# Patient Record
Sex: FEMALE | Race: WHITE | HISPANIC OR LATINO | Employment: UNEMPLOYED | URBAN - METROPOLITAN AREA
[De-identification: names, ages, dates, MRNs, and addresses within clinical notes are randomized per-mention and may not be internally consistent; named-entity substitution may affect disease eponyms.]

---

## 2017-01-05 ENCOUNTER — ALLSCRIPTS OFFICE VISIT (OUTPATIENT)
Dept: OTHER | Facility: OTHER | Age: 62
End: 2017-01-05

## 2017-01-18 ENCOUNTER — ALLSCRIPTS OFFICE VISIT (OUTPATIENT)
Dept: OTHER | Facility: OTHER | Age: 62
End: 2017-01-18

## 2017-02-01 ENCOUNTER — HOSPITAL ENCOUNTER (EMERGENCY)
Facility: HOSPITAL | Age: 62
Discharge: HOME | End: 2017-02-01
Attending: EMERGENCY MEDICINE | Admitting: EMERGENCY MEDICINE
Payer: COMMERCIAL

## 2017-02-01 VITALS
TEMPERATURE: 98.7 F | SYSTOLIC BLOOD PRESSURE: 154 MMHG | HEART RATE: 74 BPM | WEIGHT: 167 LBS | OXYGEN SATURATION: 95 % | BODY MASS INDEX: 27.79 KG/M2 | RESPIRATION RATE: 18 BRPM | DIASTOLIC BLOOD PRESSURE: 65 MMHG

## 2017-02-01 DIAGNOSIS — R59.0 CERVICAL ADENOPATHY: ICD-10-CM

## 2017-02-01 DIAGNOSIS — R21 RASH: Primary | ICD-10-CM

## 2017-02-01 LAB
ALBUMIN SERPL BCP-MCNC: 3.9 G/DL (ref 3.5–5)
ALP SERPL-CCNC: 80 U/L (ref 46–116)
ALT SERPL W P-5'-P-CCNC: 44 U/L (ref 12–78)
ANION GAP SERPL CALCULATED.3IONS-SCNC: 9 MMOL/L (ref 4–13)
AST SERPL W P-5'-P-CCNC: 22 U/L (ref 5–45)
BASOPHILS # BLD AUTO: 0.1 THOUSANDS/ΜL (ref 0–0.1)
BASOPHILS NFR BLD AUTO: 1 % (ref 0–1)
BILIRUB SERPL-MCNC: 0.2 MG/DL (ref 0.2–1)
BUN SERPL-MCNC: 11 MG/DL (ref 5–25)
CALCIUM SERPL-MCNC: 9.4 MG/DL (ref 8.3–10.1)
CHLORIDE SERPL-SCNC: 101 MMOL/L (ref 100–108)
CO2 SERPL-SCNC: 28 MMOL/L (ref 21–32)
CREAT SERPL-MCNC: 0.84 MG/DL (ref 0.6–1.3)
EOSINOPHIL # BLD AUTO: 0.2 THOUSAND/ΜL (ref 0–0.61)
EOSINOPHIL NFR BLD AUTO: 2 % (ref 0–6)
ERYTHROCYTE [DISTWIDTH] IN BLOOD BY AUTOMATED COUNT: 12.5 % (ref 11.6–15.1)
ERYTHROCYTE [SEDIMENTATION RATE] IN BLOOD: 34 MM/HOUR (ref 2–25)
GFR SERPL CREATININE-BSD FRML MDRD: >60 ML/MIN/1.73SQ M
GLUCOSE SERPL-MCNC: 90 MG/DL (ref 65–140)
HCT VFR BLD AUTO: 41.8 % (ref 37–47)
HGB BLD-MCNC: 13.8 G/DL (ref 12–16)
LYMPHOCYTES # BLD AUTO: 3 THOUSANDS/ΜL (ref 0.6–4.47)
LYMPHOCYTES NFR BLD AUTO: 32 % (ref 14–44)
MCH RBC QN AUTO: 30.2 PG (ref 27–31)
MCHC RBC AUTO-ENTMCNC: 33.1 G/DL (ref 31.4–37.4)
MCV RBC AUTO: 91 FL (ref 82–98)
MONOCYTES # BLD AUTO: 0.9 THOUSAND/ΜL (ref 0.17–1.22)
MONOCYTES NFR BLD AUTO: 9 % (ref 4–12)
NEUTROPHILS # BLD AUTO: 5.2 THOUSANDS/ΜL (ref 1.85–7.62)
NEUTS SEG NFR BLD AUTO: 55 % (ref 43–75)
PLATELET # BLD AUTO: 354 THOUSANDS/UL (ref 130–400)
PMV BLD AUTO: 8.1 FL (ref 8.9–12.7)
POTASSIUM SERPL-SCNC: 4.1 MMOL/L (ref 3.5–5.3)
PROT SERPL-MCNC: 8.7 G/DL (ref 6.4–8.2)
RBC # BLD AUTO: 4.57 MILLION/UL (ref 4.2–5.4)
SODIUM SERPL-SCNC: 138 MMOL/L (ref 136–145)
WBC # BLD AUTO: 9.4 THOUSAND/UL (ref 4.8–10.8)

## 2017-02-01 PROCEDURE — 36415 COLL VENOUS BLD VENIPUNCTURE: CPT | Performed by: EMERGENCY MEDICINE

## 2017-02-01 PROCEDURE — 99283 EMERGENCY DEPT VISIT LOW MDM: CPT

## 2017-02-01 PROCEDURE — 80053 COMPREHEN METABOLIC PANEL: CPT | Performed by: EMERGENCY MEDICINE

## 2017-02-01 PROCEDURE — 86617 LYME DISEASE ANTIBODY: CPT | Performed by: EMERGENCY MEDICINE

## 2017-02-01 PROCEDURE — 85652 RBC SED RATE AUTOMATED: CPT | Performed by: EMERGENCY MEDICINE

## 2017-02-01 PROCEDURE — 85025 COMPLETE CBC W/AUTO DIFF WBC: CPT | Performed by: EMERGENCY MEDICINE

## 2017-02-01 RX ORDER — MUPIROCIN CALCIUM 20 MG/G
1 CREAM TOPICAL 3 TIMES DAILY
Qty: 15 G | Refills: 0 | Status: SHIPPED | OUTPATIENT
Start: 2017-02-01 | End: 2017-05-10

## 2017-02-01 RX ORDER — SULFAMETHOXAZOLE AND TRIMETHOPRIM 800; 160 MG/1; MG/1
1 TABLET ORAL 2 TIMES DAILY
Qty: 14 TABLET | Refills: 0 | Status: SHIPPED | OUTPATIENT
Start: 2017-02-01 | End: 2017-02-08

## 2017-02-01 RX ORDER — ESOMEPRAZOLE MAGNESIUM 40 MG/1
CAPSULE, DELAYED RELEASE ORAL DAILY
COMMUNITY
Start: 2015-09-15 | End: 2018-01-20

## 2017-02-01 RX ORDER — SULFAMETHOXAZOLE AND TRIMETHOPRIM 800; 160 MG/1; MG/1
1 TABLET ORAL ONCE
Status: COMPLETED | OUTPATIENT
Start: 2017-02-01 | End: 2017-02-01

## 2017-02-01 RX ADMIN — SULFAMETHOXAZOLE AND TRIMETHOPRIM 1 TABLET: 800; 160 TABLET ORAL at 20:22

## 2017-02-03 LAB
B BURGDOR IGG PATRN SER IB-IMP: NEGATIVE
B BURGDOR IGM PATRN SER IB-IMP: NEGATIVE
B BURGDOR18KD IGG SER QL IB: NORMAL
B BURGDOR23KD IGG SER QL IB: NORMAL
B BURGDOR23KD IGM SER QL IB: NORMAL
B BURGDOR28KD IGG SER QL IB: NORMAL
B BURGDOR30KD IGG SER QL IB: NORMAL
B BURGDOR39KD IGG SER QL IB: NORMAL
B BURGDOR39KD IGM SER QL IB: NORMAL
B BURGDOR41KD IGG SER QL IB: NORMAL
B BURGDOR41KD IGM SER QL IB: NORMAL
B BURGDOR45KD IGG SER QL IB: NORMAL
B BURGDOR58KD IGG SER QL IB: NORMAL
B BURGDOR66KD IGG SER QL IB: NORMAL
B BURGDOR93KD IGG SER QL IB: NORMAL

## 2017-02-07 ENCOUNTER — ALLSCRIPTS OFFICE VISIT (OUTPATIENT)
Dept: OTHER | Facility: OTHER | Age: 62
End: 2017-02-07

## 2017-02-07 LAB
FLUAV AG SPEC QL IA: NEGATIVE
INFLUENZA B AG (HISTORICAL): NEGATIVE

## 2017-03-07 ENCOUNTER — ALLSCRIPTS OFFICE VISIT (OUTPATIENT)
Dept: OTHER | Facility: OTHER | Age: 62
End: 2017-03-07

## 2017-03-09 ENCOUNTER — TRANSCRIBE ORDERS (OUTPATIENT)
Dept: LAB | Facility: CLINIC | Age: 62
End: 2017-03-09

## 2017-03-09 ENCOUNTER — APPOINTMENT (OUTPATIENT)
Dept: LAB | Facility: CLINIC | Age: 62
End: 2017-03-09
Payer: COMMERCIAL

## 2017-03-09 ENCOUNTER — GENERIC CONVERSION - ENCOUNTER (OUTPATIENT)
Dept: OTHER | Facility: OTHER | Age: 62
End: 2017-03-09

## 2017-03-09 DIAGNOSIS — E78.5 HYPERLIPIDEMIA, UNSPECIFIED HYPERLIPIDEMIA TYPE: Primary | ICD-10-CM

## 2017-03-09 DIAGNOSIS — Z12.31 ENCOUNTER FOR SCREENING MAMMOGRAM FOR MALIGNANT NEOPLASM OF BREAST: ICD-10-CM

## 2017-03-09 LAB — VENIPUNCTURE: NORMAL

## 2017-03-09 PROCEDURE — 36415 COLL VENOUS BLD VENIPUNCTURE: CPT | Performed by: FAMILY MEDICINE

## 2017-03-10 LAB
A/G RATIO (HISTORICAL): 1.4 (ref 1.1–2.5)
ALBUMIN SERPL BCP-MCNC: 4.6 G/DL (ref 3.6–4.8)
ALP SERPL-CCNC: 64 IU/L (ref 39–117)
ALT SERPL W P-5'-P-CCNC: 29 IU/L (ref 0–32)
AST SERPL W P-5'-P-CCNC: 24 IU/L (ref 0–40)
BILIRUB SERPL-MCNC: 0.3 MG/DL (ref 0–1.2)
BUN SERPL-MCNC: 19 MG/DL (ref 8–27)
BUN/CREA RATIO (HISTORICAL): 21 (ref 11–26)
CALCIUM SERPL-MCNC: 10.1 MG/DL (ref 8.7–10.3)
CHLORIDE SERPL-SCNC: 100 MMOL/L (ref 96–106)
CHOLEST SERPL-MCNC: 271 MG/DL (ref 100–199)
CO2 SERPL-SCNC: 22 MMOL/L (ref 18–29)
CREAT SERPL-MCNC: 0.9 MG/DL (ref 0.57–1)
CREATININE, URINE (HISTORICAL): 224 MG/DL
EGFR AFRICAN AMERICAN (HISTORICAL): 79 ML/MIN/1.73
EGFR-AMERICAN CALC (HISTORICAL): 68 ML/MIN/1.73
GLUCOSE SERPL-MCNC: 109 MG/DL (ref 65–99)
HDLC SERPL-MCNC: 51 MG/DL
LDLC SERPL CALC-MCNC: 187 MG/DL (ref 0–99)
MICROALBUM.,U,RANDOM (HISTORICAL): 8.6 UG/ML
MICROALBUMIN/CREATININE RATIO (HISTORICAL): 3.8 MG/G CREAT (ref 0–30)
POTASSIUM SERPL-SCNC: 4.6 MMOL/L (ref 3.5–5.2)
SODIUM SERPL-SCNC: 139 MMOL/L (ref 134–144)
TOT. GLOBULIN, SERUM (HISTORICAL): 3.4 G/DL (ref 1.5–4.5)
TOTAL PROTEIN (HISTORICAL): 8 G/DL (ref 6–8.5)
TRIGL SERPL-MCNC: 167 MG/DL (ref 0–149)

## 2017-03-11 LAB
HBA1C MFR BLD HPLC: 6.2 % (ref 4.8–5.6)
INTERPRETATION (HISTORICAL): NORMAL

## 2017-03-13 ENCOUNTER — GENERIC CONVERSION - ENCOUNTER (OUTPATIENT)
Dept: OTHER | Facility: OTHER | Age: 62
End: 2017-03-13

## 2017-04-04 DIAGNOSIS — M79.602 PAIN OF LEFT ARM: ICD-10-CM

## 2017-04-04 DIAGNOSIS — R20.2 PARESTHESIA OF SKIN: ICD-10-CM

## 2017-04-04 DIAGNOSIS — M79.601 PAIN OF RIGHT ARM: ICD-10-CM

## 2017-04-04 DIAGNOSIS — Z12.31 ENCOUNTER FOR SCREENING MAMMOGRAM FOR MALIGNANT NEOPLASM OF BREAST: ICD-10-CM

## 2017-04-06 ENCOUNTER — ALLSCRIPTS OFFICE VISIT (OUTPATIENT)
Dept: OTHER | Facility: OTHER | Age: 62
End: 2017-04-06

## 2017-04-08 ENCOUNTER — HOSPITAL ENCOUNTER (OUTPATIENT)
Dept: RADIOLOGY | Facility: HOSPITAL | Age: 62
Discharge: HOME | End: 2017-04-08
Attending: FAMILY MEDICINE
Payer: COMMERCIAL

## 2017-04-08 ENCOUNTER — GENERIC CONVERSION - ENCOUNTER (OUTPATIENT)
Dept: OTHER | Facility: OTHER | Age: 62
End: 2017-04-08

## 2017-04-08 DIAGNOSIS — Z12.31 ENCOUNTER FOR SCREENING MAMMOGRAM FOR MALIGNANT NEOPLASM OF BREAST: ICD-10-CM

## 2017-04-08 PROCEDURE — G0202 SCR MAMMO BI INCL CAD: HCPCS

## 2017-04-11 ENCOUNTER — GENERIC CONVERSION - ENCOUNTER (OUTPATIENT)
Dept: OTHER | Facility: OTHER | Age: 62
End: 2017-04-11

## 2017-04-11 ENCOUNTER — APPOINTMENT (OUTPATIENT)
Dept: PHYSICAL THERAPY | Facility: CLINIC | Age: 62
End: 2017-04-11
Payer: COMMERCIAL

## 2017-04-11 DIAGNOSIS — M79.601 PAIN OF RIGHT ARM: ICD-10-CM

## 2017-04-11 DIAGNOSIS — R20.2 PARESTHESIA OF SKIN: ICD-10-CM

## 2017-04-11 DIAGNOSIS — M79.602 PAIN OF LEFT ARM: ICD-10-CM

## 2017-04-11 PROCEDURE — 97140 MANUAL THERAPY 1/> REGIONS: CPT

## 2017-04-11 PROCEDURE — 97110 THERAPEUTIC EXERCISES: CPT

## 2017-04-11 PROCEDURE — 97161 PT EVAL LOW COMPLEX 20 MIN: CPT

## 2017-04-12 ENCOUNTER — GENERIC CONVERSION - ENCOUNTER (OUTPATIENT)
Dept: OTHER | Facility: OTHER | Age: 62
End: 2017-04-12

## 2017-04-24 ENCOUNTER — APPOINTMENT (OUTPATIENT)
Dept: PHYSICAL THERAPY | Facility: CLINIC | Age: 62
End: 2017-04-24
Payer: COMMERCIAL

## 2017-04-24 PROCEDURE — 97110 THERAPEUTIC EXERCISES: CPT

## 2017-04-24 PROCEDURE — 97112 NEUROMUSCULAR REEDUCATION: CPT

## 2017-04-26 ENCOUNTER — APPOINTMENT (OUTPATIENT)
Dept: PHYSICAL THERAPY | Facility: CLINIC | Age: 62
End: 2017-04-26
Payer: COMMERCIAL

## 2017-04-26 PROCEDURE — 97110 THERAPEUTIC EXERCISES: CPT

## 2017-04-26 PROCEDURE — 97140 MANUAL THERAPY 1/> REGIONS: CPT

## 2017-04-26 PROCEDURE — 97112 NEUROMUSCULAR REEDUCATION: CPT

## 2017-05-01 ENCOUNTER — APPOINTMENT (OUTPATIENT)
Dept: PHYSICAL THERAPY | Facility: CLINIC | Age: 62
End: 2017-05-01
Payer: COMMERCIAL

## 2017-05-01 PROCEDURE — 97110 THERAPEUTIC EXERCISES: CPT

## 2017-05-01 PROCEDURE — 97112 NEUROMUSCULAR REEDUCATION: CPT

## 2017-05-04 ENCOUNTER — APPOINTMENT (OUTPATIENT)
Dept: PHYSICAL THERAPY | Facility: CLINIC | Age: 62
End: 2017-05-04
Payer: COMMERCIAL

## 2017-05-04 PROCEDURE — 97112 NEUROMUSCULAR REEDUCATION: CPT

## 2017-05-08 ENCOUNTER — APPOINTMENT (OUTPATIENT)
Dept: PHYSICAL THERAPY | Facility: CLINIC | Age: 62
End: 2017-05-08
Payer: COMMERCIAL

## 2017-05-08 PROCEDURE — 97110 THERAPEUTIC EXERCISES: CPT

## 2017-05-10 ENCOUNTER — HOSPITAL ENCOUNTER (EMERGENCY)
Facility: HOSPITAL | Age: 62
Discharge: HOME | End: 2017-05-10
Attending: EMERGENCY MEDICINE | Admitting: EMERGENCY MEDICINE
Payer: COMMERCIAL

## 2017-05-10 ENCOUNTER — APPOINTMENT (EMERGENCY)
Dept: RADIOLOGY | Facility: HOSPITAL | Age: 62
End: 2017-05-10
Payer: COMMERCIAL

## 2017-05-10 ENCOUNTER — GENERIC CONVERSION - ENCOUNTER (OUTPATIENT)
Dept: OTHER | Facility: OTHER | Age: 62
End: 2017-05-10

## 2017-05-10 ENCOUNTER — APPOINTMENT (OUTPATIENT)
Dept: PHYSICAL THERAPY | Facility: CLINIC | Age: 62
End: 2017-05-10
Payer: COMMERCIAL

## 2017-05-10 VITALS
HEART RATE: 85 BPM | SYSTOLIC BLOOD PRESSURE: 108 MMHG | WEIGHT: 164 LBS | OXYGEN SATURATION: 97 % | BODY MASS INDEX: 27.32 KG/M2 | RESPIRATION RATE: 16 BRPM | HEIGHT: 65 IN | DIASTOLIC BLOOD PRESSURE: 83 MMHG

## 2017-05-10 DIAGNOSIS — R42 DIZZINESS: Primary | ICD-10-CM

## 2017-05-10 DIAGNOSIS — R55 POSTURAL DIZZINESS WITH PRESYNCOPE: ICD-10-CM

## 2017-05-10 DIAGNOSIS — R42 POSTURAL DIZZINESS WITH PRESYNCOPE: ICD-10-CM

## 2017-05-10 DIAGNOSIS — S43.006A SHOULDER DISLOCATION: ICD-10-CM

## 2017-05-10 LAB
ALBUMIN SERPL BCP-MCNC: 4 G/DL (ref 3.5–5)
ALP SERPL-CCNC: 76 U/L (ref 46–116)
ALT SERPL W P-5'-P-CCNC: 63 U/L (ref 12–78)
ANION GAP SERPL CALCULATED.3IONS-SCNC: 12 MMOL/L (ref 4–13)
APTT PPP: 22 SECONDS (ref 24–33)
AST SERPL W P-5'-P-CCNC: 28 U/L (ref 5–45)
BASOPHILS # BLD AUTO: 0.3 THOUSANDS/ΜL (ref 0–0.1)
BASOPHILS NFR BLD AUTO: 3 % (ref 0–1)
BILIRUB SERPL-MCNC: 0.3 MG/DL (ref 0.2–1)
BILIRUB UR QL STRIP: NEGATIVE
BUN SERPL-MCNC: 14 MG/DL (ref 5–25)
CALCIUM SERPL-MCNC: 9.4 MG/DL (ref 8.3–10.1)
CHLORIDE SERPL-SCNC: 101 MMOL/L (ref 100–108)
CK SERPL-CCNC: 80 U/L (ref 26–192)
CLARITY UR: CLEAR
CO2 SERPL-SCNC: 25 MMOL/L (ref 21–32)
COLOR UR: YELLOW
CREAT SERPL-MCNC: 0.8 MG/DL (ref 0.6–1.3)
EOSINOPHIL # BLD AUTO: 0.4 THOUSAND/ΜL (ref 0–0.61)
EOSINOPHIL NFR BLD AUTO: 4 % (ref 0–6)
ERYTHROCYTE [DISTWIDTH] IN BLOOD BY AUTOMATED COUNT: 13.4 % (ref 11.6–15.1)
GFR SERPL CREATININE-BSD FRML MDRD: >60 ML/MIN/1.73SQ M
GLUCOSE SERPL-MCNC: 134 MG/DL (ref 65–140)
GLUCOSE UR STRIP-MCNC: NEGATIVE MG/DL
HCT VFR BLD AUTO: 45 % (ref 37–47)
HGB BLD-MCNC: 14.6 G/DL (ref 12–16)
HGB UR QL STRIP.AUTO: NEGATIVE
INR PPP: 0.95 (ref 0.86–1.16)
KETONES UR STRIP-MCNC: ABNORMAL MG/DL
LEUKOCYTE ESTERASE UR QL STRIP: NEGATIVE
LYMPHOCYTES # BLD AUTO: 3 THOUSANDS/ΜL (ref 0.6–4.47)
LYMPHOCYTES NFR BLD AUTO: 33 % (ref 14–44)
MCH RBC QN AUTO: 29.7 PG (ref 27–31)
MCHC RBC AUTO-ENTMCNC: 32.4 G/DL (ref 31.4–37.4)
MCV RBC AUTO: 92 FL (ref 82–98)
MONOCYTES # BLD AUTO: 0.9 THOUSAND/ΜL (ref 0.17–1.22)
MONOCYTES NFR BLD AUTO: 11 % (ref 4–12)
NEUTROPHILS # BLD AUTO: 4.3 THOUSANDS/ΜL (ref 1.85–7.62)
NEUTS SEG NFR BLD AUTO: 49 % (ref 43–75)
NITRITE UR QL STRIP: NEGATIVE
PH UR STRIP.AUTO: 8.5 [PH] (ref 5–9)
PLATELET # BLD AUTO: 318 THOUSANDS/UL (ref 130–400)
PLATELET BLD QL SMEAR: ADEQUATE
PMV BLD AUTO: 8.1 FL (ref 8.9–12.7)
POTASSIUM SERPL-SCNC: 4 MMOL/L (ref 3.5–5.3)
PROT SERPL-MCNC: 8.5 G/DL (ref 6.4–8.2)
PROT UR STRIP-MCNC: NEGATIVE MG/DL
PROTHROMBIN TIME: 10 SECONDS (ref 9.4–11.7)
RBC # BLD AUTO: 4.91 MILLION/UL (ref 4.2–5.4)
SODIUM SERPL-SCNC: 138 MMOL/L (ref 136–145)
SP GR UR STRIP.AUTO: 1.02 (ref 1–1.03)
TROPONIN I SERPL-MCNC: <0.02 NG/ML
TROPONIN I SERPL-MCNC: <0.02 NG/ML
UROBILINOGEN UR QL STRIP.AUTO: 0.2 E.U./DL
WBC # BLD AUTO: 8.9 THOUSAND/UL (ref 4.8–10.8)

## 2017-05-10 PROCEDURE — 73060 X-RAY EXAM OF HUMERUS: CPT

## 2017-05-10 PROCEDURE — 36415 COLL VENOUS BLD VENIPUNCTURE: CPT | Performed by: EMERGENCY MEDICINE

## 2017-05-10 PROCEDURE — 85730 THROMBOPLASTIN TIME PARTIAL: CPT | Performed by: EMERGENCY MEDICINE

## 2017-05-10 PROCEDURE — 71010 HB CHEST X-RAY 1 VIEW FRONTAL (PORTABLE): CPT

## 2017-05-10 PROCEDURE — 73030 X-RAY EXAM OF SHOULDER: CPT

## 2017-05-10 PROCEDURE — 96375 TX/PRO/DX INJ NEW DRUG ADDON: CPT

## 2017-05-10 PROCEDURE — 81003 URINALYSIS AUTO W/O SCOPE: CPT | Performed by: EMERGENCY MEDICINE

## 2017-05-10 PROCEDURE — 99284 EMERGENCY DEPT VISIT MOD MDM: CPT

## 2017-05-10 PROCEDURE — 96376 TX/PRO/DX INJ SAME DRUG ADON: CPT

## 2017-05-10 PROCEDURE — 85610 PROTHROMBIN TIME: CPT | Performed by: EMERGENCY MEDICINE

## 2017-05-10 PROCEDURE — 80053 COMPREHEN METABOLIC PANEL: CPT | Performed by: EMERGENCY MEDICINE

## 2017-05-10 PROCEDURE — 82550 ASSAY OF CK (CPK): CPT | Performed by: EMERGENCY MEDICINE

## 2017-05-10 PROCEDURE — 93005 ELECTROCARDIOGRAM TRACING: CPT | Performed by: EMERGENCY MEDICINE

## 2017-05-10 PROCEDURE — 85025 COMPLETE CBC W/AUTO DIFF WBC: CPT | Performed by: EMERGENCY MEDICINE

## 2017-05-10 PROCEDURE — 96374 THER/PROPH/DIAG INJ IV PUSH: CPT

## 2017-05-10 PROCEDURE — 84484 ASSAY OF TROPONIN QUANT: CPT | Performed by: EMERGENCY MEDICINE

## 2017-05-10 RX ORDER — FENTANYL CITRATE 50 UG/ML
50 INJECTION, SOLUTION INTRAMUSCULAR; INTRAVENOUS ONCE
Status: COMPLETED | OUTPATIENT
Start: 2017-05-10 | End: 2017-05-10

## 2017-05-10 RX ORDER — NALOXONE HYDROCHLORIDE 1 MG/ML
2 INJECTION INTRAMUSCULAR; INTRAVENOUS; SUBCUTANEOUS ONCE
Status: DISCONTINUED | OUTPATIENT
Start: 2017-05-10 | End: 2017-05-10

## 2017-05-10 RX ORDER — FENTANYL CITRATE 50 UG/ML
100 INJECTION, SOLUTION INTRAMUSCULAR; INTRAVENOUS ONCE
Status: DISCONTINUED | OUTPATIENT
Start: 2017-05-10 | End: 2017-05-10

## 2017-05-10 RX ORDER — MIDAZOLAM HYDROCHLORIDE 1 MG/ML
2 INJECTION INTRAMUSCULAR; INTRAVENOUS ONCE
Status: COMPLETED | OUTPATIENT
Start: 2017-05-10 | End: 2017-05-10

## 2017-05-10 RX ORDER — KETOROLAC TROMETHAMINE 30 MG/ML
60 INJECTION, SOLUTION INTRAMUSCULAR; INTRAVENOUS ONCE
Status: DISCONTINUED | OUTPATIENT
Start: 2017-05-10 | End: 2017-05-10

## 2017-05-10 RX ORDER — TRAMADOL HYDROCHLORIDE 50 MG/1
50 TABLET ORAL EVERY 6 HOURS PRN
Qty: 20 TABLET | Refills: 0 | Status: SHIPPED | OUTPATIENT
Start: 2017-05-10 | End: 2017-05-20

## 2017-05-10 RX ORDER — KETOROLAC TROMETHAMINE 30 MG/ML
30 INJECTION, SOLUTION INTRAMUSCULAR; INTRAVENOUS ONCE
Status: COMPLETED | OUTPATIENT
Start: 2017-05-10 | End: 2017-05-10

## 2017-05-10 RX ADMIN — KETOROLAC TROMETHAMINE 30 MG: 30 INJECTION, SOLUTION INTRAMUSCULAR at 11:37

## 2017-05-10 RX ADMIN — FENTANYL CITRATE 50 MCG: 50 INJECTION INTRAMUSCULAR; INTRAVENOUS at 12:35

## 2017-05-10 RX ADMIN — FENTANYL CITRATE 50 MCG: 50 INJECTION INTRAMUSCULAR; INTRAVENOUS at 11:38

## 2017-05-10 RX ADMIN — MIDAZOLAM 2 MG: 1 INJECTION INTRAMUSCULAR; INTRAVENOUS at 12:35

## 2017-05-12 ENCOUNTER — ALLSCRIPTS OFFICE VISIT (OUTPATIENT)
Dept: OTHER | Facility: OTHER | Age: 62
End: 2017-05-12

## 2017-05-12 LAB
ATRIAL RATE: 81 BPM
P AXIS: 44 DEGREES
PR INTERVAL: 160 MS
QRS AXIS: 20 DEGREES
QRSD INTERVAL: 86 MS
QT INTERVAL: 366 MS
QTC INTERVAL: 425 MS
T WAVE AXIS: 58 DEGREES
VENTRICULAR RATE: 81 BPM

## 2017-05-15 ENCOUNTER — APPOINTMENT (OUTPATIENT)
Dept: PHYSICAL THERAPY | Facility: CLINIC | Age: 62
End: 2017-05-15
Payer: COMMERCIAL

## 2017-05-17 ENCOUNTER — APPOINTMENT (OUTPATIENT)
Dept: PHYSICAL THERAPY | Facility: CLINIC | Age: 62
End: 2017-05-17
Payer: COMMERCIAL

## 2017-05-22 ENCOUNTER — TRANSCRIBE ORDERS (OUTPATIENT)
Dept: ADMINISTRATIVE | Facility: HOSPITAL | Age: 62
End: 2017-05-22

## 2017-05-22 ENCOUNTER — ALLSCRIPTS OFFICE VISIT (OUTPATIENT)
Dept: OTHER | Facility: OTHER | Age: 62
End: 2017-05-22

## 2017-05-22 DIAGNOSIS — R42 DIZZINESS AND GIDDINESS: Primary | ICD-10-CM

## 2017-05-22 DIAGNOSIS — M25.312 OTHER INSTABILITY, LEFT SHOULDER: ICD-10-CM

## 2017-05-22 DIAGNOSIS — R42 DIZZINESS AND GIDDINESS: ICD-10-CM

## 2017-05-22 DIAGNOSIS — M24.412 RECURRENT DISLOCATION OF LEFT SHOULDER: ICD-10-CM

## 2017-05-31 ENCOUNTER — ALLSCRIPTS OFFICE VISIT (OUTPATIENT)
Dept: OTHER | Facility: OTHER | Age: 62
End: 2017-05-31

## 2017-05-31 ENCOUNTER — HOSPITAL ENCOUNTER (OUTPATIENT)
Dept: NON INVASIVE DIAGNOSTICS | Facility: HOSPITAL | Age: 62
Discharge: HOME | End: 2017-05-31
Attending: INTERNAL MEDICINE
Payer: COMMERCIAL

## 2017-05-31 DIAGNOSIS — R42 DIZZINESS AND GIDDINESS: ICD-10-CM

## 2017-05-31 PROCEDURE — 93017 CV STRESS TEST TRACING ONLY: CPT

## 2017-05-31 PROCEDURE — 93306 TTE W/DOPPLER COMPLETE: CPT

## 2017-06-01 LAB
MAX DIASTOLIC BP: 80 MMHG
MAX HEART RATE: 139 BPM
MAX PREDICTED HEART RATE: 157 BPM
MAX. SYSTOLIC BP: 170 MMHG
PROTOCOL NAME: NORMAL
TIME IN EXERCISE PHASE: 529 S

## 2017-06-06 ENCOUNTER — HOSPITAL ENCOUNTER (OUTPATIENT)
Dept: RADIOLOGY | Facility: HOSPITAL | Age: 62
Discharge: HOME | End: 2017-06-06
Attending: ORTHOPAEDIC SURGERY
Payer: COMMERCIAL

## 2017-06-06 DIAGNOSIS — M24.412 RECURRENT DISLOCATION OF LEFT SHOULDER: ICD-10-CM

## 2017-06-06 PROCEDURE — 73221 MRI JOINT UPR EXTREM W/O DYE: CPT

## 2017-06-12 ENCOUNTER — GENERIC CONVERSION - ENCOUNTER (OUTPATIENT)
Dept: OTHER | Facility: OTHER | Age: 62
End: 2017-06-12

## 2017-06-12 ENCOUNTER — ALLSCRIPTS OFFICE VISIT (OUTPATIENT)
Dept: OTHER | Facility: OTHER | Age: 62
End: 2017-06-12

## 2017-06-14 ENCOUNTER — ALLSCRIPTS OFFICE VISIT (OUTPATIENT)
Dept: OTHER | Facility: OTHER | Age: 62
End: 2017-06-14

## 2017-06-21 ENCOUNTER — ALLSCRIPTS OFFICE VISIT (OUTPATIENT)
Dept: OTHER | Facility: OTHER | Age: 62
End: 2017-06-21

## 2017-07-06 ENCOUNTER — APPOINTMENT (OUTPATIENT)
Dept: PHYSICAL THERAPY | Facility: CLINIC | Age: 62
End: 2017-07-06
Payer: COMMERCIAL

## 2017-07-06 PROCEDURE — 97140 MANUAL THERAPY 1/> REGIONS: CPT

## 2017-07-06 PROCEDURE — 97161 PT EVAL LOW COMPLEX 20 MIN: CPT

## 2017-07-10 ENCOUNTER — APPOINTMENT (OUTPATIENT)
Dept: PHYSICAL THERAPY | Facility: CLINIC | Age: 62
End: 2017-07-10
Payer: COMMERCIAL

## 2017-07-10 PROCEDURE — 97110 THERAPEUTIC EXERCISES: CPT

## 2017-07-10 PROCEDURE — 97014 ELECTRIC STIMULATION THERAPY: CPT

## 2017-07-12 ENCOUNTER — GENERIC CONVERSION - ENCOUNTER (OUTPATIENT)
Dept: OTHER | Facility: OTHER | Age: 62
End: 2017-07-12

## 2017-07-13 ENCOUNTER — APPOINTMENT (OUTPATIENT)
Dept: PHYSICAL THERAPY | Facility: CLINIC | Age: 62
End: 2017-07-13
Payer: COMMERCIAL

## 2017-07-13 PROCEDURE — 97110 THERAPEUTIC EXERCISES: CPT

## 2017-07-13 PROCEDURE — 97140 MANUAL THERAPY 1/> REGIONS: CPT

## 2017-07-18 ENCOUNTER — APPOINTMENT (OUTPATIENT)
Dept: PHYSICAL THERAPY | Facility: CLINIC | Age: 62
End: 2017-07-18
Payer: COMMERCIAL

## 2017-07-18 PROCEDURE — 97014 ELECTRIC STIMULATION THERAPY: CPT

## 2017-07-18 PROCEDURE — 97110 THERAPEUTIC EXERCISES: CPT

## 2017-07-19 ENCOUNTER — ALLSCRIPTS OFFICE VISIT (OUTPATIENT)
Dept: OTHER | Facility: OTHER | Age: 62
End: 2017-07-19

## 2017-07-20 ENCOUNTER — APPOINTMENT (OUTPATIENT)
Dept: PHYSICAL THERAPY | Facility: CLINIC | Age: 62
End: 2017-07-20
Payer: COMMERCIAL

## 2017-07-25 ENCOUNTER — APPOINTMENT (OUTPATIENT)
Dept: PHYSICAL THERAPY | Facility: CLINIC | Age: 62
End: 2017-07-25
Payer: COMMERCIAL

## 2017-07-27 ENCOUNTER — APPOINTMENT (OUTPATIENT)
Dept: PHYSICAL THERAPY | Facility: CLINIC | Age: 62
End: 2017-07-27
Payer: COMMERCIAL

## 2017-08-01 ENCOUNTER — APPOINTMENT (OUTPATIENT)
Dept: PHYSICAL THERAPY | Facility: CLINIC | Age: 62
End: 2017-08-01
Payer: COMMERCIAL

## 2017-08-01 PROCEDURE — 97110 THERAPEUTIC EXERCISES: CPT

## 2017-08-01 PROCEDURE — 97014 ELECTRIC STIMULATION THERAPY: CPT

## 2017-08-03 ENCOUNTER — APPOINTMENT (OUTPATIENT)
Dept: PHYSICAL THERAPY | Facility: CLINIC | Age: 62
End: 2017-08-03
Payer: COMMERCIAL

## 2017-08-03 PROCEDURE — 97110 THERAPEUTIC EXERCISES: CPT

## 2017-08-03 PROCEDURE — 97014 ELECTRIC STIMULATION THERAPY: CPT

## 2017-08-08 ENCOUNTER — APPOINTMENT (OUTPATIENT)
Dept: PHYSICAL THERAPY | Facility: CLINIC | Age: 62
End: 2017-08-08
Payer: COMMERCIAL

## 2017-08-08 PROCEDURE — 97014 ELECTRIC STIMULATION THERAPY: CPT

## 2017-08-08 PROCEDURE — 97110 THERAPEUTIC EXERCISES: CPT

## 2017-08-10 ENCOUNTER — APPOINTMENT (OUTPATIENT)
Dept: PHYSICAL THERAPY | Facility: CLINIC | Age: 62
End: 2017-08-10
Payer: COMMERCIAL

## 2017-08-10 PROCEDURE — 97014 ELECTRIC STIMULATION THERAPY: CPT

## 2017-08-10 PROCEDURE — 97110 THERAPEUTIC EXERCISES: CPT

## 2017-08-15 ENCOUNTER — APPOINTMENT (OUTPATIENT)
Dept: PHYSICAL THERAPY | Facility: CLINIC | Age: 62
End: 2017-08-15
Payer: COMMERCIAL

## 2017-08-15 PROCEDURE — 97110 THERAPEUTIC EXERCISES: CPT

## 2017-08-15 PROCEDURE — 97014 ELECTRIC STIMULATION THERAPY: CPT

## 2017-08-17 ENCOUNTER — APPOINTMENT (OUTPATIENT)
Dept: PHYSICAL THERAPY | Facility: CLINIC | Age: 62
End: 2017-08-17
Payer: COMMERCIAL

## 2017-08-17 PROCEDURE — 97110 THERAPEUTIC EXERCISES: CPT

## 2017-08-22 ENCOUNTER — APPOINTMENT (OUTPATIENT)
Dept: PHYSICAL THERAPY | Facility: CLINIC | Age: 62
End: 2017-08-22
Payer: COMMERCIAL

## 2017-08-22 PROCEDURE — 97110 THERAPEUTIC EXERCISES: CPT

## 2017-08-24 ENCOUNTER — APPOINTMENT (OUTPATIENT)
Dept: PHYSICAL THERAPY | Facility: CLINIC | Age: 62
End: 2017-08-24
Payer: COMMERCIAL

## 2017-08-24 PROCEDURE — 97110 THERAPEUTIC EXERCISES: CPT

## 2017-08-28 ENCOUNTER — ALLSCRIPTS OFFICE VISIT (OUTPATIENT)
Dept: OTHER | Facility: OTHER | Age: 62
End: 2017-08-28

## 2017-09-18 ENCOUNTER — ALLSCRIPTS OFFICE VISIT (OUTPATIENT)
Dept: OTHER | Facility: OTHER | Age: 62
End: 2017-09-18

## 2017-09-18 DIAGNOSIS — N94.89 OTHER SPECIFIED CONDITIONS ASSOCIATED WITH FEMALE GENITAL ORGANS AND MENSTRUAL CYCLE: ICD-10-CM

## 2017-09-18 LAB
BILIRUB UR QL STRIP: NORMAL
CLARITY UR: NORMAL
COLOR UR: YELLOW
GLUCOSE (HISTORICAL): NORMAL
HGB UR QL STRIP.AUTO: NORMAL
KETONES UR STRIP-MCNC: NORMAL MG/DL
LEUKOCYTE ESTERASE UR QL STRIP: NORMAL
NITRITE UR QL STRIP: NORMAL
PH UR STRIP.AUTO: 6 [PH]
PROT UR STRIP-MCNC: NORMAL MG/DL
SP GR UR STRIP.AUTO: 1.02
UROBILINOGEN UR QL STRIP.AUTO: NORMAL

## 2017-09-25 ENCOUNTER — HOSPITAL ENCOUNTER (OUTPATIENT)
Dept: RADIOLOGY | Facility: HOSPITAL | Age: 62
Discharge: HOME | End: 2017-09-25
Attending: FAMILY MEDICINE
Payer: COMMERCIAL

## 2017-09-25 DIAGNOSIS — N94.89 OTHER SPECIFIED CONDITIONS ASSOCIATED WITH FEMALE GENITAL ORGANS AND MENSTRUAL CYCLE: ICD-10-CM

## 2017-09-25 PROCEDURE — 76830 TRANSVAGINAL US NON-OB: CPT

## 2017-09-25 PROCEDURE — 76856 US EXAM PELVIC COMPLETE: CPT

## 2017-09-26 ENCOUNTER — GENERIC CONVERSION - ENCOUNTER (OUTPATIENT)
Dept: OTHER | Facility: OTHER | Age: 62
End: 2017-09-26

## 2017-09-30 ENCOUNTER — ALLSCRIPTS OFFICE VISIT (OUTPATIENT)
Dept: OTHER | Facility: OTHER | Age: 62
End: 2017-09-30

## 2017-09-30 LAB
BILIRUB UR QL STRIP: NORMAL
CLARITY UR: NORMAL
COLOR UR: YELLOW
GLUCOSE (HISTORICAL): NORMAL
HGB UR QL STRIP.AUTO: NORMAL
KETONES UR STRIP-MCNC: NORMAL MG/DL
LEUKOCYTE ESTERASE UR QL STRIP: NORMAL
NITRITE UR QL STRIP: NORMAL
PH UR STRIP.AUTO: 5 [PH]
PROT UR STRIP-MCNC: NORMAL MG/DL
SP GR UR STRIP.AUTO: 1.01
UROBILINOGEN UR QL STRIP.AUTO: NORMAL

## 2017-10-11 ENCOUNTER — GENERIC CONVERSION - ENCOUNTER (OUTPATIENT)
Dept: OTHER | Facility: OTHER | Age: 62
End: 2017-10-11

## 2017-11-04 ENCOUNTER — APPOINTMENT (EMERGENCY)
Dept: RADIOLOGY | Facility: HOSPITAL | Age: 62
End: 2017-11-04
Payer: COMMERCIAL

## 2017-11-04 ENCOUNTER — HOSPITAL ENCOUNTER (EMERGENCY)
Facility: HOSPITAL | Age: 62
Discharge: HOME | End: 2017-11-04
Admitting: EMERGENCY MEDICINE
Payer: COMMERCIAL

## 2017-11-04 VITALS
WEIGHT: 162 LBS | TEMPERATURE: 97.8 F | HEART RATE: 71 BPM | BODY MASS INDEX: 26.96 KG/M2 | SYSTOLIC BLOOD PRESSURE: 117 MMHG | DIASTOLIC BLOOD PRESSURE: 70 MMHG | OXYGEN SATURATION: 96 % | RESPIRATION RATE: 20 BRPM

## 2017-11-04 DIAGNOSIS — S92.912A TOE FRACTURE, LEFT: Primary | ICD-10-CM

## 2017-11-04 PROCEDURE — 99283 EMERGENCY DEPT VISIT LOW MDM: CPT

## 2017-11-04 PROCEDURE — 73660 X-RAY EXAM OF TOE(S): CPT

## 2017-11-04 RX ORDER — NAPROXEN 500 MG/1
500 TABLET ORAL 2 TIMES DAILY WITH MEALS
Qty: 20 TABLET | Refills: 0 | Status: SHIPPED | OUTPATIENT
Start: 2017-11-04 | End: 2018-01-20

## 2017-11-04 NOTE — ED PROVIDER NOTES
History  Chief Complaint   Patient presents with    Toe Injury     injury to L fifth toe yesterday     57-year-old female presenting today with left pinky toe pain x1 day that began after patient had an inversion injury  Notes some bruising and swelling to this area  No other foot or ankle tenderness  Has been able to ambulate however with some pain  Pain ranking 6/10 nonradiating  Denies numbness, paresthesias, open injury or fall, hitting of head  Differential includes but is not limited to fracture, sprain, dislocation  Prior to Admission Medications   Prescriptions Last Dose Informant Patient Reported? Taking? BIOTIN PO   Yes Yes   Sig: Take by mouth daily   esomeprazole (NEXIUM) 40 MG capsule   Yes No   Sig: Take by mouth daily     fluticasone-salmeterol (ADVAIR) 250-50 mcg/dose inhaler   Yes No   Sig: Inhale 1 puff daily        Facility-Administered Medications: None       Past Medical History:   Diagnosis Date    Asthma     Fibromyalgia     History of shingles     may 2016    Sleep apnea        Past Surgical History:   Procedure Laterality Date    ABDOMINAL SURGERY      release of adhesions    BLADDER SURGERY      mesh-lift    BREAST SURGERY      lift     SECTION      x 3    CHOLECYSTECTOMY      lap    COLONOSCOPY      COSMETIC SURGERY      tummy and breast lift    ESOPHAGOGASTRODUODENOSCOPY      OOPHORECTOMY Left     OTHER SURGICAL HISTORY      vocal cord surgery, scraping    [place] HYSTEROSCOPY,W/ENDO BX N/A 11/3/2016    Procedure: DILATATION AND CURETTAGE (D&C) WITH HYSTEROSCOPY;  Surgeon: An Agosto MD;  Location: [facility];  Service: Gynecology    TONSILLECTOMY         Family History   Problem Relation Age of Onset    Hypertension Mother     Alzheimer's disease Mother     Hypertension Father     Kidney disease Brother      stone    Diabetes Daughter      I have reviewed and agree with the history as documented      Social History   Substance Use Topics    Smoking status: Never Smoker    Smokeless tobacco: Never Used    Alcohol use No        Review of Systems   Constitutional: Negative  Negative for activity change and appetite change  HENT: Negative  Eyes: Negative  Respiratory: Negative  Cardiovascular: Negative  Gastrointestinal: Negative  Genitourinary: Negative  Musculoskeletal: Positive for arthralgias  Negative for back pain, gait problem, joint swelling, myalgias, neck pain and neck stiffness  Skin: Positive for color change  Negative for pallor, rash and wound  Neurological: Negative  Negative for weakness and numbness  All other systems reviewed and are negative  Physical Exam  ED Triage Vitals [11/04/17 1112]   Temperature Pulse Respirations Blood Pressure SpO2   97 8 °F (36 6 °C) 71 20 117/70 96 %      Temp src Heart Rate Source Patient Position - Orthostatic VS BP Location FiO2 (%)   -- Monitor Sitting Right arm --      Pain Score       7           Orthostatic Vital Signs  Vitals:    11/04/17 1112   BP: 117/70   Pulse: 71   Patient Position - Orthostatic VS: Sitting       Physical Exam   Constitutional: She is oriented to person, place, and time  She appears well-developed and well-nourished  Cardiovascular: Normal rate, regular rhythm, normal heart sounds and intact distal pulses  Pulmonary/Chest: Effort normal and breath sounds normal    S PO2 is 96% indicating adequate oxygenation  Abdominal: Soft  Bowel sounds are normal    Musculoskeletal: Normal range of motion  She exhibits tenderness  She exhibits no edema or deformity  Feet:    Neurological: She is alert and oriented to person, place, and time  Skin: Skin is warm and dry  Capillary refill takes less than 2 seconds  Nursing note and vitals reviewed        ED Medications  Medications - No data to display    Diagnostic Studies  Results Reviewed     None                 XR toe fifth min 2 views LEFT   ED Interpretation by Lorenza Newman PA-C (11/04 1156)   5th toe fracture  Patient relays she has a history of 5th metatarsal base fracture back in 2011 and is completely nontender in this region  Procedures  Procedures       Phone Contacts  ED Phone Contact    ED Course  ED Course                                MDM  Number of Diagnoses or Management Options  Toe fracture, left:   Diagnosis management comments: Discussed with patient results of the x-ray showing a 5th toe fracture  Patient was placed in a lynsey tape and and ace wrap with good neurovascular exam intact before and after  Patient states that she has a boot at home for which she will use and follow up with Ortho  Informed return for worsening  Patient verbalizes understanding and agrees with the above assessment and plan  Amount and/or Complexity of Data Reviewed  Tests in the radiology section of CPT®: reviewed and ordered  Review and summarize past medical records: yes  Independent visualization of images, tracings, or specimens: yes      CritCare Time    Disposition  Final diagnoses: Toe fracture, left     Time reflects when diagnosis was documented in both MDM as applicable and the Disposition within this note     Time User Action Codes Description Comment    11/4/2017 11:56 AM Ryan Ceron Add [[id number] [id number]] Toe fracture, left       ED Disposition     ED Disposition Condition Comment    Discharge  Adriana Hughes discharge to home/self care      Condition at discharge: Good        Follow-up Information     Follow up With Specialties Details Why Contact Info Additional [address] Emergency Department Emergency Medicine Go to If symptoms worsen [address]  [phone] [facility] ED, Zhanna Hugo [place], [address], MD Orthopedic Surgery  call [phone]  [place] [address] [address]           Discharge Medication List as of 11/4/2017 11:57 AM      START taking these medications    Details   naproxen (NAPROSYN) 500 mg tablet Take 1 tablet by mouth 2 (two) times a day with meals for 10 days, Starting Sat 11/4/2017, Until Tue 11/14/2017, Print         CONTINUE these medications which have NOT CHANGED    Details   BIOTIN PO Take by mouth daily, Historical Med      esomeprazole (NEXIUM) 40 MG capsule Take by mouth daily  , Starting Tue 9/15/2015, Historical Med      fluticasone-salmeterol (ADVAIR) 250-50 mcg/dose inhaler Inhale 1 puff daily  , Historical Med           No discharge procedures on file      ED Provider  Electronically Signed by           Jorge Alberto Smith PA-C  11/04/17 0172

## 2017-11-04 NOTE — DISCHARGE INSTRUCTIONS
Toe Fracture   WHAT YOU NEED TO KNOW:   A toe fracture is a break in 1 or more of the bones in your toe  It is most commonly caused by a direct blow to the toe  The bones in your toe may just be broken, or they may be out of place or   DISCHARGE INSTRUCTIONS:   Return to the emergency department if:   · Blood soaks through your bandage  · You have severe pain in your toe  · Your toe is cold or numb  Contact your healthcare provider if:   · You have a fever  · Your pain does not go away, even after treatment  · Your toe continues to hurt even after it has healed  · You have questions or concerns about your condition or care  Medicines: You may need any of the following:  · NSAIDs , such as ibuprofen, help decrease swelling, pain, and fever  This medicine is available with or without a doctor's order  NSAIDs can cause stomach bleeding or kidney problems in certain people  If you take blood thinner medicine, always ask your healthcare provider if NSAIDs are safe for you  Always read the medicine label and follow directions  · Prescription pain medicine  may be given  Ask your healthcare provider how to take this medicine safely  Some prescription pain medicines contain acetaminophen  Do not take other medicines that contain acetaminophen without talking to your healthcare provider  Too much acetaminophen may cause liver damage  Prescription pain medicine may cause constipation  Ask your healthcare provider how to prevent or treat constipation  · Antibiotics  treat a bacterial infection  You may need antibiotics if you have an open wound  · Take your medicine as directed  Contact your healthcare provider if you think your medicine is not helping or if you have side effects  Tell him of her if you are allergic to any medicine  Keep a list of the medicines, vitamins, and herbs you take  Include the amounts, and when and why you take them   Bring the list or the pill bottles to follow-up visits  Carry your medicine list with you in case of an emergency  Self-care:   · Use lynsey tape, an elastic bandage, or a splint as directed  These help keep your toe in its correct position as it heals  Lynsey tape is when your fractured toe and the toe next to it are taped together  · Use support devices  including a cane, crutches, walking boot, or hard soled shoe as directed  These help protect your broken toe and limit movement so it can heal     · Rest  your toe so that it can heal  Return to normal activities as directed  · Apply ice  on your toe for 15 to 20 minutes every hour or as directed  Use an ice pack, or put crushed ice in a plastic bag  Cover it with a towel  Ice helps prevent tissue damage and decreases swelling and pain  · Elevate  your toe above the level of your heart as often as you can  This will help decrease swelling and pain  Prop your toe on pillows or blankets to keep it elevated comfortably  Follow up with your healthcare provider as directed: You may need to see a podiatrist in 1 to 2 weeks  Write down your questions so you remember to ask them during your visits  © 2017 [facility] Information is for End User's use only and may not be sold, redistributed or otherwise used for commercial purposes  All illustrations and images included in CareNotes® are the copyrighted property of A D A M , Inc  or [employer]  The above information is an  only  It is not intended as medical advice for individual conditions or treatments  Talk to your doctor, nurse or pharmacist before following any medical regimen to see if it is safe and effective for you

## 2017-11-06 ENCOUNTER — GENERIC CONVERSION - ENCOUNTER (OUTPATIENT)
Dept: OTHER | Facility: OTHER | Age: 62
End: 2017-11-06

## 2017-11-06 DIAGNOSIS — G43.109 MIGRAINE WITH AURA AND WITHOUT STATUS MIGRAINOSUS, NOT INTRACTABLE: ICD-10-CM

## 2017-11-06 DIAGNOSIS — Z78.0 ASYMPTOMATIC MENOPAUSAL STATE: ICD-10-CM

## 2017-11-06 DIAGNOSIS — S92.919A CLOSED FRACTURE OF TOE: ICD-10-CM

## 2017-11-09 ENCOUNTER — TRANSCRIBE ORDERS (OUTPATIENT)
Dept: ADMINISTRATIVE | Facility: HOSPITAL | Age: 62
End: 2017-11-09

## 2017-11-09 DIAGNOSIS — M54.12 BRACHIAL NEURITIS: Primary | ICD-10-CM

## 2017-11-17 ENCOUNTER — ALLSCRIPTS OFFICE VISIT (OUTPATIENT)
Dept: OTHER | Facility: OTHER | Age: 62
End: 2017-11-17

## 2017-11-21 ENCOUNTER — HOSPITAL ENCOUNTER (OUTPATIENT)
Dept: RADIOLOGY | Facility: HOSPITAL | Age: 62
Discharge: HOME | End: 2017-11-21
Attending: PSYCHIATRY & NEUROLOGY
Payer: COMMERCIAL

## 2017-11-21 DIAGNOSIS — G43.109 MIGRAINE WITH AURA AND WITHOUT STATUS MIGRAINOSUS, NOT INTRACTABLE: ICD-10-CM

## 2017-11-21 PROCEDURE — 72141 MRI NECK SPINE W/O DYE: CPT

## 2017-11-22 ENCOUNTER — HOSPITAL ENCOUNTER (OUTPATIENT)
Dept: RADIOLOGY | Facility: CLINIC | Age: 62
Discharge: HOME | End: 2017-11-22
Payer: COMMERCIAL

## 2017-11-22 ENCOUNTER — GENERIC CONVERSION - ENCOUNTER (OUTPATIENT)
Dept: OTHER | Facility: OTHER | Age: 62
End: 2017-11-22

## 2017-11-22 DIAGNOSIS — S92.919A CLOSED FRACTURE OF TOE: ICD-10-CM

## 2017-11-22 DIAGNOSIS — Z78.0 ASYMPTOMATIC MENOPAUSAL STATE: ICD-10-CM

## 2017-11-22 PROCEDURE — 77080 DXA BONE DENSITY AXIAL: CPT

## 2017-11-27 ENCOUNTER — GENERIC CONVERSION - ENCOUNTER (OUTPATIENT)
Dept: OTHER | Facility: OTHER | Age: 62
End: 2017-11-27

## 2017-11-30 ENCOUNTER — GENERIC CONVERSION - ENCOUNTER (OUTPATIENT)
Dept: OTHER | Facility: OTHER | Age: 62
End: 2017-11-30

## 2017-12-07 ENCOUNTER — GENERIC CONVERSION - ENCOUNTER (OUTPATIENT)
Dept: OTHER | Facility: OTHER | Age: 62
End: 2017-12-07

## 2017-12-12 ENCOUNTER — ALLSCRIPTS OFFICE VISIT (OUTPATIENT)
Dept: OTHER | Facility: OTHER | Age: 62
End: 2017-12-12

## 2017-12-13 NOTE — PROGRESS NOTES
Assessment    1  Folliculitis of perineum (364 8) (S64 9)    Plan  Folliculitis of perineum    · Mupirocin 2 % External Ointment; APPLY A SMALL AMOUNT 3 TIMES DAILY ASDIRECTED    Discussion/Summary    Rto prn  Possible side effects of new medications were reviewed with the patient/guardian today  The treatment plan was reviewed with the patient/guardian  The patient/guardian understands and agrees with the treatment plan      Chief Complaint  Patient presents for c/o diarrhea that has caused irritation and discomfort near her rectum  nil/lpn      History of Present Illness  HPI: 57 y o f seen for painful lump in perineum after diarrhea episode - noticed 3 days ago, no therapy tried      Review of Systems   Constitutional: No fever, no chills, feels well, no tiredness, no recent weight gain or loss  Respiratory: no complaints of shortness of breath, no wheezing, no dyspnea on exertion, no orthopnea or PND  Gastrointestinal: no complaints of abdominal pain, no constipation, no nausea or diarrhea, no vomiting, no bloody stools  Family History  Mother    1  Family history of arthritis (V17 7) (Z82 61)   2  Family history of hypertension (V17 49) (Z82 49)   3  Family history of Hypertension  Father    4  Family history of arthritis (V17 7) (Z82 61)   5  Family history of hypertension (V17 49) (Z82 49)   6  Family history of Heart attack   7  Family history of Hypertension  Son    8  Family history of Down syndrome (V19 5) (Z82 79)  Sister    5  Family history of Breast cancer   10  Family history of Skin cancer  Brother    6  Family history of diabetes mellitus (V18 0) (Z83 3)    Social History   · Daily caffeinated coffee consumption   ·    · Exercise habits   · Good sleep hygiene   · Never a smoker   · Never A Smoker   · Pets/Animals: Fish   · Social alcohol use (Z78 9)    Surgical History    1  History of Breast Surgery Reduction Procedure Bilateral   2  History of  Section   3   History of Complete Colonoscopy   4  History of Gallbladder Surgery   5  History of Oophorectomy    Current Meds   1  AirDuo RespiClick 647/91 644-43 MCG/ACT Inhalation Aerosol Powder Breath Activated; INHALE 1 PUFFS Every twelve hours; Therapy: 13Iwm4336 to (Last Rx:22Yyb1076)  Requested for: 92Svc1535 Ordered   2  Cyclobenzaprine HCl - 10 MG Oral Tablet; TAKE 1 TABLET 3 TIMES DAILY AS NEEDED; Therapy: 60KUG8993 to (Evaluate:2018)  Requested for: 63SSH8947; Last [id number] Ordered    The medication list was reviewed and updated today  Allergies  1  Latex Exam Gloves MISC    Vitals   Recorded: 2017 02:59PM   Temperature 97 6 F   Heart Rate 76   Respiration Quality Normal   Respiration 16   Systolic 047   Diastolic 80   Height 5 ft 3 in   Weight 166 lb 6 oz   BMI Calculated 29 47   BSA Calculated 1 79       Physical Exam   Abdomen  Abdomen: Non-tender, no masses  Skin  Skin and subcutaneous tissue: Abnormal  -- infected follicle VS small cyst in perineum          Signatures   Electronically signed by : ZOFIA Patrick ; Dec 12 2017  8:54PM EST                       (Author)

## 2017-12-15 ENCOUNTER — HOSPITAL ENCOUNTER (OUTPATIENT)
Dept: NEUROLOGY | Facility: HOSPITAL | Age: 62
Discharge: HOME | End: 2017-12-15
Attending: PSYCHIATRY & NEUROLOGY
Payer: COMMERCIAL

## 2017-12-15 DIAGNOSIS — M54.12 BRACHIAL NEURITIS: ICD-10-CM

## 2017-12-15 DIAGNOSIS — M79.601 RIGHT ARM PAIN: ICD-10-CM

## 2017-12-15 PROCEDURE — 95886 MUSC TEST DONE W/N TEST COMP: CPT

## 2017-12-15 PROCEDURE — 95911 NRV CNDJ TEST 9-10 STUDIES: CPT

## 2017-12-16 ENCOUNTER — GENERIC CONVERSION - ENCOUNTER (OUTPATIENT)
Dept: OTHER | Facility: OTHER | Age: 62
End: 2017-12-16

## 2017-12-16 LAB
A/G RATIO (HISTORICAL): 1.1 (ref 1.2–2.2)
ALBUMIN SERPL BCP-MCNC: 4 G/DL (ref 3.6–4.8)
ALP SERPL-CCNC: 62 IU/L (ref 39–117)
ALT SERPL W P-5'-P-CCNC: 40 IU/L (ref 0–32)
AST SERPL W P-5'-P-CCNC: 26 IU/L (ref 0–40)
BASOPHILS # BLD AUTO: 0.1 X10E3/UL (ref 0–0.2)
BASOPHILS # BLD AUTO: 1 %
BILIRUB SERPL-MCNC: 0.2 MG/DL (ref 0–1.2)
BUN SERPL-MCNC: 12 MG/DL (ref 8–27)
BUN/CREA RATIO (HISTORICAL): 15 (ref 12–28)
CALCIUM SERPL-MCNC: 9.4 MG/DL (ref 8.7–10.3)
CHLORIDE SERPL-SCNC: 100 MMOL/L (ref 96–106)
CHOLEST SERPL-MCNC: 270 MG/DL (ref 100–199)
CO2 SERPL-SCNC: 25 MMOL/L (ref 18–29)
CREAT SERPL-MCNC: 0.79 MG/DL (ref 0.57–1)
DEPRECATED RDW RBC AUTO: 13.8 % (ref 12.3–15.4)
EGFR AFRICAN AMERICAN (HISTORICAL): 91 ML/MIN/1.73
EGFR-AMERICAN CALC (HISTORICAL): 79 ML/MIN/1.73
EOSINOPHIL # BLD AUTO: 0.4 X10E3/UL (ref 0–0.4)
EOSINOPHIL # BLD AUTO: 4 %
GLUCOSE SERPL-MCNC: 122 MG/DL (ref 65–99)
HCT VFR BLD AUTO: 42.3 % (ref 34–46.6)
HDLC SERPL-MCNC: 44 MG/DL
HGB BLD-MCNC: 14 G/DL (ref 11.1–15.9)
IMM.GRANULOCYTES (CD4/8) (HISTORICAL): 0 X10E3/UL (ref 0–0.1)
IMM.GRANULOCYTES (CD4/8) (HISTORICAL): 1 %
LDLC SERPL CALC-MCNC: 167 MG/DL (ref 0–99)
LYMPHOCYTES # BLD AUTO: 3 X10E3/UL (ref 0.7–3.1)
LYMPHOCYTES # BLD AUTO: 34 %
MCH RBC QN AUTO: 30.8 PG (ref 26.6–33)
MCHC RBC AUTO-ENTMCNC: 33.1 G/DL (ref 31.5–35.7)
MCV RBC AUTO: 93 FL (ref 79–97)
MONOCYTES # BLD AUTO: 1.2 X10E3/UL (ref 0.1–0.9)
MONOCYTES (HISTORICAL): 13 %
NEUTROPHILS # BLD AUTO: 4.2 X10E3/UL (ref 1.4–7)
NEUTROPHILS # BLD AUTO: 47 %
PLATELET # BLD AUTO: 331 X10E3/UL (ref 150–379)
POTASSIUM SERPL-SCNC: 4.6 MMOL/L (ref 3.5–5.2)
RBC (HISTORICAL): 4.55 X10E6/UL (ref 3.77–5.28)
SODIUM SERPL-SCNC: 140 MMOL/L (ref 134–144)
TOT. GLOBULIN, SERUM (HISTORICAL): 3.6 G/DL (ref 1.5–4.5)
TOTAL PROTEIN (HISTORICAL): 7.6 G/DL (ref 6–8.5)
TRIGL SERPL-MCNC: 294 MG/DL (ref 0–149)
WBC # BLD AUTO: 8.8 X10E3/UL (ref 3.4–10.8)

## 2017-12-17 LAB
BACTERIA UR QL AUTO: ABNORMAL
BILIRUB UR QL STRIP: NEGATIVE
COLOR UR: YELLOW
COMMENT (HISTORICAL): CLEAR
FECAL OCCULT BLOOD DIAGNOSTIC (HISTORICAL): NEGATIVE
GLUCOSE (HISTORICAL): NEGATIVE
INTERPRETATION (HISTORICAL): NORMAL
KETONES UR STRIP-MCNC: NEGATIVE MG/DL
LEUKOCYTE ESTERASE UR QL STRIP: ABNORMAL
MICROSCOPIC EXAMINATION (HISTORICAL): ABNORMAL
MUCUS THREADS (HISTORICAL): PRESENT
NITRITE UR QL STRIP: NEGATIVE
NON-SQ EPI CELLS URNS QL MICRO: ABNORMAL /HPF
PH UR STRIP.AUTO: 5.5 [PH] (ref 5–7.5)
PROT UR STRIP-MCNC: NEGATIVE MG/DL
RBC (HISTORICAL): ABNORMAL /HPF
SP GR UR STRIP.AUTO: 1.01 (ref 1–1.03)
TSH SERPL DL<=0.05 MIU/L-ACNC: 2.63 UIU/ML (ref 0.45–4.5)
UROBILINOGEN UR QL STRIP.AUTO: 0.2 EU/DL (ref 0.2–1)
WBC # BLD AUTO: ABNORMAL /HPF

## 2017-12-19 ENCOUNTER — GENERIC CONVERSION - ENCOUNTER (OUTPATIENT)
Dept: OTHER | Facility: OTHER | Age: 62
End: 2017-12-19

## 2018-01-05 ENCOUNTER — GENERIC CONVERSION - ENCOUNTER (OUTPATIENT)
Dept: OTHER | Facility: OTHER | Age: 63
End: 2018-01-05

## 2018-01-09 NOTE — RESULT NOTES
Verified Results  ([facility]) Pap IG (Image Guided) [id number] 12:00AM Amanda Bill, [address]   Araceli Child Cervical  No  of containers  Araceli Child 01 CYTYC Thin Prep Vial     Test Name Result Flag Reference   DIAGNOSIS: Comment     NEGATIVE FOR INTRAEPITHELIAL LESION AND MALIGNANCY  Specimen adequacy: Comment     Satisfactory for evaluation  Endocervical component may not be  distinguished in cases of atrophy  Clinician provided ICD10: Comment     Z01 [id number]   Performed by: Harjit Huntley, Cytotechnologist (ASCP)     Araceli Child Note: Comment     The Pap smear is a screening test designed to aid in the detection of  premalignant and malignant conditions of the uterine cervix  It is not a  diagnostic procedure and should not be used as the sole means of detecting  cervical cancer  Both false-positive and false-negative reports do occur  Test Methodology: Comment     This liquid based ThinPrep(R) pap test was screened with the  use of an image guided system         Discussion/Summary   Good results

## 2018-01-09 NOTE — RESULT NOTES
Verified Results  * [facility] [id number] 01:37PM Aaliyah Calabrese Order Number: [id number]    - Patient Instructions: To schedule this appointment, please contact Central Scheduling at [phone]  Test Name Result Flag Reference   US PELVIS COMPLETE (TRANSABDOMINAL AND TRANSVAGINAL) (Report)     PELVIC ULTRASOUND, COMPLETE     INDICATION: 68-year-old woman with low back pain and bloating for one week  History of left oophorectomy  Possible endometrial mass suggested on prior sonogram         COMPARISON: September 17, 2016  TECHNIQUE:  Transabdominal pelvic ultrasound was performed in sagittal and transverse planes with a curvilinear transducer  Additional transvaginal imaging was performed to better evaluate the endometrium and ovaries  Imaging included volumetric    sweeps as well as traditional still imaging technique  FINDINGS:     UTERUS:   Position: Anteverted and anteflexed  Size: 6 2 x 3 0 x 4 1 cm  Myometrium: Normal contour and echogenicity  No masses  Cervix: Normal in appearance  ENDOMETRIUM:    Thickness: Normal in thickness, measuring 3 mm in maximal thickness  Echotexture: Normal and homogenous in echogenicity with no evidence of endometrial mass or fluid collection  The endometrial abnormality noted on the earlier sonogram is not visible on today's study  OVARIES/ADNEXA:   Right ovary: Not visualized  Left ovary: Not visualized  Left oophorectomy by history  No suspicious adnexal mass  FREE FLUID: None  IMPRESSION:      1  Prior left oophorectomy  2  Right ovary not visualized  3  Otherwise normal pelvic sonogram  Specifically, the endometrium is normal in appearance            Workstation performed: [id number]     Signed by:   Kimberly Ayers MD   9/26/17

## 2018-01-10 NOTE — RESULT NOTES
Verified Results  * [facility] COMPLETE [facility] [id number] 07:46AM Bi Gonzalez Order Number: [id number]    - Patient Instructions: To schedule this appointment, please contact Central Scheduling at [phone]   Order Number: [id number]    - Patient Instructions: To schedule this appointment, please contact Central Scheduling at [phone]  Test Name Result Flag Reference   US PELVIS COMPLETE (TRANSABDOMINAL AND TRANSVAGINAL) (Report)     PELVIC ULTRASOUND, COMPLETE     INDICATION: Pelvic pain Patient is postmenopausal,   left oophorectomy  COMPARISON: 9/14/2011     TECHNIQUE:  Transabdominal pelvic ultrasound was performed in sagittal and transverse planes with a curvilinear transducer  Additional transvaginal imaging was performed to better evaluate the endometrium and ovaries  Imaging included volumetric    sweeps as well as traditional still imaging technique  FINDINGS:     UTERUS:   The uterus is normal in position, measuring 8 0 x 2 8 x 4 5 cm  Contour and echotexture appear normal      The cervix shows no suspicious abnormality  ENDOMETRIUM:    7 mm  The endometrium is heterogeneous with a suspected endometrial mass measuring 1 2 x 1 0 x 1 0 cm  There is some fluid in the endometrial canal       OVARIES/ADNEXA:   Right ovary: Not visualized     Left ovary: Left oophorectomy  No suspicious adnexal mass or loculated collections  There is no free fluid  IMPRESSION:      Fluid in the endometrium with a possible endometrial mass  Hysterosonogram recommended  Left oophorectomy  Right ovary not visualized         ##sigslh##sigslh       Workstation performed: [id number]     Signed by:   Augustine Jerome MD   9/19/16     (1) COMPREHENSIVE METABOLIC PANEL [id number] 10:53KA Beba Hashimoto     Test Name Result Flag Reference   Glucose, Serum 96 mg/dL  65-99   BUN 23 mg/dL  8-27   Creatinine, Serum 0 81 mg/dL  0 57-1 00   eGFR If NonAfricn Am 78 mL/min/1 73  >59   eGFR If Africn Am 90 mL/min/1 73  >59   BUN/Creatinine Ratio 28 H 11-26   Sodium, Serum 141 mmol/L  134-144   Potassium, Serum 4 7 mmol/L  3 5-5 2   Chloride, Serum 100 mmol/L     Carbon Dioxide, Total 22 mmol/L  18-29   Calcium, Serum 9 5 mg/dL  8 7-10 3   Protein, Total, Serum 7 5 g/dL  6 0-8 5   Albumin, Serum 4 1 g/dL  3 6-4 8   Globulin, Total 3 4 g/dL  1 5-4 5   A/G Ratio 1 2  1 1-2 5   Bilirubin, Total <0 2 mg/dL  0 0-1 2   Alkaline Phosphatase, S 90 IU/L     AST (SGOT) 20 IU/L  0-40   ALT (SGPT) 43 IU/L H 0-32     (1) LIPID PANEL FASTING W DIRECT LDL REFLEX [id number] 07:33AM Adelina Nava     Test Name Result Flag Reference   Cholesterol, Total 256 mg/dL H 100-199   Triglycerides 264 mg/dL H 0-149   HDL Cholesterol 43 mg/dL  >39   According to ATP-III Guidelines, HDL-C >59 mg/dL is considered a  negative risk factor for CHD  LDL Cholesterol Calc 160 mg/dL H 0-99     [facility]) Feli Muro CMP14 Default [id number] 07:33AM Adelina Nava     Test Name Result Flag Reference   Feli Muro CMP14 Default Comment     A hand-written panel/profile was received from your office  In  accordance with the [facility] Ambiguous Test Code Policy dated July 4467, we have completed your order by using the closest currently  or formerly recognized AMA panel  We have assigned Comprehensive  Metabolic Panel (14), Test Code #141368 to this request   If this  is not the testing you wished to receive on this specimen, please  contact the [facility] Client Inquiry/Technical Services Department  to clarify the test order  We appreciate your business  [facility]) Cardiovascular Risk Assessment [id number] 07:33AM Adelina Nava     Test Name Result Flag Reference   Interpretation Note     -------------------------------  CARDIOVASCULAR REPORT:  -------------------------------  Current available clinical information suggests the  patient's risk is at least LOW   One major CHD risk factor is  present (age over 54)  If the patient has CHD or a CHD risk  equivalent, the risk category is high  If patient does not  have CHD or a CHD risk equivalent, consider use of the  Pooled Cohort Equations to estimate 10-year CVD risk, as  individuals with greater than 7 5% risk may warrant more  intensive therapy  The calculator can be found at:  http://tools  cardiosource org/AIMBJ-Rrvs-Spcwukjjh/  -  Insulin resistance, obesity, excessive alcohol use, smoking,  thyroid disease, nephrotic syndrome, liver disease, and  certain medications are all causes of secondary  dyslipidemia  Consider evaluation if clinically indicated  -  Therapeutic lifestyle changes are always valuable to achieve  optimal blood lipid status (diet, exercise, weight  management)  -------------------------------  LIPID MANAGEMENT  Select one patient risk category based upon medical history  and clinical judgment  Additional risk factors such as  personal or family history of premature CHD, smoking, and  hypertension modify a patient's goals of therapy  In CVD  prevention, the intensity of therapy should be adjusted to  the level of patient risk  MODERATE intensity statin therapy  generally results in an average LDL-C reduction of 30% to  less than 50% from the untreated baseline  Examples include  (daily doses): atorvastatin 10-20 mg, rosuvastatin 5-10 mg,  simvastatin 20-40 mg, pravastatin 40-80 mg, lovastatin 40  mg  HIGH intensity statin therapy generally results in an  average LDL-C reduction of 50% or more from the untreated  baseline  Examples include (daily doses): atorvastatin 40-80  mg and rosuvastatin 20 mg   -------------------------------  LOW RISK ASSESSMENT AND TREATMENT SUGGESTIONS  -------------------------------  LDL-C is high, was 150 and now is 160 mg/dL  Non-HDL  Cholesterol is high, was 195 and now is 213 mg/dL  -  Consider statin therapy as elevated LDL-C may contribute to  increased CVD risk   If statin cannot be tolerated or  increased, alternatives include use of an intestinal agent  (ezetimibe or bile acid sequestrant), niacin, and/or fish  oil   -------------------------------  INTERMEDIATE RISK ASSESSMENT AND TREATMENT SUGGESTIONS  -------------------------------  LDL-C is high, was 150 and now is 160 mg/dL  Non-HDL  Cholesterol is high, was 195 and now is 213 mg/dL  -  Begin statin  If statin already in use, consider increasing  dose  If statin cannot be tolerated or increased,  alternatives include use of an intestinal agent (ezetimibe  or bile acid sequestrant), niacin, and/or fish oil   -------------------------------  HIGH RISK ASSESSMENT AND TREATMENT SUGGESTIONS  -------------------------------  LDL-C is high, was 150 and now is 160 mg/dL  Non-HDL  Cholesterol is high, was 195 and now is 213 mg/dL  -  Begin statin  If statin already in use, consider increasing  dose to achieve at least a 50% LDL reduction from baseline  Moderate or high intensity statin is preferred  If statin  cannot be tolerated or increased, alternatives include use  of an intestinal agent (ezetimibe or bile acid sequestrant),  niacin, and/or fish oil   -------------------------------  DISCLAIMER  These assessments and treatment suggestions are provided as  a convenience in support of the physician-patient  relationship and are not intended to replace the physician's  clinical judgment  They are derived from the national  guidelines in addition to other evidence and expert opinion  The clinician should consider this information within the  context of clinical opinion and the individual patient  SEE GUIDANCE FOR CARDIOVASCULAR REPORT: National Heart,  Lung, and Blood [place]'s Third Report of the NCEP Expert  Panel on Detection, Evaluation and Treatment of High Blood  Cholesterol in Adults (ATP III) (2002  NIH publication  );  Dajuan et al  Diabetes Care 2008; 31(4):811-82;  Akash gilmore al  Clin Chem 2009; 55(3):407-419; Harry Campos et  al  2013 ACC/AHA guideline on the treatment of blood  cholesterol to reduce atherosclerotic cardiovascular risk in  adults: a report of the [employer] of  Cardiology/American Heart Association Task Force on Practice  Guidelines  Circulation 7193;844(UBPBI 2):S1? S45  PDF Image

## 2018-01-10 NOTE — RESULT NOTES
Message   cholesterol is very high  recommend starting medication  if agreeable ---I can sent to pharmacy and repeat bw in 2 months  rl      Verified Results  (1) COMPREHENSIVE METABOLIC PANEL [id number] 67:07XZ [facility]     Test Name Result Flag Reference   Glucose, Serum 109 mg/dL H 65-99   BUN 19 mg/dL  8-27   Creatinine, Serum 0 90 mg/dL  0 57-1 00   eGFR If NonAfricn Am 68 mL/min/1 73  >59   eGFR If Africn Am 79 mL/min/1 73  >59   BUN/Creatinine Ratio 21  11-26   Sodium, Serum 139 mmol/L  134-144   Potassium, Serum 4 6 mmol/L  3 5-5 2   Chloride, Serum 100 mmol/L     Carbon Dioxide, Total 22 mmol/L  18-29   Calcium, Serum 10 1 mg/dL  8 7-10 3   Protein, Total, Serum 8 0 g/dL  6 0-8 5   Albumin, Serum 4 6 g/dL  3 6-4 8   Globulin, Total 3 4 g/dL  1 5-4 5   A/G Ratio 1 4  1 1-2 5   **Effective March 13, 2017 the reference interval**                   for A/G Ratio will be changing to:                               Age                Male          Female                           0 -  7 days       1 1 - 2 3       1 1 - 2 3                           8 - 30 days       1 2 - 2 8       1 2 - 2 8                           1 -  6 months     1 3 - 3 6       1 3 - 3 6                    7 months -  5 years      1 5 - 2 6       1 5 - 2 6                              > 5 years      1 2 - 2 2       1 2 - 2 2   Bilirubin, Total 0 3 mg/dL  0 0-1 2   Alkaline Phosphatase, S 64 IU/L     AST (SGOT) 24 IU/L  0-40   ALT (SGPT) 29 IU/L  0-32     (1) HEMOGLOBIN A1C [id number] 12:00AM [facility]     Test Name Result Flag Reference   Hemoglobin A1c 6 2 % H 4 8-5 6   Pre-diabetes: 5 7 - 6 4           Diabetes: >6 4           Glycemic control for adults with diabetes: <7 0     (1) LIPID PANEL FASTING W DIRECT LDL REFLEX [id number] 12:00AM [facility]     Test Name Result Flag Reference   Cholesterol, Total 271 mg/dL H 100-199   Triglycerides 167 mg/dL H 0-149   HDL Cholesterol 51 mg/dL  >39   LDL Cholesterol Calc 187 mg/dL H 0-99     (1) MICROALBUMIN CREATININE RATIO, RANDOM URINE [id number] 12:00AM [place]     Test Name Result Flag Reference   Creatinine, Urine 224 0 mg/dL  Not Estab  Microalbumin, Urine 8 6 ug/mL  Not Estab  Microalb/Creat Ratio 3 8 mg/g creat  0 0-30 0     [facility]) Cardiovascular Risk Assessment [id number] 12:00AM [place]     Test Name Result Flag Reference   Interpretation Note     Supplement report is available  PDF Image

## 2018-01-12 VITALS
HEIGHT: 64 IN | RESPIRATION RATE: 18 BRPM | BODY MASS INDEX: 28.17 KG/M2 | TEMPERATURE: 97.8 F | DIASTOLIC BLOOD PRESSURE: 80 MMHG | HEART RATE: 84 BPM | WEIGHT: 165 LBS | SYSTOLIC BLOOD PRESSURE: 118 MMHG

## 2018-01-12 VITALS
HEART RATE: 82 BPM | BODY MASS INDEX: 28.65 KG/M2 | DIASTOLIC BLOOD PRESSURE: 74 MMHG | SYSTOLIC BLOOD PRESSURE: 122 MMHG | WEIGHT: 167.81 LBS | HEIGHT: 64 IN

## 2018-01-12 NOTE — PROGRESS NOTES
Assessment    1  Encounter for preventive health examination (V70 0) (Z00 00)   2  Toe fracture (826 0) (C05 326A)    Plan  Abdominal bloating, Abdominal pain in female    · 1 - Rg Parr MD (General Surgery) Co-Management  *  Status: Active  Requested  for: 18FGA3698  Care Summary provided  : Yes  Elevated LFTs, Health Maintenance, Impaired fasting glucose, Mixed hyperlipidemia    · (1) CBC/PLT/DIFF; Status:Active; Requested for:17Nov2017;    · (1) COMPREHENSIVE METABOLIC PANEL; Status:Active; Requested for:17Nov2017;    · (1) LIPID PANEL FASTING W DIRECT LDL REFLEX; Status:Active; Requested  for:17Nov2017;    · (1) TSH; Status:Active; Requested for:17Nov2017;    · (1) URINALYSIS (will reflex a microscopy if leukocytes, occult blood, protein or nitrites  are not within normal limits); Status:Active; Requested for:17Nov2017; Health Maintenance    · Begin a limited exercise program ; Status:Complete;   Done: 03ZBX0845   · Decreasing the stress in your life may help your condition improve ; Status:Complete;    Done: 13ZBF0133   · Keep a diary of when and what you eat ; Status:Complete;   Done: 71RQJ5123   · Regular aerobic exercise can help reduce stress ; Status:Complete;   Done: 21BGO7241  Postmenopausal, Toe fracture    · * DXA BONE DENSITY SPINE HIP AND PELVIS; Status:Hold For - Scheduling;  Requested for:17Nov2017;     Discussion/Summary  health maintenance visit cervical cancer screening is managed by gyn Breast cancer screening: mammogram is current and mammogram is needed every year  Colorectal cancer screening: colorectal cancer screening is current  Osteoporosis screening: bone mineral density testing has been ordered  Screening lab work includes hemoglobin, glucose, lipid profile and thyroid function testing  The patient declines immunizations  Advice and education were given regarding nutrition, aerobic exercise and weight loss  Patient discussion: discussed with the patient        History of Present Illness  HM, Adult Female:   General Health: The patient's health since the last visit is described as fair  She has regular dental visits  She complains of vision problems  Vision care includes wearing glasses  She denies hearing loss  Immunizations status:  declines  Lifestyle:  She consumes a diverse and healthy diet  She does not use tobacco  She denies alcohol use  She denies drug use  Reproductive health:  saw Dr Florida Oseguera who wants pt to see general surgeon for adhesions  Screening: cancer screening reviewed and updated  metabolic screening reviewed and updated  risk screening reviewed and updated  Additional History:  pt broke her b/l feet 2 weeks ago and would like a bone density test  reviewed ED report from 11/4  noted 5 toe fx on left   c-scope utd  mammogram utd     pt stopped self stopped statin     has hx of herpes from last bf a few years ago  Review of Systems    Constitutional: not feeling poorly, no recent weight gain and no recent weight loss  Eyes: as noted in HPI    ENT: no hearing loss  Cardiovascular: No complaints of slow heart rate, no fast heart rate, no chest pain, no palpitations, no leg claudication, no lower extremity edema  Respiratory: No complaints of shortness of breath, no wheezing, no cough, no SOB on exertion, no orthopnea, no PND  Gastrointestinal: Abdominal pain and bloating workup being done by Gynecology  No change in bowel habits no nausea vomiting  Genitourinary: as noted in HPI  Musculoskeletal: arthralgias  Neurological: no headache, no numbness and no dizziness  Psychiatric: no depression  Active Problems    1  Abdominal bloating (787 3) (R14 0)   2  Abdominal pain in female (789 00) (R10 9)   3  Adult BMI 28 0-28 9 kg/sq m (V85 24) (Z68 28)   4  Allergic rhinitis (477 9) (J30 9)   5  Asthma (493 90) (J45 909)   6  Cervical radiculopathy (723 4) (M54 12)   7  Complicated migraine (597 75) (G43 109)   8   Dysuria (788 1) (R30 0)   9  Elevated LFTs (790 6) (R79 89)   10  Encounter for screening mammogram for malignant neoplasm of breast (V76 12)    (Z12 31)   11  Endometrial mass (625 8) (N94 89)   12  Female pelvic pain (625 9) (R10 2)   13  Impaired fasting glucose (790 21) (R73 01)   14  Mixed hyperlipidemia (272 2) (E78 2)   15  Paresthesia and pain of both upper extremities (782 0,729 5) (R20 2,M79 601,M79 602)   16  Seborrheic keratosis (702 19) (L82 1)   17  Shoulder instability, left (718 81) (M25 312)   18  Tension type headache (339 10) (G44 209)   19   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)    Past Medical History    · History of Abdominal pain, RLQ (right lower quadrant) (789 03) (R10 31)   · History of Abnormal glucose (790 29) (R73 09)   · History of Acute weakness (780 79) (R53 1)   · History of Adult BMI 29 0-29 9 kg/sq m (V85 25) (Z68 29)   · History of Adult body mass index 29 0-29 9 (V85 25) (Z68 29)   · Asthma (493 90) (J45 909)   · History of Asthma with exacerbation (493 92) (J45 901)   · History of Asymptomatic postmenopausal status (age-related) (natural) (V49 81) (Z78 0)   · History of Back Strain (847 9)   · 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, 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)   · History of Bilateral carpal tunnel syndrome (354 0) (G56 03)   · History of Blepharospasm (333 81) (G24 5)   · History of BMI 28 0-28 9,adult (V85 24) (Z68 28)   · History of Bug bites (919 4,E906 4) (W57 XXXA)   · History of Cervical adenopathy (785 6) (R59 0)   · History of Contusion of skin with intact surface (924 9) (T14 8XXA)   · History of Contusion Of The Left Side Of The Back With Intact Skin Surface (922 31)   · History of Dyslipidemia (272 4) (E78 5)   · History of Encounter for routine gynecological examination (V72 31) (Z01 419)   · History of Encounter for screening for malignant neoplasm of colon (V76 51) (Z12 11)   · History of Encounter for screening mammogram for malignant neoplasm of breast  (V76 12) (Z12 31)   · History of Facial droop (781 94) (R29 810)   · History of Facial pain (784 0) (R51)   · Fibrositis (729 0) (M79 7)   · Hip pain, unspecified laterality   · History of abdominal pain (V13 89) (E39 282)   · History of arthritis (V13 4) (Z87 39)   · History of atypical nevus (V13 3) (Z87 2)   · History of backache (V13 59) (Z87 39)   · History of blood loss (V12 59) (V33 297)   · History of breast lump (V13 89) (T70 788)   · History of cataract (V12 49) (Z86 69)   · History of dislocation of shoulder (V13 59) (Z87 39)   · History of dizziness (V13 89) (Z76 160)   · History of fatigue (V13 89) (F06 431)   · History of headache (V13 89) (T04 638)   · History of heartburn (V12 79) (F06 926)   · History of herpes genitalis (V12 09) (Z86 19)   · History of herpes zoster (V12 09) (Z86 19)   · History of hyperlipidemia (V12 29) (Z86 39)   · History of influenza (V12 09) (Z87 09)   · History of skin disorder (V13 3) (Z87 2)   · History of sleep apnea (V13 89) (Z86 69)   · History of stomach ulcers (V12 79) (Z87 19)   · History of tinea corporis (V12 09) (Z86 19)   · History of viral infection (V12 09) (Z86 19)   · History of Hospital discharge follow-up (V67 59) (Z09)   · History of Left knee pain (719 46) (M25 562)   · History of Leg injury (959 7) (S89 90XA)   · History of Mammogram abnormal (793 80) (R92 8)   · History of Muscle cramps (729 82) (R25 2)   · History of Myalgia And Myositis (729 1)   · History of Neck Strain (847 0)   · Noninfectious Dermatological Conditions (709 9)   · History of Other fatigue (780 79) (R53 83)   · History of Pain of upper abdomen (789 09) (R10 10)   · History of Pelvic pain in female (625 9) (R10 2)   · History of PPD screening test (V74 1) (Z11 1)   · History of Pruritus ani (698 0) (L29 0)   · History of Rectal itching (698 0) (L29 0)   · History of Rectal pain (569 42) (K62 89)   · History of Recurrent dislocation of left shoulder (718 31) (M24 412)   · History of Right knee pain (719 46) (M25 561)   · Rotator cuff tendinitis, unspecified laterality (726 10) (M75 80)   · History of Skin rash (782 1) (R21)   · History of Skin rash (782 1) (R21)   · History of Sprain Of The Back (847 9)   · History of Stomach problems (536 9) (K31 9)   · History of Tinea pedis of both feet (110 4) (B35 3)    Surgical History    · History of Breast Surgery Reduction Procedure Bilateral   · History of  Section   · History of Complete Colonoscopy   · History of Gallbladder Surgery   · History of Oophorectomy    Family History  Mother    · Family history of arthritis (V17 7) (Z82 61)   · Family history of hypertension (V17 49) (Z82 49)   · Family history of Hypertension  Father    · Family history of arthritis (V17 7) (Z82 61)   · Family history of hypertension (V17 49) (Z82 49)   · Family history of Heart attack   · Family history of Hypertension  Son    · Family history of Down syndrome (V19 5) (Z82 79)  Sister    · Family history of Breast cancer   · Family history of Skin cancer  Brother    · Family history of diabetes mellitus (V18 0) (Z83 3)    Social History    · Daily caffeinated coffee consumption   ·    · Exercise habits   · Good sleep hygiene   · Never a smoker   · Never A Smoker   · Pets/Animals: Fish   · Social alcohol use (Z78 9)    Current Meds   1  AirDuo RespiClick 622/07 634-92 MCG/ACT Inhalation Aerosol Powder Breath Activated;   INHALE 1 PUFFS Every twelve hours; Therapy: 36Vau9897 to (Last Rx:20Art2130)  Requested for: 10Ksr8456 Ordered   2  Atorvastatin Calcium 20 MG Oral Tablet; Take 1 tablet daily; Therapy: 22FSO1084 to (Last Rx:2017)  Requested for: 32IRQ1102 Ordered   3  Cyclobenzaprine HCl - 10 MG Oral Tablet; TAKE 1 TABLET 3 TIMES DAILY AS NEEDED; Therapy: 51LTG8935 to (Evaluate:2018)  Requested for: 82EFG0433; Last   Rx:2017 Ordered    Allergies    1   Latex Exam Gloves MISC    Vitals   Recorded: 99REA7834 08:49AM Temperature 97 9 F   Heart Rate 68   Respiration 12   Systolic 107   Diastolic 74   Height 3 in   Weight 165 lb    BMI Calculated 91920 8   BSA Calculated 0 2     Physical Exam    Constitutional   General appearance: No acute distress, well appearing and well nourished  Head and Face   Head and face: Normal     Eyes   Conjunctiva and lids: No swelling, erythema or discharge  Ears, Nose, Mouth, and Throat   External inspection of ears and nose: Normal     Otoscopic examination: Tympanic membranes translucent with normal light reflex  Canals patent without erythema  Nasal mucosa, septum, and turbinates: Normal without edema or erythema  Lips, teeth, and gums: Normal, good dentition  Oropharynx: Normal with no erythema, edema, exudate or lesions  Neck   Neck: Supple, symmetric, trachea midline, no masses  Thyroid: Normal, no thyromegaly  Pulmonary   Respiratory effort: No increased work of breathing or signs of respiratory distress  Auscultation of lungs: Clear to auscultation  Cardiovascular   Auscultation of heart: Normal rate and rhythm, normal S1 and S2, no murmurs  Carotid pulses: 2+ bilaterally  Abdominal aorta: Normal     Peripheral vascular exam: Normal     Examination of extremities for edema and/or varicosities: Normal     Abdomen Mild tenderness diffusely rest of abdominal exam within normal limits  Lymphatic   Palpation of lymph nodes in neck: No lymphadenopathy  Musculoskeletal   Gait and station: Normal   has surgical shoe on left foot  Neurologic   Cranial nerves: Cranial nerves II-XII intact  Cortical function: Normal mental status  Reflexes: 2+ and symmetric  Psychiatric   Judgment and insight: Normal     Mood and affect: Normal        Procedure    Procedure: Visual Acuity Test    Indication: routine screening  Inforrmation supplied by a Snellen chart     Results: 20/20 in both eyes with corrective device, 20/20 in the right eye with corrective device, 20/25 in the left eye with corrective device   Color vision was screened with the [place] Test and the results were  Health Management  Encounter for screening mammogram for malignant neoplasm of breast   * MAMMO SCREENING BILATERAL W CAD; every 1 year; Last 08Apr2017; Next Due:  08Apr2018;  Active    Future Appointments    Date/Time Provider Specialty Site   11/27/2017 02:30 PM Tricia Haider MD General Surgery [address]     Signatures   Electronically signed by : Florencio Frost DO; Nov 17 2017 10:59AM EST                       (Author)

## 2018-01-13 VITALS
HEART RATE: 93 BPM | SYSTOLIC BLOOD PRESSURE: 130 MMHG | WEIGHT: 170 LBS | HEIGHT: 64 IN | DIASTOLIC BLOOD PRESSURE: 74 MMHG | BODY MASS INDEX: 29.02 KG/M2

## 2018-01-13 VITALS
WEIGHT: 165 LBS | TEMPERATURE: 97.9 F | SYSTOLIC BLOOD PRESSURE: 116 MMHG | BODY MASS INDEX: 38.18 KG/M2 | RESPIRATION RATE: 12 BRPM | HEART RATE: 68 BPM | HEIGHT: 55 IN | DIASTOLIC BLOOD PRESSURE: 74 MMHG

## 2018-01-13 VITALS
DIASTOLIC BLOOD PRESSURE: 77 MMHG | HEIGHT: 64 IN | WEIGHT: 166 LBS | SYSTOLIC BLOOD PRESSURE: 99 MMHG | BODY MASS INDEX: 28.34 KG/M2

## 2018-01-13 VITALS
RESPIRATION RATE: 16 BRPM | DIASTOLIC BLOOD PRESSURE: 80 MMHG | HEART RATE: 76 BPM | WEIGHT: 166 LBS | TEMPERATURE: 97.2 F | BODY MASS INDEX: 28.94 KG/M2 | SYSTOLIC BLOOD PRESSURE: 122 MMHG

## 2018-01-13 VITALS
SYSTOLIC BLOOD PRESSURE: 98 MMHG | BODY MASS INDEX: 28.34 KG/M2 | HEIGHT: 64 IN | RESPIRATION RATE: 18 BRPM | TEMPERATURE: 98 F | DIASTOLIC BLOOD PRESSURE: 62 MMHG | HEART RATE: 82 BPM | WEIGHT: 166 LBS

## 2018-01-13 NOTE — RESULT NOTES
Verified Results  * MRI CERVICAL SPINE WO CONTRAST [id number] 02:53PM Shaila Glass Order Number: [id number]    - Patient Instructions: To schedule this appointment, please contact Central Scheduling at [phone]  Test Name Result Flag Reference   MRI CERVICAL SPINE WO CONTRAST (Report)     MRI CERVICAL SPINE WITHOUT CONTRAST     INDICATION: Right arm radiculopathy  COMPARISON: None  TECHNIQUE: Sagittal T1, sagittal T2, sagittal inversion recovery, axial T2, axial 2D merge        IMAGE QUALITY: Diagnostic     FINDINGS:     ALIGNMENT: Straightening of the normal cervical lordosis  No subluxation or compression fracture  No scoliosis  MARROW SIGNAL: Mild endplate marrow degenerative change C4-5 and C5-6  CERVICAL AND VISUALIZED THORACIC CORD: Normal signal within the visualized cord  PREVERTEBRAL AND PARASPINAL SOFT TISSUES:  Normal          VISUALIZED POSTERIOR FOSSA: The visualized posterior fossa demonstrates no abnormal signal      CERVICAL DISC SPACES:        C2-C3: Normal      C3-C4: Slight loss of disc height with mild annular bulging and uncinate joint hypertrophic degenerative change  Mild canal stenosis and foraminal narrowing  C4-C5: Disc desiccation and loss of disc height  Annular bulging with bilateral uncinate joint hypertrophic osteophyte formation  Mild to moderate canal stenosis with partial effacement of the CSF space but no cord compression  Moderate bilateral    foraminal narrowing  C5-C6: Disc desiccation and loss of disc height  Mild annular bulging and uncinate joint hypertrophic osteophyte formation  Mild to moderate canal stenosis with moderate bilateral foraminal narrowing  C6-C7: Slight loss of disc height with mild annular bulging  Mild uncinate joint hypertrophic change  Mild canal stenosis and bilateral foraminal narrowing       C7-T1: Normal      UPPER THORACIC DISC SPACES: Normal        IMPRESSION:     Straightening of normal cervical lordosis  Multilevel cervical spondylitic degenerative change with annular bulging and uncinate joint hypertrophic degenerative change resulting in mild to moderate canal stenosis and foraminal narrowing  Moderate    bilateral foraminal narrowing at the C4-5 and C5-6 levels               Workstation performed: [id number]     Signed by:   Nisha Mcqueen DO   11/22/17

## 2018-01-13 NOTE — MISCELLANEOUS
Message  Pt called today to discuss left shoulder MRI results  Pt denies any more dislocation events since last visit  MRI results: partial thickness supraspinatus tear (rim-rent tear), no full thickness tear, Hill Sachs fracture/lesion, probable anterior labrum tear  Pt demonstrates understanding of results  Pt still in sling and NWB LUE  Will refer to my partner Dr Allegra Lechuga for possible arthroscopic soft tissue reconstruction for shoulder recurrent instability  Signatures   Electronically signed by :  Cay Eisenmenger, DO; Jun 12 2017  2:46PM EST                       (Author)

## 2018-01-14 VITALS
WEIGHT: 167 LBS | BODY MASS INDEX: 28.51 KG/M2 | TEMPERATURE: 97.8 F | RESPIRATION RATE: 16 BRPM | HEART RATE: 70 BPM | SYSTOLIC BLOOD PRESSURE: 99 MMHG | DIASTOLIC BLOOD PRESSURE: 68 MMHG | HEIGHT: 64 IN

## 2018-01-14 VITALS
TEMPERATURE: 97.2 F | HEIGHT: 64 IN | BODY MASS INDEX: 28.34 KG/M2 | DIASTOLIC BLOOD PRESSURE: 60 MMHG | SYSTOLIC BLOOD PRESSURE: 110 MMHG | RESPIRATION RATE: 16 BRPM | WEIGHT: 166 LBS | HEART RATE: 68 BPM

## 2018-01-14 VITALS
BODY MASS INDEX: 28.65 KG/M2 | HEIGHT: 64 IN | HEART RATE: 80 BPM | TEMPERATURE: 97.4 F | WEIGHT: 167.8 LBS | SYSTOLIC BLOOD PRESSURE: 120 MMHG | RESPIRATION RATE: 16 BRPM | DIASTOLIC BLOOD PRESSURE: 70 MMHG

## 2018-01-14 VITALS
BODY MASS INDEX: 28 KG/M2 | WEIGHT: 164 LBS | HEART RATE: 83 BPM | HEIGHT: 64 IN | SYSTOLIC BLOOD PRESSURE: 134 MMHG | DIASTOLIC BLOOD PRESSURE: 84 MMHG

## 2018-01-14 VITALS
BODY MASS INDEX: 28.34 KG/M2 | HEIGHT: 64 IN | RESPIRATION RATE: 16 BRPM | HEART RATE: 84 BPM | SYSTOLIC BLOOD PRESSURE: 118 MMHG | DIASTOLIC BLOOD PRESSURE: 70 MMHG | TEMPERATURE: 97.8 F | WEIGHT: 166 LBS

## 2018-01-14 NOTE — RESULT NOTES
Verified Results  Urine Dip Automated- POC [id number] 11:18AM Vielka Novathuybenjamin     Test Name Result Flag Reference   Color Yellow     Clarity Transparent     Leukocytes neg     Nitrite neg     Blood neg     Bilirubin neg     Urobilinogen norm     Protein neg     Ph 5     Specific Gravity 1 015     Ketone neg     Glucose norm         Plan  Endometrial mass    · Urine Dip Automated- POC; Status:Complete;   Done: 10BQS1725 11:18AM

## 2018-01-15 VITALS
OXYGEN SATURATION: 98 % | DIASTOLIC BLOOD PRESSURE: 76 MMHG | WEIGHT: 170 LBS | HEIGHT: 64 IN | BODY MASS INDEX: 29.02 KG/M2 | SYSTOLIC BLOOD PRESSURE: 112 MMHG | HEART RATE: 82 BPM

## 2018-01-17 NOTE — RESULT NOTES
Verified Results  * DXA BONE DENSITY SPINE HIP AND PELVIS 22Nov2017 02:13PM Richrd Guardian Order Number: [id number]    - Patient Instructions: To schedule this appointment, please contact Central Scheduling at [phone]  Test Name Result Flag Reference   DXA BONE DENSITY SPINE HIP AND PELVIS (Report)     CENTRAL DXA SCAN     CLINICAL HISTORY:  57year old postmenopausal  female risk factors include rheumatoid arthritis  TECHNIQUE: Bone densitometry was performed using a Blue[employer]ic bone densitometer  Regions of interest appear properly placed  There are no obvious fractures or other confounding variables which could limit the study  Degenerative changes of the lumbar    spine and hip  This will falsely elevate the bone mineral densities in these regions  COMPARISON: May 11, 2012, performed on a different DEXA unit  RESULTS:    LUMBAR SPINE: L1-L4:   BMD 1 007 gm/cm2   T-score -1 5   Z-score 0 0     LEFT TOTAL HIP:   BMD 0 895 gm/cm2   T-score -0 9   Z-score 0 2     LEFT FEMORAL NECK:   BMD 0 814 gm/cm2   T-score -1 6   Z-score -0 2     TOTAL RIGHT HIP:        BMD 0 881 gm/sq-cm,      T-score is -1 0      Z-score is 0 1                FEMORAL NECK RIGHT HIP :     BMD 0 794 gm/sq-cm,     T-score is -1 8     Z-score is -0 3             IMPRESSION:   1  Based on the [facility] classification, the T-score of -1 8 in the right hip is consistent with low bone mineral density  2  Direct comparison cannot be made to the lumbar spine, given the prior study only evaluated the L2 and L4 vertebral bodies  There has been a slight decrease in the mean total bone mineral density of the hips  It should be noted however that the    examinations were performed on different DXA units  3  Any secondary causes of low bone mineral density should be excluded prior to treatment, if clinically indicated     4  A daily intake of at least 1200 mg calcium and 800 to 1000 IU of Vitamin D, as well as weight bearing and muscle strengthening exercise, fall prevention and avoidance of tobacco and excessive alcohol intake as basic preventive measures are suggested  5  Repeat DXA in 18 - 24 months, on the same machine, as clinically indicated  The 10 year risk of hip fracture is 3%, with the 10 year risk of major osteoporotic fracture being 18%, as calculated by the [facility] fracture risk assessment tool (FRAX)  The current NOF guidelines recommend treating patients with FRAX 10 year risk score of   >3% for hip fracture and >20% for major osteoporotic fracture        WHO CLASSIFICATION:   Normal (a T-score of -1 0 or higher)   Low bone mineral density (a T-score of less than -1 0 but higher than -2 5)   Osteoporosis (a T-score of -2 5 or less)   Severe osteoporosis (a T-score of -2 5 or less with a fragility fracture)             Workstation performed: [id number]     Signed by:   Lis Morgan DO   11/24/17

## 2018-01-17 NOTE — RESULT NOTES
Verified Results  * MRI SHOULDER LEFT WO CONTRAST [id number] 10:50AM Nhi Womack Order Number: [id number]   Performing Comments: MRI LEFT SHOULDER   - Patient Instructions: To schedule this appointment, please contact Central Scheduling at [phone]  Test Name Result Flag Reference   MRI SHOULDER LEFT WO CONTRAST (Report)     This is a summary report  The complete report is available in the patient's medical record  If you cannot access the medical record, please contact the sending organization for a detailed fax or copy  MRI LEFT SHOULDER     INDICATION: [id number]: Recurrent dislocation, left shoulder  History taken directly from the electronic ordering system  57-year-old woman with pain and decreased range of motion in the left shoulder following a fall off of a treadmill one month ago  History of recurrent left shoulder dislocations  COMPARISON: Left shoulder radiographs from May 10, 2017  TECHNIQUE:  The following MR sequences were obtained of the left shoulder: Localizer, axial GRE/PD fat sat, oblique coronal T2 fat sat, oblique sagittal T1/T2 fat sat  Images were acquired on a 1 5 Korin unit  Gadolinium was not used  FINDINGS:     SUBCUTANEOUS TISSUES: Normal     JOINT EFFUSION: Small shoulder joint effusion  ACROMION PROCESS: 2 mm anterolateral undersurface enthesophyte  Otherwise normal acromion morphology  ROTATOR CUFF: Partial thickness tear of the anterior leading edge of the supraspinatus tendon at its footprint (rim rent tear)  No full-thickness rotator cuff tear  Rotator cuff muscles unremarkable  SUBACROMIAL/SUBDELTOID BURSA: Normal       LONG HEAD OF BICEPS TENDON: Normal      GLENOID LABRUM: Suggestion of small amount of joint fluid interposed between the anterior labrum and glenoid process (series 5, images 12 and 13), the appearance of which suggests a subclavian pleural sulcus, rather than a tear   If Bankart lesion or    related anteroinferior labral tear suspected clinically, consider further evaluation with MR arthrography  Superior labrum and posterior labrum normal in appearance  GLENOHUMERAL JOINT: Intact  ACROMIOCLAVICULAR JOINT: Small effusion and mild osteophytosis  BONE MARROW SIGNAL: Small Hill-Sachs fracture deformity of the posterior humeral head with surrounding marrow edema  Marrow signal otherwise normal          IMPRESSION:     1  Rim rent tear of the left supraspinatus  2  No evidence of full-thickness rotator cuff tear  3  Recent Hill-Sachs fracture  4  Questionable anterior labral tear  If clinically indicated, this can be evaluated more accurately with MR arthrography         Workstation performed: [id number]     Signed by:   Sonia Mariscal MD   6/8/17

## 2018-01-17 NOTE — PROGRESS NOTES
Chief Complaint  PPD read negative 0 00mm bh/lpn      Active Problems    1  Adult BMI 29 0-29 9 kg/sq m (V85 25) (Z68 29)   2  Allergic rhinitis (477 9) (J30 9)   3  Asthma (493 90) (J45 909)   4  Atypical nevus (216 9) (D22 9)   5  Cervical radiculopathy (723 4) (M54 12)   6  Complicated migraine (407 66) (G43 109)   7  Dislocated shoulder (831 00) (S43 006A)   8  Dizziness (780 4) (R42)   9  Dyslipidemia (272 4) (E78 5)   10  Elevated LFTs (790 6) (R94 5)   11  Encounter for screening mammogram for malignant neoplasm of breast (V76 12)    (Z12 31)   12  Endometrial mass (625 8) (N94 89)   13  Impaired fasting glucose (790 21) (R73 01)   14  Mixed hyperlipidemia (272 2) (E78 2)   15  Muscle cramps (729 82) (R25 2)   16  Paresthesia and pain of both upper extremities (782 0,729 5) (R20 2,M79 601,M79 602)   17  PPD screening test (V74 1) (Z11 1)   18  Rectal itching (698 0) (L29 0)   19  Recurrent dislocation of left shoulder (718 31) (M24 412)   20  Tension type headache (339 10) (G44 209)   21  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)    Current Meds   1  Advair Diskus 250-50 MCG/DOSE Inhalation Aerosol Powder Breath Activated; 1 puff 2   times a day; Therapy: 50Ynu8567 to (Naty Mcgraw)  Requested for: 18RMI2419; Last   Rx:06Mar2017 Ordered   2  Atorvastatin Calcium 20 MG Oral Tablet; Take 1 tablet daily; Therapy: 10HHH0535 to (Last Rx:15Mar2017)  Requested for: 63SFV8484 Ordered   3  Clotrimazole 1 % External Cream; Apply to affected skin twice daily; Therapy: 19YFO5245 to (Evaluate:01Mar2017)  Requested for: 74UTI8151; Last   Rx:18Jan2017 Ordered   4  Magnesium Oxide 400 MG Oral Tablet; TAKE 1 TABLET daily if tolerated take one tab   twice  DAILY; Therapy: 51ZYB3751 to (Last Rx:05Jan2017)  Requested for: 08WLC7780 Ordered   5   Mometasone Furoate 50 MCG/ACT Nasal Suspension; USE 2 SPRAYS IN EACH   NOSTRIL 3 TIMES DAILY POST OP;   Therapy: 02WRT1324 to (Last Rx:21Mar2017) Requested for: 21Mar2017 Ordered   6  Montelukast Sodium 10 MG Oral Tablet; TAKE 1 TABLET DAILY; Therapy: 84CYB2421 to (Jeanne Grounds)  Requested for: 28QFF5714; Last   Rx:07Mar2017 Ordered   7  ProAir  (90 Base) MCG/ACT Inhalation Aerosol Solution; INHALE 2 PUFFS   FOUR TIMES DAILY AS DIRECTED; Therapy: 68HGB5519 to (Evaluate:18Jun2017)  Requested for: 20Mar2017; Last   Rx:20Mar2017 Ordered    Allergies    1  Latex Exam Gloves MISC    Plan  PPD screening test    · PPD    Future Appointments    Date/Time Provider Specialty Site   06/21/2017 08:20 AM ZOFIA Real  Orthopedic Surgery [facility]   08/10/2017 10:30 AM ZOFIA Heaton   Neurology [facility]UROLOG[facility]   Electronically signed by : ZOFIA Broussard ; Jun 14 2017 11:19AM EST                       (Author)

## 2018-01-17 NOTE — RESULT NOTES
Verified Results  * MAMMO SCREENING BILATERAL W CAD 30Oou29 03:13PM Anu Barr     Test Name Result Flag Reference   MAMMO SCREENING BILATERAL W CAD (Report)     Patient History:   Patient is postmenopausal    Family history of breast cancer in paternal half sister at age 43   and breast cancer in sister at age 52  Benign cyst aspiration, March 24, 2014  Patient's BMI is 29 2  Reason for exam: screening (asymptomatic)  Mammo Screening Bilateral W CAD: April 4, 2016 - Check In #:    [de-identified]   Bilateral MLO, CC, and XCCL view(s) were taken  Technologist: RT Shahana(R)(M)   Prior study comparison: April 1, 2015, bilateral screening    mammogram performed at [place] [address]  March 24, 2014, bilateral diagnostic mammogram performed at [place] [address]  March 4, 2014, right breast    screening mammogram performed at [place] [address]  June 16, 2011, bilateral screening mammogram performed    at [place] [address]  April 20, 2010,    bilateral screening mammogram performed at [place] [address]  March 16, 2009, screening mammogram performed at    [place] [address]  There are scattered fibroglandular densities  No new dominant    soft tissue mass, architectural distortion or suspicious    calcifications are noted  The skin and nipple structures are    within normal limits  Benign appearing calcifications are    noted  Stable postoperative changes noted  No mammographic evidence of malignancy  No    significant changes when compared with prior studies  ASSESSMENT: BiRad:2 - Benign     Recommendation:   Routine screening mammogram of both breasts in 1 year  Analyzed by CAD     8-10% of cancers will be missed on mammography  Management of a    palpable abnormality must be based on clinical grounds   Patients   will be notified of their results via letter from our facility  Accredited by [employer] of [employer] and FDA  Transcription Location: CARYN Keyes 98: [id number]     Risk Value(s):   Tyrer-Cuzick 10 Year: 14 707%, Tyrer-Cuzick Lifetime: 31 448%,    Myriad Table: 5 6%, ILENE 5 Year: 2 9%, NCI Lifetime: 12 8%, MRS    : Based on personal and/or family history,    consideration of hereditary risk assessment may be warranted     Signed by:   Andrew Poon MD   4/5/16

## 2018-01-17 NOTE — RESULT NOTES
Verified Results  COLONOSCOPY 02Mar2012 03:52PM Anu Barr     Test Name Result Flag Reference   Colonoscopy see scanned immage

## 2018-01-18 NOTE — PROGRESS NOTES
Assessment   1  Encounter for routine gynecological examination (V72 31) (Z01 419)  2  Abnormal glucose (790 29) (R73 09)  3  Dyslipidemia (272 4) (E78 5)  4  Adult body mass index 29 0-29 9 (V85 25) (Z68 29)    Plan  Abnormal glucose    · Start: MetFORMIN HCl - 500 MG Oral Tablet; take 0 5 tablet daily  Encounter for routine gynecological examination    · (1) THIN PREP PAP WITH IMAGING; Status:Active; Requested for:19Erx7393;   PAP Maturation index required? : No  Reflex HPV? : Never    Discussion/Summary  Currently, she eats an adequate diet  the risks and benefits of cervical cancer screening were discussed Pap test was done today Breast cancer screening: the risks and benefits of breast cancer screening were discussed and mammogram is current  Colorectal cancer screening: colorectal cancer screening is current  Osteoporosis screening: bone mineral density testing is not indicated  The immunizations are up to date  Advice and education were given regarding nutrition, aerobic exercise, weight bearing exercise and weight loss  Patient discussion: discussed with the patient  Discussion regarding diet and exercise for her decreasing risk for diabetes  Adding 250 mg of metformin at bedtime for eating weight reduction and glucose control  The patient was counseled regarding diagnostic results, instructions for management, risk factor reductions, prognosis, risks and benefits of treatment options, importance of compliance with treatment  Chief Complaint  PE and Pap  ck/lpn      History of Present Illness  HM, Adult Female: The patient is being seen for a health maintenance evaluation  The last health maintenance visit was many year(s) ago  General Health: The patient's health since the last visit is described as good  Screening:      Review of Systems    Constitutional: No fever, no chills, feels well, no tiredness, no recent weight gain or weight loss     Eyes: No complaints of eye pain, no red eyes, no eyesight problems, no discharge, no dry eyes, no itching of eyes  ENT: no complaints of earache, no loss of hearing, no nose bleeds, no nasal discharge, no sore throat, no hoarseness  Cardiovascular: No complaints of slow heart rate, no fast heart rate, no chest pain, no palpitations, no leg claudication, no lower extremity edema  Respiratory: No complaints of shortness of breath, no wheezing, no cough, no SOB on exertion, no orthopnea, no PND  Gastrointestinal: No complaints of abdominal pain, no constipation, no nausea or vomiting, no diarrhea, no bloody stools  Genitourinary: No complaints of dysuria, no incontinence, no pelvic pain, no dysmenorrhea, no vaginal discharge or bleeding  Musculoskeletal: No complaints of arthralgias, no myalgias, no joint swelling or stiffness, no limb pain or swelling  Integumentary: No complaints of skin rash or lesions, no itching, no skin wounds, no breast pain or lump  Neurological: No complaints of headache, no confusion, no convulsions, no numbness, no dizziness or fainting, no tingling, no limb weakness, no difficulty walking  Psychiatric: Not suicidal, no sleep disturbance, no anxiety or depression, no change in personality, no emotional problems  Endocrine: Glycohemoglobin has been known to be increasing over the past year  Hematologic/Lymphatic: No complaints of swollen glands, no swollen glands in the neck, does not bleed easily, does not bruise easily  Active Problems   1  Abnormal glucose (790 29) (R73 09)  2  Asthma (493 90) (J45 909)  3  Backache (724 5) (M54 9)  4  Breast lump (611 72) (N63)  5  Dyslipidemia (272 4) (E78 5)  6  Encounter for screening for malignant neoplasm of colon (V76 51) (Z12 11)  7  Encounter for screening mammogram for malignant neoplasm of breast (V76 12)   (Z12 31)  8  Genital herpes simplex (054 10) (A60 00)  9  Left knee pain (719 46) (M25 562)  10  Mammogram abnormal (793 80) (R92 8)  11   Need for prophylactic vaccination and inoculation against influenza (V04 81) (Z23)  12  Right knee pain (719 46) (M25 561)  13  Varicose veins of left lower extremity with pain (454 8) (V65 231)    Past Medical History    · History of Abdominal pain, RLQ (right lower quadrant) (789 03) (R10 31)   · History of Asthma with exacerbation (493 92) (J45 901)   · History of Asymptomatic postmenopausal status (age-related) (natural) (V49 81) (Z78 0)   · History of Back Strain (847 9)   · 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, 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)   · History of Blepharospasm (333 81) (G24 5)   · History of Contusion of skin with intact surface (924 9) (T14 8)   · History of Contusion Of The Left Side Of The Back With Intact Skin Surface (922 31)   · History of Facial pain (784 0) (R51)   · Fibrositis (729 0) (M79 7)   · Hip pain, unspecified laterality   · History of fatigue (V13 89) (N88 642)   · History of heartburn (V12 79) (W02 486)   · History of influenza (V12 09) (Z87 09)   · History of tinea corporis (V12 09) (Z86 19)   · History of Leg injury (959 7) (S89 90XA)   · History of Myalgia And Myositis (729 1)   · History of Neck Strain (847 0)   · Noninfectious Dermatological Conditions (709 9)   · History of Pruritus ani (698 0) (L29 0)   · Rotator cuff tendinitis, unspecified laterality (726 10) (M75 80)   · History of Skin rash (782 1) (R21)   · History of Sprain Of The Back (847 9)    Surgical History    · History of Breast Surgery Reduction Procedure Bilateral   · History of  Section    Family History    · Family history of Hypertension    · Family history of Heart attack   · Family history of Hypertension    · Family history of Breast cancer   · Family history of Skin cancer    · Family history of diabetes mellitus (V18 0) (Z83 3)    Social History    · Daily caffeinated coffee consumption   · Never A Smoker   · Social alcohol use (Z78 9)    Current Meds  1   Advair Diskus 250-50 MCG/DOSE Inhalation Aerosol Powder Breath Activated; 1 puff 2   times a day; Therapy: 75Xsp6150 to (Garrel Dumont)  Requested for: 48GSD8046; Last   Rx:46Sog1432 Ordered  2  NexIUM 40 MG Oral Capsule Delayed Release; TAKE 1 CAPSULE DAILY; Therapy: 63Asi6203 to Recorded  3  Pravastatin Sodium 40 MG Oral Tablet; TAKE 1 TABLET AT BEDTIME; Therapy: 54HQP7165 to (Last Rx:65Qcu8378)  Requested for: 03Goz8353 Ordered  4  Systane Overnight Therapy 0 3 % Ophthalmic Gel; USE AS DIRECTED; Therapy: 90Arj8933 to Recorded    Allergies   1  Latex Exam Gloves MISC    Vitals   Recorded: 25Feb2016 03:18PM   Temperature 97 9 F   Heart Rate 78   Respiration 14   Systolic 480   Diastolic 70   Height 5 ft 3 5 in   Weight 170 lb    BMI Calculated 29 64   BSA Calculated 1 82     Physical Exam    Constitutional   General appearance: Abnormal   comfortable and obese  Eyes   Conjunctiva and lids: No swelling, erythema or discharge  Pupils and irises: Equal, round, reactive to light  Ophthalmoscopic examination: Normal fundi and optic discs  Ears, Nose, Mouth, and Throat   Otoscopic examination: Tympanic membranes translucent with normal light reflex  Canals patent without erythema  Oropharynx: Normal with no erythema, edema, exudate or lesions  Neck   Neck: Supple, symmetric, trachea midline, no masses  Thyroid: Normal, no thyromegaly  Pulmonary   Respiratory effort: No increased work of breathing or signs of respiratory distress  Percussion of chest: Normal     Palpation of chest: Normal     Auscultation of lungs: Clear to auscultation  Cardiovascular   Auscultation of heart: Normal rate and rhythm, normal S1 and S2, no murmurs  Carotid pulses: 2+ bilaterally  Abdominal aorta: Normal     Examination of extremities for edema and/or varicosities: Normal     Chest   Breasts: Abnormal   Bilateral breast scars  Bilateral nipple examination revealed normal appearance and no discharge     Palpation of breasts and axillae: Normal, no masses palpated  Chest: Normal     Abdomen   Abdomen: Abnormal   The abdomen was obese  Bowel sounds were normal  The abdomen was soft and nontender  Liver and spleen: No hepatomegaly or splenomegaly  Genitourinary   External genitalia and vagina: Normal, no lesions appreciated  Urethra: Normal, no discharge  Bladder: Not distended, no tenderness  Cervix: Normal, no lesions  Uterus: Normal size, no tenderness, no masses  Adnexa/Parametria: Normal, no masses or tenderness  Lymphatic   Palpation of lymph nodes in neck: No lymphadenopathy  Palpation of lymph nodes in axillae: No lymphadenopathy  Musculoskeletal   Gait and station: Normal     Digits and nails: Normal without clubbing or cyanosis  Muscle strength/tone: Normal     Skin   Skin and subcutaneous tissue: Normal without rashes or lesions  Neurologic   Cranial nerves: Cranial nerves II-XII intact  Cortical function: Normal mental status  Reflexes: 2+ and symmetric  Psychiatric   Judgment and insight: Normal     Mood and affect: Normal        Health Management  Encounter for screening mammogram for malignant neoplasm of breast   Digital Bilateral Screening Mammogram With CAD; every 1 year; Last 01Apr2015;  Next  Due: 01Apr2016; Near Due    Signatures   Electronically signed by : ZOFIA Bearden ; Feb 25 2016  5:28PM EST                       (Author)

## 2018-01-18 NOTE — PROGRESS NOTES
Chief Complaint  PPD placed in left forearm  bh/lpn      Active Problems    1  Adult BMI 29 0-29 9 kg/sq m (V85 25) (Z68 29)   2  Allergic rhinitis (477 9) (J30 9)   3  Asthma (493 90) (J45 909)   4  Atypical nevus (216 9) (D22 9)   5  Cervical radiculopathy (723 4) (M54 12)   6  Complicated migraine (676 56) (G43 109)   7  Dislocated shoulder (831 00) (S43 006A)   8  Dizziness (780 4) (R42)   9  Dyslipidemia (272 4) (E78 5)   10  Elevated LFTs (790 6) (R94 5)   11  Encounter for screening mammogram for malignant neoplasm of breast (V76 12)    (Z12 31)   12  Endometrial mass (625 8) (N94 89)   13  Impaired fasting glucose (790 21) (R73 01)   14  Mixed hyperlipidemia (272 2) (E78 2)   15  Muscle cramps (729 82) (R25 2)   16  Paresthesia and pain of both upper extremities (782 0,729 5) (R20 2,M79 601,M79 602)   17  PPD screening test (V74 1) (Z11 1)   18  Rectal itching (698 0) (L29 0)   19  Recurrent dislocation of left shoulder (718 31) (M24 412)   20  Tension type headache (339 10) (G44 209)   21  Varicose veins of left lower extremity with pain (454 8) (G42 782)    Current Meds   1  Advair Diskus 250-50 MCG/DOSE Inhalation Aerosol Powder Breath Activated; 1 puff 2   times a day; Therapy: 57Wqm1442 to (Joon Diaz)  Requested for: 74PJR1226; Last   Rx:06Mar2017 Ordered   2  Atorvastatin Calcium 20 MG Oral Tablet; Take 1 tablet daily; Therapy: 68TUK6273 to (Last Rx:15Mar2017)  Requested for: 45ENJ0094 Ordered   3  Clotrimazole 1 % External Cream; Apply to affected skin twice daily; Therapy: 47TJX8676 to (Evaluate:01Mar2017)  Requested for: 21MHL5792; Last   Rx:18Jan2017 Ordered   4  Magnesium Oxide 400 MG Oral Tablet; TAKE 1 TABLET daily if tolerated take one tab   twice  DAILY; Therapy: 66XGG2087 to (Last Rx:05Jan2017)  Requested for: 38LCN3496 Ordered   5   Mometasone Furoate 50 MCG/ACT Nasal Suspension; USE 2 SPRAYS IN EACH   NOSTRIL 3 TIMES DAILY POST OP;   Therapy: 32RBP8029 to (Last Rx:21Mar2017)  Requested for: 21Mar2017 Ordered   6  Montelukast Sodium 10 MG Oral Tablet; TAKE 1 TABLET DAILY; Therapy: 84OLM2807 to (Maureen Colon)  Requested for: 34EPP2698; Last   Rx:07Mar2017 Ordered   7  ProAir  (90 Base) MCG/ACT Inhalation Aerosol Solution; INHALE 2 PUFFS   FOUR TIMES DAILY AS DIRECTED; Therapy: 75YPO8416 to (Evaluate:18Jun2017)  Requested for: 20Mar2017; Last   Rx:20Mar2017 Ordered    Allergies    1  Latex Exam Gloves MISC    Plan  PPD screening test    · PPD    Future Appointments    Date/Time Provider Specialty Site   08/10/2017 10:30 AM ZOFIA Solano   Neurology NEUROLOGY [place]   06/13/2017 04:00 PM Sandra Catalan DO Orthopedic Surgery [facility]   06/14/2017 11:00 AM [facility] FP, Nurse Schedule  [facility]     Signatures   Electronically signed by : ZOFIA Moyer ; Jun 12 2017  2:34PM EST                       (Author)

## 2018-01-19 ENCOUNTER — GENERIC CONVERSION - ENCOUNTER (OUTPATIENT)
Dept: OTHER | Facility: OTHER | Age: 63
End: 2018-01-19

## 2018-01-19 ENCOUNTER — HOSPITAL ENCOUNTER (OUTPATIENT)
Dept: RADIOLOGY | Facility: HOSPITAL | Age: 63
Discharge: HOME | End: 2018-01-19
Attending: PODIATRIST
Payer: COMMERCIAL

## 2018-01-19 ENCOUNTER — TRANSCRIBE ORDERS (OUTPATIENT)
Dept: ADMINISTRATIVE | Facility: HOSPITAL | Age: 63
End: 2018-01-19

## 2018-01-19 DIAGNOSIS — M72.2 PLANTAR FASCIAL FIBROMATOSIS: ICD-10-CM

## 2018-01-19 DIAGNOSIS — M72.2 PLANTAR FASCIAL FIBROMATOSIS: Primary | ICD-10-CM

## 2018-01-19 PROCEDURE — 73630 X-RAY EXAM OF FOOT: CPT

## 2018-01-20 ENCOUNTER — APPOINTMENT (EMERGENCY)
Dept: RADIOLOGY | Facility: HOSPITAL | Age: 63
End: 2018-01-20
Payer: COMMERCIAL

## 2018-01-20 ENCOUNTER — HOSPITAL ENCOUNTER (EMERGENCY)
Facility: HOSPITAL | Age: 63
Discharge: HOME | End: 2018-01-20
Attending: EMERGENCY MEDICINE
Payer: COMMERCIAL

## 2018-01-20 VITALS
DIASTOLIC BLOOD PRESSURE: 59 MMHG | OXYGEN SATURATION: 94 % | RESPIRATION RATE: 16 BRPM | HEIGHT: 64 IN | SYSTOLIC BLOOD PRESSURE: 122 MMHG | TEMPERATURE: 98.4 F | WEIGHT: 164 LBS | HEART RATE: 83 BPM | BODY MASS INDEX: 28 KG/M2

## 2018-01-20 DIAGNOSIS — J45.901 ASTHMA EXACERBATION: Primary | ICD-10-CM

## 2018-01-20 PROCEDURE — 99285 EMERGENCY DEPT VISIT HI MDM: CPT

## 2018-01-20 PROCEDURE — 94640 AIRWAY INHALATION TREATMENT: CPT

## 2018-01-20 PROCEDURE — 71046 X-RAY EXAM CHEST 2 VIEWS: CPT

## 2018-01-20 PROCEDURE — 93005 ELECTROCARDIOGRAM TRACING: CPT

## 2018-01-20 RX ORDER — HYDROCODONE POLISTIREX AND CHLORPHENIRAMINE POLISTIREX 10; 8 MG/5ML; MG/5ML
5 SUSPENSION, EXTENDED RELEASE ORAL EVERY 12 HOURS PRN
Status: DISCONTINUED | OUTPATIENT
Start: 2018-01-20 | End: 2018-01-20

## 2018-01-20 RX ORDER — ALBUTEROL SULFATE 2.5 MG/3ML
5 SOLUTION RESPIRATORY (INHALATION) ONCE
Status: COMPLETED | OUTPATIENT
Start: 2018-01-20 | End: 2018-01-20

## 2018-01-20 RX ORDER — ALBUTEROL SULFATE 90 UG/1
2 AEROSOL, METERED RESPIRATORY (INHALATION) EVERY 4 HOURS PRN
Qty: 1 INHALER | Refills: 0 | Status: SHIPPED | OUTPATIENT
Start: 2018-01-20 | End: 2018-01-24 | Stop reason: CLARIF

## 2018-01-20 RX ORDER — ATORVASTATIN CALCIUM 10 MG/1
10 TABLET, FILM COATED ORAL DAILY
COMMUNITY
End: 2018-01-24

## 2018-01-20 RX ORDER — GUAIFENESIN/DEXTROMETHORPHAN 100-10MG/5
10 SYRUP ORAL ONCE
Status: COMPLETED | OUTPATIENT
Start: 2018-01-20 | End: 2018-01-20

## 2018-01-20 RX ORDER — CEFADROXIL 500 MG/1
500 CAPSULE ORAL EVERY 12 HOURS SCHEDULED
COMMUNITY
End: 2018-02-07

## 2018-01-20 RX ORDER — FLUTICASONE PROPIONATE 220 UG/1
1 AEROSOL, METERED RESPIRATORY (INHALATION) 2 TIMES DAILY
COMMUNITY
End: 2018-02-01 | Stop reason: ALTCHOICE

## 2018-01-20 RX ORDER — PREDNISONE 50 MG/1
50 TABLET ORAL DAILY
Qty: 5 TABLET | Refills: 0 | Status: SHIPPED | OUTPATIENT
Start: 2018-01-20 | End: 2018-01-25

## 2018-01-20 RX ORDER — ALBUTEROL SULFATE 2.5 MG/3ML
2.5 SOLUTION RESPIRATORY (INHALATION) EVERY 6 HOURS PRN
Qty: 75 ML | Refills: 0 | Status: SHIPPED | OUTPATIENT
Start: 2018-01-20 | End: 2019-06-27 | Stop reason: ALTCHOICE

## 2018-01-20 RX ORDER — IPRATROPIUM BROMIDE AND ALBUTEROL SULFATE 2.5; .5 MG/3ML; MG/3ML
3 SOLUTION RESPIRATORY (INHALATION) ONCE
Status: COMPLETED | OUTPATIENT
Start: 2018-01-20 | End: 2018-01-20

## 2018-01-20 RX ORDER — PROMETHAZINE HYDROCHLORIDE AND CODEINE PHOSPHATE 6.25; 1 MG/5ML; MG/5ML
5 SYRUP ORAL EVERY 4 HOURS PRN
Qty: 120 ML | Refills: 0 | Status: SHIPPED | OUTPATIENT
Start: 2018-01-20 | End: 2018-01-24 | Stop reason: SDUPTHER

## 2018-01-20 RX ORDER — PREDNISONE 20 MG/1
60 TABLET ORAL ONCE
Status: COMPLETED | OUTPATIENT
Start: 2018-01-20 | End: 2018-01-20

## 2018-01-20 RX ORDER — PANTOPRAZOLE SODIUM 40 MG/1
40 TABLET, DELAYED RELEASE ORAL DAILY
COMMUNITY
End: 2018-12-06 | Stop reason: SDUPTHER

## 2018-01-20 RX ADMIN — IPRATROPIUM BROMIDE 0.5 MG: 0.5 SOLUTION RESPIRATORY (INHALATION) at 20:29

## 2018-01-20 RX ADMIN — ALBUTEROL SULFATE 5 MG: 2.5 SOLUTION RESPIRATORY (INHALATION) at 20:29

## 2018-01-20 RX ADMIN — IPRATROPIUM BROMIDE AND ALBUTEROL SULFATE 3 ML: .5; 3 SOLUTION RESPIRATORY (INHALATION) at 21:19

## 2018-01-20 RX ADMIN — GUAIFENESIN AND DEXTROMETHORPHAN 10 ML: 100; 10 SYRUP ORAL at 20:47

## 2018-01-20 RX ADMIN — PREDNISONE 60 MG: 20 TABLET ORAL at 20:47

## 2018-01-21 LAB
ATRIAL RATE: 93 BPM
P AXIS: 39 DEGREES
PR INTERVAL: 142 MS
QRS AXIS: 15 DEGREES
QRSD INTERVAL: 74 MS
QT INTERVAL: 346 MS
QTC INTERVAL: 430 MS
T WAVE AXIS: 63 DEGREES
VENTRICULAR RATE: 93 BPM

## 2018-01-21 NOTE — ED PROVIDER NOTES
History  Chief Complaint   Patient presents with    Shortness of Breath     Pt c/o onset of cough and short of breath for one hour, history of asthma  Pt speaking in full sentences  57-year-old female with past medical history of asthma, fibromyalgia, sleep apnea, presents to the ER with acute onset cold symptoms, dry cough that started this morning  Patient started to have chest tightness and shortness of breath about 2 hours prior to arrival   Patient has history of mild asthma  Patient ran out of her albuterol nebulizer and inhaler at home  Patient came to the ER for further evaluation of her shortness of breath and chest tightness  Patient denies any recent fevers, chills, nausea, vomiting, diarrhea, dysuria  History provided by:  Patient  Shortness of Breath   Associated symptoms: cough    Associated symptoms: no abdominal pain, no chest pain, no ear pain, no fever, no headaches, no rash and no wheezing        Prior to Admission Medications   Prescriptions Last Dose Informant Patient Reported? Taking?    Calcium Citrate-Vitamin D (CALCIUM CITRATE+D3 PO)   Yes Yes   Sig: Take 1 tablet by mouth daily   Cholecalciferol (D3-1000) 1000 units tablet   Yes Yes   Sig: Take 1,000 Units by mouth daily   Ferrous Gluconate (IRON 27 PO)   Yes Yes   Sig: Take 1 tablet by mouth daily   atorvastatin (LIPITOR) 10 mg tablet   Yes Yes   Sig: Take 10 mg by mouth daily   cefadroxil (DURICEF) 500 mg capsule   Yes Yes   Sig: Take 500 mg by mouth every 12 (twelve) hours   fluticasone (FLOVENT HFA) 220 mcg/act inhaler   Yes Yes   Sig: Inhale 1 puff 2 (two) times a day   mupirocin (BACTROBAN) 2 % ointment   Yes Yes   Sig: Apply 1 application topically daily   orlistat (NOEMY) 60 MG capsule   Yes Yes   Sig: Take 60 mg by mouth 3 (three) times a day with meals   pantoprazole (PROTONIX) 40 mg tablet   Yes Yes   Sig: Take 40 mg by mouth daily      Facility-Administered Medications: None       Past Medical History: Diagnosis Date    Asthma     Fibromyalgia     History of shingles     may 2016    Sleep apnea        Past Surgical History:   Procedure Laterality Date    ABDOMINAL SURGERY      release of adhesions    BLADDER SURGERY      mesh-lift    BREAST SURGERY      lift     SECTION      x 3    CHOLECYSTECTOMY      lap    COLONOSCOPY      COSMETIC SURGERY      tummy and breast lift    ESOPHAGOGASTRODUODENOSCOPY      OOPHORECTOMY Left     OTHER SURGICAL HISTORY      vocal cord surgery, scraping    IA HYSTEROSCOPY,W/ENDO BX N/A 11/3/2016    Procedure: DILATATION AND CURETTAGE (D&C) WITH HYSTEROSCOPY;  Surgeon: Arabella Ferris MD;  Location: [facility];  Service: Gynecology    TONSILLECTOMY         Family History   Problem Relation Age of Onset    Hypertension Mother     Alzheimer's disease Mother     Hypertension Father     Kidney disease Brother      stone    Diabetes Daughter      I have reviewed and agree with the history as documented  Social History   Substance Use Topics    Smoking status: Never Smoker    Smokeless tobacco: Never Used    Alcohol use No        Review of Systems   Constitutional: Negative for activity change, appetite change, chills and fever  HENT: Negative for congestion and ear pain  Eyes: Negative for pain and discharge  Respiratory: Positive for cough and shortness of breath  Negative for chest tightness, wheezing and stridor  Cardiovascular: Negative for chest pain and palpitations  Gastrointestinal: Negative for abdominal distention, abdominal pain, constipation, diarrhea and nausea  Endocrine: Negative for cold intolerance  Genitourinary: Negative for dysuria, frequency and urgency  Musculoskeletal: Negative for arthralgias and back pain  Skin: Negative for color change and rash  Allergic/Immunologic: Negative for environmental allergies and food allergies  Neurological: Negative for dizziness, weakness, numbness and headaches  Hematological: Negative for adenopathy  Psychiatric/Behavioral: Negative for agitation, behavioral problems and confusion  The patient is not nervous/anxious  All other systems reviewed and are negative  Physical Exam  ED Triage Vitals [01/20/18 2026]   Temperature Pulse Respirations Blood Pressure SpO2   98 4 °F (36 9 °C) (!) 109 18 145/70 98 %      Temp Source Heart Rate Source Patient Position - Orthostatic VS BP Location FiO2 (%)   Tympanic Monitor Sitting Left arm --      Pain Score       --           Orthostatic Vital Signs  Vitals:    01/20/18 2026 01/20/18 2040   BP: 145/70 136/67   Pulse: (!) 109 97   Patient Position - Orthostatic VS: Sitting Sitting       Physical Exam   Constitutional: She is oriented to person, place, and time  She appears well-developed and well-nourished  HENT:   Head: Normocephalic and atraumatic  Mouth/Throat: Oropharynx is clear and moist    Eyes: Conjunctivae and EOM are normal    Neck: Normal range of motion  Neck supple  Cardiovascular: Normal rate, regular rhythm, normal heart sounds and intact distal pulses  Pulmonary/Chest: Effort normal  She has wheezes  Diffuse inspiratory and expiratory wheezes noted bilateral    Abdominal: Soft  Bowel sounds are normal  She exhibits no distension  There is no tenderness  Musculoskeletal: Normal range of motion  Neurological: She is alert and oriented to person, place, and time  Skin: Skin is warm and dry  Psychiatric: She has a normal mood and affect  Her behavior is normal  Judgment and thought content normal    Nursing note and vitals reviewed        ED Medications  Medications   ipratropium (ATROVENT) 0 02 % inhalation solution 0 5 mg (0 5 mg Nebulization Given 1/20/18 2029)   albuterol inhalation solution 5 mg (5 mg Nebulization Given 1/20/18 2029)   predniSONE tablet 60 mg (60 mg Oral Given 1/20/18 2047)   dextromethorphan-guaiFENesin (ROBITUSSIN DM)  mg/5 mL oral syrup 10 mL (10 mL Oral Given 1/20/18 2047)   ipratropium-albuterol (DUO-NEB) 0 5-2 5 mg/mL inhalation solution 3 mL (3 mL Nebulization Given 1/20/18 2119)       Diagnostic Studies  Results Reviewed     None                 XR chest 2 views   ED Interpretation by Jesse Parker DO (01/20 2118)   No acute infiltrates or pneumothorax noted  Procedures  ECG 12 Lead Documentation  Date/Time: 1/20/2018 8:29 PM  Performed by: Agustina Trevino  Authorized by: Agustina Trevino     Indications / Diagnosis:  Shortness of breath  ECG reviewed by me, the ED Provider: yes    Patient location:  ED  Previous ECG:     Previous ECG:  Compared to current    Similarity:  No change  Interpretation:     Interpretation: normal    Comments:      Sinus rhythm, rate 93, normal axis, normal intervals, no acute ST/T-wave abnormalities noted, otherwise unremarkable EKG, unchanged from previous EKG on 05/31/2017  Phone Contacts  ED Phone Contact    ED Course  ED Course as of Jan 20 2128   Sat Jan 20, 2018 2042 Pre-treatment peak flow: 300    2117 Post treatment peak flow after the 1st treatment was 350  MDM  Number of Diagnoses or Management Options  Asthma exacerbation: new and requires workup  Diagnosis management comments: Obtain chest x-ray to rule out any acute infiltrates  Give DuoNeb, steroids, cough medicine and reassess symptoms  Amount and/or Complexity of Data Reviewed  Tests in the medicine section of CPT®: ordered and reviewed  Independent visualization of images, tracings, or specimens: yes    Risk of Complications, Morbidity, and/or Mortality  General comments: Chest x-ray does not show any acute infiltrates or abnormalities  Patient's symptoms started to improve with 1st neb treatment  Peak flow improved  Patient continues to have some wheezing bilaterally  Second neb treatment ordered    Care patient signed out to the next attending will re-evaluated patient after 2nd neb treatment and if she continues to feel better than patient will be discharged home on asthma exacerbation  Patient Progress  Patient progress: stable    CritCare Time    Disposition  Final diagnoses:   Asthma exacerbation     Time reflects when diagnosis was documented in both MDM as applicable and the Disposition within this note     Time User Action Codes Description Comment    1/20/2018  9:18 PM Agapito Segura Add [J45 901] Asthma exacerbation       ED Disposition     None      Follow-up Information     Follow up With Specialties Details Why Contact Info    Jazmin Aparicio MD Family Medicine In 3 days  [address] [facility] [phone]          Patient's Medications   Discharge Prescriptions    ALBUTEROL (2 5 MG/3 ML) 0 083 % NEBULIZER SOLUTION    Take 3 mL by nebulization every 6 (six) hours as needed for wheezing       Start Date: 1/20/2018 End Date: --       Order Dose: 2 5 mg       Quantity: 75 mL    Refills: 0    ALBUTEROL (PROVENTIL HFA,VENTOLIN HFA) 90 MCG/ACT INHALER    Inhale 2 puffs every 4 (four) hours as needed for wheezing       Start Date: 1/20/2018 End Date: --       Order Dose: 2 puffs       Quantity: 1 Inhaler    Refills: 0    PREDNISONE 50 MG TABLET    Take 1 tablet by mouth daily for 5 days       Start Date: 1/20/2018 End Date: 1/25/2018       Order Dose: 50 mg       Quantity: 5 tablet    Refills: 0    PROMETHAZINE-CODEINE (PHENERGAN WITH CODEINE) 6 25-10 MG/5 ML SYRUP    Take 5 mL by mouth every 4 (four) hours as needed for cough for up to 10 days       Start Date: 1/20/2018 End Date: 1/30/2018       Order Dose: 5 mL       Quantity: 120 mL    Refills: 0     No discharge procedures on file      ED Provider  Electronically Signed by           Gerald Garrett DO  01/20/18 9133

## 2018-01-21 NOTE — ED NOTES
Pt feeling better, lungs clear to auscultate with coughing occasionally       Carla Weller RN  01/20/18 2533

## 2018-01-21 NOTE — ED NOTES
Pt feeling better with treatment, resting comfortably, chest tightness relieved       Perri Stack, RN  01/20/18 2042

## 2018-01-21 NOTE — DISCHARGE INSTRUCTIONS
Please take a list of all of your medications and discharge paperwork with you to all of your follow-up medical visits  Please take all of your medications as directed  Please call your family doctor or return to the ER if you have increased shortness of breath, chest pain, fevers, chills, nausea, vomiting, diarrhea, or any other worsening symptoms  Asthma, Ambulatory Care   GENERAL INFORMATION:   Asthma  is a lung disease that makes breathing difficult  Chronic inflammation and reactions to triggers narrow the airways in your lungs  Asthma can become life-threatening if it is not managed  Common symptoms include the following:   · Coughing     · Wheezing     · Shortness of breath     · Chest tightness  Seek immediate care for the following symptoms:   · Severe shortness of breath    · Blue or gray lips or nails    · Skin around your neck and ribs pulls in with each breath    · Shortness of breath, even after you take your short-term medicine as directed     · Peak flow numbers in the red zone of your asthma action plan  Treatment for asthma  will depend on how severe it is  Medicine may decrease inflammation, open airways, and make it easier to breathe  Medicines may be inhaled, taken as a pill, or injected  Short-term medicines relieve your symptoms quickly  Long-term medicines are used to prevent future attacks  You may also need medicine to help control your allergies  Manage and prevent future asthma attacks:   · Follow your asthma action pan  This is a written plan that you and your healthcare provider create  It explains which medicine you need and when to change doses if necessary  It also explains how you can monitor symptoms and use a peak flow meter  The meter measures how well your lungs are working  · Manage other health conditions , such as allergies, acid reflux, and sleep apnea  · Identify and avoid triggers  These may include pets, dust mites, mold, and cockroaches      · Do not smoke and avoid others who smoke  If you smoke, it is never too late to quit  Ask your healthcare provider if you need help quitting  · Ask about a flu vaccine  The flu can make your asthma worse  You may need a yearly flu shot  Follow up with your healthcare provider as directed: You will need to return to make sure your medicine is working and your symptoms are controlled  You may be referred to an asthma or allergy specialist  Earnestinemarlee Bruno may be asked to keep a record of your peak flow values and bring it with you to your appointments  Write down your questions so you remember to ask them during your visits  CARE AGREEMENT:   You have the right to help plan your care  Learn about your health condition and how it may be treated  Discuss treatment options with your caregivers to decide what care you want to receive  You always have the right to refuse treatment  The above information is an  only  It is not intended as medical advice for individual conditions or treatments  Talk to your doctor, nurse or pharmacist before following any medical regimen to see if it is safe and effective for you  © 2014 [address] is for End User's use only and may not be sold, redistributed or otherwise used for commercial purposes  All illustrations and images included in CareNotes® are the copyrighted property of [employer]  or [employer]

## 2018-01-21 NOTE — ED NOTES
Patient transported to [address] [place] [address] 304, 5780 [facility]  01/20/18 7812

## 2018-01-22 VITALS
WEIGHT: 162 LBS | SYSTOLIC BLOOD PRESSURE: 112 MMHG | BODY MASS INDEX: 27.66 KG/M2 | OXYGEN SATURATION: 97 % | HEIGHT: 64 IN | HEART RATE: 74 BPM | DIASTOLIC BLOOD PRESSURE: 62 MMHG

## 2018-01-22 VITALS
DIASTOLIC BLOOD PRESSURE: 78 MMHG | TEMPERATURE: 98.6 F | RESPIRATION RATE: 12 BRPM | WEIGHT: 164.38 LBS | HEIGHT: 63 IN | BODY MASS INDEX: 29.12 KG/M2 | SYSTOLIC BLOOD PRESSURE: 118 MMHG

## 2018-01-23 VITALS
WEIGHT: 166.38 LBS | DIASTOLIC BLOOD PRESSURE: 80 MMHG | RESPIRATION RATE: 16 BRPM | BODY MASS INDEX: 29.48 KG/M2 | HEIGHT: 63 IN | HEART RATE: 76 BPM | SYSTOLIC BLOOD PRESSURE: 120 MMHG | TEMPERATURE: 97.6 F

## 2018-01-23 VITALS — HEIGHT: 63 IN | WEIGHT: 164 LBS | BODY MASS INDEX: 29.06 KG/M2

## 2018-01-23 NOTE — CONSULTS
I had the pleasure of evaluating your patient, Jose Livingston  My full evaluation follows:      Chief Complaint  Chief Complaint Free Text Note Form: Right foot pain since Friday  Left foot pain also  The patient had injury to left 5th digit was seen in emergency room 1 month ago  Was told to lynsey splint toe  There is a fracture of the base of the left 5th digit the this could be chronic  Patient was having significant pain pain was 8/10 pain scale  Pain now 4/10 pain scale  Patient also has chronic plantar fasciitis for the past several months  Patient used to work in [employer] but was laid off and 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  Has been having pain in arch  Left worse than right  Rates pain 6/10 pain scale  Has tried over-the-counter orthotics bed is wearing soft nonsupportive sneakers  History of Present Illness  HPI: Patient also has chronic ingrown nail right hallux tibial border  There has been no infection but has been chronically causing pain patient has been coming out herself  Patient previously had matricectomy over 10 years ago on other borders  Social History    · Never a smoker    Current Meds   1  Advair Diskus 250-50 MCG/DOSE MISC; Therapy: (Recorded:71Tis6713) to Recorded   2  Biotin CAPS; Therapy: (Recorded:66Htn5475) to Recorded   3  Iron TABS; Therapy: (Recorded:46Zkk2243) to Recorded   4  Vitamin D TABS; Therapy: (Recorded:83Xjg7978) to Recorded    Allergies    1  Adhesive Tape TAPE   2  Latex Gloves MISC    Vitals  Signs   Recorded: 30Ibp5800 11:34AM   Height: 5 ft 3 in  Weight: 164 lb   BMI Calculated: 29 05  BSA Calculated: 1 78    Physical Exam  Left Foot: Appearance: Normal except as noted: pes planus  Tenderness: insertion of the plantar fascia  Left 5th digit mild swelling positive pain on range of motion no erythema no signs of infection  Right Foot: Appearance: Normal except as noted: excessive pronation and pes planus   Evaluation of the great toe nail demonstrates an ingrown nail  Ingrown tibial border negative paronychia positive pain on palpation negative exudate no signs of infection  Neurological Exam: performed  Light touch was intact bilaterally  Vibratory sensation was intact bilaterally  Vascular Exam: performed Dorsalis pedis pulses were 2/4 bilaterally  Posterior tibial pulses were 1/4 bilaterally  Capillary refill time was between 1-3 seconds bilaterally  Results/Data  Xray: Foot:   Views: of both feet  Findings:    Diagnostic Studies Reviewed: I personally reviewed the films/images/results in the office today  My interpretation follows  X-ray Review x-ray DP medial oblique bilateral feet  There is a possible old fracture the 5th metatarsal base of the left 5th digit  No other fractures noted  Procedure  Cast orthotics bilateral feet sulcus length no arch fill padded graphite      Assessment    1  Never a smoker   2  Ingrowing nail (703 0) (L60 0)   3  Plantar fasciitis (728 71) (M72 2)   4  Flat foot (734) (M21 40)   5  Difficulty in walking (719 7) (R26 2)   6  Closed nondisplaced fracture of proximal phalanx of lesser toe of right foot, initial   encounter (826 0) (S92 514A)    Plan  Ingrowing nail    · Stretch your plantar fascia 4 times a day ; Status:Complete;   Done: 08TYY8012 12:09PM  Unlinked    · Casting for Orthotics - POC; Status:Complete;   Done: 29TBA4165 12:09PM   · Follow-up visit in 2 weeks Evaluation and Treatment  Follow-up  Status: Hold For -  Scheduling  Requested for: 06Mwu6795    Discussion/Summary  Discussion Summary:   Patient given handout and instruction on stretching exercises  Discussed physical therapy or injection of pain not resolving discussed proper shoes and proper shoe fit  Patient instructed on lynsey splinting daily  Discussed risks of posttraumatic arthritis  Discussed NSAIDs risks and benefits  The patient will follow up for matricectomy of chronic ingrown nail   Patient will call if any signs of infection  Counseling Documentation With Imm: The patient was counseled regarding instructions for management, patient and family education, importance of compliance with treatment  Thank you very much for allowing me to participate in the care of this patient  If you have any questions, please do not hesitate to contact me  Signatures   Electronically signed by :  Echo Meadows DPM; Dec 12 2017 12:10PM EST                       (Author)

## 2018-01-23 NOTE — RESULT NOTES
Verified Results  (1) CBC/PLT/DIFF 10KBU9600 07:27AM Carter Finger     Test Name Result Flag Reference   WBC 8 8 x10E3/uL  3 4-10 8   RBC 4 55 x10E6/uL  3 77-5 28   Hemoglobin 14 0 g/dL  11 1-15 9   Hematocrit 42 3 %  34 0-46  6   MCV 93 fL  79-97   MCH 30 8 pg  26 6-33 0   MCHC 33 1 g/dL  31 5-35 7   RDW 13 8 %  12 3-15 4   Platelets 190 H05G4/XM  150-379   Neutrophils 47 %  Not Estab  Lymphs 34 %  Not Estab  Monocytes 13 %  Not Estab  Eos 4 %  Not Estab  Basos 1 %  Not Estab  Neutrophils (Absolute) 4 2 x10E3/uL  1 4-7 0   Lymphs (Absolute) 3 0 x10E3/uL  0 7-3 1   Monocytes(Absolute) 1 2 x10E3/uL H 0 1-0 9   Eos (Absolute) 0 4 x10E3/uL  0 0-0 4   Baso (Absolute) 0 1 x10E3/uL  0 0-0 2   Immature Granulocytes 1 %  Not Estab     Immature Grans (Abs) 0 0 x10E3/uL  0 0-0 1     (1) COMPREHENSIVE METABOLIC PANEL 44SZP1816 96:77OC Cr[facility] Finger     Test Name Result Flag Reference   Glucose, Serum 122 mg/dL H 65-99   BUN 12 mg/dL  8-27   Creatinine, Serum 0 79 mg/dL  0 57-1 00   BUN/Creatinine Ratio 15  12-28   Sodium, Serum 140 mmol/L  134-144   Potassium, Serum 4 6 mmol/L  3 5-5 2   Chloride, Serum 100 mmol/L     Carbon Dioxide, Total 25 mmol/L  18-29   Calcium, Serum 9 4 mg/dL  8 7-10 3   Protein, Total, Serum 7 6 g/dL  6 0-8 5   Albumin, Serum 4 0 g/dL  3 6-4 8   Globulin, Total 3 6 g/dL  1 5-4 5   A/G Ratio 1 1 L 1 2-2 2   Bilirubin, Total 0 2 mg/dL  0 0-1 2   Alkaline Phosphatase, S 62 IU/L     AST (SGOT) 26 IU/L  0-40   ALT (SGPT) 40 IU/L H 0-32   eGFR If NonAfricn Am 79 mL/min/1 73  >59   eGFR If Africn Am 91 mL/min/1 73  >59     (1) LIPID PANEL FASTING W DIRECT LDL REFLEX [id number] 07:27AM Carter Finger     Test Name Result Flag Reference   Cholesterol, Total 270 mg/dL H 100-199   Triglycerides 294 mg/dL H 0-149   HDL Cholesterol 44 mg/dL  >39   LDL Cholesterol Calc 167 mg/dL H 0-99     (1) TSH [id number] 07:27AM Carter Finger     Test Name Result Flag Reference   TSH 2 630 uIU/mL 0 450-4 500     (1) URINALYSIS (will reflex a microscopy if leukocytes, occult blood, protein or nitrites are not within normal limits) [id number] 07:27AM Leonard Plush     Test Name Result Flag Reference   Specific Gravity 1 013  1 005-1 030   pH 5 5  5 0-7 5   Urine-Color Yellow  Yellow   Appearance Clear  Clear   WBC Esterase 1+ A Negative   Protein Negative  Negative/Trace   Glucose Negative  Negative   Ketones Negative  Negative   Occult Blood Negative  Negative   Bilirubin Negative  Negative   Urobilinogen,Semi-Qn 0 2 EU/dL  0 2-1 0   Nitrite, Urine Negative  Negative   Microscopic Examination See below:     WBC 6-10 /hpf A 0 -  5   RBC 0-2 /hpf  0 -  2   Epithelial Cells (non renal) 0-10 /hpf  0 - 10   Mucus Threads Present  Not Estab  Bacteria None seen  None seen/Few     [facility]) Cardiovascular Risk Assessment [id number] 07:27AM Leonard Plush     Test Name Result Flag Reference   Interpretation Note     Supplemental report is available  PDF Image

## 2018-01-24 ENCOUNTER — OFFICE VISIT (OUTPATIENT)
Dept: FAMILY MEDICINE CLINIC | Facility: CLINIC | Age: 63
End: 2018-01-24
Payer: COMMERCIAL

## 2018-01-24 VITALS
BODY MASS INDEX: 29.77 KG/M2 | RESPIRATION RATE: 16 BRPM | HEIGHT: 63 IN | DIASTOLIC BLOOD PRESSURE: 81 MMHG | WEIGHT: 168 LBS | HEART RATE: 92 BPM | SYSTOLIC BLOOD PRESSURE: 137 MMHG

## 2018-01-24 VITALS
SYSTOLIC BLOOD PRESSURE: 122 MMHG | RESPIRATION RATE: 16 BRPM | HEART RATE: 70 BPM | HEIGHT: 63 IN | BODY MASS INDEX: 29.27 KG/M2 | DIASTOLIC BLOOD PRESSURE: 80 MMHG | WEIGHT: 165.2 LBS | TEMPERATURE: 98.4 F

## 2018-01-24 VITALS
RESPIRATION RATE: 16 BRPM | HEART RATE: 80 BPM | WEIGHT: 168 LBS | BODY MASS INDEX: 29.77 KG/M2 | DIASTOLIC BLOOD PRESSURE: 86 MMHG | SYSTOLIC BLOOD PRESSURE: 132 MMHG | HEIGHT: 63 IN

## 2018-01-24 VITALS
WEIGHT: 168 LBS | HEIGHT: 63 IN | TEMPERATURE: 98.5 F | DIASTOLIC BLOOD PRESSURE: 80 MMHG | HEART RATE: 78 BPM | RESPIRATION RATE: 16 BRPM | SYSTOLIC BLOOD PRESSURE: 120 MMHG | BODY MASS INDEX: 29.77 KG/M2

## 2018-01-24 VITALS
DIASTOLIC BLOOD PRESSURE: 70 MMHG | HEIGHT: 63 IN | BODY MASS INDEX: 29.88 KG/M2 | TEMPERATURE: 96.2 F | WEIGHT: 168.6 LBS | HEART RATE: 72 BPM | SYSTOLIC BLOOD PRESSURE: 122 MMHG | RESPIRATION RATE: 16 BRPM

## 2018-01-24 DIAGNOSIS — J45.901 MODERATE ASTHMA WITH ACUTE EXACERBATION, UNSPECIFIED WHETHER PERSISTENT: Primary | ICD-10-CM

## 2018-01-24 DIAGNOSIS — J06.9 VIRAL UPPER RESPIRATORY TRACT INFECTION: ICD-10-CM

## 2018-01-24 PROBLEM — R73.01 IMPAIRED FASTING GLUCOSE: Status: ACTIVE | Noted: 2018-01-24

## 2018-01-24 PROCEDURE — 99214 OFFICE O/P EST MOD 30 MIN: CPT | Performed by: FAMILY MEDICINE

## 2018-01-24 RX ORDER — CALCIUM CARBONATE/VITAMIN D3 500-10/5ML
LIQUID (ML) ORAL
COMMUNITY
Start: 2017-01-05 | End: 2018-02-01 | Stop reason: ALTCHOICE

## 2018-01-24 RX ORDER — IRON,CARBONYL/ASCORBIC ACID 100-250 MG
TABLET ORAL
COMMUNITY
End: 2018-02-01 | Stop reason: ALTCHOICE

## 2018-01-24 RX ORDER — CLOTRIMAZOLE AND BETAMETHASONE DIPROPIONATE 10; .64 MG/G; MG/G
CREAM TOPICAL
Refills: 0 | COMMUNITY
Start: 2018-01-09 | End: 2018-02-01 | Stop reason: ALTCHOICE

## 2018-01-24 RX ORDER — NAPROXEN SODIUM 220 MG
1 TABLET ORAL EVERY 12 HOURS
COMMUNITY
Start: 2018-01-05 | End: 2018-02-01 | Stop reason: ALTCHOICE

## 2018-01-24 RX ORDER — PROMETHAZINE HYDROCHLORIDE AND CODEINE PHOSPHATE 6.25; 1 MG/5ML; MG/5ML
5 SYRUP ORAL EVERY 4 HOURS PRN
Qty: 120 ML | Refills: 0 | Status: SHIPPED | OUTPATIENT
Start: 2018-01-24 | End: 2018-02-03

## 2018-01-24 RX ORDER — POTASSIUM CHLORIDE 20 MEQ/1
TABLET, EXTENDED RELEASE ORAL
COMMUNITY
Start: 2017-01-05 | End: 2018-02-01 | Stop reason: ALTCHOICE

## 2018-01-24 RX ORDER — ATORVASTATIN CALCIUM 10 MG/1
1 TABLET, FILM COATED ORAL DAILY
COMMUNITY
Start: 2018-01-05 | End: 2018-06-26 | Stop reason: SDUPTHER

## 2018-01-24 RX ORDER — NICOTINE POLACRILEX 2 MG
GUM BUCCAL
COMMUNITY
End: 2018-02-01 | Stop reason: ALTCHOICE

## 2018-01-24 RX ORDER — MONTELUKAST SODIUM 10 MG/1
TABLET ORAL
COMMUNITY
Start: 2017-03-07 | End: 2018-02-01 | Stop reason: ALTCHOICE

## 2018-01-24 RX ORDER — TRIAMCINOLONE ACETONIDE 1 MG/G
CREAM TOPICAL
COMMUNITY
Start: 2017-08-08 | End: 2018-02-01 | Stop reason: ALTCHOICE

## 2018-01-24 RX ORDER — CYCLOBENZAPRINE HCL 10 MG
TABLET ORAL
COMMUNITY
Start: 2017-11-06 | End: 2018-02-01 | Stop reason: ALTCHOICE

## 2018-01-24 RX ORDER — MOMETASONE FUROATE 50 UG/1
SPRAY, METERED NASAL
COMMUNITY
Start: 2004-05-25 | End: 2018-02-01 | Stop reason: ALTCHOICE

## 2018-01-24 RX ORDER — FLUTICASONE PROPIONATE AND SALMETEROL 232; 14 UG/1; UG/1
POWDER, METERED RESPIRATORY (INHALATION)
COMMUNITY
Start: 2017-09-12 | End: 2018-02-01 | Stop reason: ALTCHOICE

## 2018-01-24 RX ORDER — ALBUTEROL SULFATE 90 UG/1
AEROSOL, METERED RESPIRATORY (INHALATION)
COMMUNITY
Start: 2017-03-20 | End: 2018-03-16

## 2018-01-24 NOTE — PATIENT INSTRUCTIONS
Bronchospasm   WHAT YOU NEED TO KNOW:   Bronchospasm is a narrowing of the airway that usually comes and goes  You may be at risk for bronchospasm if you have a chest cold or allergies  You may also be at risk if you are bothered by air pollution, certain medicines, cold, dry air, smoke, or strong odors  Exercise may worsen your symptoms  Bronchospasms may make it hard for you to breathe  DISCHARGE INSTRUCTIONS:   Medicines: You may need any of the following:  · Bronchodilators  help expand your airway for easier breathing  Some of these medicines may help prevent future spasms  · Inhaled steroids  help reduce swelling in your airway and soothe your breathing  These are used for long-term control  · Anticholinergics  help relax and open your airway  · Take your medicine as directed  Contact your healthcare provider if you think your medicine is not helping or if you have side effects  Tell him of her if you are allergic to any medicine  Keep a list of the medicines, vitamins, and herbs you take  Include the amounts, and when and why you take them  Bring the list or the pill bottles to follow-up visits  Carry your medicine list with you in case of an emergency  Follow up with your healthcare provider as directed: You may need more tests to find the cause of your condition  Write down your questions so you remember to ask them during your visits  Self-care:   · Avoid triggers  · Warm up before you exercise  Ask your healthcare provider about the best exercise plan for you  · Try to avoid people who are sick  Ask your healthcare provider if you need a flu or pneumonia vaccine  · Breathe through your nose when you are in cold, dry air or weather  This may help reduce lung irritation by warming the air before it reaches your lungs  Contact your healthcare provider if:   · You have a fever  · You have a cough that will not go away  · Your wheezing worsens      · You have questions or concerns about your condition or care  Return to the emergency department if:   · You cough or spit up blood  · You have trouble breathing  · You have blue fingernails or toenails  · You have chest pain  · You have a fast or uneven heartbeat  © 2017 [facility] Information is for End User's use only and may not be sold, redistributed or otherwise used for commercial purposes  All illustrations and images included in CareNotes® are the copyrighted property of [employer] M [employer]  or [employer]  The above information is an  only  It is not intended as medical advice for individual conditions or treatments  Talk to your doctor, nurse or pharmacist before following any medical regimen to see if it is safe and effective for you

## 2018-01-24 NOTE — PROGRESS NOTES
Chief Complaint   Patient presents with    Follow-up     [facility] ER Asthma        Patient ID: Demetri Tarango is a 57 y o  female  Pt seen and examined  Here for ER f/u for asthma exacerbation  Pt stated that today wasn't a good day  Using rescue inhaler a lot  Would like refill on phenergan with codeine  Feels it is a/w stress    Has seen an allergist in the past but didn't see pulm    Pt was given abx for toe but never started it  Has 1 more day of steroid       Asthma   She complains of chest tightness, cough, shortness of breath and wheezing  There is no hemoptysis or hoarse voice  This is a recurrent problem  The current episode started in the past 7 days  The problem occurs every several days  The problem has been unchanged  The cough is non-productive  Associated symptoms include dyspnea on exertion, headaches, nasal congestion, PND, postnasal drip and rhinorrhea  Pertinent negatives include no appetite change, chest pain, ear congestion, ear pain, fever, heartburn, malaise/fatigue, myalgias, orthopnea, sneezing, sore throat, sweats, trouble swallowing or weight loss  Her symptoms are aggravated by emotional stress  Her symptoms are alleviated by beta-agonist  She reports minimal improvement on treatment  Her past medical history is significant for asthma  The following portions of the patient's history were reviewed and updated as appropriate: allergies, current medications, past family history, past medical history, past social history, past surgical history and problem list     Review of Systems   Constitutional: Negative for appetite change, fever, malaise/fatigue and weight loss  HENT: Positive for postnasal drip and rhinorrhea  Negative for ear pain, hoarse voice, sneezing, sore throat and trouble swallowing  Eyes: Negative for discharge  Respiratory: Positive for cough, chest tightness, shortness of breath and wheezing  Negative for hemoptysis      Cardiovascular: Positive for dyspnea on exertion and PND  Negative for chest pain  Gastrointestinal: Negative for diarrhea, heartburn and vomiting  Musculoskeletal: Negative for arthralgias and myalgias  Skin: Negative for color change  Neurological: Positive for headaches  Negative for dizziness  Hematological: Negative for adenopathy         Current Outpatient Prescriptions   Medication Sig Dispense Refill    albuterol (2 5 mg/3 mL) 0 083 % nebulizer solution Take 3 mL by nebulization every 6 (six) hours as needed for wheezing 75 mL 0    albuterol (PROAIR HFA) 90 mcg/act inhaler Inhale      atorvastatin (LIPITOR) 10 mg tablet Take 1 tablet by mouth daily      Biotin 1 MG CAPS Take by mouth      Calcium Citrate-Vitamin D (CALCIUM CITRATE+D3 PO) Take 1 tablet by mouth daily      cefadroxil (DURICEF) 500 mg capsule Take 500 mg by mouth every 12 (twelve) hours      Cholecalciferol (D3-1000) 1000 units tablet Take 1,000 Units by mouth daily      clotrimazole-betamethasone (LOTRISONE) 1-0 05 % cream APPLY AND RUB IN A THIN LAYER TO AFFECTED AREAS TWICE A DAY (AM AND PM)  0    cyclobenzaprine (FLEXERIL) 10 mg tablet Take by mouth      Ferrous Gluconate (IRON 27 PO) Take 1 tablet by mouth daily      fluticasone (FLOVENT HFA) 220 mcg/act inhaler Inhale 1 puff 2 (two) times a day      fluticasone-salmeterol (ADVAIR DISKUS) 250-50 mcg/dose inhaler Inhale      Fluticasone-Salmeterol (AIRDUO RESPICLICK 223/79) 107-42 MCG/ACT AEPB Inhale      Iron-Vitamin C (IRON 100/C) 100-250 MG TABS Take by mouth      Magnesium Oxide 400 MG CAPS Take by mouth      mometasone (NASONEX) 50 mcg/act nasal spray into each nostril      orlistat (NOEMY) 60 MG capsule Take 60 mg by mouth 3 (three) times a day with meals      pantoprazole (PROTONIX) 40 mg tablet Take 40 mg by mouth daily      potassium chloride (K-DUR,KLOR-CON) 20 mEq tablet Take by mouth      predniSONE 50 mg tablet Take 1 tablet by mouth daily for 5 days 5 tablet 0    triamcinolone (KENALOG) 0 1 % cream Triamcinolone Acetonide 0 1 % External Cream  APPLY 1 INCH Twice daily PRN 2-4 x a day for rash and itchiness   Quantity: 1;  Refills: 3      Bee Martínez ;  Start 8-Aug-2017  Active  80 GM Tube      montelukast (SINGULAIR) 10 mg tablet Take by mouth      mupirocin (BACTROBAN) 2 % ointment Apply 1 application topically daily      naproxen sodium (ALEVE) 220 MG tablet Take 1 tablet by mouth every 12 (twelve) hours      promethazine-codeine (PHENERGAN WITH CODEINE) 6 25-10 mg/5 mL syrup Take 5 mL by mouth every 4 (four) hours as needed for cough for up to 10 days 120 mL 0     No current facility-administered medications for this visit  Objective:    /70 (BP Location: Left arm, Patient Position: Sitting, Cuff Size: Adult)   Pulse 72   Temp (!) 96 2 °F (35 7 °C)   Resp 16   Ht 5' 3" (1 6 m)   Wt 76 5 kg (168 lb 9 6 oz)   BMI 29 87 kg/m²        Physical Exam   Constitutional: She appears well-nourished  No distress  HENT:   Right Ear: Tympanic membrane is bulging  Left Ear: Tympanic membrane is bulging  Nose: Mucosal edema and rhinorrhea present  Mouth/Throat: Mucous membranes are normal  Posterior oropharyngeal erythema present  +post nasal drip   Neck: Normal range of motion  Neck supple  Cardiovascular: Normal rate and regular rhythm  Pulmonary/Chest: Effort normal and breath sounds normal    Lymphadenopathy:     She has no cervical adenopathy  Skin: Skin is warm and dry  Assessment/Plan:    No problem-specific Assessment & Plan notes found for this encounter  Diagnoses and all orders for this visit:    Moderate asthma with acute exacerbation, unspecified whether persistent  Comments:  continue with airduo and rescue inhaler  use neb tx as needed  referral for pulm given   Orders:  -     Ambulatory referral to Pulmonology; Future    Viral upper respiratory tract infection  Comments:  start abx prescribed by podiatry    will give 1 more refill of phenergan with codiene  Orders:  -     promethazine-codeine (PHENERGAN WITH CODEINE) 6 25-10 mg/5 mL syrup; Take 5 mL by mouth every 4 (four) hours as needed for cough for up to 10 days    Other orders  -     fluticasone-salmeterol (ADVAIR DISKUS) 250-50 mcg/dose inhaler; Inhale  -     Fluticasone-Salmeterol (Meredeth Janina 117/70) 196-69 MCG/ACT AEPB; Inhale  -     naproxen sodium (ALEVE) 220 MG tablet; Take 1 tablet by mouth every 12 (twelve) hours  -     Biotin 1 MG CAPS; Take by mouth  -     clotrimazole-betamethasone (LOTRISONE) 1-0 05 % cream; APPLY AND RUB IN A THIN LAYER TO AFFECTED AREAS TWICE A DAY (AM AND PM)  -     cyclobenzaprine (FLEXERIL) 10 mg tablet; Take by mouth  -     Iron-Vitamin C (IRON 100/C) 100-250 MG TABS; Take by mouth  -     Magnesium Oxide 400 MG CAPS; Take by mouth  -     mometasone (NASONEX) 50 mcg/act nasal spray; into each nostril  -     montelukast (SINGULAIR) 10 mg tablet; Take by mouth  -     Discontinue: mupirocin (BACTROBAN) 2 % ointment; Mupirocin 2 % External Ointment  APPLY 1 INCH Daily   Quantity: 30;  Refills: 2      Mike Santillan ;  Start 19-Jan-2018;  End 20-Mar-2018  Active  -     potassium chloride (K-DUR,KLOR-CON) 20 mEq tablet; Take by mouth  -     triamcinolone (KENALOG) 0 1 % cream; Triamcinolone Acetonide 0 1 % External Cream  APPLY 1 INCH Twice daily PRN 2-4 x a day for rash and itchiness   Quantity: 1;  Refills: 3      Jos Snyder ;  Start 8-Aug-2017  Active  80 GM Tube  -     Discontinue: cholecalciferol (VITAMIN D3) 1,000 units tablet; Take by mouth  -     atorvastatin (LIPITOR) 10 mg tablet; Take 1 tablet by mouth daily  -     albuterol (PROAIR HFA) 90 mcg/act inhaler;  [id number] Angel Edwards DO

## 2018-02-01 ENCOUNTER — TELEPHONE (OUTPATIENT)
Dept: FAMILY MEDICINE CLINIC | Facility: CLINIC | Age: 63
End: 2018-02-01

## 2018-02-01 ENCOUNTER — APPOINTMENT (EMERGENCY)
Dept: RADIOLOGY | Facility: HOSPITAL | Age: 63
End: 2018-02-01
Payer: COMMERCIAL

## 2018-02-01 ENCOUNTER — HOSPITAL ENCOUNTER (EMERGENCY)
Facility: HOSPITAL | Age: 63
Discharge: HOME | End: 2018-02-01
Attending: EMERGENCY MEDICINE
Payer: COMMERCIAL

## 2018-02-01 VITALS
HEIGHT: 63 IN | OXYGEN SATURATION: 96 % | TEMPERATURE: 98.6 F | WEIGHT: 164 LBS | RESPIRATION RATE: 18 BRPM | HEART RATE: 80 BPM | BODY MASS INDEX: 29.06 KG/M2 | DIASTOLIC BLOOD PRESSURE: 57 MMHG | SYSTOLIC BLOOD PRESSURE: 103 MMHG

## 2018-02-01 DIAGNOSIS — J40 BRONCHITIS: Primary | ICD-10-CM

## 2018-02-01 DIAGNOSIS — J18.9 PNEUMONIA: ICD-10-CM

## 2018-02-01 LAB
ALBUMIN SERPL BCP-MCNC: 3.3 G/DL (ref 3.5–5)
ALP SERPL-CCNC: 92 U/L (ref 46–116)
ALT SERPL W P-5'-P-CCNC: 84 U/L (ref 12–78)
ANION GAP SERPL CALCULATED.3IONS-SCNC: 8 MMOL/L (ref 4–13)
APTT PPP: 23 SECONDS (ref 24–33)
AST SERPL W P-5'-P-CCNC: 40 U/L (ref 5–45)
BASOPHILS # BLD AUTO: 0.1 THOUSANDS/ΜL (ref 0–0.1)
BASOPHILS NFR BLD AUTO: 1 % (ref 0–1)
BILIRUB SERPL-MCNC: 0.2 MG/DL (ref 0.2–1)
BUN SERPL-MCNC: 11 MG/DL (ref 5–25)
CALCIUM SERPL-MCNC: 10 MG/DL (ref 8.3–10.1)
CHLORIDE SERPL-SCNC: 106 MMOL/L (ref 100–108)
CK SERPL-CCNC: 99 U/L (ref 26–192)
CO2 SERPL-SCNC: 30 MMOL/L (ref 21–32)
CREAT SERPL-MCNC: 1.01 MG/DL (ref 0.6–1.3)
EOSINOPHIL # BLD AUTO: 0.1 THOUSAND/ΜL (ref 0–0.61)
EOSINOPHIL NFR BLD AUTO: 1 % (ref 0–6)
ERYTHROCYTE [DISTWIDTH] IN BLOOD BY AUTOMATED COUNT: 13.5 % (ref 11.6–15.1)
FLUAV AG SPEC QL IA: NEGATIVE
FLUBV AG SPEC QL IA: NEGATIVE
GFR SERPL CREATININE-BSD FRML MDRD: 59 ML/MIN/1.73SQ M
GLUCOSE SERPL-MCNC: 149 MG/DL (ref 65–140)
HCT VFR BLD AUTO: 42.8 % (ref 37–47)
HGB BLD-MCNC: 14.3 G/DL (ref 12–16)
INR PPP: 0.96 (ref 0.86–1.16)
LACTATE SERPL-SCNC: 1.6 MMOL/L (ref 0.5–2)
LYMPHOCYTES # BLD AUTO: 2.8 THOUSANDS/ΜL (ref 0.6–4.47)
LYMPHOCYTES NFR BLD AUTO: 26 % (ref 14–44)
MCH RBC QN AUTO: 31.3 PG (ref 27–31)
MCHC RBC AUTO-ENTMCNC: 33.4 G/DL (ref 31.4–37.4)
MCV RBC AUTO: 94 FL (ref 82–98)
MONOCYTES # BLD AUTO: 0.9 THOUSAND/ΜL (ref 0.17–1.22)
MONOCYTES NFR BLD AUTO: 8 % (ref 4–12)
NEUTROPHILS # BLD AUTO: 7 THOUSANDS/ΜL (ref 1.85–7.62)
NEUTS SEG NFR BLD AUTO: 64 % (ref 43–75)
NRBC BLD AUTO-RTO: 0 /100 WBCS
PLATELET # BLD AUTO: 375 THOUSANDS/UL (ref 130–400)
PMV BLD AUTO: 7.7 FL (ref 8.9–12.7)
POTASSIUM SERPL-SCNC: 4.5 MMOL/L (ref 3.5–5.3)
PROT SERPL-MCNC: 8.2 G/DL (ref 6.4–8.2)
PROTHROMBIN TIME: 10.1 SECONDS (ref 9.4–11.7)
RBC # BLD AUTO: 4.57 MILLION/UL (ref 4.2–5.4)
SODIUM SERPL-SCNC: 144 MMOL/L (ref 136–145)
TROPONIN I SERPL-MCNC: <0.02 NG/ML
TROPONIN I SERPL-MCNC: <0.02 NG/ML
WBC # BLD AUTO: 10.9 THOUSAND/UL (ref 4.8–10.8)

## 2018-02-01 PROCEDURE — 85730 THROMBOPLASTIN TIME PARTIAL: CPT | Performed by: EMERGENCY MEDICINE

## 2018-02-01 PROCEDURE — 80053 COMPREHEN METABOLIC PANEL: CPT | Performed by: EMERGENCY MEDICINE

## 2018-02-01 PROCEDURE — 85610 PROTHROMBIN TIME: CPT | Performed by: EMERGENCY MEDICINE

## 2018-02-01 PROCEDURE — 84484 ASSAY OF TROPONIN QUANT: CPT | Performed by: EMERGENCY MEDICINE

## 2018-02-01 PROCEDURE — 87798 DETECT AGENT NOS DNA AMP: CPT | Performed by: EMERGENCY MEDICINE

## 2018-02-01 PROCEDURE — 87040 BLOOD CULTURE FOR BACTERIA: CPT | Performed by: EMERGENCY MEDICINE

## 2018-02-01 PROCEDURE — 71045 X-RAY EXAM CHEST 1 VIEW: CPT

## 2018-02-01 PROCEDURE — 96365 THER/PROPH/DIAG IV INF INIT: CPT

## 2018-02-01 PROCEDURE — 94640 AIRWAY INHALATION TREATMENT: CPT

## 2018-02-01 PROCEDURE — 85025 COMPLETE CBC W/AUTO DIFF WBC: CPT | Performed by: EMERGENCY MEDICINE

## 2018-02-01 PROCEDURE — 71275 CT ANGIOGRAPHY CHEST: CPT

## 2018-02-01 PROCEDURE — 36415 COLL VENOUS BLD VENIPUNCTURE: CPT | Performed by: EMERGENCY MEDICINE

## 2018-02-01 PROCEDURE — 83605 ASSAY OF LACTIC ACID: CPT | Performed by: EMERGENCY MEDICINE

## 2018-02-01 PROCEDURE — 82550 ASSAY OF CK (CPK): CPT | Performed by: EMERGENCY MEDICINE

## 2018-02-01 PROCEDURE — 99285 EMERGENCY DEPT VISIT HI MDM: CPT

## 2018-02-01 PROCEDURE — 87400 INFLUENZA A/B EACH AG IA: CPT | Performed by: EMERGENCY MEDICINE

## 2018-02-01 PROCEDURE — 93005 ELECTROCARDIOGRAM TRACING: CPT

## 2018-02-01 RX ORDER — AZITHROMYCIN 250 MG/1
250 TABLET, FILM COATED ORAL DAILY
Qty: 4 TABLET | Refills: 0 | Status: SHIPPED | OUTPATIENT
Start: 2018-02-01 | End: 2018-02-05

## 2018-02-01 RX ORDER — ACETAMINOPHEN 325 MG/1
975 TABLET ORAL ONCE
Status: COMPLETED | OUTPATIENT
Start: 2018-02-01 | End: 2018-02-01

## 2018-02-01 RX ORDER — AZITHROMYCIN 250 MG/1
500 TABLET, FILM COATED ORAL ONCE
Status: DISCONTINUED | OUTPATIENT
Start: 2018-02-01 | End: 2018-02-01

## 2018-02-01 RX ORDER — AZITHROMYCIN 250 MG/1
500 TABLET, FILM COATED ORAL ONCE
Status: COMPLETED | OUTPATIENT
Start: 2018-02-01 | End: 2018-02-01

## 2018-02-01 RX ORDER — BENZONATATE 100 MG/1
100 CAPSULE ORAL ONCE
Status: COMPLETED | OUTPATIENT
Start: 2018-02-01 | End: 2018-02-01

## 2018-02-01 RX ADMIN — ACETAMINOPHEN 975 MG: 325 TABLET, FILM COATED ORAL at 15:52

## 2018-02-01 RX ADMIN — CEFTRIAXONE 1000 MG: 1 INJECTION, SOLUTION INTRAVENOUS at 18:15

## 2018-02-01 RX ADMIN — AZITHROMYCIN 500 MG: 250 TABLET, FILM COATED ORAL at 18:45

## 2018-02-01 RX ADMIN — IOHEXOL 85 ML: 350 INJECTION, SOLUTION INTRAVENOUS at 16:08

## 2018-02-01 RX ADMIN — ALBUTEROL SULFATE 2.5 MG: 2.5 SOLUTION RESPIRATORY (INHALATION) at 15:16

## 2018-02-01 RX ADMIN — BENZONATATE 100 MG: 100 CAPSULE ORAL at 18:13

## 2018-02-01 NOTE — TELEPHONE ENCOUNTER
Dr Infante [place] pt unable to get in with pulmonary until march   Pt still having trouble breathing was in emergency room 2x is there something she can take to get the stuff out of her chest  Terrible cough

## 2018-02-01 NOTE — TELEPHONE ENCOUNTER
Spoke with patient- based upon sxs- advised patient to go to ER for reevaluation  Patient consented- being transported by friend

## 2018-02-01 NOTE — TELEPHONE ENCOUNTER
Patient says steroid did not help, coughing is worse , she went to [facility] in [place] on Saturday  Wants appointment today

## 2018-02-02 LAB
ATRIAL RATE: 101 BPM
FLUAV AG SPEC QL: DETECTED
FLUBV AG SPEC QL: ABNORMAL
P AXIS: 41 DEGREES
PR INTERVAL: 150 MS
QRS AXIS: 4 DEGREES
QRSD INTERVAL: 72 MS
QT INTERVAL: 324 MS
QTC INTERVAL: 420 MS
RSV B RNA SPEC QL NAA+PROBE: ABNORMAL
T WAVE AXIS: 44 DEGREES
VENTRICULAR RATE: 101 BPM

## 2018-02-02 PROCEDURE — 93010 ELECTROCARDIOGRAM REPORT: CPT | Performed by: INTERNAL MEDICINE

## 2018-02-02 NOTE — DISCHARGE INSTRUCTIONS
Acute Bronchitis   WHAT YOU NEED TO KNOW:   What is acute bronchitis? Acute bronchitis is swelling and irritation in the air passages of your lungs  This irritation may cause you to cough or have other breathing problems  Acute bronchitis often starts because of another illness, such as a cold or the flu  The illness spreads from your nose and throat to your windpipe and airways  Bronchitis is often called a chest cold  Acute bronchitis lasts about 3 to 6 weeks and is usually not a serious illness  What causes acute bronchitis? · Infection  caused by a virus, bacteria, or a fungus    · Polluted air  caused by chemical fumes, dust, smoke, allergens, or pollution  What increases my risk for acute bronchitis? · Age, usually older adults    · Smoking cigarettes or being around cigarette smoke    · Chronic lung diseases or chronic sinus infections    · Weakened immune system    · Gastroesophageal reflux disease    · Allergies and environmental changes  What are the signs and symptoms of acute bronchitis? · A cough with sputum that may be clear, yellow, or green    · Feeling more tired than usual, and body aches    · A fever and chills    · Wheezing when you breathe    · A tight chest or pain when you breathe or cough  How is acute bronchitis diagnosed? Your healthcare provider may diagnose bronchitis by your symptoms  If he is not sure, you may need the following:  · Blood tests  will be done to see if your symptoms are caused by an infection  · X-ray  pictures of your lungs and heart may show signs of infection, such as pneumonia  Chest x-rays may also show fluid around your heart and lungs  How is acute bronchitis treated? Your healthcare provider will treat any condition that has caused your acute bronchitis  He may also give you any of the following:  · Ibuprofen or acetaminophen  are medicines that help lower your fever  They are available without a doctor's order   Ask your healthcare provider which medicine is right for you  Ask how much to take and how often to take it  Follow directions  These medicines can cause stomach bleeding if not taken correctly  Ibuprofen can cause kidney damage  Do not take ibuprofen if you have kidney disease, an ulcer, or allergies to aspirin  Acetaminophen can cause liver damage  Do not take more than 4,000 milligrams in 24 hours  · Decongestants  help loosen mucus in your lungs and make it easier to cough up  This can help you breathe easier  · Cough suppressants  decrease your urge to cough  If your cough produces mucus, do not take a cough suppressant unless your healthcare provider tells you to  Your healthcare provider may suggest that you take a cough suppressant at night so you can rest     · Inhalers  may be given  Your healthcare provider may give you one or more inhalers to help you breathe easier and cough less  An inhaler gives your medicine to open your airways  Ask your healthcare provider to show you how to use your inhaler correctly  How can I care for myself when I have acute bronchitis? · Get more rest   Rest helps your body to heal  Slowly start to do more each day  Rest when you feel it is needed  · Avoid irritants in the air  Avoid chemicals, fumes, and dust  Wear a face mask if you must work around dust or fumes  Stay inside on days when air pollution levels are high  If you have allergies, stay inside when pollen counts are high  Do not use aerosol products, such as spray-on deodorant, bug spray, and hair spray  · Do not smoke or be around others who smoke  Nicotine and other chemicals in cigarettes and cigars damages the cilia that move mucus out of your lungs  Ask your healthcare provider for information if you currently smoke and need help to quit  E-cigarettes or smokeless tobacco still contain nicotine  Talk to your healthcare provider before you use these products  · Drink liquids as directed    Liquids help keep your air passages moist and help you cough up mucus  You may need to drink more liquids when you have acute bronchitis  Ask how much liquid to drink each day and which liquids are best for you  · Use a humidifier or vaporizer  Use a cool mist humidifier or a vaporizer to increase air moisture in your home  This may make it easier for you to breathe and help decrease your cough  How can I decrease my risk for acute bronchitis? · Get the vaccinations you need  Ask your healthcare provider if you should get vaccinated against the flu or pneumonia  · Prevent the spread of germs  You can decrease your risk of acute bronchitis and other illnesses by doing the following:     [facility] your hands often with soap and water  Carry germ-killing hand lotion or gel with you  You can use the lotion or gel to clean your hands when soap and water are not available  ¨ Do not touch your eyes, nose, or mouth unless you have washed your hands first     ¨ Always cover your mouth when you cough to prevent the spread of germs  It is best to cough into a tissue or your shirt sleeve instead of into your hand  Ask those around you cover their mouths when they cough  ¨ Try to avoid people who have a cold or the flu  If you are sick, stay away from others as much as possible  When should I seek immediate care? · You cough up blood  · Your lips or fingernails turn blue  · You feel like you are not getting enough air when you breathe  When should I contact my healthcare provider? · You have a fever  · Your breathing problems do not go away or get worse  · Your cough does not get better within 4 weeks  · You have questions or concerns about your condition or care  CARE AGREEMENT:   You have the right to help plan your care  Learn about your health condition and how it may be treated  Discuss treatment options with your caregivers to decide what care you want to receive  You always have the right to refuse treatment  The above information is an  only  It is not intended as medical advice for individual conditions or treatments  Talk to your doctor, nurse or pharmacist before following any medical regimen to see if it is safe and effective for you  © 2017 [address] Information is for End User's use only and may not be sold, redistributed or otherwise used for commercial purposes  All illustrations and images included in CareNotes® are the copyrighted property of A [employer] M [employer] Inc  or [address]  Community Acquired Pneumonia   WHAT YOU NEED TO KNOW:   Community-acquired pneumonia (CAP) is a lung infection that you get outside of a hospital or nursing home setting  Your lungs become inflamed and cannot work well  CAP may be caused by bacteria, viruses, or fungi  DISCHARGE INSTRUCTIONS:   Seek care immediately if:   · You are confused and cannot think clearly  · You have increased trouble breathing  · Your lips or fingernails turn gray or blue  Contact your healthcare provider if:   · Your symptoms do not get better, or they get worse  · You are urinating less, or not at all  · You have questions or concerns about your condition or care  Medicines:   · Medicines  may be given to treat a bacterial, viral, or fungal infection  You may also be given medicines to dilate your bronchial tubes to help you breathe more easily  · Take your medicine as directed  Contact your healthcare provider if you think your medicine is not helping or if you have side effects  Tell him or her if you are allergic to any medicine  Keep a list of the medicines, vitamins, and herbs you take  Include the amounts, and when and why you take them  Bring the list or the pill bottles to follow-up visits  Carry your medicine list with you in case of an emergency  Follow up with your healthcare provider within 3 days or as directed: You may need another x-ray   Write down your questions so you remember to ask them during your visits  Deep breathing and coughing:  Deep breathing helps open the air passages in your lungs  Coughing helps bring up mucus from your lungs  Take a deep breath and hold the breath as long as you can  Then push the air out of your lungs with a deep, strong cough  Spit out any mucus you have coughed up  Take 10 deep breaths in a row every hour that you are awake  Remember to follow each deep breath with a cough  Do not smoke or allow others to smoke around you:  Nicotine and other chemicals in cigarettes and cigars can cause lung damage  Ask your healthcare provider for information if you currently smoke and need help to quit  E-cigarettes or smokeless tobacco still contain nicotine  Talk to your healthcare provider before you use these products  Manage CAP at home:   · Breathe warm, moist air  This helps loosen mucus  Loosely place a warm, wet washcloth over your nose and mouth  A room humidifier may also help make the air moist     · Drink liquids as directed  Ask your healthcare provider how much liquid to drink each day and which liquids to drink  Liquids help make mucus thin and easier to get out of your body  · Gently tap your chest   This helps loosen mucus so it is easier to cough  Lie with your head lower than your chest several times a day and tap your chest      · Get plenty of rest   Rest helps your body heal   Prevent CAP:   · Wash your hands often with soap and water  Carry germ-killing hand gel with you  You can use the gel to clean your hands when soap and water are not available  Do not touch your eyes, nose, or mouth unless you have washed your hands first      · Clean surfaces often  Clean doorknobs, countertops, cell phones, and other surfaces that are touched often  · Always cover your mouth when you cough  Cough into a tissue or your shirtsleeve so you do not spread germs from your hands  · Try to avoid people who have a cold or the flu    If you are sick, stay away from others as much as possible  · Ask about vaccines  You may need a vaccine to help prevent pneumonia  Get an influenza (flu) vaccine every year as soon as it becomes available  © 2017 [facility] Information is for End User's use only and may not be sold, redistributed or otherwise used for commercial purposes  All illustrations and images included in CareNotes® are the copyrighted property of A [employer] M [employer]  or [employer]  The above information is an  only  It is not intended as medical advice for individual conditions or treatments  Talk to your doctor, nurse or pharmacist before following any medical regimen to see if it is safe and effective for you

## 2018-02-06 LAB
BACTERIA BLD CULT: NORMAL
BACTERIA BLD CULT: NORMAL

## 2018-02-07 ENCOUNTER — OFFICE VISIT (OUTPATIENT)
Dept: FAMILY MEDICINE CLINIC | Facility: CLINIC | Age: 63
End: 2018-02-07
Payer: COMMERCIAL

## 2018-02-07 VITALS
DIASTOLIC BLOOD PRESSURE: 80 MMHG | BODY MASS INDEX: 29.2 KG/M2 | SYSTOLIC BLOOD PRESSURE: 130 MMHG | TEMPERATURE: 97.4 F | WEIGHT: 164.8 LBS | HEART RATE: 82 BPM | RESPIRATION RATE: 16 BRPM | HEIGHT: 63 IN

## 2018-02-07 DIAGNOSIS — R59.0 MEDIASTINAL LYMPHADENOPATHY: ICD-10-CM

## 2018-02-07 DIAGNOSIS — J10.1 INFLUENZA A: Primary | ICD-10-CM

## 2018-02-07 DIAGNOSIS — J45.901 MODERATE ASTHMA WITH ACUTE EXACERBATION, UNSPECIFIED WHETHER PERSISTENT: ICD-10-CM

## 2018-02-07 PROBLEM — J06.9 VIRAL UPPER RESPIRATORY TRACT INFECTION: Status: RESOLVED | Noted: 2018-01-24 | Resolved: 2018-02-07

## 2018-02-07 PROCEDURE — 99214 OFFICE O/P EST MOD 30 MIN: CPT | Performed by: FAMILY MEDICINE

## 2018-02-07 NOTE — ED PROVIDER NOTES
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)   used: No    Shortness of Breath   Severity:  Moderate  Onset quality:  Gradual  Duration:  3 weeks  Timing:  Intermittent  Progression:  Unchanged  Chronicity:  New  Context: URI    Context: not activity, not animal exposure, not emotional upset, not fumes, not known allergens, not occupational exposure, not pollens, not smoke exposure, not strong odors and not weather changes    Relieved by:  Nothing  Worsened by:  Exertion, activity and deep breathing (cough-fits)  Ineffective treatments:  Inhaler, sitting up, rest and oxygen (diuretics)  Associated symptoms: cough, fever and sputum production    Associated symptoms: no abdominal pain, no chest pain, no claudication, no diaphoresis, no ear pain, no headaches, no hemoptysis, no neck pain, no PND, no rash, no sore throat, no syncope, no swollen glands, no vomiting and no wheezing    Associated symptoms comment:  Whitish-sputum production  Myalgias  Generalized weakness   Fatigue  Risk factors: no recent alcohol use, no hx of cancer, no hx of PE/DVT, no obesity, no oral contraceptive use, no prolonged immobilization, no recent surgery and no tobacco use    Risk factors comment:  H/o asthma, mild intermittent, sleep apnea, sick contacts (son with URI - possibly Flu)      Prior to Admission Medications   Prescriptions Last Dose Informant Patient Reported? Taking?    albuterol (2 5 mg/3 mL) 0 083 % nebulizer solution   No No   Sig: Take 3 mL by nebulization every 6 (six) hours as needed for wheezing   albuterol (PROAIR HFA) 90 mcg/act inhaler   Yes No   Sig: Inhale   atorvastatin (LIPITOR) 10 mg tablet   Yes No   Sig: Take 1 tablet by mouth daily   cefadroxil (DURICEF) 500 mg capsule   Yes No   Sig: Take 500 mg by mouth every 12 (twelve) hours   fluticasone-salmeterol (ADVAIR DISKUS) 250-50 mcg/dose inhaler   Yes No   Sig: Inhale   pantoprazole (PROTONIX) 40 mg tablet   Yes No   Sig: Take 40 mg by mouth daily   promethazine-codeine (PHENERGAN WITH CODEINE) 6 25-10 mg/5 mL syrup Past Week at Unknown time  No Yes   Sig: Take 5 mL by mouth every 4 (four) hours as needed for cough for up to 10 days      Facility-Administered Medications: None       Past Medical History:   Diagnosis Date    Abnormal glucose     last assessed 16    Arthritis     Asthma     w/ exacerbation; last assessed 5/14/15    Atypical nevus     last assessed 17    Breast lump     last assessed 3/6/14    Cataract     Cervical adenopathy     last assessed  17    Dyslipidemia     last assessed 17    Facial droop     last assessed  16    Fibromyalgia     Genital herpes     resolved 16    Herpes zoster     last assessed 16    History of shingles     may 2016    History of stomach ulcers     Hx of abnormal mammogram     last assessed  3/5/14    Hyperlipidemia     Myalgia     last assessed  12    Myositis     12    Skin neoplasm     of the lower limb, including hip; onset 12; last assessed  12    Sleep apnea        Past Surgical History:   Procedure Laterality Date    ABDOMINAL SURGERY      release of adhesions    BLADDER SURGERY      mesh-lift    BREAST SURGERY      lift     SECTION      x 3    CHOLECYSTECTOMY      lap    COLONOSCOPY      COSMETIC SURGERY      tummy and breast lift    ESOPHAGOGASTRODUODENOSCOPY      GALLBLADDER SURGERY      OOPHORECTOMY Left     OTHER SURGICAL HISTORY      vocal cord surgery, scraping    [place] HYSTEROSCOPY,W/ENDO BX N/A 11/3/2016    Procedure: DILATATION AND CURETTAGE (D&C) WITH HYSTEROSCOPY;  Surgeon: Suma Vargas MD;  Location: [facility];  Service: Gynecology    TONSILLECTOMY         Family History   Problem Relation Age of Onset    Hypertension Mother     Alzheimer's disease Mother     Arthritis Mother     Hypertension Father     Arthritis Father     Heart attack Father     Kidney disease Brother      stone    Diabetes Brother     Diabetes Daughter     Breast cancer Sister     Skin cancer Sister     Down syndrome Son      I have reviewed and agree with the history as documented  Social History   Substance Use Topics    Smoking status: Never Smoker    Smokeless tobacco: Never Used    Alcohol use No      Comment: social alcohol use as per [employer]        Review of Systems   Constitutional: Positive for activity change, chills and fever  Negative for appetite change and diaphoresis  HENT: Positive for congestion  Negative for ear pain, sinus pain, sinus pressure, sore throat, trouble swallowing and voice change  Eyes: Negative for pain, discharge, redness and itching  Respiratory: Positive for cough, sputum production, chest tightness and shortness of breath  Negative for hemoptysis and wheezing  Cardiovascular: Negative for chest pain, palpitations, claudication, leg swelling, syncope and PND  Gastrointestinal: Negative for abdominal distention, abdominal pain, diarrhea and vomiting  Endocrine: Negative for polydipsia, polyphagia and polyuria  Genitourinary: Negative for difficulty urinating and dysuria  Musculoskeletal: Positive for myalgias  Negative for back pain, neck pain and neck stiffness  Chronic, generalized myalgias - h/o fibromyalgia   Skin: Negative for pallor, rash and wound  Allergic/Immunologic: Negative for immunocompromised state  Neurological: Negative for dizziness, syncope, weakness, light-headedness and headaches  Psychiatric/Behavioral: Negative for behavioral problems and confusion  The patient is nervous/anxious          Physical Exam  ED Triage Vitals   Temperature Pulse Respirations Blood Pressure SpO2   02/01/18 1447 02/01/18 1447 02/01/18 1447 02/01/18 1447 02/01/18 1447   98 8 °F (37 1 °C) (!) 108 (!) 28 119/63 94 %      Temp Source Heart Rate Source Patient Position - Orthostatic VS BP Location FiO2 (%)   02/01/18 1732 02/01/18 1732 02/01/18 1848 02/01/18 1848 --   Oral Monitor Lying Right arm       Pain Score       02/01/18 1447       Worst Possible Pain           Orthostatic Vital Signs  Vitals:    02/01/18 1447 02/01/18 1732 02/01/18 1848   BP: 119/63 114/63 103/57   Pulse: (!) 108 88 80   Patient Position - Orthostatic VS:   Lying       Physical Exam   Constitutional: She is oriented to person, place, and time  She appears well-developed and well-nourished  No distress  HENT:   Head: Normocephalic and atraumatic  Mouth/Throat: Oropharynx is clear and moist    Eyes: EOM are normal  Pupils are equal, round, and reactive to light  Neck: Normal range of motion  Neck supple  No JVD present  No tracheal deviation present  Cardiovascular: Regular rhythm and intact distal pulses  Mildly tachycardic s/p albuterol at home   Pulmonary/Chest: Effort normal  No stridor  Coarse BS diffuse b/l  Good air entry  (+) coughing fits at bedside  tachypneic   O2 Sat 94%     Abdominal: Soft  She exhibits no distension  There is no tenderness  Musculoskeletal: Normal range of motion  She exhibits no edema, tenderness or deformity  Neurological: She is alert and oriented to person, place, and time  Skin: Skin is warm and dry  No rash noted  She is not diaphoretic  No erythema  No pallor  Psychiatric: Her behavior is normal  Thought content normal    Mod anxious   Nursing note and vitals reviewed        ED Medications  Medications   albuterol inhalation solution 2 5 mg (2 5 mg Nebulization Given 2/1/18 1516)   acetaminophen (TYLENOL) tablet 975 mg (975 mg Oral Given 2/1/18 1552)   iohexol (OMNIPAQUE) 350 MG/ML injection (MULTI-DOSE) 85 mL (85 mL Intravenous Given 2/1/18 1608)   cefTRIAXone (ROCEPHIN) IVPB (premix) 1,000 mg (0 mg Intravenous Stopped 2/1/18 1848) benzonatate (TESSALON PERLES) capsule 100 mg (100 mg Oral Given 2/1/18 1813)   azithromycin (ZITHROMAX) tablet 500 mg (500 mg Oral Given 2/1/18 1845)       Diagnostic Studies  Results Reviewed     Procedure Component Value Units Date/Time    Blood culture #2 [[id number]] Collected:  02/01/18 1509    Lab Status:  Final result Specimen:  Blood from Arm, Left Updated:  02/06/18 2303     Blood Culture No Growth After 5 Days  Blood culture #1 [[id number]] Collected:  02/01/18 1509    Lab Status:  Final result Specimen:  Blood from Arm, Left Updated:  02/06/18 2303     Blood Culture No Growth After 5 Days  Influenza A/B and RSV by PCR (Indicated for patients > 2 mo of age) [[id number]]  (Abnormal) Collected:  02/01/18 1516    Lab Status:  Final result Specimen:  Nasopharyngeal from Nasopharyngeal Swab Updated:  02/02/18 1407     INFLU A PCR Detected (A)     INFLU B PCR None Detected     RSV PCR None Detected    Troponin I [[id number]]  (Normal) Collected:  02/01/18 1816    Lab Status:  Final result Specimen:  Blood from Arm, Left Updated:  02/01/18 1840     Troponin I <0 02 ng/mL     Narrative:         [employer] Chemistry analyzer 99% cutoff is > 0 04 ng/mL in network labs    o cTnI 99% cutoff is useful only when applied to patients in the clinical setting of myocardial ischemia  o cTnI 99% cutoff should be interpreted in the context of clinical history, ECG findings and possibly cardiac imaging to establish correct diagnosis  o cTnI 99% cutoff may be suggestive but clearly not indicative of a coronary event without the clinical setting of myocardial ischemia      Rapid Influenza Screen with Reflex PCR (indicated for patients <2 mo of age) [[id number]]  (Normal) Collected:  02/01/18 1516    Lab Status:  Final result Specimen:  Nasopharyngeal from Nasopharyngeal Swab Updated:  02/01/18 1541     Rapid Influenza A Ag Negative     Rapid Influenza B Ag Negative    Lactic acid x2 Q2H [[id number]]  (Normal) Collected:  02/01/18 1509 Lab Status:  Final result Specimen:  Blood from Arm, Left Updated:  02/01/18 1539     LACTIC ACID 1 6 mmol/L     Narrative:         Result may be elevated if tourniquet was used during collection  Troponin I [[id number]]  (Normal) Collected:  02/01/18 1509    Lab Status:  Final result Specimen:  Blood from Arm, Left Updated:  02/01/18 1538     Troponin I <0 02 ng/mL     Narrative:         [employer] Chemistry analyzer 99% cutoff is > 0 04 ng/mL in network labs    o cTnI 99% cutoff is useful only when applied to patients in the clinical setting of myocardial ischemia  o cTnI 99% cutoff should be interpreted in the context of clinical history, ECG findings and possibly cardiac imaging to establish correct diagnosis  o cTnI 99% cutoff may be suggestive but clearly not indicative of a coronary event without the clinical setting of myocardial ischemia  CK Total with Reflex CKMB [[id number]]  (Normal) Collected:  02/01/18 1509    Lab Status:  Final result Specimen:  Blood from Arm, Left Updated:  02/01/18 1537     Total CK 99 U/L     Comprehensive metabolic panel [[id number]]  (Abnormal) Collected:  02/01/18 1509    Lab Status:  Final result Specimen:  Blood from Arm, Left Updated:  02/01/18 1536     Sodium 144 mmol/L      Potassium 4 5 mmol/L      Chloride 106 mmol/L      CO2 30 mmol/L      Anion Gap 8 mmol/L      BUN 11 mg/dL      Creatinine 1 01 mg/dL      Glucose 149 (H) mg/dL      Calcium 10 0 mg/dL      AST 40 U/L      ALT 84 (H) U/L      Alkaline Phosphatase 92 U/L      Total Protein 8 2 g/dL      Albumin 3 3 (L) g/dL      Total Bilirubin 0 20 mg/dL      eGFR 59 ml/min/1 73sq m     Narrative:         National Kidney Disease Education Program recommendations are as follows:  GFR calculation is accurate only with a steady state creatinine  Chronic Kidney disease less than 60 ml/min/1 73 sq  meters  Kidney failure less than 15 ml/min/1 73 sq  meters      Martha Cedeno [[id number]]  (Normal) Collected:  02/01/18 1500 Lab Status:  Final result Specimen:  Blood from Arm, Left Updated:  02/01/18 1535     Protime 10 1 seconds      INR 0 96    APTT [[id number]]  (Abnormal) Collected:  02/01/18 1509    Lab Status:  Final result Specimen:  Blood from Arm, Left Updated:  02/01/18 1535     PTT 23 (L) seconds     Narrative: Therapeutic Heparin Range = 60-90 seconds    CBC and differential [[id number]]  (Abnormal) Collected:  02/01/18 1509    Lab Status:  Final result Specimen:  Blood from Arm, Left Updated:  02/01/18 1518     WBC 10 90 (H) Thousand/uL      RBC 4 57 Million/uL      Hemoglobin 14 3 g/dL      Hematocrit 42 8 %      MCV 94 fL      MCH 31 3 (H) pg      MCHC 33 4 g/dL      RDW 13 5 %      MPV 7 7 (L) fL      Platelets 012 Thousands/uL      nRBC 0 /100 WBCs      Neutrophils Relative 64 %      Lymphocytes Relative 26 %      Monocytes Relative 8 %      Eosinophils Relative 1 %      Basophils Relative 1 %      Neutrophils Absolute 7 00 Thousands/µL      Lymphocytes Absolute 2 80 Thousands/µL      Monocytes Absolute 0 90 Thousand/µL      Eosinophils Absolute 0 10 Thousand/µL      Basophils Absolute 0 10 Thousands/µL                  CTA ED chest PE study   Final Result by Alayna Guillory MD (02/01 1658)      1  Evaluation of lower lobe subsegmental pulmonary arteries is somewhat suboptimal due to respiratory motion  Otherwise no evidence for pulmonary embolus  2   Variable bronchial wall thickening with partial opacification of bronchioles  Correlate for bronchiolar airways disease  Minimal consolidation in the lingula  3   Single prominent right perihilar lymph node identified, nonspecific, may be reactive  This could be followed up with CT  Workstation performed: [id number]         XR chest 1 view portable   Final Result by Jason Gann DO (02/01 6421)      No active pulmonary disease            Workstation performed: [id number]                    Procedures  Procedures       Phone Contacts  ED Phone Contact    ED Course  ED Course                                MDM  Number of Diagnoses or Management Options  Bronchitis: new and requires workup  Pneumonia: new and requires workup  Diagnosis management comments: R/o sepsis, other infectious etiology (Influenza), viral illness, RAD, asthma exacerbation, volume deficit, other metabolic/electrolyte abnormality; low suspicion for ACS/CHF or PE but will r/o given non-resolving s&s  - EKG  - Labs, including Sommer, BNP, LA, Bld Cx, Influenza screen/cx  - UA, UCx  - CXR  - CTA  - IVF  - PRN analgesia, antipyretics  - O2  - Duonebs  - s/p last dose of steroids - will hold  - Abx  - Re-eval, dispo        Amount and/or Complexity of Data Reviewed  Clinical lab tests: reviewed and ordered  Tests in the radiology section of CPT®: ordered and reviewed  Tests in the medicine section of CPT®: ordered and reviewed  Decide to obtain previous medical records or to obtain history from someone other than the patient: yes  Obtain history from someone other than the patient: yes (Sister - concern pt's symptoms have not improved)  Review and summarize past medical records: yes  Independent visualization of images, tracings, or specimens: yes    Risk of Complications, Morbidity, and/or Mortality  Presenting problems: moderate  Diagnostic procedures: moderate  Management options: low    Patient Progress  Patient progress: improved (Stable at discharge)    CritCare Time    Disposition  Final diagnoses:   Bronchitis   Pneumonia - r/o     Time reflects when diagnosis was documented in both MDM as applicable and the Disposition within this note     Time User Action Codes Description Comment    2/1/2018  6:59 PM Kayla Quick Add [J40] Bronchitis     2/1/2018  6:59 PM Kayla Quick Add [J18 9] Pneumonia     2/1/2018  7:00 PM Kayla Quick Modify [J18 9] Pneumonia r/o      ED Disposition     ED Disposition Condition Comment    Discharge  Demetri Tarango discharge to home/self care     Condition at discharge: Good        Follow-up Information     Follow up With Specialties Details Why Contact Kenn Juarez DO Family Medicine Schedule an appointment as soon as possible for a visit  [address] [facility] [address]  [phone]          Discharge Medication List as of 2/1/2018  7:11 PM      START taking these medications    Details   azithromycin (ZITHROMAX) 250 mg tablet Take 1 tablet (250 mg total) by mouth daily for 4 days Take first 2 tablets together, then 1 every day until finished , Starting Thu 2/1/2018, Until Mon 2/5/2018, Print         CONTINUE these medications which have NOT CHANGED    Details   promethazine-codeine (PHENERGAN WITH CODEINE) 6 25-10 mg/5 mL syrup Take 5 mL by mouth every 4 (four) hours as needed for cough for up to 10 days, Starting Wed 1/24/2018, Until Sat 2/3/2018, Print      albuterol (2 5 mg/3 mL) 0 083 % nebulizer solution Take 3 mL by nebulization every 6 (six) hours as needed for wheezing, Starting Sat 1/20/2018, Print      albuterol (PROAIR HFA) 90 mcg/act inhaler Inhale, Starting Mon 3/20/2017, Historical Med      atorvastatin (LIPITOR) 10 mg tablet Take 1 tablet by mouth daily, Starting Fri 1/5/2018, Historical Med      cefadroxil (DURICEF) 500 mg capsule Take 500 mg by mouth every 12 (twelve) hours, Historical Med      fluticasone-salmeterol (ADVAIR DISKUS) 250-50 mcg/dose inhaler Inhale, Starting Mon 4/30/2012, Historical Med      pantoprazole (PROTONIX) 40 mg tablet Take 40 mg by mouth daily, Historical Med           No discharge procedures on file      ED Provider  Electronically Signed by           Aaron Cruz MD  02/06/18 1261

## 2018-02-07 NOTE — PATIENT INSTRUCTIONS
Please keep appt with pulm next month  T/c repeat ct scan due to lymph node but will defer to pulm     Continue with asthma medication

## 2018-02-07 NOTE — PROGRESS NOTES
Chief Complaint   Patient presents with    Asthma    Pneumonia    Follow-up     [facility]  ER        Patient ID: Dixon Taylor is a 57 y o  female  Pt seen and examined  Pt was seen at [facility] on 2/1 and dx with pna and bronchitis  Pt's PCR came back + for influenza A  Pt stated that she wasn't notified  Prior to that she was in the ED ([facility] and [facility]) 2 x prior with asthma exacerbation  Pt had CTA while in the ER   CTA - CHEST WITH IV CONTRAST - PULMONARY ANGIOGRAM     INDICATION: Shortness of breath      COMPARISON: Chest x-ray 2/1/2018     TECHNIQUE: CTA examination of the chest was performed using angiographic technique according to a protocol specifically tailored to evaluate for pulmonary embolism  Reformatted images were created in axial, sagittal, and coronal planes  In addition,   coronal 3D MIP postprocessing was performed on the acquisition scanner        Radiation dose length product (DLP) for this visit:  533 69 mGy-cm   This examination, like all CT scans performed in the [facility], was performed utilizing techniques to minimize radiation dose exposure, including the use of iterative   reconstruction and automated exposure control      IV Contrast:  85 mL of iohexol (OMNIPAQUE)              FINDINGS:     PULMONARY ARTERIAL TREE:  Evaluation of lower lobe subsegmental pulmonary arteries is somewhat suboptimal due to respiratory motion  Pulmonary arteries are otherwise clear      LUNGS:  Somewhat suboptimal evaluation due to respiratory motion  Bronchial wall thickening noted bilaterally  Partial opacification of lower lobe bronchioles  Minimal consolidation in the lingula laterally  Mild patchy atelectasis in the right lower   lobe posteriorly      PLEURA:  Unremarkable      HEART/AORTA:  Unremarkable for patient's age      MEDIASTINUM AND BALJINDER:  Single prominent right perihilar lymph node identified measuring 1 5 x 1 3 cm    Small mediastinal lymph nodes noted not enlarged by CT size criteria      CHEST WALL AND LOWER NECK:       Normal      VISUALIZED STRUCTURES IN THE UPPER ABDOMEN:  Low attenuation liver suggest fatty infiltration      OSSEOUS STRUCTURES:  No acute fracture or destructive osseous lesion      IMPRESSION:     1  Evaluation of lower lobe subsegmental pulmonary arteries is somewhat suboptimal due to respiratory motion  Otherwise no evidence for pulmonary embolus  2   Variable bronchial wall thickening with partial opacification of bronchioles  Correlate for bronchiolar airways disease  Minimal consolidation in the lingula  3   Single prominent right perihilar lymph node identified, nonspecific, may be reactive  This could be followed up with CT            xray showed no active disease        Pt give steroid, cough syrup, and abx  Sat today is 96%      Pt stated that she felt as if she was dying for the past 4 days   Had vertigo  + headaches  Today is feeling better    Feels her bp is elevated but today it is stable        Asthma   She complains of cough  There is no chest tightness, difficulty breathing, frequent throat clearing, hemoptysis, hoarse voice, shortness of breath, sputum production or wheezing  Associated symptoms include headaches and myalgias  Pertinent negatives include no appetite change, chest pain, fever, postnasal drip or sore throat  Her past medical history is significant for asthma  Pneumonia   She complains of cough  There is no chest tightness, difficulty breathing, frequent throat clearing, hemoptysis, hoarse voice, shortness of breath, sputum production or wheezing  Associated symptoms include headaches and myalgias  Pertinent negatives include no appetite change, chest pain, fever, postnasal drip or sore throat  Her past medical history is significant for asthma           The following portions of the patient's history were reviewed and updated as appropriate: allergies, current medications, past family history, past medical history, past social history, past surgical history and problem list     Review of Systems   Constitutional: Positive for activity change, chills and fatigue  Negative for appetite change, diaphoresis and fever  HENT: Negative for congestion, hoarse voice, postnasal drip and sore throat  Respiratory: Positive for cough  Negative for hemoptysis, sputum production, shortness of breath and wheezing  Cardiovascular: Negative for chest pain, palpitations and leg swelling  Gastrointestinal: Negative for diarrhea, nausea and vomiting  Musculoskeletal: Positive for arthralgias and myalgias  Neurological: Positive for headaches  Current Outpatient Prescriptions   Medication Sig Dispense Refill    albuterol (2 5 mg/3 mL) 0 083 % nebulizer solution Take 3 mL by nebulization every 6 (six) hours as needed for wheezing 75 mL 0    albuterol (PROAIR HFA) 90 mcg/act inhaler Inhale      atorvastatin (LIPITOR) 10 mg tablet Take 1 tablet by mouth daily      fluticasone-salmeterol (ADVAIR DISKUS) 250-50 mcg/dose inhaler Inhale      pantoprazole (PROTONIX) 40 mg tablet Take 40 mg by mouth daily       No current facility-administered medications for this visit  Objective:    /80 (BP Location: Right arm, Patient Position: Sitting, Cuff Size: Adult)   Pulse 82   Temp (!) 97 4 °F (36 3 °C)   Resp 16   Ht 5' 3" (1 6 m)   Wt 74 8 kg (164 lb 12 8 oz)   BMI 29 19 kg/m²        Physical Exam   Constitutional: She is oriented to person, place, and time  She appears well-developed and well-nourished  Eyes: Conjunctivae are normal    Neck: Normal range of motion  No thyromegaly present  Cardiovascular: Normal rate, regular rhythm, normal heart sounds and intact distal pulses  Pulmonary/Chest: Effort normal and breath sounds normal    Abdominal: Soft  Musculoskeletal: She exhibits no edema  Neurological: She is alert and oriented to person, place, and time  Skin: Skin is warm and dry  Psychiatric: She has a normal mood and affect  Assessment/Plan:        Diagnoses and all orders for this visit:    Influenza A  Comments:  improving    Mediastinal lymphadenopathy  Comments:  most like reactive but will defer to pulm for repeat imaging     Moderate asthma with acute exacerbation, unspecified whether persistent  Comments:  stable currently   follow up with pulm                  Return if symptoms worsen or fail to improve            Governor Clark, DO

## 2018-02-21 ENCOUNTER — TELEPHONE (OUTPATIENT)
Dept: FAMILY MEDICINE CLINIC | Facility: CLINIC | Age: 63
End: 2018-02-21

## 2018-02-28 ENCOUNTER — TRANSCRIBE ORDERS (OUTPATIENT)
Dept: URGENT CARE | Facility: CLINIC | Age: 63
End: 2018-02-28

## 2018-02-28 ENCOUNTER — APPOINTMENT (OUTPATIENT)
Dept: RADIOLOGY | Facility: CLINIC | Age: 63
End: 2018-02-28
Attending: FAMILY MEDICINE

## 2018-02-28 DIAGNOSIS — Z02.1 PRE-EMPLOYMENT EXAMINATION: Primary | ICD-10-CM

## 2018-02-28 PROCEDURE — 71045 X-RAY EXAM CHEST 1 VIEW: CPT

## 2018-03-15 ENCOUNTER — OFFICE VISIT (OUTPATIENT)
Dept: NEUROLOGY | Facility: CLINIC | Age: 63
End: 2018-03-15
Payer: COMMERCIAL

## 2018-03-15 VITALS
SYSTOLIC BLOOD PRESSURE: 104 MMHG | DIASTOLIC BLOOD PRESSURE: 78 MMHG | HEIGHT: 63 IN | WEIGHT: 164 LBS | BODY MASS INDEX: 29.06 KG/M2 | HEART RATE: 83 BPM

## 2018-03-15 DIAGNOSIS — M54.12 CERVICAL RADICULOPATHY: ICD-10-CM

## 2018-03-15 DIAGNOSIS — H81.10 BENIGN PAROXYSMAL POSITIONAL VERTIGO, UNSPECIFIED LATERALITY: ICD-10-CM

## 2018-03-15 DIAGNOSIS — R51.9 DAILY HEADACHE: Primary | ICD-10-CM

## 2018-03-15 DIAGNOSIS — S16.1XXD STRAIN OF NECK MUSCLE, SUBSEQUENT ENCOUNTER: ICD-10-CM

## 2018-03-15 PROCEDURE — 99214 OFFICE O/P EST MOD 30 MIN: CPT | Performed by: PSYCHIATRY & NEUROLOGY

## 2018-03-15 RX ORDER — CYCLOBENZAPRINE HCL 10 MG
10 TABLET ORAL 3 TIMES DAILY PRN
Qty: 90 TABLET | Refills: 3 | Status: SHIPPED | OUTPATIENT
Start: 2018-03-15 | End: 2018-07-23 | Stop reason: SDUPTHER

## 2018-03-15 RX ORDER — VERAPAMIL HYDROCHLORIDE 120 MG/1
120 CAPSULE, EXTENDED RELEASE ORAL
Qty: 30 CAPSULE | Refills: 3 | Status: SHIPPED | OUTPATIENT
Start: 2018-03-15 | End: 2018-07-23 | Stop reason: SDUPTHER

## 2018-03-15 RX ORDER — BENZONATATE 100 MG/1
CAPSULE ORAL
Refills: 0 | COMMUNITY
Start: 2018-02-21 | End: 2018-10-22 | Stop reason: ALTCHOICE

## 2018-03-15 NOTE — PROGRESS NOTES
Return NeuroOutpatient Note        Adalgisa Esparza  [id number]  30 y o   1955       Dizziness        History obtained from: Patient     HPI/Subjective:    Patient returns as follow up  Per my previous history, on Dec 6th, she had presented to hospital after headache for a day and noted right facial droop  She had negative MRI and CTA  She was dx as having complicated migraine  There was a possibility of TIA as well  Her LDL was elevated to 182 and she was started on lipitor  She's currently on 20mg daily  She is still in a lot of stress  Still hasn't been able to find job that she deserves  She had EMG few years ago and it was normal  Patient finally had cervical spine MRI in Nov 2017 and it showed straightening of normal cervical lordosis  Multilevel cervical spondylitic degenerative change with annular bulging and uncinate joint hypertrophic degenerative change resulting in mild to moderate canal stenosis and foraminal narrowing  Moderate bilateral foraminal narrowing at the C4-5 and C5-6 levels  Patient says her paresthesia is actually better  Her neck pain still persists  In [place] she developed flu and later pneumonia  Since then she started getting vertigo  It's positional  She also now started getting headache, described at top, frontal or occipital region  She denies associated nausea or vomiting  Ernie light or noise sensitivity  She was given meclizine script by ENT     She was placed on flexeril prn for neck pain and she does well with it            Past Medical History:   Diagnosis Date    Abnormal glucose     last assessed 2/25/16    Arthritis     Asthma     w/ exacerbation; last assessed 5/14/15    Atypical nevus     last assessed 1/18/17    Breast lump     last assessed 3/6/14    Cataract     Cervical adenopathy     last assessed  2/7/17    Dizziness     Dyslipidemia     last assessed 5/22/17    Facial droop     last assessed  12/5/16    Fibromyalgia     Flu 01/20/2018    Genital herpes     resolved 9/1/16    Herpes zoster     last assessed 5/20/16    History of shingles     may 2016    History of stomach ulcers     Hx of abnormal mammogram     last assessed  3/5/14    Hyperlipidemia     Myalgia     last assessed  11/21/12    Myositis     11/21/12    Skin neoplasm     of the lower limb, including hip; onset 1/23/12; last assessed  1/23/12    Sleep apnea      Social History     Social History    Marital status:      Spouse name: N/A    Number of children: N/A    Years of education: N/A     Occupational History    Not on file       Social History Main Topics    Smoking status: Never Smoker    Smokeless tobacco: Never Used    Alcohol use No      Comment: social alcohol use as per [employer]    Drug use: No    Sexual activity: Not on file     Other Topics Concern    Not on file     Social History Narrative    Daily caffinated coffee consumption    Exercise habits- walking sometimes 1x per day- last impression 5/22/17- Alis Westfall    Good sleep hygiene    Pet - fish     Family History   Problem Relation Age of Onset    Hypertension Mother     Alzheimer's disease Mother     Arthritis Mother     Hypertension Father     Arthritis Father     Heart attack Father     Kidney disease Brother      stone    Diabetes Brother     Diabetes Daughter     Breast cancer Sister     Skin cancer Sister     Down syndrome Son      Allergies   Allergen Reactions    Latex Rash     Current Outpatient Prescriptions on File Prior to Visit   Medication Sig Dispense Refill    albuterol (2 5 mg/3 mL) 0 083 % nebulizer solution Take 3 mL by nebulization every 6 (six) hours as needed for wheezing 75 mL 0    atorvastatin (LIPITOR) 10 mg tablet Take 1 tablet by mouth daily      fluticasone-salmeterol (ADVAIR DISKUS) 250-50 mcg/dose inhaler Inhale      pantoprazole (PROTONIX) 40 mg tablet Take 40 mg by mouth daily      albuterol (PROAIR HFA) 90 mcg/act inhaler Inhale       No current facility-administered medications on file prior to visit  Review of Systems   Refer to positive review of systems in HPI  Constitutional- No fever  Eyes- No visual change  ENT- Hearing normal  CV- No chest pain  Resp- No Shortness of breath  GI- No diarrhea  - Bladder normal  MS- No Arthritis   Skin- No rash  Psych- No depression  Endo- No DM  Heme- No nodes    Vitals:    03/15/18 1409 03/15/18 1415   BP: 118/80 104/78   BP Location: Left arm Left arm   Patient Position: Sitting Standing   Pulse: 83    Weight: 74 4 kg (164 lb)    Height: 5' 3" (1 6 m)        PHYSICAL EXAM:  Appearance: No Acute Distress  Ophthalmoscopic: Disc Flat, Normal fundus  Mental status:  Orientation: Awake, Alert, and Orientedx3  Memory: Registation 3/3 Recall 3/3  Attention: normal  Knowledge: good  Language: No aphasia  Speech: No dysarthria  Cranial Nerves:  2 No Visual Defect on Confrontation, Pupils round, equal, reactive to light  3,4,6 Extraocular Movements Intact, no nystagmus  5 Facial Sensation Intact  7 right facial asymmetry is chronic   8 Intact hearing  9,10 Palate symmetric, normal gag  11 Good shoulder shrug  12 Tongue Midline  Gait: Stable  Coordination: No ataxia with finger to nose testing, and heel to shin  Sensory: Intact, Symmetric to pinprick, light touch, vibration, and joint position  Muscle Tone: Normal              Muscle exam:  Arm Right Left Leg Right Left   Deltoid 5/5 5/5 Iliopsoas 5/5 5/5   Biceps 5/5 5/5 Quads 5/5 5/5   Triceps 5/5 5/5 Hamstrings 5/5 5/5   Wrist Extension 5/5 5/5 Ankle Dorsi Flexion 5/5 5/5   Wrist Flexion 5/5 5/5 Ankle Plantar Flexion 5/5 5/5   Interossei 5/5 5/5 Ankle Eversion 5/5 5/5   APB 5/5 5/5 Ankle Inversion 5/5 5/5       Reflexes   RJ BJ TJ KJ AJ Plantars Mcnulty's   Right 2+ 2+ 2+ 2+ 2+ Downgoing Not present   Left 2+ 2+ 2+ 2+ 2+ Downgoing Not present     Personal review of  Labs:                  Diagnoses and all orders for this visit:      1   Daily headache verapamil (VERELAN PM) 120 MG 24 hr capsule   2  Benign paroxysmal positional vertigo, unspecified laterality  verapamil (VERELAN PM) 120 MG 24 hr capsule   3  Strain of neck muscle, subsequent encounter  Ambulatory referral to Physical Therapy    cyclobenzaprine (FLEXERIL) 10 mg tablet   4  Cervical radiculopathy  Ambulatory referral to Physical Therapy    cyclobenzaprine (FLEXERIL) 10 mg tablet       Patient has return of headaches now  For vertigo and headaches will start her on verapamil  Resume flexeril prn  Will refer her to PT for neck traction therapy  Counseling Documentation:  The patient and/or patient's family were  counseled regarding diagnostic results  Instructions for management,risk factor reductions,prognosis of disease were discussed  Patient and family were educated regarding impressions,risks and benefits of treatment options,importance of compliance with treatment        Total time of encounter:  30 min  More than 50% of the time was used in counseling and/or coordination of care  Extent of counseling and/or coordination of care        [facility]  [address]  [place]  [phone]

## 2018-03-16 ENCOUNTER — OFFICE VISIT (OUTPATIENT)
Dept: PULMONOLOGY | Facility: MEDICAL CENTER | Age: 63
End: 2018-03-16
Payer: COMMERCIAL

## 2018-03-16 VITALS
BODY MASS INDEX: 29.41 KG/M2 | OXYGEN SATURATION: 98 % | HEIGHT: 63 IN | TEMPERATURE: 98.4 F | WEIGHT: 166 LBS | SYSTOLIC BLOOD PRESSURE: 122 MMHG | HEART RATE: 91 BPM | RESPIRATION RATE: 12 BRPM | DIASTOLIC BLOOD PRESSURE: 82 MMHG

## 2018-03-16 DIAGNOSIS — R05.9 COUGH: ICD-10-CM

## 2018-03-16 DIAGNOSIS — J45.30 ASTHMA IN ADULT, MILD PERSISTENT, UNCOMPLICATED: Primary | ICD-10-CM

## 2018-03-16 DIAGNOSIS — G47.33 OBSTRUCTIVE SLEEP APNEA HYPOPNEA, MILD: ICD-10-CM

## 2018-03-16 DIAGNOSIS — J45.30 ALLERGIC ASTHMA, MILD PERSISTENT, UNCOMPLICATED: ICD-10-CM

## 2018-03-16 DIAGNOSIS — R06.02 SOB (SHORTNESS OF BREATH): ICD-10-CM

## 2018-03-16 PROCEDURE — 99214 OFFICE O/P EST MOD 30 MIN: CPT | Performed by: INTERNAL MEDICINE

## 2018-03-16 PROCEDURE — 94010 BREATHING CAPACITY TEST: CPT | Performed by: INTERNAL MEDICINE

## 2018-03-16 RX ORDER — ALBUTEROL SULFATE 90 UG/1
2 AEROSOL, METERED RESPIRATORY (INHALATION) EVERY 4 HOURS PRN
Qty: 1 INHALER | Refills: 5 | Status: SHIPPED | OUTPATIENT
Start: 2018-03-16 | End: 2020-06-10 | Stop reason: SDUPTHER

## 2018-03-16 RX ORDER — PROMETHAZINE HYDROCHLORIDE AND CODEINE PHOSPHATE 6.25; 1 MG/5ML; MG/5ML
SYRUP ORAL
Qty: 120 ML | Refills: 0 | Status: SHIPPED | OUTPATIENT
Start: 2018-03-16 | End: 2018-03-16 | Stop reason: SDUPTHER

## 2018-03-16 NOTE — PATIENT INSTRUCTIONS
Try pseudoephedrine 30 mg 1 tablet twice a day for postnasal drainage    Can use Flonase 2 sprays each nostril once a day    Use Breo 200 mcg 1 puff daily for 2 weeks then try Breo 100 mcg 1 puff daily    Take Allegra or Claritin over-the-counter once a day for allergies    Ventolin inhaler 2 puffs every 4 hours as needed    Can use benzonatate 100 mg twice a day during the day as needed for cough    Can use Phenergan with codeine 1-2 tsp fulls at bedtime as needed for cough

## 2018-03-18 PROBLEM — R05.9 COUGH: Status: ACTIVE | Noted: 2018-03-18

## 2018-03-18 PROBLEM — J45.30 ASTHMA IN ADULT, MILD PERSISTENT, UNCOMPLICATED: Status: ACTIVE | Noted: 2018-03-18

## 2018-03-18 PROBLEM — G47.33 OBSTRUCTIVE SLEEP APNEA HYPOPNEA, MILD: Status: ACTIVE | Noted: 2018-03-18

## 2018-03-18 PROBLEM — J10.1 INFLUENZA A: Status: RESOLVED | Noted: 2018-02-07 | Resolved: 2018-03-18

## 2018-03-18 RX ORDER — PROMETHAZINE HYDROCHLORIDE AND CODEINE PHOSPHATE 6.25; 1 MG/5ML; MG/5ML
SYRUP ORAL
Qty: 120 ML | Refills: 0 | Status: SHIPPED | OUTPATIENT
Start: 2018-03-18 | End: 2018-10-15

## 2018-03-18 NOTE — PROGRESS NOTES
Assessment/Plan:     Problem List Items Addressed This Visit     None      Visit Diagnoses     SOB (shortness of breath)    -  Primary    Relevant Orders    POCT spirometry (Completed)    Allergic asthma, mild persistent, uncomplicated        Relevant Medications    albuterol (VENTOLIN HFA) 90 mcg/act inhaler    Cough        Relevant Medications    promethazine-codeine (PHENERGAN WITH CODEINE) 6 25-10 mg/5 mL syrup            Return in about 2 months (around 5/16/2018)  All questions are answered to the patient's satisfaction and understanding  She verbalizes understanding  She is encouraged to call with any further questions or concerns  Portions of the record may have been created with voice recognition software  Occasional wrong word or "sound a like" substitutions may have occurred due to the inherent limitations of voice recognition software  Read the chart carefully and recognize, using context, where substitutions have occurred  ______________________________________________________________________    Chief Complaint:   Chief Complaint   Patient presents with    Follow-up     hfu :1/20, 1/27, 2/1,    Shortness of Breath     dx in hosp occurs when taking    Cough     salty foam       Patient ID: Jessica Tamez is a 57 y o  y o  female has a past medical history of Abnormal glucose; Arthritis; Asthma; Atypical nevus; Breast lump; Cataract; Cervical adenopathy; Dizziness; Dyslipidemia; Facial droop; Fibromyalgia; Flu (01/20/2018); Genital herpes; Herpes zoster; History of shingles; History of stomach ulcers; abnormal mammogram; Hyperlipidemia; Myalgia; Myositis; Skin neoplasm; and Sleep apnea  3/16/2018  Shortness of Breath   Pertinent negatives include no abdominal pain, chest pain, fever, leg swelling, rash or sore throat  Cough   Associated symptoms include postnasal drip and shortness of breath  Pertinent negatives include no chest pain, eye redness, fever, myalgias, rash or sore throat   Her past medical history is significant for environmental allergies  HPI:  [place] has a history of asthma for any years  She has had some cough and shortness of breath for the past several weeks  She was seen in the ER on 1/20 and prescribed a short course of prednisone for an asthma exacerbation  Then on 2/1/18 she went to the ER again complaining of nasal congestion and cough  Influenza A by nasal PCR swab was positive  She was prescribd a 5 day azithromycin pack then  She is better now but is having persistent nonproductive cough and occasional wheeze  Nocturnal dyspnea  No  fever or chills  Minima GERD  She is on [id number]/14 mcg 1 puff daily and she uses Avair 250 mcg 1 puff in the evening  She also has mild ERINN  Diagnostic sleep study done April 2015 showed an AHI of 7 6  In May 2015 she had a PAP titration study done and needed a pressure of 6 cm H2O  She is using the CPAP and denies any excessive daytime somnolence  She does have allergic rhinitis and post nasal drainage  She does have a history of fibromyalgia and headaches  Review of Systems   Constitutional: Negative for activity change, fatigue and fever  HENT: Positive for postnasal drip  Negative for sinus pressure and sore throat  Eyes: Negative for redness and itching  Respiratory: Positive for cough and shortness of breath  Has periodic wheeze and exertional shortness of breath   Cardiovascular: Negative for chest pain and leg swelling  Gastrointestinal: Negative for abdominal distention and abdominal pain  Endocrine: Negative for polydipsia and polyphagia  Genitourinary: Negative for dysuria  Musculoskeletal: Negative for joint swelling and myalgias  Skin: Negative for rash  Allergic/Immunologic: Positive for environmental allergies  Neurological: Negative for dizziness  Psychiatric/Behavioral: Negative for confusion  Smoking history: She reports that she has never smoked  She has never used smokeless tobacco     The following portions of the patient's history were reviewed and updated as appropriate: allergies, current medications, past family history, past medical history, past social history, past surgical history and problem list     Immunization History   Administered Date(s) Administered    Influenza TIV (IM) 09/15/2015, 11/20/2017    Influenza, Quadrivalent (nasal) 12/06/2016    Pneumococcal Conjugate 13-Valent 02/02/2016    Tuberculin Skin Test-PPD Intradermal 06/12/2017     Current Outpatient Prescriptions   Medication Sig Dispense Refill    albuterol (2 5 mg/3 mL) 0 083 % nebulizer solution Take 3 mL by nebulization every 6 (six) hours as needed for wheezing 75 mL 0    atorvastatin (LIPITOR) 10 mg tablet Take 1 tablet by mouth daily      benzonatate (TESSALON PERLES) 100 mg capsule take 1 capsule by mouth every 8 hours if needed for cough  0    cyclobenzaprine (FLEXERIL) 10 mg tablet Take 1 tablet (10 mg total) by mouth 3 (three) times a day as needed for muscle spasms 90 tablet 3    fluticasone-salmeterol (ADVAIR DISKUS) 250-50 mcg/dose inhaler Inhale      pantoprazole (PROTONIX) 40 mg tablet Take 40 mg by mouth daily      verapamil (VERELAN PM) 120 MG 24 hr capsule Take 1 capsule (120 mg total) by mouth daily at bedtime 30 capsule 3    albuterol (VENTOLIN HFA) 90 mcg/act inhaler Inhale 2 puffs every 4 (four) hours as needed for wheezing 1 Inhaler 5    promethazine-codeine (PHENERGAN WITH CODEINE) 6 25-10 mg/5 mL syrup Take 5 mL to 10 mL at bedtime for cough as needed 120 mL 0     No current facility-administered medications for this visit  Allergies: Latex    Objective:  Vitals:    03/16/18 1351   BP: 122/82   BP Location: Left arm   Patient Position: Sitting   Cuff Size: Adult   Pulse: 91   Resp: 12   Temp: 98 4 °F (36 9 °C)   TempSrc: Oral   SpO2: 98%   Weight: 75 3 kg (166 lb)   Height: 5' 3" (1 6 m)   Oxygen Therapy  SpO2: 98 %      Wt Readings from Last 3 Encounters:   03/16/18 75 3 kg (166 lb)   03/15/18 74 4 kg (164 lb)   02/07/18 74 8 kg (164 lb 12 8 oz)     Body mass index is 29 41 kg/m²  Physical Exam   Constitutional: She is oriented to person, place, and time  She appears well-developed and well-nourished  No distress  HENT:   Head: Normocephalic  Nose: Nose normal    Mouth/Throat: Oropharynx is clear and moist  No oropharyngeal exudate  Eyes: Conjunctivae are normal  No scleral icterus  Neck: Neck supple  No JVD present  No tracheal deviation present  Cardiovascular: Normal rate, regular rhythm and normal heart sounds  No murmur heard  Pulmonary/Chest: Effort normal and breath sounds normal  She has no wheezes  She has no rales  Abdominal: Soft  She exhibits no distension  There is no tenderness  Musculoskeletal: She exhibits no edema  Neurological: She is alert and oriented to person, place, and time  Skin: Skin is warm and dry  She is not diaphoretic  No erythema  Psychiatric: She has a normal mood and affect  Her behavior is normal  Judgment and thought content normal        Office Spirometry Results:done today  3/16/18  FVC - 2 56 L   83%  FEV1 - 2 11 L    90%  FEV1/FVC% - 83%    Normal lung volumes     Xr Chest 1 View    Result Date: 3/3/2018  Narrative: CHEST INDICATION: Z02 1: Encounter for pre-employment examination COMPARISON:  February 1, 2018 EXAM PERFORMED/VIEWS:  XR CHEST 1 VIEW FINDINGS: Cardiomediastinal silhouette appears unremarkable  The lungs are clear  No pneumothorax or pleural effusion  Osseous structures appear within normal limits for patient age       Impression: No acute consolidation or congestion seen Stable chest x-ray Workstation performed: [id number]

## 2018-03-19 NOTE — ASSESSMENT & PLAN NOTE
Use benzonatate 100 mg BID PRN and can also use promethazine with codeine 5 to 10 ml at bedtime as needed

## 2018-03-19 NOTE — ASSESSMENT & PLAN NOTE
Mild persistent asthma with recent exacerbation from Influenza A infection diagnosed February 1  She has persistent cough from this and occasional wheezing  Spirometry today shows normal lung volumes    I will change her inhaler regimen for now to Breo 200 mcg 1 puff daily x 4 weeks then reduce to Breo 100 mcg 1 puff daily  Stop Advair and AirDuo  I gave her samples of Breo    Also she has post nasal drainage and cna try pseudoephedrine 0 mg BID PRN in addition to flonase

## 2018-03-19 NOTE — ASSESSMENT & PLAN NOTE
Mild ERINN with diagnostic study done in April 22015 showing an AHI of 7 6  She will continue CPA at 6 cm H2O  Compliance data from [address] [place] [address] from 2/11 to 3/12/18 was reviwed with the patient  She uses an Airfit N10 mask    Average usagewas just over 4 hours and resultat AHI was 1 8 which is satisfactory

## 2018-04-02 ENCOUNTER — APPOINTMENT (OUTPATIENT)
Dept: PHYSICAL THERAPY | Facility: CLINIC | Age: 63
End: 2018-04-02
Payer: COMMERCIAL

## 2018-04-09 ENCOUNTER — HOSPITAL ENCOUNTER (OUTPATIENT)
Dept: RADIOLOGY | Facility: HOSPITAL | Age: 63
Discharge: HOME | End: 2018-04-09
Attending: FAMILY MEDICINE
Payer: COMMERCIAL

## 2018-04-09 ENCOUNTER — EVALUATION (OUTPATIENT)
Dept: PHYSICAL THERAPY | Facility: CLINIC | Age: 63
End: 2018-04-09
Payer: COMMERCIAL

## 2018-04-09 ENCOUNTER — TRANSCRIBE ORDERS (OUTPATIENT)
Dept: ADMINISTRATIVE | Facility: HOSPITAL | Age: 63
End: 2018-04-09

## 2018-04-09 ENCOUNTER — TELEPHONE (OUTPATIENT)
Dept: FAMILY MEDICINE CLINIC | Facility: CLINIC | Age: 63
End: 2018-04-09

## 2018-04-09 VITALS — SYSTOLIC BLOOD PRESSURE: 122 MMHG | DIASTOLIC BLOOD PRESSURE: 70 MMHG

## 2018-04-09 DIAGNOSIS — M54.12 CERVICAL RADICULOPATHY: Primary | ICD-10-CM

## 2018-04-09 DIAGNOSIS — Z12.31 ENCOUNTER FOR SCREENING MAMMOGRAM FOR MALIGNANT NEOPLASM OF BREAST: ICD-10-CM

## 2018-04-09 PROCEDURE — G8981 BODY POS CURRENT STATUS: HCPCS | Performed by: PHYSICAL THERAPIST

## 2018-04-09 PROCEDURE — G8982 BODY POS GOAL STATUS: HCPCS | Performed by: PHYSICAL THERAPIST

## 2018-04-09 PROCEDURE — G8983 BODY POS D/C STATUS: HCPCS | Performed by: PHYSICAL THERAPIST

## 2018-04-09 PROCEDURE — 77067 SCR MAMMO BI INCL CAD: CPT

## 2018-04-09 PROCEDURE — 97161 PT EVAL LOW COMPLEX 20 MIN: CPT | Performed by: PHYSICAL THERAPIST

## 2018-04-09 NOTE — PROGRESS NOTES
PT Evaluation     Today's date: 2018  Patient name: Karissa Fountain  : 1955  MRN: [id number]  Referring provider: Nicolas Strange MD  Dx:   Encounter Diagnosis     ICD-10-CM    1  Cervical radiculopathy M54 12                   Assessment    Assessment details: Due to active mandibular infection, the patient was instructed to see her dentist today  Due to the reproduction of vertigo with lying supine or rotation to the right, the patient was referred to the [address] [place] [address]  Understanding of Dx/Px/POC: good   Prognosis: good    Plan  Plan details: Defer physical therapy at this facility  Patient needs to have the active mandibular infection addressed and was referred to the [address] [place] [address]  Subjective Evaluation    History of Present Illness  Date of onset: 2016  Mechanism of injury: Patient reports experiencing neck pain for 2 years, but after a bout of the flu in 2018, the neck pain was significantly worse  She also began to experience spinning when lying supine or turning to the right  She also reports a right sided mandibular tooth infection that has been getting more swollen since starting anti-biotics  Recurrent probem    Quality of life: good    Pain  Current pain ratin  At best pain ratin  At worst pain ratin  Location: right side of neck  Quality: knife-like  Relieving factors: rest  Progression: worsening    Treatments  Current treatment: physical therapy  Patient Goals  Patient goals for therapy: decreased pain, increased motion and independence with ADLs/IADLs          Objective     Static Posture     Comments  Patient with significant swelling on the lower right mandible, slightly red and warm  Active Range of Motion   Cervical/Thoracic Spine   Cervical    Right rotation: 45 degrees with pain    Additional Active Range of Motion Details  With right rotation the patient experiences spinning        Flowsheet Rows    Flowsheet Row Most Recent Value   PT/OT G-Codes Current Score  49   Projected Score  62   FOTO information reviewed  Yes   Assessment Type  Evaluation   G code set  Changing & Maintaining Body Position   Changing and Maintaining Body Position Current Status ()  CK   Changing and Maintaining Body Position Goal Status ()  CK        Precautions Asthma, COPD, right mandibular infection    Specialty Daily Treatment Diary     Manual                                                     Exercise Diary                                                                                                                                                                             Modalities

## 2018-04-09 NOTE — TELEPHONE ENCOUNTER
----- Message from Tessie Soto DO sent at 4/9/2018  4:17 PM EDT -----  Mammogram stable   Repeat in 1 year

## 2018-05-25 ENCOUNTER — OFFICE VISIT (OUTPATIENT)
Dept: FAMILY MEDICINE CLINIC | Facility: CLINIC | Age: 63
End: 2018-05-25
Payer: COMMERCIAL

## 2018-05-25 VITALS
HEIGHT: 63 IN | HEART RATE: 88 BPM | TEMPERATURE: 98.1 F | OXYGEN SATURATION: 98 % | SYSTOLIC BLOOD PRESSURE: 106 MMHG | WEIGHT: 161.2 LBS | BODY MASS INDEX: 28.56 KG/M2 | DIASTOLIC BLOOD PRESSURE: 78 MMHG | RESPIRATION RATE: 14 BRPM

## 2018-05-25 DIAGNOSIS — R05.9 COUGH: Primary | ICD-10-CM

## 2018-05-25 PROCEDURE — 99213 OFFICE O/P EST LOW 20 MIN: CPT | Performed by: FAMILY MEDICINE

## 2018-05-25 RX ORDER — AZITHROMYCIN 250 MG/1
TABLET, FILM COATED ORAL
Qty: 6 TABLET | Refills: 0 | Status: SHIPPED | OUTPATIENT
Start: 2018-05-25 | End: 2018-05-29

## 2018-05-25 NOTE — PATIENT INSTRUCTIONS
Acute Cough   AMBULATORY CARE:   An acute cough  can last up to 3 weeks  Common causes of an acute cough include a cold, allergies, or a lung infection  Seek care immediately if:   · You have trouble breathing or feel short of breath  · You cough up blood, or you see blood in your mucus  · You faint or feel weak or dizzy  · You have chest pain when you cough or take a deep breath  · You have new wheezing  Contact your healthcare provider if:   · You have a fever  · Your cough lasts longer than 4 weeks  · Your symptoms do not improve with treatment  · You have questions or concerns about your condition or care  Treatment:  An acute cough usually goes away on its own  Ask your healthcare provider about medicines you can take to decrease your cough  You may need medicine to stop the cough, decrease swelling in your airways, or help open your airways  Medicine may also be given to help you cough up mucus  If you have an infection caused by bacteria, you may need antibiotics  Manage your symptoms:   · Do not smoke and stay away from others who smoke  Nicotine and other chemicals in cigarettes and cigars can cause lung damage and make your cough worse  Ask your healthcare provider for information if you currently smoke and need help to quit  E-cigarettes or smokeless tobacco still contain nicotine  Talk to your healthcare provider before you use these products  · Drink extra liquids as directed  Liquids will help thin and loosen mucus so you can cough it up  Liquids will also help prevent dehydration  Examples of good liquids to drink include water, fruit juice, and broth  Do not drink liquids that contain caffeine  Caffeine can increase your risk for dehydration  Ask your healthcare provider how much liquid to drink each day  · Rest as directed  Do not do activities that make your cough worse, such as exercise  · Use a humidifier or vaporizer    Use a cool mist humidifier or a vaporizer to increase air moisture in your home  This may make it easier for you to breathe and help decrease your cough  · Eat 2 to 5 mL of honey 2 times each day  Honey can help thin mucus and decrease your cough  · Use cough drops or lozenges  These can help decrease throat irritation and your cough  Follow up with your healthcare provider as directed:  Write down your questions so you remember to ask them during your visits  © 2017 [facility] Information is for End User's use only and may not be sold, redistributed or otherwise used for commercial purposes  All illustrations and images included in CareNotes® are the copyrighted property of [employer] D [employer] M [employer]  or [employer]  The above information is an  only  It is not intended as medical advice for individual conditions or treatments  Talk to your doctor, nurse or pharmacist before following any medical regimen to see if it is safe and effective for you

## 2018-05-26 PROBLEM — J30.9 ALLERGIC RHINITIS: Status: ACTIVE | Noted: 2017-03-07

## 2018-05-26 PROBLEM — G44.209 TENSION TYPE HEADACHE: Status: ACTIVE | Noted: 2017-01-05

## 2018-05-26 PROBLEM — M54.12 CERVICAL RADICULOPATHY: Status: ACTIVE | Noted: 2017-04-06

## 2018-05-26 PROBLEM — M25.312 SHOULDER INSTABILITY, LEFT: Status: ACTIVE | Noted: 2017-06-21

## 2018-05-26 NOTE — PROGRESS NOTES
Assessment/Plan:   Diagnoses and all orders for this visit:    Cough  Comments:  rx zpk  Has leftover rx for prom w cod if needed  follow-up if sxs persist abx  Orders:  -     azithromycin (ZITHROMAX) 250 mg tablet; Take 2 tablets today then 1 tablet daily x 4 days        Subjective:      Patient ID: Adriana Hughes is a 57 y o  female  Chief Complaint   Patient presents with    Cough     pt has pain like nails scratching her down front of chest and left side back hurts       58 yo pt in w c/o cough and atypical cp--feels like scratching down front of chest  No shortness of breath, diaphoresis , rash, fever or n/v  No inc edema , occ palpitation  The following portions of the patient's history were reviewed and updated as appropriate: allergies, current medications, past family history, past medical history, past social history, past surgical history and problem list      Review of Systems   Constitutional: Positive for fatigue  Negative for fever  HENT: Positive for congestion and postnasal drip  Respiratory: Positive for cough  Negative for shortness of breath  Cardiovascular:        Atypical cp   Gastrointestinal: Negative for abdominal pain  Genitourinary: Negative  Musculoskeletal: Positive for arthralgias and myalgias  Allergic/Immunologic: Positive for environmental allergies  Neurological: Negative  Psychiatric/Behavioral: Positive for sleep disturbance  Objective:    /78 (BP Location: Left arm, Patient Position: Sitting, Cuff Size: Standard)   Pulse 88   Temp 98 1 °F (36 7 °C)   Resp 14   Ht 5' 3" (1 6 m)   Wt 73 1 kg (161 lb 3 2 oz)   SpO2 98%   BMI 28 56 kg/m²        Physical Exam   Constitutional: She is oriented to person, place, and time  She appears well-developed and well-nourished  No distress  HENT:   Head: Normocephalic and atraumatic  Nose: Nose normal    Mouth/Throat: No oropharyngeal exudate     Eyes: Conjunctivae are normal    Neck: Neck supple  Cardiovascular: Normal rate and regular rhythm  Pulmonary/Chest: Effort normal and breath sounds normal  She has no wheezes  She exhibits tenderness  Abdominal: Soft  Bowel sounds are normal  There is no tenderness  Musculoskeletal: Normal range of motion  Neurological: She is alert and oriented to person, place, and time  No cranial nerve deficit  Skin: Skin is warm and dry  No rash noted  Psychiatric: She has a normal mood and affect  Nursing note and vitals reviewed            Davon Vallejo MD

## 2018-06-12 ENCOUNTER — TELEPHONE (OUTPATIENT)
Dept: FAMILY MEDICINE CLINIC | Facility: CLINIC | Age: 63
End: 2018-06-12

## 2018-06-12 DIAGNOSIS — R10.9 ABDOMINAL PAIN, UNSPECIFIED ABDOMINAL LOCATION: ICD-10-CM

## 2018-06-12 DIAGNOSIS — R53.82 CHRONIC FATIGUE: Primary | ICD-10-CM

## 2018-06-12 DIAGNOSIS — E78.5 HYPERLIPIDEMIA, UNSPECIFIED HYPERLIPIDEMIA TYPE: ICD-10-CM

## 2018-06-12 DIAGNOSIS — R73.09 ABNORMAL GLUCOSE: ICD-10-CM

## 2018-06-12 DIAGNOSIS — M25.50 ARTHRALGIA, UNSPECIFIED JOINT: ICD-10-CM

## 2018-06-12 DIAGNOSIS — M79.10 MYALGIA: ICD-10-CM

## 2018-06-12 NOTE — TELEPHONE ENCOUNTER
Dr Marguarite Mohs:    Patient called asking if you could write her an order for BW  Please contact her when ready for pickup

## 2018-06-14 ENCOUNTER — OFFICE VISIT (OUTPATIENT)
Dept: NEUROLOGY | Facility: CLINIC | Age: 63
End: 2018-06-14
Payer: COMMERCIAL

## 2018-06-14 ENCOUNTER — CLINICAL SUPPORT (OUTPATIENT)
Dept: FAMILY MEDICINE CLINIC | Facility: CLINIC | Age: 63
End: 2018-06-14

## 2018-06-14 VITALS
SYSTOLIC BLOOD PRESSURE: 100 MMHG | HEIGHT: 63 IN | DIASTOLIC BLOOD PRESSURE: 64 MMHG | HEART RATE: 82 BPM | WEIGHT: 160 LBS | BODY MASS INDEX: 28.35 KG/M2

## 2018-06-14 DIAGNOSIS — Z87.898 H/O HEADACHE: Primary | ICD-10-CM

## 2018-06-14 DIAGNOSIS — Z02.1 PRE-EMPLOYMENT DRUG SCREENING: Primary | ICD-10-CM

## 2018-06-14 DIAGNOSIS — J44.1 CHRONIC OBSTRUCTIVE PULMONARY DISEASE WITH ACUTE EXACERBATION (HCC): Primary | ICD-10-CM

## 2018-06-14 DIAGNOSIS — M54.2 NECK PAIN: ICD-10-CM

## 2018-06-14 DIAGNOSIS — M50.90 CERVICAL DISC DISEASE: ICD-10-CM

## 2018-06-14 PROCEDURE — 99213 OFFICE O/P EST LOW 20 MIN: CPT | Performed by: PSYCHIATRY & NEUROLOGY

## 2018-06-14 RX ORDER — FLUTICASONE FUROATE AND VILANTEROL 200; 25 UG/1; UG/1
1 POWDER RESPIRATORY (INHALATION) DAILY
Qty: 1 INHALER | Refills: 5 | Status: SHIPPED | OUTPATIENT
Start: 2018-06-14 | End: 2018-06-27 | Stop reason: SDUPTHER

## 2018-06-14 RX ORDER — FLUTICASONE FUROATE AND VILANTEROL 200; 25 UG/1; UG/1
1 POWDER RESPIRATORY (INHALATION) DAILY
COMMUNITY
End: 2018-06-14 | Stop reason: SDUPTHER

## 2018-06-14 NOTE — PROGRESS NOTES
Return NeuroOutpatient Note        Ivett Ortiz  [id number]  20 y o   1955       Dizziness        History obtained from: Patient     HPI/Subjective:    Patient returns as follow up  Per my previous history, on Dec 6th, she had presented to hospital after headache for a day and noted right facial droop  She had negative MRI and CTA  She was dx as having complicated migraine  There was a possibility of TIA as well  Her LDL was elevated to 182 and she was started on lipitor  She's currently on 20mg daily  She is still in a lot of stress  Still hasn't been able to find job that she deserves  She had EMG few years ago and it was normal  Patient finally had cervical spine MRI in Nov 2017 and it showed straightening of normal cervical lordosis  Multilevel cervical spondylitic degenerative change with annular bulging and uncinate joint hypertrophic degenerative change resulting in mild to moderate canal stenosis and foraminal narrowing  Moderate bilateral foraminal narrowing at the C4-5 and C5-6 levels  Patient says her paresthesia is actually better  At last visit she had reported return of headaches  At last visit she was placed on verapamil  She says her headaches have resolved  She was placed on flexeril prn for neck pain and she does well with it     She has not had time to go for PT for neck therapy          Past Medical History:   Diagnosis Date    Abnormal glucose     last assessed 2/25/16    Arthritis     Asthma     w/ exacerbation; last assessed 5/14/15    Atypical nevus     last assessed 1/18/17    Breast lump     last assessed 3/6/14    Cataract     Cervical adenopathy     last assessed  2/7/17    Dizziness     Dyslipidemia     last assessed 5/22/17    Facial droop     last assessed  12/5/16    Fibromyalgia     Flu 01/20/2018    Genital herpes     resolved 9/1/16    Herpes zoster     last assessed 5/20/16    History of shingles     may 2016    History of stomach ulcers     Hx of abnormal mammogram     last assessed  3/5/14    Hyperlipidemia     Myalgia     last assessed  11/21/12    Myositis     11/21/12    Skin neoplasm     of the lower limb, including hip; onset 1/23/12; last assessed  1/23/12    Sleep apnea      Social History     Social History    Marital status:      Spouse name: N/A    Number of children: N/A    Years of education: N/A     Occupational History    Not on file       Social History Main Topics    Smoking status: Never Smoker    Smokeless tobacco: Never Used    Alcohol use No      Comment: social alcohol use as per [employer]    Drug use: No    Sexual activity: Not on file     Other Topics Concern    Not on file     Social History Narrative    Daily caffinated coffee consumption    Exercise habits- walking sometimes 1x per day- last impression 5/22/17- Michelle Bradley    Good sleep hygiene    Pet - fish     Family History   Problem Relation Age of Onset    Hypertension Mother     Alzheimer's disease Mother     Arthritis Mother     Hypertension Father     Arthritis Father     Heart attack Father     Kidney disease Brother         stone    Diabetes Brother     Diabetes Daughter     Breast cancer Sister     Skin cancer Sister     Down syndrome Son      Allergies   Allergen Reactions    Latex Rash     Current Outpatient Prescriptions on File Prior to Visit   Medication Sig Dispense Refill    albuterol (2 5 mg/3 mL) 0 083 % nebulizer solution Take 3 mL by nebulization every 6 (six) hours as needed for wheezing 75 mL 0    atorvastatin (LIPITOR) 10 mg tablet Take 1 tablet by mouth daily      benzonatate (TESSALON PERLES) 100 mg capsule take 1 capsule by mouth every 8 hours if needed for cough  0    cyclobenzaprine (FLEXERIL) 10 mg tablet Take 1 tablet (10 mg total) by mouth 3 (three) times a day as needed for muscle spasms 90 tablet 3    pantoprazole (PROTONIX) 40 mg tablet Take 40 mg by mouth daily      verapamil (VERELAN PM) 120 MG 24 hr capsule Take 1 capsule (120 mg total) by mouth daily at bedtime 30 capsule 3    albuterol (VENTOLIN HFA) 90 mcg/act inhaler Inhale 2 puffs every 4 (four) hours as needed for wheezing 1 Inhaler 5    fluticasone-salmeterol (ADVAIR DISKUS) 250-50 mcg/dose inhaler Inhale      promethazine-codeine (PHENERGAN WITH CODEINE) 6 25-10 mg/5 mL syrup Take 5 mL to 10 mL at bedtime for cough as needed 120 mL 0     No current facility-administered medications on file prior to visit  Review of Systems   Refer to positive review of systems in HPI     Constitutional- No fever  Eyes- No visual change  ENT- Hearing normal  CV- No chest pain  Resp- No Shortness of breath  GI- No diarrhea  - Bladder normal  MS- No Arthritis   Skin- No rash  Psych- No depression  Endo- No DM  Heme- No nodes    Vitals:    06/14/18 1428   BP: 100/64   BP Location: Left arm   Patient Position: Sitting   Pulse: 82   Weight: 72 6 kg (160 lb)   Height: 5' 3" (1 6 m)       PHYSICAL EXAM:  Appearance: No Acute Distress  Ophthalmoscopic: Disc Flat, Normal fundus  Mental status:  Orientation: Awake, Alert, and Orientedx3  Memory: Registation 3/3 Recall 3/3  Attention: normal  Knowledge: good  Language: No aphasia  Speech: No dysarthria  Cranial Nerves:  2 No Visual Defect on Confrontation, Pupils round, equal, reactive to light  3,4,6 Extraocular Movements Intact, no nystagmus  5 Facial Sensation Intact  7 right facial asymmetry is chronic   8 Intact hearing  9,10 Palate symmetric, normal gag  11 Good shoulder shrug  12 Tongue Midline  Gait: Stable  Coordination: No ataxia with finger to nose testing, and heel to shin  Sensory: Intact, Symmetric to pinprick, light touch, vibration, and joint position  Muscle Tone: Normal              Muscle exam:  Arm Right Left Leg Right Left   Deltoid 5/5 5/5 Iliopsoas 5/5 5/5   Biceps 5/5 5/5 Quads 5/5 5/5   Triceps 5/5 5/5 Hamstrings 5/5 5/5   Wrist Extension 5/5 5/5 Ankle Dorsi Flexion 5/5 5/5   Wrist Flexion 5/5 5/5 Ankle Plantar Flexion 5/5 5/5   Interossei 5/5 5/5 Ankle Eversion 5/5 5/5   APB 5/5 5/5 Ankle Inversion 5/5 5/5       Reflexes   RJ BJ TJ KJ AJ Plantars Mcnulty's   Right 2+ 2+ 2+ 2+ 2+ Downgoing Not present   Left 2+ 2+ 2+ 2+ 2+ Downgoing Not present     Personal review of  Labs:                  Diagnoses and all orders for this visit:      1  H/O headache     2  Cervical disc disease     3  Neck pain         Patient has now resolution of headaches  Resume flexeril prn  She says she may loose insurance so may not go for PT  Addressed all of her social concerns  Counseling Documentation:  The patient and/or patient's family were  counseled regarding diagnostic results  Instructions for management,risk factor reductions,prognosis of disease were discussed  Patient and family were educated regarding impressions,risks and benefits of treatment options,importance of compliance with treatment        Total time of encounter:  30 min  More than 50% of the time was used in counseling and/or coordination of care  Extent of counseling and/or coordination of care        MD [facility]  [address] [place] [address],[address]  [address] [address]  [phone]

## 2018-06-14 NOTE — PROGRESS NOTES
PT HERE FOR ALISHA LOZADA UDS, PT COULD ONLY PROVIDE ABOUT 3 DROPS OF URINE WASN'T ENOUGH TO PERFORM TEST

## 2018-06-15 ENCOUNTER — CLINICAL SUPPORT (OUTPATIENT)
Dept: FAMILY MEDICINE CLINIC | Facility: CLINIC | Age: 63
End: 2018-06-15

## 2018-06-15 DIAGNOSIS — Z02.1 PRE-EMPLOYMENT DRUG SCREENING: Primary | ICD-10-CM

## 2018-06-16 LAB — HBA1C MFR BLD HPLC: 6.2 %

## 2018-06-20 ENCOUNTER — TELEPHONE (OUTPATIENT)
Dept: FAMILY MEDICINE CLINIC | Facility: CLINIC | Age: 63
End: 2018-06-20

## 2018-06-20 LAB
ALBUMIN SERPL-MCNC: 4.4 G/DL (ref 3.6–4.8)
ALBUMIN/GLOB SERPL: 1.5 {RATIO} (ref 1.2–2.2)
ALP SERPL-CCNC: 77 IU/L (ref 39–117)
ALT SERPL-CCNC: 34 IU/L (ref 0–32)
AMYLASE SERPL-CCNC: 68 U/L (ref 31–124)
ANA NUCLEOLAR TITR SER: ABNORMAL {TITER}
ANA TITR SER IF: POSITIVE {TITER}
AST SERPL-CCNC: 23 IU/L (ref 0–40)
B BURGDOR IGG+IGM SER-ACNC: <0.91 ISR (ref 0–0.9)
B MICROTI IGG TITR SER: NORMAL {TITER}
B MICROTI IGM TITR SER: NORMAL {TITER}
BASOPHILS # BLD AUTO: 0.1 X10E3/UL (ref 0–0.2)
BASOPHILS NFR BLD AUTO: 1 %
BILIRUB SERPL-MCNC: 0.2 MG/DL (ref 0–1.2)
BUN SERPL-MCNC: 18 MG/DL (ref 8–27)
BUN/CREAT SERPL: 19 (ref 12–28)
CALCIUM SERPL-MCNC: 10.1 MG/DL (ref 8.7–10.3)
CHLORIDE SERPL-SCNC: 101 MMOL/L (ref 96–106)
CHOLEST SERPL-MCNC: 234 MG/DL (ref 100–199)
CHOLEST/HDLC SERPL: 5.1 RATIO (ref 0–4.4)
CK SERPL-CCNC: 120 U/L (ref 24–173)
CO2 SERPL-SCNC: 24 MMOL/L (ref 20–29)
CREAT SERPL-MCNC: 0.96 MG/DL (ref 0.57–1)
EOSINOPHIL # BLD AUTO: 0.3 X10E3/UL (ref 0–0.4)
EOSINOPHIL NFR BLD AUTO: 4 %
ERYTHROCYTE [DISTWIDTH] IN BLOOD BY AUTOMATED COUNT: 14 % (ref 12.3–15.4)
EST. AVERAGE GLUCOSE BLD GHB EST-MCNC: 131 MG/DL
GLOBULIN SER-MCNC: 2.9 G/DL (ref 1.5–4.5)
GLUCOSE SERPL-MCNC: 119 MG/DL (ref 65–99)
HBA1C MFR BLD: 6.2 % (ref 4.8–5.6)
HCT VFR BLD AUTO: 41.4 % (ref 34–46.6)
HDLC SERPL-MCNC: 46 MG/DL
HGB BLD-MCNC: 13.9 G/DL (ref 11.1–15.9)
IMM GRANULOCYTES # BLD: 0 X10E3/UL (ref 0–0.1)
IMM GRANULOCYTES NFR BLD: 0 %
LDLC SERPL CALC-MCNC: 149 MG/DL (ref 0–99)
LIPASE SERPL-CCNC: 28 U/L (ref 14–72)
LYMPHOCYTES # BLD AUTO: 2.4 X10E3/UL (ref 0.7–3.1)
LYMPHOCYTES NFR BLD AUTO: 29 %
MCH RBC QN AUTO: 31 PG (ref 26.6–33)
MCHC RBC AUTO-ENTMCNC: 33.6 G/DL (ref 31.5–35.7)
MCV RBC AUTO: 92 FL (ref 79–97)
MICRODELETION SYND BLD/T FISH: NORMAL
MONOCYTES # BLD AUTO: 0.9 X10E3/UL (ref 0.1–0.9)
MONOCYTES NFR BLD AUTO: 11 %
NEUTROPHILS # BLD AUTO: 4.7 X10E3/UL (ref 1.4–7)
NEUTROPHILS NFR BLD AUTO: 55 %
PLATELET # BLD AUTO: 311 X10E3/UL (ref 150–379)
POTASSIUM SERPL-SCNC: 4.4 MMOL/L (ref 3.5–5.2)
PROT SERPL-MCNC: 7.3 G/DL (ref 6–8.5)
RBC # BLD AUTO: 4.49 X10E6/UL (ref 3.77–5.28)
SL AMB EGFR AFRICAN AMERICAN: 72 ML/MIN/1.73
SL AMB EGFR NON AFRICAN AMERICAN: 63 ML/MIN/1.73
SL AMB NOTE:: ABNORMAL
SL AMB VLDL CHOLESTEROL CALC: 39 MG/DL (ref 5–40)
SODIUM SERPL-SCNC: 140 MMOL/L (ref 134–144)
TRIGL SERPL-MCNC: 194 MG/DL (ref 0–149)
TSH SERPL DL<=0.005 MIU/L-ACNC: 3.11 UIU/ML (ref 0.45–4.5)
WBC # BLD AUTO: 8.4 X10E3/UL (ref 3.4–10.8)

## 2018-06-20 NOTE — TELEPHONE ENCOUNTER
Dr Re Kaiser    Patient is calling for results of labs done this past Saturday at [facility]

## 2018-06-21 ENCOUNTER — TELEPHONE (OUTPATIENT)
Dept: FAMILY MEDICINE CLINIC | Facility: CLINIC | Age: 63
End: 2018-06-21

## 2018-06-26 DIAGNOSIS — E78.5 HYPERLIPIDEMIA, UNSPECIFIED HYPERLIPIDEMIA TYPE: Primary | ICD-10-CM

## 2018-06-26 RX ORDER — ATORVASTATIN CALCIUM 10 MG/1
TABLET, FILM COATED ORAL
Qty: 30 TABLET | Refills: 3 | Status: SHIPPED | OUTPATIENT
Start: 2018-06-26 | End: 2018-12-14 | Stop reason: SDUPTHER

## 2018-06-27 ENCOUNTER — OFFICE VISIT (OUTPATIENT)
Dept: FAMILY MEDICINE CLINIC | Facility: CLINIC | Age: 63
End: 2018-06-27
Payer: COMMERCIAL

## 2018-06-27 VITALS
TEMPERATURE: 96.7 F | SYSTOLIC BLOOD PRESSURE: 112 MMHG | WEIGHT: 166 LBS | HEART RATE: 78 BPM | DIASTOLIC BLOOD PRESSURE: 70 MMHG | BODY MASS INDEX: 29.41 KG/M2 | RESPIRATION RATE: 14 BRPM | HEIGHT: 63 IN

## 2018-06-27 DIAGNOSIS — E78.5 HYPERLIPIDEMIA, UNSPECIFIED HYPERLIPIDEMIA TYPE: ICD-10-CM

## 2018-06-27 DIAGNOSIS — E03.9 HYPOTHYROIDISM, UNSPECIFIED TYPE: ICD-10-CM

## 2018-06-27 DIAGNOSIS — R73.03 PREDIABETES: ICD-10-CM

## 2018-06-27 DIAGNOSIS — R30.0 DYSURIA: ICD-10-CM

## 2018-06-27 DIAGNOSIS — J44.1 CHRONIC OBSTRUCTIVE PULMONARY DISEASE WITH ACUTE EXACERBATION (HCC): ICD-10-CM

## 2018-06-27 DIAGNOSIS — R13.10 DYSPHAGIA, UNSPECIFIED TYPE: ICD-10-CM

## 2018-06-27 DIAGNOSIS — E07.89 THYROID FULLNESS: Primary | ICD-10-CM

## 2018-06-27 LAB
SL AMB  POCT GLUCOSE, UA: ABNORMAL
SL AMB LEUKOCYTE ESTERASE,UA: 25
SL AMB POCT BILIRUBIN,UA: ABNORMAL
SL AMB POCT BLOOD,UA: ABNORMAL
SL AMB POCT CLARITY,UA: CLEAR
SL AMB POCT COLOR,UA: YELLOW
SL AMB POCT KETONES,UA: ABNORMAL
SL AMB POCT NITRITE,UA: ABNORMAL
SL AMB POCT PH,UA: 7
SL AMB POCT SPECIFIC GRAVITY,UA: 1
SL AMB POCT URINE PROTEIN: ABNORMAL
SL AMB POCT UROBILINOGEN: ABNORMAL

## 2018-06-27 PROCEDURE — 99214 OFFICE O/P EST MOD 30 MIN: CPT | Performed by: FAMILY MEDICINE

## 2018-06-27 PROCEDURE — 81003 URINALYSIS AUTO W/O SCOPE: CPT | Performed by: FAMILY MEDICINE

## 2018-06-27 PROCEDURE — 3008F BODY MASS INDEX DOCD: CPT | Performed by: FAMILY MEDICINE

## 2018-06-27 RX ORDER — CIPROFLOXACIN 250 MG/1
250 TABLET, FILM COATED ORAL EVERY 12 HOURS SCHEDULED
Qty: 14 TABLET | Refills: 0 | Status: SHIPPED | OUTPATIENT
Start: 2018-06-27 | End: 2018-07-04

## 2018-06-27 RX ORDER — FLUTICASONE FUROATE AND VILANTEROL 200; 25 UG/1; UG/1
1 POWDER RESPIRATORY (INHALATION) DAILY
Qty: 3 INHALER | Refills: 0 | Status: SHIPPED | OUTPATIENT
Start: 2018-06-27 | End: 2018-06-28 | Stop reason: SDUPTHER

## 2018-06-27 RX ORDER — LEVOTHYROXINE SODIUM 0.03 MG/1
25 TABLET ORAL DAILY
Qty: 90 TABLET | Refills: 1 | Status: SHIPPED | OUTPATIENT
Start: 2018-06-27 | End: 2019-04-09 | Stop reason: SDUPTHER

## 2018-06-27 RX ORDER — COLESEVELAM 180 1/1
1875 TABLET ORAL 2 TIMES DAILY WITH MEALS
Qty: 180 TABLET | Refills: 3 | Status: SHIPPED | OUTPATIENT
Start: 2018-06-27 | End: 2018-10-22

## 2018-06-27 RX ORDER — METFORMIN HYDROCHLORIDE 500 MG/1
500 TABLET, EXTENDED RELEASE ORAL
Qty: 90 TABLET | Refills: 1 | Status: SHIPPED | OUTPATIENT
Start: 2018-06-27 | End: 2019-01-11 | Stop reason: SDUPTHER

## 2018-06-27 RX ORDER — CIPROFLOXACIN 250 MG/1
500 TABLET, FILM COATED ORAL EVERY 12 HOURS SCHEDULED
Qty: 14 TABLET | Refills: 0 | Status: SHIPPED | OUTPATIENT
Start: 2018-06-27 | End: 2018-06-27 | Stop reason: SDUPTHER

## 2018-06-28 DIAGNOSIS — J44.1 CHRONIC OBSTRUCTIVE PULMONARY DISEASE WITH ACUTE EXACERBATION (HCC): ICD-10-CM

## 2018-06-28 RX ORDER — FLUTICASONE FUROATE AND VILANTEROL 200; 25 UG/1; UG/1
1 POWDER RESPIRATORY (INHALATION) DAILY
Qty: 3 INHALER | Refills: 0 | Status: SHIPPED | OUTPATIENT
Start: 2018-06-28 | End: 2020-06-10 | Stop reason: SDUPTHER

## 2018-06-29 NOTE — PROGRESS NOTES
Assessment/Plan:     Diagnoses and all orders for this visit:    Thyroid fullness  Comments:  +GISELA  TSH=3 110  Trouble swallowing, fatigue, joint pains  Check U/S thyroid  Orders:  -     US thyroid; Future  -     levothyroxine 25 mcg tablet; Take 1 tablet (25 mcg total) by mouth daily    Hypothyroidism, unspecified type  Comments:  clinical sxs-start levothyroxine 25mcg daily  recheck TSH 3 mths, check u/s thyroid  Orders:  -     US thyroid; Future  -     levothyroxine 25 mcg tablet; Take 1 tablet (25 mcg total) by mouth daily    Dysphagia, unspecified type  Comments:  as above  will also check esophagram  t/c GI eval  Possible ENT/endocrin eval pending thyroid u/s  Orders:  -     FL barium swallow; Future    Dysuria  -     POCT urine dip auto non-scope  -     Urine culture; Future  -     Urine culture  -     Discontinue: ciprofloxacin (CIPRO) 250 mg tablet; Take 2 tablets (500 mg total) by mouth every 12 (twelve) hours for 7 days  -     ciprofloxacin (CIPRO) 250 mg tablet; Take 1 tablet (250 mg total) by mouth every 12 (twelve) hours for 7 days    Hyperlipidemia, unspecified hyperlipidemia type  Comments:  optimize thryoid fxn, cont statin and low chol diet, inc  exercise as tolerated  Add Welchol to statin use  Orders:  -     colesevelam (WELCHOL) 625 mg tablet; Take 3 tablets (1,875 mg total) by mouth 2 (two) times a day with meals  -     levothyroxine 25 mcg tablet; Take 1 tablet (25 mcg total) by mouth daily    Prediabetes  Comments:  XbQ0O=8 2%  Rx metformin  Follow ADA diet, inc exercise as tolerated  Watch response as well to [employer]  Orders:  -     metFORMIN (GLUCOPHAGE-XR) 500 mg 24 hr tablet; Take 1 tablet (500 mg total) by mouth daily with dinner    Chronic obstructive pulmonary disease with acute exacerbation ([facility] [address] )  Comments:  stable  Inh refilled  Orders:  -     Discontinue: fluticasone-vilanterol (BREO ELLIPTA) 200-25 MCG/INH inhaler; Inhale 1 puff daily Rinse mouth after use  Subjective:      Patient ID: Shani Phan is a 57 y o  female  Chief Complaint   Patient presents with    Results    Medication Refill     refill for 3 months(Breo)    Fatigue    Asthma    Difficulty Swallowing    Hyperlipidemia       58 yo pt in for follow-up   Multiple concerns  Recent labs-ClF6S=0 2%, ZNW=477,ALT=34  CBC wnl  +Elevated lipids  TSH=3 110  +GISELA-titer 1:320-Nucleolar pattern  Tick born titers negative  Feels fatigued, +joint and muscle pains,no rash or neck stiffness  +Trouble swallowing, no drooling, occ nausea, no vomiting  Ernie CP or sig abd pain  Occ dysuria--UA-+leukos -otherwise negative  Denies melena or brbpr  No shortness of breath  Needs med refills  The following portions of the patient's history were reviewed and updated as appropriate: allergies, current medications, past family history, past medical history, past social history, past surgical history and problem list      Review of Systems   Constitutional: Positive for fatigue  Negative for fever  Eyes:        Wears glasses   Respiratory: Positive for wheezing  Cardiovascular: Negative for chest pain  Gastrointestinal: Negative for blood in stool  Endocrine:        Pre-diabetes  Borderline hypothyroidism   Genitourinary: Positive for dysuria  Musculoskeletal: Positive for arthralgias and myalgias  Negative for neck stiffness  Skin: Negative for rash  Neurological: Positive for headaches  Psychiatric/Behavioral: Positive for sleep disturbance  The patient is nervous/anxious  Objective:    /70 (BP Location: Left arm, Patient Position: Sitting)   Pulse 78   Temp (!) 96 7 °F (35 9 °C)   Resp 14   Ht 5' 3" (1 6 m)   Wt 75 3 kg (166 lb)   BMI 29 41 kg/m²        Physical Exam   Constitutional: She is oriented to person, place, and time  Overweight, looks tired   HENT:   Head: Normocephalic and atraumatic  Mouth/Throat: No oropharyngeal exudate     Eyes: Conjunctivae and EOM are normal  Pupils are equal, round, and reactive to light  Neck: Neck supple  Thyromegaly present  Cardiovascular: Normal rate and regular rhythm  Pulmonary/Chest: Effort normal and breath sounds normal    Abdominal: Soft  Bowel sounds are normal  There is no tenderness  Musculoskeletal: She exhibits tenderness  Neurological: She is alert and oriented to person, place, and time  No cranial nerve deficit  Skin: Skin is warm  Psychiatric: She has a normal mood and affect  Nursing note and vitals reviewed  Labs;  Labs in chart were reviewed        Karina Bedoya MD

## 2018-07-02 LAB
BACTERIA UR CULT: ABNORMAL
Lab: ABNORMAL
SL AMB ANTIMICROBIAL SUSCEPTIBILITY: ABNORMAL

## 2018-07-23 DIAGNOSIS — M54.12 CERVICAL RADICULOPATHY: ICD-10-CM

## 2018-07-23 DIAGNOSIS — H81.10 BENIGN PAROXYSMAL POSITIONAL VERTIGO, UNSPECIFIED LATERALITY: ICD-10-CM

## 2018-07-23 DIAGNOSIS — S16.1XXD STRAIN OF NECK MUSCLE, SUBSEQUENT ENCOUNTER: ICD-10-CM

## 2018-07-23 DIAGNOSIS — R51.9 DAILY HEADACHE: ICD-10-CM

## 2018-07-23 RX ORDER — CYCLOBENZAPRINE HCL 10 MG
TABLET ORAL
Qty: 90 TABLET | Refills: 3 | Status: SHIPPED | OUTPATIENT
Start: 2018-07-23 | End: 2018-10-22 | Stop reason: ALTCHOICE

## 2018-07-23 RX ORDER — VERAPAMIL HYDROCHLORIDE 120 MG/1
CAPSULE, EXTENDED RELEASE ORAL
Qty: 30 CAPSULE | Refills: 3 | Status: SHIPPED | OUTPATIENT
Start: 2018-07-23 | End: 2018-10-22 | Stop reason: ALTCHOICE

## 2018-10-15 ENCOUNTER — OFFICE VISIT (OUTPATIENT)
Dept: FAMILY MEDICINE CLINIC | Facility: CLINIC | Age: 63
End: 2018-10-15
Payer: MEDICARE

## 2018-10-15 VITALS
RESPIRATION RATE: 16 BRPM | WEIGHT: 152 LBS | BODY MASS INDEX: 26.93 KG/M2 | HEART RATE: 80 BPM | DIASTOLIC BLOOD PRESSURE: 80 MMHG | TEMPERATURE: 97.4 F | SYSTOLIC BLOOD PRESSURE: 120 MMHG | HEIGHT: 63 IN

## 2018-10-15 DIAGNOSIS — R10.12 LEFT UPPER QUADRANT PAIN: Primary | ICD-10-CM

## 2018-10-15 PROCEDURE — 99213 OFFICE O/P EST LOW 20 MIN: CPT | Performed by: FAMILY MEDICINE

## 2018-10-15 NOTE — PROGRESS NOTES
Keyanna Luciano 1955 female MRN: [id number]    [facility] PRACTICE ACUTE OFFICE VISIT  [facility] - [address]      ASSESSMENT/PLAN  Keyanna Luciano is a 57 y o  female presents to the office for    1  Left upper quadrant pain;  - Patient is a doctor herself from   She states that "something isn't right"  Minimal touch of the left UQ,and lower back causes pain  No signs of rash at this time, resembling shingles  (+) no hernia, felt, patient would like an US to evaluate and to see what is causing her pain  CBC/CMP sent for evaluation   (+) constipation-> however to "sand BM" today; awaiting results, to eval further   - Avoid NSAID, given the possibility of gastritis  Continue priolosec  - Patient was Valorie Christensen go to the ED, given that her pain was severe and wanted STAT answers  - US abdomen complete; Future  - Comprehensive metabolic panel; Future       Disposition: RTC w/ her pcp in 1 week  Future Appointments  Date Time Provider Sridhar Bryan   10/22/2018 2:15 PM Rae Levin MD [facility]          SUBJECTIVE  CC: Abdominal Pain      HPI:  Keyanna Luciano is a 57 y o  female who presents for an acute appoinment  10/10 LUQ pain, with left flank pain x 6 days with worsening symptoms  Pain is constant stabbing pain  Light tough even causes her pain  She believe that she has swelling of the left abdomen in compared to the right  Hx of hysterectomy  Headaces  Constipation (hx of constipation as a child)-> went to the bathroom today and was like " sand" x 2      Review of Systems   Constitutional: Negative for activity change, appetite change, chills, fatigue and fever  HENT: Negative for congestion  Eyes: Negative for visual disturbance  Respiratory: Negative for cough, chest tightness and shortness of breath  Cardiovascular: Negative for chest pain and leg swelling  Gastrointestinal: Positive for abdominal pain and constipation   Negative for abdominal distention, diarrhea, nausea and vomiting  Musculoskeletal: Positive for back pain  Allergic/Immunologic: Negative for environmental allergies  Neurological: Negative for dizziness, light-headedness and headaches  All other systems reviewed and are negative        Historical Information   The patient history was reviewed as follows:  Past Medical History:   Diagnosis Date    Abnormal glucose     last assessed 16    Arthritis     Asthma     w/ exacerbation; last assessed 5/14/15    Atypical nevus     last assessed 17    Breast lump     last assessed 3/6/14    Cataract     Cervical adenopathy     last assessed  17    Dizziness     Dyslipidemia     last assessed 17    Facial droop     last assessed  16    Fibromyalgia     Flu 2018    Genital herpes     resolved 16    Herpes zoster     last assessed 16    History of shingles     may 2016    History of stomach ulcers     Hx of abnormal mammogram     last assessed  3/5/14    Hyperlipidemia     Myalgia     last assessed  12    Myositis     12    Skin neoplasm     of the lower limb, including hip; onset 12; last assessed  12    Sleep apnea          Past Surgical History:   Procedure Laterality Date    ABDOMINAL SURGERY      release of adhesions    BLADDER SURGERY      mesh-lift    BREAST SURGERY      lift     SECTION      x 3    CHOLECYSTECTOMY      lap    COLONOSCOPY      COSMETIC SURGERY      tummy and breast lift    ESOPHAGOGASTRODUODENOSCOPY      GALLBLADDER SURGERY      OOPHORECTOMY Left     OTHER SURGICAL HISTORY      vocal cord surgery, scraping    NV HYSTEROSCOPY,W/ENDO BX N/A 11/3/2016    Procedure: DILATATION AND CURETTAGE (D&C) WITH HYSTEROSCOPY;  Surgeon: Tomas Cabot, MD;  Location: [facility];  Service: Gynecology    TONSILLECTOMY       Family History   Problem Relation Age of Onset    Hypertension Mother     Alzheimer's disease Mother    Jaclyn Mare Arthritis Mother     Hypertension Father     Arthritis Father     Heart attack Father     Kidney disease Brother         stone    Diabetes Brother     Diabetes Daughter     Breast cancer Sister     Skin cancer Sister     Down syndrome Son       Social History   History   Alcohol Use No     Comment: social alcohol use as per [employer]     History   Drug Use No     History   Smoking Status    Never Smoker   Smokeless Tobacco    Never Used       Medications:     Current Outpatient Prescriptions:     albuterol (2 5 mg/3 mL) 0 083 % nebulizer solution, Take 3 mL by nebulization every 6 (six) hours as needed for wheezing, Disp: 75 mL, Rfl: 0    albuterol (VENTOLIN HFA) 90 mcg/act inhaler, Inhale 2 puffs every 4 (four) hours as needed for wheezing, Disp: 1 Inhaler, Rfl: 5    atorvastatin (LIPITOR) 10 mg tablet, take 1 tablet by mouth once daily, Disp: 30 tablet, Rfl: 3    benzonatate (TESSALON PERLES) 100 mg capsule, take 1 capsule by mouth every 8 hours if needed for cough, Disp: , Rfl: 0    colesevelam (WELCHOL) 625 mg tablet, Take 3 tablets (1,875 mg total) by mouth 2 (two) times a day with meals, Disp: 180 tablet, Rfl: 3    cyclobenzaprine (FLEXERIL) 10 mg tablet, take 1 tablet by mouth three times a day if needed, Disp: 90 tablet, Rfl: 3    fluticasone-vilanterol (BREO ELLIPTA) 200-25 MCG/INH inhaler, Inhale 1 puff daily Rinse mouth after use , Disp: 3 Inhaler, Rfl: 0    levothyroxine 25 mcg tablet, Take 1 tablet (25 mcg total) by mouth daily, Disp: 90 tablet, Rfl: 1    metFORMIN (GLUCOPHAGE-XR) 500 mg 24 hr tablet, Take 1 tablet (500 mg total) by mouth daily with dinner, Disp: 90 tablet, Rfl: 1    pantoprazole (PROTONIX) 40 mg tablet, Take 40 mg by mouth daily, Disp: , Rfl:     verapamil (VERELAN PM) 120 MG 24 hr capsule, take 1 capsule by mouth at bedtime, Disp: 30 capsule, Rfl: 3    Allergies   Allergen Reactions    Latex Rash       OBJECTIVE  Vitals:   Vitals:    10/15/18 1330   BP: 120/80 BP Location: Left arm   Patient Position: Sitting   Cuff Size: Standard   Pulse: 80   Resp: 16   Temp: (!) 97 4 °F (36 3 °C)   Weight: 68 9 kg (152 lb)   Height: 5' 3" (1 6 m)         Physical Exam   Constitutional: She is oriented to person, place, and time  Vital signs are normal  She appears well-developed and well-nourished  HENT:   Head: Normocephalic and atraumatic  Right Ear: Hearing normal    Left Ear: Hearing normal    Eyes: Pupils are equal, round, and reactive to light  Conjunctivae and EOM are normal    Neck: Normal range of motion  Neck supple  Cardiovascular: Normal rate, regular rhythm, S1 normal, S2 normal, normal heart sounds and intact distal pulses  No murmur heard  Pulmonary/Chest: Effort normal and breath sounds normal  No respiratory distress  She has no wheezes  Abdominal: Soft  Bowel sounds are normal  She exhibits no distension  There is no hepatosplenomegaly, splenomegaly or hepatomegaly  There is tenderness (Light touch cause 10/10 pain unable to truely exam the patient  No swelling or brusing seen or rash)  There is no CVA tenderness  No hernia  Musculoskeletal: Normal range of motion  She exhibits no edema  Neurological: She is alert and oriented to person, place, and time  She has normal strength  Skin: Skin is warm  No rash noted  Psychiatric: She has a normal mood and affect  Her speech is normal and behavior is normal  Judgment and thought content normal    Vitals reviewed                   Senthil Perla MD,   [facility]  10/15/2018

## 2018-10-18 ENCOUNTER — HOSPITAL ENCOUNTER (OUTPATIENT)
Dept: RADIOLOGY | Facility: CLINIC | Age: 63
Discharge: HOME | End: 2018-10-18
Payer: MEDICARE

## 2018-10-18 ENCOUNTER — TRANSCRIBE ORDERS (OUTPATIENT)
Dept: LAB | Facility: CLINIC | Age: 63
End: 2018-10-18

## 2018-10-18 ENCOUNTER — LAB (OUTPATIENT)
Dept: LAB | Facility: CLINIC | Age: 63
End: 2018-10-18
Payer: MEDICARE

## 2018-10-18 DIAGNOSIS — R10.12 LEFT UPPER QUADRANT PAIN: ICD-10-CM

## 2018-10-18 LAB
ALBUMIN SERPL BCP-MCNC: 4.1 G/DL (ref 3.5–5)
ALP SERPL-CCNC: 73 U/L (ref 46–116)
ALT SERPL W P-5'-P-CCNC: 42 U/L (ref 12–78)
ANION GAP SERPL CALCULATED.3IONS-SCNC: 2 MMOL/L (ref 4–13)
AST SERPL W P-5'-P-CCNC: 17 U/L (ref 5–45)
BILIRUB SERPL-MCNC: 0.33 MG/DL (ref 0.2–1)
BUN SERPL-MCNC: 10 MG/DL (ref 5–25)
CALCIUM SERPL-MCNC: 9.8 MG/DL (ref 8.3–10.1)
CHLORIDE SERPL-SCNC: 106 MMOL/L (ref 100–108)
CO2 SERPL-SCNC: 30 MMOL/L (ref 21–32)
CREAT SERPL-MCNC: 0.97 MG/DL (ref 0.6–1.3)
GFR SERPL CREATININE-BSD FRML MDRD: 62 ML/MIN/1.73SQ M
GLUCOSE P FAST SERPL-MCNC: 124 MG/DL (ref 65–99)
POTASSIUM SERPL-SCNC: 4.5 MMOL/L (ref 3.5–5.3)
PROT SERPL-MCNC: 8.5 G/DL (ref 6.4–8.2)
SODIUM SERPL-SCNC: 138 MMOL/L (ref 136–145)

## 2018-10-18 PROCEDURE — 80053 COMPREHEN METABOLIC PANEL: CPT

## 2018-10-18 PROCEDURE — 36415 COLL VENOUS BLD VENIPUNCTURE: CPT

## 2018-10-18 PROCEDURE — 76700 US EXAM ABDOM COMPLETE: CPT

## 2018-10-18 NOTE — PROGRESS NOTES
Please advise the patient that her liver, and kidney function is normal  Her blood sugar was slightly elevated but it was 2/2 to her not fasting for it  Just be sure to follow up with her PCP if pain persist  Still pending US

## 2018-10-19 ENCOUNTER — TELEPHONE (OUTPATIENT)
Dept: FAMILY MEDICINE CLINIC | Facility: CLINIC | Age: 63
End: 2018-10-19

## 2018-10-19 NOTE — TELEPHONE ENCOUNTER
----- Message from Miguel Angel Finch MD sent at 10/18/2018  5:01 PM EDT -----  Please advise the patient that her liver, and kidney function is normal  Her blood sugar was slightly elevated but it was 2/2 to her not fasting for it  Just be sure to follow up with her PCP if pain persist  Still pending US

## 2018-10-22 ENCOUNTER — OFFICE VISIT (OUTPATIENT)
Dept: FAMILY MEDICINE CLINIC | Facility: CLINIC | Age: 63
End: 2018-10-22
Payer: MEDICARE

## 2018-10-22 VITALS
DIASTOLIC BLOOD PRESSURE: 70 MMHG | RESPIRATION RATE: 14 BRPM | BODY MASS INDEX: 27.07 KG/M2 | HEART RATE: 82 BPM | WEIGHT: 152.8 LBS | SYSTOLIC BLOOD PRESSURE: 100 MMHG | TEMPERATURE: 96.6 F | HEIGHT: 63 IN

## 2018-10-22 DIAGNOSIS — R10.32 LLQ ABDOMINAL PAIN: ICD-10-CM

## 2018-10-22 DIAGNOSIS — E78.5 HYPERLIPIDEMIA, UNSPECIFIED HYPERLIPIDEMIA TYPE: ICD-10-CM

## 2018-10-22 DIAGNOSIS — E03.9 HYPOTHYROIDISM, UNSPECIFIED TYPE: Primary | ICD-10-CM

## 2018-10-22 DIAGNOSIS — Z23 NEED FOR INFLUENZA VACCINATION: ICD-10-CM

## 2018-10-22 PROCEDURE — 99214 OFFICE O/P EST MOD 30 MIN: CPT | Performed by: FAMILY MEDICINE

## 2018-10-22 PROCEDURE — 90686 IIV4 VACC NO PRSV 0.5 ML IM: CPT

## 2018-10-22 PROCEDURE — G0008 ADMIN INFLUENZA VIRUS VAC: HCPCS

## 2018-10-22 RX ORDER — LEVOTHYROXINE SODIUM 0.03 MG/1
25 TABLET ORAL DAILY
Qty: 90 TABLET | Refills: 1 | Status: SHIPPED | OUTPATIENT
Start: 2018-10-22 | End: 2018-10-22 | Stop reason: SDUPTHER

## 2018-10-27 ENCOUNTER — HOSPITAL ENCOUNTER (OUTPATIENT)
Dept: RADIOLOGY | Facility: HOSPITAL | Age: 63
Discharge: HOME | End: 2018-10-27
Attending: FAMILY MEDICINE
Payer: MEDICARE

## 2018-10-27 DIAGNOSIS — E01.0 THYROMEGALY: ICD-10-CM

## 2018-10-27 DIAGNOSIS — E03.9 HYPOTHYROIDISM, UNSPECIFIED TYPE: ICD-10-CM

## 2018-10-27 DIAGNOSIS — R10.32 LLQ ABDOMINAL PAIN: ICD-10-CM

## 2018-10-27 PROCEDURE — 74177 CT ABD & PELVIS W/CONTRAST: CPT

## 2018-10-27 PROCEDURE — 76536 US EXAM OF HEAD AND NECK: CPT

## 2018-10-27 RX ADMIN — IOHEXOL 100 ML: 350 INJECTION, SOLUTION INTRAVENOUS at 10:50

## 2018-11-09 PROBLEM — E03.9 HYPOTHYROIDISM: Status: ACTIVE | Noted: 2018-11-09

## 2018-11-10 NOTE — PROGRESS NOTES
Assessment/Plan:   Diagnoses and all orders for this visit:    Hypothyroidism, unspecified type  Comments:  check u/s thyroid, cont l-thyroxine-adjust dose prn  Orders:  -     US thyroid; Future  -     Discontinue: levothyroxine 25 mcg tablet; Take 1 tablet (25 mcg total) by mouth daily    LLQ abdominal pain  Comments:  abd u/s unrevealing as to cause,?diverticular disease- check CT abd-t/c ref to   GI-Dr William Nicolas  Orders:  -     Cancel: CT abdomen pelvis w wo contrast; Future  -     CT abdomen pelvis w contrast; Future    Need for influenza vaccination  Comments:  given  Orders:  -     Cancel: SYRINGE/SINGLE-DOSE VIAL: influenza vaccine, 9916-2921, quadrivalent, 0 5 mL, preservative-free, for patients 3+ yr (FLUZONE)  -     SYRINGE/SINGLE-DOSE VIAL: influenza vaccine, 4592-3925, quadrivalent, 0 5 mL, preservative-free, for patients 3+ yr (FLUZONE)    Hyperlipidemia, unspecified hyperlipidemia type  Comments:  maintain thyroid fxn wnl, follow low chol diet, inc exercise as tolerated  Subjective:      Patient ID: Weston Jackson is a 70 y o  female  Chief Complaint   Patient presents with    Medication Refill    Immunizations     flu       49-year-old patient in for follow-up  Request flu shot  Having ongoing problems with intermittent abdominal pain mainly left-sided  Abdominal ultrasound unrevealing as to cause did show some questionable fatty liver and past cholecystectomy otherwise normal  Also in with complaint of thyroid-disorder -?fullness  Trouble with fatigue  Undergoing stress caring for her disabled son and 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  The following portions of the patient's history were reviewed and updated as appropriate: allergies, current medications, past family history, past medical history, past social history, past surgical history and problem list      Review of Systems   Eyes:        Wears glasses   Respiratory: Negative for shortness of breath      Gastrointestinal: Positive for abdominal pain  Negative for blood in stool  Psychiatric/Behavioral: Positive for sleep disturbance  The patient is nervous/anxious  Objective:    /70 (BP Location: Left arm, Patient Position: Sitting, Cuff Size: Large)   Pulse 82   Temp (!) 96 6 °F (35 9 °C)   Resp 14   Ht 5' 3" (1 6 m)   Wt 69 3 kg (152 lb 12 8 oz)   BMI 27 07 kg/m²        Physical Exam   Constitutional: She is oriented to person, place, and time  She appears well-developed and well-nourished  No distress  HENT:   Head: Normocephalic and atraumatic  Nose: Nose normal    Mouth/Throat: Oropharynx is clear and moist  No oropharyngeal exudate  Eyes: Pupils are equal, round, and reactive to light  Conjunctivae and EOM are normal    Neck: Normal range of motion  Neck supple  +thyroid fullness, non-tender   Cardiovascular: Normal rate, regular rhythm, normal heart sounds and intact distal pulses  Exam reveals no gallop and no friction rub  No murmur heard  Pulmonary/Chest: Effort normal and breath sounds normal    Abdominal: Soft  Bowel sounds are normal  There is tenderness (LUQ and LLQ)  There is no rebound and no guarding  Musculoskeletal: Normal range of motion  She exhibits no edema, tenderness or deformity  Neurological: She is alert and oriented to person, place, and time  No cranial nerve deficit  Skin: Skin is warm and dry  Capillary refill takes less than 2 seconds  No rash noted  Psychiatric: She has a normal mood and affect  Nursing note and vitals reviewed          Gerri Cruz MD

## 2018-12-06 ENCOUNTER — OFFICE VISIT (OUTPATIENT)
Dept: FAMILY MEDICINE CLINIC | Facility: CLINIC | Age: 63
End: 2018-12-06
Payer: MEDICARE

## 2018-12-06 VITALS
BODY MASS INDEX: 27.99 KG/M2 | TEMPERATURE: 97.1 F | SYSTOLIC BLOOD PRESSURE: 110 MMHG | WEIGHT: 158 LBS | RESPIRATION RATE: 14 BRPM | DIASTOLIC BLOOD PRESSURE: 70 MMHG | HEART RATE: 72 BPM

## 2018-12-06 DIAGNOSIS — R10.84 GENERALIZED ABDOMINAL PAIN: Primary | ICD-10-CM

## 2018-12-06 DIAGNOSIS — R11.0 NAUSEA: ICD-10-CM

## 2018-12-06 DIAGNOSIS — K21.9 GASTROESOPHAGEAL REFLUX DISEASE WITHOUT ESOPHAGITIS: ICD-10-CM

## 2018-12-06 DIAGNOSIS — R35.0 URINARY FREQUENCY: ICD-10-CM

## 2018-12-06 LAB
SL AMB  POCT GLUCOSE, UA: ABNORMAL
SL AMB LEUKOCYTE ESTERASE,UA: ABNORMAL
SL AMB POCT BILIRUBIN,UA: 1
SL AMB POCT BLOOD,UA: 50
SL AMB POCT CLARITY,UA: CLEAR
SL AMB POCT COLOR,UA: YELLOW
SL AMB POCT KETONES,UA: ABNORMAL
SL AMB POCT NITRITE,UA: ABNORMAL
SL AMB POCT PH,UA: 5
SL AMB POCT SPECIFIC GRAVITY,UA: 1.02
SL AMB POCT URINE PROTEIN: ABNORMAL
SL AMB POCT UROBILINOGEN: ABNORMAL

## 2018-12-06 PROCEDURE — 99213 OFFICE O/P EST LOW 20 MIN: CPT | Performed by: NURSE PRACTITIONER

## 2018-12-06 PROCEDURE — 81003 URINALYSIS AUTO W/O SCOPE: CPT | Performed by: NURSE PRACTITIONER

## 2018-12-06 RX ORDER — ONDANSETRON 4 MG/1
4 TABLET, ORALLY DISINTEGRATING ORAL EVERY 6 HOURS PRN
Qty: 20 TABLET | Refills: 0 | Status: SHIPPED | OUTPATIENT
Start: 2018-12-06 | End: 2019-01-21

## 2018-12-06 RX ORDER — PANTOPRAZOLE SODIUM 40 MG/1
40 TABLET, DELAYED RELEASE ORAL DAILY
Qty: 30 TABLET | Refills: 0 | Status: SHIPPED | OUTPATIENT
Start: 2018-12-06 | End: 2019-04-05 | Stop reason: SDUPTHER

## 2018-12-06 NOTE — PATIENT INSTRUCTIONS
Acute Abdominal Pain   AMBULATORY CARE:   Acute abdominal pain  usually starts suddenly and gets worse quickly  Seek care immediately if:   · You vomit blood or cannot stop vomiting  · You have blood in your bowel movement or it looks like tar  · You have bleeding from your rectum  · Your abdomen is larger than usual, more painful, and hard  · You have severe pain in your abdomen  · You stop passing gas and having bowel movements  · You feel weak, dizzy, or faint  Contact your healthcare provider if:   · You have a fever  · You have new signs and symptoms  · Your symptoms do not get better with treatment  · You have questions or concerns about your condition or care  Treatment for acute abdominal pain  may depend on the cause of your abdominal pain  You may need any of the following:  · Medicines  may be given to decrease pain, treat an infection, and manage your symptoms, such as constipation  · Surgery  may be needed to treat a serious cause of abdominal pain  Examples include surgery to treat appendicitis or a blockage in your bowels  Manage your symptoms:   · Apply heat  on your abdomen for 20 to 30 minutes every 2 hours for as many days as directed  Heat helps decrease pain and muscle spasms  · Manage your stress  Stress may cause abdominal pain  Your healthcare provider may recommend relaxation techniques and deep breathing exercises to help decrease your stress  Your healthcare provider may recommend you talk to someone about your stress or anxiety, such as a counselor or a trusted friend  Get plenty of sleep and exercise regularly  · Limit or do not drink alcohol  Alcohol can make your abdominal pain worse  Ask your healthcare provider if it is safe for you to drink alcohol  Also ask how much is safe for you to drink  · Do not smoke  Nicotine and other chemicals in cigarettes can damage your esophagus and stomach   Ask your healthcare provider for information if you currently smoke and need help to quit  E-cigarettes or smokeless tobacco still contain nicotine  Talk to your healthcare provider before you use these products  Make changes to the food you eat as directed:  Do not eat foods that cause abdominal pain or other symptoms  Eat small meals more often  · Eat more high-fiber foods if you are constipated  High-fiber foods include fruits, vegetables, whole-grain foods, and legumes  · Do not eat foods that cause gas if you have bloating  Examples include broccoli, cabbage, and cauliflower  Do not drink soda or carbonated drinks, because these may also cause gas  · Do not eat foods or drinks that contain sorbitol or fructose if you have diarrhea and bloating  Some examples are fruit juices, candy, jelly, and sugar-free gum  · Do not eat high-fat foods, such as fried foods, cheeseburgers, hot dogs, and desserts  · Limit or do not drink caffeine  Caffeine may make symptoms, such as heart burn or nausea, worse  · Drink plenty of liquids to prevent dehydration from diarrhea or vomiting  Ask your healthcare provider how much liquid to drink each day and which liquids are best for you  Follow up with your healthcare provider as directed:  Write down your questions so you remember to ask them during your visits  © 2017 [employer] Information is for End User's use only and may not be sold, redistributed or otherwise used for commercial purposes  All illustrations and images included in CareNotes® are the copyrighted property of [employer]  or [employer]  The above information is an  only  It is not intended as medical advice for individual conditions or treatments  Talk to your doctor, nurse or pharmacist before following any medical regimen to see if it is safe and effective for you

## 2018-12-06 NOTE — PROGRESS NOTES
Assessment/Plan   Abdominal pain, unclear cause  Exam unrevealing  UA negative for uti  Query viral illness     Zofran for n/v  Resume omeprazone  Adhere to simple, bland diet  Follow up with PCP in 1 week  Call back with update in 1 day  Follow up with gastro   Advised ER if symptoms progress as discussed    Subjective   Ladonna Marroquin is a 70 y o  female who presents for evaluation of abdominal pain  Onset was sudden and several hours ago  Symptoms have been waxing and waning    The pain is described as cramping, and is 7/10 in intensity  Pain is located in the suprapubic region without radiation  Aggravating factors: activity and movement  Alleviating factors: belching  Associated symptoms: belching nausea, body aches and bloating  The patient denies chills, constipation, fever, sweats and vomiting  Had Ct of abd 10/2018-see report  Was referred to [facility] by pcp  She saw her gastro in [place] instead  "He told me to take miralax  Gross  I take the pills"       The following portions of the patient's history were reviewed and updated as appropriate: allergies, current medications, past family history, past medical history, past social history, past surgical history and problem list     Review of Systems  Pertinent items are noted in HPI  CT Abdomen,Pelvis from 10/27/18 for abdominal pain    FINDINGS:     ABDOMEN     LOWER CHEST:  No clinically significant abnormality identified in the visualized lower chest      LIVER/BILIARY TREE:  Mild diffuse decreased attenuation suggesting hepatic steatosis      GALLBLADDER:  Cholecystectomy  Extrahepatic ductal dilatation which can be seen in the setting of cholecystectomy      SPLEEN:  Unremarkable      PANCREAS:  Fatty infiltration of the pancreatic head, unchanged when compared to the prior study      ADRENAL GLANDS:  Unremarkable      KIDNEYS/URETERS:  Unremarkable   No hydronephrosis      STOMACH AND BOWEL: Small gastric fundal diverticulum  There is diverticular disease in the descending and sigmoid colon without pericolonic fat stranding to suggest acute diverticulitis      APPENDIX:  No findings to suggest appendicitis      ABDOMINOPELVIC CAVITY:  No ascites or free intraperitoneal air  No lymphadenopathy      VESSELS:  Unremarkable for patient's age      PELVIS     REPRODUCTIVE ORGANS:  Approximate 1 cm focus of ring enhancement seen on image 74 of series 2  Within the lower uterine segment possibly representing a fibroid      URINARY BLADDER:  Unremarkable      ABDOMINAL WALL/INGUINAL REGIONS:  Unremarkable      OSSEOUS STRUCTURES:  No acute fracture or destructive osseous lesion      IMPRESSION:  1   Stable left descending and sigmoid diverticulosis without acute diverticulitis  2   Hepatic steatosis  3   Cholecystectomy        Objective   /70 (BP Location: Left arm, Patient Position: Sitting, Cuff Size: Standard)   Pulse 72   Temp (!) 97 1 °F (36 2 °C) (Temporal)   Resp 14   Wt 71 7 kg (158 lb)   BMI 27 99 kg/m²   General appearance: alert and oriented, in no acute distress and appears uncomfortable when sitting in the room  Was squirming in chair   Appear comfortable and relaxed when walking to check out desk  Lungs: clear to auscultation bilaterally  Heart: regular rate and rhythm, S1, S2 normal, no murmur, click, rub or gallop  Abdomen: soft, non-tender; bowel sounds normal; no masses,  no organomegaly  Pulses: 2+ and symmetric  Skin: Skin color, texture, turgor normal  No rashes or lesions  Neurologic: Grossly normal

## 2018-12-14 ENCOUNTER — OFFICE VISIT (OUTPATIENT)
Dept: FAMILY MEDICINE CLINIC | Facility: CLINIC | Age: 63
End: 2018-12-14
Payer: MEDICARE

## 2018-12-14 VITALS
TEMPERATURE: 97 F | SYSTOLIC BLOOD PRESSURE: 104 MMHG | DIASTOLIC BLOOD PRESSURE: 78 MMHG | HEIGHT: 63 IN | HEART RATE: 76 BPM | RESPIRATION RATE: 14 BRPM | WEIGHT: 156.4 LBS | BODY MASS INDEX: 27.71 KG/M2

## 2018-12-14 DIAGNOSIS — R51.9 NONINTRACTABLE HEADACHE, UNSPECIFIED CHRONICITY PATTERN, UNSPECIFIED HEADACHE TYPE: ICD-10-CM

## 2018-12-14 DIAGNOSIS — Z11.59 NEED FOR HEPATITIS C SCREENING TEST: ICD-10-CM

## 2018-12-14 DIAGNOSIS — R73.01 IMPAIRED FASTING GLUCOSE: ICD-10-CM

## 2018-12-14 DIAGNOSIS — E78.5 HYPERLIPIDEMIA, UNSPECIFIED HYPERLIPIDEMIA TYPE: ICD-10-CM

## 2018-12-14 DIAGNOSIS — E03.9 HYPOTHYROIDISM, UNSPECIFIED TYPE: Primary | ICD-10-CM

## 2018-12-14 LAB — SL AMB POCT HEMOGLOBIN AIC: 5.9

## 2018-12-14 PROCEDURE — 99214 OFFICE O/P EST MOD 30 MIN: CPT | Performed by: FAMILY MEDICINE

## 2018-12-14 PROCEDURE — 83036 HEMOGLOBIN GLYCOSYLATED A1C: CPT | Performed by: FAMILY MEDICINE

## 2018-12-14 RX ORDER — ATORVASTATIN CALCIUM 10 MG/1
10 TABLET, FILM COATED ORAL DAILY
Qty: 90 TABLET | Refills: 1 | Status: SHIPPED | OUTPATIENT
Start: 2018-12-14 | End: 2019-01-21 | Stop reason: SDUPTHER

## 2019-01-04 NOTE — PROGRESS NOTES
Assessment/Plan:   Diagnoses and all orders for this visit:    Hypothyroidism, unspecified type  Comments:  check thyroid fxn, adjust levothyroxine prn  Orders:  -     Comprehensive metabolic panel; Future  -     CBC and Platelet; Future  -     Lipid panel; Future  -     Magnesium; Future  -     TSH, 3rd generation with Free T4 reflex; Future    Impaired fasting glucose  Comments:  lby9z=6 9%  Orders:  -     POCT hemoglobin A1c  -     Comprehensive metabolic panel; Future  -     CBC and Platelet; Future  -     Magnesium; Future    Hyperlipidemia, unspecified hyperlipidemia type  Comments:  check lipid profile and thyroid fxn  cont statin, low chol diet, exercise as tolerated  med ref lipitor given  Orders:  -     Comprehensive metabolic panel; Future  -     Amylase; Future  -     CBC and Platelet; Future  -     Lipid panel; Future  -     TSH, 3rd generation with Free T4 reflex; Future  -     Lipase; Future  -     atorvastatin (LIPITOR) 10 mg tablet; Take 1 tablet (10 mg total) by mouth daily    Nonintractable headache, unspecified chronicity pattern, unspecified headache type  Comments:  maintain h/a log, check labs [id number] post completion  Orders:  -     Comprehensive metabolic panel; Future  -     CBC and Platelet; Future    Need for hepatitis C screening test  Comments:  check Hep C ab  Orders:  -     Hepatitis C antibody; Future    Other orders  -     fluocinonide (LIDEX) 0 05 % cream; Apply 1 application topically 2 (two) times a day To affected area            Subjective:      Patient ID: Vanessa Boy is a 70 y o  female  Chief Complaint   Patient presents with    Medication Refill     pt needs bw done       69 yo pt in for BP check and follow-up to request med refill and labs  BP wnl  C/o chronic, intermittent headaches, fatigue  Sleep non-restorative, appetite fair  Ongoing stress        The following portions of the patient's history were reviewed and updated as appropriate: allergies, current medications, past family history, past medical history, past social history, past surgical history and problem list      Review of Systems   Eyes:        Wears glasses   Respiratory: Negative  Cardiovascular: Negative  Endocrine:        Thyroid d/o   Allergic/Immunologic: Positive for environmental allergies  Psychiatric/Behavioral: Positive for sleep disturbance  Objective:    /78 (BP Location: Left arm, Patient Position: Sitting, Cuff Size: Standard)   Pulse 76   Temp (!) 97 °F (36 1 °C)   Resp 14   Ht 5' 3" (1 6 m)   Wt 70 9 kg (156 lb 6 4 oz)   BMI 27 71 kg/m²        Physical Exam   Constitutional: She is oriented to person, place, and time  She appears well-developed and well-nourished  No distress  Eyes: Conjunctivae are normal  No scleral icterus  Cardiovascular: Normal rate, regular rhythm and intact distal pulses  Pulmonary/Chest: Effort normal and breath sounds normal  No respiratory distress  Abdominal: Soft  Bowel sounds are normal  There is no tenderness  Musculoskeletal: Normal range of motion  Neurological: She is alert and oriented to person, place, and time  No cranial nerve deficit  Skin: Skin is warm and dry  Psychiatric: She has a normal mood and affect  Vitals reviewed        Anupama Toussaint MD

## 2019-01-11 ENCOUNTER — OFFICE VISIT (OUTPATIENT)
Dept: FAMILY MEDICINE CLINIC | Facility: CLINIC | Age: 64
End: 2019-01-11
Payer: MEDICARE

## 2019-01-11 VITALS
WEIGHT: 159.6 LBS | RESPIRATION RATE: 14 BRPM | DIASTOLIC BLOOD PRESSURE: 70 MMHG | SYSTOLIC BLOOD PRESSURE: 102 MMHG | HEIGHT: 63 IN | HEART RATE: 78 BPM | BODY MASS INDEX: 28.28 KG/M2 | TEMPERATURE: 97.5 F

## 2019-01-11 DIAGNOSIS — Z00.00 MEDICARE WELCOME EXAM: Primary | ICD-10-CM

## 2019-01-11 DIAGNOSIS — E03.9 HYPOTHYROIDISM, UNSPECIFIED TYPE: ICD-10-CM

## 2019-01-11 DIAGNOSIS — E78.5 HYPERLIPIDEMIA, UNSPECIFIED HYPERLIPIDEMIA TYPE: ICD-10-CM

## 2019-01-11 DIAGNOSIS — R94.31 EKG ABNORMALITY: ICD-10-CM

## 2019-01-11 DIAGNOSIS — R73.03 PREDIABETES: ICD-10-CM

## 2019-01-11 PROCEDURE — G0402 INITIAL PREVENTIVE EXAM: HCPCS | Performed by: FAMILY MEDICINE

## 2019-01-11 PROCEDURE — 93000 ELECTROCARDIOGRAM COMPLETE: CPT | Performed by: FAMILY MEDICINE

## 2019-01-11 RX ORDER — METFORMIN HYDROCHLORIDE 500 MG/1
500 TABLET, EXTENDED RELEASE ORAL
Qty: 90 TABLET | Refills: 3 | Status: SHIPPED | OUTPATIENT
Start: 2019-01-11 | End: 2019-12-26 | Stop reason: SDUPTHER

## 2019-01-11 NOTE — PROGRESS NOTES
Assessment and Plan:  Problem List Items Addressed This Visit     None      Visit Diagnoses     Medicare welcome exam    -  Primary    Relevant Orders    POCT ECG        Health Maintenance Due   Topic Date Due    Hepatitis C Screening  1955    Medicare Annual Wellness Visit (AWV)  1955    PT PLAN OF CARE  05/09/2018    Urinary Incontinence Screening  10/23/2018    Pneumococcal PPSV23/PCV13 65+ Years / High and Highest Risk (2 of 2 - PPSV23) 10/23/2018         HPI:  Patient Active Problem List   Diagnosis    Asthma    Fibromyalgia    Facial droop    Headache    Hyperlipidemia    Impaired fasting glucose    Mediastinal lymphadenopathy    Asthma in adult, mild persistent, uncomplicated    Cough    Obstructive sleep apnea hypopnea, mild    Allergic rhinitis    Cervical radiculopathy    Shoulder instability, left    Tension type headache    Varicose veins of left lower extremity with pain    Hypothyroidism     Past Medical History:   Diagnosis Date    Abnormal glucose     last assessed 2/25/16    Arthritis     Asthma     w/ exacerbation; last assessed 5/14/15    Atypical nevus     last assessed 1/18/17    Breast lump     last assessed 3/6/14    Cataract     Cervical adenopathy     last assessed  2/7/17    Dizziness     Dyslipidemia     last assessed 5/22/17    Facial droop     last assessed  12/5/16    Fibromyalgia     Flu 01/20/2018    Genital herpes     resolved 9/1/16    Herpes zoster     last assessed 5/20/16    History of shingles     may 2016    History of stomach ulcers     Hx of abnormal mammogram     last assessed  3/5/14    Hyperlipidemia     Myalgia     last assessed  11/21/12    Myositis     11/21/12    Skin neoplasm     of the lower limb, including hip; onset 1/23/12; last assessed  1/23/12    Sleep apnea      Past Surgical History:   Procedure Laterality Date    ABDOMINAL SURGERY      release of adhesions    BLADDER SURGERY      mesh-lift    BREAST SURGERY      lift     SECTION      x 3    CHOLECYSTECTOMY      lap    COLONOSCOPY      COSMETIC SURGERY      tummy and breast lift    ESOPHAGOGASTRODUODENOSCOPY      GALLBLADDER SURGERY      OOPHORECTOMY Left     OTHER SURGICAL HISTORY      vocal cord surgery, scraping    NV HYSTEROSCOPY,W/ENDO BX N/A 11/3/2016    Procedure: DILATATION AND CURETTAGE (D&C) WITH HYSTEROSCOPY;  Surgeon: Verna Mancilla MD;  Location: [address] [place] [address];  Service: Gynecology    TONSILLECTOMY       Family History   Problem Relation Age of Onset    Hypertension Mother     Alzheimer's disease Mother     Arthritis Mother     Hypertension Father     Arthritis Father     Heart attack Father     Kidney disease Brother         stone    Diabetes Brother     Diabetes Daughter    [facility] Breast cancer Sister     Skin cancer Sister     Down syndrome Son      History   Smoking Status    Never Smoker   Smokeless Tobacco    Never Used     History   Alcohol Use No     Comment: social alcohol use as per [employer]      History   Drug Use No         Current Outpatient Prescriptions   Medication Sig Dispense Refill    albuterol (2 5 mg/3 mL) 0 083 % nebulizer solution Take 3 mL by nebulization every 6 (six) hours as needed for wheezing 75 mL 0    albuterol (VENTOLIN HFA) 90 mcg/act inhaler Inhale 2 puffs every 4 (four) hours as needed for wheezing 1 Inhaler 5    atorvastatin (LIPITOR) 10 mg tablet Take 1 tablet (10 mg total) by mouth daily 90 tablet 1    fluocinonide (LIDEX) 0 05 % cream Apply 1 application topically 2 (two) times a day To affected area  0    fluticasone-vilanterol (BREO ELLIPTA) 200-25 MCG/INH inhaler Inhale 1 puff daily Rinse mouth after use   3 Inhaler 0    levothyroxine 25 mcg tablet Take 1 tablet (25 mcg total) by mouth daily 90 tablet 1    ondansetron (ZOFRAN-ODT) 4 mg disintegrating tablet Take 1 tablet (4 mg total) by mouth every 6 (six) hours as needed for nausea or vomiting 20 tablet 0    pantoprazole (PROTONIX) 40 mg tablet Take 1 tablet (40 mg total) by mouth daily 30 tablet 0    metFORMIN (GLUCOPHAGE-XR) 500 mg 24 hr tablet Take 1 tablet (500 mg total) by mouth daily with dinner (Patient not taking: Reported on 1/11/2019 ) 90 tablet 1     No current facility-administered medications for this visit  Allergies   Allergen Reactions    Latex Rash     Immunization History   Administered Date(s) Administered    Influenza TIV (IM) 09/15/2015, 11/20/2017    Influenza, Quadrivalent (nasal) 12/06/2016    Influenza, injectable, quadrivalent, preservative free 0 5 mL 10/22/2018    Pneumococcal Conjugate 13-Valent 02/02/2016    Tuberculin Skin Test-PPD Intradermal 06/12/2017       Patient Care Team:  Yessenia Huynh MD as PCP - General (Family Medicine)  MD Charlene Turner MD Marcina Polite, MD    Medicare Screening Tests and Risk Assessments:  Bethany Neely is here for her Welcome to Medicare visit  Health Risk Assessment:  Patient rates overall health as good  Patient feels that their physical health rating is Slightly better  Eyesight was rated as Same  Hearing was rated as Same  Pain experienced by patient in the last 7 days has been Some  Patient's pain rating has been 7/10  Patient states that she has experienced no weight loss or gain in last 6 months  Emotional/Mental Health:  Patient has been feeling nervous/anxious  PHQ-9 Depression Screening:    Frequency of the following problems over the past two weeks:      1  Little interest or pleasure in doing things: 0 - not at all      2  Feeling down, depressed, or hopeless: 0 - not at all  PHQ-2 Score: 0          Broken Bones/Falls: Fall Risk Assessment:    In the past year, patient has experienced: No history of falling in past year          Bladder/Bowel:  Patient has not leaked urine accidently in the last six months  Patient reports no loss of bowel control      Immunizations:  Patient has had a flu vaccination within the last year  Patient has not received a pneumonia shot  Patient has not received a shingles shot  Home Safety:  Patient does not have trouble with stairs inside or outside of their home  Patient currently reports that there are no safety hazards present in home, working smoke alarms, working carbon monoxide detectors  Preventative Screenings:   Breast cancer screening performed, 4/9/2018  no colon cancer screen completed, cholesterol screen completed, 12/18/2018  glaucoma eye exam completed, 12/11/2018      Nutrition:  Current diet: Regular with servings of the following:    Medications:  Patient is not currently taking any over-the-counter supplements  Patient is able to manage medications  Lifestyle Choices:  Patient reports no tobacco use  Patient has not smoked or used tobacco in the past   Patient reports no alcohol use  Patient drives a vehicle  Patient wears seat belt  Current level of exercise of physical activity described by patient as: moderate  Activities of Daily Living:  Can get out of bed by his or her self, able to dress self, able to make own meals, able to do own shopping, able to bathe self, can do own laundry/housekeeping, can manage own money, pay bills and track expenses    Previous Hospitalizations:  No hospitalization or ED visit in past 12 months        Advanced Directives:  Patient has not decided on power of   Patient has not completed advanced directive          Preventative Screening/Counseling:      Cardiovascular:      General: Risks and Benefits Discussed      Counseling: Healthy Diet, Healthy Weight, Improve Cholesterol, Improve Blood Pressure and Improve Exercise Tolerance     Due for Labs/Analytes/Optional EKG: Lipid Panel          Diabetes:      General: Risks and Benefits Discussed and Screening Current      Counseling: Healthy Diet, Healthy Weight and Improve Physical Activity      Due for labs: Blood Glucose          Colorectal Cancer:      General: Risks and Benefits Discussed      Counseling: high fiber diet          Breast Cancer:      General: Risks and Benefits Discussed          Osteoporosis:      General: Risks and Benefits Discussed      Counseling: Calcium and Vitamin D Intake and Regular Weightbearing Exercise          Glaucoma:      General: Risks and Benefits Discussed and Screening Current          HIV:      General: Screening Not Indicated          Hepatitis C:      General: Risks and Benefits Discussed        Advanced Directives:   Patient has no living will for healthcare, does not have durable POA for healthcare, patient does not have an advanced directive  Information on ACP and/or AD provided  5 wishes given  Immunizations:  Patient reviewed and up to date      Influenza: Risks & Benefits Discussed and Influenza UTD This Year      Pneumococcal: Risks & Benefits Discussed      Shingrix: Risks & Benefits Discussed      Hepatitis B (Low risk patients): Series Not Indicated      Zostavax: Risks & Benefits Discussed      TD: Risks & Benefits Discussed      TDAP: Risks & Benefits Discussed      Other Preventative Counseling (Non-Medicare):  Nutrition Counseling, Car/seat belt/driving safety reviewed and Skin self-exam      Referrals:  Referral(s) to: Cardiologist         Visual Acuity Screening    Right eye Left eye Both eyes   Without correction:      With correction: 20/20 20/20 20/20       Physical Exam:  Review of Systems   Constitutional: Positive for fatigue  Negative for fever  HENT: Positive for postnasal drip  Eyes:        Wears glasses   Respiratory: Negative for shortness of breath  Cardiovascular: Positive for palpitations  Negative for chest pain  Gastrointestinal: Negative for bowel incontinence  Endocrine:        Pre-DM   Musculoskeletal: Positive for arthralgias and myalgias  Skin: Negative for rash  Allergic/Immunologic: Positive for environmental allergies  Neurological: Negative  Psychiatric/Behavioral: Positive for sleep disturbance  Negative for hallucinations  The patient is nervous/anxious  Vitals:    01/11/19 1438   BP: 102/70   BP Location: Left arm   Patient Position: Sitting   Cuff Size: Standard   Pulse: 78   Resp: 14   Temp: 97 5 °F (36 4 °C)   Weight: 72 4 kg (159 lb 9 6 oz)   Height: 5' 3" (1 6 m)   Body mass index is 28 27 kg/m²  Physical Exam   Constitutional: She is oriented to person, place, and time  She appears well-developed and well-nourished  HENT:   Head: Normocephalic and atraumatic  Right Ear: External ear normal    Left Ear: External ear normal    Nose: Nose normal    Mouth/Throat: Oropharynx is clear and moist  No oropharyngeal exudate  Eyes: Pupils are equal, round, and reactive to light  Conjunctivae and EOM are normal    Neck: Normal range of motion  Neck supple  No thyromegaly present  Cardiovascular: Normal rate, regular rhythm, normal heart sounds and intact distal pulses  Exam reveals no gallop and no friction rub  No murmur heard  Pulmonary/Chest: Effort normal and breath sounds normal    Abdominal: Soft  Bowel sounds are normal  There is no tenderness  Musculoskeletal: Normal range of motion  She exhibits no edema or deformity  Neurological: She is alert and oriented to person, place, and time  No cranial nerve deficit or sensory deficit  Skin: Skin is warm and dry  Capillary refill takes less than 2 seconds  No rash noted  Psychiatric: She has a normal mood and affect  Nursing note and vitals reviewed

## 2019-01-20 NOTE — PROGRESS NOTES
Consultation - Cardiology Office  [facility]  Gee Vogt 70 y o  female MRN: [id number]  : 1955  Unit/Bed#:  Encounter: [id number]      Assessment:     1  Chest pain, unspecified type    2  Abnormal EKG    3  Dyslipidemia    4  Asthma in adult, mild persistent, uncomplicated    5  Hypothyroidism, unspecified type    6  Impaired fasting glucose    7  Obstructive sleep apnea hypopnea, mild    8           Discussion summary and Plan:  1  Atypical chest pain  Patient has multiple cardiac risk factor including history of dyslipidemia, family history of heart problem and mild diabetes mellitus  She will be scheduled for exercise stress test     2  Abnormal EKG  EKG done at primary care doctor's office reviewed  Most likely pseudo infarct pattern  Previously echo shows normal LV systolic function  Q-waves are only small  Hence I am not worried about it, I do not think patient had any inferior wall infarction  3  Dyslipidemia  Cholesterol has been high  She says she is compliant with Lipitor 10 mg  Will increase the dose to 20 mg  She will scheduled to have follow-up with primary care doctors    4  Hypothyroidism  Continue levothyroxine  She is taking 25 mcg p o  Daily  5  Impaired glucose metabolism   Patient is on metformin  Claims she is not diabetic she is borderline  Management as per primary care team    6  History of obstructive sleep apnea  Patient has been compliant with CPAP  Continue using CPAP  7  History of asthma  As per patient pretty well controlled on inhalers  All issues related to cardiac condition discussed with patient at length  All her questions answered  Further plan as also stress test become available  Patient is scheduled to have low risk cataract surgery  I believe there is no cardiac contraindication for the surgery she is in optimal condition for the surgery as planned            Counseling :  A description of the counseling  Goals and Barriers  Patient's ability to self care: Yes  Medication side effect reviewed with patient in detail and all their questions answered to their satisfaction  Primary Care Physician Requesting Consult: Laura Garcia MD    Reason for Consult / Principal Problem:  Abnormal EKG        HPI :     Phoenix Sood is a 70y o  year old female who was referred by primary care doctor for chest pain and abnormal EKG  Patient has past medical history significant for dyslipidemia, impaired glucose metabolism on metformin, history of bronchial asthma well controlled, sleep apnea on CPAP, hypothyroidism, family history of heart problem who was seen by primary care doctor for follow-up and was found to have abnormal EKG which shows Q-wave in inferior leads and possible old inferior infarct and sent to us  Patient has a stress test and a echo Doppler done in 2017 which shows she has a normal LV systolic function and normal stress test at that time  It was exercise stress test   She is not having episodes of chest pain which she attributed to her shoulder pain however she got worried now  She has no fever no chills no nausea no vomiting  She admits her diet is not proper and her cholesterol has been elevated even though she is compliant with her cholesterol medications  She had history of GERD and she is on pantoprazole for long period of time  No fever no chills no nausea no vomiting no other significant complaint  She is under lot of stress at home also because of her issue with her not working and issues with  A neighbor    Review of Systems   Constitutional: Negative for activity change, chills, diaphoresis, fever and unexpected weight change  HENT: Negative for congestion  Eyes: Negative for discharge and redness  Respiratory: Positive for shortness of breath  Negative for cough, chest tightness and wheezing  Mostly exertional   Cardiovascular: Positive for chest pain  Negative for palpitations and leg swelling  Gastrointestinal: Negative for abdominal pain, diarrhea and nausea  Endocrine: Negative  Genitourinary: Negative for decreased urine volume and urgency  Musculoskeletal: Negative  Negative for arthralgias, back pain and gait problem  Skin: Negative for rash and wound  Allergic/Immunologic: Negative  Neurological: Negative for dizziness, seizures, syncope, weakness, light-headedness and headaches  Hematological: Negative  Psychiatric/Behavioral: Negative for agitation and confusion  The patient is nervous/anxious          Historical Information   Past Medical History:   Diagnosis Date    Abnormal glucose     last assessed 16    Arthritis     Asthma     w/ exacerbation; last assessed 5/14/15    Atypical nevus     last assessed 17    Breast lump     last assessed 3/6/14    Cataract     Cervical adenopathy     last assessed  17    Dizziness     Dyslipidemia     last assessed 17    Facial droop     last assessed  16    Fibromyalgia     Flu 2018    Genital herpes     resolved 16    Herpes zoster     last assessed 16    History of shingles     may 2016    History of stomach ulcers     Hx of abnormal mammogram     last assessed  3/5/14    Hyperlipidemia     Myalgia     last assessed  12    Myositis     12    Skin neoplasm     of the lower limb, including hip; onset 12; last assessed  12    Sleep apnea      Past Surgical History:   Procedure Laterality Date    ABDOMINAL SURGERY      release of adhesions    BLADDER SURGERY      mesh-lift    BREAST SURGERY      lift     SECTION      x 3    CHOLECYSTECTOMY      lap    COLONOSCOPY      COSMETIC SURGERY      tummy and breast lift    ESOPHAGOGASTRODUODENOSCOPY      GALLBLADDER SURGERY      OOPHORECTOMY Left     OTHER SURGICAL HISTORY      vocal cord surgery, scraping    IL HYSTEROSCOPY,W/ENDO BX N/A 11/3/2016 Procedure: DILATATION AND CURETTAGE (D&C) WITH HYSTEROSCOPY;  Surgeon: Lali Mabry MD;  Location: [facility];  Service: Gynecology    TONSILLECTOMY       History   Alcohol Use No     Comment: social alcohol use as per [employer]     History   Drug Use No     History   Smoking Status    Never Smoker   Smokeless Tobacco    Never Used     Family History:   Family History   Problem Relation Age of Onset    Hypertension Mother     Alzheimer's disease Mother     Arthritis Mother     Hypertension Father     Arthritis Father     Heart attack Father     Kidney disease Brother         stone    Diabetes Brother     Diabetes Daughter     Breast cancer Sister     Skin cancer Sister     Down syndrome Son        Meds/Allergies     Allergies   Allergen Reactions    Latex Rash       Current Outpatient Prescriptions:     albuterol (2 5 mg/3 mL) 0 083 % nebulizer solution, Take 3 mL by nebulization every 6 (six) hours as needed for wheezing, Disp: 75 mL, Rfl: 0    albuterol (VENTOLIN HFA) 90 mcg/act inhaler, Inhale 2 puffs every 4 (four) hours as needed for wheezing, Disp: 1 Inhaler, Rfl: 5    atorvastatin (LIPITOR) 20 mg tablet, Take 1 tablet (20 mg total) by mouth daily, Disp: 90 tablet, Rfl: 1    fluocinonide (LIDEX) 0 05 % cream, Apply 1 application topically 2 (two) times a day To affected area, Disp: , Rfl: 0    fluticasone-vilanterol (BREO ELLIPTA) 200-25 MCG/INH inhaler, Inhale 1 puff daily Rinse mouth after use , Disp: 3 Inhaler, Rfl: 0    levothyroxine 25 mcg tablet, Take 1 tablet (25 mcg total) by mouth daily, Disp: 90 tablet, Rfl: 1    metFORMIN (GLUCOPHAGE-XR) 500 mg 24 hr tablet, Take 1 tablet (500 mg total) by mouth daily with dinner, Disp: 90 tablet, Rfl: 3    pantoprazole (PROTONIX) 40 mg tablet, Take 1 tablet (40 mg total) by mouth daily, Disp: 30 tablet, Rfl: 0    Vitals: Blood pressure 110/60, pulse 69, height 5' 3" (1 6 m), weight 72 8 kg (160 lb 9 6 oz), SpO2 98 %, not currently breastfeeding  Body mass index is 28 45 kg/m²  Vitals:    19 1500   Weight: 72 8 kg (160 lb 9 6 oz)     BP Readings from Last 3 Encounters:   19 110/60   19 102/70   18 104/78         Physical Exam    Neurologic:  Alert & oriented x 3, no new focal deficits, Not in any acute distress,  Constitutional:  Well developed, well nourished, non-toxic appearance   Eyes:  Pupil equal and reacting to light, conjunctiva normal, No JVP, No LNP   HENT:  Atraumatic, oropharynx moist, Neck- normal range of motion, no tenderness,  Neck supple   Respiratory:  Bilateral air entry, mostly clear to auscultation  Cardiovascular: S1-S2 regular with a 2/6 ejection systolic murmur and S4 is present  GI:  Soft, nondistended, normal bowel sounds, nontender, no hepatosplenomegaly appreciated  Musculoskeletal:  No edema, no tenderness, no deformities  Skin:  Well hydrated, no rash   Lymphatic:  No lymphadenopathy noted   Extremities:  No edema and distal pulses are present    Diagnostic Studies Review Cardio:    Stress test   Stress EKG test 2017 was normal    She walked 8 minutes and 49 seconds  No symptoms no EKG changes        Echo Doppler  Done 2017  EF was 60%, trace MR, trace AI, trace TR PA pressure 25 mm of mercury    EKG:    Cardiac testing:   Results for orders placed during the hospital encounter of 17   Echo complete with contrast if indicated    [address]  [address] [address]  [phone]    Transthoracic Echocardiogram  2D, M-mode, Doppler, and Color Doppler    Study date:  31-May-2017    Patient: Tyler Blair  MR number: [id number]  Account number: [de-identified]  : 1955  Age: 59 years  Gender: Female  Status: Routine  Location: Echo lab  Height: 65 in  Weight: 163 7 lb  BP: 117/ 81 mmHg    Indications: Dizziness    Diagnoses: R42  - Dizziness and giddiness    Sonographer:  LORE Huertas  Primary Physician: Alie Pascal MD  Referring Physician:  Drew No DO  Group:  Gilberto Pradhan  Interpreting Physician:  Pipo Newton MD    SUMMARY    LEFT VENTRICLE:  Systolic function was normal  Ejection fraction was estimated in the range of 55 % to 60 % to be 60 %  There were no regional wall motion abnormalities  Wall thickness was at the upper limits of normal   Doppler parameters were consistent with abnormal left ventricular relaxation (grade 1 diastolic dysfunction)  MITRAL VALVE:  There was trace regurgitation  AORTIC VALVE:  There was trace regurgitation  TRICUSPID VALVE:  There was trace regurgitation  Estimated peak PA pressure was 25 mmHg  HISTORY: PRIOR HISTORY: Shingles, Fibromyalgia, Asthma,Dyslipidemia    PROCEDURE: The procedure was performed in the echo lab  This was a routine study  The transthoracic approach was used  The study included complete 2D imaging, M-mode, complete spectral Doppler, and color Doppler  The heart rate was 74 bpm,  at the start of the study  Images were obtained from the parasternal, apical, subcostal, and suprasternal notch acoustic windows  Image quality was adequate  LEFT VENTRICLE: Size was normal  Systolic function was normal  Ejection fraction was estimated in the range of 55 % to 60 % to be 60 %  There were no regional wall motion abnormalities  Wall thickness was at the upper limits of normal   DOPPLER: Doppler parameters were consistent with abnormal left ventricular relaxation (grade 1 diastolic dysfunction)  RIGHT VENTRICLE: The size was normal  Systolic function was normal     LEFT ATRIUM: Size was normal     RIGHT ATRIUM: Size was normal     MITRAL VALVE: Valve structure was normal  There was normal leaflet separation  DOPPLER: The transmitral velocity was within the normal range  There was no evidence for stenosis  There was trace regurgitation  AORTIC VALVE: The valve was trileaflet   Leaflets exhibited normal thickness and normal cuspal separation  DOPPLER: Transaortic velocity was within the normal range  There was no evidence for stenosis  There was trace regurgitation  TRICUSPID VALVE: DOPPLER: There was trace regurgitation  Estimated peak PA pressure was 25 mmHg  PULMONIC VALVE: DOPPLER: There was no significant regurgitation  PERICARDIUM: There was no thickening or calcification  There was no pericardial effusion  AORTA: The root exhibited normal size  SYSTEMIC VEINS: IVC: The inferior vena cava was normal in size  Respirophasic changes were normal     SYSTEM MEASUREMENT TABLES    2D mode  AoR Diam 2D: 2 9 cm  LA Diam (2D): 3 1 cm  LA/Ao (2D): 1 07  FS (2D Teich): 30 2 %  IVSd (2D): 1 15 cm  LVDEV: 65 1 cm³  LVEDV MOD BP: 64 cm³  LVESV: 27 3 cm³  LVIDd(2D): 3 88 cm  LVISd (2D): 2 71 cm  LVOT Area 2D: 3 14 cm squared  LVPWd (2D): 1 09 cm  SV (Teich): 37 8 cm³    Apical four chamber  LVEF A4C: 60 %    Apical two chamber  LVEF A2C: 50 %    Unspecified Scan Mode  LISA Cont Eq (Peak Matthew): 2 22 cm squared  LVOT Diam : 2 cm  LVOT Vmax: 918 mm/s  LVOT Vmax; Mean: 918 mm/s  Peak Grad ; Mean: 3 mm[Hg]  MV Peak A Matthew: 785 mm/s  MV Peak E Matthew  Mean: 617 mm/s  MVA (PHT): 4 4 cm squared  PHT: 50 ms  Max P mm[Hg]  V Max: 2090 mm/s  Vmax: 1990 mm/s  RA Area: 9 2 cm squared  RA Volume: 17 6 cm³  TAPSE: 2 3 cm    [facility]redited [facility]    Prepared and electronically signed by    Zach Arana MD  Signed 31-May-2017 12:04:08           Imaging:  Chest X-Ray:   Xr Chest 2 Views    Result Date: 2018  Impression No active pulmonary disease   Workstation performed: [id number]       Lab Review   Lab Results   Component Value Date    WBC 8 4 2018    HGB 13 9 2018    HCT 41 4 2018    MCV 92 2018    RDW 14 0 2018     2018     BMP:  Lab Results   Component Value Date    SODIUM 138 10/18/2018    K 4 5 10/18/2018     10/18/2018    CO2 30 10/18/2018    BUN 10 10/18/2018    CREATININE 0 97 10/18/2018    GLUC 119 (H) 06/16/2018    GLUF 124 (H) 10/18/2018    CALCIUM 9 8 10/18/2018    EGFR 62 10/18/2018     LFT:  Lab Results   Component Value Date    AST 17 10/18/2018    ALT 42 10/18/2018    ALKPHOS 73 10/18/2018    TP 8 5 (H) 10/18/2018    ALB 4 1 10/18/2018      Lab Results   Component Value Date    OZX9KMLLGQSO 2 630 12/16/2017     Lab Results   Component Value Date    HGBA1C 5 9 12/14/2018     Lipid Profile:   Lab Results   Component Value Date    CHOLESTEROL 234 (H) 06/16/2018    HDL 46 06/16/2018    LDLCALC 167 (H) 12/16/2017    TRIG 194 (H) 06/16/2018     Lab Results   Component Value Date    CHOLESTEROL 234 (H) 06/16/2018    CHOLESTEROL 258 (H) 12/06/2016     Lab Results   Component Value Date    CKTOTAL 120 06/16/2018    TROPONINI <0 02 02/01/2018           Dr María Steele MD [facility] [place]      "This note has been constructed using a voice recognition system  Therefore there may be syntax, spelling, and/or grammatical errors   Please call if you have any questions  "

## 2019-01-21 ENCOUNTER — OFFICE VISIT (OUTPATIENT)
Dept: CARDIOLOGY CLINIC | Facility: CLINIC | Age: 64
End: 2019-01-21
Payer: MEDICARE

## 2019-01-21 VITALS
HEART RATE: 69 BPM | BODY MASS INDEX: 28.46 KG/M2 | DIASTOLIC BLOOD PRESSURE: 60 MMHG | SYSTOLIC BLOOD PRESSURE: 110 MMHG | WEIGHT: 160.6 LBS | HEIGHT: 63 IN | OXYGEN SATURATION: 98 %

## 2019-01-21 DIAGNOSIS — J45.30 ASTHMA IN ADULT, MILD PERSISTENT, UNCOMPLICATED: ICD-10-CM

## 2019-01-21 DIAGNOSIS — E03.9 HYPOTHYROIDISM, UNSPECIFIED TYPE: ICD-10-CM

## 2019-01-21 DIAGNOSIS — E78.5 HYPERLIPIDEMIA, UNSPECIFIED HYPERLIPIDEMIA TYPE: ICD-10-CM

## 2019-01-21 DIAGNOSIS — R73.01 IMPAIRED FASTING GLUCOSE: ICD-10-CM

## 2019-01-21 DIAGNOSIS — R07.9 CHEST PAIN, UNSPECIFIED TYPE: ICD-10-CM

## 2019-01-21 DIAGNOSIS — G47.33 OBSTRUCTIVE SLEEP APNEA HYPOPNEA, MILD: ICD-10-CM

## 2019-01-21 DIAGNOSIS — R94.31 ABNORMAL EKG: ICD-10-CM

## 2019-01-21 DIAGNOSIS — E78.5 DYSLIPIDEMIA: ICD-10-CM

## 2019-01-21 PROCEDURE — 99215 OFFICE O/P EST HI 40 MIN: CPT | Performed by: INTERNAL MEDICINE

## 2019-01-21 RX ORDER — ATORVASTATIN CALCIUM 20 MG/1
20 TABLET, FILM COATED ORAL DAILY
Qty: 90 TABLET | Refills: 1 | Status: SHIPPED | OUTPATIENT
Start: 2019-01-21 | End: 2019-07-25 | Stop reason: SDUPTHER

## 2019-01-21 NOTE — PATIENT INSTRUCTIONS
Cholesterol and Your Health   AMBULATORY CARE:   Cholesterol  is a waxy, fat-like substance  Cholesterol is made by your body, but also comes from certain foods you eat  Your body uses cholesterol to make hormones and new cells  Your body also uses cholesterol to protect nerves  Cholesterol comes from foods such as meat and dairy products  Your total cholesterol level is made up by LDL cholesterol, HDL cholesterol, and triglycerides:  · LDL cholesterol  is called bad cholesterol  because it forms plaque in your arteries  As plaque builds up, your arteries become narrow, and less blood flows through  When plaque decreases blood flow to your heart, you may have chest pain  If plaque completely blocks an artery that bring blood to your heart, you may have a heart attack  Plaque can break off and form blood clots  Blood clots may block arteries in your brain and cause a stroke  · HDL cholesterol  is called good cholesterol  because it helps remove LDL cholesterol from your arteries  It does this by attaching to LDL cholesterol and carrying it to your liver  Your liver breaks down LDL cholesterol so your body can get rid of it  High levels of HDL cholesterol can help prevent a heart attack and stroke  Low levels of HDL cholesterol can increase your risk for heart disease, heart attack, and stroke  · Triglycerides  are a type of fat that store energy from foods you eat  High levels of triglycerides also cause plaque buildup  This can increase your risk for a heart attack or stroke  If your triglyceride level is high, your LDL cholesterol level may also be high  How food affects your cholesterol levels:   · Unhealthy fats  increase LDL cholesterol and triglyceride levels in your blood  They are found in foods high in cholesterol, saturated fat, and trans fat:     ¨ Cholesterol  is found in eggs, dairy, and meat  ¨ Saturated fat  is found in butter, cheese, ice cream, whole milk, and coconut oil  Saturated fat is also found in meat, such as sausage, hot dogs, and bologna  ¨ Trans fat  is found in liquid oils and is used in fried and baked foods  Foods that contain trans fats include chips, crackers, muffins, sweet rolls, microwave popcorn, and cookies  · Healthy fats,  also called unsaturated fats, help lower LDL cholesterol and triglyceride levels  Healthy fats include the following:     ¨ Monounsaturated fats  are found in foods such as olive oil, canola oil, avocado, nuts, and olives  ¨ Polyunsaturated fats,  such as omega 3 fats, are found in fish, such as salmon, trout, and tuna  They can also be found in plant foods such as flaxseed, walnuts, and soybeans  Other things that affect your cholesterol levels:   · Smoking cigarettes    · Being overweight or obese     · Drinking large amounts of alcohol    · Not enough exercise or no exercise    · Certain genes passed from your parents to you  What you need to know about having your cholesterol levels checked: Adults 21to 39years of age should have their cholesterol levels checked every 4 to 6 years  Adults 45 years and older should have their cholesterol checked every 1 to 2 years  You may need your cholesterol checked more often, or at a younger age, if you have risk factors for heart disease  You may also need to have your cholesterol checked more often if you have other health conditions, such as diabetes  Blood tests are used to check cholesterol levels  Blood tests measure your levels of triglycerides, LDL cholesterol, and HDL cholesterol  Cholesterol level goals: Your cholesterol level goal may depend on your risk for heart disease  It may also depend on your age and other health conditions  Ask your healthcare provider if the following goals are right for you:  · Your total cholesterol level  should be less than 200 mg/dL  This number may also depend on your HDL and LDL cholesterol goals       · Your LDL cholesterol level  should be less than 130 mg/dL  · Your HDL cholesterol level  should be 60 mg/dL or higher  · Your triglyceride level  should be less than 150 mg/dL  Treatment for high cholesterol:  Treatment for high cholesterol will also decrease your risk of heart disease, heart attack, and stroke  Treatment may include any of the following:  · Medicines  may be given to lower your LDL cholesterol, triglyceride levels, or total cholesterol level  You may need medicines to lower your cholesterol if any of the following is true:     ¨ You have a history of stroke, TIA, unstable angina, or a heart attack    ¨ Your LDL cholesterol level is 190 mg/dL or higher    ¨ You are age 36to 76years of age, have diabetes, and your LDL cholesterol is 70 mg/dL or higher    ¨ You are age 36to 76years of age, have risk factors for heart disease, and your LDL cholesterol is 70 mg/dL or higher    · Lifestyle changes  include changes to your diet, exercise, weight loss, and quitting smoking  It also includes decreasing the amount of alcohol you drink  · Supplements  include fish oil, red yeast rice, and garlic  Fish oil may help lower your triglyceride and LDL cholesterol levels  It may also increase your HDL cholesterol level  Red yeast rice may help decrease your total cholesterol level and LDL cholesterol level  Garlic may help lower your total cholesterol level  Do not take these supplements without talking to your healthcare provider  Nutrition to help lower your cholesterol levels:  A registered dietitian can help you create a healthy eating plan  Read food labels and choose foods low in saturated fat, trans fats, and cholesterol  · Decrease the total amount of fat you eat  Choose lean meats, fat-free or 1% fat milk, and low-fat dairy products, such as yogurt and cheese  Try to limit or avoid red meats  Limit or do not eat fried foods or baked goods such as cookies  · Replace unhealthy fats with healthy fats    Cook foods in olive oil or canola oil  Choose soft margarines that are low in saturated fat and trans fat  Seeds, nuts, and avocados are other examples of healthy fats  · Eat foods with omega-3 fats  Examples include salmon, tuna, mackerel, walnuts, and flaxseed  Eat fish 2 times per week  Children and pregnant women should not eat fish that have high levels of mercury, such as shark, swordfish, and alvin mackerel  · Increase the amount of plant-based foods you eat  Plant-based foods are low in cholesterol and fat  Eating more of these foods may help lower your cholesterol and help you lose weight  Examples of plant-based foods includes fruits, vegetables, legumes, and whole grains  Replace milk that contains dairy with almond, soy, or coconut milk  Eat beans and foods with soy for protein instead of meat  Ask your healthcare provider or dietitian for more information on plant-based foods  · Increase the amount of fiber you eat  High-fiber foods can help lower your LDL cholesterol  You should eat between 20 and 30 grams of fiber each day  Eat at least 5 servings of fruits and vegetables each day  Other examples of high-fiber foods include whole-grain or whole-wheat breads, pastas, or cereals, and brown rice  Eat 3 ounces of whole-grain foods each day  Increase fiber slowly  You may have abdominal discomfort, bloating, and gas if you add fiber to your diet too quickly  Lifestyle changes you can make to help lower your cholesterol levels:   · Maintain a healthy weight  Ask your healthcare provider how much you should weigh  Ask him or her to help you create a weight loss plan if you are overweight  Weight loss can decrease your total cholesterol and triglyceride levels  · Exercise regularly  Exercise can help lower your total cholesterol level and maintain a healthy weight  Exercise can also help increase your HDL cholesterol level   Work with your healthcare provider to create an exercise program that is right for you  Get at least 30 minutes of moderate exercise most days of the week  Examples of exercise include brisk walking, swimming, or biking  · Do not smoke  Nicotine and other chemicals in cigarettes and cigars can damage your lungs, heart, and blood vessels  They can also raise your triglyceride levels  Ask your healthcare provider for information if you currently smoke and need help to quit  E-cigarettes or smokeless tobacco still contain nicotine  Talk to your healthcare provider before you use these products  · Limit or do not drink alcohol  Alcohol can increase your triglyceride levels  Ask your healthcare provider if it is safe for you to drink alcohol  Also ask how much is safe for you to drink each day  © 2017 [facility] Information is for End User's use only and may not be sold, redistributed or otherwise used for commercial purposes  All illustrations and images included in CareNotes® are the copyrighted property of [employer]  or [employer]  The above information is an  only  It is not intended as medical advice for individual conditions or treatments  Talk to your doctor, nurse or pharmacist before following any medical regimen to see if it is safe and effective for you

## 2019-02-21 ENCOUNTER — HOSPITAL ENCOUNTER (OUTPATIENT)
Dept: NON INVASIVE DIAGNOSTICS | Facility: CLINIC | Age: 64
Discharge: HOME | End: 2019-02-21
Payer: MEDICARE

## 2019-02-21 ENCOUNTER — TELEPHONE (OUTPATIENT)
Dept: CARDIOLOGY CLINIC | Facility: CLINIC | Age: 64
End: 2019-02-21

## 2019-02-21 DIAGNOSIS — R94.31 ABNORMAL EKG: ICD-10-CM

## 2019-02-21 DIAGNOSIS — R07.9 CHEST PAIN, UNSPECIFIED TYPE: ICD-10-CM

## 2019-02-21 PROCEDURE — 93016 CV STRESS TEST SUPVJ ONLY: CPT | Performed by: INTERNAL MEDICINE

## 2019-02-21 PROCEDURE — 93017 CV STRESS TEST TRACING ONLY: CPT

## 2019-02-21 PROCEDURE — 93018 CV STRESS TEST I&R ONLY: CPT | Performed by: INTERNAL MEDICINE

## 2019-02-21 NOTE — TELEPHONE ENCOUNTER
----- Message from Javier Gleason MD sent at 2/21/2019  6:01 PM EST -----  Pt's Patient's stress test is normal    Patient can keep regular appointment  Please call patient with the result

## 2019-02-25 LAB
CHEST PAIN STATEMENT: NORMAL
MAX DIASTOLIC BP: 86 MMHG
MAX HEART RATE: 142 BPM
MAX PREDICTED HEART RATE: 155 BPM
MAX. SYSTOLIC BP: 182 MMHG
PROTOCOL NAME: NORMAL
REASON FOR TERMINATION: NORMAL
TARGET HR FORMULA: NORMAL
TEST INDICATION: NORMAL
TIME IN EXERCISE PHASE: NORMAL

## 2019-03-11 ENCOUNTER — TELEPHONE (OUTPATIENT)
Dept: FAMILY MEDICINE CLINIC | Facility: CLINIC | Age: 64
End: 2019-03-11

## 2019-03-11 ENCOUNTER — TRANSCRIBE ORDERS (OUTPATIENT)
Dept: ADMINISTRATIVE | Facility: HOSPITAL | Age: 64
End: 2019-03-11

## 2019-03-11 DIAGNOSIS — Z12.39 SCREENING BREAST EXAMINATION: Primary | ICD-10-CM

## 2019-03-12 DIAGNOSIS — Z12.39 SCREENING BREAST EXAMINATION: Primary | ICD-10-CM

## 2019-04-05 DIAGNOSIS — K21.9 GASTROESOPHAGEAL REFLUX DISEASE WITHOUT ESOPHAGITIS: ICD-10-CM

## 2019-04-05 RX ORDER — PANTOPRAZOLE SODIUM 40 MG/1
40 TABLET, DELAYED RELEASE ORAL DAILY
Qty: 30 TABLET | Refills: 5 | Status: SHIPPED | OUTPATIENT
Start: 2019-04-05 | End: 2020-06-10 | Stop reason: SDUPTHER

## 2019-04-09 DIAGNOSIS — E07.89 THYROID FULLNESS: ICD-10-CM

## 2019-04-09 DIAGNOSIS — E03.9 HYPOTHYROIDISM, UNSPECIFIED TYPE: ICD-10-CM

## 2019-04-09 DIAGNOSIS — E78.5 HYPERLIPIDEMIA, UNSPECIFIED HYPERLIPIDEMIA TYPE: ICD-10-CM

## 2019-04-09 RX ORDER — LEVOTHYROXINE SODIUM 0.03 MG/1
TABLET ORAL
Qty: 90 TABLET | Refills: 1 | Status: SHIPPED | OUTPATIENT
Start: 2019-04-09 | End: 2019-06-01 | Stop reason: SDUPTHER

## 2019-04-12 ENCOUNTER — HOSPITAL ENCOUNTER (OUTPATIENT)
Dept: RADIOLOGY | Facility: HOSPITAL | Age: 64
Discharge: HOME | End: 2019-04-12
Attending: FAMILY MEDICINE
Payer: MEDICARE

## 2019-04-12 VITALS — BODY MASS INDEX: 28.35 KG/M2 | HEIGHT: 63 IN | WEIGHT: 160 LBS

## 2019-04-12 DIAGNOSIS — Z12.39 SCREENING BREAST EXAMINATION: ICD-10-CM

## 2019-04-12 PROCEDURE — 77063 BREAST TOMOSYNTHESIS BI: CPT

## 2019-04-12 PROCEDURE — 77067 SCR MAMMO BI INCL CAD: CPT

## 2019-04-19 ENCOUNTER — TELEPHONE (OUTPATIENT)
Dept: FAMILY MEDICINE CLINIC | Facility: CLINIC | Age: 64
End: 2019-04-19

## 2019-04-22 DIAGNOSIS — R73.01 IMPAIRED FASTING GLUCOSE: ICD-10-CM

## 2019-04-22 DIAGNOSIS — Z11.59 NEED FOR HEPATITIS C SCREENING TEST: ICD-10-CM

## 2019-04-22 DIAGNOSIS — K21.9 GASTROESOPHAGEAL REFLUX DISEASE WITHOUT ESOPHAGITIS: ICD-10-CM

## 2019-04-22 DIAGNOSIS — E03.9 HYPOTHYROIDISM, UNSPECIFIED TYPE: ICD-10-CM

## 2019-04-22 DIAGNOSIS — E78.2 MIXED HYPERLIPIDEMIA: Primary | ICD-10-CM

## 2019-04-26 ENCOUNTER — APPOINTMENT (OUTPATIENT)
Dept: LAB | Facility: CLINIC | Age: 64
End: 2019-04-26
Payer: MEDICARE

## 2019-04-26 ENCOUNTER — TRANSCRIBE ORDERS (OUTPATIENT)
Dept: LAB | Facility: CLINIC | Age: 64
End: 2019-04-26

## 2019-04-26 DIAGNOSIS — R73.01 IMPAIRED FASTING GLUCOSE: ICD-10-CM

## 2019-04-26 DIAGNOSIS — E78.2 MIXED HYPERLIPIDEMIA: ICD-10-CM

## 2019-04-26 DIAGNOSIS — E03.9 HYPOTHYROIDISM, UNSPECIFIED TYPE: ICD-10-CM

## 2019-04-26 DIAGNOSIS — Z11.59 NEED FOR HEPATITIS C SCREENING TEST: ICD-10-CM

## 2019-04-26 DIAGNOSIS — K21.9 GASTROESOPHAGEAL REFLUX DISEASE WITHOUT ESOPHAGITIS: ICD-10-CM

## 2019-04-26 LAB
ALBUMIN SERPL BCP-MCNC: 3.6 G/DL (ref 3.5–5)
ALP SERPL-CCNC: 73 U/L (ref 46–116)
ALT SERPL W P-5'-P-CCNC: 40 U/L (ref 12–78)
AMYLASE SERPL-CCNC: 62 IU/L (ref 25–115)
ANION GAP SERPL CALCULATED.3IONS-SCNC: 4 MMOL/L (ref 4–13)
AST SERPL W P-5'-P-CCNC: 22 U/L (ref 5–45)
BILIRUB SERPL-MCNC: 0.34 MG/DL (ref 0.2–1)
BUN SERPL-MCNC: 13 MG/DL (ref 5–25)
CALCIUM SERPL-MCNC: 9.1 MG/DL (ref 8.3–10.1)
CHLORIDE SERPL-SCNC: 108 MMOL/L (ref 100–108)
CHOLEST SERPL-MCNC: 168 MG/DL (ref 50–200)
CO2 SERPL-SCNC: 29 MMOL/L (ref 21–32)
CREAT SERPL-MCNC: 0.84 MG/DL (ref 0.6–1.3)
ERYTHROCYTE [DISTWIDTH] IN BLOOD BY AUTOMATED COUNT: 13 % (ref 11.6–15.1)
GFR SERPL CREATININE-BSD FRML MDRD: 73 ML/MIN/1.73SQ M
GLUCOSE P FAST SERPL-MCNC: 120 MG/DL (ref 65–99)
HCT VFR BLD AUTO: 43.8 % (ref 34.8–46.1)
HCV AB SER QL: NORMAL
HDLC SERPL-MCNC: 41 MG/DL (ref 40–60)
HGB BLD-MCNC: 13.8 G/DL (ref 11.5–15.4)
LDLC SERPL CALC-MCNC: 94 MG/DL (ref 0–100)
LIPASE SERPL-CCNC: 86 U/L (ref 73–393)
MCH RBC QN AUTO: 30.9 PG (ref 26.8–34.3)
MCHC RBC AUTO-ENTMCNC: 31.5 G/DL (ref 31.4–37.4)
MCV RBC AUTO: 98 FL (ref 82–98)
PLATELET # BLD AUTO: 296 THOUSANDS/UL (ref 149–390)
PMV BLD AUTO: 10.6 FL (ref 8.9–12.7)
POTASSIUM SERPL-SCNC: 4.3 MMOL/L (ref 3.5–5.3)
PROT SERPL-MCNC: 8.2 G/DL (ref 6.4–8.2)
RBC # BLD AUTO: 4.46 MILLION/UL (ref 3.81–5.12)
SODIUM SERPL-SCNC: 141 MMOL/L (ref 136–145)
TRIGL SERPL-MCNC: 167 MG/DL
TSH SERPL DL<=0.05 MIU/L-ACNC: 1.69 UIU/ML (ref 0.36–3.74)
WBC # BLD AUTO: 6.63 THOUSAND/UL (ref 4.31–10.16)

## 2019-04-26 PROCEDURE — 80061 LIPID PANEL: CPT

## 2019-04-26 PROCEDURE — 83690 ASSAY OF LIPASE: CPT

## 2019-04-26 PROCEDURE — 84443 ASSAY THYROID STIM HORMONE: CPT

## 2019-04-26 PROCEDURE — 82150 ASSAY OF AMYLASE: CPT

## 2019-04-26 PROCEDURE — 36415 COLL VENOUS BLD VENIPUNCTURE: CPT

## 2019-04-26 PROCEDURE — 85027 COMPLETE CBC AUTOMATED: CPT

## 2019-04-26 PROCEDURE — 80053 COMPREHEN METABOLIC PANEL: CPT

## 2019-04-26 PROCEDURE — 86803 HEPATITIS C AB TEST: CPT

## 2019-05-16 ENCOUNTER — OFFICE VISIT (OUTPATIENT)
Dept: CARDIOLOGY CLINIC | Facility: CLINIC | Age: 64
End: 2019-05-16
Payer: MEDICARE

## 2019-05-16 VITALS
HEART RATE: 61 BPM | OXYGEN SATURATION: 97 % | BODY MASS INDEX: 28.67 KG/M2 | HEIGHT: 63 IN | SYSTOLIC BLOOD PRESSURE: 100 MMHG | DIASTOLIC BLOOD PRESSURE: 70 MMHG | WEIGHT: 161.8 LBS

## 2019-05-16 DIAGNOSIS — E03.9 HYPOTHYROIDISM, UNSPECIFIED TYPE: ICD-10-CM

## 2019-05-16 DIAGNOSIS — M79.7 FIBROMYALGIA: ICD-10-CM

## 2019-05-16 DIAGNOSIS — G47.33 OBSTRUCTIVE SLEEP APNEA HYPOPNEA, MILD: ICD-10-CM

## 2019-05-16 DIAGNOSIS — R07.9 CHEST PAIN, UNSPECIFIED TYPE: ICD-10-CM

## 2019-05-16 DIAGNOSIS — R94.31 ABNORMAL EKG: ICD-10-CM

## 2019-05-16 DIAGNOSIS — I83.812 VARICOSE VEINS OF LEFT LOWER EXTREMITY WITH PAIN: ICD-10-CM

## 2019-05-16 DIAGNOSIS — E78.5 DYSLIPIDEMIA: ICD-10-CM

## 2019-05-16 DIAGNOSIS — R73.01 IMPAIRED FASTING GLUCOSE: ICD-10-CM

## 2019-05-16 PROCEDURE — 99214 OFFICE O/P EST MOD 30 MIN: CPT | Performed by: INTERNAL MEDICINE

## 2019-05-16 RX ORDER — DIPHENOXYLATE HYDROCHLORIDE AND ATROPINE SULFATE 2.5; .025 MG/1; MG/1
1 TABLET ORAL DAILY
COMMUNITY

## 2019-05-16 RX ORDER — CETIRIZINE HYDROCHLORIDE 10 MG/1
10 TABLET, CHEWABLE ORAL DAILY
COMMUNITY
End: 2019-06-27 | Stop reason: ALTCHOICE

## 2019-05-16 RX ORDER — MELATONIN
2000 DAILY
COMMUNITY

## 2019-06-01 DIAGNOSIS — E03.9 HYPOTHYROIDISM, UNSPECIFIED TYPE: ICD-10-CM

## 2019-06-01 DIAGNOSIS — E78.5 HYPERLIPIDEMIA, UNSPECIFIED HYPERLIPIDEMIA TYPE: ICD-10-CM

## 2019-06-01 DIAGNOSIS — E07.89 THYROID FULLNESS: ICD-10-CM

## 2019-06-01 RX ORDER — LEVOTHYROXINE SODIUM 0.03 MG/1
25 TABLET ORAL DAILY
Qty: 90 TABLET | Refills: 1 | Status: SHIPPED | OUTPATIENT
Start: 2019-06-01 | End: 2020-06-10 | Stop reason: CLARIF

## 2019-06-04 ENCOUNTER — TRANSCRIBE ORDERS (OUTPATIENT)
Dept: RADIOLOGY | Facility: CLINIC | Age: 64
End: 2019-06-04

## 2019-06-04 ENCOUNTER — OFFICE VISIT (OUTPATIENT)
Dept: FAMILY MEDICINE CLINIC | Facility: CLINIC | Age: 64
End: 2019-06-04
Payer: MEDICARE

## 2019-06-04 ENCOUNTER — APPOINTMENT (OUTPATIENT)
Dept: RADIOLOGY | Facility: CLINIC | Age: 64
End: 2019-06-04
Payer: COMMERCIAL

## 2019-06-04 VITALS
WEIGHT: 160 LBS | SYSTOLIC BLOOD PRESSURE: 118 MMHG | DIASTOLIC BLOOD PRESSURE: 74 MMHG | HEART RATE: 68 BPM | BODY MASS INDEX: 28.35 KG/M2 | TEMPERATURE: 97.4 F | RESPIRATION RATE: 12 BRPM | HEIGHT: 63 IN

## 2019-06-04 DIAGNOSIS — R52 PAIN OF MULTIPLE SITES: ICD-10-CM

## 2019-06-04 DIAGNOSIS — R22.1 NECK SWELLING: ICD-10-CM

## 2019-06-04 DIAGNOSIS — R13.10 DYSPHAGIA, UNSPECIFIED TYPE: ICD-10-CM

## 2019-06-04 DIAGNOSIS — S13.4XXA WHIPLASH INJURIES, INITIAL ENCOUNTER: ICD-10-CM
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
## 2019-06-04 DIAGNOSIS — J44.1 CHRONIC OBSTRUCTIVE PULMONARY DISEASE WITH ACUTE EXACERBATION (HCC): ICD-10-CM

## 2019-06-04 PROCEDURE — 71130 X-RAY STRENOCLAVIC JT 3/>VWS: CPT

## 2019-06-04 PROCEDURE — 99213 OFFICE O/P EST LOW 20 MIN: CPT | Performed by: NURSE PRACTITIONER

## 2019-06-06 ENCOUNTER — TELEPHONE (OUTPATIENT)
Dept: FAMILY MEDICINE CLINIC | Facility: CLINIC | Age: 64
End: 2019-06-06

## 2019-06-11 ENCOUNTER — TELEPHONE (OUTPATIENT)
Dept: FAMILY MEDICINE CLINIC | Facility: CLINIC | Age: 64
End: 2019-06-11

## 2019-06-11 DIAGNOSIS — S22.31XA CLOSED FRACTURE OF ONE RIB OF RIGHT SIDE, INITIAL ENCOUNTER: ICD-10-CM

## 2019-06-11 DIAGNOSIS — R52 PAIN OF MULTIPLE SITES: ICD-10-CM
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
## 2019-06-11 DIAGNOSIS — E04.1 THYROID NODULE: ICD-10-CM

## 2019-06-13 ENCOUNTER — APPOINTMENT (OUTPATIENT)
Dept: RADIOLOGY | Facility: CLINIC | Age: 64
End: 2019-06-13
Payer: COMMERCIAL

## 2019-06-13 ENCOUNTER — HOSPITAL ENCOUNTER (OUTPATIENT)
Dept: RADIOLOGY | Facility: CLINIC | Age: 64
Discharge: HOME | End: 2019-06-13
Payer: COMMERCIAL

## 2019-06-13 ENCOUNTER — TRANSCRIBE ORDERS (OUTPATIENT)
Dept: RADIOLOGY | Facility: CLINIC | Age: 64
End: 2019-06-13

## 2019-06-13 DIAGNOSIS — R52 PAIN OF MULTIPLE SITES: ICD-10-CM
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
## 2019-06-13 DIAGNOSIS — E04.1 THYROID NODULE: ICD-10-CM

## 2019-06-13 DIAGNOSIS — S22.31XA CLOSED FRACTURE OF ONE RIB OF RIGHT SIDE, INITIAL ENCOUNTER: ICD-10-CM

## 2019-06-13 PROCEDURE — 71111 X-RAY EXAM RIBS/CHEST4/> VWS: CPT

## 2019-06-13 PROCEDURE — 76536 US EXAM OF HEAD AND NECK: CPT

## 2019-06-18 ENCOUNTER — TELEPHONE (OUTPATIENT)
Dept: FAMILY MEDICINE CLINIC | Facility: CLINIC | Age: 64
End: 2019-06-18

## 2019-06-18 NOTE — TELEPHONE ENCOUNTER
That would be great! She advised my  that she called your office to make an appointment  She was told your office does not deal with the issue  Thanks a million!

## 2019-06-27 ENCOUNTER — OFFICE VISIT (OUTPATIENT)
Dept: OBGYN CLINIC | Facility: CLINIC | Age: 64
End: 2019-06-27
Payer: COMMERCIAL

## 2019-06-27 VITALS
HEART RATE: 89 BPM | HEIGHT: 64 IN | SYSTOLIC BLOOD PRESSURE: 103 MMHG | WEIGHT: 162.2 LBS | DIASTOLIC BLOOD PRESSURE: 65 MMHG | BODY MASS INDEX: 27.69 KG/M2

## 2019-06-27 DIAGNOSIS — S20.211A CONTUSION OF RIB ON RIGHT SIDE, INITIAL ENCOUNTER: Primary | ICD-10-CM

## 2019-06-27 DIAGNOSIS — S22.43XA CLOSED FRACTURE OF MULTIPLE RIBS OF BOTH SIDES, INITIAL ENCOUNTER: ICD-10-CM

## 2019-06-27 PROCEDURE — 99213 OFFICE O/P EST LOW 20 MIN: CPT | Performed by: ORTHOPAEDIC SURGERY

## 2019-06-27 RX ORDER — LIDOCAINE 50 MG/G
1 PATCH TOPICAL DAILY
Qty: 30 PATCH | Refills: 1 | Status: SHIPPED | OUTPATIENT
Start: 2019-06-27 | End: 2019-08-13 | Stop reason: SDUPTHER

## 2019-07-02 ENCOUNTER — EVALUATION (OUTPATIENT)
Dept: PHYSICAL THERAPY | Facility: CLINIC | Age: 64
End: 2019-07-02
Payer: COMMERCIAL

## 2019-07-02 VITALS — SYSTOLIC BLOOD PRESSURE: 102 MMHG | DIASTOLIC BLOOD PRESSURE: 70 MMHG

## 2019-07-02 DIAGNOSIS — S13.9XXD CERVICAL SPRAIN, SUBSEQUENT ENCOUNTER: Primary | ICD-10-CM

## 2019-07-02 PROCEDURE — 97162 PT EVAL MOD COMPLEX 30 MIN: CPT | Performed by: PHYSICAL THERAPIST
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
## 2019-07-05 ENCOUNTER — OFFICE VISIT (OUTPATIENT)
Dept: PHYSICAL THERAPY | Facility: CLINIC | Age: 64
End: 2019-07-05
Payer: COMMERCIAL

## 2019-07-05 DIAGNOSIS — S13.9XXD CERVICAL SPRAIN, SUBSEQUENT ENCOUNTER: Primary | ICD-10-CM

## 2019-07-05 PROCEDURE — 97110 THERAPEUTIC EXERCISES: CPT | Performed by: PHYSICAL THERAPIST

## 2019-07-05 PROCEDURE — 97112 NEUROMUSCULAR REEDUCATION: CPT | Performed by: PHYSICAL THERAPIST

## 2019-07-05 NOTE — PROGRESS NOTES
Daily Note     Today's date: 2019  Patient name: Pablo Cortes  : 1955  MRN: [id number]  Referring provider: TANI Amador  Dx:   Encounter Diagnosis     ICD-10-CM    1  Cervical sprain, subsequent encounter S13  9XXD                   Subjective: Patient reports rib cage pain, coughing is very difficult  Objective: See treatment diary below      Assessment: Tolerated treatment fair  Patient demonstrated fatigue post treatment and exhibited good technique with therapeutic exercises      Plan: Continue per plan of care  Precautions: Fx 2nd rib on right        Manual                                                                                   Exercise Diary              Nustep 11 min L1            Pulleys 20x            FIS w PB 20x            Sh flex in supine 20x            Diagphragmatic breathing 10x                                                                                                                                                                                                                   Modalities

## 2019-07-09 ENCOUNTER — OFFICE VISIT (OUTPATIENT)
Dept: PHYSICAL THERAPY | Facility: CLINIC | Age: 64
End: 2019-07-09
Payer: COMMERCIAL

## 2019-07-09 DIAGNOSIS — S13.9XXD CERVICAL SPRAIN, SUBSEQUENT ENCOUNTER: Primary | ICD-10-CM

## 2019-07-09 PROCEDURE — 97110 THERAPEUTIC EXERCISES: CPT | Performed by: PHYSICAL THERAPIST

## 2019-07-09 PROCEDURE — 97140 MANUAL THERAPY 1/> REGIONS: CPT | Performed by: PHYSICAL THERAPIST

## 2019-07-09 NOTE — PROGRESS NOTES
Daily Note     Today's date: 2019  Patient name: Khadra Alexandre  : 1955  MRN: [id number]  Referring provider: TANI Alford  Dx: No diagnosis found  Subjective: My right shoulder is severely painful, denies any new injury  Objective: See treatment diary below      Assessment: Tolerated treatment well  Patient demonstrated fatigue post treatment and exhibited good technique with therapeutic exercises      Plan: Continue per plan of care  Precautions: Fx 2nd rib on right  Manual              STM c-spine  perf                                                                     Exercise Diary             Nustep 11 min L1 10 min           Pulleys 20x 20x           FIS w PB 20x 20x           Sh flex in supine 20x            Diagphragmatic breathing 10x            TB Scap retraction  Y 20x           TB ER,IR  Y 20x           Wall reach   20x           Wall reach w lift off  20x                                                                                                                                                              Modalities

## 2019-07-11 ENCOUNTER — OFFICE VISIT (OUTPATIENT)
Dept: PHYSICAL THERAPY | Facility: CLINIC | Age: 64
End: 2019-07-11
Payer: COMMERCIAL

## 2019-07-11 DIAGNOSIS — S13.9XXD CERVICAL SPRAIN, SUBSEQUENT ENCOUNTER: Primary | ICD-10-CM

## 2019-07-11 PROCEDURE — 97110 THERAPEUTIC EXERCISES: CPT | Performed by: PHYSICAL THERAPIST

## 2019-07-11 PROCEDURE — 97140 MANUAL THERAPY 1/> REGIONS: CPT | Performed by: PHYSICAL THERAPIST

## 2019-07-11 PROCEDURE — 97112 NEUROMUSCULAR REEDUCATION: CPT | Performed by: PHYSICAL THERAPIST

## 2019-07-11 NOTE — PROGRESS NOTES
Daily Note     Today's date: 2019  Patient name: Jonh Gusman  : 1955  MRN: [id number]  Referring provider: TANI Astorga  Dx:   Encounter Diagnosis     ICD-10-CM    1  Cervical sprain, subsequent encounter S13  9XXD                   Subjective: I am in a lot of pain, neck ribs and shoulders      Objective: See treatment diary below      Assessment: Tolerated treatment well  Patient demonstrated fatigue post treatment and exhibited good technique with therapeutic exercises      Plan: Continue per plan of care  Precautions: Fx 2nd rib on right  Manual             STM c-spine  perf   perf                                                                  Exercise Diary            Nustep 11 min L1 10 min 14 min          Pulleys 20x 20x 20x          FIS w PB 20x 20x 20x          Sh flex in supine 20x  20x          Diagphragmatic breathing 10x            TB Scap retraction  Y 20x 20x          TB ER,IR  Y 20x 20x          Wall reach   20x 20x          Wall reach w lift off  20x 20x                                                                                                                                                             Modalities

## 2019-07-16 ENCOUNTER — OFFICE VISIT (OUTPATIENT)
Dept: PHYSICAL THERAPY | Facility: CLINIC | Age: 64
End: 2019-07-16
Payer: COMMERCIAL

## 2019-07-16 DIAGNOSIS — S13.9XXD CERVICAL SPRAIN, SUBSEQUENT ENCOUNTER: Primary | ICD-10-CM

## 2019-07-16 PROCEDURE — 97110 THERAPEUTIC EXERCISES: CPT | Performed by: PHYSICAL THERAPIST

## 2019-07-16 PROCEDURE — 97140 MANUAL THERAPY 1/> REGIONS: CPT | Performed by: PHYSICAL THERAPIST

## 2019-07-16 NOTE — PROGRESS NOTES
Daily Note     Today's date: 2019  Patient name: Hardeep Shipman  : 1955  MRN: [id number]  Referring provider: TANI Barry  Dx:   Encounter Diagnosis     ICD-10-CM    1  Cervical sprain, subsequent encounter S13  9XXD                   Subjective: Pt reports that she is feeling better overall  Pt states that arms are not as painful, but her neck is really bothering her  Pt states that R side of neck hurts more and she gets tingling sensation down R arm and hand occasionally  Objective: See treatment diary below      Assessment: Tolerated treatment well  Patient demonstrated fatigue post treatment and exhibited good technique with therapeutic exercises  Pt c/o of pain around L shoulder blade w/ scap retractions and rows and c/o soreness in R deltoid with wall reaches  Pt had significant tightness in R cervical spine and upper trap  Pt reported feeling less stiff after STM to cervical spine and manual stretching  Treatment provided by Casey Bowens, SPT and supervised by Macy Mera, PT  Plan: Continue per plan of care  Precautions: Fx 2nd rib on right  Manual            STM c-spine  perf   perf perf         UT stretch B/L    5x10s each                                                    Exercise Diary           Nustep 11 min L1 10 min 14 min 15 min L1         Pulleys 20x 20x 20x --         FIS w PB 20x 20x 20x --         Sh flex in supine 20x  20x --         Diagphragmatic breathing 10x   --         TB Scap retraction  Y 20x 20x Yellow  20x         TB ER,IR  Y 20x 20x --         Wall reach   20x 20x [id number]         Wall reach w lift off  20x 20x --         TB rows    yellow 20x         Doorway stretch    Arms low 5x15s                                                                                                                                  Modalities

## 2019-07-18 ENCOUNTER — OFFICE VISIT (OUTPATIENT)
Dept: PHYSICAL THERAPY | Facility: CLINIC | Age: 64
End: 2019-07-18
Payer: COMMERCIAL

## 2019-07-18 DIAGNOSIS — S13.9XXD CERVICAL SPRAIN, SUBSEQUENT ENCOUNTER: Primary | ICD-10-CM

## 2019-07-18 PROCEDURE — 97140 MANUAL THERAPY 1/> REGIONS: CPT | Performed by: PHYSICAL THERAPIST

## 2019-07-18 PROCEDURE — 97110 THERAPEUTIC EXERCISES: CPT | Performed by: PHYSICAL THERAPIST

## 2019-07-18 NOTE — PROGRESS NOTES
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 and afterwards had moderate pain in her arms, but they feel better now  Pt states that she feels like she is getting better  Objective: See treatment diary below      Assessment: Tolerated treatment well  Patient demonstrated fatigue post treatment and exhibited good technique with therapeutic exercises  Pt is able to tolerate treatment better compared to previous visits  Pt c/o pain w/ wall slide and lift offs, but pain stopped when pt was instructed to reach submax  Pt presented with less tightness in cervical spine musculature compared to last visit, but R was still worse than L  Cervical spine significantly loosened up post STM to cervical spine  Treatment provided by Casey Bowens, ROLANDO and directly supervised by Macy Mera, PT  Plan: Continue per plan of care  Precautions: Fx 2nd rib on right  Manual           STM c-spine  perf   perf perf perf        UT stretch B/L    5x10s each                                                    Exercise Diary          Nustep 11 min L1 10 min 14 min 15 min L1 15 min L1        Pulleys 20x 20x 20x -- --        FIS w PB 20x 20x 20x -- --        Sh flex in supine 20x  20x -- --        Diagphragmatic breathing 10x   -- --        TB Scap retraction  Y 20x 20x Yellow  20x Yellow 20x        TB ER,IR  Y 20x 20x -- Yellow 20x ea B/L        Wall reach   20x 20x [id number] 20x5s        Wall reach w lift off  20x 20x -- 20x        TB rows    yellow 20x yellow 20x        Doorway stretch    Arms low 5x15s Arms low  10x15s        TB extension     20x                                                                                                                    Modalities

## 2019-07-22 ENCOUNTER — OFFICE VISIT (OUTPATIENT)
Dept: PHYSICAL THERAPY | Facility: CLINIC | Age: 64
End: 2019-07-22
Payer: COMMERCIAL

## 2019-07-22 DIAGNOSIS — S13.9XXD CERVICAL SPRAIN, SUBSEQUENT ENCOUNTER: Primary | ICD-10-CM

## 2019-07-22 PROCEDURE — 97112 NEUROMUSCULAR REEDUCATION: CPT

## 2019-07-22 PROCEDURE — 97110 THERAPEUTIC EXERCISES: CPT

## 2019-07-22 PROCEDURE — 97140 MANUAL THERAPY 1/> REGIONS: CPT

## 2019-07-22 NOTE — PROGRESS NOTES
Daily Note     Today's date: 2019  Patient name: Veronika Marquez  : 1955  MRN: [id number]  Referring provider: TANI Estrada  Dx:   Encounter Diagnosis     ICD-10-CM    1  Cervical sprain, subsequent encounter S13  9XXD                   Subjective: Pt reports that the pain in her ribs is feeling better, but she continues to have pain in her neck and bilateral knees  Pt states that she is going to talk to the orthopedic about getting a cervical CT scan  Objective: See treatment diary below      Assessment: Tolerated treatment well  Patient demonstrated fatigue post treatment and exhibited good technique with therapeutic exercises  Pt presented with mild tightness in L cervical spine and upper trap and moderate-severe tightness in R cervical spine, upper trap, and levator scap  Pt responded with decreased pain and tightness in cervical spine musculature post STM and stretching  Pt introduced to upper trap, levator scap, and cross body stretching, and reported decreased pain and stiffness in cervical spine and scapular musculature  Pt cued for good upright posture during T-band rows and stretches  Pt c/o of pain at medial border of B/L scapulae during T-band rows and doorway stretch, R worse than L, which felt better after cross body stretching  Pt instructed to perform upper trap, levator scap, and cross body stretch for HEP 5x15s B/L 3x per day  Treatment provided by ROLANDO Manuel and directly supervised by [facility], PT  Plan: Continue per plan of care  Precautions: Fx 2nd rib on right  Manual          STM c-spine  perf   perf perf perf perf       UT stretch B/L    5x10s each                                                    Exercise Diary         Nustep 11 min L1 10 min 14 min 15 min L1 15 min L1 15 min L1       Pulleys 20x 20x 20x -- -- --       FIS w PB 20x 20x 20x -- -- --       Sh flex in supine 20x  20x -- -- --       Diagphragmatic breathing 10x   -- -- --       TB Scap retraction  Y 20x 20x Yellow  20x Yellow 20x --       TB ER,IR  Y 20x 20x -- Yellow 20x ea B/L Red 20x each B/L       Wall reach   20x 20x [id number] 20x5s --       Wall reach w lift off  20x 20x -- 20x --       TB rows    yellow 20x yellow 20x yellow 20x       Doorway stretch    Arms low 5x15s Arms low  10x15s Arms shoulder height 3x30s       TB extension     20x --       Upper trap stretch      5x15s B/L       Levator scap stretch      5x15s B/L       Cross body stretch      5x15s B/L                                                                            Modalities

## 2019-07-24 ENCOUNTER — OFFICE VISIT (OUTPATIENT)
Dept: PHYSICAL THERAPY | Facility: CLINIC | Age: 64
End: 2019-07-24
Payer: COMMERCIAL

## 2019-07-24 DIAGNOSIS — S13.9XXD CERVICAL SPRAIN, SUBSEQUENT ENCOUNTER: Primary | ICD-10-CM

## 2019-07-24 PROCEDURE — 97112 NEUROMUSCULAR REEDUCATION: CPT

## 2019-07-24 PROCEDURE — 97110 THERAPEUTIC EXERCISES: CPT

## 2019-07-24 PROCEDURE — 97140 MANUAL THERAPY 1/> REGIONS: CPT

## 2019-07-24 NOTE — PROGRESS NOTES
Daily Note     Today's date: 2019  Patient name: Shellie Brower  : 1955  MRN: [id number]  Referring provider: TANI Leija  Dx:   Encounter Diagnosis     ICD-10-CM    1  Cervical sprain, subsequent encounter S13  9XXD                   Subjective: Pt reports that the pain in her ribs is feeling better, but she continues to have pain in her neck right shoulder and bilateral knees  Objective: See treatment diary below    Assessment: Tolerated treatment well  Patient demonstrated fatigue post treatment and exhibited good technique with therapeutic exercises  Pt reported neck was still sore after session maybe a slight increase in pain  Plan: Continue per plan of care  Precautions: Fx 2nd rib on right  Manual         STM c-spine  perf   perf perf perf perf perf      UT stretch B/L    5x10s each         Cervical traction       4 min      Cervical lat flex mobs       Gr 2-3; 4 min                       Exercise Diary        Nustep 11 min L1 10 min 14 min 15 min L1 15 min L1 15 min L1 15 min      Pulleys 20x 20x 20x -- -- -- 20x      FIS w PB 20x 20x 20x -- -- --       Sh flex in supine 20x  20x -- -- --       Diagphragmatic breathing 10x   -- -- --       TB Scap retraction  Y 20x 20x Yellow  20x Yellow 20x -- yellow 20x      TB ER,IR  Y 20x 20x -- Yellow 20x ea B/L Red 20x each B/L Red 20x each      Wall reach   20x 20x [id number] 20x5s -- 20 x 5 sec      Wall reach w lift off  20x 20x -- 20x --       TB rows    yellow 20x yellow 20x yellow 20x yellow 20x      Doorway stretch    Arms low 5x15s Arms low  10x15s Arms shoulder height 3x30s 3 x 30 sec      TB extension     20x --       Upper trap stretch      5x15s B/L       Levator scap stretch      5x15s B/L       Cross body stretch      5x15s B/L       IR/ER with manual resist       Jose: combo of iso and RS                                                              Modalities

## 2019-07-25 DIAGNOSIS — E78.5 HYPERLIPIDEMIA, UNSPECIFIED HYPERLIPIDEMIA TYPE: ICD-10-CM

## 2019-07-26 RX ORDER — ATORVASTATIN CALCIUM 20 MG/1
TABLET, FILM COATED ORAL
Qty: 90 TABLET | Refills: 0 | Status: SHIPPED | OUTPATIENT
Start: 2019-07-26 | End: 2020-01-23 | Stop reason: SDUPTHER

## 2019-07-29 ENCOUNTER — OFFICE VISIT (OUTPATIENT)
Dept: PHYSICAL THERAPY | Facility: CLINIC | Age: 64
End: 2019-07-29
Payer: COMMERCIAL

## 2019-07-29 DIAGNOSIS — S13.9XXD CERVICAL SPRAIN, SUBSEQUENT ENCOUNTER: Primary | ICD-10-CM

## 2019-07-29 PROCEDURE — 97110 THERAPEUTIC EXERCISES: CPT | Performed by: PHYSICAL THERAPIST

## 2019-07-29 PROCEDURE — 97140 MANUAL THERAPY 1/> REGIONS: CPT | Performed by: PHYSICAL THERAPIST

## 2019-07-29 PROCEDURE — 97112 NEUROMUSCULAR REEDUCATION: CPT | Performed by: PHYSICAL THERAPIST

## 2019-07-29 NOTE — PROGRESS NOTES
Daily Note     Today's date: 2019  Patient name: Adriana Hughes  : 1955  MRN: [id number]  Referring provider: TANI Morales  Dx:   Encounter Diagnosis     ICD-10-CM    1  Cervical sprain, subsequent encounter S13  9XXD                   Subjective: Pt reports her neck is bothering her more than usual and she had soreness in shoulder after joint mobilizations last session, but it feels better today  Pt states her knees have been hurting a lot more recently as well and she thinks it is because she did a lot of work outside yesterday  Pt reports that she has been compliant with doorway, UT, and LS stretching HEP and it has been helping her pain occasionally  Objective: See treatment diary below      Assessment: Tolerated treatment well  Patient demonstrated fatigue post treatment and exhibited good technique with therapeutic exercises  Pt continues to present with moderate stiffness in R lower cervical spine, which decreased after STM and traction to cervical spine  Pt is able tolerate PT better compared to previous visits  HEP reviewed with patient to reinforce importance of repetitive stretching and awareness of good posture  Treatment provided by Courtney Rock SPT and directly supervised by Maria M Cramer PT  Plan: Continue per plan of care  Precautions: Fx 2nd rib on right  Manual        STM c-spine  perf   perf perf perf perf perf perf     UT stretch B/L    5x10s each    --     Cervical traction       4 min 4 min     Cervical lat flex mobs       Gr 2-3; 4 min --                      Exercise Diary       Nustep 11 min L1 10 min 14 min 15 min L1 15 min L1 15 min L1 15 min 15 min L1     Pulleys 20x 20x 20x -- -- -- 20x --     FIS w PB 20x 20x 20x -- -- --  --     Sh flex in supine 20x  20x -- -- --  --     Diagphragmatic breathing 10x   -- -- --  --     TB Scap retraction  Y 20x 20x Yellow  20x Yellow 20x -- yellow 20x Red 30x     TB ER,IR  Y 20x 20x -- Yellow 20x ea B/L Red 20x each B/L Red 20x each Red 30x each     Wall reach   20x 20x [id number] 20x5s -- 20 x 5 sec 20x5s     Wall reach w lift off  20x 20x -- 20x --  20x     TB rows    yellow 20x yellow 20x yellow 20x yellow 20x Red 30x     Doorway stretch    Arms low 5x15s Arms low  10x15s Arms shoulder height 3x30s 3 x 30 sec 3x30s     TB extension     20x --  Red 30x     Upper trap stretch      5x15s B/L  HEP     Levator scap stretch      5x15s B/L  HEP     Cross body stretch      5x15s B/L  HEP     IR/ER with manual resist       Jose: combo of iso and RS --                                                             Modalities

## 2019-08-01 ENCOUNTER — OFFICE VISIT (OUTPATIENT)
Dept: PHYSICAL THERAPY | Facility: CLINIC | Age: 64
End: 2019-08-01
Payer: COMMERCIAL

## 2019-08-01 DIAGNOSIS — S13.9XXD CERVICAL SPRAIN, SUBSEQUENT ENCOUNTER: Primary | ICD-10-CM

## 2019-08-01 PROCEDURE — 97112 NEUROMUSCULAR REEDUCATION: CPT | Performed by: PHYSICAL THERAPIST

## 2019-08-01 PROCEDURE — 97110 THERAPEUTIC EXERCISES: CPT | Performed by: PHYSICAL THERAPIST

## 2019-08-01 PROCEDURE — 97140 MANUAL THERAPY 1/> REGIONS: CPT | Performed by: PHYSICAL THERAPIST
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
## 2019-08-05 ENCOUNTER — OFFICE VISIT (OUTPATIENT)
Dept: PHYSICAL THERAPY | Facility: CLINIC | Age: 64
End: 2019-08-05
Payer: COMMERCIAL

## 2019-08-05 DIAGNOSIS — S13.9XXD CERVICAL SPRAIN, SUBSEQUENT ENCOUNTER: Primary | ICD-10-CM

## 2019-08-05 PROCEDURE — 97140 MANUAL THERAPY 1/> REGIONS: CPT | Performed by: PHYSICAL THERAPIST

## 2019-08-05 PROCEDURE — 97110 THERAPEUTIC EXERCISES: CPT | Performed by: PHYSICAL THERAPIST

## 2019-08-05 PROCEDURE — 97112 NEUROMUSCULAR REEDUCATION: CPT | Performed by: PHYSICAL THERAPIST

## 2019-08-05 NOTE — PROGRESS NOTES
Daily Note     Today's date: 2019  Patient name: Baron Bee  : 1955  MRN: [id number]  Referring provider: TANI Becker  Dx:   Encounter Diagnosis     ICD-10-CM    1  Cervical sprain, subsequent encounter S13  9XXD                   Subjective: The right side is very tight, especially with reaching with my right arm  Objective: See treatment diary below      Assessment: Tolerated treatment well  Patient demonstrated fatigue post treatment      Plan: Continue per plan of care  Precautions: Fx 2nd rib on right        Neurac Daily Treatment Diary     Manual         STM fig 8 perf       Manual traction 10x                                   Exercise Diary         Retraction 2x5       Rotation Right        Rotation Left        Lat Flex Right        Lat Flex Left        Extension        Scap retract supine 2x5       Scap retract w depression sit 2x5       Nustep  10 min L1           TB [place] Gr 2x10       Pectoralis corner stretch 10x

## 2019-08-08 ENCOUNTER — OFFICE VISIT (OUTPATIENT)
Dept: PHYSICAL THERAPY | Facility: CLINIC | Age: 64
End: 2019-08-08
Payer: COMMERCIAL

## 2019-08-08 DIAGNOSIS — S13.9XXD CERVICAL SPRAIN, SUBSEQUENT ENCOUNTER: Primary | ICD-10-CM

## 2019-08-08 PROCEDURE — 97110 THERAPEUTIC EXERCISES: CPT | Performed by: PHYSICAL THERAPIST

## 2019-08-08 PROCEDURE — 97140 MANUAL THERAPY 1/> REGIONS: CPT | Performed by: PHYSICAL THERAPIST

## 2019-08-08 PROCEDURE — 97112 NEUROMUSCULAR REEDUCATION: CPT | Performed by: PHYSICAL THERAPIST

## 2019-08-08 NOTE — PROGRESS NOTES
Daily Note     Today's date: 2019  Patient name: Karissa Fountain  : 1955  MRN: [id number]  Referring provider: TANI Merritt  Dx:   Encounter Diagnosis     ICD-10-CM    1  Cervical sprain, subsequent encounter S13  9XXD                   Subjective: My neck gets very stiff often during the day      Objective: See treatment diary below      Assessment: Tolerated treatment well  Patient demonstrated fatigue post treatment and exhibited good technique with therapeutic exercises      Plan: Continue per plan of care  Precautions: Fx 2nd rib on right        Neurac Daily Treatment Diary     Manual        STM fig 8 perf perf      Manual traction 10x 10x                                  Exercise Diary        Retraction 2x5 2x5      Rotation Right  2x5      Rotation Left  2x5      Lat Flex Right        Lat Flex Left        Extension        Scap retract supine 2x5 2x5      Scap retract w depression sit 2x5 2x5      Nustep  10 min L1 15 min          TB [place] Gr 2x10 Gr 20x      Pectoralis corner stretch 10x 10x

## 2019-08-12 ENCOUNTER — OFFICE VISIT (OUTPATIENT)
Dept: PHYSICAL THERAPY | Facility: CLINIC | Age: 64
End: 2019-08-12
Payer: COMMERCIAL

## 2019-08-12 DIAGNOSIS — S13.9XXD CERVICAL SPRAIN, SUBSEQUENT ENCOUNTER: Primary | ICD-10-CM

## 2019-08-12 PROCEDURE — 97140 MANUAL THERAPY 1/> REGIONS: CPT | Performed by: PHYSICAL THERAPIST

## 2019-08-12 PROCEDURE — 97112 NEUROMUSCULAR REEDUCATION: CPT | Performed by: PHYSICAL THERAPIST

## 2019-08-12 PROCEDURE — 97110 THERAPEUTIC EXERCISES: CPT | Performed by: PHYSICAL THERAPIST

## 2019-08-12 NOTE — PROGRESS NOTES
Daily Note     Today's date: 2019  Patient name: Romeo Amor  : 1955  MRN: [id number]  Referring provider: TANI Alvarez  Dx:   Encounter Diagnosis     ICD-10-CM    1  Cervical sprain, subsequent encounter S13  9XXD                   Subjective: My neck is very stiff  To get CAT Scan results to bring for ortho eval       Objective: See treatment diary below      Assessment: Tolerated treatment well  Patient demonstrated fatigue post treatment and exhibited good technique with therapeutic exercises      Plan: Continue per plan of care  Precautions: Fx 2nd rib on right        Neurac Daily Treatment Diary     Manual       STM fig 8 perf perf perf     Manual traction 10x 10x 10x                                 Exercise Diary       Retraction 2x5 2x5 2x5     Rotation Right  2x5 2x5     Rotation Left  2x5 2x5     Lat Flex Right   2x5     Lat Flex Left   2x52x5     Extension        Scap retract supine 2x5 2x5 2x5     Scap retract w depression sit 2x5 2x5      Nustep  10 min L1 15 min 15 min         TB Aldridge Gr 2x10 Gr 20x      Pectoralis corner stretch 10x 10x

## 2019-08-13 ENCOUNTER — TELEPHONE (OUTPATIENT)
Dept: FAMILY MEDICINE CLINIC | Facility: CLINIC | Age: 64
End: 2019-08-13

## 2019-08-13 ENCOUNTER — OFFICE VISIT (OUTPATIENT)
Dept: FAMILY MEDICINE CLINIC | Facility: CLINIC | Age: 64
End: 2019-08-13
Payer: MEDICARE

## 2019-08-13 VITALS
OXYGEN SATURATION: 97 % | SYSTOLIC BLOOD PRESSURE: 120 MMHG | RESPIRATION RATE: 14 BRPM | BODY MASS INDEX: 27.45 KG/M2 | DIASTOLIC BLOOD PRESSURE: 80 MMHG | HEART RATE: 88 BPM | TEMPERATURE: 98 F | HEIGHT: 64 IN | WEIGHT: 160.8 LBS

## 2019-08-13 DIAGNOSIS — R73.01 IMPAIRED FASTING GLUCOSE: ICD-10-CM

## 2019-08-13 DIAGNOSIS — L21.9 SEBORRHEIC DERMATITIS OF SCALP: ICD-10-CM

## 2019-08-13 DIAGNOSIS — E03.9 HYPOTHYROIDISM, UNSPECIFIED TYPE: ICD-10-CM

## 2019-08-13 DIAGNOSIS — S22.43XS CLOSED FRACTURE OF MULTIPLE RIBS OF BOTH SIDES, SEQUELA: ICD-10-CM

## 2019-08-13 DIAGNOSIS — R05.9 COUGH: Primary | ICD-10-CM

## 2019-08-13 PROCEDURE — 99214 OFFICE O/P EST MOD 30 MIN: CPT | Performed by: FAMILY MEDICINE

## 2019-08-13 RX ORDER — LIDOCAINE 50 MG/G
1 PATCH TOPICAL DAILY
Qty: 30 PATCH | Refills: 1 | Status: SHIPPED | OUTPATIENT
Start: 2019-08-13 | End: 2020-06-10 | Stop reason: ALTCHOICE

## 2019-08-13 NOTE — TELEPHONE ENCOUNTER
Received notice that the prior auth for the Lido patch has been denied    Prior Auth required for Lidocaine Patch, submitted to insurance vis [employer]   Awaiting determinatiom

## 2019-08-15 ENCOUNTER — OFFICE VISIT (OUTPATIENT)
Dept: PHYSICAL THERAPY | Facility: CLINIC | Age: 64
End: 2019-08-15
Payer: COMMERCIAL

## 2019-08-15 DIAGNOSIS — S13.9XXD CERVICAL SPRAIN, SUBSEQUENT ENCOUNTER: Primary | ICD-10-CM

## 2019-08-15 PROCEDURE — 97112 NEUROMUSCULAR REEDUCATION: CPT | Performed by: PHYSICAL THERAPIST

## 2019-08-15 PROCEDURE — 97110 THERAPEUTIC EXERCISES: CPT | Performed by: PHYSICAL THERAPIST

## 2019-08-15 PROCEDURE — 97140 MANUAL THERAPY 1/> REGIONS: CPT | Performed by: PHYSICAL THERAPIST

## 2019-08-15 NOTE — PROGRESS NOTES
PT Re-Evaluation     Today's date: 8/15/2019  Patient name: Evan Colunga  : 1955  MRN: [id number]  Referring provider: TANI Aguilera  Dx:   Encounter Diagnosis     ICD-10-CM    1  Cervical sprain, subsequent encounter S13  9XXD                   Assessment  Assessment details: Patient is showing gradual progress of ROM, strength and return to functional status prior to injury  However, there continues to be functional limitations of the cervical spine and left shoulder which limit lifting ability  She would benefit with continued PT to restore prior level of function  Impairments: abnormal or restricted ROM, activity intolerance, impaired physical strength and pain with function    Goals  STG  Full ROM of c-spine in 3 weeks  Full ROM of left shoulder in 3 weeks  LTG  Normal 5/5 strength in 6 weeks  Able to lift 10 lbs overhead without pain in 6 weeks  FOTO > 57 in 6 weeks    Plan  Planned therapy interventions: manual therapy, joint mobilization, neuromuscular re-education, patient education, strengthening, stretching, therapeutic exercise and home exercise program  Frequency: 2x week  Duration in visits: 12  Duration in weeks: 6  Treatment plan discussed with: patient        Subjective Evaluation    History of Present Illness  Mechanism of injury: Overall I am able to tolerate more activity, but continue with severe neck, shoulder pain and low back pain    The [place] exercises have been very benficial   Pain  Current pain rating: 3  At best pain ratin  At worst pain ratin  Location: neck, left shoulder, low back  Quality: sharp, dull ache and discomfort  Aggravating factors: overhead activity and lifting  Progression: improved          Objective     Active Range of Motion   Cervical/Thoracic Spine       Cervical    Flexion: 80 degrees  with pain  Extension: 30 degrees     with pain  Left rotation: 75 degrees with pain  Right rotation: 85 degrees         Left Shoulder   Flexion: 150 degrees with pain  External rotation 0°: 75 degrees with pain    Right Shoulder   Flexion: 180 degrees   External rotation 0°: 80 degrees     Strength/Myotome Testing     Left Shoulder     Planes of Motion   Flexion: 3   Abduction: 3   External rotation at 0°: 3   Internal rotation at 0°: 4     Right Shoulder     Planes of Motion   Flexion: 5   Abduction: 5   External rotation at 0°: 5   Internal rotation at 0°: 5       Flowsheet Rows      Most Recent Value   PT/OT G-Codes   Current Score  37   Projected Score  57          Precautions: Fx 2nd rib on right        Neurac Daily Treatment Diary     Manual  8/5 8/8 8/12 8/15    STM fig 8 perf perf perf perf    Manual traction 10x 10x 10x 10x                                Exercise Diary  8/5 8/8 8/12 8/15    Retraction 2x5 2x5 2x5 2x5    Rotation Right  2x5 2x5 2x5    Rotation Left  2x5 2x5 2x5    Lat Flex Right   2x5 2x5    Lat Flex Left   2x52x5 2x5    Extension    2x5    Scap retract supine 2x5 2x5 2x5 2x5    Scap retract w depression sit 2x5 2x5  2x5    Nustep  10 min L1 15 min 15 min 15min        TB Aldridge Gr 2x10 Gr 20x  Gr 20x    Pectoralis corner stretch 10x 10x  10x

## 2019-08-20 ENCOUNTER — APPOINTMENT (OUTPATIENT)
Dept: RADIOLOGY | Facility: CLINIC | Age: 64
End: 2019-08-20
Payer: COMMERCIAL

## 2019-08-20 ENCOUNTER — OFFICE VISIT (OUTPATIENT)
Dept: OBGYN CLINIC | Facility: CLINIC | Age: 64
End: 2019-08-20
Payer: COMMERCIAL

## 2019-08-20 VITALS
BODY MASS INDEX: 28.35 KG/M2 | SYSTOLIC BLOOD PRESSURE: 119 MMHG | HEART RATE: 80 BPM | HEIGHT: 63 IN | DIASTOLIC BLOOD PRESSURE: 80 MMHG | WEIGHT: 160 LBS

## 2019-08-20 DIAGNOSIS — M47.22 CERVICAL RADICULOPATHY DUE TO DEGENERATIVE JOINT DISEASE OF SPINE: ICD-10-CM

## 2019-08-20 DIAGNOSIS — M25.562 PAIN IN BOTH KNEES, UNSPECIFIED CHRONICITY: ICD-10-CM

## 2019-08-20 DIAGNOSIS — M62.838 TRAPEZIUS MUSCLE SPASM: ICD-10-CM

## 2019-08-20 DIAGNOSIS — M17.0 PRIMARY LOCALIZED OSTEOARTHRITIS OF KNEES, BILATERAL: Primary | ICD-10-CM

## 2019-08-20 DIAGNOSIS — M25.561 PAIN IN BOTH KNEES, UNSPECIFIED CHRONICITY: ICD-10-CM

## 2019-08-20 PROCEDURE — 99214 OFFICE O/P EST MOD 30 MIN: CPT | Performed by: ORTHOPAEDIC SURGERY

## 2019-08-20 PROCEDURE — 73562 X-RAY EXAM OF KNEE 3: CPT
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
## 2019-08-21 ENCOUNTER — APPOINTMENT (OUTPATIENT)
Dept: LAB | Facility: CLINIC | Age: 64
End: 2019-08-21
Payer: MEDICARE

## 2019-08-21 DIAGNOSIS — R73.01 IMPAIRED FASTING GLUCOSE: ICD-10-CM

## 2019-08-21 DIAGNOSIS — E03.9 HYPOTHYROIDISM, UNSPECIFIED TYPE: ICD-10-CM

## 2019-08-21 DIAGNOSIS — R05.9 COUGH: ICD-10-CM

## 2019-08-21 LAB
ALBUMIN SERPL BCP-MCNC: 4.1 G/DL (ref 3.5–5)
ALP SERPL-CCNC: 75 U/L (ref 46–116)
ALT SERPL W P-5'-P-CCNC: 62 U/L (ref 12–78)
ANION GAP SERPL CALCULATED.3IONS-SCNC: 6 MMOL/L (ref 4–13)
AST SERPL W P-5'-P-CCNC: 28 U/L (ref 5–45)
BILIRUB SERPL-MCNC: 0.35 MG/DL (ref 0.2–1)
BUN SERPL-MCNC: 16 MG/DL (ref 5–25)
CALCIUM SERPL-MCNC: 9.4 MG/DL (ref 8.3–10.1)
CHLORIDE SERPL-SCNC: 107 MMOL/L (ref 100–108)
CHOLEST SERPL-MCNC: 177 MG/DL (ref 50–200)
CO2 SERPL-SCNC: 27 MMOL/L (ref 21–32)
CREAT SERPL-MCNC: 0.92 MG/DL (ref 0.6–1.3)
ERYTHROCYTE [DISTWIDTH] IN BLOOD BY AUTOMATED COUNT: 13.3 % (ref 11.6–15.1)
EST. AVERAGE GLUCOSE BLD GHB EST-MCNC: 128 MG/DL
GFR SERPL CREATININE-BSD FRML MDRD: 66 ML/MIN/1.73SQ M
GLUCOSE P FAST SERPL-MCNC: 108 MG/DL (ref 65–99)
HBA1C MFR BLD: 6.1 % (ref 4.2–6.3)
HCT VFR BLD AUTO: 42 % (ref 34.8–46.1)
HDLC SERPL-MCNC: 44 MG/DL (ref 40–60)
HGB BLD-MCNC: 13.2 G/DL (ref 11.5–15.4)
LDLC SERPL CALC-MCNC: 99 MG/DL (ref 0–100)
MCH RBC QN AUTO: 30.7 PG (ref 26.8–34.3)
MCHC RBC AUTO-ENTMCNC: 31.4 G/DL (ref 31.4–37.4)
MCV RBC AUTO: 98 FL (ref 82–98)
PLATELET # BLD AUTO: 339 THOUSANDS/UL (ref 149–390)
PMV BLD AUTO: 10.4 FL (ref 8.9–12.7)
POTASSIUM SERPL-SCNC: 4.4 MMOL/L (ref 3.5–5.3)
PROT SERPL-MCNC: 8.5 G/DL (ref 6.4–8.2)
RBC # BLD AUTO: 4.3 MILLION/UL (ref 3.81–5.12)
SODIUM SERPL-SCNC: 140 MMOL/L (ref 136–145)
TRIGL SERPL-MCNC: 172 MG/DL
TSH SERPL DL<=0.05 MIU/L-ACNC: 1.37 UIU/ML (ref 0.36–3.74)
WBC # BLD AUTO: 9.96 THOUSAND/UL (ref 4.31–10.16)

## 2019-08-21 PROCEDURE — 83036 HEMOGLOBIN GLYCOSYLATED A1C: CPT

## 2019-08-21 PROCEDURE — 80053 COMPREHEN METABOLIC PANEL: CPT

## 2019-08-21 PROCEDURE — 85027 COMPLETE CBC AUTOMATED: CPT

## 2019-08-21 PROCEDURE — 84443 ASSAY THYROID STIM HORMONE: CPT

## 2019-08-21 PROCEDURE — 80061 LIPID PANEL: CPT

## 2019-08-21 PROCEDURE — 36415 COLL VENOUS BLD VENIPUNCTURE: CPT

## 2019-08-28 ENCOUNTER — OFFICE VISIT (OUTPATIENT)
Dept: PHYSICAL THERAPY | Facility: CLINIC | Age: 64
End: 2019-08-28
Payer: COMMERCIAL

## 2019-08-28 DIAGNOSIS — S13.9XXD CERVICAL SPRAIN, SUBSEQUENT ENCOUNTER: Primary | ICD-10-CM

## 2019-08-28 PROCEDURE — 97140 MANUAL THERAPY 1/> REGIONS: CPT

## 2019-08-28 PROCEDURE — 97110 THERAPEUTIC EXERCISES: CPT

## 2019-08-28 NOTE — PROGRESS NOTES
Daily Note     Today's date: 2019  Patient name: Adriana Hughes  : 1955  MRN: [id number]  Referring provider: TANI Morales  Dx:   Encounter Diagnosis     ICD-10-CM    1  Cervical sprain, subsequent encounter S13  9XXD                   Subjective: My Neck hurts, my shoulder hurts and my knee hurts    Objective: See treatment diary below    Assessment: Tolerated treatment well  Patient demonstrated fatigue post treatment and exhibited good technique with therapeutic exercises    Plan: Continue per plan of care  Precautions: Fx 2nd rib on right          Manual      STM fig 8 perf perf perf     Manual traction 10x 10x 10x Perf        Gr 2-3 GH jt mobs                        Exercise Diary       Retraction 2x5 2x5 2x5 2x5    Rotation Right  2x5 2x5 2x5    Rotation Left  2x5 2x5 2x5    Lat Flex Right   2x5 2x5    Lat Flex Left   2x52x5 2x5    Extension    2x5    Scap retract supine 2x5 2x5 2x5 2x5    Scap retract w depression sit 2x5 2x5  2x5    Rows with tubi    2 x 10 Blue    Lats w tubing    2 x 10 blue    Bicep curls     3lbs 2 x 10    Tricep ext    8lbs 2 x 10    Nustep  10 min L1 15 min 15 min 5 min        TB Aldridge Gr 2x10 Gr 20x      Pectoralis corner stretch 10x 10x

## 2019-09-06 ENCOUNTER — OFFICE VISIT (OUTPATIENT)
Dept: PHYSICAL THERAPY | Facility: CLINIC | Age: 64
End: 2019-09-06
Payer: COMMERCIAL

## 2019-09-06 DIAGNOSIS — S13.9XXD CERVICAL SPRAIN, SUBSEQUENT ENCOUNTER: Primary | ICD-10-CM

## 2019-09-06 PROCEDURE — 97140 MANUAL THERAPY 1/> REGIONS: CPT | Performed by: PHYSICAL THERAPIST

## 2019-09-06 PROCEDURE — 97112 NEUROMUSCULAR REEDUCATION: CPT | Performed by: PHYSICAL THERAPIST

## 2019-09-06 PROCEDURE — 97110 THERAPEUTIC EXERCISES: CPT | Performed by: PHYSICAL THERAPIST

## 2019-09-06 NOTE — PROGRESS NOTES
Daily Note     Today's date: 2019  Patient name: Beaulah Castleman  : 1955  MRN: [id number]  Referring provider: TANI Lagos  Dx:   Encounter Diagnosis     ICD-10-CM    1  Cervical sprain, subsequent encounter S13  9XXD                   Subjective: My neck is very painful, getting muscle spasms in back and neck  Objective: See treatment diary below      Assessment: Tolerated treatment fair  Patient demonstrated fatigue post treatment and exhibited good technique with therapeutic exercises      Plan: Continue per plan of care  Precautions: Fx 2nd rib on right          Manual     STM fig 8 perf perf perf  perf   Manual traction 10x 10x 10x Perf perf       Gr 2-3 GH jt mobs                        Exercise Diary     Retraction 2x5 2x5 2x5 2x5 2x5   Rotation Right  2x5 2x5 2x5 2x5   Rotation Left  2x5 2x5 2x5 2x5   Lat Flex Right   2x5 2x5 2x5   Lat Flex Left   2x52x5 2x5 2x5   Extension    2x5 2x5   Scap retract supine 2x5 2x5 2x5 2x5 2x5   Scap retract w depression sit 2x5 2x5  2x5 2x5   Rows with tubi    2 x 10 Blue 20x   Lats w tubing    2 x 10 blue 20x   Bicep curls     3lbs 2 x 10    Tricep ext    8lbs 2 x 10    Nustep  10 min L1 15 min 15 min 5 min 10 min       TB Aldridge Gr 2x10 Gr 20x      Pectoralis corner stretch 10x 10x

## 2019-09-10 ENCOUNTER — OFFICE VISIT (OUTPATIENT)
Dept: PHYSICAL THERAPY | Facility: CLINIC | Age: 64
End: 2019-09-10
Payer: COMMERCIAL

## 2019-09-10 DIAGNOSIS — S13.9XXD CERVICAL SPRAIN, SUBSEQUENT ENCOUNTER: Primary | ICD-10-CM

## 2019-09-10 PROCEDURE — 97112 NEUROMUSCULAR REEDUCATION: CPT | Performed by: PHYSICAL THERAPIST

## 2019-09-10 PROCEDURE — 97110 THERAPEUTIC EXERCISES: CPT | Performed by: PHYSICAL THERAPIST

## 2019-09-10 PROCEDURE — 97140 MANUAL THERAPY 1/> REGIONS: CPT | Performed by: PHYSICAL THERAPIST

## 2019-09-10 NOTE — PROGRESS NOTES
Daily Note     Today's date: 9/10/2019  Patient name: Hardeep Shipman  : 1955  MRN: [id number]  Referring provider: TANI Barry  Dx:   Encounter Diagnosis     ICD-10-CM    1  Cervical sprain, subsequent encounter S13  9XXD                   Subjective: I am getting episodes of back spasms      Objective: See treatment diary below      Assessment: Tolerated treatment well  Patient demonstrated fatigue post treatment and exhibited good technique with therapeutic exercises      Plan: Continue per plan of care  Precautions: Fx 2nd rib on right        Neurac Daily Treatment Diary     Manual         stm perf                                           Exercise Diary         Retraction 2x5       Rotation Right 2x5       Rotation Left 2x5       Scap retract supine 2x5       Scap retract w depression sit 2x5       Nustep 10 min                                   Modalities

## 2019-09-12 ENCOUNTER — OFFICE VISIT (OUTPATIENT)
Dept: PHYSICAL THERAPY | Facility: CLINIC | Age: 64
End: 2019-09-12
Payer: COMMERCIAL

## 2019-09-12 DIAGNOSIS — S13.9XXD CERVICAL SPRAIN, SUBSEQUENT ENCOUNTER: Primary | ICD-10-CM

## 2019-09-12 PROCEDURE — 97112 NEUROMUSCULAR REEDUCATION: CPT | Performed by: PHYSICAL THERAPIST

## 2019-09-12 PROCEDURE — 97140 MANUAL THERAPY 1/> REGIONS: CPT | Performed by: PHYSICAL THERAPIST

## 2019-09-12 PROCEDURE — 97110 THERAPEUTIC EXERCISES: CPT | Performed by: PHYSICAL THERAPIST

## 2019-09-12 NOTE — PROGRESS NOTES
Daily Note     Today's date: 2019  Patient name: Darien Field  : 1955  MRN: [id number]  Referring provider: TANI Cloud  Dx:   Encounter Diagnosis     ICD-10-CM    1  Cervical sprain, subsequent encounter S13  9XXD                   Subjective: Reports low back pain > neck pain today, getting episodes of spasm  Objective: See treatment diary below      Assessment: Tolerated treatment well  Patient demonstrated fatigue post treatment and exhibited good technique with therapeutic exercises      Plan: Continue per plan of care  Precautions: Fx 2nd rib on right        Neurac Daily Treatment Diary     Manual        stm perf perf                                          Exercise Diary        Retraction 2x5 2x5      Rotation Right 2x5 2x5      Rotation Left 2x5 2x5      Scap retract supine 2x5 2x5      Scap retract w depression sit 2x5 2x5      Nustep 10 min       Supine pelvic lift  2x5      Supine bridge  2x5      SDLY Hip ABD  2x5          SDLY Hip ADD  2x5

## 2019-09-17 ENCOUNTER — OFFICE VISIT (OUTPATIENT)
Dept: PHYSICAL THERAPY | Facility: CLINIC | Age: 64
End: 2019-09-17
Payer: COMMERCIAL

## 2019-09-17 DIAGNOSIS — S13.9XXD CERVICAL SPRAIN, SUBSEQUENT ENCOUNTER: Primary | ICD-10-CM

## 2019-09-17 PROCEDURE — 97112 NEUROMUSCULAR REEDUCATION: CPT | Performed by: PHYSICAL THERAPIST

## 2019-09-17 PROCEDURE — 97110 THERAPEUTIC EXERCISES: CPT | Performed by: PHYSICAL THERAPIST

## 2019-09-17 PROCEDURE — 97140 MANUAL THERAPY 1/> REGIONS: CPT | Performed by: PHYSICAL THERAPIST

## 2019-09-17 NOTE — PROGRESS NOTES
Daily Note     Today's date: 2019  Patient name: Vita Patterson  : 1955  MRN: [id number]  Referring provider: TANI Brandon  Dx:   Encounter Diagnosis     ICD-10-CM    1  Cervical sprain, subsequent encounter S13  9XXD                   Subjective: My neck is stiff, but less painful  Objective: See treatment diary below      Assessment: Tolerated treatment well  Patient demonstrated fatigue post treatment and exhibited good technique with therapeutic exercises      Plan: Continue per plan of care  Precautions: Fx 2nd rib on right        Neurac Daily Treatment Diary     Manual       stm perf perf perf                                         Exercise Diary       Retraction 2x5 2x5 2x5     Rotation Right 2x5 2x5 2x5     Rotation Left 2x5 2x5 2x5     Scap retract supine 2x5 2x5 2x5     Scap retract w depression sit 2x5 2x5 2x5     Nustep 10 min  15 min     Supine pelvic lift  2x5 2x5     Supine bridge  2x5 2x5     SDLY Hip ABD  2x5 2x5         SDLY Hip ADD  2x5 2x5

## 2019-09-18 ENCOUNTER — OFFICE VISIT (OUTPATIENT)
Dept: PHYSICAL THERAPY | Facility: CLINIC | Age: 64
End: 2019-09-18
Payer: COMMERCIAL

## 2019-09-18 DIAGNOSIS — S13.9XXD CERVICAL SPRAIN, SUBSEQUENT ENCOUNTER: Primary | ICD-10-CM

## 2019-09-18 PROCEDURE — 97140 MANUAL THERAPY 1/> REGIONS: CPT

## 2019-09-18 PROCEDURE — 97112 NEUROMUSCULAR REEDUCATION: CPT

## 2019-09-18 PROCEDURE — 97110 THERAPEUTIC EXERCISES: CPT

## 2019-09-18 NOTE — PROGRESS NOTES
Daily Note     Today's date: 2019  Patient name: Norma Madrigal  : 1955  MRN: [id number]  Referring provider: TANI Marie  Dx:   Encounter Diagnosis     ICD-10-CM    1  Cervical sprain, subsequent encounter S13  9XXD                   Subjective: My neck is killing me today  Objective: See treatment diary below      Assessment: Tolerated treatment fair  Patient would benefit from continued PT      Plan: Progress treatment as tolerated  Precautions: Fx 2nd rib on right        Neurac Daily Treatment Diary     Manual      stm perf perf perf perf                                        Exercise Diary      Retraction 2x5 2x5 2x5 Blue tb rows x20 reps    Rotation Right 2x5 2x5 2x5 FIS x20 reps    Rotation Left 2x5 2x5 2x5     Scap retract supine 2x5 2x5 2x5 pec corner stretch 10 x 10 sec    Scap retract w depression sit 2x5 2x5 2x5 Bicep curls 3# x20 reps    Nustep 10 min  15 min 10min L1    Supine pelvic lift  2x5 2x5 YTB B ER x10 reps    Supine bridge  2x5 2x5     SDLY Hip ABD  2x5 2x5         SDLY Hip ADD  2x5 2x5

## 2019-09-19 ENCOUNTER — OFFICE VISIT (OUTPATIENT)
Dept: PHYSICAL THERAPY | Facility: CLINIC | Age: 64
End: 2019-09-19
Payer: COMMERCIAL

## 2019-09-19 DIAGNOSIS — S13.9XXD CERVICAL SPRAIN, SUBSEQUENT ENCOUNTER: Primary | ICD-10-CM

## 2019-09-19 PROCEDURE — 97112 NEUROMUSCULAR REEDUCATION: CPT | Performed by: PHYSICAL THERAPIST

## 2019-09-19 PROCEDURE — 97110 THERAPEUTIC EXERCISES: CPT | Performed by: PHYSICAL THERAPIST

## 2019-09-19 PROCEDURE — 97140 MANUAL THERAPY 1/> REGIONS: CPT | Performed by: PHYSICAL THERAPIST

## 2019-09-19 NOTE — PROGRESS NOTES
Daily Note     Today's date: 2019  Patient name: Romeo Amor  : 1955  MRN: [id number]  Referring provider: TANI Alvarez  Dx:   Encounter Diagnosis     ICD-10-CM    1  Cervical sprain, subsequent encounter S13  9XXD                   Subjective: I get episodes of back/neck spasm  Objective: See treatment diary below      Assessment: Tolerated treatment well  Patient demonstrated fatigue post treatment and exhibited good technique with therapeutic exercises      Plan: Continue per plan of care  Precautions: Fx 2nd rib on right        Neurac Daily Treatment Diary     Manual      stm perf perf perf perf                                        Exercise Diary     Retraction 2x5 2x5 2x5 Blue tb rows x20 reps 2x5   Rotation Right 2x5 2x5 2x5 FIS x20 reps 2x5   Rotation Left 2x5 2x5 2x5  2x5   Scap retract supine 2x5 2x5 2x5 pec corner stretch 10 x 10 sec 2x5   Scap retract w depression sit 2x5 2x5 2x5 Bicep curls 3# x20 reps 2x5   Nustep 10 min  15 min 10min L1    Supine pelvic lift  2x5 2x5 YTB B ER x10 reps 2x5   Supine bridge  2x5 2x5  2x5   SDLY Hip ABD  2x5 2x5  2x5       SDLY Hip ADD  2x5 2x5  2x5

## 2019-09-23 ENCOUNTER — OFFICE VISIT (OUTPATIENT)
Dept: PHYSICAL THERAPY | Facility: CLINIC | Age: 64
End: 2019-09-23
Payer: COMMERCIAL

## 2019-09-23 DIAGNOSIS — S13.9XXD CERVICAL SPRAIN, SUBSEQUENT ENCOUNTER: Primary | ICD-10-CM

## 2019-09-23 PROCEDURE — 97112 NEUROMUSCULAR REEDUCATION: CPT | Performed by: PHYSICAL THERAPIST

## 2019-09-23 PROCEDURE — 97110 THERAPEUTIC EXERCISES: CPT | Performed by: PHYSICAL THERAPIST

## 2019-09-23 PROCEDURE — 97140 MANUAL THERAPY 1/> REGIONS: CPT | Performed by: PHYSICAL THERAPIST

## 2019-09-23 NOTE — PROGRESS NOTES
Daily Note     Today's date: 2019  Patient name: Freeman Celestin  : 1955  MRN: [id number]  Referring provider: TANI Canseco  Dx:   Encounter Diagnosis     ICD-10-CM    1  Cervical sprain, subsequent encounter S13  9XXD                   Subjective: My neck and low back is stiff/painful  Objective: See treatment diary below      Assessment: Tolerated treatment well  Patient demonstrated fatigue post treatment and exhibited good technique with therapeutic exercises      Plan: Continue per plan of care  Precautions: Fx 2nd rib on right  Neurac Daily Treatment Diary     Manual         STM perf                                             Exercise Diary         Supine Pelvic Tilt 2x5       Supine Bridging 2x5       Prone Bridging 2x5       Side-lying Hip Abduction 2x5       Side-lying Hip Adduction 2x5       Cervical retraction supine 2x5       Scap retract supine 2x5       Scap retract w/ depression 2x5                   Modalities

## 2019-09-26 ENCOUNTER — OFFICE VISIT (OUTPATIENT)
Dept: PHYSICAL THERAPY | Facility: CLINIC | Age: 64
End: 2019-09-26
Payer: COMMERCIAL

## 2019-09-26 DIAGNOSIS — S13.9XXD CERVICAL SPRAIN, SUBSEQUENT ENCOUNTER: Primary | ICD-10-CM

## 2019-09-26 PROCEDURE — 97112 NEUROMUSCULAR REEDUCATION: CPT | Performed by: PHYSICAL THERAPIST

## 2019-09-26 PROCEDURE — 97110 THERAPEUTIC EXERCISES: CPT | Performed by: PHYSICAL THERAPIST

## 2019-09-26 NOTE — PROGRESS NOTES
PT Discharge    Today's date: 2019  Patient name: Karissa Fountain  : 1955  MRN: [id number]  Referring provider: TANI Merritt  Dx: No diagnosis found  Assessment  Assessment details: Patient has shown great progress of cervical and lumbar ROM, and strength  She continues with pain episodes and is highly motivated with her HEP  She has reached maximun benefit with PT at this time  Plan  Plan details: Discontinue PT,         Subjective Evaluation    History of Present Illness  Mechanism of injury: I am able to cut grass with mower, but I still get episodes of neck and back pain  Pain  Current pain rating: 3  At best pain ratin  At worst pain ratin  Location: neck and back          Objective     Active Range of Motion   Cervical/Thoracic Spine       Cervical    Flexion: 80 degrees   Extension: 25 degrees     with pain  Left rotation: 80 degrees  Right rotation: 80 degrees             Precautions: Fx 2nd rib on right  Neurac Daily Treatment Diary     Manual         STM perf                                             Exercise Diary        Supine Pelvic Tilt 2x5 2x5      Supine Bridging 2x5 2x5      Prone Bridging 2x5 2x5      Side-lying Hip Abduction 2x5 2x5      Side-lying Hip Adduction 2x5 2x5      Cervical retraction supine 2x5 2x5      Scap retract supine 2x5 2x5      Scap retract w/ depression 2x5 2x5                  Modalities

## 2019-10-09 ENCOUNTER — CLINICAL SUPPORT (OUTPATIENT)
Dept: FAMILY MEDICINE CLINIC | Facility: CLINIC | Age: 64
End: 2019-10-09
Payer: MEDICARE

## 2019-10-09 DIAGNOSIS — Z23 NEED FOR PNEUMOCOCCAL VACCINE: ICD-10-CM

## 2019-10-09 DIAGNOSIS — Z23 NEED FOR PNEUMOCOCCAL VACCINATION: Primary | ICD-10-CM

## 2019-10-09 PROCEDURE — G0009 ADMIN PNEUMOCOCCAL VACCINE: HCPCS

## 2019-10-09 PROCEDURE — 90732 PPSV23 VACC 2 YRS+ SUBQ/IM: CPT

## 2019-10-18 ENCOUNTER — CLINICAL SUPPORT (OUTPATIENT)
Dept: FAMILY MEDICINE CLINIC | Facility: CLINIC | Age: 64
End: 2019-10-18
Payer: MEDICARE

## 2019-10-18 DIAGNOSIS — Z23 NEED FOR VACCINATION: Primary | ICD-10-CM

## 2019-10-18 PROCEDURE — 90662 IIV NO PRSV INCREASED AG IM: CPT

## 2019-10-18 PROCEDURE — G0008 ADMIN INFLUENZA VIRUS VAC: HCPCS

## 2019-10-29 ENCOUNTER — OFFICE VISIT (OUTPATIENT)
Dept: OBGYN CLINIC | Facility: CLINIC | Age: 64
End: 2019-10-29
Payer: COMMERCIAL

## 2019-10-29 ENCOUNTER — APPOINTMENT (OUTPATIENT)
Dept: RADIOLOGY | Facility: CLINIC | Age: 64
End: 2019-10-29
Payer: COMMERCIAL

## 2019-10-29 VITALS
HEIGHT: 63 IN | BODY MASS INDEX: 28.17 KG/M2 | DIASTOLIC BLOOD PRESSURE: 71 MMHG | SYSTOLIC BLOOD PRESSURE: 104 MMHG | WEIGHT: 159 LBS | HEART RATE: 79 BPM

## 2019-10-29 DIAGNOSIS — M54.12 CERVICAL RADICULOPATHY: ICD-10-CM

## 2019-10-29 DIAGNOSIS — M54.12 RIGHT CERVICAL RADICULOPATHY: Primary | ICD-10-CM

## 2019-10-29 PROCEDURE — 99213 OFFICE O/P EST LOW 20 MIN: CPT | Performed by: ORTHOPAEDIC SURGERY

## 2019-10-29 PROCEDURE — 72040 X-RAY EXAM NECK SPINE 2-3 VW: CPT
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
## 2019-11-15 NOTE — PROGRESS NOTES
Progress Note - Cardiology Office   [facility]  Sia Rosen 75 y o  female MRN: [id number]  : 1955  Unit/Bed#:  Encounter: [id number]      Assessment:     1  Chest pain, unspecified type    2  Dyslipidemia    3  Hypothyroidism, unspecified type    4  Abnormal EKG    5  Obstructive sleep apnea hypopnea, mild    6  Upper respiratory tract infection, unspecified type    7  Fibromyalgia        Discussion summary and Plan:  1  Atypical chest pain  Patient is having upper respiratory tract infection  Most likely viral she feels congestion she is going to follow up with her primary care team   She has some cough with a clear phlegm  She occasionally gets chest pain at that time  Less likely to be cardiac in origin  Cardiac workup has been negative  Consider workup for noncardiac chest pain if continues to have recurrent symptoms  2  Abnormal EKG  Patient EKG shows pseudo infarct pattern  Echo shows normal LV systolic function no evidence of old inferior wall infarction  Repeat EKG reviewed no changes found discussed with patient      3  Dyslipidemia  Patient has blood work done  She is now taking her cholesterol medications regularly  Her labs from 2019 reviewed  LFTs were acceptable  4  Hypothyroidism  Continue levothyroxine  5  Impaired glucose metabolism   She is borderline diabetic  She is now on metformin  Management as per medical team    6  History of obstructive sleep apnea  Patient has been compliant with CPAP  Continue using CPAP  7  Upper respiratory tract infection with history of asthma  Currently not actively wheezing  Will use over-the-counter D congestion and follow up with primary care doctor  All issues related to cardiac condition discussed with patient at length  All her questions answered  Further plan as also stress test become available  Counseling :  A description of the counseling    Goals and Barriers  Patient's ability to self care: Yes  Medication side effect reviewed with patient in detail and all their questions answered to their satisfaction  Primary Care Physiciant: Jamaal Cornejo MD    HPI :     Chen Crowell is a 75y o  year old female who was referred by primary care doctor for chest pain and abnormal EKG  Patient has past medical history significant for dyslipidemia, impaired glucose metabolism on metformin, history of bronchial asthma well controlled, sleep apnea on CPAP, hypothyroidism, family history of heart problem who was seen by primary care doctor for follow-up and was found to have abnormal EKG which shows Q-wave in inferior leads and possible old inferior infarct and sent to us  Patient has a stress test and a echo Doppler done in 2017 which shows she has a normal LV systolic function and normal stress test at that time  It was exercise stress test   She is not having episodes of chest pain which she attributed to her shoulder pain however she got worried now  She has no fever no chills no nausea no vomiting  She admits her diet is not proper and her cholesterol has been elevated even though she is compliant with her cholesterol medications  She had history of GERD and she is on pantoprazole for long period of time  No fever no chills no nausea no vomiting no other significant complaint  11/20/2019  Above reviewed  Patient came for follow-up  She is doing well other than she recently got upper respiratory tract infection not sure it is while  She has been coughing and some chest pain no relationship to activities  She had a cataract surgery without any problem  She had past medical history significant for sleep apnea on CPAP, hypothyroidism, family history of heart problems, history of bronchial asthma currently having upper respiratory tract infection    She is scheduled to follow up with her primary care doctor but she is taking over-the-counter some medications  No fever no chills no nausea no vomiting no other significant complaint  She has diabetes mellitus and she is now on cholesterol medication  Patient has blood test done August 2019 which was reviewed  Review of Systems   Constitutional: Negative for activity change, chills, diaphoresis, fever and unexpected weight change  HENT: Positive for congestion  Eyes: Negative for discharge and redness  Respiratory: Positive for cough and wheezing  Negative for chest tightness and shortness of breath  Cardiovascular: Positive for chest pain  Negative for palpitations and leg swelling  Only when coughs   Gastrointestinal: Negative for abdominal pain, diarrhea and nausea  Endocrine: Negative  Genitourinary: Negative for decreased urine volume and urgency  Musculoskeletal: Negative  Negative for arthralgias, back pain and gait problem  Skin: Negative for rash and wound  Allergic/Immunologic: Negative  Neurological: Negative for dizziness, seizures, syncope, weakness, light-headedness and headaches  Hematological: Negative  Psychiatric/Behavioral: Negative for agitation and confusion  The patient is nervous/anxious          Historical Information   Past Medical History:   Diagnosis Date    Abnormal glucose     last assessed 2/25/16    Arthritis     Asthma     w/ exacerbation; last assessed 5/14/15    Atypical nevus     last assessed 1/18/17    Breast lump     last assessed 3/6/14    Cataract     Cervical adenopathy     last assessed  2/7/17    Dizziness     Dyslipidemia     last assessed 5/22/17    Facial droop     last assessed  12/5/16    Fibromyalgia     Flu 01/20/2018    Genital herpes     resolved 9/1/16    Herpes zoster     last assessed 5/20/16    History of shingles     may 2016    History of stomach ulcers     Hx of abnormal mammogram     last assessed  3/5/14    Hyperlipidemia     Myalgia     last assessed  11/21/12    Myositis     11/21/12    Skin neoplasm     of the lower limb, including hip; onset 12; last assessed  12    Sleep apnea      Past Surgical History:   Procedure Laterality Date    ABDOMINAL SURGERY      release of adhesions    BLADDER SURGERY      mesh-lift    BREAST SURGERY      lift     SECTION      x 3    CHOLECYSTECTOMY      lap    COLONOSCOPY      COSMETIC SURGERY      tummy and breast lift    ESOPHAGOGASTRODUODENOSCOPY      GALLBLADDER SURGERY      OOPHORECTOMY Left     OTHER SURGICAL HISTORY      vocal cord surgery, scraping    IN HYSTEROSCOPY,W/ENDO BX N/A 11/3/2016    Procedure: DILATATION AND CURETTAGE (D&C) WITH HYSTEROSCOPY;  Surgeon: Dung Hernandez MD;  Location[facility];  Service: Gynecology    REDUCTION MAMMAPLASTY Bilateral 2008    TONSILLECTOMY       Social History     Substance and Sexual Activity   Alcohol Use No    Comment: social alcohol use as per [employer]     Social History     Substance and Sexual Activity   Drug Use No     Social History     Tobacco Use   Smoking Status Never Smoker   Smokeless Tobacco Never Used     Family History:   Family History   Problem Relation Age of Onset    Hypertension Mother     Alzheimer's disease Mother     Arthritis Mother     Hypertension Father     Arthritis Father     Heart attack Father     Kidney disease Brother         stone    Diabetes Brother     Diabetes Daughter     Breast cancer Sister 28    Skin cancer Sister     Down syndrome Son     No Known Problems Maternal Uncle     No Known Problems Paternal Aunt     No Known Problems Paternal Uncle     No Known Problems Maternal Grandmother     No Known Problems Maternal Grandfather     No Known Problems Paternal Grandmother     No Known Problems Paternal Grandfather        Meds/Allergies     Allergies   Allergen Reactions    Latex Rash       Current Outpatient Medications:     albuterol (VENTOLIN HFA) 90 mcg/act inhaler, Inhale 2 puffs every 4 (four) hours as needed for wheezing, Disp: 1 Inhaler, Rfl: 5    atorvastatin (LIPITOR) 20 mg tablet, TAKE ONE TABLET BY MOUTH EVERY DAY, Disp: 90 tablet, Rfl: 0    cholecalciferol (VITAMIN D3) 1,000 units tablet, Take 2,000 Units by mouth daily, Disp: , Rfl:     fluocinonide (LIDEX) 0 05 % cream, Apply 1 application topically 2 (two) times a day To affected area, Disp: 30 g, Rfl: 1    fluticasone-vilanterol (BREO ELLIPTA) 200-25 MCG/INH inhaler, Inhale 1 puff daily Rinse mouth after use , Disp: 3 Inhaler, Rfl: 0    lidocaine (LIDODERM) 5 %, Apply 1 patch topically daily Remove & Discard patch within 12 hours or as directed by MD, Disp: 30 patch, Rfl: 1    metFORMIN (GLUCOPHAGE-XR) 500 mg 24 hr tablet, Take 1 tablet (500 mg total) by mouth daily with dinner, Disp: 90 tablet, Rfl: 3    multivitamin (THERAGRAN) TABS, Take 1 tablet by mouth daily, Disp: , Rfl:     pantoprazole (PROTONIX) 40 mg tablet, Take 1 tablet (40 mg total) by mouth daily, Disp: 30 tablet, Rfl: 5    Potassium 99 MG TABS, Take by mouth, Disp: , Rfl:     levothyroxine 25 mcg tablet, Take 1 tablet (25 mcg total) by mouth daily (Patient not taking: Reported on 11/20/2019), Disp: 90 tablet, Rfl: 1    Multiple Vitamins-Minerals (HAIR SKIN AND NAILS FORMULA) TABS, Take by mouth, Disp: , Rfl:     Vitals: Blood pressure 110/60, pulse 82, height 5' 3" (1 6 m), weight 73 kg (161 lb), SpO2 96 %, not currently breastfeeding  Body mass index is 28 52 kg/m²  Vitals:    11/20/19 1458   Weight: 73 kg (161 lb)     BP Readings from Last 3 Encounters:   11/20/19 110/60   10/29/19 104/71   08/20/19 119/80         Physical Exam   Constitutional: She is oriented to person, place, and time  She appears well-developed and well-nourished  No distress  HENT:   Head: Normocephalic and atraumatic  Eyes: Pupils are equal, round, and reactive to light  Neck: Neck supple  No JVD present  No tracheal deviation present  No thyromegaly present     Cardiovascular: Normal rate, regular rhythm, S1 normal, S2 normal and intact distal pulses  Exam reveals no gallop, no S3, no S4, no distant heart sounds and no friction rub  Murmur heard  Systolic (ejection) murmur is present with a grade of 2/6  Pulmonary/Chest: Effort normal and breath sounds normal  No respiratory distress  She has no wheezes  She has no rales  She exhibits no tenderness  Abdominal: Soft  Bowel sounds are normal  She exhibits no distension  There is no tenderness  Musculoskeletal: She exhibits no edema or deformity  Neurological: She is alert and oriented to person, place, and time  Skin: Skin is warm and dry  No rash noted  She is not diaphoretic  No pallor  Psychiatric: She has a normal mood and affect  Her behavior is normal  Judgment normal          Diagnostic Studies Review Cardio:    Stress test   Stress EKG test 2017 was normal    She walked 8 minutes and 49 seconds  No symptoms no EKG changes        Echo Doppler  Done 2017  EF was 60%, trace MR, trace AI, trace TR PA pressure 25 mm of mercury  EKG:  Twelve lead EKG 2019 shows normal sinus rhythm heart rate 69 beats per minute  No change from old EKG    Cardiac testing:   Results for orders placed during the hospital encounter of 17   Echo complete with contrast if indicated    Narrative Erika [address]  [address] [facility] 6 [phone]    Transthoracic Echocardiogram  2D, M-mode, Doppler, and Color Doppler    Study date:  31-May-2017    Patient: Adam Jenkins  MR number: [id number]  Account number: [de-identified]  : 1955  Age: 59 years  Gender: Female  Status: Routine  Location: Echo lab  Height: 65 in  Weight: 163 7 lb  BP: 117/ 81 mmHg    Indications: Dizziness    Diagnoses: R42  - Dizziness and giddiness    Sonographer:  LORE Mendes  Primary Physician:  Alberto De Jesus MD  Referring Physician:  Cris Hassan DO  Group:  Ketan Bethea  Interpreting Physician:  Elaine Arambula Jose Manuel Resendiz MD    SUMMARY    LEFT VENTRICLE:  Systolic function was normal  Ejection fraction was estimated in the range of 55 % to 60 % to be 60 %  There were no regional wall motion abnormalities  Wall thickness was at the upper limits of normal   Doppler parameters were consistent with abnormal left ventricular relaxation (grade 1 diastolic dysfunction)  MITRAL VALVE:  There was trace regurgitation  AORTIC VALVE:  There was trace regurgitation  TRICUSPID VALVE:  There was trace regurgitation  Estimated peak PA pressure was 25 mmHg  HISTORY: PRIOR HISTORY: Shingles, Fibromyalgia, Asthma,Dyslipidemia    PROCEDURE: The procedure was performed in the echo lab  This was a routine study  The transthoracic approach was used  The study included complete 2D imaging, M-mode, complete spectral Doppler, and color Doppler  The heart rate was 74 bpm,  at the start of the study  Images were obtained from the parasternal, apical, subcostal, and suprasternal notch acoustic windows  Image quality was adequate  LEFT VENTRICLE: Size was normal  Systolic function was normal  Ejection fraction was estimated in the range of 55 % to 60 % to be 60 %  There were no regional wall motion abnormalities  Wall thickness was at the upper limits of normal   DOPPLER: Doppler parameters were consistent with abnormal left ventricular relaxation (grade 1 diastolic dysfunction)  RIGHT VENTRICLE: The size was normal  Systolic function was normal     LEFT ATRIUM: Size was normal     RIGHT ATRIUM: Size was normal     MITRAL VALVE: Valve structure was normal  There was normal leaflet separation  DOPPLER: The transmitral velocity was within the normal range  There was no evidence for stenosis  There was trace regurgitation  AORTIC VALVE: The valve was trileaflet  Leaflets exhibited normal thickness and normal cuspal separation  DOPPLER: Transaortic velocity was within the normal range  There was no evidence for stenosis   There was trace regurgitation  TRICUSPID VALVE: DOPPLER: There was trace regurgitation  Estimated peak PA pressure was 25 mmHg  PULMONIC VALVE: DOPPLER: There was no significant regurgitation  PERICARDIUM: There was no thickening or calcification  There was no pericardial effusion  AORTA: The root exhibited normal size  SYSTEMIC VEINS: IVC: The inferior vena cava was normal in size  Respirophasic changes were normal     SYSTEM MEASUREMENT TABLES    2D mode  AoR Diam 2D: 2 9 cm  LA Diam (2D): 3 1 cm  LA/Ao (2D): 1 07  FS (2D Teich): 30 2 %  IVSd (2D): 1 15 cm  LVDEV: 65 1 cm³  LVEDV MOD BP: 64 cm³  LVESV: 27 3 cm³  LVIDd(2D): 3 88 cm  LVISd (2D): 2 71 cm  LVOT Area 2D: 3 14 cm squared  LVPWd (2D): 1 09 cm  SV (Teich): 37 8 cm³    Apical four chamber  LVEF A4C: 60 %    Apical two chamber  LVEF A2C: 50 %    Unspecified Scan Mode  LISA Cont Eq (Peak Matthew): 2 22 cm squared  LVOT Diam : 2 cm  LVOT Vmax: 918 mm/s  LVOT Vmax; Mean: 918 mm/s  Peak Grad ; Mean: 3 mm[Hg]  MV Peak A Matthew: 785 mm/s  MV Peak E Matthew  Mean: 617 mm/s  MVA (PHT): 4 4 cm squared  PHT: 50 ms  Max P mm[Hg]  V Max: 2090 mm/s  Vmax: 1990 mm/s  RA Area: 9 2 cm squared  RA Volume: 17 6 cm³  TAPSE: 2 3 cm    [facility] Accredited Echocardiography [facility]    Prepared and electronically signed by    Jer Alexander MD  Signed 31-May-2017 12:04:08           Imaging:  Chest X-Ray:   Xr Chest 2 Views    Result Date: 2018  Impression No active pulmonary disease   Workstation performed: [id number]       Lab Review   Lab Results   Component Value Date    WBC 9 96 2019    HGB 13 2 2019    HCT 42 0 2019    MCV 98 2019    RDW 13 3 2019     2019     BMP:  Lab Results   Component Value Date    SODIUM 140 2019    K 4 4 2019     2019    CO2 27 2019    BUN 16 2019    CREATININE 0 92 2019    GLUC 119 (H) 2018    GLUF 108 (H) 2019    CALCIUM 9 4 2019 EGFR 66 08/21/2019     LFT:  Lab Results   Component Value Date    AST 28 08/21/2019    ALT 62 08/21/2019    ALKPHOS 75 08/21/2019    TP 8 5 (H) 08/21/2019    ALB 4 1 08/21/2019      Lab Results   Component Value Date    LPJ2CSCXRPOY 1 370 08/21/2019     Lab Results   Component Value Date    HGBA1C 6 1 08/21/2019     Lipid Profile:   Lab Results   Component Value Date    CHOLESTEROL 177 08/21/2019    HDL 44 08/21/2019    LDLCALC 99 08/21/2019    TRIG 172 (H) 08/21/2019     Lab Results   Component Value Date    CHOLESTEROL 177 08/21/2019    CHOLESTEROL 168 04/26/2019     Lab Results   Component Value Date    CKTOTAL 120 06/16/2018    TROPONINI <0 02 02/01/2018           Dr Olga Lidia Hernandez MD [facility]      "This note has been constructed using a voice recognition system  Therefore there may be syntax, spelling, and/or grammatical errors   Please call if you have any questions  "

## 2019-11-20 ENCOUNTER — OFFICE VISIT (OUTPATIENT)
Dept: CARDIOLOGY CLINIC | Facility: CLINIC | Age: 64
End: 2019-11-20
Payer: MEDICARE

## 2019-11-20 VITALS
BODY MASS INDEX: 28.53 KG/M2 | WEIGHT: 161 LBS | DIASTOLIC BLOOD PRESSURE: 60 MMHG | SYSTOLIC BLOOD PRESSURE: 110 MMHG | HEIGHT: 63 IN | HEART RATE: 82 BPM | OXYGEN SATURATION: 96 %

## 2019-11-20 DIAGNOSIS — E03.9 HYPOTHYROIDISM, UNSPECIFIED TYPE: ICD-10-CM

## 2019-11-20 DIAGNOSIS — M79.7 FIBROMYALGIA: ICD-10-CM

## 2019-11-20 DIAGNOSIS — R07.9 CHEST PAIN, UNSPECIFIED TYPE: ICD-10-CM

## 2019-11-20 DIAGNOSIS — G47.33 OBSTRUCTIVE SLEEP APNEA HYPOPNEA, MILD: ICD-10-CM

## 2019-11-20 DIAGNOSIS — E78.5 DYSLIPIDEMIA: ICD-10-CM

## 2019-11-20 DIAGNOSIS — J06.9 UPPER RESPIRATORY TRACT INFECTION, UNSPECIFIED TYPE: ICD-10-CM

## 2019-11-20 DIAGNOSIS — R94.31 ABNORMAL EKG: ICD-10-CM

## 2019-11-20 PROCEDURE — 93000 ELECTROCARDIOGRAM COMPLETE: CPT | Performed by: INTERNAL MEDICINE

## 2019-11-20 PROCEDURE — 99214 OFFICE O/P EST MOD 30 MIN: CPT | Performed by: INTERNAL MEDICINE

## 2019-11-22 ENCOUNTER — TELEPHONE (OUTPATIENT)
Dept: OBGYN CLINIC | Facility: CLINIC | Age: 64
End: 2019-11-22

## 2019-11-22 DIAGNOSIS — M54.12 RIGHT CERVICAL RADICULOPATHY: Primary | ICD-10-CM

## 2019-11-22 NOTE — TELEPHONE ENCOUNTER
----- Message from Eduarda Nicole DO sent at 11/22/2019  2:46 PM EST -----  Emily Hernandez had an MRI of her cervical spine it showed mild spinal stenosis at several levels and a small disc herniation at C3-4  I would like for her to follow up with [facility] to talk about treatment options and consider an injection at the level of the disc herniation  She should bring the CD at that time so the images can be seen  I did refer her to Dr Charlie Nelson in our office

## 2019-11-26 ENCOUNTER — CONSULT (OUTPATIENT)
Dept: PAIN MEDICINE | Facility: CLINIC | Age: 64
End: 2019-11-26
Payer: MEDICARE

## 2019-11-26 VITALS
DIASTOLIC BLOOD PRESSURE: 70 MMHG | HEIGHT: 63 IN | WEIGHT: 159.8 LBS | HEART RATE: 86 BPM | SYSTOLIC BLOOD PRESSURE: 115 MMHG | BODY MASS INDEX: 28.31 KG/M2

## 2019-11-26 DIAGNOSIS — M54.12 RIGHT CERVICAL RADICULOPATHY: ICD-10-CM

## 2019-11-26 DIAGNOSIS — M54.2 NECK PAIN: ICD-10-CM

## 2019-11-26 DIAGNOSIS — G89.4 CHRONIC PAIN SYNDROME: Primary | ICD-10-CM

## 2019-11-26 DIAGNOSIS — M48.02 CERVICAL SPINAL STENOSIS: ICD-10-CM

## 2019-11-26 PROCEDURE — 99204 OFFICE O/P NEW MOD 45 MIN: CPT | Performed by: ANESTHESIOLOGY

## 2019-11-26 RX ORDER — CYCLOBENZAPRINE HCL 10 MG
10 TABLET ORAL 3 TIMES DAILY PRN
COMMUNITY
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
## 2019-12-26 DIAGNOSIS — R73.03 PREDIABETES: ICD-10-CM

## 2019-12-26 RX ORDER — METFORMIN HYDROCHLORIDE 500 MG/1
TABLET, EXTENDED RELEASE ORAL
Qty: 90 TABLET | Refills: 2 | Status: SHIPPED | OUTPATIENT
Start: 2019-12-26 | End: 2020-06-10 | Stop reason: SDUPTHER

## 2020-01-23 DIAGNOSIS — E78.5 HYPERLIPIDEMIA, UNSPECIFIED HYPERLIPIDEMIA TYPE: ICD-10-CM

## 2020-01-23 RX ORDER — ATORVASTATIN CALCIUM 20 MG/1
20 TABLET, FILM COATED ORAL DAILY
Qty: 90 TABLET | Refills: 3 | Status: SHIPPED | OUTPATIENT
Start: 2020-01-23 | End: 2021-03-11 | Stop reason: SDUPTHER

## 2020-02-03 ENCOUNTER — OFFICE VISIT (OUTPATIENT)
Dept: FAMILY MEDICINE CLINIC | Facility: CLINIC | Age: 65
End: 2020-02-03
Payer: MEDICARE

## 2020-02-03 VITALS
WEIGHT: 161.2 LBS | RESPIRATION RATE: 14 BRPM | SYSTOLIC BLOOD PRESSURE: 102 MMHG | TEMPERATURE: 97.7 F | HEIGHT: 63 IN | HEART RATE: 80 BPM | BODY MASS INDEX: 28.56 KG/M2 | DIASTOLIC BLOOD PRESSURE: 70 MMHG

## 2020-02-03 DIAGNOSIS — E66.3 OVERWEIGHT (BMI 25.0-29.9): ICD-10-CM

## 2020-02-03 DIAGNOSIS — T78.40XA ALLERGIC REACTION, INITIAL ENCOUNTER: ICD-10-CM

## 2020-02-03 DIAGNOSIS — J01.90 ACUTE NON-RECURRENT SINUSITIS, UNSPECIFIED LOCATION: ICD-10-CM

## 2020-02-03 DIAGNOSIS — R21 RASH OF FACE: Primary | ICD-10-CM

## 2020-02-03 PROCEDURE — 1160F RVW MEDS BY RX/DR IN RCRD: CPT | Performed by: FAMILY MEDICINE

## 2020-02-03 PROCEDURE — 99213 OFFICE O/P EST LOW 20 MIN: CPT | Performed by: FAMILY MEDICINE

## 2020-02-03 PROCEDURE — 1036F TOBACCO NON-USER: CPT | Performed by: FAMILY MEDICINE

## 2020-02-03 PROCEDURE — 4040F PNEUMOC VAC/ADMIN/RCVD: CPT | Performed by: FAMILY MEDICINE

## 2020-02-03 RX ORDER — METHYLPREDNISOLONE 4 MG/1
TABLET ORAL
Qty: 21 EACH | Refills: 0 | Status: SHIPPED | OUTPATIENT
Start: 2020-02-03 | End: 2020-06-10 | Stop reason: CLARIF

## 2020-02-06 ENCOUNTER — TELEPHONE (OUTPATIENT)
Dept: FAMILY MEDICINE CLINIC | Facility: CLINIC | Age: 65
End: 2020-02-06

## 2020-02-06 NOTE — PROGRESS NOTES
Assessment/Plan:    1  Rash of face  -     methylPREDNISolone 4 MG tablet therapy pack; Use as directed on package    2  Allergic reaction, initial encounter    3  Acute non-recurrent sinusitis, unspecified location    4  Overweight (BMI 25 0-29 9)      1  Rash on face likely allergic reaction from face mask: will try medrol dose kourtney  Advised to clean mask parts and wash any necessary sheets or linens or clothing  Monitor for any signs of side effects of medrol dose pack including elevated BP, advised to take with food  Monitor for spreading of rash  Precautions reviewed with patient  2  Acute sinusitis: Advised to try flonase and supportive care with fluids, rest, and tylenol  Medrol dose kourtney given and could help relief some symptoms  3  BMI Overweight     BMI Counseling: Body mass index is 28 56 kg/m²  The BMI is above normal  Nutrition recommendations include encouraging healthy choices of fruits and vegetables and consuming healthier snacks  Exercise recommendations include strength training exercises  No pharmacotherapy was ordered  There are no Patient Instructions on file for this visit  No follow-ups on file  Subjective:      Patient ID: Brigette Simons is a 75 y o  female  Chief Complaint   Patient presents with    Rash     rash on face started sunday   , on face and head (hives rash is itchy  I noticed a few bite marks on the back of patients neck     Difficulty Swallowing     feels like she is swallowing sand     Headache     fatigue       HPI  76 y/o female presents with multiple complains  Patient complains that she started with a rash this morning around her face  She does use a CPAP machine at night and it around where the mask lies  She states the pieces are new  She did notice some marks on her head and two on her back/ right shoulder  Does complain of some itching and dry feeling  Tried cream to the area  She does complain of difficulty swallowing and feels like sand  Headache and congestion and this she might have a sinus infection  Denies any fevers or chills  She has no shortness of breath or abdominal pain  The following portions of the patient's history were reviewed and updated as appropriate: allergies, current medications, past family history, past medical history, past social history, past surgical history and problem list     Review of Systems      Current Outpatient Medications   Medication Sig Dispense Refill    albuterol (VENTOLIN HFA) 90 mcg/act inhaler Inhale 2 puffs every 4 (four) hours as needed for wheezing 1 Inhaler 5    atorvastatin (LIPITOR) 20 mg tablet Take 1 tablet (20 mg total) by mouth daily 90 tablet 3    cholecalciferol (VITAMIN D3) 1,000 units tablet Take 2,000 Units by mouth daily      cyclobenzaprine (FLEXERIL) 10 mg tablet Take 10 mg by mouth 3 (three) times a day as needed for muscle spasms (as needed for pain)      fluocinonide (LIDEX) 0 05 % cream Apply 1 application topically 2 (two) times a day To affected area 30 g 1    fluticasone-vilanterol (BREO ELLIPTA) 200-25 MCG/INH inhaler Inhale 1 puff daily Rinse mouth after use  3 Inhaler 0    levothyroxine 25 mcg tablet Take 1 tablet (25 mcg total) by mouth daily 90 tablet 1    lidocaine (LIDODERM) 5 % Apply 1 patch topically daily Remove & Discard patch within 12 hours or as directed by MD 30 patch 1    metFORMIN (GLUCOPHAGE-XR) 500 mg 24 hr tablet TAKE ONE TABLET BY MOUTH EVERY DAY WITH DINNER 90 tablet 2    Multiple Vitamins-Minerals (HAIR SKIN AND NAILS FORMULA) TABS Take by mouth      multivitamin (THERAGRAN) TABS Take 1 tablet by mouth daily      pantoprazole (PROTONIX) 40 mg tablet Take 1 tablet (40 mg total) by mouth daily 30 tablet 5    Potassium 99 MG TABS Take by mouth      methylPREDNISolone 4 MG tablet therapy pack Use as directed on package 21 each 0     No current facility-administered medications for this visit          Objective:    /70 (BP Location: Left arm, Patient Position: Sitting, Cuff Size: Standard)   Pulse 80   Temp 97 7 °F (36 5 °C)   Resp 14   Ht 5' 3" (1 6 m)   Wt 73 1 kg (161 lb 3 2 oz)   BMI 28 56 kg/m²        Physical Exam   Constitutional: She is oriented to person, place, and time  She appears well-developed and well-nourished  No distress  HENT:   Head: Normocephalic and atraumatic  Right Ear: External ear normal    Left Ear: External ear normal    Nose: Nose normal    Mouth/Throat: Oropharynx is clear and moist  No oropharyngeal exudate  Eyes: Pupils are equal, round, and reactive to light  Conjunctivae and EOM are normal  Right eye exhibits no discharge  Left eye exhibits no discharge  Cardiovascular: Normal rate, regular rhythm, normal heart sounds and intact distal pulses  No murmur heard  Pulmonary/Chest: Effort normal and breath sounds normal  No respiratory distress  Abdominal: Soft  Bowel sounds are normal  She exhibits no distension  There is no tenderness  Lymphadenopathy:     She has no cervical adenopathy  Neurological: She is alert and oriented to person, place, and time  Skin: Rash noted  Slight erythema and swelling with dryness on the cheeks bilateral, chin  2 small dot of erythema noted on back/shoulder   Psychiatric: She has a normal mood and affect   Her behavior is normal               Aissatou Lindquist MD

## 2020-02-06 NOTE — TELEPHONE ENCOUNTER
Dr Nhaed Burroughs    Patient is still congested, difficulty swallowing, green mucus  Would like you to send antibiotic [facility]

## 2020-02-06 NOTE — TELEPHONE ENCOUNTER
Pt states started prednisone today advised pt antbx not needed at this time per Dr Magi Jeronimo re-evaluate if s/s worse

## 2020-06-09 RX ORDER — MULTIVIT WITH MINERALS/LUTEIN
TABLET ORAL
COMMUNITY
Start: 2020-04-20

## 2020-06-09 RX ORDER — CLOBETASOL PROPIONATE 0.5 MG/G
CREAM TOPICAL
COMMUNITY
Start: 2019-11-14 | End: 2020-06-10 | Stop reason: SDUPTHER

## 2020-06-10 ENCOUNTER — OFFICE VISIT (OUTPATIENT)
Dept: FAMILY MEDICINE CLINIC | Facility: CLINIC | Age: 65
End: 2020-06-10
Payer: MEDICARE

## 2020-06-10 VITALS
RESPIRATION RATE: 12 BRPM | SYSTOLIC BLOOD PRESSURE: 122 MMHG | WEIGHT: 130.2 LBS | DIASTOLIC BLOOD PRESSURE: 80 MMHG | HEIGHT: 63 IN | BODY MASS INDEX: 23.07 KG/M2 | TEMPERATURE: 98.2 F | HEART RATE: 90 BPM

## 2020-06-10 DIAGNOSIS — Z12.31 ENCOUNTER FOR MAMMOGRAM TO ESTABLISH BASELINE MAMMOGRAM: ICD-10-CM

## 2020-06-10 DIAGNOSIS — J45.30 ALLERGIC ASTHMA, MILD PERSISTENT, UNCOMPLICATED: Primary | ICD-10-CM

## 2020-06-10 DIAGNOSIS — E78.5 HYPERLIPIDEMIA, UNSPECIFIED HYPERLIPIDEMIA TYPE: ICD-10-CM

## 2020-06-10 DIAGNOSIS — K21.9 GASTROESOPHAGEAL REFLUX DISEASE WITHOUT ESOPHAGITIS: ICD-10-CM

## 2020-06-10 DIAGNOSIS — R73.03 PREDIABETES: ICD-10-CM

## 2020-06-10 DIAGNOSIS — L21.9 SEBORRHEIC DERMATITIS OF SCALP: ICD-10-CM

## 2020-06-10 PROCEDURE — 1036F TOBACCO NON-USER: CPT | Performed by: FAMILY MEDICINE

## 2020-06-10 PROCEDURE — 1160F RVW MEDS BY RX/DR IN RCRD: CPT | Performed by: FAMILY MEDICINE

## 2020-06-10 PROCEDURE — 99214 OFFICE O/P EST MOD 30 MIN: CPT | Performed by: FAMILY MEDICINE

## 2020-06-10 PROCEDURE — 4040F PNEUMOC VAC/ADMIN/RCVD: CPT | Performed by: FAMILY MEDICINE

## 2020-06-10 RX ORDER — METFORMIN HYDROCHLORIDE 500 MG/1
500 TABLET, EXTENDED RELEASE ORAL
Qty: 90 TABLET | Refills: 2 | Status: SHIPPED | OUTPATIENT
Start: 2020-06-10 | End: 2021-08-28

## 2020-06-10 RX ORDER — PANTOPRAZOLE SODIUM 40 MG/1
40 TABLET, DELAYED RELEASE ORAL DAILY
Qty: 30 TABLET | Refills: 5 | Status: SHIPPED | OUTPATIENT
Start: 2020-06-10 | End: 2021-02-21

## 2020-06-10 RX ORDER — ALBUTEROL SULFATE 90 UG/1
2 AEROSOL, METERED RESPIRATORY (INHALATION) EVERY 4 HOURS PRN
Qty: 1 INHALER | Refills: 5 | Status: SHIPPED | OUTPATIENT
Start: 2020-06-10

## 2020-06-10 RX ORDER — CLOBETASOL PROPIONATE 0.5 MG/G
CREAM TOPICAL 2 TIMES DAILY
Qty: 45 G | Refills: 1 | Status: SHIPPED | OUTPATIENT
Start: 2020-06-10 | End: 2021-09-02 | Stop reason: SDUPTHER

## 2020-06-10 RX ORDER — FLUTICASONE FUROATE AND VILANTEROL 200; 25 UG/1; UG/1
1 POWDER RESPIRATORY (INHALATION) DAILY
Qty: 3 INHALER | Refills: 0 | Status: SHIPPED | OUTPATIENT
Start: 2020-06-10 | End: 2020-08-24 | Stop reason: ALTCHOICE

## 2020-06-24 ENCOUNTER — OFFICE VISIT (OUTPATIENT)
Dept: CARDIOLOGY CLINIC | Facility: CLINIC | Age: 65
End: 2020-06-24
Payer: MEDICARE

## 2020-06-24 VITALS
DIASTOLIC BLOOD PRESSURE: 80 MMHG | SYSTOLIC BLOOD PRESSURE: 110 MMHG | HEIGHT: 63 IN | HEART RATE: 75 BPM | WEIGHT: 150 LBS | TEMPERATURE: 97.6 F | BODY MASS INDEX: 26.58 KG/M2 | OXYGEN SATURATION: 98 %

## 2020-06-24 DIAGNOSIS — R94.31 ABNORMAL EKG: ICD-10-CM

## 2020-06-24 DIAGNOSIS — G47.33 OBSTRUCTIVE SLEEP APNEA: ICD-10-CM

## 2020-06-24 DIAGNOSIS — E03.9 HYPOTHYROIDISM, UNSPECIFIED TYPE: ICD-10-CM

## 2020-06-24 DIAGNOSIS — E78.5 DYSLIPIDEMIA: ICD-10-CM

## 2020-06-24 DIAGNOSIS — M79.7 FIBROMYALGIA: ICD-10-CM

## 2020-06-24 DIAGNOSIS — J44.1 CHRONIC OBSTRUCTIVE PULMONARY DISEASE WITH ACUTE EXACERBATION (HCC): ICD-10-CM

## 2020-06-24 PROCEDURE — 1036F TOBACCO NON-USER: CPT | Performed by: INTERNAL MEDICINE

## 2020-06-24 PROCEDURE — 1160F RVW MEDS BY RX/DR IN RCRD: CPT | Performed by: INTERNAL MEDICINE

## 2020-06-24 PROCEDURE — 93000 ELECTROCARDIOGRAM COMPLETE: CPT | Performed by: INTERNAL MEDICINE

## 2020-06-24 PROCEDURE — 99214 OFFICE O/P EST MOD 30 MIN: CPT | Performed by: INTERNAL MEDICINE

## 2020-06-24 PROCEDURE — 3008F BODY MASS INDEX DOCD: CPT | Performed by: INTERNAL MEDICINE

## 2020-06-24 PROCEDURE — 4040F PNEUMOC VAC/ADMIN/RCVD: CPT | Performed by: INTERNAL MEDICINE

## 2020-06-25 PROBLEM — Z12.31 ENCOUNTER FOR MAMMOGRAM TO ESTABLISH BASELINE MAMMOGRAM: Status: ACTIVE | Noted: 2020-06-25

## 2020-06-25 PROBLEM — K21.9 GASTROESOPHAGEAL REFLUX DISEASE WITHOUT ESOPHAGITIS: Status: ACTIVE | Noted: 2020-06-25

## 2020-06-25 PROBLEM — R73.03 PREDIABETES: Status: ACTIVE | Noted: 2020-06-25

## 2020-06-25 PROBLEM — L21.9 SEBORRHEIC DERMATITIS OF SCALP: Status: ACTIVE | Noted: 2020-06-25

## 2020-07-29 ENCOUNTER — OFFICE VISIT (OUTPATIENT)
Dept: OBGYN CLINIC | Facility: CLINIC | Age: 65
End: 2020-07-29
Payer: MEDICARE

## 2020-07-29 VITALS
WEIGHT: 158 LBS | HEIGHT: 63 IN | HEART RATE: 78 BPM | BODY MASS INDEX: 28 KG/M2 | TEMPERATURE: 97.1 F | DIASTOLIC BLOOD PRESSURE: 77 MMHG | SYSTOLIC BLOOD PRESSURE: 112 MMHG

## 2020-07-29 DIAGNOSIS — M17.0 PRIMARY LOCALIZED OSTEOARTHRITIS OF KNEES, BILATERAL: Primary | ICD-10-CM

## 2020-07-29 PROCEDURE — 99214 OFFICE O/P EST MOD 30 MIN: CPT | Performed by: ORTHOPAEDIC SURGERY

## 2020-07-29 PROCEDURE — 3008F BODY MASS INDEX DOCD: CPT | Performed by: ORTHOPAEDIC SURGERY

## 2020-07-29 PROCEDURE — 1160F RVW MEDS BY RX/DR IN RCRD: CPT | Performed by: ORTHOPAEDIC SURGERY

## 2020-07-29 PROCEDURE — 1036F TOBACCO NON-USER: CPT | Performed by: ORTHOPAEDIC SURGERY

## 2020-07-29 PROCEDURE — 4040F PNEUMOC VAC/ADMIN/RCVD: CPT | Performed by: ORTHOPAEDIC SURGERY

## 2020-07-29 NOTE — PROGRESS NOTES
Assessment/Plan:  1  Primary localized osteoarthritis of knees, bilateral         THe patient does have arthritis about both knees  We did advise ice/heat and OTC medications as needed for discomfort  We did discuss possible injections, however she is not interested in this  We did also discuss physical therapy, which she will think about  SHe may require knee replacements in the future if her arthritis if her arthritis progresses  Subjective:   Phoenix Sood is a 75 y o  female who presents today for evaluation of her bilateral knees  She did see Dr Duane Jason about a year ago and had xrays which showed some moderate bilateral knee arthritis, mostly about the patellofemoral compartments  She notes bilateral anterior knee pain, which is worse with stairs, cold weather and activity,  and better with rest  The right knee is slightly worse than the left  She notes good ROM and denies any swelling  He notes good sensation of the bilateral lower extremities  She notes crepitus about both knees  Review of Systems   Constitutional: Negative  Negative for chills and fever  HENT: Negative  Negative for ear pain and sore throat  Eyes: Negative  Negative for pain and redness  Respiratory: Negative  Negative for shortness of breath and wheezing  Cardiovascular: Negative for chest pain and palpitations  Gastrointestinal: Negative  Negative for abdominal pain and blood in stool  Endocrine: Negative  Negative for polydipsia and polyuria  Genitourinary: Negative  Negative for difficulty urinating and dysuria  Musculoskeletal:        As noted in HPI   Skin: Negative  Negative for pallor and rash  Neurological: Negative  Negative for dizziness and numbness  Hematological: Negative  Negative for adenopathy  Does not bruise/bleed easily  Psychiatric/Behavioral: Negative  Negative for confusion and suicidal ideas           Past Medical History:   Diagnosis Date    Abnormal glucose     last assessed 16    Arthritis     Asthma     w/ exacerbation; last assessed 5/14/15    Atypical nevus     last assessed 17    Breast lump     last assessed 3/6/14    Cataract     Cervical adenopathy     last assessed  17    Dizziness     Dyslipidemia     last assessed 17    Facial droop     last assessed  16    Fibromyalgia     Flu 2018    Genital herpes     resolved 16    Headache, tension-type     Herpes zoster     last assessed 16    History of shingles     may 2016    History of stomach ulcers     Hx of abnormal mammogram     last assessed  3/5/14    Hyperlipidemia     Myalgia     last assessed  12    Myositis     12    Neck pain     Peripheral neuropathy     Seborrheic keratosis     Skin neoplasm     of the lower limb, including hip; onset 12; last assessed  12    Sleep apnea        Past Surgical History:   Procedure Laterality Date    ABDOMINAL SURGERY      release of adhesions    BLADDER SURGERY      mesh-lift    BREAST SURGERY      lift     SECTION      x 3    CHOLECYSTECTOMY      lap    COLONOSCOPY      COSMETIC SURGERY      tummy and breast lift    ESOPHAGOGASTRODUODENOSCOPY      GALLBLADDER SURGERY      OOPHORECTOMY Left     OTHER SURGICAL HISTORY      vocal cord surgery, scraping    [place] HYSTEROSCOPY,W/ENDO BX N/A 11/3/2016    Procedure: DILATATION AND CURETTAGE (D&C) WITH HYSTEROSCOPY;  Surgeon: Kwadwo Funes MD;  Location: [facility];  Service: Gynecology    REDUCTION MAMMAPLASTY Bilateral 2008    TONSILLECTOMY         Family History   Problem Relation Age of Onset    Hypertension Mother     Alzheimer's disease Mother     Arthritis Mother     Hypertension Father     Arthritis Father     Heart attack Father     Kidney disease Brother         stone    Diabetes Brother     Diabetes Daughter     Breast cancer Sister 28    Skin cancer Sister     Down syndrome Son     No Known Problems Maternal Uncle     No Known Problems Paternal Aunt     No Known Problems Paternal Uncle     No Known Problems Maternal Grandmother     No Known Problems Maternal Grandfather     No Known Problems Paternal Grandmother     No Known Problems Paternal Grandfather        Social History     Occupational History    Not on file   Tobacco Use    Smoking status: Never Smoker    Smokeless tobacco: Never Used   Substance and Sexual Activity    Alcohol use: No     Comment: social alcohol use as per [employer]scripts    Drug use: No    Sexual activity: Not on file         Current Outpatient Medications:     albuterol (Ventolin HFA) 90 mcg/act inhaler, Inhale 2 puffs every 4 (four) hours as needed for wheezing, Disp: 1 Inhaler, Rfl: 5    Ascorbic Acid (VITAMIN C) 1000 MG tablet, , Disp: , Rfl:     atorvastatin (LIPITOR) 20 mg tablet, Take 1 tablet (20 mg total) by mouth daily, Disp: 90 tablet, Rfl: 3    cholecalciferol (VITAMIN D3) 1,000 units tablet, Take 2,000 Units by mouth daily, Disp: , Rfl:     clobetasol (TEMOVATE) 0 05 % cream, Apply topically 2 (two) times a day, Disp: 45 g, Rfl: 1    cyclobenzaprine (FLEXERIL) 10 mg tablet, Take 10 mg by mouth 3 (three) times a day as needed for muscle spasms (as needed for pain), Disp: , Rfl:     fluticasone-vilanterol (Breo Ellipta) 200-25 MCG/INH inhaler, Inhale 1 puff daily Rinse mouth after use , Disp: 3 Inhaler, Rfl: 0    metFORMIN (GLUCOPHAGE-XR) 500 mg 24 hr tablet, Take 1 tablet (500 mg total) by mouth daily with dinner, Disp: 90 tablet, Rfl: 2    Multiple Vitamins-Minerals (HAIR SKIN AND NAILS FORMULA) TABS, Take by mouth, Disp: , Rfl:     multivitamin (THERAGRAN) TABS, Take 1 tablet by mouth daily, Disp: , Rfl:     pantoprazole (PROTONIX) 40 mg tablet, Take 1 tablet (40 mg total) by mouth daily, Disp: 30 tablet, Rfl: 5    Potassium 99 MG TABS, Take 99 mg by mouth daily , Disp: , Rfl:     Selenium 200 MCG CAPS, Take 200 mcg by mouth daily , Disp: , Rfl:     Zinc 50 MG CAPS, Take 50 mg by mouth daily , Disp: , Rfl:     Allergies   Allergen Reactions    Latex Rash       Objective:  Vitals:    07/29/20 0836   BP: 112/77   Pulse: 78   Temp: (!) 97 1 °F (36 2 °C)       Right Knee Exam     Tenderness   The patient is experiencing no tenderness  Range of Motion   Extension: normal   Flexion: normal     Tests   Varus: negative Valgus: negative  Drawer:  Anterior - negative    Posterior - negative    Other   Erythema: absent  Sensation: normal  Pulse: present  Swelling: none      Left Knee Exam     Tenderness   The patient is experiencing no tenderness  Range of Motion   Extension: normal   Flexion: normal     Tests   Varus: negative Valgus: negative  Drawer:  Anterior - negative         Other   Erythema: absent  Sensation: normal  Pulse: present  Swelling: none            Physical Exam   Constitutional: She is oriented to person, place, and time  She appears well-developed and well-nourished  HENT:   Head: Normocephalic and atraumatic  Eyes: EOM are normal    Neck: Normal range of motion  Neck supple  Cardiovascular: Regular rhythm  Pulmonary/Chest: Effort normal  No respiratory distress  Neurological: She is alert and oriented to person, place, and time  Skin: No rash noted  Psychiatric: She has a normal mood and affect  Her behavior is normal    Nursing note and vitals reviewed

## 2020-08-24 ENCOUNTER — TELEPHONE (OUTPATIENT)
Dept: FAMILY MEDICINE CLINIC | Facility: CLINIC | Age: 65
End: 2020-08-24

## 2020-08-24 DIAGNOSIS — J45.30 ALLERGIC ASTHMA, MILD PERSISTENT, UNCOMPLICATED: Primary | ICD-10-CM

## 2020-08-24 NOTE — TELEPHONE ENCOUNTER
Dr [facility],    Patient would like it if you can change her asthma medication  Jonn Antony is too expensive    Pharmacy is [address] [facility]

## 2020-08-25 RX ORDER — FLUTICASONE FUROATE AND VILANTEROL 200; 25 UG/1; UG/1
1 POWDER RESPIRATORY (INHALATION) DAILY
COMMUNITY
End: 2020-09-02 | Stop reason: ALTCHOICE

## 2020-08-31 NOTE — TELEPHONE ENCOUNTER
Pt was put on ventolin because it is a rescue  inhaler ( not for every day use    [facility] is for everyday use tc/cma

## 2020-09-01 ENCOUNTER — TELEPHONE (OUTPATIENT)
Dept: FAMILY MEDICINE CLINIC | Facility: CLINIC | Age: 65
End: 2020-09-01

## 2020-09-01 NOTE — TELEPHONE ENCOUNTER
Monika,    Patient is calling you back to give you the name of the medication that the insurance company gave to her    The name of the medication is Advair and Symbicort T

## 2020-09-02 DIAGNOSIS — J45.30 ALLERGIC ASTHMA, MILD PERSISTENT, UNCOMPLICATED: Primary | ICD-10-CM

## 2020-09-03 ENCOUNTER — TELEPHONE (OUTPATIENT)
Dept: FAMILY MEDICINE CLINIC | Facility: CLINIC | Age: 65
End: 2020-09-03

## 2020-09-03 DIAGNOSIS — J45.30 ALLERGIC ASTHMA, MILD PERSISTENT, UNCOMPLICATED: ICD-10-CM

## 2020-09-03 NOTE — TELEPHONE ENCOUNTER
Dr Alex Sprague,    Please resend patient's Advair Disk to [facility] in [place]  [facility] states they never received it and patient has not had the medication since yesterday

## 2020-09-04 ENCOUNTER — TELEPHONE (OUTPATIENT)
Dept: FAMILY MEDICINE CLINIC | Facility: CLINIC | Age: 65
End: 2020-09-04

## 2020-09-04 NOTE — TELEPHONE ENCOUNTER
Dr Bong David,    Patient called and the Advair Disk is more expensive than the previous medication that she was on  Patient informed me that [facility] will be faxing over a form for doctor to fill out so she can get a discount on the medication  Did not see it in the chart yet  Patient was coughing a lot when she was on the phone with me and it has been 4 days without any asthma medication  Please advise patient when we get the form

## 2020-09-14 ENCOUNTER — CLINICAL SUPPORT (OUTPATIENT)
Dept: FAMILY MEDICINE CLINIC | Facility: CLINIC | Age: 65
End: 2020-09-14
Payer: MEDICARE

## 2020-09-14 DIAGNOSIS — Z23 NEED FOR VACCINATION: Primary | ICD-10-CM

## 2020-09-14 PROCEDURE — G0008 ADMIN INFLUENZA VIRUS VAC: HCPCS

## 2020-09-14 PROCEDURE — 90662 IIV NO PRSV INCREASED AG IM: CPT

## 2020-10-19 DIAGNOSIS — J45.30 ALLERGIC ASTHMA, MILD PERSISTENT, UNCOMPLICATED: ICD-10-CM

## 2020-12-29 ENCOUNTER — HOSPITAL ENCOUNTER (OUTPATIENT)
Dept: RADIOLOGY | Facility: HOSPITAL | Age: 65
Discharge: HOME | End: 2020-12-29
Attending: FAMILY MEDICINE
Payer: MEDICARE

## 2020-12-29 VITALS — BODY MASS INDEX: 30.12 KG/M2 | WEIGHT: 170 LBS | HEIGHT: 63 IN

## 2020-12-29 DIAGNOSIS — Z12.39 SCREENING BREAST EXAMINATION: ICD-10-CM

## 2020-12-29 DIAGNOSIS — Z12.31 ENCOUNTER FOR MAMMOGRAM TO ESTABLISH BASELINE MAMMOGRAM: ICD-10-CM

## 2020-12-29 PROCEDURE — 77067 SCR MAMMO BI INCL CAD: CPT

## 2020-12-29 PROCEDURE — 77063 BREAST TOMOSYNTHESIS BI: CPT

## 2021-01-11 ENCOUNTER — APPOINTMENT (OUTPATIENT)
Dept: LAB | Facility: CLINIC | Age: 66
End: 2021-01-11
Payer: MEDICARE

## 2021-01-11 ENCOUNTER — OFFICE VISIT (OUTPATIENT)
Dept: FAMILY MEDICINE CLINIC | Facility: CLINIC | Age: 66
End: 2021-01-11
Payer: MEDICARE

## 2021-01-11 VITALS
HEART RATE: 78 BPM | HEIGHT: 63 IN | BODY MASS INDEX: 29.77 KG/M2 | TEMPERATURE: 97.1 F | DIASTOLIC BLOOD PRESSURE: 80 MMHG | RESPIRATION RATE: 16 BRPM | SYSTOLIC BLOOD PRESSURE: 110 MMHG | WEIGHT: 168 LBS

## 2021-01-11 DIAGNOSIS — R82.90 ABNORMAL URINE: ICD-10-CM

## 2021-01-11 DIAGNOSIS — R21 RASH OF FACE: ICD-10-CM

## 2021-01-11 DIAGNOSIS — S90.424S: ICD-10-CM

## 2021-01-11 DIAGNOSIS — M25.541 PAIN INVOLVING JOINTS OF FINGERS OF BOTH HANDS: ICD-10-CM

## 2021-01-11 DIAGNOSIS — R73.03 PREDIABETES: ICD-10-CM

## 2021-01-11 DIAGNOSIS — L08.9: ICD-10-CM

## 2021-01-11 DIAGNOSIS — Z00.00 MEDICARE ANNUAL WELLNESS VISIT, SUBSEQUENT: ICD-10-CM

## 2021-01-11 DIAGNOSIS — R21 RASH OF FACE: Primary | ICD-10-CM

## 2021-01-11 DIAGNOSIS — E78.5 DYSLIPIDEMIA: ICD-10-CM

## 2021-01-11 DIAGNOSIS — M25.542 PAIN INVOLVING JOINTS OF FINGERS OF BOTH HANDS: ICD-10-CM

## 2021-01-11 DIAGNOSIS — E03.9 HYPOTHYROIDISM, UNSPECIFIED TYPE: ICD-10-CM

## 2021-01-11 DIAGNOSIS — K21.9 GASTROESOPHAGEAL REFLUX DISEASE WITHOUT ESOPHAGITIS: ICD-10-CM

## 2021-01-11 LAB
ALBUMIN SERPL BCP-MCNC: 3.9 G/DL (ref 3.5–5)
ALP SERPL-CCNC: 89 U/L (ref 46–116)
ALT SERPL W P-5'-P-CCNC: 52 U/L (ref 12–78)
AMYLASE SERPL-CCNC: 65 IU/L (ref 25–115)
ANION GAP SERPL CALCULATED.3IONS-SCNC: 1 MMOL/L (ref 4–13)
AST SERPL W P-5'-P-CCNC: 21 U/L (ref 5–45)
BILIRUB SERPL-MCNC: 0.27 MG/DL (ref 0.2–1)
BUN SERPL-MCNC: 15 MG/DL (ref 5–25)
CALCIUM SERPL-MCNC: 9.6 MG/DL (ref 8.3–10.1)
CHLORIDE SERPL-SCNC: 106 MMOL/L (ref 100–108)
CHOLEST SERPL-MCNC: 183 MG/DL (ref 50–200)
CO2 SERPL-SCNC: 30 MMOL/L (ref 21–32)
CREAT SERPL-MCNC: 0.86 MG/DL (ref 0.6–1.3)
CRP SERPL QL: 3.1 MG/L
ERYTHROCYTE [DISTWIDTH] IN BLOOD BY AUTOMATED COUNT: 13 % (ref 11.6–15.1)
GFR SERPL CREATININE-BSD FRML MDRD: 70 ML/MIN/1.73SQ M
GLUCOSE P FAST SERPL-MCNC: 103 MG/DL (ref 65–99)
HCT VFR BLD AUTO: 43.6 % (ref 34.8–46.1)
HDLC SERPL-MCNC: 55 MG/DL
HGB BLD-MCNC: 13.4 G/DL (ref 11.5–15.4)
LDLC SERPL CALC-MCNC: 107 MG/DL (ref 0–100)
LIPASE SERPL-CCNC: 121 U/L (ref 73–393)
MAGNESIUM SERPL-MCNC: 2.4 MG/DL (ref 1.6–2.6)
MCH RBC QN AUTO: 30.5 PG (ref 26.8–34.3)
MCHC RBC AUTO-ENTMCNC: 30.7 G/DL (ref 31.4–37.4)
MCV RBC AUTO: 99 FL (ref 82–98)
PLATELET # BLD AUTO: 363 THOUSANDS/UL (ref 149–390)
PMV BLD AUTO: 10.3 FL (ref 8.9–12.7)
POTASSIUM SERPL-SCNC: 4.5 MMOL/L (ref 3.5–5.3)
PROT SERPL-MCNC: 8.3 G/DL (ref 6.4–8.2)
RBC # BLD AUTO: 4.39 MILLION/UL (ref 3.81–5.12)
SL AMB  POCT GLUCOSE, UA: ABNORMAL
SL AMB LEUKOCYTE ESTERASE,UA: 75
SL AMB POCT BILIRUBIN,UA: ABNORMAL
SL AMB POCT BLOOD,UA: ABNORMAL
SL AMB POCT CLARITY,UA: CLEAR
SL AMB POCT COLOR,UA: YELLOW
SL AMB POCT HEMOGLOBIN AIC: 6 (ref ?–6.5)
SL AMB POCT KETONES,UA: ABNORMAL
SL AMB POCT NITRITE,UA: ABNORMAL
SL AMB POCT PH,UA: 5
SL AMB POCT SPECIFIC GRAVITY,UA: 1.01
SL AMB POCT URINE PROTEIN: ABNORMAL
SL AMB POCT UROBILINOGEN: ABNORMAL
SODIUM SERPL-SCNC: 137 MMOL/L (ref 136–145)
TRIGL SERPL-MCNC: 107 MG/DL
TSH SERPL DL<=0.05 MIU/L-ACNC: 0.75 UIU/ML (ref 0.36–3.74)
WBC # BLD AUTO: 9.1 THOUSAND/UL (ref 4.31–10.16)

## 2021-01-11 PROCEDURE — 86140 C-REACTIVE PROTEIN: CPT

## 2021-01-11 PROCEDURE — 85027 COMPLETE CBC AUTOMATED: CPT

## 2021-01-11 PROCEDURE — 81003 URINALYSIS AUTO W/O SCOPE: CPT | Performed by: FAMILY MEDICINE

## 2021-01-11 PROCEDURE — 83036 HEMOGLOBIN GLYCOSYLATED A1C: CPT | Performed by: FAMILY MEDICINE

## 2021-01-11 PROCEDURE — 99214 OFFICE O/P EST MOD 30 MIN: CPT | Performed by: FAMILY MEDICINE

## 2021-01-11 PROCEDURE — 83735 ASSAY OF MAGNESIUM: CPT

## 2021-01-11 PROCEDURE — G0438 PPPS, INITIAL VISIT: HCPCS | Performed by: FAMILY MEDICINE

## 2021-01-11 PROCEDURE — 80061 LIPID PANEL: CPT

## 2021-01-11 PROCEDURE — 36415 COLL VENOUS BLD VENIPUNCTURE: CPT

## 2021-01-11 PROCEDURE — 82150 ASSAY OF AMYLASE: CPT

## 2021-01-11 PROCEDURE — 84443 ASSAY THYROID STIM HORMONE: CPT

## 2021-01-11 PROCEDURE — 1123F ACP DISCUSS/DSCN MKR DOCD: CPT | Performed by: FAMILY MEDICINE

## 2021-01-11 PROCEDURE — 83690 ASSAY OF LIPASE: CPT

## 2021-01-11 PROCEDURE — 86038 ANTINUCLEAR ANTIBODIES: CPT

## 2021-01-11 PROCEDURE — 86618 LYME DISEASE ANTIBODY: CPT

## 2021-01-11 PROCEDURE — 80053 COMPREHEN METABOLIC PANEL: CPT

## 2021-01-11 RX ORDER — FAMOTIDINE 20 MG/1
20 TABLET, FILM COATED ORAL DAILY
Qty: 30 TABLET | Refills: 1 | Status: SHIPPED | OUTPATIENT
Start: 2021-01-11 | End: 2021-05-27 | Stop reason: SDUPTHER

## 2021-01-11 NOTE — PROGRESS NOTES
Assessment and Plan:     Problem List Items Addressed This Visit     None           Preventive health issues were discussed with patient, and age appropriate screening tests were ordered as noted in patient's After Visit Summary  Personalized health advice and appropriate referrals for health education or preventive services given if needed, as noted in patient's After Visit Summary       History of Present Illness:     Patient presents for Medicare Annual Wellness visit    Patient Care Team:  Joaquim Storey MD as PCP - General (Family Medicine)  MD Bimal Nuñez MD Donita Reaper, MD Bryn Sour, MD (Gastroenterology)     Problem List:     Patient Active Problem List   Diagnosis    Asthma    Fibromyalgia    Facial droop    Headache    Hyperlipidemia    Impaired fasting glucose    Mediastinal lymphadenopathy    Allergic asthma, mild persistent, uncomplicated    Cough    Obstructive sleep apnea hypopnea, mild    Allergic rhinitis    Cervical radiculopathy    Shoulder instability, left    Tension type headache    Varicose veins of left lower extremity with pain    Hypothyroidism    Abnormal EKG    Chest pain    Dyslipidemia    Chronic obstructive pulmonary disease with acute exacerbation ([facility] [address] )    Upper respiratory tract infection    Obstructive sleep apnea    Gastroesophageal reflux disease without esophagitis    Encounter for mammogram to establish baseline mammogram    Prediabetes    Seborrheic dermatitis of scalp      Past Medical and Surgical History:     Past Medical History:   Diagnosis Date    Abnormal glucose     last assessed 2/25/16    Arthritis     Asthma     w/ exacerbation; last assessed 5/14/15    Atypical nevus     last assessed 1/18/17    Breast lump     last assessed 3/6/14    Cataract     Cervical adenopathy     last assessed  2/7/17    Dizziness     Dyslipidemia     last assessed 5/22/17    Facial droop     last assessed 16    Fibromyalgia     Flu 2018    Genital herpes     resolved 16    Headache, tension-type     Herpes zoster     last assessed 16    History of shingles     may 2016    History of stomach ulcers     Hx of abnormal mammogram     last assessed  3/5/14    Hyperlipidemia     Myalgia     last assessed  12    Myositis     12    Neck pain     Peripheral neuropathy     Seborrheic keratosis     Skin neoplasm     of the lower limb, including hip; onset 12; last assessed  12    Sleep apnea      Past Surgical History:   Procedure Laterality Date    ABDOMINAL SURGERY      release of adhesions    BLADDER SURGERY      mesh-lift    BREAST SURGERY      lift     SECTION      x 3    CHOLECYSTECTOMY      lap    COLONOSCOPY      COSMETIC SURGERY      tummy and breast lift    ESOPHAGOGASTRODUODENOSCOPY      GALLBLADDER SURGERY      OOPHORECTOMY Left     OTHER SURGICAL HISTORY      vocal cord surgery, scraping    MI HYSTEROSCOPY,W/ENDO BX N/A 11/3/2016    Procedure: DILATATION AND CURETTAGE (D&C) WITH HYSTEROSCOPY;  Surgeon: Lainey Mcqueen MD;  Location: [facility];  Service: Gynecology    REDUCTION MAMMAPLASTY Bilateral 2008    TONSILLECTOMY        Family History:     Family History   Problem Relation Age of Onset    Hypertension Mother     Alzheimer's disease Mother     Arthritis Mother     Hypertension Father     Arthritis Father     Heart attack Father     Kidney disease Brother         stone    Diabetes Brother     Diabetes Daughter     Breast cancer Sister 28    Skin cancer Sister     Down syndrome Son     No Known Problems Maternal Uncle     No Known Problems Paternal Aunt     No Known Problems Paternal Uncle     No Known Problems Maternal Grandmother     No Known Problems Maternal Grandfather     No Known Problems Paternal Grandmother     No Known Problems Paternal Grandfather       Social History:     E-Cigarette/Vaping    E-Cigarette Use Never User      E-Cigarette/Vaping Substances    Nicotine No     THC No     CBD No      Social History     Socioeconomic History    Marital status:      Spouse name: None    Number of children: None    Years of education: None    Highest education level: None   Occupational History    None   Social Needs    Financial resource strain: None    Food insecurity     Worry: None     Inability: None    Transportation needs     Medical: None     Non-medical: None   Tobacco Use    Smoking status: Never Smoker    Smokeless tobacco: Never Used   Substance and Sexual Activity    Alcohol use: No     Comment: social alcohol use as per [employer]    Drug use: No    Sexual activity: None   Lifestyle    Physical activity     Days per week: None     Minutes per session: None    Stress: None   Relationships    Social connections     Talks on phone: None     Gets together: None     Attends Mosque service: None     Active member of club or organization: None     Attends meetings of clubs or organizations: None     Relationship status: None    Intimate partner violence     Fear of current or ex partner: None     Emotionally abused: None     Physically abused: None     Forced sexual activity: None   Other Topics Concern    None   Social History Narrative    Daily caffinated coffee consumption    Exercise habits- walking sometimes 1x per day- last impression 5/22/17- Wilber Rachel    Good sleep hygiene    Pet - fish      Medications and Allergies:     Current Outpatient Medications   Medication Sig Dispense Refill    albuterol (Ventolin HFA) 90 mcg/act inhaler Inhale 2 puffs every 4 (four) hours as needed for wheezing 1 Inhaler 5    Ascorbic Acid (VITAMIN C) 1000 MG tablet       atorvastatin (LIPITOR) 20 mg tablet Take 1 tablet (20 mg total) by mouth daily 90 tablet 3    Breo Ellipta 200-25 MCG/INH inhaler INHALE ONE PUFF BY MOUTH EVERY DAY - RINSE MOUTH AFTER  each 0    cholecalciferol (VITAMIN D3) 1,000 units tablet Take 2,000 Units by mouth daily      clobetasol (TEMOVATE) 0 05 % cream Apply topically 2 (two) times a day 45 g 1    cyclobenzaprine (FLEXERIL) 10 mg tablet Take 10 mg by mouth 3 (three) times a day as needed for muscle spasms (as needed for pain)      fluticasone-salmeterol (Advair Diskus) 250-50 mcg/dose inhaler Inhale 1 puff 2 (two) times a day Rinse mouth after use  1 Inhaler 3    metFORMIN (GLUCOPHAGE-XR) 500 mg 24 hr tablet Take 1 tablet (500 mg total) by mouth daily with dinner 90 tablet 2    Multiple Vitamins-Minerals (HAIR SKIN AND NAILS FORMULA) TABS Take by mouth      multivitamin (THERAGRAN) TABS Take 1 tablet by mouth daily      pantoprazole (PROTONIX) 40 mg tablet Take 1 tablet (40 mg total) by mouth daily 30 tablet 5    Potassium 99 MG TABS Take 99 mg by mouth daily       Selenium 200 MCG CAPS Take 200 mcg by mouth daily       Zinc 50 MG CAPS Take 50 mg by mouth daily       hydrocortisone 2 5 % cream        No current facility-administered medications for this visit  Allergies   Allergen Reactions    Latex Rash      Immunizations:     Immunization History   Administered Date(s) Administered    Influenza, Quadrivalent (nasal) 12/06/2016    Influenza, high dose seasonal 0 7 mL 10/18/2019, 09/14/2020    Influenza, injectable, quadrivalent, preservative free 0 5 mL 10/22/2018    Influenza, seasonal, injectable 09/15/2015, 11/20/2017    Pneumococcal Conjugate 13-Valent 02/02/2016    Pneumococcal Polysaccharide PPV23 10/09/2019    Tuberculin Skin Test-PPD Intradermal 06/12/2017      Health Maintenance:         Topic Date Due    MAMMOGRAM  12/29/2021    Colorectal Cancer Screening  03/02/2022    Hepatitis C Screening  Completed     There are no preventive care reminders to display for this patient     Medicare Health Risk Assessment:     /80 (BP Location: Left arm, Patient Position: Sitting, Cuff Size: Standard)   Pulse 78 Temp (!) 97 1 °F (36 2 °C)   Resp 16   Ht 5' 3" (1 6 m)   Wt 76 2 kg (168 lb)   BMI 29 76 kg/m²      Can [place] is here for her Subsequent Wellness visit  Last Medicare Wellness visit information reviewed, patient interviewed and updates made to the record today  Health Risk Assessment:   Patient rates overall health as very good  Patient feels that their physical health rating is same  Eyesight was rated as same  Hearing was rated as same  Patient feels that their emotional and mental health rating is same  Pain experienced in the last 7 days has been some  Patient's pain rating has been 2/10  Patient states that she has experienced no weight loss or gain in last 6 months  spasm near ribs , toes cramping    Depression Screening:   PHQ-2 Score: 0      Fall Risk Screening: In the past year, patient has experienced: no history of falling in past year      Urinary Incontinence Screening:   Patient has leaked urine accidently in the last six months  Home Safety:  Patient does not have trouble with stairs inside or outside of their home  Patient has working smoke alarms and has working carbon monoxide detector  Home safety hazards include: none  Nutrition:   Current diet is Regular  Medications:   Patient is currently taking over-the-counter supplements  OTC medications include: see medication list  Patient is able to manage medications  Activities of Daily Living (ADLs)/Instrumental Activities of Daily Living (IADLs):   Walk and transfer into and out of bed and chair?: Yes  Dress and groom yourself?: Yes    Bathe or shower yourself?: Yes    Feed yourself? Yes  Do your laundry/housekeeping?: Yes  Manage your money, pay your bills and track your expenses?: Yes  Make your own meals?: Yes    Do your own shopping?: Yes    Previous Hospitalizations:   Any hospitalizations or ED visits within the last 12 months?: No      Advance Care Planning:   Living will: No    Durable POA for healthcare:  No Advanced directive: No    Advanced directive counseling given: Yes    Five wishes given: Yes      PREVENTIVE SCREENINGS      Cardiovascular Screening:    General: History Lipid Disorder      Diabetes Screening:     General: Screening Current      Colorectal Cancer Screening:     General: Screening Current      Breast Cancer Screening:     General: Screening Current      Cervical Cancer Screening:    General: Screening Not Indicated      Osteoporosis Screening:    General: Risks and Benefits Discussed      Lung Cancer Screening:     General: Screening Not Indicated      Hepatitis C Screening:    General: Screening Current    Hep C Screening Accepted: Yes      Other Counseling Topics:   Car/seat belt/driving safety, skin self-exam, sunscreen and regular weightbearing exercise and calcium and vitamin D intake         Ana Luisa Mace MD

## 2021-01-11 NOTE — PATIENT INSTRUCTIONS
Medicare Preventive Visit Patient Instructions  Thank you for completing your Welcome to Medicare Visit or Medicare Annual Wellness Visit today  Your next wellness visit will be due in one year (1/11/2022)  The screening/preventive services that you may require over the next 5-10 years are detailed below  Some tests may not apply to you based off risk factors and/or age  Screening tests ordered at today's visit but not completed yet may show as past due  Also, please note that scanned in results may not display below  Preventive Screenings:  Service Recommendations Previous Testing/Comments   Colorectal Cancer Screening  * Colonoscopy    * Fecal Occult Blood Test (FOBT)/Fecal Immunochemical Test (FIT)  * Fecal DNA/Cologuard Test  * Flexible Sigmoidoscopy Age: 54-65 years old   Colonoscopy: every 10 years (may be performed more frequently if at higher risk)  OR  FOBT/FIT: every 1 year  OR  Cologuard: every 3 years  OR  Sigmoidoscopy: every 5 years  Screening may be recommended earlier than age 48 if at higher risk for colorectal cancer  Also, an individualized decision between you and your healthcare provider will decide whether screening between the ages of 74-80 would be appropriate  Colonoscopy: 03/02/2012  FOBT/FIT: Not on file  Cologuard: Not on file  Sigmoidoscopy: Not on file    Screening Current     Breast Cancer Screening Age: 36 years old  Frequency: every 1-2 years  Not required if history of left and right mastectomy Mammogram: 12/29/2020    Screening Current   Cervical Cancer Screening Between the ages of 21-29, pap smear recommended once every 3 years  Between the ages of 33-67, can perform pap smear with HPV co-testing every 5 years     Recommendations may differ for women with a history of total hysterectomy, cervical cancer, or abnormal pap smears in past  Pap Smear: Not on file    Screening Not Indicated   Hepatitis C Screening Once for adults born between 1945 and 1965  More frequently in patients at high risk for Hepatitis C Hep C Antibody: 04/26/2019    Screening Current   Diabetes Screening 1-2 times per year if you're at risk for diabetes or have pre-diabetes Fasting glucose: 108 mg/dL   A1C: 6 1 %       Cholesterol Screening Once every 5 years if you don't have a lipid disorder  May order more often based on risk factors  Lipid panel: 08/21/2019    Screening Not Indicated  History Lipid Disorder     Other Preventive Screenings Covered by Medicare:  1  Abdominal Aortic Aneurysm (AAA) Screening: covered once if your at risk  You're considered to be at risk if you have a family history of AAA  2  Lung Cancer Screening: covers low dose CT scan once per year if you meet all of the following conditions: (1) Age 50-69; (2) No signs or symptoms of lung cancer; (3) Current smoker or have quit smoking within the last 15 years; (4) You have a tobacco smoking history of at least 30 pack years (packs per day multiplied by number of years you smoked); (5) You get a written order from a healthcare provider  3  Glaucoma Screening: covered annually if you're considered high risk: (1) You have diabetes OR (2) Family history of glaucoma OR (3)  aged 48 and older OR (3)  American aged 72 and older  3  Osteoporosis Screening: covered every 2 years if you meet one of the following conditions: (1) You're estrogen deficient and at risk for osteoporosis based off medical history and other findings; (2) Have a vertebral abnormality; (3) On glucocorticoid therapy for more than 3 months; (4) Have primary hyperparathyroidism; (5) On osteoporosis medications and need to assess response to drug therapy  · Last bone density test (DXA Scan): 11/22/2017  5  HIV Screening: covered annually if you're between the age of 12-76  Also covered annually if you are younger than 13 and older than 72 with risk factors for HIV infection   For pregnant patients, it is covered up to 3 times per pregnancy  Immunizations:  Immunization Recommendations   Influenza Vaccine Annual influenza vaccination during flu season is recommended for all persons aged >= 6 months who do not have contraindications   Pneumococcal Vaccine (Prevnar and Pneumovax)  * Prevnar = PCV13  * Pneumovax = PPSV23   Adults 25-60 years old: 1-3 doses may be recommended based on certain risk factors  Adults 72 years old: Prevnar (PCV13) vaccine recommended followed by Pneumovax (PPSV23) vaccine  If already received PPSV23 since turning 65, then PCV13 recommended at least one year after PPSV23 dose  Hepatitis B Vaccine 3 dose series if at intermediate or high risk (ex: diabetes, end stage renal disease, liver disease)   Tetanus (Td) Vaccine - COST NOT COVERED BY MEDICARE PART B Following completion of primary series, a booster dose should be given every 10 years to maintain immunity against tetanus  Td may also be given as tetanus wound prophylaxis  Tdap Vaccine - COST NOT COVERED BY MEDICARE PART B Recommended at least once for all adults  For pregnant patients, recommended with each pregnancy  Shingles Vaccine (Shingrix) - COST NOT COVERED BY MEDICARE PART B  2 shot series recommended in those aged 48 and above     Health Maintenance Due:      Topic Date Due    MAMMOGRAM  12/29/2021    Colorectal Cancer Screening  03/02/2022    Hepatitis C Screening  Completed     Immunizations Due:  There are no preventive care reminders to display for this patient  Advance Directives   What are advance directives? Advance directives are legal documents that state your wishes and plans for medical care  These plans are made ahead of time in case you lose your ability to make decisions for yourself  Advance directives can apply to any medical decision, such as the treatments you want, and if you want to donate organs  What are the types of advance directives?   There are many types of advance directives, and each state has rules about how to use them  You may choose a combination of any of the following:  · Living will: This is a written record of the treatment you want  You can also choose which treatments you do not want, which to limit, and which to stop at a certain time  This includes surgery, medicine, IV fluid, and tube feedings  · Durable power of  for healthcare [facility]): This is a written record that states who you want to make healthcare choices for you when you are unable to make them for yourself  This person, called a proxy, is usually a family member or a friend  You may choose more than 1 proxy  · Do not resuscitate (DNR) order:  A DNR order is used in case your heart stops beating or you stop breathing  It is a request not to have certain forms of treatment, such as CPR  A DNR order may be included in other types of advance directives  · Medical directive: This covers the care that you want if you are in a coma, near death, or unable to make decisions for yourself  You can list the treatments you want for each condition  Treatment may include pain medicine, surgery, blood transfusions, dialysis, IV or tube feedings, and a ventilator (breathing machine)  · Values history: This document has questions about your views, beliefs, and how you feel and think about life  This information can help others choose the care that you would choose  Why are advance directives important? An advance directive helps you control your care  Although spoken wishes may be used, it is better to have your wishes written down  Spoken wishes can be misunderstood, or not followed  Treatments may be given even if you do not want them  An advance directive may make it easier for your family to make difficult choices about your care  Urinary Incontinence   Urinary incontinence (UI)  is when you lose control of your bladder  UI develops because your bladder cannot store or empty urine properly   The 3 most common types of UI are stress incontinence, urge incontinence, or both  Medicines:   · May be given to help strengthen your bladder control  Report any side effects of medication to your healthcare provider  Do pelvic muscle exercises often:  Your pelvic muscles help you stop urinating  Squeeze these muscles tight for 5 seconds, then relax for 5 seconds  Gradually work up to squeezing for 10 seconds  Do 3 sets of 15 repetitions a day, or as directed  This will help strengthen your pelvic muscles and improve bladder control  Train your bladder:  Go to the bathroom at set times, such as every 2 hours, even if you do not feel the urge to go  You can also try to hold your urine when you feel the urge to go  For example, hold your urine for 5 minutes when you feel the urge to go  As that becomes easier, hold your urine for 10 minutes  Self-care:   · Keep a UI record  Write down how often you leak urine and how much you leak  Make a note of what you were doing when you leaked urine  · Drink liquids as directed  You may need to limit the amount of liquid you drink to help control your urine leakage  Do not drink any liquid right before you go to bed  Limit or do not have drinks that contain caffeine or alcohol  · Prevent constipation  Eat a variety of high-fiber foods  Good examples are high-fiber cereals, beans, vegetables, and whole-grain breads  Walking is the best way to trigger your intestines to have a bowel movement  · Exercise regularly and maintain a healthy weight  Weight loss and exercise will decrease pressure on your bladder and help you control your leakage  · Use a catheter as directed  to help empty your bladder  A catheter is a tiny, plastic tube that is put into your bladder to drain your urine  · Go to behavior therapy as directed  Behavior therapy may be used to help you learn to control your urge to urinate      Weight Management   Why it is important to manage your weight:  Being overweight increases your risk of health conditions such as heart disease, high blood pressure, type 2 diabetes, and certain types of cancer  It can also increase your risk for osteoarthritis, sleep apnea, and other respiratory problems  Aim for a slow, steady weight loss  Even a small amount of weight loss can lower your risk of health problems  How to lose weight safely:  A safe and healthy way to lose weight is to eat fewer calories and get regular exercise  You can lose up about 1 pound a week by decreasing the number of calories you eat by 500 calories each day  Healthy meal plan for weight management:  A healthy meal plan includes a variety of foods, contains fewer calories, and helps you stay healthy  A healthy meal plan includes the following:  · Eat whole-grain foods more often  A healthy meal plan should contain fiber  Fiber is the part of grains, fruits, and vegetables that is not broken down by your body  Whole-grain foods are healthy and provide extra fiber in your diet  Some examples of whole-grain foods are whole-wheat breads and pastas, oatmeal, brown rice, and bulgur  · Eat a variety of vegetables every day  Include dark, leafy greens such as spinach, kale, katelynn greens, and mustard greens  Eat yellow and orange vegetables such as carrots, sweet potatoes, and winter squash  · Eat a variety of fruits every day  Choose fresh or canned fruit (canned in its own juice or light syrup) instead of juice  Fruit juice has very little or no fiber  · Eat low-fat dairy foods  Drink fat-free (skim) milk or 1% milk  Eat fat-free yogurt and low-fat cottage cheese  Try low-fat cheeses such as mozzarella and other reduced-fat cheeses  · Choose meat and other protein foods that are low in fat  Choose beans or other legumes such as split peas or lentils  Choose fish, skinless poultry (chicken or turkey), or lean cuts of red meat (beef or pork)  Before you cook meat or poultry, cut off any visible fat  · Use less fat and oil    Try baking foods instead of frying them  Add less fat, such as margarine, sour cream, regular salad dressing and mayonnaise to foods  Eat fewer high-fat foods  Some examples of high-fat foods include french fries, doughnuts, ice cream, and cakes  · Eat fewer sweets  Limit foods and drinks that are high in sugar  This includes candy, cookies, regular soda, and sweetened drinks  Exercise:  Exercise at least 30 minutes per day on most days of the week  Some examples of exercise include walking, biking, dancing, and swimming  You can also fit in more physical activity by taking the stairs instead of the elevator or parking farther away from stores  Ask your healthcare provider about the best exercise plan for you  © Copyright [employer] 2018 Information is for End User's use only and may not be sold, redistributed or otherwise used for commercial purposes   All illustrations and images included in CareNotes® are the copyrighted property of A D [employer] M [employer]  or [address] [place] [address]

## 2021-01-13 ENCOUNTER — TELEPHONE (OUTPATIENT)
Dept: FAMILY MEDICINE CLINIC | Facility: CLINIC | Age: 66
End: 2021-01-13

## 2021-01-13 LAB
B BURGDOR IGG+IGM SER-ACNC: 11
BACTERIA UR CULT: NORMAL
Lab: NORMAL
RYE IGE QN: NEGATIVE

## 2021-01-13 NOTE — TELEPHONE ENCOUNTER
----- Message from Carl Sullivan MD sent at 1/13/2021 12:59 PM EST -----  Please inform GISELA and urine culture negative    Remainder of labs essentially within normal-CRP borderline high at 3 1 (range up to 3)

## 2021-01-15 DIAGNOSIS — J45.30 ALLERGIC ASTHMA, MILD PERSISTENT, UNCOMPLICATED: ICD-10-CM

## 2021-01-19 PROBLEM — L08.9 INFECTED BLISTER OF TOE OF RIGHT FOOT: Status: ACTIVE | Noted: 2021-01-19

## 2021-01-19 PROBLEM — R21 RASH OF FACE: Status: ACTIVE | Noted: 2021-01-19

## 2021-01-19 PROBLEM — Z00.00 MEDICARE ANNUAL WELLNESS VISIT, SUBSEQUENT: Status: ACTIVE | Noted: 2020-06-25

## 2021-01-19 PROBLEM — N30.00 ACUTE CYSTITIS WITHOUT HEMATURIA: Status: ACTIVE | Noted: 2021-01-19

## 2021-01-19 PROBLEM — M25.541 PAIN INVOLVING JOINTS OF FINGERS OF BOTH HANDS: Status: ACTIVE | Noted: 2021-01-19

## 2021-01-19 PROBLEM — S90.424A INFECTED BLISTER OF TOE OF RIGHT FOOT: Status: ACTIVE | Noted: 2021-01-19

## 2021-01-19 PROBLEM — M25.542 PAIN INVOLVING JOINTS OF FINGERS OF BOTH HANDS: Status: ACTIVE | Noted: 2021-01-19

## 2021-02-04 ENCOUNTER — APPOINTMENT (EMERGENCY)
Dept: RADIOLOGY | Facility: HOSPITAL | Age: 66
End: 2021-02-04
Payer: MEDICARE

## 2021-02-04 ENCOUNTER — HOSPITAL ENCOUNTER (EMERGENCY)
Facility: HOSPITAL | Age: 66
Discharge: HOME | End: 2021-02-05
Attending: EMERGENCY MEDICINE
Payer: MEDICARE

## 2021-02-04 ENCOUNTER — OFFICE VISIT (OUTPATIENT)
Dept: FAMILY MEDICINE CLINIC | Facility: CLINIC | Age: 66
End: 2021-02-04
Payer: MEDICARE

## 2021-02-04 VITALS
TEMPERATURE: 98.9 F | HEART RATE: 70 BPM | DIASTOLIC BLOOD PRESSURE: 55 MMHG | RESPIRATION RATE: 18 BRPM | OXYGEN SATURATION: 98 % | SYSTOLIC BLOOD PRESSURE: 91 MMHG

## 2021-02-04 VITALS
RESPIRATION RATE: 16 BRPM | WEIGHT: 168 LBS | SYSTOLIC BLOOD PRESSURE: 106 MMHG | OXYGEN SATURATION: 98 % | HEIGHT: 63 IN | TEMPERATURE: 97.7 F | HEART RATE: 79 BPM | DIASTOLIC BLOOD PRESSURE: 66 MMHG | BODY MASS INDEX: 29.77 KG/M2

## 2021-02-04 DIAGNOSIS — K57.90 DIVERTICULOSIS: ICD-10-CM

## 2021-02-04 DIAGNOSIS — R10.30 LOWER ABDOMINAL PAIN: Primary | ICD-10-CM

## 2021-02-04 DIAGNOSIS — K57.92 DIVERTICULITIS: Primary | ICD-10-CM

## 2021-02-04 DIAGNOSIS — R30.0 DYSURIA: ICD-10-CM

## 2021-02-04 LAB
ALBUMIN SERPL BCP-MCNC: 3.3 G/DL (ref 3.5–5)
ALP SERPL-CCNC: 74 U/L (ref 46–116)
ALT SERPL W P-5'-P-CCNC: 37 U/L (ref 12–78)
ANION GAP SERPL CALCULATED.3IONS-SCNC: 5 MMOL/L (ref 4–13)
AST SERPL W P-5'-P-CCNC: 23 U/L (ref 5–45)
BASOPHILS # BLD AUTO: 0.11 THOUSANDS/ΜL (ref 0–0.1)
BASOPHILS NFR BLD AUTO: 1 % (ref 0–1)
BILIRUB SERPL-MCNC: 0.2 MG/DL (ref 0.2–1)
BILIRUB UR QL STRIP: NEGATIVE
BUN SERPL-MCNC: 17 MG/DL (ref 5–25)
CALCIUM ALBUM COR SERPL-MCNC: 9.7 MG/DL (ref 8.3–10.1)
CALCIUM SERPL-MCNC: 9.1 MG/DL (ref 8.3–10.1)
CHLORIDE SERPL-SCNC: 103 MMOL/L (ref 100–108)
CLARITY UR: CLEAR
CO2 SERPL-SCNC: 27 MMOL/L (ref 21–32)
COLOR UR: NORMAL
CREAT SERPL-MCNC: 0.86 MG/DL (ref 0.6–1.3)
EOSINOPHIL # BLD AUTO: 0.18 THOUSAND/ΜL (ref 0–0.61)
EOSINOPHIL NFR BLD AUTO: 2 % (ref 0–6)
ERYTHROCYTE [DISTWIDTH] IN BLOOD BY AUTOMATED COUNT: 12.5 % (ref 11.6–15.1)
GFR SERPL CREATININE-BSD FRML MDRD: 70 ML/MIN/1.73SQ M
GLUCOSE SERPL-MCNC: 118 MG/DL (ref 65–140)
GLUCOSE UR STRIP-MCNC: NEGATIVE MG/DL
HCT VFR BLD AUTO: 39.7 % (ref 34.8–46.1)
HGB BLD-MCNC: 12.3 G/DL (ref 11.5–15.4)
HGB UR QL STRIP.AUTO: NEGATIVE
IMM GRANULOCYTES # BLD AUTO: 0.03 THOUSAND/UL (ref 0–0.2)
IMM GRANULOCYTES NFR BLD AUTO: 0 % (ref 0–2)
KETONES UR STRIP-MCNC: NEGATIVE MG/DL
LEUKOCYTE ESTERASE UR QL STRIP: NEGATIVE
LIPASE SERPL-CCNC: 83 U/L (ref 73–393)
LYMPHOCYTES # BLD AUTO: 2.41 THOUSANDS/ΜL (ref 0.6–4.47)
LYMPHOCYTES NFR BLD AUTO: 28 % (ref 14–44)
MAGNESIUM SERPL-MCNC: 2 MG/DL (ref 1.6–2.6)
MCH RBC QN AUTO: 30.4 PG (ref 26.8–34.3)
MCHC RBC AUTO-ENTMCNC: 31 G/DL (ref 31.4–37.4)
MCV RBC AUTO: 98 FL (ref 82–98)
MONOCYTES # BLD AUTO: 0.97 THOUSAND/ΜL (ref 0.17–1.22)
MONOCYTES NFR BLD AUTO: 11 % (ref 4–12)
NEUTROPHILS # BLD AUTO: 4.82 THOUSANDS/ΜL (ref 1.85–7.62)
NEUTS SEG NFR BLD AUTO: 58 % (ref 43–75)
NITRITE UR QL STRIP: NEGATIVE
NRBC BLD AUTO-RTO: 0 /100 WBCS
PH UR STRIP.AUTO: 5.5 [PH]
PLATELET # BLD AUTO: 361 THOUSANDS/UL (ref 149–390)
PMV BLD AUTO: 10.1 FL (ref 8.9–12.7)
POTASSIUM SERPL-SCNC: 3.8 MMOL/L (ref 3.5–5.3)
PROT SERPL-MCNC: 8 G/DL (ref 6.4–8.2)
PROT UR STRIP-MCNC: NEGATIVE MG/DL
RBC # BLD AUTO: 4.05 MILLION/UL (ref 3.81–5.12)
SL AMB  POCT GLUCOSE, UA: ABNORMAL
SL AMB LEUKOCYTE ESTERASE,UA: 25
SL AMB POCT BILIRUBIN,UA: ABNORMAL
SL AMB POCT BLOOD,UA: ABNORMAL
SL AMB POCT CLARITY,UA: CLEAR
SL AMB POCT COLOR,UA: YELLOW
SL AMB POCT KETONES,UA: ABNORMAL
SL AMB POCT NITRITE,UA: ABNORMAL
SL AMB POCT PH,UA: 5
SL AMB POCT SPECIFIC GRAVITY,UA: 1.02
SL AMB POCT URINE PROTEIN: ABNORMAL
SL AMB POCT UROBILINOGEN: ABNORMAL
SODIUM SERPL-SCNC: 135 MMOL/L (ref 136–145)
SP GR UR STRIP.AUTO: >=1.03 (ref 1–1.03)
UROBILINOGEN UR QL STRIP.AUTO: 0.2 E.U./DL
WBC # BLD AUTO: 8.52 THOUSAND/UL (ref 4.31–10.16)

## 2021-02-04 PROCEDURE — 81002 URINALYSIS NONAUTO W/O SCOPE: CPT | Performed by: FAMILY MEDICINE

## 2021-02-04 PROCEDURE — 81003 URINALYSIS AUTO W/O SCOPE: CPT | Performed by: EMERGENCY MEDICINE

## 2021-02-04 PROCEDURE — 99284 EMERGENCY DEPT VISIT MOD MDM: CPT | Performed by: EMERGENCY MEDICINE

## 2021-02-04 PROCEDURE — 83735 ASSAY OF MAGNESIUM: CPT | Performed by: EMERGENCY MEDICINE

## 2021-02-04 PROCEDURE — 85025 COMPLETE CBC W/AUTO DIFF WBC: CPT | Performed by: EMERGENCY MEDICINE

## 2021-02-04 PROCEDURE — 36415 COLL VENOUS BLD VENIPUNCTURE: CPT | Performed by: EMERGENCY MEDICINE

## 2021-02-04 PROCEDURE — 96374 THER/PROPH/DIAG INJ IV PUSH: CPT

## 2021-02-04 PROCEDURE — G1004 CDSM NDSC: HCPCS

## 2021-02-04 PROCEDURE — 99214 OFFICE O/P EST MOD 30 MIN: CPT | Performed by: FAMILY MEDICINE

## 2021-02-04 PROCEDURE — 99284 EMERGENCY DEPT VISIT MOD MDM: CPT

## 2021-02-04 PROCEDURE — 96361 HYDRATE IV INFUSION ADD-ON: CPT

## 2021-02-04 PROCEDURE — 74177 CT ABD & PELVIS W/CONTRAST: CPT

## 2021-02-04 PROCEDURE — 83690 ASSAY OF LIPASE: CPT | Performed by: EMERGENCY MEDICINE

## 2021-02-04 PROCEDURE — 80053 COMPREHEN METABOLIC PANEL: CPT | Performed by: EMERGENCY MEDICINE

## 2021-02-04 PROCEDURE — 87086 URINE CULTURE/COLONY COUNT: CPT | Performed by: EMERGENCY MEDICINE

## 2021-02-04 RX ORDER — KETOROLAC TROMETHAMINE 30 MG/ML
15 INJECTION, SOLUTION INTRAMUSCULAR; INTRAVENOUS ONCE
Status: COMPLETED | OUTPATIENT
Start: 2021-02-04 | End: 2021-02-04

## 2021-02-04 RX ORDER — CIPROFLOXACIN 500 MG/1
500 TABLET, FILM COATED ORAL ONCE
Status: COMPLETED | OUTPATIENT
Start: 2021-02-05 | End: 2021-02-04

## 2021-02-04 RX ORDER — METRONIDAZOLE 500 MG/1
500 TABLET ORAL EVERY 8 HOURS SCHEDULED
Qty: 21 TABLET | Refills: 0 | Status: SHIPPED | OUTPATIENT
Start: 2021-02-04 | End: 2021-02-11

## 2021-02-04 RX ORDER — CIPROFLOXACIN 500 MG/1
500 TABLET, FILM COATED ORAL EVERY 12 HOURS SCHEDULED
Qty: 14 TABLET | Refills: 0 | Status: SHIPPED | OUTPATIENT
Start: 2021-02-04 | End: 2021-02-08 | Stop reason: SDUPTHER

## 2021-02-04 RX ORDER — METRONIDAZOLE 500 MG/1
500 TABLET ORAL ONCE
Status: COMPLETED | OUTPATIENT
Start: 2021-02-05 | End: 2021-02-04

## 2021-02-04 RX ORDER — CIPROFLOXACIN 500 MG/1
500 TABLET, FILM COATED ORAL 2 TIMES DAILY
Qty: 14 TABLET | Refills: 0 | Status: SHIPPED | OUTPATIENT
Start: 2021-02-04 | End: 2021-02-11

## 2021-02-04 RX ORDER — MORPHINE SULFATE 4 MG/ML
4 INJECTION, SOLUTION INTRAMUSCULAR; INTRAVENOUS ONCE
Status: DISCONTINUED | OUTPATIENT
Start: 2021-02-04 | End: 2021-02-04

## 2021-02-04 RX ADMIN — SODIUM CHLORIDE 1000 ML: 0.9 INJECTION, SOLUTION INTRAVENOUS at 21:13

## 2021-02-04 RX ADMIN — KETOROLAC TROMETHAMINE 15 MG: 30 INJECTION, SOLUTION INTRAMUSCULAR at 21:13

## 2021-02-04 RX ADMIN — METRONIDAZOLE 500 MG: 500 TABLET, FILM COATED ORAL at 23:52

## 2021-02-04 RX ADMIN — IOHEXOL 100 ML: 350 INJECTION, SOLUTION INTRAVENOUS at 22:34

## 2021-02-04 RX ADMIN — CIPROFLOXACIN HYDROCHLORIDE 500 MG: 500 TABLET, FILM COATED ORAL at 23:52

## 2021-02-04 NOTE — PROGRESS NOTES
Morales Montelongo 1955 female MRN: [id number]    [address] [place] [address]      ASSESSMENT/PLAN  Morales Montelongo is a 77 y o  female presents to the office for    Diagnoses and all orders for this visit:    Lower abdominal pain  -     CT abdomen pelvis wo contrast; Future  -     Transfer to other facility  -     ciprofloxacin (CIPRO) 500 mg tablet; Take 1 tablet (500 mg total) by mouth every 12 (twelve) hours for 7 days    Dysuria  -     POCT urine dip  -     Urine culture; Future  -     Transfer to other facility  -     ciprofloxacin (CIPRO) 500 mg tablet; Take 1 tablet (500 mg total) by mouth every 12 (twelve) hours for 7 days    Diverticulosis         Unfortunately I am concerned about the patient possibly having acute diverticulitis verses pyelonephritis verses kidney stone  Unfortunately patient did take penicillin and I was unable to see if the UA or urine culture would be positive  Therefore I am referring the patient directly to the emergency room given her abdominal distension and significant tenderness of the lower quadrant  Patient does have radiation to the flanks  Advised patient that I am calling in Cipro 500 mg twice a day just to be sure that if this is urine infection we are treating it appropriately  Patient aware of medications  I would like to have close follow-up with the patient after discharge         No future appointments  SUBJECTIVE  CC: Urinary Tract Infection (pt here with low abdominal pain and back pain near kidneys since Monday, has frequency and took pemicillin 8 pills that she had at home), Back Pain, and Abdominal Pain      HPI:  Morales Montelongo is a 77 y o  female who presents for  An acute appointment  Patient very concerned about a possible urinary tract infection  She states that she has lower abdominal pain that radiates back to her flanks bilateral   This started around Monday    Patient has a medical degree and started taking her penicillin twice a day for the last several days  She states that she has seen minimal improvement with the antibiotic  Patient has a history of diverticulosis has never had an infection  Did start this morning with slight burning sensation of her urine  Denies any gross hemtaurea  States that the symptoms have only been worsening  Review of Systems   Constitutional: Positive for appetite change  Negative for activity change, chills, fatigue and fever  HENT: Negative for congestion  Respiratory: Negative for cough, chest tightness and shortness of breath  Cardiovascular: Negative for chest pain and leg swelling  Gastrointestinal: Positive for abdominal distention and abdominal pain  Negative for constipation, diarrhea, nausea and vomiting  Genitourinary: Positive for dysuria  All other systems reviewed and are negative        Historical Information   The patient history was reviewed as follows:  Past Medical History:   Diagnosis Date    Abnormal glucose     last assessed 2/25/16    Arthritis     Asthma     w/ exacerbation; last assessed 5/14/15    Atypical nevus     last assessed 1/18/17    Breast lump     last assessed 3/6/14    Cataract     Cervical adenopathy     last assessed  2/7/17    Dizziness     Dyslipidemia     last assessed 5/22/17    Facial droop     last assessed  12/5/16    Fibromyalgia     Flu 01/20/2018    Genital herpes     resolved 9/1/16    Headache, tension-type     Herpes zoster     last assessed 5/20/16    History of shingles     may 2016    History of stomach ulcers     Hx of abnormal mammogram     last assessed  3/5/14    Hyperlipidemia     Myalgia     last assessed  11/21/12    Myositis     11/21/12    Neck pain     Pain involving joints of fingers of both hands 1/19/2021    Peripheral neuropathy     Seborrheic keratosis     Skin neoplasm     of the lower limb, including hip; onset 1/23/12; last assessed  1/23/12   Ian Sleep apnea          Medications:     Current Outpatient Medications:     albuterol (Ventolin HFA) 90 mcg/act inhaler, Inhale 2 puffs every 4 (four) hours as needed for wheezing, Disp: 1 Inhaler, Rfl: 5    Ascorbic Acid (VITAMIN C) 1000 MG tablet, , Disp: , Rfl:     atorvastatin (LIPITOR) 20 mg tablet, Take 1 tablet (20 mg total) by mouth daily, Disp: 90 tablet, Rfl: 3    Breo Ellipta 200-25 MCG/INH inhaler, INHALE ONE PUFF BY MOUTH EVERY DAY - RINSE MOUTH AFTER USE, Disp: 180 each, Rfl: 0    cholecalciferol (VITAMIN D3) 1,000 units tablet, Take 2,000 Units by mouth daily, Disp: , Rfl:     clobetasol (TEMOVATE) 0 05 % cream, Apply topically 2 (two) times a day, Disp: 45 g, Rfl: 1    cyclobenzaprine (FLEXERIL) 10 mg tablet, Take 10 mg by mouth 3 (three) times a day as needed for muscle spasms (as needed for pain), Disp: , Rfl:     famotidine (PEPCID) 20 mg tablet, Take 1 tablet (20 mg total) by mouth daily, Disp: 30 tablet, Rfl: 1    hydrocortisone 2 5 % cream, , Disp: , Rfl:     metFORMIN (GLUCOPHAGE-XR) 500 mg 24 hr tablet, Take 1 tablet (500 mg total) by mouth daily with dinner, Disp: 90 tablet, Rfl: 2    Multiple Vitamins-Minerals (HAIR SKIN AND NAILS FORMULA) TABS, Take by mouth, Disp: , Rfl:     multivitamin (THERAGRAN) TABS, Take 1 tablet by mouth daily, Disp: , Rfl:     pantoprazole (PROTONIX) 40 mg tablet, Take 1 tablet (40 mg total) by mouth daily, Disp: 30 tablet, Rfl: 5    Potassium 99 MG TABS, Take 99 mg by mouth daily , Disp: , Rfl:     Selenium 200 MCG CAPS, Take 200 mcg by mouth daily , Disp: , Rfl:     Zinc 50 MG CAPS, Take 50 mg by mouth daily , Disp: , Rfl:     ciprofloxacin (CIPRO) 500 mg tablet, Take 1 tablet (500 mg total) by mouth every 12 (twelve) hours for 7 days, Disp: 14 tablet, Rfl: 0    fluticasone-salmeterol (Advair Diskus) 250-50 mcg/dose inhaler, Inhale 1 puff 2 (two) times a day Rinse mouth after use   (Patient not taking: Reported on 2/4/2021), Disp: 1 Inhaler, Rfl: 3    Allergies   Allergen Reactions    Latex Rash       OBJECTIVE  Vitals:   Vitals:    02/04/21 1757   BP: 106/66   BP Location: Left arm   Patient Position: Sitting   Cuff Size: Standard   Pulse: 79   Resp: 16   Temp: 97 7 °F (36 5 °C)   TempSrc: Temporal   SpO2: 98%   Weight: 76 2 kg (168 lb)   Height: 5' 3" (1 6 m)         Physical Exam  Vitals signs reviewed  Constitutional:       Appearance: She is well-developed  HENT:      Head: Normocephalic and atraumatic  Eyes:      Conjunctiva/sclera: Conjunctivae normal       Pupils: Pupils are equal, round, and reactive to light  Neck:      Musculoskeletal: Normal range of motion and neck supple  Cardiovascular:      Rate and Rhythm: Normal rate and regular rhythm  Heart sounds: Normal heart sounds  Pulmonary:      Effort: Pulmonary effort is normal  No respiratory distress  Breath sounds: Normal breath sounds  Abdominal:      General: Bowel sounds are normal  There is distension (mild)  Tenderness: There is abdominal tenderness in the suprapubic area and left lower quadrant  There is right CVA tenderness and left CVA tenderness  Musculoskeletal: Normal range of motion  Skin:     General: Skin is warm  Capillary Refill: Capillary refill takes less than 2 seconds  Neurological:      Mental Status: She is alert and oriented to person, place, and time                      Guillaume Chacon MD,   [facility]  2/4/2021

## 2021-02-05 ENCOUNTER — TELEPHONE (OUTPATIENT)
Dept: FAMILY MEDICINE CLINIC | Facility: CLINIC | Age: 66
End: 2021-02-05

## 2021-02-05 NOTE — ED PROVIDER NOTES
History  Chief Complaint   Patient presents with    Abdominal Pain     States sent from [facility] practice, seen in office patient states sent for possible diverticulits  States pain lower abdomen and lower back for 3 days  Also, some upper abdomen discomfort  Feels like she wants to relieve gas but cannot   No vomiting     Back Pain     47-year-old female presents with the left lower abdominal pain ongoing for 3 days gradual onset sharp continuous currently 7/10 nothing makes it better worse  Denies any constipation diarrhea no nausea vomiting fevers chills dysuria urgency frequency  No chest pain shortness of breath cough congestion  History provided by:  Patient   used: No        Prior to Admission Medications   Prescriptions Last Dose Informant Patient Reported? Taking?    Ascorbic Acid (VITAMIN C) 1000 MG tablet  Self Yes No   Breo Ellipta 200-25 MCG/INH inhaler   No No   Sig: INHALE ONE PUFF BY MOUTH EVERY DAY - RINSE MOUTH AFTER USE   Multiple Vitamins-Minerals (HAIR SKIN AND NAILS FORMULA) TABS  Self Yes No   Sig: Take by mouth   Potassium 99 MG TABS  Self Yes No   Sig: Take 99 mg by mouth daily    Selenium 200 MCG CAPS  Self Yes No   Sig: Take 200 mcg by mouth daily    Zinc 50 MG CAPS  Self Yes No   Sig: Take 50 mg by mouth daily    albuterol (Ventolin HFA) 90 mcg/act inhaler  Self No No   Sig: Inhale 2 puffs every 4 (four) hours as needed for wheezing   atorvastatin (LIPITOR) 20 mg tablet  Self No No   Sig: Take 1 tablet (20 mg total) by mouth daily   cholecalciferol (VITAMIN D3) 1,000 units tablet  Self Yes No   Sig: Take 2,000 Units by mouth daily   ciprofloxacin (CIPRO) 500 mg tablet   No No   Sig: Take 1 tablet (500 mg total) by mouth every 12 (twelve) hours for 7 days   clobetasol (TEMOVATE) 0 05 % cream  Self No No   Sig: Apply topically 2 (two) times a day   cyclobenzaprine (FLEXERIL) 10 mg tablet  Self Yes No   Sig: Take 10 mg by mouth 3 (three) times a day as needed for muscle spasms (as needed for pain)   famotidine (PEPCID) 20 mg tablet   No No   Sig: Take 1 tablet (20 mg total) by mouth daily   fluticasone-salmeterol (Advair Diskus) 250-50 mcg/dose inhaler   No No   Sig: Inhale 1 puff 2 (two) times a day Rinse mouth after use     Patient not taking: Reported on 2021   hydrocortisone 2 5 % cream   Yes No   metFORMIN (GLUCOPHAGE-XR) 500 mg 24 hr tablet  Self No No   Sig: Take 1 tablet (500 mg total) by mouth daily with dinner   multivitamin (THERAGRAN) TABS  Self Yes No   Sig: Take 1 tablet by mouth daily   pantoprazole (PROTONIX) 40 mg tablet  Self No No   Sig: Take 1 tablet (40 mg total) by mouth daily      Facility-Administered Medications: None       Past Medical History:   Diagnosis Date    Abnormal glucose     last assessed 16    Arthritis     Asthma     w/ exacerbation; last assessed 5/14/15    Atypical nevus     last assessed 17    Breast lump     last assessed 3/6/14    Cataract     Cervical adenopathy     last assessed  17    Dizziness     Dyslipidemia     last assessed 17    Facial droop     last assessed  16    Fibromyalgia     Flu 2018    Genital herpes     resolved 16    Headache, tension-type     Herpes zoster     last assessed 16    History of shingles     may 2016    History of stomach ulcers     Hx of abnormal mammogram     last assessed  3/5/14    Hyperlipidemia     Myalgia     last assessed  12    Myositis     12    Neck pain     Pain involving joints of fingers of both hands 2021    Peripheral neuropathy     Seborrheic keratosis     Skin neoplasm     of the lower limb, including hip; onset 12; last assessed  12    Sleep apnea        Past Surgical History:   Procedure Laterality Date    ABDOMINAL SURGERY      release of adhesions    BLADDER SURGERY      mesh-lift    BREAST SURGERY      lift     SECTION      x 3    CHOLECYSTECTOMY      lap    COLONOSCOPY      COSMETIC SURGERY      tummy and breast lift    ESOPHAGOGASTRODUODENOSCOPY      GALLBLADDER SURGERY      OOPHORECTOMY Left     OTHER SURGICAL HISTORY      vocal cord surgery, scraping    [place] HYSTEROSCOPY,W/ENDO BX N/A 11/3/2016    Procedure: DILATATION AND CURETTAGE (D&C) WITH HYSTEROSCOPY;  Surgeon: Hang Bustamante MD;  Location: [facility];  Service: Gynecology    REDUCTION MAMMAPLASTY Bilateral 2008    TONSILLECTOMY         Family History   Problem Relation Age of Onset    Hypertension Mother     Alzheimer's disease Mother     Arthritis Mother     Hypertension Father     Arthritis Father     Heart attack Father     Kidney disease Brother         stone    Diabetes Brother     Diabetes Daughter     Breast cancer Sister 28    Skin cancer Sister     Down syndrome Son     No Known Problems Maternal Uncle     No Known Problems Paternal Aunt     No Known Problems Paternal Uncle     No Known Problems Maternal Grandmother     No Known Problems Maternal Grandfather     No Known Problems Paternal Grandmother     No Known Problems Paternal Grandfather      I have reviewed and agree with the history as documented  E-Cigarette/Vaping    E-Cigarette Use Never User      E-Cigarette/Vaping Substances    Nicotine No     THC No     CBD No      Social History     Tobacco Use    Smoking status: Never Smoker    Smokeless tobacco: Never Used   Substance Use Topics    Alcohol use: No     Comment: social alcohol use as per [employer]    Drug use: No       Review of Systems   Constitutional: Negative  HENT: Negative  Eyes: Negative  Respiratory: Negative  Cardiovascular: Negative  Gastrointestinal: Positive for abdominal pain  Endocrine: Negative  Genitourinary: Negative  Musculoskeletal: Negative  Skin: Negative  Allergic/Immunologic: Negative  Neurological: Negative  Hematological: Negative  Psychiatric/Behavioral: Negative      All other systems reviewed and are negative  Physical Exam  Physical Exam  Vitals signs and nursing note reviewed  Constitutional:       Appearance: She is well-developed  HENT:      Head: Normocephalic and atraumatic  Eyes:      Pupils: Pupils are equal, round, and reactive to light  Neck:      Musculoskeletal: Normal range of motion and neck supple  Cardiovascular:      Rate and Rhythm: Normal rate and regular rhythm  Pulmonary:      Effort: Pulmonary effort is normal       Breath sounds: Normal breath sounds  Abdominal:      General: Bowel sounds are normal       Palpations: Abdomen is soft  Tenderness: There is abdominal tenderness in the left lower quadrant  There is no right CVA tenderness, left CVA tenderness, guarding or rebound  Negative signs include Lopez's sign, Rovsing's sign, McBurney's sign, psoas sign and obturator sign  Musculoskeletal: Normal range of motion  Skin:     General: Skin is warm and dry  Neurological:      Mental Status: She is alert and oriented to person, place, and time  Vital Signs  ED Triage Vitals [02/04/21 1952]   Temperature Pulse Respirations Blood Pressure SpO2   98 9 °F (37 2 °C) 81 18 131/70 97 %      Temp Source Heart Rate Source Patient Position - Orthostatic VS BP Location FiO2 (%)   Tympanic Monitor Sitting Left arm --      Pain Score       7           Vitals:    02/04/21 1952   BP: 131/70   Pulse: 81   Patient Position - Orthostatic VS: Sitting         Visual Acuity      ED Medications  Medications - No data to display    Diagnostic Studies  Results Reviewed     None                 No orders to display              Procedures  Procedures         ED Course                                           MDM  Number of Diagnoses or Management Options  Diverticulitis:   Diagnosis management comments: Labs and CT scan of the abdomen pelvis pending care transitioned to Dr Maureen Vega         Amount and/or Complexity of Data Reviewed  Clinical lab tests: ordered  Tests in the radiology section of CPT®: ordered  Tests in the medicine section of CPT®: ordered    Patient Progress  Patient progress: stable      Disposition  Final diagnoses:   None     ED Disposition     None      Follow-up Information    None         Patient's Medications   Discharge Prescriptions    No medications on file     No discharge procedures on file      PDMP Review     None          ED Provider  Electronically Signed by           Alyssa Cervantes DO  02/08/21 0600

## 2021-02-05 NOTE — TELEPHONE ENCOUNTER
Dr Giancarlo Salinas:      Patient would like a printout of a diet that she can follow for diverticulitis  Please advise when this is ready for pickup

## 2021-02-05 NOTE — ED NOTES
Pt seen, assessed and d/c by provider  Pt appeared to be in no acute distress upon discharge  Pt able to ambulate well without assistance upon exiting        Cesar Adams RN  02/05/21 7397

## 2021-02-05 NOTE — ED CARE HANDOFF
Emergency Department Sign Out Note        Sign out and transfer of care from Dr Erasto Mtz  See Separate Emergency Department note  The patient, Shilpa Vazquez, was evaluated by the previous provider for abdominal pain  Workup Completed:  labs    ED Course / Workup Pending (followup): Urinalysis and CT AP  ED Course as of Feb 04 2359   Thu Feb 04, 2021   2140 Patient care signed out from Dr Erasto Mtz  Patient is a 77 y F with lower abdominal pain and dysuria  Urinalysis and CT AP pending  Dispo accordingly  2328 Focus of minimal pericolonic inflammatory stranding involving the mid sigmoid colon with a few associated diverticula most compatible with mild/early acute diverticulitis  No evidence of perforation  0 Explained findings of diverticulitis to patient  Given first dose of antibiotics here in the ED  Instructed to follow up with PCP and GI  Explained return precautions  Patient verbalized understanding and had no further questions at the time of her discharge  Procedures  MDM    Disposition  Final diagnoses:   Diverticulitis     Time reflects when diagnosis was documented in both MDM as applicable and the Disposition within this note     Time User Action Codes Description Comment    2/4/2021 11:30 PM Aminta Amado Add [L11 83] Diverticulitis       ED Disposition     ED Disposition Condition Date/Time Comment    Discharge Stable Thu Feb 4, 2021 11:30 PM Shilpa Vazquez discharge to home/self care              Follow-up Information     Follow up With Specialties Details Why Contact Info Additional Information    Isaías Olivares MD Family Medicine Schedule an appointment as soon as possible for a visit in 3 days for re-evaluation [address] [facility] [id number] [facility] Emergency Department Emergency Medicine Go to  As needed, If symptoms worsen, for re-evaluation [address] [place] [id number]  [address] [address] Emergency Department, Zhanna Hugo, Alberto, [address], MD Gastroenterology Schedule an appointment as soon as possible for a visit in 1 week for re-evaluation [address]  [phone]           Patient's Medications   Discharge Prescriptions    CIPROFLOXACIN (CIPRO) 500 MG TABLET    Take 1 tablet (500 mg total) by mouth 2 (two) times a day for 7 days       Start Date: 2/4/2021  End Date: 2/11/2021       Order Dose: 500 mg       Quantity: 14 tablet    Refills: 0    METRONIDAZOLE (FLAGYL) 500 MG TABLET    Take 1 tablet (500 mg total) by mouth every 8 (eight) hours for 7 days       Start Date: 2/4/2021  End Date: 2/11/2021       Order Dose: 500 mg       Quantity: 21 tablet    Refills: 0     No discharge procedures on file         ED Provider  Electronically Signed by     Reynaldo Marques DO  02/04/21 [id number]

## 2021-02-05 NOTE — DISCHARGE INSTRUCTIONS
Your CT scan shows a focus of minimal pericolonic inflammatory stranding involving the mid sigmoid colon with a few associated diverticula most compatible with mild/early acute diverticulitis  No evidence of perforation  Please take your antibiotics as prescribed  Please follow up with your primary care provider and the gastroenterologist  Return to the emergency department for the following, but not limited to worsening abdominal pain, persistent vomiting, fevers, blood in the stools

## 2021-02-06 LAB — BACTERIA UR CULT: NORMAL

## 2021-02-06 NOTE — TELEPHONE ENCOUNTER
Please add pt on for appt for follow-up on Monday--can check on how she'sdoing and give her info then-thanks

## 2021-02-08 ENCOUNTER — OFFICE VISIT (OUTPATIENT)
Dept: FAMILY MEDICINE CLINIC | Facility: CLINIC | Age: 66
End: 2021-02-08
Payer: MEDICARE

## 2021-02-08 VITALS
HEART RATE: 78 BPM | BODY MASS INDEX: 29.62 KG/M2 | HEIGHT: 63 IN | DIASTOLIC BLOOD PRESSURE: 70 MMHG | WEIGHT: 167.2 LBS | SYSTOLIC BLOOD PRESSURE: 104 MMHG | TEMPERATURE: 97.7 F | RESPIRATION RATE: 16 BRPM

## 2021-02-08 DIAGNOSIS — L21.9 SEBORRHEIC DERMATITIS OF SCALP: ICD-10-CM

## 2021-02-08 DIAGNOSIS — D22.9 ATYPICAL NEVI: ICD-10-CM

## 2021-02-08 DIAGNOSIS — Z12.11 COLON CANCER SCREENING: ICD-10-CM

## 2021-02-08 DIAGNOSIS — K57.90 DIVERTICULOSIS: Primary | ICD-10-CM

## 2021-02-08 LAB
BACTERIA UR CULT: NORMAL
Lab: NO GROWTH

## 2021-02-08 PROCEDURE — 99213 OFFICE O/P EST LOW 20 MIN: CPT | Performed by: FAMILY MEDICINE

## 2021-02-08 NOTE — PATIENT INSTRUCTIONS
Diverticulitis Diet   WHAT YOU NEED TO KNOW:   What is a diverticulitis diet? A diverticulitis diet includes foods that allow your intestines to rest while you have diverticulitis  Diverticulitis is a condition that causes diverticula (small pockets) along your intestine to become inflamed or infected  This is caused by hard bowel movement, food, or bacteria that get stuck in the pockets  Which foods may be recommended while I have diverticulitis? · A clear liquid diet may be recommended for 2 to 3 days  A clear liquid diet includes clear liquids, and foods that are liquid at room temperature  Examples include the following:     ? Water and clear juices (such as apple, cranberry, or grape), strained citrus juices or fruit punch    ? Coffee or tea (without cream or milk)    ? Clear sports drinks or soft drinks, such as ginger ale, lemon-lime soda, or club soda (no cola or root beer)    ? Clear broth, bouillon, or consommé    ? Plain popsicles (no popsicles with pureed fruit or fiber)    ? Flavored gelatin without fruit    · Low-fiber foods may be recommended until your symptoms improve  Examples include the following:     ? Cream of wheat and finely ground grits    ? White bread, white pasta, and white rice    ? Canned and well-cooked fruit without skins or seeds, and juice without pulp    ? Canned and well-cooked vegetables without skins or seeds, and vegetable juice    ? Cow's milk, lactose-free milk, soy milk, and rice milk    ? Yogurt, cottage cheese, and sherbet    ? Eggs, poultry (such as chicken and turkey), fish, and tender, ground, well-cooked beef     ? Tofu and smooth nut butters, such as peanut butter    ? Broth and strained soups made of low-fiber foods    What do I need to know about high-fiber foods? High-fiber foods can help prevent diverticulosis and diverticulitis  Your healthcare provider will tell you when you can add high-fiber foods back into your diet   Examples include the following:  · Whole grains and breads, and cereals made with whole grains    · Dried fruit, fresh fruit with skin, and fruit pulp    · Raw vegetables    · Cooked greens, such as spinach    · Tough meat and meat with gristle    · Legumes, such as alicea beans and lentils    When should I contact my healthcare provider? · Your symptoms get worse or do not go away  · You have questions about the foods you should eat  · You have questions or concerns about your condition or care  CARE AGREEMENT:   You have the right to help plan your care  Learn about your health condition and how it may be treated  Discuss treatment options with your healthcare providers to decide what care you want to receive  You always have the right to refuse treatment  The above information is an  only  It is not intended as medical advice for individual conditions or treatments  Talk to your doctor, nurse or pharmacist before following any medical regimen to see if it is safe and effective for you  © Copyright [address] Information is for End User's use only and may not be sold, redistributed or otherwise used for commercial purposes  All illustrations and images included in CareNotes® are the copyrighted property of [employer]  or [facility] Jelena Mcmullen   Diverticulitis   AMBULATORY CARE:   Diverticulitis  is a condition that causes small pockets along your intestine called diverticula to become inflamed or infected  This is caused by hard bowel movement, food, or bacteria that get stuck in the pockets    Signs and symptoms include the following:   · Pain in the lower left side of your abdomen    · Fever and chills    · Nausea or vomiting     · Constipation or diarrhea     · An urge to urinate or have a bowel movement more often than usual     · Bloody bowel movements    · Bloating and gas    Seek care immediately if:   · You have bowel movement or foul-smelling discharge leaking from your vagina or in your urine     · You have severe diarrhea  · You urinate less than usual or not at all  · You are not able to have a bowel movement  · You cannot stop vomiting  · You have cramps or severe abdominal pain and a fever  · You have new or increased blood in your bowel movements  Contact your healthcare provider if:   · You have pain when you urinate  · Your symptoms get worse or do not go away  · You have questions or concerns about your condition or care  Treatment:  Mild diverticulitis can be treated at home  You may need to rest and follow a clear liquid diet until your diverticulitis gets better  You will be admitted to the hospital if you have severe diverticulitis  You may need any of the following:  · Antibiotics  help treat or prevent a bacterial infection  · Prescription pain medicine  may be given  Ask your healthcare provider how to take this medicine safely  Some prescription pain medicines contain acetaminophen  Do not take other medicines that contain acetaminophen without talking to your healthcare provider  Too much acetaminophen may cause liver damage  Prescription pain medicine may cause constipation  Ask your healthcare provider how to prevent or treat constipation  · An IV  may be used to give you liquids and nutrition  You may not be able to eat or drink anything until your healthcare provider says it is okay  · Drainage  may be done to reduce inflammation or treat infection  Your healthcare provider may insert a small tube through an incision in your abdomen to drain pus from infected diverticula  · Surgery  may be needed if there is a hole in your bowel or a large amount of swelling  A healthcare provider will remove the infected or inflamed areas of your colon  Clear liquid diet:  A clear liquid diet includes any liquids that you can see through  Examples include water, ginger-trixie, cranberry or apple juice, frozen fruit ice, or broth   Stay on a clear liquid diet until your symptoms are gone, or as directed  Follow up with your healthcare provider as directed: You may need to return for a colonoscopy  When your symptoms are gone, you may need a low-fat, high-fiber diet to prevent diverticulitis from developing again  Your healthcare provider or dietitian can help you create meal plans  Write down your questions so you remember to ask them during your visits  © Copyright [address] Information is for End User's use only and may not be sold, redistributed or otherwise used for commercial purposes  All illustrations and images included in CareNotes® are the copyrighted property of A D A M , Inc  or [facility] Jelena Mcmullen   The above information is an  only  It is not intended as medical advice for individual conditions or treatments  Talk to your doctor, nurse or pharmacist before following any medical regimen to see if it is safe and effective for you

## 2021-02-11 ENCOUNTER — OFFICE VISIT (OUTPATIENT)
Dept: GASTROENTEROLOGY | Facility: CLINIC | Age: 66
End: 2021-02-11
Payer: MEDICARE

## 2021-02-11 VITALS
HEIGHT: 63 IN | SYSTOLIC BLOOD PRESSURE: 110 MMHG | DIASTOLIC BLOOD PRESSURE: 72 MMHG | WEIGHT: 164 LBS | HEART RATE: 76 BPM | BODY MASS INDEX: 29.06 KG/M2 | TEMPERATURE: 98 F

## 2021-02-11 DIAGNOSIS — K21.9 GASTROESOPHAGEAL REFLUX DISEASE, UNSPECIFIED WHETHER ESOPHAGITIS PRESENT: Primary | ICD-10-CM

## 2021-02-11 DIAGNOSIS — K57.92 ACUTE DIVERTICULITIS: ICD-10-CM

## 2021-02-11 DIAGNOSIS — Z86.010 HISTORY OF COLON POLYPS: ICD-10-CM

## 2021-02-11 DIAGNOSIS — R11.0 NAUSEA: ICD-10-CM

## 2021-02-11 DIAGNOSIS — R14.0 BLOATING: ICD-10-CM

## 2021-02-11 PROCEDURE — 99203 OFFICE O/P NEW LOW 30 MIN: CPT | Performed by: PHYSICIAN ASSISTANT

## 2021-02-11 NOTE — PATIENT INSTRUCTIONS
Low Fiber Diet   WHAT YOU NEED TO KNOW:   A low-fiber diet limits foods that are high in fiber  Fiber is the part of fruits, vegetables, and grains that is not broken down by your body  You may need to follow this diet after surgery on your intestines  You may also need to follow this diet for certain conditions such as Crohn disease or ulcerative colitis  Ask your healthcare provider or dietitian how much fiber you can have each day  DISCHARGE INSTRUCTIONS:   Foods to include:  Read food labels to check the amount of fiber that are found in foods  Some foods that are low in fiber include the following:  · Grains:  Choose grains that have less than 2 grams of fiber in each serving  Examples include the following:     ? Cream of wheat and finely ground grits    ? Dry cereal made from rice     ? White bread, white pasta, and white rice    ? Crackers, bagels, and rolls made from white or refined flour    · Other foods:      ? Canned and well-cooked fruit without skins or seeds, and juice without pulp    ? Ripe bananas and melons    ? Canned and well-cooked vegetables without skins or seeds, and vegetable juice    ? Cow's milk, lactose-free milk, soy milk, and rice milk    ? Yogurt without nuts, fruit, or granola    ? Eggs, poultry (such as chicken and turkey), fish, and tender, ground, well-cooked beef     ? Tofu and smooth peanut butter    ?  Broth and strained soups made of low-fiber foods    Foods to avoid:   · Breads, cereals, crackers, and pasta made with whole wheat or whole grains (such as whole oats)    · [employer], wild rice, quinoa, kasha, and barley    · All fresh fruit with skin, except banana and melons     · Dried fruits and fruit juice with pulp    · Canned pineapple    · Raw vegetables    · Nuts, seeds, and popcorn    · Beans, nuts, peas, and lentils    · Tough meats    · Coconut and avocado    What else you should know about a low-fiber diet:  A low-fiber diet can decrease the amount of bowel movements you have  Drink liquids as directed to avoid constipation  Ask how much liquid to drink each day and which liquids are best for you  © Copyright [address] Information is for End User's use only and may not be sold, redistributed or otherwise used for commercial purposes  All illustrations and images included in CareNotes® are the copyrighted property of [employer]  or [facility] Jelena Mcmullen   The above information is an  only  It is not intended as medical advice for individual conditions or treatments  Talk to your doctor, nurse or pharmacist before following any medical regimen to see if it is safe and effective for you

## 2021-02-12 ENCOUNTER — TELEPHONE (OUTPATIENT)
Dept: GASTROENTEROLOGY | Facility: CLINIC | Age: 66
End: 2021-02-12

## 2021-02-12 NOTE — PROGRESS NOTES
[facility] - Outpatient Consultation  Werner Salinas 77 y o  female MRN: [id number]  Encounter: [id number]          ASSESSMENT AND PLAN:      #1  Acute sigmoid diverticulitis: patient thinks this may be her second episode, was diagnosed in the ER on 2/4, is almost complete with abx, no fevers, LLQ pain improving, had constipation with it which is now more loose on abx  -complete abx  -call our office or go to ER with any worsening symptoms or fevers after completing abx  -low fiber diet for next several weeks  -plan for colonoscopy in about 5-6 weeks for further assessment  -suprep ordered  -risks and benefits discussed    #2  History of colon polyps: last colonoscopy 5 years ago at [facility] (formerly [facility]3], patient is due  -colonoscopy as above    #3  GERD with nausea and bloating  -continue PPI  -anti-reflux diet and measures  -plan for EGD to assess for PUD, chao's etc in setting of bloating, nausea, and GERD    ______________________________________________________________________    HPI:  This is a 77year old female with history of GERD, colon polyps, hypothyroidism, asthma, sleep apnea, COPD and HLD who presents as a new patient for acute diverticulitis  Patient was recently diagnosed with this on 2/4 in the ER  She thinks she may have had a mild episode of this in the past but this is the most severe that she had  Her last colonoscopy was 5 years ago at [facility] (formerly [facility]3] and she is due for repeat due to hx of colon polyps  She reports that she had back and LLQ pain with constipation and abdominal bloating  The constipation has now become diarrhea since taking abx  Her last abx dose is tomorrow  She denies any fever  Still has some abdominal discomfort but improved compared to prior  Had some nausea and bloating but no vomiting  Denies any unexplained weight loss  No rectal bleeding or melena but her stools have been a darker brown  Denies NSAID use  Does have hx of GERD on PPI   Relatively well controlled on this  Last EGD was also 5 years ago  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  REVIEW OF SYSTEMS:    CONSTITUTIONAL: Denies any fever, chills, rigors, and weight loss  HEENT: No earache or tinnitus  Denies hearing loss or visual disturbances  CARDIOVASCULAR: No chest pain or palpitations  RESPIRATORY: Denies any cough, hemoptysis, shortness of breath or dyspnea on exertion  GASTROINTESTINAL: As noted in the History of Present Illness  GENITOURINARY: No problems with urination  Denies any hematuria or dysuria  NEUROLOGIC: No dizziness or vertigo, denies headaches  MUSCULOSKELETAL: Denies any muscle or joint pain  SKIN: Denies skin rashes or itching  ENDOCRINE: Denies excessive thirst  Denies intolerance to heat or cold  PSYCHOSOCIAL: Denies depression or anxiety  Denies any recent memory loss         Historical Information   Past Medical History:   Diagnosis Date    Abnormal glucose     last assessed 2/25/16    Arthritis     Asthma     w/ exacerbation; last assessed 5/14/15    Atypical nevus     last assessed 1/18/17    Breast lump     last assessed 3/6/14    Cataract     Cervical adenopathy     last assessed  2/7/17    Dizziness     Dyslipidemia     last assessed 5/22/17    Facial droop     last assessed  12/5/16    Fibromyalgia     Flu 01/20/2018    Genital herpes     resolved 9/1/16    Headache, tension-type     Herpes zoster     last assessed 5/20/16    History of shingles     may 2016    History of stomach ulcers     Hx of abnormal mammogram     last assessed  3/5/14    Hyperlipidemia     Myalgia     last assessed  11/21/12    Myositis     11/21/12    Neck pain     Pain involving joints of fingers of both hands 1/19/2021    Peripheral neuropathy     Seborrheic keratosis     Skin neoplasm     of the lower limb, including hip; onset 1/23/12; last assessed  1/23/12    Sleep apnea      Past Surgical History:   Procedure Laterality Date    ABDOMINAL SURGERY      release of adhesions    BLADDER SURGERY      mesh-lift    BREAST SURGERY      lift     SECTION      x 3    CHOLECYSTECTOMY      lap    COLONOSCOPY      COSMETIC SURGERY      tummy and breast lift    ESOPHAGOGASTRODUODENOSCOPY      GALLBLADDER SURGERY      OOPHORECTOMY Left     OTHER SURGICAL HISTORY      vocal cord surgery, scraping    IA HYSTEROSCOPY,W/ENDO BX N/A 11/3/2016    Procedure: DILATATION AND CURETTAGE (D&C) WITH HYSTEROSCOPY;  Surgeon: Hang Bustamante MD;  Location: [facility];  Service: Gynecology    REDUCTION MAMMAPLASTY Bilateral 2008    TONSILLECTOMY       Social History   Social History     Substance and Sexual Activity   Alcohol Use No    Comment: social alcohol use as per [employer]     Social History     Substance and Sexual Activity   Drug Use No     Social History     Tobacco Use   Smoking Status Never Smoker   Smokeless Tobacco Never Used     Family History   Problem Relation Age of Onset    Hypertension Mother     Alzheimer's disease Mother     Arthritis Mother     Hypertension Father     Arthritis Father     Heart attack Father     Kidney disease Brother         stone    Diabetes Brother     Diabetes Daughter     Breast cancer Sister 28    Skin cancer Sister     Down syndrome Son     No Known Problems Maternal Uncle     No Known Problems Paternal Aunt     No Known Problems Paternal Uncle     No Known Problems Maternal Grandmother     No Known Problems Maternal Grandfather     No Known Problems Paternal Grandmother     No Known Problems Paternal Grandfather        Meds/Allergies       Current Outpatient Medications:     albuterol (Ventolin HFA) 90 mcg/act inhaler    Ascorbic Acid (VITAMIN C) 1000 MG tablet    atorvastatin (LIPITOR) 20 mg tablet    Breo Ellipta 200-25 MCG/INH inhaler    cholecalciferol (VITAMIN D3) 1,000 units tablet    ciprofloxacin (CIPRO) 500 mg tablet    clobetasol (TEMOVATE) 0 05 % cream    cyclobenzaprine (FLEXERIL) 10 mg tablet    famotidine (PEPCID) 20 mg tablet    fluticasone-salmeterol (Advair Diskus) 250-50 mcg/dose inhaler    hydrocortisone 2 5 % cream    metFORMIN (GLUCOPHAGE-XR) 500 mg 24 hr tablet    metroNIDAZOLE (FLAGYL) 500 mg tablet    Multiple Vitamins-Minerals (HAIR SKIN AND NAILS FORMULA) TABS    multivitamin (THERAGRAN) TABS    pantoprazole (PROTONIX) 40 mg tablet    Potassium 99 MG TABS    Selenium 200 MCG CAPS    Zinc 50 MG CAPS    Allergies   Allergen Reactions    Latex Rash           Objective     Blood pressure 110/72, pulse 76, temperature 98 °F (36 7 °C), height 5' 3" (1 6 m), weight 74 4 kg (164 lb), not currently breastfeeding  Body mass index is 29 05 kg/m²  PHYSICAL EXAM:      General Appearance:   Alert, cooperative, no distress   HEENT:   Normocephalic, atraumatic, anicteric      Neck:  Supple, symmetrical, trachea midline   Lungs:   Clear to auscultation bilaterally; no rales, rhonchi or wheezing; respirations unlabored    Heart[de-identified]   Regular rate and rhythm; no murmur, rub, or gallop  Abdomen:   Soft, mid abdominal discomfort to palpation, no noted LLQ pain to palpation, non-distended; normal bowel sounds; no masses, no organomegaly    Genitalia:   Deferred    Rectal:   Deferred    Extremities:  No cyanosis, clubbing or edema    Pulses:  2+ and symmetric    Skin:  No jaundice, rashes, or lesions    Lymph nodes:  No palpable cervical lymphadenopathy        Lab Results:   No visits with results within 1 Day(s) from this visit     Latest known visit with results is:   Admission on 02/04/2021, Discharged on 02/05/2021   Component Date Value    WBC 02/04/2021 8 52     RBC 02/04/2021 4 05     Hemoglobin 02/04/2021 12 3     Hematocrit 02/04/2021 39 7     MCV 02/04/2021 98     MCH 02/04/2021 30 4     MCHC 02/04/2021 31 0*    RDW 02/04/2021 12 5     MPV 02/04/2021 10 1     Platelets 14/96/0627 361     nRBC 02/04/2021 0     Neutrophils Relative 02/04/2021 58     Immat GRANS % 02/04/2021 0     Lymphocytes Relative 02/04/2021 28     Monocytes Relative 02/04/2021 11     Eosinophils Relative 02/04/2021 2     Basophils Relative 02/04/2021 1     Neutrophils Absolute 02/04/2021 4 82     Immature Grans Absolute 02/04/2021 0 03     Lymphocytes Absolute 02/04/2021 2 41     Monocytes Absolute 02/04/2021 0 97     Eosinophils Absolute 02/04/2021 0 18     Basophils Absolute 02/04/2021 0 11*    Sodium 02/04/2021 135*    Potassium 02/04/2021 3 8     Chloride 02/04/2021 103     CO2 02/04/2021 27     ANION GAP 02/04/2021 5     BUN 02/04/2021 17     Creatinine 02/04/2021 0 86     Glucose 02/04/2021 118     Calcium 02/04/2021 9 1     Corrected Calcium 02/04/2021 9 7     AST 02/04/2021 23     ALT 02/04/2021 37     Alkaline Phosphatase 02/04/2021 74     Total Protein 02/04/2021 8 0     Albumin 02/04/2021 3 3*    Total Bilirubin 02/04/2021 0 20     eGFR 02/04/2021 70     Magnesium 02/04/2021 2 0     Lipase 02/04/2021 83     Color, UA 02/04/2021 Light Yellow     Clarity, UA 02/04/2021 Clear     Specific Gravity, UA 02/04/2021 >=1 030     pH, UA 02/04/2021 5 5     Leukocytes, UA 02/04/2021 Negative     Nitrite, UA 02/04/2021 Negative     Protein, UA 02/04/2021 Negative     Glucose, UA 02/04/2021 Negative     Ketones, UA 02/04/2021 Negative     Urobilinogen, UA 02/04/2021 0 2     Bilirubin, UA 02/04/2021 Negative     Blood, UA 02/04/2021 Negative     Urine Culture 02/04/2021 No Growth <1000 cfu/mL          Radiology Results:   Ct Abdomen Pelvis With Contrast    Result Date: 2/4/2021  Narrative: CT ABDOMEN AND PELVIS WITH IV CONTRAST INDICATION:   Abdominal pain, acute, nonlocalized abdominal pain  COMPARISON:  None  TECHNIQUE:  CT examination of the abdomen and pelvis was performed  Axial, sagittal, and coronal 2D reformatted images were created from the source data and submitted for interpretation   Radiation dose length product (DLP) for this visit:  450 22 mGy-cm   This examination, like all CT scans performed in the [facility], was performed utilizing techniques to minimize radiation dose exposure, including the use of iterative  reconstruction and automated exposure control  IV Contrast:  100 mL of iohexol (OMNIPAQUE) Enteric Contrast:  Enteric contrast was not administered  FINDINGS: ABDOMEN LOWER CHEST:  No clinically significant abnormality identified in the visualized lower chest  LIVER/BILIARY TREE:  Liver is diffusely decreased in density consistent with fatty change  No CT evidence of suspicious hepatic mass  Normal hepatic contours  No biliary dilatation  GALLBLADDER:  Gallbladder is surgically absent  SPLEEN:  Unremarkable  PANCREAS:  Unremarkable  ADRENAL GLANDS:  Unremarkable  KIDNEYS/URETERS:  Unremarkable  No hydronephrosis  STOMACH AND BOWEL:  There is a focus of minimal pericolonic inflammatory stranding involving the mid sigmoid colon (series 2, image 79) with multiple associated diverticula most compatible with mild or early acute diverticulitis  Is no evidence of large  or small bowel obstruction  APPENDIX:  No findings to suggest appendicitis  ABDOMINOPELVIC CAVITY:  No ascites  No pneumoperitoneum  No lymphadenopathy  VESSELS:  Unremarkable for patient's age  PELVIS REPRODUCTIVE ORGANS:  Unremarkable for patient's age  URINARY BLADDER:  Unremarkable  ABDOMINAL WALL/INGUINAL REGIONS:  Unremarkable  OSSEOUS STRUCTURES:  No acute fracture or destructive osseous lesion  Impression: Focus of minimal pericolonic inflammatory stranding involving the mid sigmoid colon with a few associated diverticula most compatible with mild/early acute diverticulitis  No evidence of perforation   Workstation performed: [id number]

## 2021-02-12 NOTE — TELEPHONE ENCOUNTER
Patient is s/p office visit yesterday  She is currently on day 7 of abx for treatment of diverticulitis  She called to report liquid stool/mucus at least 3-5x/day x 3 days  Said left-sided abdominal pain is minimal   She asked if she could take imodium  Do you want to do stool studies or is it OK to prescribe imodium  The liquid stool may have been triggered by abx  Colonoscopy scheduled 6 weeks  Please provide recommendation

## 2021-02-12 NOTE — TELEPHONE ENCOUNTER
Patients GI provider: Dr Curt Flower    Number to return call: [phone]    Reason for call: Pt calling stating she is experiencing uncontrollable diarrhea and would like to know if she can take imodium?     Scheduled procedure/appointment date if applicable: Procedure 3/69/91

## 2021-02-15 DIAGNOSIS — R19.7 DIARRHEA, UNSPECIFIED TYPE: ICD-10-CM

## 2021-02-15 DIAGNOSIS — Z87.19 HISTORY OF COLONIC DIVERTICULITIS: Primary | ICD-10-CM

## 2021-02-15 DIAGNOSIS — R10.32 LLQ PAIN: ICD-10-CM

## 2021-02-15 NOTE — TELEPHONE ENCOUNTER
Noted  Of note, her CT scan from ED was on 2/4 (11 days ago) and she was only prescribed 7 days of antibiotics by ED so I don't know how she completed flagyl and still has 5 days left of cipro unless she was not taking appropriately  Again, if pain worsens, recommending ED   Thanks nicole

## 2021-02-15 NOTE — TELEPHONE ENCOUNTER
If she is still having severe abdominal pain despite completing antibiotics, I would recommend ER eval  If she is refusing would at least recommend repeat CT scan STAT to rule out any complications of diverticulitis  I can order this and would like this to be done tonight or tomorrow if possible  Will also order c diff   If her pain worsens or she is febrile she needs to go to ED

## 2021-02-15 NOTE — TELEPHONE ENCOUNTER
Patient said she had ct scan last week in ED and was prescribed abx for diverticulitis; she does not want to repeat  She said she is still on cipro (5 days left) and finished flagyl yesterday  She will go to ED if pain worsens  She is aware of order for stool for C diff

## 2021-02-15 NOTE — TELEPHONE ENCOUNTER
Can we find out if she is still having diarrhea as this message appears to be from Friday and was not addressed  If still having frequent diarrhea we can check for c diff but would advise against imodium

## 2021-02-15 NOTE — TELEPHONE ENCOUNTER
Patient is reporting abdominal pain "7/10" but is worse low back pain "9/10", stool consistency very soft stool with urgency after eating  OK to move forward with stool tests  She may also benefit from bentyl  She is refusing ED for pain control at this time  Thanks

## 2021-02-16 NOTE — TELEPHONE ENCOUNTER
Patient left  asking about collection of stool specimen for c  Diff  She is reporting feeling much better today, stool is soft  She said she is getting collection container tomorrow and asked where to return specimen  Advised return to [address] [place] [address] lab of her choice  Also, if specimen is formed, lab will not process and not need to submit  All questions answered at this time

## 2021-02-17 ENCOUNTER — OFFICE VISIT (OUTPATIENT)
Dept: DERMATOLOGY | Age: 66
End: 2021-02-17
Payer: MEDICARE

## 2021-02-17 VITALS — HEIGHT: 63 IN | WEIGHT: 164.8 LBS | BODY MASS INDEX: 29.2 KG/M2 | TEMPERATURE: 97.9 F

## 2021-02-17 DIAGNOSIS — L28.2 PRURIGO: ICD-10-CM

## 2021-02-17 DIAGNOSIS — L21.9 SEBORRHEIC DERMATITIS: Primary | ICD-10-CM

## 2021-02-17 PROCEDURE — 11901 INJECT SKIN LESIONS >7: CPT | Performed by: DERMATOLOGY

## 2021-02-17 PROCEDURE — 99204 OFFICE O/P NEW MOD 45 MIN: CPT | Performed by: DERMATOLOGY

## 2021-02-17 RX ORDER — TRIAMCINOLONE ACETONIDE 0.25 MG/G
CREAM TOPICAL 2 TIMES DAILY
Qty: 30 G | Refills: 1 | Status: SHIPPED | OUTPATIENT
Start: 2021-02-17 | End: 2021-07-27 | Stop reason: SDUPTHER

## 2021-02-17 NOTE — PATIENT INSTRUCTIONS
PRURIGO NODULARIS    Assessment and Plan:  Based on a thorough discussion of this condition and the management approach to it (including a comprehensive discussion of the known risks, side effects and potential benefits of treatment), the patient (family) agrees to implement the following specific plan:   Discuss treatment option: Kenalog injection at today's visit   Follow up in 1 month   Recommend to stop using hand  on affected area(s)  What is nodular prurigo? Nodular prurigo is a skin condition characterised by very itchy firm lumps  It is the most severe form of prurigo  It is not known why these lumps appear  It is also called prurigo nodularis  Nodular prurigo is very difficult to treat effectively  Who gets nodular prurigo? Nodular prurigo can occur at all ages but mainly in adults aged 19-56 years  Both sexes are equally affected  Up to 80% of patients have a personal or family history of atopic dermatitis, asthma or hay fever (compared to about 25% of the normal population)  It has been associated with internal disease including iron deficiency anaemia, chronic renal failure, gluten enteropathy, HIV infection and many other diverse conditions  What is the cause of nodular prurigo? The cause of nodular prurigo is unknown  It is uncertain whether scratching leads to the nodules or if the nodules appear before they are scratched  The reason for the nodules, the inflammation and the increased activity and size of nerves in the skin is under investigation but remains unknown  Nodular prurigo may commence as an insect bite reaction or another form of dermatitis  In some cases, nodular prurigo has been associated with brachioradial pruritus, which is due to compression or traction of spinal nerves  This theory may explain why local treatment is not always successful   It has been speculated that widespread nodular prurigo may also follow sensitisation of the spinal nerves and that the nodules appear because of scratching  What are the clinical features of nodular prurigo? The individual prurigo nodule is a firm lump, 1-3 cm in diameter, often with a raised warty surface  The early lesion may start as a smaller red itchy bump  Crusting and scaling may cover recently scratched lesions  Older lesions may be darker or paler than surrounding skin  The skin in between the nodules is often dry  The itch is often very intense, often for hours on end, leading to vigorous scratching and sometimes secondary infection  Nodular prurigo lesions are usually grouped and numerous but may vary in number from 2-200  Nodular prurigo tends to be symmetrically distributed  They usually start on the lower arms and legs and are worse on the outer aspects  The trunk, face and even palms can also be affected  Sometimes the prurigo nodules are most obvious on the cape area (neck, shoulders and upper arms)  New nodules appear from time to time, but existing nodules may regress spontaneously to leave scars  Nodular prurigo often runs a long course and can lead to significant stress and depression  How is nodular prurigo diagnosed? In most cases, the diagnosis is made clinically due to the degree of itch and the typical appearance of the nodules  A skin biopsy may be useful to confirm the diagnosis of nodular prurigo  Under the microscope, the skin is enormously thickened and may appear quite abnormal, sometimes resembling squamous cell skin cancer  The nerve fibres and nerve endings in the skin are markedly increased in size  The skin is inflamed and there is an increased number of neural mediators known to cause itching and nerve growth  Direct immunofluorescence looking for antibody deposition in the skin is usually negative  Rarely, the blistering disease bullous pemphigoid can present as nodular prurigo (pemphigoid nodularis)   In this case, immunofluorescence reveals immunoglobulins in the basement membrane zone below the epidermis  The prurigo nodules can be present for weeks or months before any blisters appear  It is important to identify underlying diseases that are associated with nodular prurigo; blood tests may include full blood count, liver, kidney and thyroid function tests  Patch testing may be worthwhile if contact allergy is considered possible  What is the treatment for nodular prurigo? Unfortunately, nodular prurigo is one of the more resistant conditions skin specialists are called upon to treat  Local treatments that may be tried include:   Emollients applied liberally and frequently to cool and soothe itchy skin - menthol or phenol may be added   Oral antihistamines at night to reduce itch and allow sleep   Ultrapotent topical steroid creams  To enhance their effect, apply under occlusion; cover with a plastic or hydrocolloid adhesive dressing and leave this in place for several days   Corticosteroid injections (triamcinolone acetonide 10-40 mg/mL) into thicker nodules   Coal tar ointment as a steroid alternative   Calcipotriol ointment (topical vitamin D3), usually used for psoriasis, can be applied twice daily   Capsaicin cream, which induces itching and burning until eventually, the itch stops completely -- it requires repeated applications four to six times daily   Cryotherapy, which may shrink the nodules and reduce their itch   Treatments with pulsed dye laser, which may reduce the vascularity of individual lesions     Antiseptic or antibiotic ointment may be used on infected nodules, and oral antibiotics (usually flucloxacillin) are indicated for significant secondary bacterial infection (cellulitis)  Systemic treatments that may be helpful for more severe disease are listed below, in no particular order  Combination treatment is frequently recommended     Phototherapy (UVB and PUVA)    Tricyclic antidepressants such as amitriptyline or doxepin    Anticonvulsants used for neuropathic pain and itch, such as gabapentin or pregabalin    Naltrexone, an opiate antagonist (this counteracts the narcotic effect of morphine, heroin and similar drugs), has been reported to reduce itching in some subjects   Oral steroids    Methotrexate    Thalidomide, which is reserved for very severe cases and may be difficult to access   Ciclosporin, which may reduce the lumps and the itching but its use is limited by side effects   Systemic retinoids such as acitretin or isotretinoin, which may shrink the nodules and reduce the severity of the itch  SEBORRHEIC DERMATITIS    Assessment and Plan:  Based on a thorough discussion of this condition and the management approach to it (including a comprehensive discussion of the known risks, side effects and potential benefits of treatment), the patient (family) agrees to implement the following specific plan:   Start triamcinolone 0 025% cream apply topically to face, neck 2 times a day  Seborrheic Dermatitis   Seborrheic dermatitis is a common, chronic or relapsing form of eczema/dermatitis that mainly affects the sebaceous, gland-rich regions of the scalp, face, and trunk  There are infantile and adult forms of seborrhoeic dermatitis  It is sometimes associated with psoriasis and, in that clinical scenario, may be referred to as "sebo-psoriasis "  Seborrheic dermatitis is also known as "seborrheic eczema "  Dandruff (also called "pityriasis capitis") is an uninflamed form of seborrhoeic dermatitis  Dandruff presents as bran-like scaly patches scattered within hair-bearing areas of the scalp  In an infant, this condition may be referred to as "cradle cap "  The cause of seborrheic dermatitis is not completely understood  It is associated with proliferation of various species of the skin commensal Malassezia, in its yeast (non-pathogenic) form   Its metabolites (such as the fatty acids oleic acid, malssezin, and indole-3-carbaldehyde) may cause an inflammatory reaction  Differences in skin barrier lipid content and function may account for individual presentations  Infantile Seborrheic Dermatitis  Infantile seborrheic dermatitis affects babies under the age of 1 months and usually resolves by 1012 months of age  Infantile seborrheic dermatitis causes "cradle cap" (diffuse, greasy scaling on scalp)  The rash may spread to affect armpit and groin folds (a type of "napkin dermatitis")  There may be associated salmon-pink colored patches that may flake or peel  The rash in this case is usually not especially itchy, so the baby often appears undisturbed by the rash, even when more generalized  Adult Seborrheic Dermatitis  Adult seborrheic dermatitis tends to begin in late adolescence; prevalence is greatest in young adults and in the elderly  It is more common in males than in females  The following factors are sometimes associated with severe adult seborrheic dermatitis:   Oily skin   Familial tendency to seborrhoeic dermatitis or a family history of psoriasis   Immunosuppression: organ transplant recipient, human immunodeficiency virus (HIV) infection and patients with lymphoma   Neurological and psychiatric diseases: Parkinson disease, tardive dyskinesia, depression, epilepsy, facial nerve palsy, spinal cord injury and congenital disorders such as Down syndrome   Treatment for psoriasis with psoralen and ultraviolet A (PUVA) therapy   Lack of sleep   Stressful events  In adults, seborrheic dermatitis may typically affect the scalp, face (creases around the nose, behind ears, within eyebrows) and upper trunk   Typical clinical features include:   Winter flares, improving in summer following sun exposure   Minimal itch most of the time   Combination oily and dry mid-facial skin   Ill-defined localized scaly patches or diffuse scale in the scalp   Blepharitis; scaly red eyelid margins   Cass-pink, thin, scaly, and ill-defined plaques in skin folds on both sides of the face   Petal or ring-shaped flaky patches on hair-line and on anterior chest   Rash in armpits, under the breasts, in the groin folds and genital creases   Superficial folliculitis (inflamed hair follicles) on cheeks and upper trunk    Seborrheic dermatitis is diagnosed by its clinical appearance and behavior  Skin biopsy may be helpful but is rarely necessary to make this diagnosis  19-Oct-2018

## 2021-02-17 NOTE — PROGRESS NOTES
[facility] Note     Patient Name: Shilpa Vazquez  Encounter Date: 2/17/21     Have you been cared for by a [facility] Dermatologist in the last 3 years and, if so, which one? No    · Have you traveled outside of the [address] [place] [address] in the past 3 months or outside of the [facility] area in the last 2 weeks? No     May we call your Preferred Phone number to discuss your specific medical information? Yes     May we leave a detailed message that includes your specific medical information? Yes      Today's Chief Concerns:   Concern #1:  rash   Concern #2:      Past Medical History:  Have you personally ever had or currently have any of the following? · Skin cancer (such as Melanoma, Basal Cell Carcinoma, Squamous Cell Carcinoma? (If Yes, please provide more detail)- No  · Eczema: No  · Psoriasis: No  · HIV/AIDS: No  · Hepatitis B or C: No  · Tuberculosis: No  · Systemic Immunosuppression such as Diabetes, Biologic or Immunotherapy, Chemotherapy, Organ Transplantation, Bone Marrow Transplantation (If YES, please provide more detail): No  · Radiation Treatment (If YES, please provide more detail): No  · Any other major medical conditions/concerns? 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 Family History:  Have any of your "first degree relatives" (parent, brother, sister, or child) had any of the following       · Skin cancer such as Melanoma or Merkel Cell Carcinoma or Pancreatic Cancer? No  · Eczema, Asthma, Hay Fever or Seasonal Allergies: YES, sister  · Psoriasis or Psoriatic Arthritis: No  · Do any other medical conditions seem to run in your family? If Yes, what condition and which relatives?   YES, sister T-Cell sarcoma    Current Medications:   (please update all dermatological medications before printing patient's AVS!)      Current Outpatient Medications:    albuterol (Ventolin HFA) 90 mcg/act inhaler, Inhale 2 puffs every 4 (four) hours as needed for wheezing, Disp: 1 Inhaler, Rfl: 5    Ascorbic Acid (VITAMIN C) 1000 MG tablet, , Disp: , Rfl:     atorvastatin (LIPITOR) 20 mg tablet, Take 1 tablet (20 mg total) by mouth daily, Disp: 90 tablet, Rfl: 3    Breo Ellipta 200-25 MCG/INH inhaler, INHALE ONE PUFF BY MOUTH EVERY DAY - RINSE MOUTH AFTER USE, Disp: 180 each, Rfl: 0    cholecalciferol (VITAMIN D3) 1,000 units tablet, Take 2,000 Units by mouth daily, Disp: , Rfl:     clobetasol (TEMOVATE) 0 05 % cream, Apply topically 2 (two) times a day, Disp: 45 g, Rfl: 1    cyclobenzaprine (FLEXERIL) 10 mg tablet, Take 10 mg by mouth 3 (three) times a day as needed for muscle spasms (as needed for pain), Disp: , Rfl:     famotidine (PEPCID) 20 mg tablet, Take 1 tablet (20 mg total) by mouth daily, Disp: 30 tablet, Rfl: 1    fluticasone-salmeterol (Advair Diskus) 250-50 mcg/dose inhaler, Inhale 1 puff 2 (two) times a day Rinse mouth after use , Disp: 1 Inhaler, Rfl: 3    hydrocortisone 2 5 % cream, , Disp: , Rfl:     metFORMIN (GLUCOPHAGE-XR) 500 mg 24 hr tablet, Take 1 tablet (500 mg total) by mouth daily with dinner, Disp: 90 tablet, Rfl: 2    Multiple Vitamins-Minerals (HAIR SKIN AND NAILS FORMULA) TABS, Take by mouth, Disp: , Rfl:     multivitamin (THERAGRAN) TABS, Take 1 tablet by mouth daily, Disp: , Rfl:     pantoprazole (PROTONIX) 40 mg tablet, Take 1 tablet (40 mg total) by mouth daily, Disp: 30 tablet, Rfl: 5    Zinc 50 MG CAPS, Take 50 mg by mouth daily , Disp: , Rfl:     Potassium 99 MG TABS, Take 99 mg by mouth daily , Disp: , Rfl:     Selenium 200 MCG CAPS, Take 200 mcg by mouth daily , Disp: , Rfl:       Review of Systems:  Have you recently had or currently have any of the following? If YES, what are you doing for the problem?     · Fever, chills or unintended weight loss: No  · Sudden loss or change in your vision: No  · Nausea, vomiting or blood in your stool: No  · Painful or swollen joints: No  · Wheezing or cough: No  · Changing mole or non-healing wound: No  · Nosebleeds: No  · Excessive sweating: No  · Easy or prolonged bleeding? No  · Over the last 2 weeks, how often have you been bothered by the following problems? · Taking little interest or pleasure in doing things: 1 - Not at All  · Feeling down, depressed, or hopeless: 1 - Not at All  · Rapid heartbeat with epinephrine:  No    · FEMALES ONLY:    · Are you pregnant or planning to become pregnant? No  · Are you currently or planning to be nursing or breast feeding? No    · Any known allergies? Allergies   Allergen Reactions    Latex Rash   ·       Physical Exam:     Was a chaperone (Derm Clinical Assistant) present throughout the entire Physical Exam? Yes     Did the Dermatology Team specifically  the patient on the importance of a Full Skin Exam to be sure that nothing is missed clinically?  Yes}  o Did the patient ultimately request or accept a Full Skin Exam?  NO, patient decline  o Did the patient specifically refuse to have the areas "under-the-bra" examined by the Dermatologist? No  o Did the patient specifically refuse to have the areas "under-the-underwear" examined by the Dermatologist? No    CONSTITUTIONAL:   Vitals:    02/17/21 0957   Temp: 97 9 °F (36 6 °C)   TempSrc: Probe   Weight: 74 8 kg (164 lb 12 8 oz)   Height: 5' 3" (1 6 m)           PSYCH: Normal mood and affect  EYES: Normal conjunctiva  ENT: Normal lips and oral mucosa  CARDIOVASCULAR: No edema  RESPIRATORY: Normal respirations  HEME/LYMPH/IMMUNO:  No regional lymphadenopathy except as noted below in "ASSESSMENT AND PLAN BY DIAGNOSIS"    SKIN:  FULL ORGAN SYSTEM EXAM   Hair, Scalp, Ears, Face Normal except as noted below in Assessment   Neck, Cervical Chain Nodes Normal except as noted below in Assessment   Right Arm/Hand/Fingers Normal except as noted below in Assessment   Left Arm/Hand/Fingers Normal except as noted below in Assessment   Chest/Breasts/Axillae Viewed areas Normal except as noted below in Assessment   Abdomen, Umbilicus Normal except as noted below in Assessment   Back/Spine Normal except as noted below in Assessment   Groin/Genitalia/Buttocks NOT EXAMINED   Right Leg, Foot, Toes Normal except as noted below in Assessment   Left Leg, Foot, Toes Normal except as noted below in Assessment        Assessment and Plan by Diagnosis:    History of Present Condition:     Duration:  How long has this been an issue for you?    o  3 years, scalp, neck, forehead, buttocks 1 year   Location Affected:  Where on the body is this affecting you? o  see above   Quality:  Is there any bleeding, pain, itch, burning/irritation, or redness associated with the skin lesion? o  itchy   Severity:  Describe any bleeding, pain, itch, burning/irritation, or redness on a scale of 1 to 10 (with 10 being the worst)  o  10   Timing:  Does this condition seem to be there pretty constantly or do you notice it more at specific times throughout the day?     o  consistent   Context:  Have you ever noticed that this condition seems to be associated with specific activities you do?    o  denies   Modifying Factors:    o Anything that seems to make the condition worse?    -  denies  o What have you tried to do to make the condition better?    -  hand , hydrocortisone 2 5% cream, clobetasol 0 05% cream   Associated Signs and Symptoms:  Does this skin lesion seem to be associated with any of the following:  o  SL AMB DERM SIGNS AND SYMPTOMS: Itching and Scratching     PRURIGO NODULARIS    Physical Exam:   Anatomic Location Affected:  Buttocks, central scalp   Morphological Description:  Keratotic nodule with central crust      Additional History of Present Condition:  See above    Assessment and Plan:  Based on a thorough discussion of this condition and the management approach to it (including a comprehensive discussion of the known risks, side effects and potential benefits of treatment), the patient (family) agrees to implement the following specific plan:   Discuss treatment option: Kenalog injection at today's visit   Follow up in 1 month   Recommend to stop using hand  on affected area(s)  What is nodular prurigo? Nodular prurigo is a skin condition characterised by very itchy firm lumps  It is the most severe form of prurigo  It is not known why these lumps appear  It is also called prurigo nodularis  Nodular prurigo is very difficult to treat effectively  Who gets nodular prurigo? Nodular prurigo can occur at all ages but mainly in adults aged 19-56 years  Both sexes are equally affected  Up to 80% of patients have a personal or family history of atopic dermatitis, asthma or hay fever (compared to about 25% of the normal population)  It has been associated with internal disease including iron deficiency anaemia, chronic renal failure, gluten enteropathy, HIV infection and many other diverse conditions  What is the cause of nodular prurigo? The cause of nodular prurigo is unknown  It is uncertain whether scratching leads to the nodules or if the nodules appear before they are scratched  The reason for the nodules, the inflammation and the increased activity and size of nerves in the skin is under investigation but remains unknown  Nodular prurigo may commence as an insect bite reaction or another form of dermatitis  In some cases, nodular prurigo has been associated with brachioradial pruritus, which is due to compression or traction of spinal nerves  This theory may explain why local treatment is not always successful  It has been speculated that widespread nodular prurigo may also follow sensitisation of the spinal nerves and that the nodules appear because of scratching  What are the clinical features of nodular prurigo?   The individual prurigo nodule is a firm lump, 1-3 cm in diameter, often with a raised warty surface  The early lesion may start as a smaller red itchy bump  Crusting and scaling may cover recently scratched lesions  Older lesions may be darker or paler than surrounding skin  The skin in between the nodules is often dry  The itch is often very intense, often for hours on end, leading to vigorous scratching and sometimes secondary infection  Nodular prurigo lesions are usually grouped and numerous but may vary in number from 2-200  Nodular prurigo tends to be symmetrically distributed  They usually start on the lower arms and legs and are worse on the outer aspects  The trunk, face and even palms can also be affected  Sometimes the prurigo nodules are most obvious on the cape area (neck, shoulders and upper arms)  New nodules appear from time to time, but existing nodules may regress spontaneously to leave scars  Nodular prurigo often runs a long course and can lead to significant stress and depression  How is nodular prurigo diagnosed? In most cases, the diagnosis is made clinically due to the degree of itch and the typical appearance of the nodules  A skin biopsy may be useful to confirm the diagnosis of nodular prurigo  Under the microscope, the skin is enormously thickened and may appear quite abnormal, sometimes resembling squamous cell skin cancer  The nerve fibres and nerve endings in the skin are markedly increased in size  The skin is inflamed and there is an increased number of neural mediators known to cause itching and nerve growth  Direct immunofluorescence looking for antibody deposition in the skin is usually negative  Rarely, the blistering disease bullous pemphigoid can present as nodular prurigo (pemphigoid nodularis)  In this case, immunofluorescence reveals immunoglobulins in the basement membrane zone below the epidermis  The prurigo nodules can be present for weeks or months before any blisters appear      It is important to identify underlying diseases that are associated with nodular prurigo; blood tests may include full blood count, liver, kidney and thyroid function tests  Patch testing may be worthwhile if contact allergy is considered possible  What is the treatment for nodular prurigo? Unfortunately, nodular prurigo is one of the more resistant conditions skin specialists are called upon to treat  Local treatments that may be tried include:   Emollients applied liberally and frequently to cool and soothe itchy skin - menthol or phenol may be added   Oral antihistamines at night to reduce itch and allow sleep   Ultrapotent topical steroid creams  To enhance their effect, apply under occlusion; cover with a plastic or hydrocolloid adhesive dressing and leave this in place for several days   Corticosteroid injections (triamcinolone acetonide 10-40 mg/mL) into thicker nodules   Coal tar ointment as a steroid alternative   Calcipotriol ointment (topical vitamin D3), usually used for psoriasis, can be applied twice daily   Capsaicin cream, which induces itching and burning until eventually, the itch stops completely -- it requires repeated applications four to six times daily   Cryotherapy, which may shrink the nodules and reduce their itch   Treatments with pulsed dye laser, which may reduce the vascularity of individual lesions     Antiseptic or antibiotic ointment may be used on infected nodules, and oral antibiotics (usually flucloxacillin) are indicated for significant secondary bacterial infection (cellulitis)  Systemic treatments that may be helpful for more severe disease are listed below, in no particular order  Combination treatment is frequently recommended     Phototherapy (UVB and PUVA)    Tricyclic antidepressants such as amitriptyline or doxepin    Anticonvulsants used for neuropathic pain and itch, such as gabapentin or pregabalin    Naltrexone, an opiate antagonist (this counteracts the narcotic effect of morphine, heroin and similar drugs), has been reported to reduce itching in some subjects   Oral steroids    Methotrexate    Thalidomide, which is reserved for very severe cases and may be difficult to access   Ciclosporin, which may reduce the lumps and the itching but its use is limited by side effects   Systemic retinoids such as acitretin or isotretinoin, which may shrink the nodules and reduce the severity of the itch  SEBORRHEIC DERMATITIS    Physical Exam:   Anatomic Location Affected:   interior scalp cheeks, neck, chest   Morphological Description:  Pink patches and some papule      Additional History of Present Condition:  See above    Assessment and Plan:  Based on a thorough discussion of this condition and the management approach to it (including a comprehensive discussion of the known risks, side effects and potential benefits of treatment), the patient (family) agrees to implement the following specific plan:   Start triamcinolone 0 025% cream apply topically to face, neck 2 times a day  Seborrheic Dermatitis   Seborrheic dermatitis is a common, chronic or relapsing form of eczema/dermatitis that mainly affects the sebaceous, gland-rich regions of the scalp, face, and trunk  There are infantile and adult forms of seborrhoeic dermatitis  It is sometimes associated with psoriasis and, in that clinical scenario, may be referred to as "sebo-psoriasis "  Seborrheic dermatitis is also known as "seborrheic eczema "  Dandruff (also called "pityriasis capitis") is an uninflamed form of seborrhoeic dermatitis  Dandruff presents as bran-like scaly patches scattered within hair-bearing areas of the scalp  In an infant, this condition may be referred to as "cradle cap "  The cause of seborrheic dermatitis is not completely understood  It is associated with proliferation of various species of the skin commensal Malassezia, in its yeast (non-pathogenic) form   Its metabolites (such as the fatty acids oleic acid, malssezin, and indole-3-carbaldehyde) may cause an inflammatory reaction  Differences in skin barrier lipid content and function may account for individual presentations  Infantile Seborrheic Dermatitis  Infantile seborrheic dermatitis affects babies under the age of 1 months and usually resolves by 1012 months of age  Infantile seborrheic dermatitis causes "cradle cap" (diffuse, greasy scaling on scalp)  The rash may spread to affect armpit and groin folds (a type of "napkin dermatitis")  There may be associated salmon-pink colored patches that may flake or peel  The rash in this case is usually not especially itchy, so the baby often appears undisturbed by the rash, even when more generalized  Adult Seborrheic Dermatitis  Adult seborrheic dermatitis tends to begin in late adolescence; prevalence is greatest in young adults and in the elderly  It is more common in males than in females  The following factors are sometimes associated with severe adult seborrheic dermatitis:   Oily skin   Familial tendency to seborrhoeic dermatitis or a family history of psoriasis   Immunosuppression: organ transplant recipient, human immunodeficiency virus (HIV) infection and patients with lymphoma   Neurological and psychiatric diseases: Parkinson disease, tardive dyskinesia, depression, epilepsy, facial nerve palsy, spinal cord injury and congenital disorders such as Down syndrome   Treatment for psoriasis with psoralen and ultraviolet A (PUVA) therapy   Lack of sleep   Stressful events  In adults, seborrheic dermatitis may typically affect the scalp, face (creases around the nose, behind ears, within eyebrows) and upper trunk   Typical clinical features include:   Winter flares, improving in summer following sun exposure   Minimal itch most of the time   Combination oily and dry mid-facial skin   Ill-defined localized scaly patches or diffuse scale in the scalp   Blepharitis; scaly red eyelid margins   Darrington-pink, thin, scaly, and ill-defined plaques in skin folds on both sides of the face   Petal or ring-shaped flaky patches on hair-line and on anterior chest   Rash in armpits, under the breasts, in the groin folds and genital creases   Superficial folliculitis (inflamed hair follicles) on cheeks and upper trunk    Seborrheic dermatitis is diagnosed by its clinical appearance and behavior  Skin biopsy may be helpful but is rarely necessary to make this diagnosis  PROCEDURE:  INTRALESIONAL STEROID INJECTION (KENALOG INJECTION)    Purpose: Triamcinolone is a synthetic glucocorticoid corticosteroid that has marked anti-inflammatory action  It is prepared in sterile aqueous suspension suitable for injecting directly into a lesion on or immediately below the skin to treat a dermal inflammatory process  Indications: It is indicated for alopecia areata; inflammatory acne cysts; discoid lupus erythematosus; keloids and hypertrophic scars; inflammatory lesions of granuloma annulare, lichen planus, lichen simplex chronicus (neurodermatitis), psoriatic plaques, and other localized inflammatory skin conditions  Potential Side Effects: I understand that triamcinolone injection can potentially cause early and/or delayed adverse effects such as:    Pain    Impaired wound healing    Increased hair growth    Bleeding    White or brown marks    Steroid acne    Infection    Telangiectasia    Skin thinning    Cutaneous and subcutaneous lipoatrophy (most common) appearing as skin indentations or dimples around the injection sites a few weeks after treatment     PROCEDURE NOTE:  After verbal and written consent were obtained, the to-be-treated area was wiped and cleaned with rubbing alcohol 70%  Then, a total of 2 mL of Kenalog CONCENTRATION:  5 mg/mL   (Lot# [id number];  Expiration Jan, 2022, NDC#: 9949-2512-31) was injected intralesionally into a total of 9 lesion/s on the following anatomic areas:  Scalp, buttocks using a 3-mL syringe and a 30-gauge needle  There was less than 1 mL of blood loss and little to no discomfort  The area was bandaged with a Band-aid  The patient tolerated the procedure well and remained in the office for observation  With no signs of an adverse reaction, the patient was eventually discharged from clinic        Scribe Attestation    I,:  Matti Deluna am acting as a scribe while in the presence of the attending physician :       I,:  Kinga Diggs MD personally performed the services described in this documentation    as scribed in my presence :

## 2021-02-18 PROBLEM — D22.9 ATYPICAL NEVI: Status: ACTIVE | Noted: 2021-02-18

## 2021-02-18 PROBLEM — K57.90 DIVERTICULOSIS: Status: ACTIVE | Noted: 2021-02-18

## 2021-02-18 PROBLEM — Z12.11 COLON CANCER SCREENING: Status: ACTIVE | Noted: 2020-06-25

## 2021-02-19 NOTE — ASSESSMENT & PLAN NOTE
Recent [facility]  2/4-2/5-dx diverticulitis  txd w antibiotics w good response    Dietary modifications discussed

## 2021-02-21 DIAGNOSIS — K21.9 GASTROESOPHAGEAL REFLUX DISEASE WITHOUT ESOPHAGITIS: ICD-10-CM

## 2021-02-21 RX ORDER — PANTOPRAZOLE SODIUM 40 MG/1
TABLET, DELAYED RELEASE ORAL
Qty: 30 TABLET | Refills: 5 | Status: SHIPPED | OUTPATIENT
Start: 2021-02-21 | End: 2021-02-22

## 2021-02-22 DIAGNOSIS — K21.9 GASTROESOPHAGEAL REFLUX DISEASE WITHOUT ESOPHAGITIS: ICD-10-CM

## 2021-02-22 RX ORDER — PANTOPRAZOLE SODIUM 40 MG/1
TABLET, DELAYED RELEASE ORAL
Qty: 30 TABLET | Refills: 5 | Status: SHIPPED | OUTPATIENT
Start: 2021-02-22 | End: 2021-02-22 | Stop reason: SDUPTHER

## 2021-02-22 RX ORDER — PANTOPRAZOLE SODIUM 40 MG/1
40 TABLET, DELAYED RELEASE ORAL DAILY
Qty: 30 TABLET | Refills: 5 | Status: SHIPPED | OUTPATIENT
Start: 2021-02-22 | End: 2021-03-26 | Stop reason: HOSPADM

## 2021-03-11 DIAGNOSIS — E78.5 HYPERLIPIDEMIA, UNSPECIFIED HYPERLIPIDEMIA TYPE: ICD-10-CM

## 2021-03-11 RX ORDER — ATORVASTATIN CALCIUM 20 MG/1
20 TABLET, FILM COATED ORAL DAILY
Qty: 90 TABLET | Refills: 3 | Status: SHIPPED | OUTPATIENT
Start: 2021-03-11 | End: 2022-03-08

## 2021-03-20 DIAGNOSIS — Z11.59 SCREENING FOR VIRAL DISEASE: ICD-10-CM

## 2021-03-20 PROCEDURE — U0005 INFEC AGEN DETEC AMPLI PROBE: HCPCS | Performed by: INTERNAL MEDICINE

## 2021-03-20 PROCEDURE — U0003 INFECTIOUS AGENT DETECTION BY NUCLEIC ACID (DNA OR RNA); SEVERE ACUTE RESPIRATORY SYNDROME CORONAVIRUS 2 (SARS-COV-2) (CORONAVIRUS DISEASE [COVID-19]), AMPLIFIED PROBE TECHNIQUE, MAKING USE OF HIGH THROUGHPUT TECHNOLOGIES AS DESCRIBED BY CMS-2020-01-R: HCPCS | Performed by: INTERNAL MEDICINE

## 2021-03-21 LAB — SARS-COV-2 RNA RESP QL NAA+PROBE: NEGATIVE

## 2021-03-23 NOTE — PRE-PROCEDURE INSTRUCTIONS
Pre-Surgery Instructions:   Medication Instructions    albuterol (Ventolin HFA) 90 mcg/act inhaler Pt may use if needed    Ascorbic Acid (VITAMIN C) 1000 MG tablet Instructed patient per Anesthesia Guidelines   atorvastatin (LIPITOR) 20 mg tablet Instructed patient per Anesthesia Guidelines   Breo Ellipta 200-25 MCG/INH inhaler Pt to use the am of the procedure    cholecalciferol (VITAMIN D3) 1,000 units tablet Instructed patient per Anesthesia Guidelines   clobetasol (TEMOVATE) 0 05 % cream Instructed patient per Anesthesia Guidelines   cyclobenzaprine (FLEXERIL) 10 mg tablet Instructed patient per Anesthesia Guidelines   famotidine (PEPCID) 20 mg tablet Instructed patient per Anesthesia Guidelines   hydrocortisone 2 5 % cream Instructed patient per Anesthesia Guidelines   metFORMIN (GLUCOPHAGE-XR) 500 mg 24 hr tablet Instructed patient per Anesthesia Guidelines   multivitamin (THERAGRAN) TABS Pt stopped as of 3/21/21    pantoprazole (PROTONIX) 40 mg tablet Instructed patient per Anesthesia Guidelines   triamcinolone (KENALOG) 0 025 % cream Instructed patient per Anesthesia Guidelines   Zinc 50 MG CAPS Instructed patient per Anesthesia Guidelines   [DISCONTINUED] fluticasone-salmeterol (Advair Diskus) 250-50 mcg/dose inhaler Instructed patient per Anesthesia Guidelines  Pt to follow Dr Dong Cleaning instructions    [facility] [phone]

## 2021-03-25 ENCOUNTER — ANESTHESIA EVENT (OUTPATIENT)
Dept: GASTROENTEROLOGY | Facility: AMBULARY SURGERY CENTER | Age: 66
End: 2021-03-25

## 2021-03-25 NOTE — ANESTHESIA PREPROCEDURE EVALUATION
Procedure:  COLONOSCOPY  EGD    Relevant Problems   CARDIO   (+) Chest pain   (+) Hyperlipidemia      ENDO   (+) Hypothyroidism (Resolved)      GI/HEPATIC   (+) Gastroesophageal reflux disease without esophagitis      MUSCULOSKELETAL   (+) Fibromyalgia      NEURO/PSYCH   (+) Fibromyalgia   (+) Headache   (+) Tension type headache      PULMONARY   (+) Allergic asthma, mild persistent, uncomplicated   (+) Asthma   (+) Chronic obstructive pulmonary disease with acute exacerbation (HCC)   (+) Obstructive sleep apnea   (+) Obstructive sleep apnea hypopnea, mild   (+) Upper respiratory tract infection (Resolved)      Other   (+) Mediastinal lymphadenopathy        Physical Exam    Airway    Mallampati score: III  TM Distance: >3 FB  Neck ROM: full     Dental       Cardiovascular  Rhythm: regular, Rate: normal,     Pulmonary  Breath sounds clear to auscultation,     Other Findings        Anesthesia Plan  ASA Score- 2     Anesthesia Type- IV sedation with anesthesia with ASA Monitors  Additional Monitors:   Airway Plan:           Plan Factors-    Chart reviewed  Patient is not a current smoker  Induction- intravenous  Postoperative Plan-     Informed Consent- Anesthetic plan and risks discussed with patient  I personally reviewed this patient with the CRNA  Discussed and agreed on the Anesthesia Plan with the CRNA  Mary Jacobo

## 2021-03-26 ENCOUNTER — HOSPITAL ENCOUNTER (OUTPATIENT)
Dept: GASTROENTEROLOGY | Facility: AMBULARY SURGERY CENTER | Age: 66
Setting detail: OUTPATIENT SURGERY
Discharge: HOME | End: 2021-03-26
Attending: INTERNAL MEDICINE
Payer: MEDICARE

## 2021-03-26 ENCOUNTER — ANESTHESIA (OUTPATIENT)
Dept: GASTROENTEROLOGY | Facility: AMBULARY SURGERY CENTER | Age: 66
End: 2021-03-26

## 2021-03-26 VITALS
WEIGHT: 164 LBS | OXYGEN SATURATION: 93 % | DIASTOLIC BLOOD PRESSURE: 85 MMHG | SYSTOLIC BLOOD PRESSURE: 129 MMHG | HEIGHT: 63 IN | BODY MASS INDEX: 29.06 KG/M2 | RESPIRATION RATE: 18 BRPM | HEART RATE: 74 BPM | TEMPERATURE: 98.1 F

## 2021-03-26 DIAGNOSIS — R11.0 NAUSEA: ICD-10-CM

## 2021-03-26 DIAGNOSIS — R14.0 BLOATING: ICD-10-CM

## 2021-03-26 DIAGNOSIS — Z86.010 HISTORY OF COLON POLYPS: ICD-10-CM

## 2021-03-26 DIAGNOSIS — K57.92 ACUTE DIVERTICULITIS: ICD-10-CM

## 2021-03-26 DIAGNOSIS — K21.9 GASTROESOPHAGEAL REFLUX DISEASE, UNSPECIFIED WHETHER ESOPHAGITIS PRESENT: ICD-10-CM

## 2021-03-26 PROBLEM — J06.9 UPPER RESPIRATORY TRACT INFECTION: Status: RESOLVED | Noted: 2019-11-20 | Resolved: 2021-03-26

## 2021-03-26 PROBLEM — E03.9 HYPOTHYROIDISM: Status: RESOLVED | Noted: 2018-11-09 | Resolved: 2021-03-26

## 2021-03-26 LAB — GLUCOSE SERPL-MCNC: 111 MG/DL (ref 65–140)

## 2021-03-26 PROCEDURE — 45385 COLONOSCOPY W/LESION REMOVAL: CPT | Performed by: INTERNAL MEDICINE

## 2021-03-26 PROCEDURE — 43235 EGD DIAGNOSTIC BRUSH WASH: CPT | Performed by: INTERNAL MEDICINE

## 2021-03-26 PROCEDURE — 82948 REAGENT STRIP/BLOOD GLUCOSE: CPT

## 2021-03-26 PROCEDURE — 88305 TISSUE EXAM BY PATHOLOGIST: CPT | Performed by: PATHOLOGY

## 2021-03-26 RX ORDER — LIDOCAINE HYDROCHLORIDE 10 MG/ML
INJECTION, SOLUTION EPIDURAL; INFILTRATION; INTRACAUDAL; PERINEURAL AS NEEDED
Status: DISCONTINUED | OUTPATIENT
Start: 2021-03-26 | End: 2021-03-26

## 2021-03-26 RX ORDER — SODIUM CHLORIDE, SODIUM LACTATE, POTASSIUM CHLORIDE, CALCIUM CHLORIDE 600; 310; 30; 20 MG/100ML; MG/100ML; MG/100ML; MG/100ML
75 INJECTION, SOLUTION INTRAVENOUS CONTINUOUS
Status: DISCONTINUED | OUTPATIENT
Start: 2021-03-26 | End: 2021-03-30 | Stop reason: HOSPADM

## 2021-03-26 RX ORDER — FAMOTIDINE 20 MG/1
20 TABLET, FILM COATED ORAL 2 TIMES DAILY
Qty: 60 TABLET | Refills: 11 | Status: SHIPPED | OUTPATIENT
Start: 2021-03-26 | End: 2022-06-13 | Stop reason: ALTCHOICE

## 2021-03-26 RX ORDER — GLYCOPYRROLATE 0.2 MG/ML
INJECTION INTRAMUSCULAR; INTRAVENOUS AS NEEDED
Status: DISCONTINUED | OUTPATIENT
Start: 2021-03-26 | End: 2021-03-26

## 2021-03-26 RX ORDER — PROPOFOL 10 MG/ML
INJECTION, EMULSION INTRAVENOUS CONTINUOUS PRN
Status: DISCONTINUED | OUTPATIENT
Start: 2021-03-26 | End: 2021-03-26

## 2021-03-26 RX ORDER — PROPOFOL 10 MG/ML
INJECTION, EMULSION INTRAVENOUS AS NEEDED
Status: DISCONTINUED | OUTPATIENT
Start: 2021-03-26 | End: 2021-03-26

## 2021-03-26 RX ADMIN — LIDOCAINE HYDROCHLORIDE 50 MG: 10 INJECTION, SOLUTION EPIDURAL; INFILTRATION; INTRACAUDAL; PERINEURAL at 10:09

## 2021-03-26 RX ADMIN — GLYCOPYRROLATE 0.2 MG: 0.2 INJECTION, SOLUTION INTRAMUSCULAR; INTRAVENOUS at 10:09

## 2021-03-26 RX ADMIN — PROPOFOL 20 MG: 10 INJECTION, EMULSION INTRAVENOUS at 10:11

## 2021-03-26 RX ADMIN — SODIUM CHLORIDE, SODIUM LACTATE, POTASSIUM CHLORIDE, AND CALCIUM CHLORIDE 75 ML/HR: .6; .31; .03; .02 INJECTION, SOLUTION INTRAVENOUS at 08:22

## 2021-03-26 RX ADMIN — PROPOFOL 120 MCG/KG/MIN: 10 INJECTION, EMULSION INTRAVENOUS at 10:15

## 2021-03-26 RX ADMIN — PROPOFOL 100 MG: 10 INJECTION, EMULSION INTRAVENOUS at 10:09

## 2021-03-26 RX ADMIN — PROPOFOL 30 MG: 10 INJECTION, EMULSION INTRAVENOUS at 10:13

## 2021-03-26 NOTE — H&P
History and Physical - [facility]  Eddie Moreira 77 y o  female MRN: [id number]        HPI: 69y/o female with history of GERD had an episode of sigmoid diverticulitis couple of months ago  She has history of colon polyps on the last colonoscopy was 5 years ago      Historical Information   Past Medical History:   Diagnosis Date    Abnormal glucose     last assessed 16    Arthritis     Asthma     w/ exacerbation; last assessed 5/14/15    Atypical nevus     last assessed 17    Breast lump     last assessed 3/6/14    Cataract     Cervical adenopathy     last assessed  17    Colon polyp     CPAP (continuous positive airway pressure) dependence     Dizziness     Dyslipidemia     last assessed 17    Facial droop     last assessed  16    Fibromyalgia, primary     Flu 2018    Foot abrasion     right between the 4th and 5th toe    Genital herpes     resolved 16    Headache, tension-type     Herpes zoster     last assessed 16    History of shingles     may 2016    History of stomach ulcers     Hx of abnormal mammogram     last assessed  3/5/14    Hyperlipidemia     Myalgia     last assessed  12    Myositis     12    Neck pain     Pain involving joints of fingers of both hands 2021    Peripheral neuropathy     Seborrheic keratosis     Skin neoplasm     of the lower limb, including hip; onset 12; last assessed  12    Sleep apnea      Past Surgical History:   Procedure Laterality Date    ABDOMINAL SURGERY      release of adhesions    BLADDER SURGERY      mesh-lift    BREAST SURGERY      lift    CATARACT EXTRACTION Bilateral      SECTION      x [id number]    CHOLECYSTECTOMY      lap    COLONOSCOPY      COSMETIC SURGERY      tummy and breast lift    ESOPHAGOGASTRODUODENOSCOPY      OOPHORECTOMY Left     OTHER SURGICAL HISTORY      vocal cord surgery, scraping    NM HYSTEROSCOPY,W/ENDO BX N/A 11/3/2016    Procedure: DILATATION AND CURETTAGE (D&C) WITH HYSTEROSCOPY;  Surgeon: Jeffry Casas MD;  Location: [facility];  Service: Gynecology    REDUCTION MAMMAPLASTY Bilateral 2008    TONSILLECTOMY       Social History   Social History     Substance and Sexual Activity   Alcohol Use No     Social History     Substance and Sexual Activity   Drug Use No     Social History     Tobacco Use   Smoking Status Never Smoker   Smokeless Tobacco Never Used     Family History   Problem Relation Age of Onset    Hypertension Mother     Alzheimer's disease Mother     Arthritis Mother     Hypertension Father     Arthritis Father     Heart attack Father     Heart disease Father     Other Brother         vertigo, tinnitus    Diabetes Daughter     Breast cancer Sister 28    Skin cancer Sister     Other Son         downs syndrome    Abdominal aortic aneurysm Sister     Cancer Sister         T cell sarcoma    No Known Problems Brother     Diabetes Brother     Other Sister         anemia from the diet    No Known Problems Son        Meds/Allergies     (Not in a hospital admission)      Allergies   Allergen Reactions    Latex Rash     Burn-skin irritation    Adhesive [Medical Tape] Other (See Comments)     bandaid-red,itch       Objective     Blood pressure 107/72, pulse 83, temperature 98 1 °F (36 7 °C), temperature source Tympanic, resp  rate 18, height 5' 3" (1 6 m), weight 74 4 kg (164 lb), SpO2 98 %, not currently breastfeeding      PHYSICAL EXAM:    Gen: NAD  CV: S1 & S2 normal, RRR  CHEST: Clear to auscultate  ABD: soft, NT/ND, good bowel sounds  EXT: no edema    ASSESSMENT:     History of GERD, colon polyps, diverticulitis    PLAN:    EGD and colonoscopy

## 2021-03-26 NOTE — DISCHARGE INSTRUCTIONS
Upper Endoscopy and Colonoscopy   WHAT YOU NEED TO KNOW:   An upper endoscopy is also called an upper gastrointestinal (GI) endoscopy, or an esophagogastroduodenoscopy (EGD)  It is a procedure to examine the inside of your esophagus, stomach, and duodenum (first part of the small intestine) with a scope  You may feel bloated, gassy, or have some abdominal discomfort after your procedure  Your throat may be sore for 24 to 36 hours  You may burp or pass gas from air that is still inside your body  A colonoscopy is a procedure to examine the inside of your colon (intestine) with a scope  Polyps or tissue growths may have been removed during your colonoscopy  It is normal to feel bloated and to have some abdominal discomfort  You should be passing gas  If you have hemorrhoids or you had polyps removed, you may have a small amount of bleeding  DISCHARGE INSTRUCTIONS:   Seek care immediately if:   · You have sudden, severe abdominal pain  · You have problems swallowing  · You have a large amount of black, sticky bowel movements or blood in your bowel movements  · You have sudden trouble breathing  · You feel weak, lightheaded, or faint or your heart beats faster than normal for you  Contact your healthcare provider at [phone] if:   · You have a fever and chills  · You have nausea or are vomiting  · Your abdomen is bloated or feels full and hard  · You have abdominal pain  · You have a large amount of black, sticky bowel movements or blood in your bowel movements  · You have not had a bowel movement for 3 days after your procedure  · You have rash or hives  · You have questions or concerns about your procedure  Activity:   ·       Do not lift, strain, or run for 24 hours after your procedure  ·       Rest after your procedure  You have been given medicine to relax you  Do not drive or make important decisions until the day after your procedure  Return to your normal activity as directed  ·       Relieve gas and discomfort from bloating by lying on your right side with a heating pad on your abdomen  You may need to take short walks to help the gas move out  Eat small meals until bloating is relieved  Follow up with your healthcare provider as directed: Write down your questions so you remember to ask them during your visits  If you take a blood thinner, please review the specific instructions from your endoscopist about when you should resume it  These can be found in the Recommendation and Your Medication list sections of this After Visit Summary

## 2021-03-26 NOTE — ANESTHESIA POSTPROCEDURE EVALUATION
Post-Op Assessment Note    CV Status:  Stable  Pain Score: 0    Pain management: adequate     Mental Status:  Alert and awake   Hydration Status:  Euvolemic   PONV Controlled:  Controlled   Airway Patency:  Patent      Post Op Vitals Reviewed: Yes      Staff: CRNA, Anesthesiologist         No complications documented      BP      Temp      Pulse     Resp      SpO2

## 2021-04-05 ENCOUNTER — TELEPHONE (OUTPATIENT)
Dept: GASTROENTEROLOGY | Facility: CLINIC | Age: 66
End: 2021-04-05

## 2021-04-05 NOTE — TELEPHONE ENCOUNTER
Patients GI provider:  Dr Floyd Gil    Number to return call: [phone]    Reason for call: Pt returning call for results    Scheduled procedure/appointment date if applicable: NA

## 2021-04-09 ENCOUNTER — TELEPHONE (OUTPATIENT)
Dept: GASTROENTEROLOGY | Facility: CLINIC | Age: 66
End: 2021-04-09

## 2021-04-09 DIAGNOSIS — R19.7 DIARRHEA, UNSPECIFIED TYPE: Primary | ICD-10-CM

## 2021-04-09 NOTE — TELEPHONE ENCOUNTER
Burton calling to report that she has been having diarrhea since the end of February  early March  The stool is always liquid like water  Had CT scan on 2/4  Colonoscopy/EGD 3/26  She has pain occasionally by her bladder, currently none, see no blood and no foul odor  She has no problem urinating  She does have lower back pain which does bother her  She does not eat greasy or fatty food, she does eat a lot of rice  She has outstanding CT scan and C diff  No OV scheduled      Please advise

## 2021-04-09 NOTE — TELEPHONE ENCOUNTER
Called pateint and she is aware stool studies are ordered, including C diff  She will  the containers tomorrow  Did discuss the BRAT diet, she does not eat greasy, fatty foods

## 2021-04-09 NOTE — TELEPHONE ENCOUNTER
I would have patient complete outstanding orders  I will also order a stool for enteric pathogens Thank you!

## 2021-04-09 NOTE — TELEPHONE ENCOUNTER
Just for the C  Diff  Sorry I didn't comment on CT  I think we can hold on CT since she did have one done on 2/4  Thank you!

## 2021-04-09 NOTE — TELEPHONE ENCOUNTER
Patients GI provider:  Dr Maurice Howell    Number to return call: [phone]    Reason for call: Pt calling stating she had a colon/egd on 03/26/21 and is currently having watery diarrhea after she eats       Scheduled procedure/appointment date if applicable: NA

## 2021-04-14 ENCOUNTER — OFFICE VISIT (OUTPATIENT)
Dept: DERMATOLOGY | Age: 66
End: 2021-04-14
Payer: MEDICARE

## 2021-04-14 VITALS — WEIGHT: 167 LBS | BODY MASS INDEX: 29.59 KG/M2 | HEIGHT: 63 IN

## 2021-04-14 DIAGNOSIS — L25.9 CONTACT DERMATITIS, UNSPECIFIED CONTACT DERMATITIS TYPE, UNSPECIFIED TRIGGER: ICD-10-CM

## 2021-04-14 DIAGNOSIS — L82.1 SEBORRHEIC KERATOSIS: ICD-10-CM

## 2021-04-14 DIAGNOSIS — L21.9 SEBORRHEIC DERMATITIS: Primary | ICD-10-CM

## 2021-04-14 PROCEDURE — 99213 OFFICE O/P EST LOW 20 MIN: CPT | Performed by: DERMATOLOGY

## 2021-04-14 PROCEDURE — 17110 DESTRUCTION B9 LES UP TO 14: CPT | Performed by: DERMATOLOGY

## 2021-04-14 NOTE — PROGRESS NOTES
[facility] Follow Up Note    Patient Name: Arslan Spangler  Encounter Date: 04/14/2021    Today's Chief Concerns:  [place] Concern #1:  Follow up Seborrheic dermatitis       Current Medications:    Current Outpatient Medications:     albuterol (Ventolin HFA) 90 mcg/act inhaler, Inhale 2 puffs every 4 (four) hours as needed for wheezing, Disp: 1 Inhaler, Rfl: 5    Ascorbic Acid (VITAMIN C) 1000 MG tablet, , Disp: , Rfl:     atorvastatin (LIPITOR) 20 mg tablet, Take 1 tablet (20 mg total) by mouth daily (Patient taking differently: Take 20 mg by mouth daily at bedtime ), Disp: 90 tablet, Rfl: 3    Breo Ellipta 200-25 MCG/INH inhaler, INHALE ONE PUFF BY MOUTH EVERY DAY - RINSE MOUTH AFTER USE (Patient taking differently: 1 puff every morning ), Disp: 180 each, Rfl: 0    cholecalciferol (VITAMIN D3) 1,000 units tablet, Take 2,000 Units by mouth daily, Disp: , Rfl:     clobetasol (TEMOVATE) 0 05 % cream, Apply topically 2 (two) times a day, Disp: 45 g, Rfl: 1    cyclobenzaprine (FLEXERIL) 10 mg tablet, Take 10 mg by mouth 3 (three) times a day as needed for muscle spasms (as needed for pain), Disp: , Rfl:     famotidine (PEPCID) 20 mg tablet, Take 1 tablet (20 mg total) by mouth daily (Patient taking differently: Take 20 mg by mouth as needed ), Disp: 30 tablet, Rfl: 1    famotidine (PEPCID) 20 mg tablet, Take 1 tablet (20 mg total) by mouth 2 (two) times a day, Disp: 60 tablet, Rfl: 11    hydrocortisone 2 5 % cream, 2 (two) times a day scalp, Disp: , Rfl:     metFORMIN (GLUCOPHAGE-XR) 500 mg 24 hr tablet, Take 1 tablet (500 mg total) by mouth daily with dinner, Disp: 90 tablet, Rfl: 2    multivitamin (THERAGRAN) TABS, Take 1 tablet by mouth daily Last dose 3/21/21, Disp: , Rfl:     triamcinolone (KENALOG) 0 025 % cream, Apply topically 2 (two) times a day Face, neck, Disp: 30 g, Rfl: 1    Zinc 50 MG CAPS, Take 50 mg by mouth daily , Disp: , Rfl:     CONSTITUTIONAL:   Vitals:    04/14/21 1415 Weight: 75 8 kg (167 lb)   Height: 5' 3" (1 6 m)       Specific Alerts:    Have you been seen by a [facility] in the last 3 years? YES    Are you pregnant or planning to become pregnant? No    Are you currently or planning to be nursing or breast feeding? No    Allergies   Allergen Reactions    Latex Rash     Burn-skin irritation    Adhesive [Medical Tape] Other (See Comments)     bandaid-red,itch       May we call your Preferred Phone number to discuss your specific medical information? YES    May we leave a detailed message that includes your specific medical information? YES    Have you traveled outside of the [facility] in the past 3 months? No    Do you currently have a pacemaker or defibrillator? No    Do you have any artificial heart valves, joints, plates, screws, rods, stents, pins, etc? No   - If Yes, were any placed within the last 2 years? Do you require any medications prior to a surgical procedure? No       Are you taking any medications that cause you to bleed more easily ("blood thinners") No    Have you ever experienced a rapid heartbeat with epinephrine? No    Have you ever been treated with "gold" (gold sodium thiomalate) therapy? No    [facility] can help with wrinkles, "laugh lines," facial volume loss, "double chin," "love handles," age spots, and more  Are you interested in learning today about some of the skin enhancement procedures that we offer? (If Yes, please provide more detail) No    Review of Systems:  Have you recently had or currently have any of the following?     · Fever or chills: No  · Night Sweats: No  · Headaches: No  · Weight Gain: No  · Weight Loss: No  · Blurry Vision: No  · Nausea: No  · Vomiting: No  · Diarrhea: No  · Blood in Stool: No  · Abdominal Pain: No  · Itchy Skin: No  · Painful Joints: No  · Swollen Joints: No  · Muscle Pain: No  · Irregular Mole: No  · Sun Burn: No  · Dry Skin: No  · Skin Color Changes: No  · Scar or Keloid: No  · Cold Sores/Fever Blisters: No  · Bacterial Infections/MRSA: No  · Anxiety: No  · Depression: No  · Suicidal or Homicidal Thoughts: No      PSYCH: Normal mood and affect  EYES: Normal conjunctiva  ENT: Normal lips and oral mucosa  CARDIOVASCULAR: No edema  RESPIRATORY: Normal respirations  HEME/LYMPH/IMMUNO:  No regional lymphadenopathy except as noted below in ASSESSMENT AND PLAN BY DIAGNOSIS    FULL ORGAN SYSTEM SKIN EXAM (SKIN)   Hair, Scalp, Ears, Face Normal except as noted below in Assessment   Neck, Cervical Chain Nodes Normal except as noted below in Assessment   Right Arm/Hand/Fingers Normal except as noted below in Assessment   Left Arm/Hand/Fingers Normal except as noted below in Assessment   Chest/Breasts/Axillae Viewed areas Normal except as noted below in Assessment   Abdomen, Umbilicus Normal except as noted below in Assessment   Back/Spine Normal except as noted below in Assessment   Groin/Genitalia/Buttocks Viewed areas Normal except as noted below in Assessment   Right Leg, Foot, Toes Normal except as noted below in Assessment   Left Leg, Foot, Toes Normal except as noted below in Assessment           1  SEBORRHEIC KERATOSIS; INFLAMED    Physical Exam:   Anatomic Location Affected:  Scalp    Morphological Description:  Flat and raised, waxy, smooth to warty textured, yellow to brownish-grey to dark brown to blackish, discrete, "stuck-on" appearing papules   Pertinent Positives: N/A   Pertinent Negatives: N/A    Additional History of Present Condition:  Patient reports new bumps on the skin  Denies itch, burn, pain, bleeding or ulceration  Present constantly; nothing seems to make it worse or better  No prior treatment        Assessment and Plan: originally believed this was prurigo, but closer inspection reveals ISK  Based on a thorough discussion of this condition and the management approach to it (including a comprehensive discussion of the known risks, side effects and potential benefits of treatment), the patient (family) agrees to implement the following specific plan:   Please avoid Methylisothiazolone perseverative could be causing allergy   Can use anti-itch cream on face as needed   Please try CLAUDIA c-pap mask liners    Cryotherapy completed today in office     Seborrheic Keratosis  A seborrheic keratosis is a harmless warty spot that appears during adult life as a common sign of skin aging  Seborrheic keratoses can arise on any area of skin, covered or uncovered, with the usual exception of the palms and soles  They do not arise from mucous membranes  Seborrheic keratoses can have highly variable appearance  Seborrheic keratoses are extremely common  It has been estimated that over 90% of adults over the age of 61 years have one or more of them  They occur in males and females of all races, typically beginning to erupt in the 35s or 45s  They are uncommon under the age of 21 years  The precise cause of seborrhoeic keratoses is not known  Seborrhoeic keratoses are considered degenerative in nature  As time goes by, seborrheic keratoses tend to become more numerous  Some people inherit a tendency to develop a very large number of them; some people may have hundreds of them  The name "seborrheic keratosis" is misleading, because these lesions are not limited to a seborrhoeic distribution (scalp, mid-face, chest, upper back), nor are they formed from sebaceous glands, nor are they associated with sebum -- which is greasy    Seborrheic keratosis may also be called "SK," "Seb K," "basal cell papilloma," "senile wart," or "barnacle "      Researchers have noted:   Eruptive seborrhoeic keratoses can follow sunburn or dermatitis   Skin friction may be the reason they appear in body folds   Viral cause (e g , human papillomavirus) seems unlikely   Stable and clonal mutations or activation of FRFR3, PIK3CA, [place], AKT1 and EGFR genes are found in seborrhoeic keratoses   Seborrhoeic keratosis can arise from solar lentigo   FRFR3 mutations also arise in solar lentigines  These mutations are associated with increased age and location on the head and neck, suggesting a role of ultraviolet radiation in these lesions   Seborrheic keratoses do not harbour tumour suppressor gene mutations   Epidermal growth factor receptor inhibitors, which are used to treat some cancers, often result in an increase in verrucal (warty) keratoses  There is no easy way to remove multiple lesions on a single occasion  Unless a specific lesion is "inflamed" and is causing pain or stinging/burning or is bleeding, most insurance companies do not authorize treatment  PROCEDURE:  DESTRUCTION OF LESIONS  After a thorough discussion of treatment options and risk/benefits/alternatives (including but not limited to local pain, scarring, dyspigmentation, blistering, and possible superinfection), verbal and written consent were obtained and the aforementioned lesions were treated on with cryotherapy using liquid nitrogen x 1 cycle for 5-10 seconds   TOTAL NUMBER of 3 pre-malignant lesions were treated today on the ANATOMIC LOCATION: scalp  The patient tolerated the procedure well, and after-care instructions were provided  3  CONTACT DERMATITIS    Physical Exam:   Anatomic Location Affected:  Scalp   Morphological Description:  Erythema and slight scale in roughly linear areas where CPAP straps go   Pertinent Positives: N/A   Pertinent Negatives: N/A    Additional History of Present Condition:  Known latex allergy  Possibly related to C-pap equipment and strap      Assessment and Plan:  Diagnosis:  Contact dermatitis  Based on a thorough discussion of this condition and the management approach to it (including a comprehensive discussion of the known risks, side effects and potential benefits of treatment), the patient (family) agrees to implement the following specific plan:   Please avoid Methylisothiazolone perseverative could be causing allergy   Can use anti-itch cream on face as needed   Please try REMZZZ c-pap mask liners    Follow up as needed        What is contact dermatitis? Contact dermatitis is a type of eczema that results from something coming in contact with the skin  There are 2 types:  irritant and allergic  The majority of cases are from irritation  Usually that is from contact with strong soaps, repeated exposure to water, contact with cleaning agents or food, or friction  The minority are an actual allergy  In these cases something is coming in contact with the skin and causing an allergic reaction, similar to what happens with poison ivy  This usually occurs unexpectedly after using something for many years  Even very tiny amounts of the substance on the skin can cause a reaction  Some common causes are fragrances, preservatives and metals  Sometimes this can occur when an allergic substance is eaten, as well, but most often it is from direct contact with the skin  To determine the cause of allergy a patch test is often done  Some general rules to follow for both types of contact dermatitis are:   Wear gloves when using strong cleansers or before prolonged contact with water (like washing dishes)   Use gentle cleansers and avoid strong soaps   Apply moisturizer to entire body after bathing    Avoid products with fragrance    Most often contact dermatitis is treated with topical medicines like topical steroids, eucrisa, pimecrolimus or tacrolimus     Some times oral steroids, ie prednisone, methylprednisone and prednisolone, are needed  In chronic cases, treatment options include:  light therapy, methotrexate, cyclosporin        Scribe Attestation    I,:  Francis Weiss am acting as a scribe while in the presence of the attending physician :       I,:  Evelyn Piña MD personally performed the services described in this documentation as scribed in my presence :

## 2021-04-14 NOTE — PATIENT INSTRUCTIONS
1  SEBORRHEIC DERMATITIS    Assessment and Plan:  Based on a thorough discussion of this condition and the management approach to it (including a comprehensive discussion of the known risks, side effects and potential benefits of treatment), the patient (family) agrees to implement the following specific plan:   No change in treatment at this time  Seborrheic Dermatitis   Seborrheic dermatitis is a common, chronic or relapsing form of eczema/dermatitis that mainly affects the sebaceous, gland-rich regions of the scalp, face, and trunk  There are infantile and adult forms of seborrhoeic dermatitis  It is sometimes associated with psoriasis and, in that clinical scenario, may be referred to as "sebo-psoriasis "  Seborrheic dermatitis is also known as "seborrheic eczema "  Dandruff (also called "pityriasis capitis") is an uninflamed form of seborrhoeic dermatitis  Dandruff presents as bran-like scaly patches scattered within hair-bearing areas of the scalp  In an infant, this condition may be referred to as "cradle cap "  The cause of seborrheic dermatitis is not completely understood  It is associated with proliferation of various species of the skin commensal Malassezia, in its yeast (non-pathogenic) form  Its metabolites (such as the fatty acids oleic acid, malssezin, and indole-3-carbaldehyde) may cause an inflammatory reaction  Differences in skin barrier lipid content and function may account for individual presentations  Infantile Seborrheic Dermatitis  Infantile seborrheic dermatitis affects babies under the age of 1 months and usually resolves by 1012 months of age  Infantile seborrheic dermatitis causes "cradle cap" (diffuse, greasy scaling on scalp)  The rash may spread to affect armpit and groin folds (a type of "napkin dermatitis")  There may be associated salmon-pink colored patches that may flake or peel    The rash in this case is usually not especially itchy, so the baby often appears undisturbed by the rash, even when more generalized  Adult Seborrheic Dermatitis  Adult seborrheic dermatitis tends to begin in late adolescence; prevalence is greatest in young adults and in the elderly  It is more common in males than in females  The following factors are sometimes associated with severe adult seborrheic dermatitis:   Oily skin   Familial tendency to seborrhoeic dermatitis or a family history of psoriasis   Immunosuppression: organ transplant recipient, human immunodeficiency virus (HIV) infection and patients with lymphoma   Neurological and psychiatric diseases: Parkinson disease, tardive dyskinesia, depression, epilepsy, facial nerve palsy, spinal cord injury and congenital disorders such as Down syndrome   Treatment for psoriasis with psoralen and ultraviolet A (PUVA) therapy   Lack of sleep   Stressful events  In adults, seborrheic dermatitis may typically affect the scalp, face (creases around the nose, behind ears, within eyebrows) and upper trunk  Typical clinical features include:   Winter flares, improving in summer following sun exposure   Minimal itch most of the time   Combination oily and dry mid-facial skin   Ill-defined localized scaly patches or diffuse scale in the scalp   Blepharitis; scaly red eyelid margins   Blauvelt-pink, thin, scaly, and ill-defined plaques in skin folds on both sides of the face   Petal or ring-shaped flaky patches on hair-line and on anterior chest   Rash in armpits, under the breasts, in the groin folds and genital creases   Superficial folliculitis (inflamed hair follicles) on cheeks and upper trunk    Seborrheic dermatitis is diagnosed by its clinical appearance and behavior  Skin biopsy may be helpful but is rarely necessary to make this diagnosis      2  SEBORRHEIC KERATOSIS; INFLAMED     Assessment and Plan:  Based on a thorough discussion of this condition and the management approach to it (including a comprehensive discussion of the known risks, side effects and potential benefits of treatment), the patient (family) agrees to implement the following specific plan:   Please avoid Methylisothiazolone perseverative could be causing allergy   Can use anti-itch cream on face as needed   Please try REMZZZ c-pap mask liners    Cryotherapy completed today in office     Seborrheic Keratosis  A seborrheic keratosis is a harmless warty spot that appears during adult life as a common sign of skin aging  Seborrheic keratoses can arise on any area of skin, covered or uncovered, with the usual exception of the palms and soles  They do not arise from mucous membranes  Seborrheic keratoses can have highly variable appearance  Seborrheic keratoses are extremely common  It has been estimated that over 90% of adults over the age of 61 years have one or more of them  They occur in males and females of all races, typically beginning to erupt in the 35s or 45s  They are uncommon under the age of 21 years  The precise cause of seborrhoeic keratoses is not known  Seborrhoeic keratoses are considered degenerative in nature  As time goes by, seborrheic keratoses tend to become more numerous  Some people inherit a tendency to develop a very large number of them; some people may have hundreds of them  The name "seborrheic keratosis" is misleading, because these lesions are not limited to a seborrhoeic distribution (scalp, mid-face, chest, upper back), nor are they formed from sebaceous glands, nor are they associated with sebum -- which is greasy    Seborrheic keratosis may also be called "SK," "Seb K," "basal cell papilloma," "senile wart," or "barnacle "      Researchers have noted:   Eruptive seborrhoeic keratoses can follow sunburn or dermatitis   Skin friction may be the reason they appear in body folds   Viral cause (e g , human papillomavirus) seems unlikely   Stable and clonal mutations or activation of FRFR3, PIK3CA, [place], AKT1 and EGFR genes are found in seborrhoeic keratoses   Seborrhoeic keratosis can arise from solar lentigo   FRFR3 mutations also arise in solar lentigines  These mutations are associated with increased age and location on the head and neck, suggesting a role of ultraviolet radiation in these lesions   Seborrheic keratoses do not harbour tumour suppressor gene mutations   Epidermal growth factor receptor inhibitors, which are used to treat some cancers, often result in an increase in verrucal (warty) keratoses  There is no easy way to remove multiple lesions on a single occasion  Unless a specific lesion is "inflamed" and is causing pain or stinging/burning or is bleeding, most insurance companies do not authorize treatment  PROCEDURE:  DESTRUCTION OF LESIONS  Liquid nitrogen was applied for 10-12 seconds to the skin lesion and the expected blistering or scabbing reaction explained  Do not pick at the area  Patient reminded to expect hypopigmented scars from the procedure  Return if lesion fails to fully resolve  3  CONTACT DERMATITIS    Physical Exam:   Anatomic Location Affected:  Scalp   Morphological Description:  Erythema and slight scale    Pertinent Positives: N/A   Pertinent Negatives: N/A    Additional History of Present Condition:  Known latex allergy  Possibly related to C-pap equipment and strap  Assessment and Plan:  Diagnosis:  Contact dermatitis  Based on a thorough discussion of this condition and the management approach to it (including a comprehensive discussion of the known risks, side effects and potential benefits of treatment), the patient (family) agrees to implement the following specific plan:   Please avoid Methylisothiazolone perseverative could be causing allergy   Can use anti-itch cream on face as needed   Please try REMZZZ c-pap mask liners        What is contact dermatitis?   Contact dermatitis is a type of eczema that results from something coming in contact with the skin   There are 2 types:  irritant and allergic  The majority of cases are from irritation  Usually that is from contact with strong soaps, repeated exposure to water, contact with cleaning agents or food, or friction  The minority are an actual allergy  In these cases something is coming in contact with the skin and causing an allergic reaction, similar to what happens with poison ivy  This usually occurs unexpectedly after using something for many years  Even very tiny amounts of the substance on the skin can cause a reaction  Some common causes are fragrances, preservatives and metals  Sometimes this can occur when an allergic substance is eaten, as well, but most often it is from direct contact with the skin  To determine the cause of allergy a patch test is often done  Some general rules to follow for both types of contact dermatitis are:   Wear gloves when using strong cleansers or before prolonged contact with water (like washing dishes)   Use gentle cleansers and avoid strong soaps   Apply moisturizer to entire body after bathing    Avoid products with fragrance    Most often contact dermatitis is treated with topical medicines like topical steroids, eucrisa, pimecrolimus or tacrolimus     Some times oral steroids, ie prednisone, methylprednisone and prednisolone, are needed  In chronic cases, treatment options include:  light therapy, methotrexate, cyclosporin

## 2021-04-22 ENCOUNTER — TRANSCRIBE ORDERS (OUTPATIENT)
Dept: ADMINISTRATIVE | Facility: HOSPITAL | Age: 66
End: 2021-04-22

## 2021-04-22 ENCOUNTER — OFFICE VISIT (OUTPATIENT)
Dept: OBGYN CLINIC | Facility: CLINIC | Age: 66
End: 2021-04-22
Payer: MEDICARE

## 2021-04-22 ENCOUNTER — APPOINTMENT (OUTPATIENT)
Dept: RADIOLOGY | Facility: CLINIC | Age: 66
End: 2021-04-22
Payer: MEDICARE

## 2021-04-22 VITALS
WEIGHT: 164 LBS | BODY MASS INDEX: 28 KG/M2 | SYSTOLIC BLOOD PRESSURE: 107 MMHG | HEIGHT: 64 IN | HEART RATE: 75 BPM | DIASTOLIC BLOOD PRESSURE: 71 MMHG

## 2021-04-22 DIAGNOSIS — M25.511 RIGHT SHOULDER PAIN, UNSPECIFIED CHRONICITY: ICD-10-CM

## 2021-04-22 DIAGNOSIS — M75.31 CALCIFIC TENDINITIS OF RIGHT SHOULDER: Primary | ICD-10-CM

## 2021-04-22 PROCEDURE — 99214 OFFICE O/P EST MOD 30 MIN: CPT | Performed by: ORTHOPAEDIC SURGERY

## 2021-04-22 PROCEDURE — 73030 X-RAY EXAM OF SHOULDER: CPT

## 2021-04-22 RX ORDER — NAPROXEN 500 MG/1
500 TABLET ORAL 2 TIMES DAILY WITH MEALS
Qty: 60 TABLET | Refills: 0 | Status: SHIPPED | OUTPATIENT
Start: 2021-04-22 | End: 2022-01-03 | Stop reason: ALTCHOICE

## 2021-04-22 NOTE — PROGRESS NOTES
Assessment/Plan:  1  Calcific tendinitis of right shoulder  XR shoulder 2+ vw right    US Guided Calcium Removal Shoulder - Tenex    naproxen (NAPROSYN) 500 mg tablet         Flo Wilson has right shoulder pain consistent with calcific tendinitis  She has significant discomfort with impingement at the level of the rotator cuff insertion of the supraspinatus  She has no weakness on exam to suggest a tear  I do think that this large calcium deposit in the rotator cuff would be amenable to an ultrasound-guided calcific tendon removal with a Tenex procedure  I did place an order for Interventional Radiology to remove the calcium deposit under ultrasound with this  She will follow up in the office after the procedure if the pain persists  Subjective:   Jaimee Loo is a 77 y o  female who presents  To the office for evaluation for the acute onset of right-sided shoulder pain  She states that she did not have any injury or trauma and has had severe discomfort in her right shoulder for the past 2 weeks  The pain significantly worsens with any attempted overhead movement of her right arm  It improves slightly with rest or if she does not lift her arm  She denies any numbness or tingling or radiating pain down her arm  She denies any history of shoulder pain like this in the past   She has aching throbbing intermittent discomfort at rest   She has tried oral anti-inflammatories with minimal relief  Review of Systems   Constitutional: Negative for chills, fever and unexpected weight change  HENT: Negative for hearing loss, nosebleeds and sore throat  Eyes: Negative for pain, redness and visual disturbance  Respiratory: Negative for cough, shortness of breath and wheezing  Cardiovascular: Negative for chest pain, palpitations and leg swelling  Gastrointestinal: Negative for abdominal pain, nausea and vomiting  Endocrine: Negative for polydipsia and polyuria     Genitourinary: Negative for dysuria and hematuria  Musculoskeletal:        See HPI   Skin: Negative for rash and wound  Neurological: Negative for dizziness, numbness and headaches  Psychiatric/Behavioral: Negative for decreased concentration and suicidal ideas  The patient is not nervous/anxious            Past Medical History:   Diagnosis Date    Abnormal glucose     last assessed 16    Arthritis     Asthma     w/ exacerbation; last assessed 5/14/15    Atypical nevus     last assessed 17    Breast lump     last assessed 3/6/14    Cataract     Cervical adenopathy     last assessed  17    Colon polyp     CPAP (continuous positive airway pressure) dependence     Dizziness     Dyslipidemia     last assessed 17    Facial droop     last assessed  16    Fibromyalgia, primary     Flu 2018    Foot abrasion     right between the 4th and 5th toe    Genital herpes     resolved 16    Headache, tension-type     Herpes zoster     last assessed 16    History of shingles     may 2016    History of stomach ulcers     Hx of abnormal mammogram     last assessed  3/5/14    Hyperlipidemia     Myalgia     last assessed  12    Myositis     12    Neck pain     Pain involving joints of fingers of both hands 2021    Peripheral neuropathy     Seborrheic keratosis     Skin neoplasm     of the lower limb, including hip; onset 12; last assessed  12    Sleep apnea        Past Surgical History:   Procedure Laterality Date    ABDOMINAL SURGERY      release of adhesions    BLADDER SURGERY      mesh-lift    BREAST SURGERY      lift    CATARACT EXTRACTION Bilateral      SECTION      x [id number]    CHOLECYSTECTOMY      lap    COLONOSCOPY      COSMETIC SURGERY      tummy and breast lift    ESOPHAGOGASTRODUODENOSCOPY      OOPHORECTOMY Left     OTHER SURGICAL HISTORY      vocal cord surgery, scraping    AL HYSTEROSCOPY,W/ENDO BX N/A 11/3/2016    Procedure: DILATATION AND CURETTAGE (D&C) WITH HYSTEROSCOPY;  Surgeon: Huong Contreras MD;  Location: [address] [place] [address];  Service: Gynecology    REDUCTION MAMMAPLASTY Bilateral 2008    TONSILLECTOMY         Family History   Problem Relation Age of Onset    Hypertension Mother     Alzheimer's disease Mother     Arthritis Mother     Hypertension Father     Arthritis Father     Heart attack Father     Heart disease Father     Other Brother         vertigo, tinnitus    Diabetes Daughter     Breast cancer Sister 28    Skin cancer Sister     Other Son         downs syndrome    Abdominal aortic aneurysm Sister     Cancer Sister         T cell sarcoma    No Known Problems Brother     Diabetes Brother     Other Sister         anemia from the diet    No Known Problems Son        Social History     Occupational History    Not on file   Tobacco Use    Smoking status: Never Smoker    Smokeless tobacco: Never Used   Substance and Sexual Activity    Alcohol use: No    Drug use: No    Sexual activity: Not on file         Current Outpatient Medications:     albuterol (Ventolin HFA) 90 mcg/act inhaler, Inhale 2 puffs every 4 (four) hours as needed for wheezing, Disp: 1 Inhaler, Rfl: 5    Ascorbic Acid (VITAMIN C) 1000 MG tablet, , Disp: , Rfl:     atorvastatin (LIPITOR) 20 mg tablet, Take 1 tablet (20 mg total) by mouth daily (Patient taking differently: Take 20 mg by mouth daily at bedtime ), Disp: 90 tablet, Rfl: 3    Breo Ellipta 200-25 MCG/INH inhaler, INHALE ONE PUFF BY MOUTH EVERY DAY - RINSE MOUTH AFTER USE (Patient taking differently: 1 puff every morning ), Disp: 180 each, Rfl: 0    cholecalciferol (VITAMIN D3) 1,000 units tablet, Take 2,000 Units by mouth daily, Disp: , Rfl:     clobetasol (TEMOVATE) 0 05 % cream, Apply topically 2 (two) times a day, Disp: 45 g, Rfl: 1    cyclobenzaprine (FLEXERIL) 10 mg tablet, Take 10 mg by mouth 3 (three) times a day as needed for muscle spasms (as needed for pain), Disp: , Rfl:    famotidine (PEPCID) 20 mg tablet, Take 1 tablet (20 mg total) by mouth daily (Patient taking differently: Take 20 mg by mouth as needed ), Disp: 30 tablet, Rfl: 1    famotidine (PEPCID) 20 mg tablet, Take 1 tablet (20 mg total) by mouth 2 (two) times a day, Disp: 60 tablet, Rfl: 11    hydrocortisone 2 5 % cream, 2 (two) times a day scalp, Disp: , Rfl:     metFORMIN (GLUCOPHAGE-XR) 500 mg 24 hr tablet, Take 1 tablet (500 mg total) by mouth daily with dinner, Disp: 90 tablet, Rfl: 2    multivitamin (THERAGRAN) TABS, Take 1 tablet by mouth daily Last dose 3/21/21, Disp: , Rfl:     triamcinolone (KENALOG) 0 025 % cream, Apply topically 2 (two) times a day Face, neck, Disp: 30 g, Rfl: 1    Zinc 50 MG CAPS, Take 50 mg by mouth daily , Disp: , Rfl:     Allergies   Allergen Reactions    Latex Rash     Burn-skin irritation    Adhesive [Medical Tape] Other (See Comments)     bandaid-red,itch       Objective:  Vitals:    04/22/21 0953   BP: 107/71   Pulse: 75       Right Shoulder Exam     Range of Motion   Active abduction:  110 abnormal   Passive abduction:  140 abnormal   Forward flexion:  150 abnormal   Internal rotation 0 degrees:  Sacrum abnormal     Muscle Strength   Abduction: 5/5   Internal rotation: 5/5   External rotation: 5/5   Supraspinatus: 5/5   Biceps: 5/5     Tests   Briscoe test: positive  Impingement: positive  Drop arm: negative    Other   Erythema: absent  Sensation: normal  Pulse: present            Physical Exam  Vitals signs reviewed  Constitutional:       Appearance: She is well-developed  HENT:      Head: Normocephalic and atraumatic  Eyes:      Conjunctiva/sclera: Conjunctivae normal       Pupils: Pupils are equal, round, and reactive to light  Neck:      Musculoskeletal: Normal range of motion and neck supple  Cardiovascular:      Rate and Rhythm: Normal rate  Pulmonary:      Effort: Pulmonary effort is normal  No respiratory distress  Skin:     General: Skin is warm and dry  Neurological:      Mental Status: She is alert and oriented to person, place, and time  Psychiatric:         Behavior: Behavior normal          I have personally reviewed pertinent films in PACS and my interpretation is as follows: Three-view x-rays of the right shoulder in the office today demonstrate large calcific tendinitis at the insertion of supraspinatus tendon      [facility]

## 2021-04-23 ENCOUNTER — APPOINTMENT (OUTPATIENT)
Dept: LAB | Facility: HOSPITAL | Age: 66
End: 2021-04-23
Payer: MEDICARE

## 2021-04-23 ENCOUNTER — TRANSCRIBE ORDERS (OUTPATIENT)
Dept: ADMINISTRATIVE | Facility: HOSPITAL | Age: 66
End: 2021-04-23

## 2021-04-23 DIAGNOSIS — R19.7 DIARRHEA, UNSPECIFIED TYPE: ICD-10-CM

## 2021-04-23 PROCEDURE — 87505 NFCT AGENT DETECTION GI: CPT

## 2021-04-23 PROCEDURE — 87205 SMEAR GRAM STAIN: CPT

## 2021-04-24 LAB
CAMPYLOBACTER DNA SPEC NAA+PROBE: NORMAL
SALMONELLA DNA SPEC QL NAA+PROBE: NORMAL
SHIGA TOXIN STX GENE SPEC NAA+PROBE: NORMAL
SHIGELLA DNA SPEC QL NAA+PROBE: NORMAL
WBC STL QL MICRO: NORMAL

## 2021-05-05 DIAGNOSIS — J45.30 ALLERGIC ASTHMA, MILD PERSISTENT, UNCOMPLICATED: ICD-10-CM

## 2021-05-25 ENCOUNTER — HOSPITAL ENCOUNTER (OUTPATIENT)
Dept: RADIOLOGY | Facility: HOSPITAL | Age: 66
Discharge: HOME | End: 2021-05-25
Attending: ORTHOPAEDIC SURGERY | Admitting: ORTHOPAEDIC SURGERY
Payer: MEDICARE

## 2021-05-25 DIAGNOSIS — M75.31 CALCIFIC TENDINITIS OF RIGHT SHOULDER: ICD-10-CM

## 2021-05-25 PROCEDURE — 23405 INCISION OF TENDON & MUSCLE: CPT

## 2021-05-25 RX ORDER — METHYLPREDNISOLONE ACETATE 80 MG/ML
80 INJECTION, SUSPENSION INTRA-ARTICULAR; INTRALESIONAL; INTRAMUSCULAR; SOFT TISSUE ONCE
Status: COMPLETED | OUTPATIENT
Start: 2021-05-25 | End: 2021-05-25

## 2021-05-25 RX ORDER — LIDOCAINE HYDROCHLORIDE 10 MG/ML
10 INJECTION, SOLUTION EPIDURAL; INFILTRATION; INTRACAUDAL; PERINEURAL ONCE
Status: COMPLETED | OUTPATIENT
Start: 2021-05-25 | End: 2021-05-25

## 2021-05-25 RX ORDER — BUPIVACAINE HYDROCHLORIDE 2.5 MG/ML
10 INJECTION, SOLUTION EPIDURAL; INFILTRATION; INTRACAUDAL ONCE
Status: COMPLETED | OUTPATIENT
Start: 2021-05-25 | End: 2021-05-25

## 2021-05-25 RX ADMIN — METHYLPREDNISOLONE ACETATE 80 MG: 80 INJECTION, SUSPENSION INTRA-ARTICULAR; INTRALESIONAL; INTRAMUSCULAR; SOFT TISSUE at 11:56

## 2021-05-25 RX ADMIN — LIDOCAINE HYDROCHLORIDE 10 ML: 10 INJECTION, SOLUTION EPIDURAL; INFILTRATION; INTRACAUDAL; PERINEURAL at 11:30

## 2021-05-25 RX ADMIN — BUPIVACAINE HYDROCHLORIDE 2 ML: 2.5 INJECTION, SOLUTION EPIDURAL; INFILTRATION; INTRACAUDAL; PERINEURAL at 11:56

## 2021-05-27 ENCOUNTER — APPOINTMENT (EMERGENCY)
Dept: RADIOLOGY | Facility: HOSPITAL | Age: 66
End: 2021-05-27
Payer: MEDICARE

## 2021-05-27 ENCOUNTER — HOSPITAL ENCOUNTER (EMERGENCY)
Facility: HOSPITAL | Age: 66
Discharge: HOME | End: 2021-05-27
Attending: EMERGENCY MEDICINE
Payer: MEDICARE

## 2021-05-27 ENCOUNTER — OFFICE VISIT (OUTPATIENT)
Dept: DERMATOLOGY | Age: 66
End: 2021-05-27
Payer: MEDICARE

## 2021-05-27 VITALS
OXYGEN SATURATION: 97 % | HEART RATE: 80 BPM | HEIGHT: 64 IN | DIASTOLIC BLOOD PRESSURE: 65 MMHG | TEMPERATURE: 97.9 F | BODY MASS INDEX: 28.92 KG/M2 | WEIGHT: 169.4 LBS | SYSTOLIC BLOOD PRESSURE: 122 MMHG | RESPIRATION RATE: 18 BRPM

## 2021-05-27 VITALS — TEMPERATURE: 99.1 F | WEIGHT: 169.4 LBS | HEIGHT: 64 IN | BODY MASS INDEX: 28.92 KG/M2

## 2021-05-27 DIAGNOSIS — L71.9 ROSACEA: ICD-10-CM

## 2021-05-27 DIAGNOSIS — S61.012A LACERATION OF LEFT THUMB: Primary | ICD-10-CM

## 2021-05-27 DIAGNOSIS — L82.0 INFLAMED SEBORRHEIC KERATOSIS: Primary | ICD-10-CM

## 2021-05-27 PROCEDURE — 99283 EMERGENCY DEPT VISIT LOW MDM: CPT

## 2021-05-27 PROCEDURE — 99282 EMERGENCY DEPT VISIT SF MDM: CPT | Performed by: EMERGENCY MEDICINE

## 2021-05-27 PROCEDURE — 17110 DESTRUCTION B9 LES UP TO 14: CPT | Performed by: DERMATOLOGY

## 2021-05-27 PROCEDURE — 99213 OFFICE O/P EST LOW 20 MIN: CPT | Performed by: DERMATOLOGY

## 2021-05-27 PROCEDURE — 73140 X-RAY EXAM OF FINGER(S): CPT

## 2021-05-27 PROCEDURE — 12001 RPR S/N/AX/GEN/TRNK 2.5CM/<: CPT | Performed by: EMERGENCY MEDICINE

## 2021-05-27 RX ORDER — GINSENG 100 MG
1 CAPSULE ORAL ONCE
Status: COMPLETED | OUTPATIENT
Start: 2021-05-27 | End: 2021-05-27

## 2021-05-27 RX ORDER — BACITRACIN, NEOMYCIN, POLYMYXIN B 400; 3.5; 5 [USP'U]/G; MG/G; [USP'U]/G
OINTMENT TOPICAL 2 TIMES DAILY
Qty: 15 G | Refills: 0 | Status: SHIPPED | OUTPATIENT
Start: 2021-05-27 | End: 2022-01-03 | Stop reason: ALTCHOICE

## 2021-05-27 RX ORDER — LIDOCAINE HYDROCHLORIDE 20 MG/ML
10 INJECTION, SOLUTION EPIDURAL; INFILTRATION; INTRACAUDAL; PERINEURAL ONCE
Status: COMPLETED | OUTPATIENT
Start: 2021-05-27 | End: 2021-05-27

## 2021-05-27 RX ADMIN — BACITRACIN 1 SMALL APPLICATION: 500 OINTMENT TOPICAL at 20:31

## 2021-05-27 RX ADMIN — LIDOCAINE HYDROCHLORIDE 10 ML: 20 INJECTION, SOLUTION EPIDURAL; INFILTRATION; INTRACAUDAL; PERINEURAL at 20:31

## 2021-05-27 NOTE — PROGRESS NOTES
[facility] Follow Up Note    Patient Name: Shayna Vinson  Encounter Date: 5 27 21    Today's Chief Concerns:  [facility] Concern #1:  Mary Castillo Concern #2:     Concern #3:      Current Medications:    Current Outpatient Medications:     albuterol (Ventolin HFA) 90 mcg/act inhaler, Inhale 2 puffs every 4 (four) hours as needed for wheezing, Disp: 1 Inhaler, Rfl: 5    Ascorbic Acid (VITAMIN C) 1000 MG tablet, , Disp: , Rfl:     atorvastatin (LIPITOR) 20 mg tablet, Take 1 tablet (20 mg total) by mouth daily (Patient taking differently: Take 20 mg by mouth daily at bedtime ), Disp: 90 tablet, Rfl: 3    Breo Ellipta 200-25 MCG/INH inhaler, INHALE ONE PUFF BY MOUTH EVERY DAY - RINSE MOUTH AFTER USE, Disp: 180 each, Rfl: 3    cholecalciferol (VITAMIN D3) 1,000 units tablet, Take 2,000 Units by mouth daily, Disp: , Rfl:     clobetasol (TEMOVATE) 0 05 % cream, Apply topically 2 (two) times a day, Disp: 45 g, Rfl: 1    cyclobenzaprine (FLEXERIL) 10 mg tablet, Take 10 mg by mouth 3 (three) times a day as needed for muscle spasms (as needed for pain), Disp: , Rfl:     famotidine (PEPCID) 20 mg tablet, Take 1 tablet (20 mg total) by mouth 2 (two) times a day, Disp: 60 tablet, Rfl: 11    hydrocortisone 2 5 % cream, 2 (two) times a day scalp, Disp: , Rfl:     metFORMIN (GLUCOPHAGE-XR) 500 mg 24 hr tablet, Take 1 tablet (500 mg total) by mouth daily with dinner, Disp: 90 tablet, Rfl: 2    multivitamin (THERAGRAN) TABS, Take 1 tablet by mouth daily Last dose 3/21/21, Disp: , Rfl:     naproxen (NAPROSYN) 500 mg tablet, Take 1 tablet (500 mg total) by mouth 2 (two) times a day with meals, Disp: 60 tablet, Rfl: 0    triamcinolone (KENALOG) 0 025 % cream, Apply topically 2 (two) times a day Face, neck, Disp: 30 g, Rfl: 1    Zinc 50 MG CAPS, Take 50 mg by mouth daily , Disp: , Rfl:     famotidine (PEPCID) 20 mg tablet, Take 1 tablet (20 mg total) by mouth daily (Patient taking differently: Take 20 mg by mouth as needed ), Disp: 30 tablet, Rfl: 1    CONSTITUTIONAL:   Vitals:    05/27/21 1243   Temp: 99 1 °F (37 3 °C)   TempSrc: Probe   Weight: 76 8 kg (169 lb 6 4 oz)   Height: 5' 4" (1 626 m)             Specific Alerts:    Have you been seen by a [facility] Dermatologist in the last 3 years? YES    Are you pregnant or planning to become pregnant? No    Are you currently or planning to be nursing or breast feeding? No    Allergies   Allergen Reactions    Latex Rash     Burn-skin irritation    Adhesive [Medical Tape] Other (See Comments)     bandaid-red,itch       May we call your Preferred Phone number to discuss your specific medical information? YES    May we leave a detailed message that includes your specific medical information? YES    Have you traveled outside of the [facility] in the past 3 months? No    Do you currently have a pacemaker or defibrillator? No    Do you have any artificial heart valves, joints, plates, screws, rods, stents, pins, etc? No   - If Yes, were any placed within the last 2 years? Do you require any medications prior to a surgical procedure? No   - If Yes, for which procedure? - If Yes, what medications to you require? Are you taking any medications that cause you to bleed more easily ("blood thinners") No    Have you ever experienced a rapid heartbeat with epinephrine? No    Have you ever been treated with "gold" (gold sodium thiomalate) therapy? No    [facility] can help with wrinkles, "laugh lines," facial volume loss, "double chin," "love handles," age spots, and more  Are you interested in learning today about some of the skin enhancement procedures that we offer? (If Yes, please provide more detail) No    Review of Systems:  Have you recently had or currently have any of the following?     · Fever or chills: No  · Night Sweats: No  · Headaches: No  · Weight Gain: No  · Weight Loss: No  · Blurry Vision: No  · Nausea: No  · Vomiting: No  · Diarrhea: No  · Blood in Stool: No  · Abdominal Pain: No  · Itchy Skin: YES scalp, upper chest, buttocks  · Painful Joints: No  · Swollen Joints: No  · Muscle Pain: No  · Irregular Mole: No  · Sun Burn: No  · Dry Skin: No  · Skin Color Changes: No  · Scar or Keloid: No  · Cold Sores/Fever Blisters: No  · Bacterial Infections/MRSA: No  · Anxiety: No  · Depression: No  · Suicidal or Homicidal Thoughts: No      PSYCH: Normal mood and affect  EYES: Normal conjunctiva  ENT: Normal lips and oral mucosa  CARDIOVASCULAR: No edema  RESPIRATORY: Normal respirations  HEME/LYMPH/IMMUNO:  No regional lymphadenopathy except as noted below in ASSESSMENT AND PLAN BY DIAGNOSIS    FULL ORGAN SYSTEM SKIN EXAM (SKIN)   Hair, Scalp, Ears, Face Normal except as noted below in Assessment   Neck, Cervical Chain Nodes Normal except as noted below in Assessment   Right Arm/Hand/Fingers Normal except as noted below in Assessment   Left Arm/Hand/Fingers Normal except as noted below in Assessment   Chest/Breasts/Axillae Viewed areas Normal except as noted below in Assessment   Abdomen, Umbilicus Normal except as noted below in Assessment   Back/Spine Normal except as noted below in Assessment   Groin/Genitalia/Buttocks Viewed areas Normal except as noted below in Assessment   Right Leg, Foot, Toes Normal except as noted below in Assessment   Left Leg, Foot, Toes Normal except as noted below in Assessment     Patient stated she has a rash on the scalp for 2-3 years patient stated rash on scalp is very itchy   She now have some redness on the face that makes her face feels like it's on fire for the past 2 month, chest have been ongoing for 2 weeks patient denies itching on the face  rash on buttocks comes and goes this has been ongoing for years also  Patient reports she will be getting allergy testing done soon to determine symptoms        SEBORRHEIC KERATOSIS; INFLAMED    Physical Exam:   Anatomic Location Affected:  scalp   Morphological Description: Verrucous papule with central crust   Pertinent Positives:   Pertinent Negatives: Additional History of Present Condition:  Patient reports new bumps on the skin  Reports severe itches, burning, and pain   Present constantly; nothing seems to make it worse or better  No prior treatment  Assessment and Plan:  Based on a thorough discussion of this condition and the management approach to it (including a comprehensive discussion of the known risks, side effects and potential benefits of treatment), the patient (family) agrees to implement the following specific plan:   Recommend treating with liquid nitrogen  Seborrheic Keratosis  A seborrheic keratosis is a harmless warty spot that appears during adult life as a common sign of skin aging  Seborrheic keratoses can arise on any area of skin, covered or uncovered, with the usual exception of the palms and soles  They do not arise from mucous membranes  Seborrheic keratoses can have highly variable appearance  Seborrheic keratoses are extremely common  It has been estimated that over 90% of adults over the age of 61 years have one or more of them  They occur in males and females of all races, typically beginning to erupt in the 35s or 45s  They are uncommon under the age of 21 years  The precise cause of seborrhoeic keratoses is not known  Seborrhoeic keratoses are considered degenerative in nature  As time goes by, seborrheic keratoses tend to become more numerous  Some people inherit a tendency to develop a very large number of them; some people may have hundreds of them  The name "seborrheic keratosis" is misleading, because these lesions are not limited to a seborrhoeic distribution (scalp, mid-face, chest, upper back), nor are they formed from sebaceous glands, nor are they associated with sebum -- which is greasy    Seborrheic keratosis may also be called "SK," "Seb K," "basal cell papilloma," "senile wart," or "barnacle " Researchers have noted:   Eruptive seborrhoeic keratoses can follow sunburn or dermatitis   Skin friction may be the reason they appear in body folds   Viral cause (e g , human papillomavirus) seems unlikely   Stable and clonal mutations or activation of FRFR3, PIK3CA, [place], AKT1 and EGFR genes are found in seborrhoeic keratoses   Seborrhoeic keratosis can arise from solar lentigo   FRFR3 mutations also arise in solar lentigines  These mutations are associated with increased age and location on the head and neck, suggesting a role of ultraviolet radiation in these lesions   Seborrheic keratoses do not harbour tumour suppressor gene mutations   Epidermal growth factor receptor inhibitors, which are used to treat some cancers, often result in an increase in verrucal (warty) keratoses  There is no easy way to remove multiple lesions on a single occasion  Unless a specific lesion is "inflamed" and is causing pain or stinging/burning or is bleeding, most insurance companies do not authorize treatment  PROCEDURE:  DESTRUCTION OF BENIGN LESIONS  After a thorough discussion of treatment options and risk/benefits/alternatives (including but not limited to local pain, scarring, dyspigmentation, blistering, and possible superinfection), verbal and written consent were obtained and the aforementioned lesions were treated on with cryotherapy using liquid nitrogen x 1 cycle for 5-10 seconds   TOTAL NUMBER of 4 lesions were treated today on the ANATOMIC LOCATION: scalp  The patient tolerated the procedure well, and after-care instructions were provided  ROSACEA    Physical Exam:   Anatomic Location Affected:  face   Morphological Description:  Erythematous patches with flushing   Pertinent Positives:   Pertinent Negatives:     Additional History of Present Condition:  Please see above    Assessment and Plan:  Based on a thorough discussion of this condition and the management approach to it (including a comprehensive discussion of the known risks, side effects and potential benefits of treatment), the patient (family) agrees to implement the following specific plan:   Recommending waiting until results from allergy testing before treatment   Follow up in 2 months  Rosacea is a chronic rash affecting the mid-face including the nose, cheeks, chin, forehead, and eyelids  The incidence is usually greatest between the ages of 30-60 years and is more common in people with fair skin  Common characteristics include redness, telangiectasias, papules and pustules over affected areas  Rosacea may look similar to acne, but there is a lack of comedones  Occasionally the eyes may also be involved in ocular rosacea  In advanced disease, enlargement of the sebaceous glands in the nose, termed rhinophyma, may be present  Rosacea results in red spots (papules) and sometimes pustules over the face, but unlike acne there are no blackheads, whiteheads, or cystic nodules  Patients often experience increased facial flushing with prominent blood vessels (erythematotelangiectatic rosacea) and dry, sensitive skin  These symptoms are exacerbated by sun exposure, hot or spicy foods, topical steroids and oil-based facial products  In ocular rosacea, eyelids may be red and sore due to conjunctivitis, keratitis, and episcleritis  If rhinophyma develops due to enlargement of sebaceous glands, the patient may have an enlarged and irregularly shaped nose with prominent pores  In rosacea that is refractory to treatment, patients can develop persistent redness and swelling of the face due to lymphatic obstruction (Morbihan disease)  Distribution around the cheeks may be confused with the malar or butterfly rash of lupus  However, the rash of lupus spares the nasal creases and lacks papules and pustules   If signs of photosensitivity, oral ulcers, arthritis, and kidney dysfunction are present then consider referral to a rheumatologist      There are many potential causes of rosacea including genetic, environmental, vascular, and inflammatory factors  These include, but are not limited to:   Chronic exposure to ultraviolet radiation    Increased immune responses in the form of cathelicidins that promote vessel dilation and infiltration with white blood cells (neutrophils) into the dermis   Increased matrix metalloproteinases such as collagen and elastase that remodel normal tissue may contribute to inflammation of the skin making it thicker and harder   There is some evidence to suggest that increased numbers of demodex mites on patient skin may contribute to rosacea papules     General Treatment Approach    Avoid exacerbating factors such as heat, spicy foods, and alcohol    Use daily SPF30+ sunscreen and other methods of coverage for sun protection   Use water-based make-up    Avoid applying topical steroids to affected areas as they can cause perioral dermatitis and exacerbate rosacea     Topical Treatment Approach   Metronidazole cream or gel by itself or in combination with oral antibiotics for more severe cases   Azelaic acid cream or lotion is effective for mild inflammatory rosacea when applied twice daily to affected areas   Brimonidine gel and oxymetazoline hydrochloride cream can reduce facial redness temporarily    Ivermectin cream can treat papulopustular rosacea by controlling demodex mites and inflammation    Pimecrolimus cream or tacrolimus ointment twice a day for 2-3 months can help reduce inflammation    Oral Treatment Approach   Antibiotics such as doxycycline, minocycline, or erythromycin for 1-3 months   Clonidine and carvedilol can help reduce facial flushing and are generally well tolerated  Common side effects include low blood pressure, gastrointestinal upset, dry eyes, blurred vision and low heart rate      Isotretinoin at low doses can be effective for long term treatment when antibiotics fail  Side effects may make it unsuitable for some patients   NSAIDs such as diclofenac can help reduce discomfort and redness in the skin       Procedural/Surgical Treatment Approach    Vascular lasers or intense pulsed light treatment may be used to treat persistent telangiectasia and papulopustular rosacea   Plastic surgery and carbon dioxide lasers may be used to treat rhinophyma     Scribe Attestation    I,:  Alejandrina Piña am acting as a scribe while in the presence of the attending physician :       I,:  Thelma Floyd MD personally performed the services described in this documentation    as scribed in my presence :

## 2021-05-27 NOTE — PATIENT INSTRUCTIONS
SEBORRHEIC KERATOSIS; INFLAMED    Assessment and Plan:  Based on a thorough discussion of this condition and the management approach to it (including a comprehensive discussion of the known risks, side effects and potential benefits of treatment), the patient (family) agrees to implement the following specific plan:   Recommend treating with liquid nitrogen  Seborrheic Keratosis  A seborrheic keratosis is a harmless warty spot that appears during adult life as a common sign of skin aging  Seborrheic keratoses can arise on any area of skin, covered or uncovered, with the usual exception of the palms and soles  They do not arise from mucous membranes  Seborrheic keratoses can have highly variable appearance  Seborrheic keratoses are extremely common  It has been estimated that over 90% of adults over the age of 61 years have one or more of them  They occur in males and females of all races, typically beginning to erupt in the 35s or 45s  They are uncommon under the age of 21 years  The precise cause of seborrhoeic keratoses is not known  Seborrhoeic keratoses are considered degenerative in nature  As time goes by, seborrheic keratoses tend to become more numerous  Some people inherit a tendency to develop a very large number of them; some people may have hundreds of them  The name "seborrheic keratosis" is misleading, because these lesions are not limited to a seborrhoeic distribution (scalp, mid-face, chest, upper back), nor are they formed from sebaceous glands, nor are they associated with sebum -- which is greasy    Seborrheic keratosis may also be called "SK," "Seb K," "basal cell papilloma," "senile wart," or "barnacle "      Researchers have noted:   Eruptive seborrhoeic keratoses can follow sunburn or dermatitis   Skin friction may be the reason they appear in body folds   Viral cause (e g , human papillomavirus) seems unlikely   Stable and clonal mutations or activation of FRFR3, PIK3CA, [place], AKT1 and EGFR genes are found in seborrhoeic keratoses   Seborrhoeic keratosis can arise from solar lentigo   FRFR3 mutations also arise in solar lentigines  These mutations are associated with increased age and location on the head and neck, suggesting a role of ultraviolet radiation in these lesions   Seborrheic keratoses do not harbour tumour suppressor gene mutations   Epidermal growth factor receptor inhibitors, which are used to treat some cancers, often result in an increase in verrucal (warty) keratoses  There is no easy way to remove multiple lesions on a single occasion  Unless a specific lesion is "inflamed" and is causing pain or stinging/burning or is bleeding, most insurance companies do not authorize treatment  PROCEDURE:  DESTRUCTION OF BENIGN LESIONS  After a thorough discussion of treatment options and risk/benefits/alternatives (including but not limited to local pain, scarring, dyspigmentation, blistering, and possible superinfection), verbal and written consent were obtained and the aforementioned lesions were treated on with cryotherapy using liquid nitrogen x 1 cycle for 5-10 seconds  The patient tolerated the procedure well, and after-care instructions were provided  ROSACEA    Assessment and Plan:  Based on a thorough discussion of this condition and the management approach to it (including a comprehensive discussion of the known risks, side effects and potential benefits of treatment), the patient (family) agrees to implement the following specific plan:   Recommending waiting until results from allergy testing before treatment  Rosacea is a chronic rash affecting the mid-face including the nose, cheeks, chin, forehead, and eyelids  The incidence is usually greatest between the ages of 30-60 years and is more common in people with fair skin  Common characteristics include redness, telangiectasias, papules and pustules over affected areas   Rosacea may look similar to acne, but there is a lack of comedones  Occasionally the eyes may also be involved in ocular rosacea  In advanced disease, enlargement of the sebaceous glands in the nose, termed rhinophyma, may be present  Rosacea results in red spots (papules) and sometimes pustules over the face, but unlike acne there are no blackheads, whiteheads, or cystic nodules  Patients often experience increased facial flushing with prominent blood vessels (erythematotelangiectatic rosacea) and dry, sensitive skin  These symptoms are exacerbated by sun exposure, hot or spicy foods, topical steroids and oil-based facial products  In ocular rosacea, eyelids may be red and sore due to conjunctivitis, keratitis, and episcleritis  If rhinophyma develops due to enlargement of sebaceous glands, the patient may have an enlarged and irregularly shaped nose with prominent pores  In rosacea that is refractory to treatment, patients can develop persistent redness and swelling of the face due to lymphatic obstruction (Morbihan disease)  Distribution around the cheeks may be confused with the malar or butterfly rash of lupus  However, the rash of lupus spares the nasal creases and lacks papules and pustules  If signs of photosensitivity, oral ulcers, arthritis, and kidney dysfunction are present then consider referral to a rheumatologist      There are many potential causes of rosacea including genetic, environmental, vascular, and inflammatory factors   These include, but are not limited to:   Chronic exposure to ultraviolet radiation    Increased immune responses in the form of cathelicidins that promote vessel dilation and infiltration with white blood cells (neutrophils) into the dermis   Increased matrix metalloproteinases such as collagen and elastase that remodel normal tissue may contribute to inflammation of the skin making it thicker and harder   There is some evidence to suggest that increased numbers of demodex mites on patient skin may contribute to rosacea papules     General Treatment Approach    Avoid exacerbating factors such as heat, spicy foods, and alcohol    Use daily SPF30+ sunscreen and other methods of coverage for sun protection   Use water-based make-up    Avoid applying topical steroids to affected areas as they can cause perioral dermatitis and exacerbate rosacea     Topical Treatment Approach   Metronidazole cream or gel by itself or in combination with oral antibiotics for more severe cases   Azelaic acid cream or lotion is effective for mild inflammatory rosacea when applied twice daily to affected areas   Brimonidine gel and oxymetazoline hydrochloride cream can reduce facial redness temporarily    Ivermectin cream can treat papulopustular rosacea by controlling demodex mites and inflammation    Pimecrolimus cream or tacrolimus ointment twice a day for 2-3 months can help reduce inflammation    Oral Treatment Approach   Antibiotics such as doxycycline, minocycline, or erythromycin for 1-3 months   Clonidine and carvedilol can help reduce facial flushing and are generally well tolerated  Common side effects include low blood pressure, gastrointestinal upset, dry eyes, blurred vision and low heart rate   Isotretinoin at low doses can be effective for long term treatment when antibiotics fail  Side effects may make it unsuitable for some patients   NSAIDs such as diclofenac can help reduce discomfort and redness in the skin       Procedural/Surgical Treatment Approach    Vascular lasers or intense pulsed light treatment may be used to treat persistent telangiectasia and papulopustular rosacea   Plastic surgery and carbon dioxide lasers may be used to treat rhinophyma

## 2021-05-28 ENCOUNTER — VBI (OUTPATIENT)
Dept: FAMILY MEDICINE CLINIC | Facility: CLINIC | Age: 66
End: 2021-05-28

## 2021-05-28 NOTE — ED PROVIDER NOTES
History  Chief Complaint   Patient presents with    Finger Laceration     "I WAS CUTTING CHICKEN AND THE KNIFE SLIPPED AND I CUT MY LEFT THUMB"     57-year-old female presents to the ED for evaluation of left thumb laceration  Patient is right-hand dominant  Earlier today patient was cutting chicken when she accidentally cut the distal palmar aspect of left thumb  Patient is up-to-date with her tetanus  Patient came to the ED for further evaluation  Patient has no other complaints  The patient states that she initially had a lot of bleeding that subsided with applying pressure  History provided by:  Patient      Prior to Admission Medications   Prescriptions Last Dose Informant Patient Reported? Taking?    Ascorbic Acid (VITAMIN C) 1000 MG tablet 5/26/2021 at Unknown time Self Yes Yes   Breo Ellipta 200-25 MCG/INH inhaler 5/27/2021 at Unknown time  No Yes   Sig: INHALE ONE PUFF BY MOUTH EVERY DAY - RINSE MOUTH AFTER USE   Zinc 50 MG CAPS Not Taking at Unknown time Self Yes No   Sig: Take 50 mg by mouth daily    albuterol (Ventolin HFA) 90 mcg/act inhaler Unknown at Unknown time Self No No   Sig: Inhale 2 puffs every 4 (four) hours as needed for wheezing   atorvastatin (LIPITOR) 20 mg tablet 5/26/2021 at Unknown time  No Yes   Sig: Take 1 tablet (20 mg total) by mouth daily   Patient taking differently: Take 20 mg by mouth daily at bedtime    cholecalciferol (VITAMIN D3) 1,000 units tablet 5/26/2021 at Unknown time Self Yes Yes   Sig: Take 2,000 Units by mouth daily   clobetasol (TEMOVATE) 0 05 % cream 5/26/2021 at Unknown time Self No Yes   Sig: Apply topically 2 (two) times a day   cyclobenzaprine (FLEXERIL) 10 mg tablet Unknown at Unknown time Self Yes No   Sig: Take 10 mg by mouth 3 (three) times a day as needed for muscle spasms (as needed for pain)   famotidine (PEPCID) 20 mg tablet 5/27/2021 at Unknown time  No Yes   Sig: Take 1 tablet (20 mg total) by mouth 2 (two) times a day   hydrocortisone 2 5 % cream 2021 at Unknown time Self Yes Yes   Si (two) times a day scalp   metFORMIN (GLUCOPHAGE-XR) 500 mg 24 hr tablet 2021 at Unknown time Self No Yes   Sig: Take 1 tablet (500 mg total) by mouth daily with dinner   multivitamin (THERAGRAN) TABS 2021 at Unknown time Self Yes Yes   Sig: Take 1 tablet by mouth daily Last dose 3/21/21   naproxen (NAPROSYN) 500 mg tablet 2021 at Unknown time  No Yes   Sig: Take 1 tablet (500 mg total) by mouth 2 (two) times a day with meals   triamcinolone (KENALOG) 0 025 % cream 2021 at Unknown time  No Yes   Sig: Apply topically 2 (two) times a day Face, neck      Facility-Administered Medications: None       Past Medical History:   Diagnosis Date    Abnormal glucose     last assessed 16    Arthritis     Asthma     w/ exacerbation; last assessed 5/14/15    Atypical nevus     last assessed 17    Breast lump     last assessed 3/6/14    Cataract     Cervical adenopathy     last assessed  17    Colon polyp     CPAP (continuous positive airway pressure) dependence     Dizziness     Dyslipidemia     last assessed 17    Facial droop     last assessed  16    Fibromyalgia, primary     Flu 2018    Foot abrasion     right between the 4th and 5th toe    Genital herpes     resolved 16    Headache, tension-type     Herpes zoster     last assessed 16    History of shingles     may 2016    History of stomach ulcers     Hx of abnormal mammogram     last assessed  3/5/14    Hyperlipidemia     Myalgia     last assessed  12    Myositis     12    Neck pain     Pain involving joints of fingers of both hands 2021    Peripheral neuropathy     Seborrheic keratosis     Skin neoplasm     of the lower limb, including hip; onset 12; last assessed  12    Sleep apnea        Past Surgical History:   Procedure Laterality Date    ABDOMINAL SURGERY      release of adhesions    BLADDER SURGERY mesh-lift    BREAST SURGERY      lift    CATARACT EXTRACTION Bilateral      SECTION      x [id number]    CHOLECYSTECTOMY      lap    COLONOSCOPY      COSMETIC SURGERY      tummy and breast lift    ESOPHAGOGASTRODUODENOSCOPY      OOPHORECTOMY Left     OTHER SURGICAL HISTORY      vocal cord surgery, scraping    ID HYSTEROSCOPY,W/ENDO BX N/A 11/3/2016    Procedure: DILATATION AND CURETTAGE (D&C) WITH HYSTEROSCOPY;  Surgeon: Roberto Bravo MD;  Location: [address] [place] [address];  Service: Gynecology    REDUCTION MAMMAPLASTY Bilateral     [facility]       Family History   Problem Relation Age of Onset    Hypertension Mother     Alzheimer's disease Mother     Arthritis Mother     Hypertension Father     Arthritis Father     Heart attack Father     Heart disease Father     Other Brother         vertigo, tinnitus    Diabetes Daughter     Breast cancer Sister 28    Skin cancer Sister     Other Son         downs syndrome    Abdominal aortic aneurysm Sister     Cancer Sister         T cell sarcoma    No Known Problems Brother     Diabetes Brother     Other Sister         anemia from the diet    No Known Problems Son      I have reviewed and agree with the history as documented  E-Cigarette/Vaping    E-Cigarette Use Never User      E-Cigarette/Vaping Substances    Nicotine No     THC No     CBD No     Flavoring No     Other No     Unknown No      Social History     Tobacco Use    Smoking status: Never Smoker    Smokeless tobacco: Never Used   Substance Use Topics    Alcohol use: No    Drug use: No       Review of Systems   Constitutional: Negative for chills and fever  HENT: Negative for ear pain and sore throat  Eyes: Negative for pain and visual disturbance  Respiratory: Negative for cough and shortness of breath  Cardiovascular: Negative for chest pain and palpitations     Gastrointestinal: Negative for abdominal pain and vomiting  Genitourinary: Negative for dysuria and hematuria  Musculoskeletal: Negative for arthralgias and back pain  Skin: Positive for wound  Negative for color change and rash  Neurological: Negative for seizures and syncope  All other systems reviewed and are negative  Physical Exam  Physical Exam  Vitals signs and nursing note reviewed  Constitutional:       General: She is not in acute distress  Appearance: She is well-developed  HENT:      Head: Normocephalic and atraumatic  Eyes:      Conjunctiva/sclera: Conjunctivae normal    Neck:      Musculoskeletal: Neck supple  Cardiovascular:      Rate and Rhythm: Normal rate and regular rhythm  Heart sounds: No murmur  Pulmonary:      Effort: Pulmonary effort is normal  No respiratory distress  Breath sounds: Normal breath sounds  Abdominal:      Palpations: Abdomen is soft  Tenderness: There is no abdominal tenderness  Musculoskeletal: Normal range of motion  Skin:     General: Skin is warm and dry  Comments: 1 cm partial-thickness laceration noted to the palmar aspect of distal left thumb  Neurological:      General: No focal deficit present  Mental Status: She is alert and oriented to person, place, and time     Psychiatric:         Mood and Affect: Mood normal          Vital Signs  ED Triage Vitals [05/27/21 1933]   Temperature Pulse Respirations Blood Pressure SpO2   97 9 °F (36 6 °C) 80 18 122/65 97 %      Temp Source Heart Rate Source Patient Position - Orthostatic VS BP Location FiO2 (%)   Tympanic Monitor Sitting Right arm --      Pain Score       5           Vitals:    05/27/21 1933   BP: 122/65   Pulse: 80   Patient Position - Orthostatic VS: Sitting         Visual Acuity      ED Medications  Medications   lidocaine (PF) (XYLOCAINE-MPF) 2 % injection 10 mL (10 mL Infiltration Given 5/27/21 2031)   bacitracin topical ointment 1 small application (1 small application Topical Given 5/27/21 2031) Diagnostic Studies  Results Reviewed     None                 XR thumb first digit-min 2 views LEFT    (Results Pending)              Procedures  Laceration repair    Date/Time: 5/27/2021 8:40 PM  Performed by: Jasson Lewis DO  Authorized by: Jasson Lewis DO   Consent: Verbal consent obtained  Risks and benefits: risks, benefits and alternatives were discussed  Consent given by: patient  Required items: required blood products, implants, devices, and special equipment available  Patient identity confirmed: verbally with patient and arm band  Body area: upper extremity  Location details: left thumb  Laceration length: 1 cm  Foreign bodies: no foreign bodies  Tendon involvement: none  Nerve involvement: none  Vascular damage: no  Anesthesia: nerve block    Anesthesia:  Local Anesthetic: lidocaine 2% without epinephrine  Anesthetic total: 3 mL    Wound Dehiscence:  Superficial Wound Dehiscence: simple closure      Procedure Details:  Irrigation solution: tap water  Amount of cleaning: standard  Debridement: none  Degree of undermining: none  Skin closure: 4-0 Prolene  Number of sutures: 3  Technique: simple  Approximation: close  Approximation difficulty: simple  Dressing: antibiotic ointment (band aid)  Patient tolerance: patient tolerated the procedure well with no immediate complications               ED Course                                           MDM  Number of Diagnoses or Management Options  Laceration of left thumb: new and requires workup     Amount and/or Complexity of Data Reviewed  Tests in the medicine section of CPT®: ordered and reviewed  Review and summarize past medical records: yes  Independent visualization of images, tracings, or specimens: yes    Risk of Complications, Morbidity, and/or Mortality  General comments: Laceration repaired successfully  Wound was dressed with bacitracin and Band-Aid    Patient discharged home with follow-up to PCP or ED in 1 week for suture removal   Close return instructions given to return to the ER for any worsening symptoms  Patient agrees with discharge plan  Patient well appearing at time of discharge  Please Note: Fluency Direct voice recognition software may have been used in the creation of this document  Wrong words or sound a like substitutions may have occurred due to the inherent limitations of the voice software  Patient Progress  Patient progress: stable      Disposition  Final diagnoses:   Laceration of left thumb     Time reflects when diagnosis was documented in both MDM as applicable and the Disposition within this note     Time User Action Codes Description Comment    5/27/2021  8:54 PM Jagdish Lisa Add [C72 370P] Laceration of left thumb       ED Disposition     ED Disposition Condition Date/Time Comment    Discharge Stable Thu May 27, 2021  8:54 PM Seda Campo discharge to home/self care              Follow-up Information     Follow up With Specialties Details Why Contact Info Additional Information    Noemi Dougherty MD Family Medicine In 1 week For suture removal [id number] [address] Emergency Department Emergency Medicine In 1 week For suture removal, if your family doctor is not available [address] [place] [id number]  [phone] [address] [place] [address] [place], [address]          Discharge Medication List as of 5/27/2021  8:55 PM      START taking these medications    Details   neomycin-bacitracin-polymyxin b (NEOSPORIN) ointment Apply topically 2 (two) times a day for 7 days, Starting Thu 5/27/2021, Until Thu 6/3/2021, Normal         CONTINUE these medications which have NOT CHANGED    Details   Ascorbic Acid (VITAMIN C) 1000 MG tablet Starting Mon 4/20/2020, Historical Med      atorvastatin (LIPITOR) 20 mg tablet Take 1 tablet (20 mg total) by mouth daily, Starting Thu 3/11/2021, Normal      Breo Ellipta 200-25 MCG/INH inhaler INHALE ONE PUFF BY MOUTH EVERY DAY - RINSE MOUTH AFTER USE, Normal      cholecalciferol (VITAMIN D3) 1,000 units tablet Take 2,000 Units by mouth daily, Historical Med      clobetasol (TEMOVATE) 0 05 % cream Apply topically 2 (two) times a day, Starting Wed 6/10/2020, Normal      famotidine (PEPCID) 20 mg tablet Take 1 tablet (20 mg total) by mouth 2 (two) times a day, Starting Fri 3/26/2021, Normal      hydrocortisone 2 5 % cream 2 (two) times a day scalp, Starting Mon 12/14/2020, Historical Med      metFORMIN (GLUCOPHAGE-XR) 500 mg 24 hr tablet Take 1 tablet (500 mg total) by mouth daily with dinner, Starting Wed 6/10/2020, Normal      multivitamin (THERAGRAN) TABS Take 1 tablet by mouth daily Last dose 3/21/21, Historical Med      naproxen (NAPROSYN) 500 mg tablet Take 1 tablet (500 mg total) by mouth 2 (two) times a day with meals, Starting u 4/22/2021, Normal      triamcinolone (KENALOG) 0 025 % cream Apply topically 2 (two) times a day Face, neck, Starting Wed 2/17/2021, Normal      albuterol (Ventolin HFA) 90 mcg/act inhaler Inhale 2 puffs every 4 (four) hours as needed for wheezing, Starting Wed 6/10/2020, Normal      cyclobenzaprine (FLEXERIL) 10 mg tablet Take 10 mg by mouth 3 (three) times a day as needed for muscle spasms (as needed for pain), Historical Med      Zinc 50 MG CAPS Take 50 mg by mouth daily , Starting Mon 4/20/2020, Historical Med           No discharge procedures on file      PDMP Review     None          ED Provider  Electronically Signed by           Lauryn Dubon DO  05/27/21 2452

## 2021-05-28 NOTE — TELEPHONE ENCOUNTER
Jesus Gonsalez    ED Visit Information     Ed visit date: 5/27/21  Diagnosis Description: FINGER LACERATION  [facility] [address] [facility]  Discharge status: Home  Discharged with meds ? Yes  Number of ED visits to date: 1  ED Severity:N/A     Outreach Information    Outreach successful: Yes 1  Date letter mailedN/A  Date Finalized5/28/21  Care Coordination    Follow up appointment with pcp: yes PATIENT WILL CALL BACK TO SCHEDULE AN APPT  Transportation issues ?  No    [employer]

## 2021-06-03 ENCOUNTER — OFFICE VISIT (OUTPATIENT)
Dept: FAMILY MEDICINE CLINIC | Facility: CLINIC | Age: 66
End: 2021-06-03
Payer: MEDICARE

## 2021-06-03 VITALS
BODY MASS INDEX: 28.85 KG/M2 | WEIGHT: 169 LBS | HEIGHT: 64 IN | DIASTOLIC BLOOD PRESSURE: 72 MMHG | TEMPERATURE: 98.2 F | RESPIRATION RATE: 16 BRPM | SYSTOLIC BLOOD PRESSURE: 118 MMHG | HEART RATE: 80 BPM | OXYGEN SATURATION: 98 %

## 2021-06-03 DIAGNOSIS — Z48.02 VISIT FOR SUTURE REMOVAL: Primary | ICD-10-CM

## 2021-06-03 PROCEDURE — 99213 OFFICE O/P EST LOW 20 MIN: CPT | Performed by: FAMILY MEDICINE

## 2021-06-03 NOTE — PROGRESS NOTES
Suture removal    Date/Time: 6/3/2021 11:00 AM  Performed by: Ernie Kincaid MD  Authorized by: Ernie Kincaid MD   Universal Protocol:  Consent: Verbal consent obtained  Risks and benefits: risks, benefits and alternatives were discussed  Consent given by: patient  Patient understanding: patient states understanding of the procedure being performed  Required items: required blood products, implants, devices, and special equipment available  Patient identity confirmed: verbally with patient        Patient location:  Clinic  Location:     Laterality:  Left    Location:  Upper extremity    Upper extremity location:  Hand    Hand location:  L thumb  Procedure details: Tools used:  Suture removal kit    Wound appearance:  No sign(s) of infection and clean    Number of sutures removed:  3  Post-procedure details:     Post-removal:  Antibiotic ointment applied and dressing applied    Patient tolerance of procedure: Tolerated well, no immediate complications  Comments:      Patient doing well  Tolerated procedure

## 2021-06-16 ENCOUNTER — TELEPHONE (OUTPATIENT)
Dept: RADIOLOGY | Facility: HOSPITAL | Age: 66
End: 2021-06-16

## 2021-06-16 NOTE — PROGRESS NOTES
Follow-up phone call completed on: Wednesday 06/16/2021    Patient had a _______ Left ___x____ Right Ultrasound guided calcific tendonitis lavage with Tenex    Procedure was performed on:  Tuesday 05/25/2021    Physician performing procedure: Lee Ann Cagle MD    Pain level of affected side pre-procedure: 9/10 -10/10  *Per patient, pain would wake her from sleep at night, could not hold a glass of water in Right hand, and was preventing her from doing ADL's at home to take care of herself  Could not tolerate any lifting with right arm and active range of motion was significantly painful  Pain level of affected side post-procedure: 3-4/10  *Per patient, moving the arm/ shoulder causes minimal pain but she does still have some mild discomfort right at the site of incision when she washes over it in the shower or applies direct pressure      Range of Motion of joint is:  ______ Worse  ______ About the same ___x__ Improved    Does the procedure site appear reddened or swollen?  _____ Yes __x___  No    Have you had any bleeding or bruising at the procedure site? ______ Yes  __x____ No

## 2021-06-25 ENCOUNTER — APPOINTMENT (OUTPATIENT)
Dept: LAB | Facility: CLINIC | Age: 66
End: 2021-06-25
Payer: MEDICARE

## 2021-06-25 ENCOUNTER — OFFICE VISIT (OUTPATIENT)
Dept: FAMILY MEDICINE CLINIC | Facility: CLINIC | Age: 66
End: 2021-06-25
Payer: MEDICARE

## 2021-06-25 VITALS
TEMPERATURE: 99.1 F | HEART RATE: 64 BPM | WEIGHT: 166.4 LBS | DIASTOLIC BLOOD PRESSURE: 72 MMHG | BODY MASS INDEX: 28.41 KG/M2 | SYSTOLIC BLOOD PRESSURE: 118 MMHG | HEIGHT: 64 IN | RESPIRATION RATE: 16 BRPM

## 2021-06-25 DIAGNOSIS — E03.9 HYPOTHYROIDISM, UNSPECIFIED TYPE: ICD-10-CM

## 2021-06-25 DIAGNOSIS — K21.9 GASTROESOPHAGEAL REFLUX DISEASE WITHOUT ESOPHAGITIS: ICD-10-CM

## 2021-06-25 DIAGNOSIS — E78.5 DYSLIPIDEMIA: ICD-10-CM

## 2021-06-25 DIAGNOSIS — L30.9 DERMATITIS: ICD-10-CM

## 2021-06-25 DIAGNOSIS — K21.9 GASTROESOPHAGEAL REFLUX DISEASE WITHOUT ESOPHAGITIS: Primary | ICD-10-CM

## 2021-06-25 DIAGNOSIS — R73.01 IMPAIRED FASTING GLUCOSE: ICD-10-CM

## 2021-06-25 DIAGNOSIS — E04.1 THYROID NODULE: ICD-10-CM

## 2021-06-25 LAB
ALBUMIN SERPL BCP-MCNC: 3.7 G/DL (ref 3.5–5)
ALP SERPL-CCNC: 69 U/L (ref 46–116)
ALT SERPL W P-5'-P-CCNC: 60 U/L (ref 12–78)
ANION GAP SERPL CALCULATED.3IONS-SCNC: 4 MMOL/L (ref 4–13)
AST SERPL W P-5'-P-CCNC: 30 U/L (ref 5–45)
BILIRUB SERPL-MCNC: 0.32 MG/DL (ref 0.2–1)
BUN SERPL-MCNC: 15 MG/DL (ref 5–25)
CALCIUM SERPL-MCNC: 9.5 MG/DL (ref 8.3–10.1)
CHLORIDE SERPL-SCNC: 108 MMOL/L (ref 100–108)
CHOLEST SERPL-MCNC: 184 MG/DL (ref 50–200)
CO2 SERPL-SCNC: 26 MMOL/L (ref 21–32)
CREAT SERPL-MCNC: 0.84 MG/DL (ref 0.6–1.3)
ERYTHROCYTE [DISTWIDTH] IN BLOOD BY AUTOMATED COUNT: 13.7 % (ref 11.6–15.1)
GFR SERPL CREATININE-BSD FRML MDRD: 72 ML/MIN/1.73SQ M
GLUCOSE P FAST SERPL-MCNC: 98 MG/DL (ref 65–99)
HCT VFR BLD AUTO: 43.3 % (ref 34.8–46.1)
HDLC SERPL-MCNC: 56 MG/DL
HGB BLD-MCNC: 13.2 G/DL (ref 11.5–15.4)
LDLC SERPL CALC-MCNC: 106 MG/DL (ref 0–100)
MCH RBC QN AUTO: 30.7 PG (ref 26.8–34.3)
MCHC RBC AUTO-ENTMCNC: 30.5 G/DL (ref 31.4–37.4)
MCV RBC AUTO: 101 FL (ref 82–98)
PLATELET # BLD AUTO: 349 THOUSANDS/UL (ref 149–390)
PMV BLD AUTO: 10.9 FL (ref 8.9–12.7)
POTASSIUM SERPL-SCNC: 4.4 MMOL/L (ref 3.5–5.3)
PROT SERPL-MCNC: 8.4 G/DL (ref 6.4–8.2)
RBC # BLD AUTO: 4.3 MILLION/UL (ref 3.81–5.12)
SL AMB POCT HEMOGLOBIN AIC: 6 (ref ?–6.5)
SODIUM SERPL-SCNC: 138 MMOL/L (ref 136–145)
TRIGL SERPL-MCNC: 108 MG/DL
TSH SERPL DL<=0.05 MIU/L-ACNC: 0.88 UIU/ML (ref 0.36–3.74)
WBC # BLD AUTO: 7.89 THOUSAND/UL (ref 4.31–10.16)

## 2021-06-25 PROCEDURE — 83036 HEMOGLOBIN GLYCOSYLATED A1C: CPT | Performed by: FAMILY MEDICINE

## 2021-06-25 PROCEDURE — 84443 ASSAY THYROID STIM HORMONE: CPT

## 2021-06-25 PROCEDURE — 80061 LIPID PANEL: CPT

## 2021-06-25 PROCEDURE — 36415 COLL VENOUS BLD VENIPUNCTURE: CPT

## 2021-06-25 PROCEDURE — 85027 COMPLETE CBC AUTOMATED: CPT

## 2021-06-25 PROCEDURE — 99214 OFFICE O/P EST MOD 30 MIN: CPT | Performed by: FAMILY MEDICINE

## 2021-06-25 PROCEDURE — 80053 COMPREHEN METABOLIC PANEL: CPT

## 2021-06-30 PROBLEM — L30.9 DERMATITIS: Status: ACTIVE | Noted: 2021-06-30

## 2021-06-30 PROBLEM — E04.1 THYROID NODULE: Status: ACTIVE | Noted: 2021-06-30

## 2021-06-30 NOTE — PROGRESS NOTES
Assessment/Plan:    1  Gastroesophageal reflux disease without esophagitis  -     CBC; Future  -     Comprehensive metabolic panel; Future  -     Lipid Panel with Direct LDL reflex; Future  -     TSH, 3rd generation; Future    2  Hypothyroidism, unspecified type  -     TSH, 3rd generation; Future    3  Thyroid nodule  -     US thyroid; Future; Expected date: 06/25/2021    4  Dyslipidemia  -     CBC; Future  -     Comprehensive metabolic panel; Future  -     Lipid Panel with Direct LDL reflex; Future  -     TSH, 3rd generation; Future    5  Dermatitis  -     CBC; Future  -     Comprehensive metabolic panel; Future  -     Lipid Panel with Direct LDL reflex; Future  -     TSH, 3rd generation; Future    6  Impaired fasting glucose  -     POCT hemoglobin A1c         Subjective:      Patient ID: Jesus Gonsalez is a 77 y o  female  Chief Complaint   Patient presents with    Follow-up     on skin issues , a spot on the chest and on head , f/u on blood sugar        HPI  Follow-up  Ongoing prob w skin rash  Red blotchiness mainly facial,on neck and acw  No change in diet or topical agents  Concerned about possible blood sugar prob  Stz0h=3% today in office  BP wnl  GERD controlled  req order for labs due    The following portions of the patient's history were reviewed and updated as appropriate: allergies, current medications, past family history, past medical history, past social history, past surgical history and problem list     Review of Systems   Constitutional: Positive for fatigue  Negative for fever  Eyes:        Wears glasses   Respiratory:        Hx RAD   Cardiovascular: Negative  Gastrointestinal:        GERD   Endocrine:        PreDM   Musculoskeletal: Positive for arthralgias  Skin: Positive for rash  Allergic/Immunologic: Positive for environmental allergies  Neurological: Negative  Psychiatric/Behavioral: Positive for sleep disturbance  The patient is nervous/anxious            Current Outpatient Medications   Medication Sig Dispense Refill    albuterol (Ventolin HFA) 90 mcg/act inhaler Inhale 2 puffs every 4 (four) hours as needed for wheezing 1 Inhaler 5    Ascorbic Acid (VITAMIN C) 1000 MG tablet       atorvastatin (LIPITOR) 20 mg tablet Take 1 tablet (20 mg total) by mouth daily (Patient taking differently: Take 20 mg by mouth daily at bedtime ) 90 tablet 3    Breo Ellipta 200-25 MCG/INH inhaler INHALE ONE PUFF BY MOUTH EVERY DAY - RINSE MOUTH AFTER  each 3    cholecalciferol (VITAMIN D3) 1,000 units tablet Take 2,000 Units by mouth daily      cyclobenzaprine (FLEXERIL) 10 mg tablet Take 10 mg by mouth 3 (three) times a day as needed for muscle spasms (as needed for pain)      famotidine (PEPCID) 20 mg tablet Take 1 tablet (20 mg total) by mouth 2 (two) times a day 60 tablet 11    metFORMIN (GLUCOPHAGE-XR) 500 mg 24 hr tablet Take 1 tablet (500 mg total) by mouth daily with dinner 90 tablet 2    multivitamin (THERAGRAN) TABS Take 1 tablet by mouth daily Last dose 3/21/21      Zinc 50 MG CAPS Take 50 mg by mouth daily       clobetasol (TEMOVATE) 0 05 % cream Apply topically 2 (two) times a day (Patient not taking: Reported on 6/25/2021) 45 g 1    hydrocortisone 2 5 % cream 2 (two) times a day scalp (Patient not taking: Reported on 6/25/2021)      naproxen (NAPROSYN) 500 mg tablet Take 1 tablet (500 mg total) by mouth 2 (two) times a day with meals (Patient not taking: Reported on 6/25/2021) 60 tablet 0    neomycin-bacitracin-polymyxin b (NEOSPORIN) ointment Apply topically 2 (two) times a day for 7 days 15 g 0    triamcinolone (KENALOG) 0 025 % cream Apply topically 2 (two) times a day Face, neck (Patient not taking: Reported on 6/25/2021) 30 g 1     No current facility-administered medications for this visit         Objective:    /72 (BP Location: Left arm, Patient Position: Sitting, Cuff Size: Large)   Pulse 64   Temp 99 1 °F (37 3 °C)   Resp 16   Ht 5' 4" (1 626 m)   Wt 75 5 kg (166 lb 6 4 oz)   BMI 28 56 kg/m²        Physical Exam  Vitals and nursing note reviewed  Constitutional:       General: She is not in acute distress  Appearance: Normal appearance  HENT:      Nose: Congestion present  Mouth/Throat:      Mouth: Mucous membranes are moist       Pharynx: Oropharynx is clear  No oropharyngeal exudate or posterior oropharyngeal erythema  Eyes:      General: No scleral icterus  Conjunctiva/sclera: Conjunctivae normal    Cardiovascular:      Rate and Rhythm: Normal rate and regular rhythm  Pulmonary:      Effort: Pulmonary effort is normal  No respiratory distress  Breath sounds: Normal breath sounds  Abdominal:      General: Bowel sounds are normal       Palpations: Abdomen is soft  Tenderness: There is no abdominal tenderness  Musculoskeletal:      Cervical back: Neck supple  No tenderness  Skin:     General: Skin is warm and dry  Coloration: Skin is not jaundiced  Findings: Rash (red, blotchy patches on face,neck, upper acw) present  Neurological:      General: No focal deficit present  Mental Status: She is alert and oriented to person, place, and time  Cranial Nerves: No cranial nerve deficit     Psychiatric:         Mood and Affect: Mood normal            Opal Booker MD

## 2021-07-01 ENCOUNTER — HOSPITAL ENCOUNTER (OUTPATIENT)
Dept: RADIOLOGY | Facility: HOSPITAL | Age: 66
Discharge: HOME | End: 2021-07-01
Attending: FAMILY MEDICINE
Payer: MEDICARE

## 2021-07-01 ENCOUNTER — OFFICE VISIT (OUTPATIENT)
Dept: DERMATOLOGY | Age: 66
End: 2021-07-01
Payer: MEDICARE

## 2021-07-01 VITALS — HEIGHT: 64 IN | WEIGHT: 166 LBS | BODY MASS INDEX: 28.34 KG/M2 | TEMPERATURE: 99 F

## 2021-07-01 DIAGNOSIS — L71.9 ROSACEA: ICD-10-CM

## 2021-07-01 DIAGNOSIS — E04.1 THYROID NODULE: ICD-10-CM

## 2021-07-01 DIAGNOSIS — L82.1 SEBORRHEIC KERATOSIS: Primary | ICD-10-CM

## 2021-07-01 PROCEDURE — 17110 DESTRUCTION B9 LES UP TO 14: CPT | Performed by: DERMATOLOGY

## 2021-07-01 PROCEDURE — 99213 OFFICE O/P EST LOW 20 MIN: CPT | Performed by: DERMATOLOGY

## 2021-07-01 PROCEDURE — 76536 US EXAM OF HEAD AND NECK: CPT

## 2021-07-01 RX ORDER — AMOXICILLIN 875 MG/1
TABLET, COATED ORAL
COMMUNITY
Start: 2021-06-18 | End: 2021-11-09

## 2021-07-01 NOTE — PATIENT INSTRUCTIONS
1  SEBORRHEIC KERATOSIS; INFLAMED/PRURIGO        Assessment and Plan:  Based on a thorough discussion of this condition and the management approach to it (including a comprehensive discussion of the known risks, side effects and potential benefits of treatment), the patient (family) agrees to implement the following specific plan:  · Cryotherapy completed today in office with signed consent   · Continue Triamcinolone 0 025% cream apply topically to face and neck 2 times a day  · Follow up in 6 weeks        Seborrheic Keratosis  A seborrheic keratosis is a harmless warty spot that appears during adult life as a common sign of skin aging  Seborrheic keratoses can arise on any area of skin, covered or uncovered, with the usual exception of the palms and soles  They do not arise from mucous membranes  Seborrheic keratoses can have highly variable appearance        Seborrheic keratoses are extremely common  It has been estimated that over 90% of adults over the age of 61 years have one or more of them  They occur in males and females of all races, typically beginning to erupt in the 35s or 45s  They are uncommon under the age of 21 years  The precise cause of seborrhoeic keratoses is not known  Seborrhoeic keratoses are considered degenerative in nature  As time goes by, seborrheic keratoses tend to become more numerous  Some people inherit a tendency to develop a very large number of them; some people may have hundreds of them      The name "seborrheic keratosis" is misleading, because these lesions are not limited to a seborrhoeic distribution (scalp, mid-face, chest, upper back), nor are they formed from sebaceous glands, nor are they associated with sebum -- which is greasy    Seborrheic keratosis may also be called "SK," "Seb K," "basal cell papilloma," "senile wart," or "barnacle "       Researchers have noted:  · Eruptive seborrhoeic keratoses can follow sunburn or dermatitis  · Skin friction may be the reason they appear in body folds  · Viral cause (e g , human papillomavirus) seems unlikely  · Stable and clonal mutations or activation of FRFR3, PIK3CA, [place], AKT1 and EGFR genes are found in seborrhoeic keratoses  · Seborrhoeic keratosis can arise from solar lentigo  · FRFR3 mutations also arise in solar lentigines  These mutations are associated with increased age and location on the head and neck, suggesting a role of ultraviolet radiation in these lesions  · Seborrheic keratoses do not harbour tumour suppressor gene mutations  · Epidermal growth factor receptor inhibitors, which are used to treat some cancers, often result in an increase in verrucal (warty) keratoses      There is no easy way to remove multiple lesions on a single occasion  Unless a specific lesion is "inflamed" and is causing pain or stinging/burning or is bleeding, most insurance companies do not authorize treatment    PROCEDURE:  DESTRUCTION OF LESIONS  Liquid nitrogen was applied for 10-12 seconds to the skin lesion and the expected blistering or scabbing reaction explained  Do not pick at the area  Patient reminded to expect hypopigmented scars from the procedure  Return if lesion fails to fully resolve  The patient tolerated the procedure well, and after-care instructions were provided  2  ROSACEA? PHOTO DERMATITIS ?  HYPERSENSITIVITY REACTION?     Assessment and Plan:  Based on a thorough discussion of this condition and the management approach to it (including a comprehensive discussion of the known risks, side effects and potential benefits of treatment), the patient (family) agrees to implement the following specific plan:  · Please go for allergy shots by allergist   · If no improvement may need biopsy   · Please use gold bond medication cream to help with itching  · Follow up in 6 weeks   · Please avoid Naproxen and please use ibuprofen or tylenol       Rosacea is a chronic rash affecting the mid-face including the nose, cheeks, chin, forehead, and eyelids  The incidence is usually greatest between the ages of 30-60 years and is more common in people with fair skin  Common characteristics include redness, telangiectasias, papules and pustules over affected areas  Rosacea may look similar to acne, but there is a lack of comedones  Occasionally the eyes may also be involved in ocular rosacea  In advanced disease, enlargement of the sebaceous glands in the nose, termed rhinophyma, may be present       Rosacea results in red spots (papules) and sometimes pustules over the face, but unlike acne there are no blackheads, whiteheads, or cystic nodules  Patients often experience increased facial flushing with prominent blood vessels (erythematotelangiectatic rosacea) and dry, sensitive skin  These symptoms are exacerbated by sun exposure, hot or spicy foods, topical steroids and oil-based facial products       In ocular rosacea, eyelids may be red and sore due to conjunctivitis, keratitis, and episcleritis  If rhinophyma develops due to enlargement of sebaceous glands, the patient may have an enlarged and irregularly shaped nose with prominent pores  In rosacea that is refractory to treatment, patients can develop persistent redness and swelling of the face due to lymphatic obstruction (Morbihan disease)       Distribution around the cheeks may be confused with the malar or butterfly rash of lupus  However, the rash of lupus spares the nasal creases and lacks papules and pustules  If signs of photosensitivity, oral ulcers, arthritis, and kidney dysfunction are present then consider referral to a rheumatologist       There are many potential causes of rosacea including genetic, environmental, vascular, and inflammatory factors   These include, but are not limited to:  · Chronic exposure to ultraviolet radiation   · Increased immune responses in the form of cathelicidins that promote vessel dilation and infiltration with white blood cells (neutrophils) into the dermis  · Increased matrix metalloproteinases such as collagen and elastase that remodel normal tissue may contribute to inflammation of the skin making it thicker and harder  · There is some evidence to suggest that increased numbers of demodex mites on patient skin may contribute to rosacea papules      General Treatment Approach   · Avoid exacerbating factors such as heat, spicy foods, and alcohol   · Use daily SPF30+ sunscreen and other methods of coverage for sun protection  · Use water-based make-up   · Avoid applying topical steroids to affected areas as they can cause perioral dermatitis and exacerbate rosacea      Topical Treatment Approach  · Metronidazole cream or gel by itself or in combination with oral antibiotics for more severe cases  · Azelaic acid cream or lotion is effective for mild inflammatory rosacea when applied twice daily to affected areas  · Brimonidine gel and oxymetazoline hydrochloride cream can reduce facial redness temporarily   · Ivermectin cream can treat papulopustular rosacea by controlling demodex mites and inflammation   · Pimecrolimus cream or tacrolimus ointment twice a day for 2-3 months can help reduce inflammation     Oral Treatment Approach  · Antibiotics such as doxycycline, minocycline, or erythromycin for 1-3 months  · Clonidine and carvedilol can help reduce facial flushing and are generally well tolerated  Common side effects include low blood pressure, gastrointestinal upset, dry eyes, blurred vision and low heart rate  · Isotretinoin at low doses can be effective for long term treatment when antibiotics fail  Side effects may make it unsuitable for some patients     · NSAIDs such as diclofenac can help reduce discomfort and redness in the skin       Procedural/Surgical Treatment Approach   · Vascular lasers or intense pulsed light treatment may be used to treat persistent telangiectasia and papulopustular rosacea  · Plastic surgery and carbon dioxide lasers may be used to treat rhinophyma

## 2021-07-01 NOTE — PROGRESS NOTES
[facility] Follow Up Note    Patient Name: Jonh Gusman  Encounter Date: 07/01/2021    Today's Chief Concerns:  Giovany Uribe Concern #1:  Rosacea    Concern #2:  Seborrheic keratosis       Current Medications:    Current Outpatient Medications:     albuterol (Ventolin HFA) 90 mcg/act inhaler, Inhale 2 puffs every 4 (four) hours as needed for wheezing, Disp: 1 Inhaler, Rfl: 5    Ascorbic Acid (VITAMIN C) 1000 MG tablet, , Disp: , Rfl:     atorvastatin (LIPITOR) 20 mg tablet, Take 1 tablet (20 mg total) by mouth daily (Patient taking differently: Take 20 mg by mouth daily at bedtime ), Disp: 90 tablet, Rfl: 3    Breo Ellipta 200-25 MCG/INH inhaler, INHALE ONE PUFF BY MOUTH EVERY DAY - RINSE MOUTH AFTER USE, Disp: 180 each, Rfl: 3    cholecalciferol (VITAMIN D3) 1,000 units tablet, Take 2,000 Units by mouth daily, Disp: , Rfl:     clobetasol (TEMOVATE) 0 05 % cream, Apply topically 2 (two) times a day, Disp: 45 g, Rfl: 1    cyclobenzaprine (FLEXERIL) 10 mg tablet, Take 10 mg by mouth 3 (three) times a day as needed for muscle spasms (as needed for pain), Disp: , Rfl:     famotidine (PEPCID) 20 mg tablet, Take 1 tablet (20 mg total) by mouth 2 (two) times a day, Disp: 60 tablet, Rfl: 11    hydrocortisone 2 5 % cream, 2 (two) times a day scalp, Disp: , Rfl:     metFORMIN (GLUCOPHAGE-XR) 500 mg 24 hr tablet, Take 1 tablet (500 mg total) by mouth daily with dinner, Disp: 90 tablet, Rfl: 2    multivitamin (THERAGRAN) TABS, Take 1 tablet by mouth daily Last dose 3/21/21, Disp: , Rfl:     naproxen (NAPROSYN) 500 mg tablet, Take 1 tablet (500 mg total) by mouth 2 (two) times a day with meals, Disp: 60 tablet, Rfl: 0    triamcinolone (KENALOG) 0 025 % cream, Apply topically 2 (two) times a day Face, neck, Disp: 30 g, Rfl: 1    neomycin-bacitracin-polymyxin b (NEOSPORIN) ointment, Apply topically 2 (two) times a day for 7 days (Patient not taking: Reported on 7/1/2021), Disp: 15 g, Rfl: 0    Zinc 50 MG CAPS, Take 50 mg by mouth daily  (Patient not taking: Reported on 7/1/2021), Disp: , Rfl:     CONSTITUTIONAL:   Vitals:    07/01/21 0934   Temp: 99 °F (37 2 °C)   TempSrc: Tympanic   Weight: 75 3 kg (166 lb)   Height: 5' 4" (1 626 m)       Specific Alerts:    Have you been seen by a [facility]atologist in the last 3 years? YES    Are you pregnant or planning to become pregnant? No    Are you currently or planning to be nursing or breast feeding? No    Allergies   Allergen Reactions    Latex Rash     Burn-skin irritation    Adhesive [Medical Tape] Other (See Comments)     bandaid-red,itch       May we call your Preferred Phone number to discuss your specific medical information? YES    May we leave a detailed message that includes your specific medical information? YES    Have you traveled outside of the [facility] in the past 3 months? No    Do you currently have a pacemaker or defibrillator? No    Do you have any artificial heart valves, joints, plates, screws, rods, stents, pins, etc? No   - If Yes, were any placed within the last 2 years? Do you require any medications prior to a surgical procedure? No    Are you taking any medications that cause you to bleed more easily ("blood thinners") No    Have you ever experienced a rapid heartbeat with epinephrine? No    Have you ever been treated with "gold" (gold sodium thiomalate) therapy? No    [facility] can help with wrinkles, "laugh lines," facial volume loss, "double chin," "love handles," age spots, and more  Are you interested in learning today about some of the skin enhancement procedures that we offer? (If Yes, please provide more detail) No    Review of Systems:  Have you recently had or currently have any of the following?     · Fever or chills: No  · Night Sweats: No  · Headaches: No  · Weight Gain: No  · Weight Loss: No  · Blurry Vision: No  · Nausea: No  · Vomiting: No  · Diarrhea: No  · Blood in Stool: No  · Abdominal Pain: No  · Itchy Skin: YES  · Painful Joints: No  · Swollen Joints: No  · Muscle Pain: YES  · Irregular Mole: No  · Sun Burn: No  · Dry Skin: YES  · Skin Color Changes: No  · Scar or Keloid: No  · Cold Sores/Fever Blisters: No  · Bacterial Infections/MRSA: No  · Anxiety: No  · Depression: No  · Suicidal or Homicidal Thoughts: No      PSYCH: Normal mood and affect  EYES: Normal conjunctiva  ENT: Normal lips and oral mucosa  CARDIOVASCULAR: No edema  RESPIRATORY: Normal respirations  HEME/LYMPH/IMMUNO:  No regional lymphadenopathy except as noted below in ASSESSMENT AND PLAN BY DIAGNOSIS    FULL ORGAN SYSTEM SKIN EXAM (SKIN)   Hair, Scalp, Ears, Face Normal except as noted below in Assessment   Neck, Cervical Chain Nodes Normal except as noted below in Assessment   Right Arm/Hand/Fingers Normal except as noted below in Assessment   Left Arm/Hand/Fingers Normal except as noted below in Assessment   Chest/Breasts/Axillae Viewed areas Normal except as noted below in Assessment   Abdomen, Umbilicus Normal except as noted below in Assessment   Back/Spine Normal except as noted below in Assessment   Groin/Genitalia/Buttocks Viewed areas Normal except as noted below in Assessment   Right Leg, Foot, Toes Normal except as noted below in Assessment   Left Leg, Foot, Toes Normal except as noted below in Assessment       1   SEBORRHEIC KERATOSIS; INFLAMED withPRURIGO      Physical Exam:  · Anatomic Location Affected:  Scalp  · Morphological Description:  Verrucous papules with central crust  · Pertinent Positives: scarring alopecia  · Pertinent Negatives: no follicular plugging     Additional History of Present Condition:  Patient reports cryotherapy has helped       Assessment and Plan:   Based on a thorough discussion of this condition and the management approach to it (including a comprehensive discussion of the known risks, side effects and potential benefits of treatment), the patient (family) agrees to implement the following specific plan:  · Cryotherapy completed today in office with signed consent   · Continue Triamcinolone 0 025% cream apply topically to face and neck 2 times a day  · Follow up in 6 weeks       Seborrheic Keratosis  A seborrheic keratosis is a harmless warty spot that appears during adult life as a common sign of skin aging  Seborrheic keratoses can arise on any area of skin, covered or uncovered, with the usual exception of the palms and soles  They do not arise from mucous membranes  Seborrheic keratoses can have highly variable appearance        Seborrheic keratoses are extremely common  It has been estimated that over 90% of adults over the age of 61 years have one or more of them  They occur in males and females of all races, typically beginning to erupt in the 35s or 45s  They are uncommon under the age of 21 years  The precise cause of seborrhoeic keratoses is not known  Seborrhoeic keratoses are considered degenerative in nature  As time goes by, seborrheic keratoses tend to become more numerous  Some people inherit a tendency to develop a very large number of them; some people may have hundreds of them      The name "seborrheic keratosis" is misleading, because these lesions are not limited to a seborrhoeic distribution (scalp, mid-face, chest, upper back), nor are they formed from sebaceous glands, nor are they associated with sebum -- which is greasy    Seborrheic keratosis may also be called "SK," "Seb K," "basal cell papilloma," "senile wart," or "barnacle "       Researchers have noted:  · Eruptive seborrhoeic keratoses can follow sunburn or dermatitis  · Skin friction may be the reason they appear in body folds  · Viral cause (e g , human papillomavirus) seems unlikely  · Stable and clonal mutations or activation of FRFR3, PIK3CA, [place], AKT1 and EGFR genes are found in seborrhoeic keratoses  · Seborrhoeic keratosis can arise from solar lentigo  · FRFR3 mutations also arise in solar lentigines  These mutations are associated with increased age and location on the head and neck, suggesting a role of ultraviolet radiation in these lesions  · Seborrheic keratoses do not harbour tumour suppressor gene mutations  · Epidermal growth factor receptor inhibitors, which are used to treat some cancers, often result in an increase in verrucal (warty) keratoses      There is no easy way to remove multiple lesions on a single occasion  Unless a specific lesion is "inflamed" and is causing pain or stinging/burning or is bleeding, most insurance companies do not authorize treatment     [facility]:  DESTRUCTION OF LESIONS  After a thorough discussion of treatment options and risk/benefits/alternatives (including but not limited to local pain, scarring, dyspigmentation, blistering, and possible superinfection), verbal and written consent were obtained and the aforementioned lesions were treated on with cryotherapy using liquid nitrogen x 1 cycle for 5-10 seconds   TOTAL NUMBER of 4 lesions were treated today on the ANATOMIC LOCATION: scalp and left neck  The patient tolerated the procedure well, and after-care instructions were provided  2   PHOTO DERMATITIS ? HYPERSENSITIVITY REACTION?     Physical Exam:  · Anatomic Location Affected:  Face, upper forehead  · Morphological Description:  Erythematous and tan  patches   · Pertinent Positives: N/A  · Pertinent Negatives: N/A     Additional History of Present Condition:  Patient completed allergy testing  She is also using hand  on her skin which she reports it is helping  Originally complained of flushing of skin and thought she had rosacea  Now complains it feels like sunburn     Assessment and Plan: initially c/o flushing of face, but no response to tx as rosacea   With allergy testing had  several +, if does not improve with tx will need to bx to r/o SLE/DLE as now has more of a photo dermatitis appearance on forehead  Based on a thorough discussion of this condition and the management approach to it (including a comprehensive discussion of the known risks, side effects and potential benefits of treatment), the patient (family) agrees to implement the following specific plan:  · Please go for allergy shots by allergist   · If no improvement  needs biopsy   · Please use gold bond medicated cream to help with itching and NOT hand   · Follow up in 4 weeks   · Please avoid Naproxen and please use ibuprofen or tylenol       Rosacea is a chronic rash affecting the mid-face including the nose, cheeks, chin, forehead, and eyelids  The incidence is usually greatest between the ages of 30-60 years and is more common in people with fair skin  Common characteristics include redness, telangiectasias, papules and pustules over affected areas  Rosacea may look similar to acne, but there is a lack of comedones  Occasionally the eyes may also be involved in ocular rosacea  In advanced disease, enlargement of the sebaceous glands in the nose, termed rhinophyma, may be present       Rosacea results in red spots (papules) and sometimes pustules over the face, but unlike acne there are no blackheads, whiteheads, or cystic nodules  Patients often experience increased facial flushing with prominent blood vessels (erythematotelangiectatic rosacea) and dry, sensitive skin  These symptoms are exacerbated by sun exposure, hot or spicy foods, topical steroids and oil-based facial products       In ocular rosacea, eyelids may be red and sore due to conjunctivitis, keratitis, and episcleritis  If rhinophyma develops due to enlargement of sebaceous glands, the patient may have an enlarged and irregularly shaped nose with prominent pores   In rosacea that is refractory to treatment, patients can develop persistent redness and swelling of the face due to lymphatic obstruction (Morbihan disease)       Distribution around the cheeks may be confused with the malar or butterfly rash of lupus  However, the rash of lupus spares the nasal creases and lacks papules and pustules  If signs of photosensitivity, oral ulcers, arthritis, and kidney dysfunction are present then consider referral to a rheumatologist       There are many potential causes of rosacea including genetic, environmental, vascular, and inflammatory factors   These include, but are not limited to:  · Chronic exposure to ultraviolet radiation   · Increased immune responses in the form of cathelicidins that promote vessel dilation and infiltration with white blood cells (neutrophils) into the dermis  · Increased matrix metalloproteinases such as collagen and elastase that remodel normal tissue may contribute to inflammation of the skin making it thicker and harder  · There is some evidence to suggest that increased numbers of demodex mites on patient skin may contribute to rosacea papules      General Treatment Approach   · Avoid exacerbating factors such as heat, spicy foods, and alcohol   · Use daily SPF30+ sunscreen and other methods of coverage for sun protection  · Use water-based make-up   · Avoid applying topical steroids to affected areas as they can cause perioral dermatitis and exacerbate rosacea      Topical Treatment Approach  · Metronidazole cream or gel by itself or in combination with oral antibiotics for more severe cases  · Azelaic acid cream or lotion is effective for mild inflammatory rosacea when applied twice daily to affected areas  · Brimonidine gel and oxymetazoline hydrochloride cream can reduce facial redness temporarily   · Ivermectin cream can treat papulopustular rosacea by controlling demodex mites and inflammation   · Pimecrolimus cream or tacrolimus ointment twice a day for 2-3 months can help reduce inflammation     Oral Treatment Approach  · Antibiotics such as doxycycline, minocycline, or erythromycin for 1-3 months  · Clonidine and carvedilol can help reduce facial flushing and are generally well tolerated  Common side effects include low blood pressure, gastrointestinal upset, dry eyes, blurred vision and low heart rate  · Isotretinoin at low doses can be effective for long term treatment when antibiotics fail  Side effects may make it unsuitable for some patients     · NSAIDs such as diclofenac can help reduce discomfort and redness in the skin       Procedural/Surgical Treatment Approach   · Vascular lasers or intense pulsed light treatment may be used to treat persistent telangiectasia and papulopustular rosacea  · Plastic surgery and carbon dioxide lasers may be used to treat rhinophyma       Scribe Attestation    I,:  Leon Richards am acting as a scribe while in the presence of the attending physician :       I,:  Andree Rodriguez MD personally performed the services described in this documentation    as scribed in my presence :

## 2021-07-05 ENCOUNTER — HOSPITAL ENCOUNTER (EMERGENCY)
Facility: HOSPITAL | Age: 66
Discharge: HOME | End: 2021-07-05
Attending: EMERGENCY MEDICINE
Payer: MEDICARE

## 2021-07-05 ENCOUNTER — ANTICOAG VISIT (OUTPATIENT)
Dept: FAMILY MEDICINE CLINIC | Facility: CLINIC | Age: 66
End: 2021-07-05

## 2021-07-05 ENCOUNTER — APPOINTMENT (EMERGENCY)
Dept: RADIOLOGY | Facility: HOSPITAL | Age: 66
End: 2021-07-05
Payer: MEDICARE

## 2021-07-05 VITALS
DIASTOLIC BLOOD PRESSURE: 58 MMHG | SYSTOLIC BLOOD PRESSURE: 123 MMHG | TEMPERATURE: 98.6 F | HEART RATE: 74 BPM | RESPIRATION RATE: 16 BRPM | OXYGEN SATURATION: 98 %

## 2021-07-05 DIAGNOSIS — K57.92 DIVERTICULITIS: Primary | ICD-10-CM

## 2021-07-05 LAB
ALBUMIN SERPL BCP-MCNC: 3.3 G/DL (ref 3.5–5)
ALP SERPL-CCNC: 100 U/L (ref 46–116)
ALT SERPL W P-5'-P-CCNC: 74 U/L (ref 12–78)
ANION GAP SERPL CALCULATED.3IONS-SCNC: 10 MMOL/L (ref 4–13)
AST SERPL W P-5'-P-CCNC: 27 U/L (ref 5–45)
BASOPHILS # BLD AUTO: 0.14 THOUSANDS/ΜL (ref 0–0.1)
BASOPHILS NFR BLD AUTO: 1 % (ref 0–1)
BILIRUB SERPL-MCNC: 0.29 MG/DL (ref 0.2–1)
BUN SERPL-MCNC: 12 MG/DL (ref 5–25)
CALCIUM ALBUM COR SERPL-MCNC: 9.6 MG/DL (ref 8.3–10.1)
CALCIUM SERPL-MCNC: 9 MG/DL (ref 8.3–10.1)
CHLORIDE SERPL-SCNC: 103 MMOL/L (ref 100–108)
CO2 SERPL-SCNC: 26 MMOL/L (ref 21–32)
CREAT SERPL-MCNC: 0.8 MG/DL (ref 0.6–1.3)
EOSINOPHIL # BLD AUTO: 0.16 THOUSAND/ΜL (ref 0–0.61)
EOSINOPHIL NFR BLD AUTO: 1 % (ref 0–6)
ERYTHROCYTE [DISTWIDTH] IN BLOOD BY AUTOMATED COUNT: 13 % (ref 11.6–15.1)
GFR SERPL CREATININE-BSD FRML MDRD: 77 ML/MIN/1.73SQ M
GLUCOSE SERPL-MCNC: 109 MG/DL (ref 65–140)
HCT VFR BLD AUTO: 42.5 % (ref 34.8–46.1)
HGB BLD-MCNC: 13.2 G/DL (ref 11.5–15.4)
IMM GRANULOCYTES # BLD AUTO: 0.07 THOUSAND/UL (ref 0–0.2)
IMM GRANULOCYTES NFR BLD AUTO: 0 % (ref 0–2)
LIPASE SERPL-CCNC: 63 U/L (ref 73–393)
LYMPHOCYTES # BLD AUTO: 2.39 THOUSANDS/ΜL (ref 0.6–4.47)
LYMPHOCYTES NFR BLD AUTO: 15 % (ref 14–44)
MCH RBC QN AUTO: 30.6 PG (ref 26.8–34.3)
MCHC RBC AUTO-ENTMCNC: 31.1 G/DL (ref 31.4–37.4)
MCV RBC AUTO: 98 FL (ref 82–98)
MONOCYTES # BLD AUTO: 1.62 THOUSAND/ΜL (ref 0.17–1.22)
MONOCYTES NFR BLD AUTO: 10 % (ref 4–12)
NEUTROPHILS # BLD AUTO: 11.27 THOUSANDS/ΜL (ref 1.85–7.62)
NEUTS SEG NFR BLD AUTO: 73 % (ref 43–75)
NRBC BLD AUTO-RTO: 0 /100 WBCS
PLATELET # BLD AUTO: 348 THOUSANDS/UL (ref 149–390)
PMV BLD AUTO: 10.4 FL (ref 8.9–12.7)
POTASSIUM SERPL-SCNC: 4.2 MMOL/L (ref 3.5–5.3)
PROT SERPL-MCNC: 8.2 G/DL (ref 6.4–8.2)
RBC # BLD AUTO: 4.32 MILLION/UL (ref 3.81–5.12)
SODIUM SERPL-SCNC: 139 MMOL/L (ref 136–145)
WBC # BLD AUTO: 15.65 THOUSAND/UL (ref 4.31–10.16)

## 2021-07-05 PROCEDURE — 96375 TX/PRO/DX INJ NEW DRUG ADDON: CPT

## 2021-07-05 PROCEDURE — G1004 CDSM NDSC: HCPCS

## 2021-07-05 PROCEDURE — 99284 EMERGENCY DEPT VISIT MOD MDM: CPT

## 2021-07-05 PROCEDURE — 36415 COLL VENOUS BLD VENIPUNCTURE: CPT | Performed by: EMERGENCY MEDICINE

## 2021-07-05 PROCEDURE — 80053 COMPREHEN METABOLIC PANEL: CPT | Performed by: EMERGENCY MEDICINE

## 2021-07-05 PROCEDURE — 83690 ASSAY OF LIPASE: CPT | Performed by: EMERGENCY MEDICINE

## 2021-07-05 PROCEDURE — 99285 EMERGENCY DEPT VISIT HI MDM: CPT | Performed by: EMERGENCY MEDICINE

## 2021-07-05 PROCEDURE — 74177 CT ABD & PELVIS W/CONTRAST: CPT

## 2021-07-05 PROCEDURE — 85025 COMPLETE CBC W/AUTO DIFF WBC: CPT | Performed by: EMERGENCY MEDICINE

## 2021-07-05 PROCEDURE — 96374 THER/PROPH/DIAG INJ IV PUSH: CPT

## 2021-07-05 RX ORDER — LEVOFLOXACIN 750 MG/1
750 TABLET ORAL EVERY 24 HOURS
Qty: 9 TABLET | Refills: 0 | Status: SHIPPED | OUTPATIENT
Start: 2021-07-06 | End: 2021-07-15

## 2021-07-05 RX ORDER — METRONIDAZOLE 500 MG/1
500 TABLET ORAL ONCE
Status: COMPLETED | OUTPATIENT
Start: 2021-07-05 | End: 2021-07-05

## 2021-07-05 RX ORDER — ONDANSETRON 2 MG/ML
4 INJECTION INTRAMUSCULAR; INTRAVENOUS ONCE
Status: COMPLETED | OUTPATIENT
Start: 2021-07-05 | End: 2021-07-05

## 2021-07-05 RX ORDER — METRONIDAZOLE 500 MG/1
500 TABLET ORAL EVERY 8 HOURS SCHEDULED
Qty: 30 TABLET | Refills: 0 | Status: SHIPPED | OUTPATIENT
Start: 2021-07-05 | End: 2021-07-15

## 2021-07-05 RX ORDER — MORPHINE SULFATE 4 MG/ML
4 INJECTION, SOLUTION INTRAMUSCULAR; INTRAVENOUS ONCE
Status: DISCONTINUED | OUTPATIENT
Start: 2021-07-05 | End: 2021-07-05

## 2021-07-05 RX ADMIN — LEVOFLOXACIN 750 MG: 500 TABLET, FILM COATED ORAL at 14:18

## 2021-07-05 RX ADMIN — MORPHINE SULFATE 2 MG: 2 INJECTION, SOLUTION INTRAMUSCULAR; INTRAVENOUS at 11:50

## 2021-07-05 RX ADMIN — METRONIDAZOLE 500 MG: 500 TABLET ORAL at 14:18

## 2021-07-05 RX ADMIN — ONDANSETRON 4 MG: 2 INJECTION INTRAMUSCULAR; INTRAVENOUS at 11:46

## 2021-07-05 RX ADMIN — IOHEXOL 100 ML: 350 INJECTION, SOLUTION INTRAVENOUS at 12:55

## 2021-07-05 NOTE — ED PROVIDER NOTES
History  Chief Complaint   Patient presents with    Abdominal Pain     patient c/o abdominal pain, nausea, and lower back pain since yesterday  Pt in the ER with c/o abd pain/nausea and low back pain that began on Thursday  Pain has since localized to LLQ  Pt with abd distention, but states that she is passing flatus, and having small BMs  She has a prior med hx of hyperlipidemia/diverticulosis  History provided by:  Patient   used: No    Abdominal Pain  Pain location:  Suprapubic and epigastric  Pain quality: aching    Pain severity:  Moderate  Onset quality:  Sudden  Timing:  Constant  Progression:  Worsening  Chronicity:  Recurrent  Relieved by:  Nothing  Worsened by: Movement  Ineffective treatments:  None tried  Associated symptoms: nausea    Associated symptoms: no chills, no cough, no diarrhea, no dysuria, no fever, no hematuria, no shortness of breath and no vomiting    Risk factors: multiple surgeries    Risk factors: not obese        Prior to Admission Medications   Prescriptions Last Dose Informant Patient Reported? Taking?    Ascorbic Acid (VITAMIN C) 1000 MG tablet  Self Yes No   Breo Ellipta 200-25 MCG/INH inhaler   No No   Sig: INHALE ONE PUFF BY MOUTH EVERY DAY - RINSE MOUTH AFTER USE   Zinc 50 MG CAPS  Self Yes No   Sig: Take 50 mg by mouth daily    Patient not taking: Reported on 7/1/2021   albuterol (Ventolin HFA) 90 mcg/act inhaler  Self No No   Sig: Inhale 2 puffs every 4 (four) hours as needed for wheezing   amoxicillin (AMOXIL) 875 mg tablet   Yes No   atorvastatin (LIPITOR) 20 mg tablet   No No   Sig: Take 1 tablet (20 mg total) by mouth daily   Patient taking differently: Take 20 mg by mouth daily at bedtime    cholecalciferol (VITAMIN D3) 1,000 units tablet  Self Yes No   Sig: Take 2,000 Units by mouth daily   clobetasol (TEMOVATE) 0 05 % cream  Self No No   Sig: Apply topically 2 (two) times a day   cyclobenzaprine (FLEXERIL) 10 mg tablet  Self Yes No   Sig: Take 10 mg by mouth 3 (three) times a day as needed for muscle spasms (as needed for pain)   famotidine (PEPCID) 20 mg tablet   No No   Sig: Take 1 tablet (20 mg total) by mouth 2 (two) times a day   hydrocortisone 2 5 % cream  Self Yes No   Si (two) times a day scalp   metFORMIN (GLUCOPHAGE-XR) 500 mg 24 hr tablet  Self No No   Sig: Take 1 tablet (500 mg total) by mouth daily with dinner   multivitamin (THERAGRAN) TABS  Self Yes No   Sig: Take 1 tablet by mouth daily Last dose 3/21/21   naproxen (NAPROSYN) 500 mg tablet   No No   Sig: Take 1 tablet (500 mg total) by mouth 2 (two) times a day with meals   neomycin-bacitracin-polymyxin b (NEOSPORIN) ointment   No No   Sig: Apply topically 2 (two) times a day for 7 days   Patient not taking: Reported on 2021   triamcinolone (KENALOG) 0 025 % cream   No No   Sig: Apply topically 2 (two) times a day Face, neck      Facility-Administered Medications: None       Past Medical History:   Diagnosis Date    Abnormal glucose     last assessed 16    Arthritis     Asthma     w/ exacerbation; last assessed 5/14/15    Atypical nevi     Atypical nevus     last assessed 17    Breast lump     last assessed 3/6/14    Cataract     Cervical adenopathy     last assessed  17    Colon polyp     CPAP (continuous positive airway pressure) dependence     Dizziness     Dyslipidemia     last assessed 17    Facial droop     last assessed  16    Fibromyalgia, primary     Flu 2018    Foot abrasion     right between the 4th and 5th toe    Genital herpes     resolved 16    Headache, tension-type     Herpes zoster     last assessed 16    History of shingles     may 2016    History of stomach ulcers     Hx of abnormal mammogram     last assessed  3/5/14    Hyperlipidemia     Myalgia     last assessed  12    Myositis     12    Neck pain     Pain involving joints of fingers of both hands 2021    Peripheral neuropathy     Seborrheic keratosis     Shingles     Skin neoplasm     of the lower limb, including hip; onset 12; last assessed  12    Sleep apnea        Past Surgical History:   Procedure Laterality Date    ABDOMINAL SURGERY      release of adhesions    BLADDER SURGERY      mesh-lift    BREAST SURGERY      lift    CATARACT EXTRACTION Bilateral      SECTION      x [id number]    CHOLECYSTECTOMY      lap    COLONOSCOPY      COSMETIC SURGERY      tummy and breast lift    ESOPHAGOGASTRODUODENOSCOPY      OOPHORECTOMY Left     OTHER SURGICAL HISTORY      vocal cord surgery, scraping    [place] HYSTEROSCOPY,W/ENDO BX N/A 11/3/2016    Procedure: DILATATION AND CURETTAGE (D&C) WITH HYSTEROSCOPY;  Surgeon: Hattie Cortes MD;  Location: [address] [place] [address];  Service: Gynecology    REDUCTION MAMMAPLASTY Bilateral     [facility] PROCEDURE  2021       Family History   Problem Relation Age of Onset    Hypertension Mother     Alzheimer's disease Mother     Arthritis Mother     Hypertension Father     Arthritis Father     Heart attack Father     Heart disease Father     Stroke Father     Other Brother         vertigo, tinnitus    Diabetes Daughter     Breast cancer Sister 28    Skin cancer Sister     Other Son         downs syndrome    Abdominal aortic aneurysm Sister     Cancer Sister         T cell sarcoma    No Known Problems Brother     Diabetes Brother     Other Sister         anemia from the diet    No Known Problems Son      I have reviewed and agree with the history as documented      E-Cigarette/Vaping    E-Cigarette Use Never User      E-Cigarette/Vaping Substances    Nicotine No     THC No     CBD No     Flavoring No     Other No     Unknown No      Social History     Tobacco Use    Smoking status: Never Smoker    Smokeless tobacco: Never Used   Vaping Use    Vaping Use: Never used   Substance Use Topics    Alcohol use: No    Drug use: No       Review of Systems   Constitutional: Negative for chills and fever  HENT: Negative for facial swelling and trouble swallowing  Respiratory: Negative for cough, chest tightness and shortness of breath  Gastrointestinal: Positive for abdominal pain and nausea  Negative for diarrhea and vomiting  Genitourinary: Negative for dysuria, frequency, hematuria and urgency  Musculoskeletal: Negative for back pain, neck pain and neck stiffness  Psychiatric/Behavioral: Negative for agitation and behavioral problems  The patient is not nervous/anxious  All other systems reviewed and are negative  Physical Exam  Physical Exam  Vitals and nursing note reviewed  Constitutional:       General: She is not in acute distress  Appearance: She is well-developed  She is not diaphoretic  HENT:      Head: Normocephalic and atraumatic  Eyes:      Conjunctiva/sclera: Conjunctivae normal       Pupils: Pupils are equal, round, and reactive to light  Cardiovascular:      Rate and Rhythm: Normal rate and regular rhythm  Heart sounds: Normal heart sounds  No murmur heard  Pulmonary:      Effort: Pulmonary effort is normal  No respiratory distress  Breath sounds: Normal breath sounds  Abdominal:      General: Bowel sounds are normal  There is no distension  Palpations: Abdomen is soft  Tenderness: There is generalized abdominal tenderness and tenderness in the suprapubic area and left lower quadrant  Musculoskeletal:         General: No deformity  Normal range of motion  Cervical back: Normal range of motion and neck supple  Skin:     General: Skin is warm and dry  Capillary Refill: Capillary refill takes less than 2 seconds  Coloration: Skin is not pale  Findings: No rash  Neurological:      General: No focal deficit present  Mental Status: She is alert and oriented to person, place, and time  Cranial Nerves: No cranial nerve deficit  Psychiatric:         Behavior: Behavior normal          Vital Signs  ED Triage Vitals [07/05/21 1120]   Temperature Pulse Respirations Blood Pressure SpO2   98 6 °F (37 °C) 86 19 131/58 98 %      Temp Source Heart Rate Source Patient Position - Orthostatic VS BP Location FiO2 (%)   Tympanic Monitor Lying Right arm --      Pain Score       Worst Possible Pain           Vitals:    07/05/21 1120   BP: 131/58   Pulse: 86   Patient Position - Orthostatic VS: Lying         Visual Acuity      ED Medications  Medications   levofloxacin (LEVAQUIN) tablet 750 mg (has no administration in time range)   metroNIDAZOLE (FLAGYL) tablet 500 mg (has no administration in time range)   ondansetron (ZOFRAN) injection 4 mg (4 mg Intravenous Given 7/5/21 1146)   morphine injection 2 mg (2 mg Intravenous Given 7/5/21 1150)   iohexol (OMNIPAQUE) 350 MG/ML injection (SINGLE-DOSE) 100 mL (100 mL Intravenous Given 7/5/21 1255)       Diagnostic Studies  Results Reviewed     Procedure Component Value Units Date/Time    Comprehensive metabolic panel [[id number]]  (Abnormal) Collected: 07/05/21 1144    Lab Status: Final result Specimen: Blood from Arm, Left Updated: 07/05/21 1209     Sodium 139 mmol/L      Potassium 4 2 mmol/L      Chloride 103 mmol/L      CO2 26 mmol/L      ANION GAP 10 mmol/L      BUN 12 mg/dL      Creatinine 0 80 mg/dL      Glucose 109 mg/dL      Calcium 9 0 mg/dL      Corrected Calcium 9 6 mg/dL      AST 27 U/L      ALT 74 U/L      Alkaline Phosphatase 100 U/L      Total Protein 8 2 g/dL      Albumin 3 3 g/dL      Total Bilirubin 0 29 mg/dL      eGFR 77 ml/min/1 73sq m     Narrative:      Janna guidelines for Chronic Kidney Disease (CKD):     Stage 1 with normal or high GFR (GFR > 90 mL/min/1 73 square meters)    Stage 2 Mild CKD (GFR = 60-89 mL/min/1 73 square meters)    Stage 3A Moderate CKD (GFR = 45-59 mL/min/1 73 square meters)    Stage 3B Moderate CKD (GFR = 30-44 mL/min/1 73 square meters)    Stage 4 Severe CKD (GFR = 15-29 mL/min/1 73 square meters)    Stage 5 End Stage CKD (GFR <15 mL/min/1 73 square meters)  Note: GFR calculation is accurate only with a steady state creatinine    Lipase [[id number]]  (Abnormal) Collected: 07/05/21 1144    Lab Status: Final result Specimen: Blood from Arm, Left Updated: 07/05/21 1209     Lipase 63 u/L     CBC and differential [[id number]]  (Abnormal) Collected: 07/05/21 1144    Lab Status: Final result Specimen: Blood from Arm, Left Updated: 07/05/21 1149     WBC 15 65 Thousand/uL      RBC 4 32 Million/uL      Hemoglobin 13 2 g/dL      Hematocrit 42 5 %      MCV 98 fL      MCH 30 6 pg      MCHC 31 1 g/dL      RDW 13 0 %      MPV 10 4 fL      Platelets 495 Thousands/uL      nRBC 0 /100 WBCs      Neutrophils Relative 73 %      Immat GRANS % 0 %      Lymphocytes Relative 15 %      Monocytes Relative 10 %      Eosinophils Relative 1 %      Basophils Relative 1 %      Neutrophils Absolute 11 27 Thousands/µL      Immature Grans Absolute 0 07 Thousand/uL      Lymphocytes Absolute 2 39 Thousands/µL      Monocytes Absolute 1 62 Thousand/µL      Eosinophils Absolute 0 16 Thousand/µL      Basophils Absolute 0 14 Thousands/µL                  CT abdomen pelvis with contrast   Final Result by Adarsh Jin MD (07/05 1336)      1  Acute diverticulitis at the mid sigmoid colon  No findings for diverticular abscess  No bowel obstruction  The study was marked in [facility] for immediate notification  Workstation performed: [id number]                    Procedures  Procedures         ED Course            pt with c/o left lower quadrant pain, concern for diverticulitis versus small bowel obstruction  Labs, CT reviewed  Patient with acute diverticulitis without an abscess  Patient's pain is controlled with morphine and Zofran given emergency room    Patient given the option for admission versus discharge to home, she prefers to be discharged with oral antibiotics - Levaquin/Flagyl  Patient given return precautions  Patient agrees with plan to follow up with primary care physician and GI                                MDM  Number of Diagnoses or Management Options  Diverticulitis: new and requires workup     Amount and/or Complexity of Data Reviewed  Clinical lab tests: ordered and reviewed  Tests in the radiology section of CPT®: ordered and reviewed    Risk of Complications, Morbidity, and/or Mortality  Presenting problems: high  Diagnostic procedures: high  Management options: high    Patient Progress  Patient progress: improved      Disposition  Final diagnoses:   Diverticulitis     Time reflects when diagnosis was documented in both MDM as applicable and the Disposition within this note     Time User Action Codes Description Comment    7/5/2021  1:43 PM Monserratbenjamin Wheeler Add [K57 92] Diverticulitis       ED Disposition     ED Disposition Condition Date/Time Comment    Discharge Stable Mon Jul 5, 2021  1:43 PM Mirna Caballero discharge to home/self care              Follow-up Information     Follow up With Specialties Details Why Contact Info Additional Information    Griselda Ravel, MD Family Medicine Schedule an appointment as soon as possible for a visit in 2 days for follow up [id number] [facility] [address]  [address] [facility] Schedule an appointment as soon as possible for a visit in 2 days for follow up [address]  [id number] [address] [facility], [address] [place], [place] [address], [address] [facility], [address] [phone]          Patient's Medications   Discharge Prescriptions    LEVOFLOXACIN (LEVAQUIN) 750 MG TABLET    Take 1 tablet (750 mg total) by mouth every 24 hours for 9 days       Start Date: 7/6/2021  End Date: 7/15/2021       Order Dose: 750 mg       Quantity: 9 tablet    Refills: 0 METRONIDAZOLE (FLAGYL) 500 MG TABLET    Take 1 tablet (500 mg total) by mouth every 8 (eight) hours for 10 days       Start Date: 7/5/2021  End Date: 7/15/2021       Order Dose: 500 mg       Quantity: 30 tablet    Refills: 0     No discharge procedures on file      PDMP Review     None          ED Provider  Electronically Signed by           Jet Gibson DO  07/05/21 5470

## 2021-07-05 NOTE — DISCHARGE INSTRUCTIONS
Return to the ER for further concerns or worsening symptoms  Clear liquid diet for 2-3 days  Follow up with your primary care physician and gastroenterology in 1-2 days   Take tylenol or motrin for pain relief

## 2021-07-05 NOTE — ED NOTES
Pt uses bathroom to void  Pt tolerating morphine  Gait steady  Pt reports pain increases with movement and feels "better" with laying flat         Matt Dee RN  07/05/21 3098

## 2021-07-06 ENCOUNTER — TELEPHONE (OUTPATIENT)
Dept: FAMILY MEDICINE CLINIC | Facility: CLINIC | Age: 66
End: 2021-07-06

## 2021-07-06 NOTE — TELEPHONE ENCOUNTER
----- Message from Jennifer Gastelum MD sent at 7/5/2021  6:34 PM EDT -----  Please schedule [facility] ED follow-up appt --will review all labs at visit--thanks

## 2021-07-07 NOTE — TELEPHONE ENCOUNTER
Called patient, recording states # is not working, Called patient's daughter and was able to speak with patient who confirmed her # is not working right now  I scheduled appointment 7/9

## 2021-07-09 ENCOUNTER — OFFICE VISIT (OUTPATIENT)
Dept: FAMILY MEDICINE CLINIC | Facility: CLINIC | Age: 66
End: 2021-07-09
Payer: MEDICARE

## 2021-07-09 DIAGNOSIS — E86.1 HYPOTENSION DUE TO HYPOVOLEMIA: ICD-10-CM

## 2021-07-09 DIAGNOSIS — K57.92 DIVERTICULITIS: Primary | ICD-10-CM

## 2021-07-09 DIAGNOSIS — I95.89 HYPOTENSION DUE TO HYPOVOLEMIA: ICD-10-CM

## 2021-07-09 PROCEDURE — 99213 OFFICE O/P EST LOW 20 MIN: CPT | Performed by: FAMILY MEDICINE

## 2021-07-09 NOTE — PATIENT INSTRUCTIONS
Diverticulitis Diet   WHAT YOU NEED TO KNOW:   What is a diverticulitis diet? A diverticulitis diet includes foods that allow your intestines to rest while you have diverticulitis  Diverticulitis is a condition that causes diverticula (small pockets) along your intestine to become inflamed or infected  This is caused by hard bowel movement, food, or bacteria that get stuck in the pockets  Which foods may be recommended while I have diverticulitis? · A clear liquid diet may be recommended for 2 to 3 days  A clear liquid diet includes clear liquids, and foods that are liquid at room temperature  Examples include the following:     ? Water and clear juices (such as apple, cranberry, or grape), strained citrus juices or fruit punch    ? Coffee or tea (without cream or milk)    ? Clear sports drinks or soft drinks, such as ginger ale, lemon-lime soda, or club soda (no cola or root beer)    ? Clear broth, bouillon, or consommé    Diverticulitis Diet   WHAT YOU NEED TO KNOW:   What is a diverticulitis diet? A diverticulitis diet includes foods that allow your intestines to rest while you have diverticulitis  Diverticulitis is a condition that causes diverticula (small pockets) along your intestine to become inflamed or infected  This is caused by hard bowel movement, food, or bacteria that get stuck in the pockets  Which foods may be recommended while I have diverticulitis? A clear liquid diet may be recommended for 2 to 3 days  A clear liquid diet includes clear liquids, and foods that are liquid at room temperature   Examples include the following:     Water and clear juices (such as apple, cranberry, or grape), strained citrus juices or fruit punch    Coffee or tea (without cream or milk)    Clear sports drinks or soft drinks, such as ginger ale, lemon-lime soda, or club soda (no cola or root beer)    Clear broth, bouillon, or consommé    Plain popsicles (no popsicles with pureed fruit or fiber)    Flavored gelatin without fruit    Low-fiber foods may be recommended until your symptoms improve  Examples include the following:     Cream of wheat and finely ground grits    White bread, white pasta, and white rice    Canned and well-cooked fruit without skins or seeds, and juice without pulp    Canned and well-cooked vegetables without skins or seeds, and vegetable juice    Cow's milk, lactose-free milk, soy milk, and rice milk    Yogurt, cottage cheese, and sherbet    Eggs, poultry (such as chicken and turkey), fish, and tender, ground, well-cooked beef     Tofu and smooth nut butters, such as peanut butter    Broth and strained soups made of low-fiber foods    What do I need to know about high-fiber foods? High-fiber foods can help prevent diverticulosis and diverticulitis  Your healthcare provider will tell you when you can add high-fiber foods back into your diet  Examples include the following:  Whole grains and breads, and cereals made with whole grains    Dried fruit, fresh fruit with skin, and fruit pulp    Raw vegetables    Cooked greens, such as spinach    Tough meat and meat with gristle    Legumes, such as alicea beans and lentils    When should I contact my healthcare provider? Your symptoms get worse or do not go away  You have questions about the foods you should eat  You have questions or concerns about your condition or care  CARE AGREEMENT:   You have the right to help plan your care  Learn about your health condition and how it may be treated  Discuss treatment options with your healthcare providers to decide what care you want to receive  You always have the right to refuse treatment  The above information is an  only  It is not intended as medical advice for individual conditions or treatments  Talk to your doctor, nurse or pharmacist before following any medical regimen to see if it is safe and effective for you    © Copyright [employer] 2020 Information is for End User's use only and may not be sold, redistributed or otherwise used for commercial purposes  All illustrations and images included in CareNotes® are the copyrighted property of A D A M , Inc  or [address] [place] [address] Kemi   ? Plain popsicles (no popsicles with pureed fruit or fiber)    ? Flavored gelatin without fruit    · Low-fiber foods may be recommended until your symptoms improve  Examples include the following:     ? Cream of wheat and finely ground grits    ? White bread, white pasta, and white rice    ? Canned and well-cooked fruit without skins or seeds, and juice without pulp    ? Canned and well-cooked vegetables without skins or seeds, and vegetable juice    ? Cow's milk, lactose-free milk, soy milk, and rice milk    ? Yogurt, cottage cheese, and sherbet    ? Eggs, poultry (such as chicken and turkey), fish, and tender, ground, well-cooked beef     ? Tofu and smooth nut butters, such as peanut butter    ? Broth and strained soups made of low-fiber foods    What do I need to know about high-fiber foods? High-fiber foods can help prevent diverticulosis and diverticulitis  Your healthcare provider will tell you when you can add high-fiber foods back into your diet  Examples include the following:  · Whole grains and breads, and cereals made with whole grains    · Dried fruit, fresh fruit with skin, and fruit pulp    · Raw vegetables    · Cooked greens, such as spinach    · Tough meat and meat with gristle    · Legumes, such as alicea beans and lentils    When should I contact my healthcare provider? · Your symptoms get worse or do not go away  · You have questions about the foods you should eat  · You have questions or concerns about your condition or care  CARE AGREEMENT:   You have the right to help plan your care  Learn about your health condition and how it may be treated  Discuss treatment options with your healthcare providers to decide what care you want to receive   You always have the right to refuse treatment  The above information is an  only  It is not intended as medical advice for individual conditions or treatments  Talk to your doctor, nurse or pharmacist before following any medical regimen to see if it is safe and effective for you  © Copyright [address] Information is for End User's use only and may not be sold, redistributed or otherwise used for commercial purposes   All illustrations and images included in CareNotes® are the copyrighted property of A D A M , Inc  or [address] [place] [address]

## 2021-07-15 ENCOUNTER — TELEPHONE (OUTPATIENT)
Dept: FAMILY MEDICINE CLINIC | Facility: CLINIC | Age: 66
End: 2021-07-15

## 2021-07-15 NOTE — TELEPHONE ENCOUNTER
----- Message from You Gonzales MD sent at 7/15/2021  3:57 PM EDT -----  Please inform u/s thyroid shows no nodule that meets criteria for biopsy  Cont  Periodic surveillance

## 2021-07-20 VITALS
RESPIRATION RATE: 14 BRPM | HEART RATE: 78 BPM | WEIGHT: 161.8 LBS | TEMPERATURE: 97.1 F | SYSTOLIC BLOOD PRESSURE: 98 MMHG | HEIGHT: 64 IN | BODY MASS INDEX: 27.62 KG/M2 | DIASTOLIC BLOOD PRESSURE: 62 MMHG

## 2021-07-20 PROBLEM — E86.1 HYPOTENSION DUE TO HYPOVOLEMIA: Status: ACTIVE | Noted: 2021-07-20

## 2021-07-20 PROBLEM — I95.89 HYPOTENSION DUE TO HYPOVOLEMIA: Status: ACTIVE | Noted: 2021-07-20

## 2021-07-20 NOTE — PROGRESS NOTES
Assessment/Plan:    1  Diverticulitis  Assessment & Plan:  Complete antibiotics  Advance diet as tolerated  Schedule GI follow-up    Orders:  -     CBC and differential; Future    2  Hypotension due to hypovolemia  Assessment & Plan:  Increase hydration as tolerated      3  BMI 27 0-27 9,adult          Patient Instructions   Diverticulitis Diet   WHAT YOU NEED TO KNOW:   What is a diverticulitis diet? A diverticulitis diet includes foods that allow your intestines to rest while you have diverticulitis  Diverticulitis is a condition that causes diverticula (small pockets) along your intestine to become inflamed or infected  This is caused by hard bowel movement, food, or bacteria that get stuck in the pockets  Which foods may be recommended while I have diverticulitis? · A clear liquid diet may be recommended for 2 to 3 days  A clear liquid diet includes clear liquids, and foods that are liquid at room temperature  Examples include the following:     ? Water and clear juices (such as apple, cranberry, or grape), strained citrus juices or fruit punch    ? Coffee or tea (without cream or milk)    ? Clear sports drinks or soft drinks, such as ginger ale, lemon-lime soda, or club soda (no cola or root beer)    ? Clear broth, bouillon, or consommé    Diverticulitis Diet   WHAT YOU NEED TO KNOW:   What is a diverticulitis diet? A diverticulitis diet includes foods that allow your intestines to rest while you have diverticulitis  Diverticulitis is a condition that causes diverticula (small pockets) along your intestine to become inflamed or infected  This is caused by hard bowel movement, food, or bacteria that get stuck in the pockets  Which foods may be recommended while I have diverticulitis? A clear liquid diet may be recommended for 2 to 3 days  A clear liquid diet includes clear liquids, and foods that are liquid at room temperature   Examples include the following:     Water and clear juices (such as apple, cranberry, or grape), strained citrus juices or fruit punch    Coffee or tea (without cream or milk)    Clear sports drinks or soft drinks, such as ginger ale, lemon-lime soda, or club soda (no cola or root beer)    Clear broth, bouillon, or consommé    Plain popsicles (no popsicles with pureed fruit or fiber)    Flavored gelatin without fruit    Low-fiber foods may be recommended until your symptoms improve  Examples include the following:     Cream of wheat and finely ground grits    White bread, white pasta, and white rice    Canned and well-cooked fruit without skins or seeds, and juice without pulp    Canned and well-cooked vegetables without skins or seeds, and vegetable juice    Cow's milk, lactose-free milk, soy milk, and rice milk    Yogurt, cottage cheese, and sherbet    Eggs, poultry (such as chicken and turkey), fish, and tender, ground, well-cooked beef     Tofu and smooth nut butters, such as peanut butter    Broth and strained soups made of low-fiber foods    What do I need to know about high-fiber foods? High-fiber foods can help prevent diverticulosis and diverticulitis  Your healthcare provider will tell you when you can add high-fiber foods back into your diet  Examples include the following:  Whole grains and breads, and cereals made with whole grains    Dried fruit, fresh fruit with skin, and fruit pulp    Raw vegetables    Cooked greens, such as spinach    Tough meat and meat with gristle    Legumes, such as alicea beans and lentils    When should I contact my healthcare provider? Your symptoms get worse or do not go away  You have questions about the foods you should eat  You have questions or concerns about your condition or care  CARE AGREEMENT:   You have the right to help plan your care  Learn about your health condition and how it may be treated  Discuss treatment options with your healthcare providers to decide what care you want to receive   You always have the right to refuse treatment  The above information is an  only  It is not intended as medical advice for individual conditions or treatments  Talk to your doctor, nurse or pharmacist before following any medical regimen to see if it is safe and effective for you  © Copyright [address] Information is for End User's use only and may not be sold, redistributed or otherwise used for commercial purposes  All illustrations and images included in CareNotes® are the copyrighted property of [employer]  or [address] [place] [address] [facility]  ? Plain popsicles (no popsicles with pureed fruit or fiber)    ? Flavored gelatin without fruit    · Low-fiber foods may be recommended until your symptoms improve  Examples include the following:     ? Cream of wheat and finely ground grits    ? White bread, white pasta, and white rice    ? Canned and well-cooked fruit without skins or seeds, and juice without pulp    ? Canned and well-cooked vegetables without skins or seeds, and vegetable juice    ? Cow's milk, lactose-free milk, soy milk, and rice milk    ? Yogurt, cottage cheese, and sherbet    ? Eggs, poultry (such as chicken and turkey), fish, and tender, ground, well-cooked beef     ? Tofu and smooth nut butters, such as peanut butter    ? Broth and strained soups made of low-fiber foods    What do I need to know about high-fiber foods? High-fiber foods can help prevent diverticulosis and diverticulitis  Your healthcare provider will tell you when you can add high-fiber foods back into your diet  Examples include the following:  · Whole grains and breads, and cereals made with whole grains    · Dried fruit, fresh fruit with skin, and fruit pulp    · Raw vegetables    · Cooked greens, such as spinach    · Tough meat and meat with gristle    · Legumes, such as alciea beans and lentils    When should I contact my healthcare provider? · Your symptoms get worse or do not go away       · You have questions about the foods you should eat  · You have questions or concerns about your condition or care  CARE AGREEMENT:   You have the right to help plan your care  Learn about your health condition and how it may be treated  Discuss treatment options with your healthcare providers to decide what care you want to receive  You always have the right to refuse treatment  The above information is an  only  It is not intended as medical advice for individual conditions or treatments  Talk to your doctor, nurse or pharmacist before following any medical regimen to see if it is safe and effective for you  © Copyright [address] Information is for End User's use only and may not be sold, redistributed or otherwise used for commercial purposes  All illustrations and images included in CareNotes® are the copyrighted property of [employer]  or [employer]      Subjective:      Patient ID: Erik Portillo is a 77 y o  female  Chief Complaint   Patient presents with    Results     labs    Diarrhea     complains of dizziness ,pt been on liquid diet of coconut water        HPI  [facility] follow-up  eval reviewed-7/5/2021--dx acute diverticulitis mid sigmoid--given rxs for flagyl and levaquin  improving w abx but not resolved  Remains on clear liquids--mostly coconut water per pt  Diarrhea lessening, no vomiting, feels dizziness at times, no CP or syncope  BP low end normal      The following portions of the patient's history were reviewed and updated as appropriate: allergies, current medications, past family history, past medical history, past social history, past surgical history and problem list     Review of Systems   Constitutional: Positive for fatigue  Negative for fever  Eyes:        Wears glasses   Respiratory:        Hx RAD   Cardiovascular: Negative  Gastrointestinal: Positive for diarrhea  Negative for blood in stool and vomiting          GERD  Hx Diverticulitis   Endocrine:        PreDM Musculoskeletal: Positive for arthralgias  Skin: Positive for rash  Allergic/Immunologic: Positive for environmental allergies  Neurological: Positive for dizziness  Negative for syncope  Psychiatric/Behavioral: Positive for sleep disturbance  The patient is nervous/anxious            Current Outpatient Medications   Medication Sig Dispense Refill    albuterol (Ventolin HFA) 90 mcg/act inhaler Inhale 2 puffs every 4 (four) hours as needed for wheezing 1 Inhaler 5    amoxicillin (AMOXIL) 875 mg tablet       Ascorbic Acid (VITAMIN C) 1000 MG tablet       atorvastatin (LIPITOR) 20 mg tablet Take 1 tablet (20 mg total) by mouth daily (Patient taking differently: Take 20 mg by mouth daily at bedtime ) 90 tablet 3    Breo Ellipta 200-25 MCG/INH inhaler INHALE ONE PUFF BY MOUTH EVERY DAY - RINSE MOUTH AFTER  each 3    cholecalciferol (VITAMIN D3) 1,000 units tablet Take 2,000 Units by mouth daily      clobetasol (TEMOVATE) 0 05 % cream Apply topically 2 (two) times a day 45 g 1    cyclobenzaprine (FLEXERIL) 10 mg tablet Take 10 mg by mouth 3 (three) times a day as needed for muscle spasms (as needed for pain)      famotidine (PEPCID) 20 mg tablet Take 1 tablet (20 mg total) by mouth 2 (two) times a day 60 tablet 11    hydrocortisone 2 5 % cream 2 (two) times a day scalp      metFORMIN (GLUCOPHAGE-XR) 500 mg 24 hr tablet Take 1 tablet (500 mg total) by mouth daily with dinner 90 tablet 2    multivitamin (THERAGRAN) TABS Take 1 tablet by mouth daily Last dose 3/21/21      naproxen (NAPROSYN) 500 mg tablet Take 1 tablet (500 mg total) by mouth 2 (two) times a day with meals 60 tablet 0    triamcinolone (KENALOG) 0 025 % cream Apply topically 2 (two) times a day Face, neck 30 g 1    Zinc 50 MG CAPS Take 50 mg by mouth daily       neomycin-bacitracin-polymyxin b (NEOSPORIN) ointment Apply topically 2 (two) times a day for 7 days (Patient not taking: Reported on 7/1/2021) 15 g 0     No current facility-administered medications for this visit  Objective:    BP 98/62 (BP Location: Left arm, Patient Position: Sitting, Cuff Size: Large)   Pulse 78   Temp (!) 97 1 °F (36 2 °C)   Resp 14   Ht 5' 4" (1 626 m)   Wt 73 4 kg (161 lb 12 8 oz)   BMI 27 77 kg/m²        Physical Exam  Vitals and nursing note reviewed  Constitutional:       General: She is not in acute distress  Appearance: Normal appearance  Eyes:      General: No scleral icterus  Conjunctiva/sclera: Conjunctivae normal    Cardiovascular:      Rate and Rhythm: Normal rate and regular rhythm  Pulmonary:      Effort: Pulmonary effort is normal  No respiratory distress  Breath sounds: Normal breath sounds  Abdominal:      General: Bowel sounds are normal       Palpations: Abdomen is soft  Tenderness: There is abdominal tenderness (mild LLQ)  There is no right CVA tenderness, left CVA tenderness, guarding or rebound  Musculoskeletal:      Cervical back: Neck supple  Skin:     General: Skin is warm and dry  Coloration: Skin is not jaundiced  Neurological:      General: No focal deficit present  Mental Status: She is alert and oriented to person, place, and time  Cranial Nerves: No cranial nerve deficit     Psychiatric:         Mood and Affect: Mood normal            Joe Jarrett MD

## 2021-07-27 ENCOUNTER — OFFICE VISIT (OUTPATIENT)
Dept: DERMATOLOGY | Age: 66
End: 2021-07-27
Payer: MEDICARE

## 2021-07-27 VITALS — HEIGHT: 64 IN | WEIGHT: 160 LBS | BODY MASS INDEX: 27.31 KG/M2 | TEMPERATURE: 99 F

## 2021-07-27 DIAGNOSIS — L28.2 PRURIGO: Primary | ICD-10-CM

## 2021-07-27 DIAGNOSIS — L56.8 PHOTOALLERGIC DERMATITIS: ICD-10-CM

## 2021-07-27 DIAGNOSIS — L21.9 SEBORRHEIC DERMATITIS: ICD-10-CM

## 2021-07-27 PROCEDURE — 99213 OFFICE O/P EST LOW 20 MIN: CPT | Performed by: DERMATOLOGY

## 2021-07-27 RX ORDER — TRIAMCINOLONE ACETONIDE 0.25 MG/G
CREAM TOPICAL 2 TIMES DAILY
Qty: 80 G | Refills: 5 | Status: SHIPPED | OUTPATIENT
Start: 2021-07-27

## 2021-07-27 NOTE — PATIENT INSTRUCTIONS
1  PHOTO DERMATITIS? 2  PRURIGO      Based on a thorough discussion of this condition and the management approach to it (including a comprehensive discussion of the known risks, side effects and potential benefits of treatment), the patient (family) agrees to implement the following specific plan:  · Continue same regimen: Triamcinolone 0 025% ointment twice daily for 1 month then cut down to daily   · Can use Triamcinolone ointment 0 025% on buttock   · Continue to monitor for any changes   · Follow up in 2 months      Rosacea is a chronic rash affecting the mid-face including the nose, cheeks, chin, forehead, and eyelids  The incidence is usually greatest between the ages of 30-60 years and is more common in people with fair skin  Common characteristics include redness, telangiectasias, papules and pustules over affected areas  Rosacea may look similar to acne, but there is a lack of comedones  Occasionally the eyes may also be involved in ocular rosacea  In advanced disease, enlargement of the sebaceous glands in the nose, termed rhinophyma, may be present       Rosacea results in red spots (papules) and sometimes pustules over the face, but unlike acne there are no blackheads, whiteheads, or cystic nodules  Patients often experience increased facial flushing with prominent blood vessels (erythematotelangiectatic rosacea) and dry, sensitive skin  These symptoms are exacerbated by sun exposure, hot or spicy foods, topical steroids and oil-based facial products       In ocular rosacea, eyelids may be red and sore due to conjunctivitis, keratitis, and episcleritis  If rhinophyma develops due to enlargement of sebaceous glands, the patient may have an enlarged and irregularly shaped nose with prominent pores   In rosacea that is refractory to treatment, patients can develop persistent redness and swelling of the face due to lymphatic obstruction (Morbihan disease)       Distribution around the cheeks may be confused with the malar or butterfly rash of lupus  However, the rash of lupus spares the nasal creases and lacks papules and pustules  If signs of photosensitivity, oral ulcers, arthritis, and kidney dysfunction are present then consider referral to a rheumatologist       There are many potential causes of rosacea including genetic, environmental, vascular, and inflammatory factors   These include, but are not limited to:  · Chronic exposure to ultraviolet radiation   · Increased immune responses in the form of cathelicidins that promote vessel dilation and infiltration with white blood cells (neutrophils) into the dermis  · Increased matrix metalloproteinases such as collagen and elastase that remodel normal tissue may contribute to inflammation of the skin making it thicker and harder  · There is some evidence to suggest that increased numbers of demodex mites on patient skin may contribute to rosacea papules      General Treatment Approach   · Avoid exacerbating factors such as heat, spicy foods, and alcohol   · Use daily SPF30+ sunscreen and other methods of coverage for sun protection  · Use water-based make-up   · Avoid applying topical steroids to affected areas as they can cause perioral dermatitis and exacerbate rosacea      Topical Treatment Approach  · Metronidazole cream or gel by itself or in combination with oral antibiotics for more severe cases  · Azelaic acid cream or lotion is effective for mild inflammatory rosacea when applied twice daily to affected areas  · Brimonidine gel and oxymetazoline hydrochloride cream can reduce facial redness temporarily   · Ivermectin cream can treat papulopustular rosacea by controlling demodex mites and inflammation   · Pimecrolimus cream or tacrolimus ointment twice a day for 2-3 months can help reduce inflammation     Oral Treatment Approach  · Antibiotics such as doxycycline, minocycline, or erythromycin for 1-3 months  · Clonidine and carvedilol can help reduce facial flushing and are generally well tolerated  Common side effects include low blood pressure, gastrointestinal upset, dry eyes, blurred vision and low heart rate  · Isotretinoin at low doses can be effective for long term treatment when antibiotics fail  Side effects may make it unsuitable for some patients     · NSAIDs such as diclofenac can help reduce discomfort and redness in the skin       Procedural/Surgical Treatment Approach   · Vascular lasers or intense pulsed light treatment may be used to treat persistent telangiectasia and papulopustular rosacea  · Plastic surgery and carbon dioxide lasers may be used to treat rhinophyma

## 2021-07-27 NOTE — PROGRESS NOTES
[facility] Follow Up Note    Patient Name: Pablo Cortes  Encounter Date: 07/27/2021    Today's Chief Concerns:  Tessie Aide Concern #1:  Follow up for dermatitis       Current Medications:    Current Outpatient Medications:     albuterol (Ventolin HFA) 90 mcg/act inhaler, Inhale 2 puffs every 4 (four) hours as needed for wheezing, Disp: 1 Inhaler, Rfl: 5    Ascorbic Acid (VITAMIN C) 1000 MG tablet, , Disp: , Rfl:     atorvastatin (LIPITOR) 20 mg tablet, Take 1 tablet (20 mg total) by mouth daily (Patient taking differently: Take 20 mg by mouth daily at bedtime ), Disp: 90 tablet, Rfl: 3    Breo Ellipta 200-25 MCG/INH inhaler, INHALE ONE PUFF BY MOUTH EVERY DAY - RINSE MOUTH AFTER USE, Disp: 180 each, Rfl: 3    cholecalciferol (VITAMIN D3) 1,000 units tablet, Take 2,000 Units by mouth daily, Disp: , Rfl:     clobetasol (TEMOVATE) 0 05 % cream, Apply topically 2 (two) times a day, Disp: 45 g, Rfl: 1    cyclobenzaprine (FLEXERIL) 10 mg tablet, Take 10 mg by mouth 3 (three) times a day as needed for muscle spasms (as needed for pain), Disp: , Rfl:     famotidine (PEPCID) 20 mg tablet, Take 1 tablet (20 mg total) by mouth 2 (two) times a day, Disp: 60 tablet, Rfl: 11    hydrocortisone 2 5 % cream, 2 (two) times a day scalp, Disp: , Rfl:     metFORMIN (GLUCOPHAGE-XR) 500 mg 24 hr tablet, Take 1 tablet (500 mg total) by mouth daily with dinner, Disp: 90 tablet, Rfl: 2    triamcinolone (KENALOG) 0 025 % cream, Apply topically 2 (two) times a day Face, neck, Disp: 30 g, Rfl: 1    amoxicillin (AMOXIL) 875 mg tablet, , Disp: , Rfl:     multivitamin (THERAGRAN) TABS, Take 1 tablet by mouth daily Last dose 3/21/21, Disp: , Rfl:     naproxen (NAPROSYN) 500 mg tablet, Take 1 tablet (500 mg total) by mouth 2 (two) times a day with meals, Disp: 60 tablet, Rfl: 0    neomycin-bacitracin-polymyxin b (NEOSPORIN) ointment, Apply topically 2 (two) times a day for 7 days (Patient not taking: Reported on 7/1/2021), Disp: 15 g, Rfl: 0    Zinc 50 MG CAPS, Take 50 mg by mouth daily , Disp: , Rfl:     CONSTITUTIONAL:   Vitals:    07/27/21 1342   Temp: 99 °F (37 2 °C)   TempSrc: Tympanic   Weight: 72 6 kg (160 lb)   Height: 5' 4" (1 626 m)         Specific Alerts:    Have you been seen by a [facility] Dermatologist in the last 3 years? YES    Are you pregnant or planning to become pregnant? No    Are you currently or planning to be nursing or breast feeding? No    Allergies   Allergen Reactions    Latex Rash     Burn-skin irritation    Adhesive [Medical Tape] Other (See Comments)     bandaid-red,itch       May we call your Preferred Phone number to discuss your specific medical information? YES    May we leave a detailed message that includes your specific medical information? YES    Have you traveled outside of the [facility] in the past 3 months? No    Do you currently have a pacemaker or defibrillator? No    Do you have any artificial heart valves, joints, plates, screws, rods, stents, pins, etc? No   - If Yes, were any placed within the last 2 years? Do you require any medications prior to a surgical procedure? No    Are you taking any medications that cause you to bleed more easily ("blood thinners") No    Have you ever experienced a rapid heartbeat with epinephrine? No    Have you ever been treated with "gold" (gold sodium thiomalate) therapy? No    [address] D[facility]at[facility] can help with wrinkles, "laugh lines," facial volume loss, "double chin," "love handles," age spots, and more  Are you interested in learning today about some of the skin enhancement procedures that we offer? (If Yes, please provide more detail) No    Review of Systems:  Have you recently had or currently have any of the following?     · Fever or chills: No  · Night Sweats: No  · Headaches: YES  · Weight Gain: No  · Weight Loss: YES  · Blurry Vision: No  · Nausea: YES  · Vomiting: YES  · Diarrhea: YES  · Blood in Stool: No  · Abdominal Pain: YES  · Itchy Skin: No  · Painful Joints: YES  · Swollen Joints: YES  · Muscle Pain: YES  · Irregular Mole: No  · Sun Burn: No  · Dry Skin: No  · Skin Color Changes: No  · Scar or Keloid: No  · Cold Sores/Fever Blisters: No  · Bacterial Infections/MRSA: No  · Anxiety: No  · Depression: No  · Suicidal or Homicidal Thoughts: No      PSYCH: Normal mood and affect  EYES: Normal conjunctiva  ENT: Normal lips and oral mucosa  CARDIOVASCULAR: No edema  RESPIRATORY: Normal respirations  HEME/LYMPH/IMMUNO:  No regional lymphadenopathy except as noted below in ASSESSMENT AND PLAN BY DIAGNOSIS    FULL ORGAN SYSTEM SKIN EXAM (SKIN)   Hair, Scalp, Ears, Face Normal except as noted below in Assessment   Neck, Cervical Chain Nodes Normal except as noted below in Assessment   Right Arm/Hand/Fingers Normal except as noted below in Assessment   Left Arm/Hand/Fingers Normal except as noted below in Assessment   Chest/Breasts/Axillae Viewed areas Normal except as noted below in Assessment   Abdomen, Umbilicus Normal except as noted below in Assessment   Back/Spine Normal except as noted below in Assessment   Groin/Genitalia/Buttocks Viewed areas Normal except as noted below in Assessment   Right Leg, Foot, Toes Normal except as noted below in Assessment   Left Leg, Foot, Toes Normal except as noted below in Assessment       1  PHOTO DERMATITIS? 2  PRURIGO      Physical Exam:  · Anatomic Location Affected:  Face, upper forehead  · Morphological Description:  Erythematous and tan  patches, scattered atrophic slightly scarred plaques on scalp  · Pertinent Positives: N/A  · Pertinent Negatives: N/A     Additional History of Present Condition:  Patient is still working with her allergist is helping with some of the allergie  Patient has been using the Triamcinolone 0 025% cream twice daily along with sun screen  and  neutering lotion  Patient report rash is resolving   Was treated for Lupus      Assessment and Plan: initially c/o flushing of face, but no response to tx as rosacea  With allergy testing had  several +, if does not improve with tx will need to bx to r/o SLE/DLE as now has more of a photo dermatitis appearance on forehead  Based on a thorough discussion of this condition and the management approach to it (including a comprehensive discussion of the known risks, side effects and potential benefits of treatment), the patient (family) agrees to implement the following specific plan:  · Continue same regimen: Triamcinolone 0 025% ointment twice daily for 1 month then cut down to daily   · Can use Triamcinolone ointment 0 025% on buttock   · Continue to monitor for any changes   · Follow up in 2 months      Rosacea is a chronic rash affecting the mid-face including the nose, cheeks, chin, forehead, and eyelids  The incidence is usually greatest between the ages of 30-60 years and is more common in people with fair skin  Common characteristics include redness, telangiectasias, papules and pustules over affected areas  Rosacea may look similar to acne, but there is a lack of comedones  Occasionally the eyes may also be involved in ocular rosacea  In advanced disease, enlargement of the sebaceous glands in the nose, termed rhinophyma, may be present       Rosacea results in red spots (papules) and sometimes pustules over the face, but unlike acne there are no blackheads, whiteheads, or cystic nodules  Patients often experience increased facial flushing with prominent blood vessels (erythematotelangiectatic rosacea) and dry, sensitive skin  These symptoms are exacerbated by sun exposure, hot or spicy foods, topical steroids and oil-based facial products       In ocular rosacea, eyelids may be red and sore due to conjunctivitis, keratitis, and episcleritis  If rhinophyma develops due to enlargement of sebaceous glands, the patient may have an enlarged and irregularly shaped nose with prominent pores   In rosacea that is refractory to treatment, patients can develop persistent redness and swelling of the face due to lymphatic obstruction (Morbihan disease)       Distribution around the cheeks may be confused with the malar or butterfly rash of lupus  However, the rash of lupus spares the nasal creases and lacks papules and pustules  If signs of photosensitivity, oral ulcers, arthritis, and kidney dysfunction are present then consider referral to a rheumatologist       There are many potential causes of rosacea including genetic, environmental, vascular, and inflammatory factors   These include, but are not limited to:  · Chronic exposure to ultraviolet radiation   · Increased immune responses in the form of cathelicidins that promote vessel dilation and infiltration with white blood cells (neutrophils) into the dermis  · Increased matrix metalloproteinases such as collagen and elastase that remodel normal tissue may contribute to inflammation of the skin making it thicker and harder  · There is some evidence to suggest that increased numbers of demodex mites on patient skin may contribute to rosacea papules      General Treatment Approach   · Avoid exacerbating factors such as heat, spicy foods, and alcohol   · Use daily SPF30+ sunscreen and other methods of coverage for sun protection  · Use water-based make-up   · Avoid applying topical steroids to affected areas as they can cause perioral dermatitis and exacerbate rosacea      Topical Treatment Approach  · Metronidazole cream or gel by itself or in combination with oral antibiotics for more severe cases  · Azelaic acid cream or lotion is effective for mild inflammatory rosacea when applied twice daily to affected areas  · Brimonidine gel and oxymetazoline hydrochloride cream can reduce facial redness temporarily   · Ivermectin cream can treat papulopustular rosacea by controlling demodex mites and inflammation   · Pimecrolimus cream or tacrolimus ointment twice a day for 2-3 months can help reduce inflammation     Oral Treatment Approach  · Antibiotics such as doxycycline, minocycline, or erythromycin for 1-3 months  · Clonidine and carvedilol can help reduce facial flushing and are generally well tolerated  Common side effects include low blood pressure, gastrointestinal upset, dry eyes, blurred vision and low heart rate  · Isotretinoin at low doses can be effective for long term treatment when antibiotics fail  Side effects may make it unsuitable for some patients     · NSAIDs such as diclofenac can help reduce discomfort and redness in the skin       Procedural/Surgical Treatment Approach   · Vascular lasers or intense pulsed light treatment may be used to treat persistent telangiectasia and papulopustular rosacea  · Plastic surgery and carbon dioxide lasers may be used to treat rhinophyma          Scribe Attestation    I,:  Maikel Sandy am acting as a scribe while in the presence of the attending physician :       I,:  Santo Brandon MD personally performed the services described in this documentation    as scribed in my presence :

## 2021-08-08 NOTE — PROGRESS NOTES
Assessment/Plan:    1  Rash of face  -     CBC; Future  -     Comprehensive metabolic panel; Future  -     Lyme Total Antibody Profile with reflex to WB; Future  -     C-reactive protein; Future  -     GISELA Screen w/ Reflex to Titer/Pattern; Future    2  Prediabetes  Assessment & Plan:  Smc5j=1%    Orders:  -     POCT hemoglobin A1c  -     Comprehensive metabolic panel; Future  -     Magnesium; Future  -     POCT urine dip auto non-scope    3  Dyslipidemia  -     Amylase; Future  -     Lipase; Future  -     CBC; Future  -     Comprehensive metabolic panel; Future  -     Lipid Panel with Direct LDL reflex; Future    4  Hypothyroidism, unspecified type  -     Comprehensive metabolic panel; Future  -     Lipid Panel with Direct LDL reflex; Future  -     TSH, 3rd generation; Future    5  Pain involving joints of fingers of both hands  -     CBC; Future  -     Comprehensive metabolic panel; Future  -     Lyme Total Antibody Profile with reflex to WB; Future  -     C-reactive protein; Future  -     GISELA Screen w/ Reflex to Titer/Pattern; Future    6  Gastroesophageal reflux disease without esophagitis  -     famotidine (PEPCID) 20 mg tablet; Take 1 tablet (20 mg total) by mouth daily    7  Blister of toe of right foot with infection, sequela  -     mupirocin (BACTROBAN) 2 % ointment; Apply topically 3 (three) times a day To affected site on right foot x 7 days    8  Abnormal urine  -     Urine culture    9  Adult BMI 29 0-29 9 kg/sq m    10  Medicare annual wellness visit, subsequent    BMI Counseling: Body mass index is 29 76 kg/m²  The BMI is above normal  Nutrition recommendations include encouraging healthy choices of fruits and vegetables, consuming healthier snacks, moderation in carbohydrate intake, increasing intake of lean protein, reducing intake of saturated and trans fat and reducing intake of cholesterol  Exercise recommendations include exercising 3-5 times per week and strength training exercises   No Patient examined on 08/08/21. H/p dictation id [id number]. Acute dchf. DmII. Htn. Dyslipidemia. Copd. pharmacotherapy was ordered  Patient Instructions       Medicare Preventive Visit Patient Instructions  Thank you for completing your Welcome to Medicare Visit or Medicare Annual Wellness Visit today  Your next wellness visit will be due in one year (1/11/2022)  The screening/preventive services that you may require over the next 5-10 years are detailed below  Some tests may not apply to you based off risk factors and/or age  Screening tests ordered at today's visit but not completed yet may show as past due  Also, please note that scanned in results may not display below  Preventive Screenings:  Service Recommendations Previous Testing/Comments   Colorectal Cancer Screening  * Colonoscopy    * Fecal Occult Blood Test (FOBT)/Fecal Immunochemical Test (FIT)  * Fecal DNA/Cologuard Test  * Flexible Sigmoidoscopy Age: 54-65 years old   Colonoscopy: every 10 years (may be performed more frequently if at higher risk)  OR  FOBT/FIT: every 1 year  OR  Cologuard: every 3 years  OR  Sigmoidoscopy: every 5 years  Screening may be recommended earlier than age 48 if at higher risk for colorectal cancer  Also, an individualized decision between you and your healthcare provider will decide whether screening between the ages of 74-80 would be appropriate  Colonoscopy: 03/02/2012  FOBT/FIT: Not on file  Cologuard: Not on file  Sigmoidoscopy: Not on file    Screening Current     Breast Cancer Screening Age: 36 years old  Frequency: every 1-2 years  Not required if history of left and right mastectomy Mammogram: 12/29/2020    Screening Current   Cervical Cancer Screening Between the ages of 21-29, pap smear recommended once every 3 years  Between the ages of 33-67, can perform pap smear with HPV co-testing every 5 years     Recommendations may differ for women with a history of total hysterectomy, cervical cancer, or abnormal pap smears in past  Pap Smear: Not on file    Screening Not Indicated   Hepatitis C Screening Once for adults born between [facility]  More frequently in patients at high risk for Hepatitis C Hep C Antibody: 04/26/2019    Screening Current   Diabetes Screening 1-2 times per year if you're at risk for diabetes or have pre-diabetes Fasting glucose: 108 mg/dL   A1C: 6 1 %       Cholesterol Screening Once every 5 years if you don't have a lipid disorder  May order more often based on risk factors  Lipid panel: 08/21/2019    Screening Not Indicated  History Lipid Disorder     Other Preventive Screenings Covered by Medicare:  1  Abdominal Aortic Aneurysm (AAA) Screening: covered once if your at risk  You're considered to be at risk if you have a family history of AAA  2  Lung Cancer Screening: covers low dose CT scan once per year if you meet all of the following conditions: (1) Age 50-69; (2) No signs or symptoms of lung cancer; (3) Current smoker or have quit smoking within the last 15 years; (4) You have a tobacco smoking history of at least 30 pack years (packs per day multiplied by number of years you smoked); (5) You get a written order from a healthcare provider  3  Glaucoma Screening: covered annually if you're considered high risk: (1) You have diabetes OR (2) Family history of glaucoma OR (3)  aged 48 and older OR (3)  American aged 72 and older  3  Osteoporosis Screening: covered every 2 years if you meet one of the following conditions: (1) You're estrogen deficient and at risk for osteoporosis based off medical history and other findings; (2) Have a vertebral abnormality; (3) On glucocorticoid therapy for more than 3 months; (4) Have primary hyperparathyroidism; (5) On osteoporosis medications and need to assess response to drug therapy  · Last bone density test (DXA Scan): 11/22/2017  5  HIV Screening: covered annually if you're between the age of 12-76  Also covered annually if you are younger than 13 and older than 72 with risk factors for HIV infection   For pregnant patients, it is covered up to 3 times per pregnancy  Immunizations:  Immunization Recommendations   Influenza Vaccine Annual influenza vaccination during flu season is recommended for all persons aged >= 6 months who do not have contraindications   Pneumococcal Vaccine (Prevnar and Pneumovax)  * Prevnar = PCV13  * Pneumovax = PPSV23   Adults 25-60 years old: 1-3 doses may be recommended based on certain risk factors  Adults 72 years old: Prevnar (PCV13) vaccine recommended followed by Pneumovax (PPSV23) vaccine  If already received PPSV23 since turning 65, then PCV13 recommended at least one year after PPSV23 dose  Hepatitis B Vaccine 3 dose series if at intermediate or high risk (ex: diabetes, end stage renal disease, liver disease)   Tetanus (Td) Vaccine - COST NOT COVERED BY MEDICARE PART B Following completion of primary series, a booster dose should be given every 10 years to maintain immunity against tetanus  Td may also be given as tetanus wound prophylaxis  Tdap Vaccine - COST NOT COVERED BY MEDICARE PART B Recommended at least once for all adults  For pregnant patients, recommended with each pregnancy  Shingles Vaccine (Shingrix) - COST NOT COVERED BY MEDICARE PART B  2 shot series recommended in those aged 48 and above     Health Maintenance Due:      Topic Date Due    MAMMOGRAM  12/29/2021    Colorectal Cancer Screening  03/02/2022    Hepatitis C Screening  Completed     Immunizations Due:  There are no preventive care reminders to display for this patient  Advance Directives   What are advance directives? Advance directives are legal documents that state your wishes and plans for medical care  These plans are made ahead of time in case you lose your ability to make decisions for yourself  Advance directives can apply to any medical decision, such as the treatments you want, and if you want to donate organs  What are the types of advance directives?   There are many types of advance directives, and each state has rules about how to use them  You may choose a combination of any of the following:  · Living will: This is a written record of the treatment you want  You can also choose which treatments you do not want, which to limit, and which to stop at a certain time  This includes surgery, medicine, IV fluid, and tube feedings  · Durable power of  for healthcare [facility]): This is a written record that states who you want to make healthcare choices for you when you are unable to make them for yourself  This person, called a proxy, is usually a family member or a friend  You may choose more than 1 proxy  · Do not resuscitate (DNR) order:  A DNR order is used in case your heart stops beating or you stop breathing  It is a request not to have certain forms of treatment, such as CPR  A DNR order may be included in other types of advance directives  · Medical directive: This covers the care that you want if you are in a coma, near death, or unable to make decisions for yourself  You can list the treatments you want for each condition  Treatment may include pain medicine, surgery, blood transfusions, dialysis, IV or tube feedings, and a ventilator (breathing machine)  · Values history: This document has questions about your views, beliefs, and how you feel and think about life  This information can help others choose the care that you would choose  Why are advance directives important? An advance directive helps you control your care  Although spoken wishes may be used, it is better to have your wishes written down  Spoken wishes can be misunderstood, or not followed  Treatments may be given even if you do not want them  An advance directive may make it easier for your family to make difficult choices about your care  Urinary Incontinence   Urinary incontinence (UI)  is when you lose control of your bladder  UI develops because your bladder cannot store or empty urine properly   The 3 most common types of UI are stress incontinence, urge incontinence, or both  Medicines:   · May be given to help strengthen your bladder control  Report any side effects of medication to your healthcare provider  Do pelvic muscle exercises often:  Your pelvic muscles help you stop urinating  Squeeze these muscles tight for 5 seconds, then relax for 5 seconds  Gradually work up to squeezing for 10 seconds  Do 3 sets of 15 repetitions a day, or as directed  This will help strengthen your pelvic muscles and improve bladder control  Train your bladder:  Go to the bathroom at set times, such as every 2 hours, even if you do not feel the urge to go  You can also try to hold your urine when you feel the urge to go  For example, hold your urine for 5 minutes when you feel the urge to go  As that becomes easier, hold your urine for 10 minutes  Self-care:   · Keep a UI record  Write down how often you leak urine and how much you leak  Make a note of what you were doing when you leaked urine  · Drink liquids as directed  You may need to limit the amount of liquid you drink to help control your urine leakage  Do not drink any liquid right before you go to bed  Limit or do not have drinks that contain caffeine or alcohol  · Prevent constipation  Eat a variety of high-fiber foods  Good examples are high-fiber cereals, beans, vegetables, and whole-grain breads  Walking is the best way to trigger your intestines to have a bowel movement  · Exercise regularly and maintain a healthy weight  Weight loss and exercise will decrease pressure on your bladder and help you control your leakage  · Use a catheter as directed  to help empty your bladder  A catheter is a tiny, plastic tube that is put into your bladder to drain your urine  · Go to behavior therapy as directed  Behavior therapy may be used to help you learn to control your urge to urinate      Weight Management   Why it is important to manage your weight: Being overweight increases your risk of health conditions such as heart disease, high blood pressure, type 2 diabetes, and certain types of cancer  It can also increase your risk for osteoarthritis, sleep apnea, and other respiratory problems  Aim for a slow, steady weight loss  Even a small amount of weight loss can lower your risk of health problems  How to lose weight safely:  A safe and healthy way to lose weight is to eat fewer calories and get regular exercise  You can lose up about 1 pound a week by decreasing the number of calories you eat by 500 calories each day  Healthy meal plan for weight management:  A healthy meal plan includes a variety of foods, contains fewer calories, and helps you stay healthy  A healthy meal plan includes the following:  · Eat whole-grain foods more often  A healthy meal plan should contain fiber  Fiber is the part of grains, fruits, and vegetables that is not broken down by your body  Whole-grain foods are healthy and provide extra fiber in your diet  Some examples of whole-grain foods are whole-wheat breads and pastas, oatmeal, brown rice, and bulgur  · Eat a variety of vegetables every day  Include dark, leafy greens such as spinach, kale, katelynn greens, and mustard greens  Eat yellow and orange vegetables such as carrots, sweet potatoes, and winter squash  · Eat a variety of fruits every day  Choose fresh or canned fruit (canned in its own juice or light syrup) instead of juice  Fruit juice has very little or no fiber  · Eat low-fat dairy foods  Drink fat-free (skim) milk or 1% milk  Eat fat-free yogurt and low-fat cottage cheese  Try low-fat cheeses such as mozzarella and other reduced-fat cheeses  · Choose meat and other protein foods that are low in fat  Choose beans or other legumes such as split peas or lentils  Choose fish, skinless poultry (chicken or turkey), or lean cuts of red meat (beef or pork)   Before you cook meat or poultry, cut off any visible fat    · Use less fat and oil  Try baking foods instead of frying them  Add less fat, such as margarine, sour cream, regular salad dressing and mayonnaise to foods  Eat fewer high-fat foods  Some examples of high-fat foods include french fries, doughnuts, ice cream, and cakes  · Eat fewer sweets  Limit foods and drinks that are high in sugar  This includes candy, cookies, regular soda, and sweetened drinks  Exercise:  Exercise at least 30 minutes per day on most days of the week  Some examples of exercise include walking, biking, dancing, and swimming  You can also fit in more physical activity by taking the stairs instead of the elevator or parking farther away from stores  Ask your healthcare provider about the best exercise plan for you  © Copyright [employer] 2018 Information is for End User's use only and may not be sold, redistributed or otherwise used for commercial purposes  All illustrations and images included in CareNotes® are the copyrighted property of [employer]  or [employer]      Subjective:      Patient ID: Chen Crowell is a 77 y o  female  Chief Complaint   Patient presents with    Rash     head , ears , face and neck patches that itch , needs topical for in between toes     Medicare Wellness Visit       HPI  Follow-up  Main c/o intermittent rash--mainly facial/neck, not painful, occ itchy  Also c/o multiple jt and mm pains, no trauma, no fever  ?hx autoimmune disease  Vcb0j=2%  BP wnl  Requests labs    The following portions of the patient's history were reviewed and updated as appropriate: allergies, current medications, past family history, past medical history, past social history, past surgical history and problem list     Review of Systems   Constitutional: Positive for fatigue  Negative for fever  Eyes:        Wears glasses   Respiratory:        Hx RAD   Cardiovascular: Negative      Gastrointestinal:        GERD   Endocrine:        PreDM   Musculoskeletal: Positive for arthralgias  Skin: Positive for rash  Allergic/Immunologic: Positive for environmental allergies  Neurological: Negative  Psychiatric/Behavioral: Positive for sleep disturbance  The patient is nervous/anxious  Current Outpatient Medications   Medication Sig Dispense Refill    albuterol (Ventolin HFA) 90 mcg/act inhaler Inhale 2 puffs every 4 (four) hours as needed for wheezing 1 Inhaler 5    Ascorbic Acid (VITAMIN C) 1000 MG tablet       atorvastatin (LIPITOR) 20 mg tablet Take 1 tablet (20 mg total) by mouth daily 90 tablet 3    cholecalciferol (VITAMIN D3) 1,000 units tablet Take 2,000 Units by mouth daily      clobetasol (TEMOVATE) 0 05 % cream Apply topically 2 (two) times a day 45 g 1    cyclobenzaprine (FLEXERIL) 10 mg tablet Take 10 mg by mouth 3 (three) times a day as needed for muscle spasms (as needed for pain)      fluticasone-salmeterol (Advair Diskus) 250-50 mcg/dose inhaler Inhale 1 puff 2 (two) times a day Rinse mouth after use   1 Inhaler 3    metFORMIN (GLUCOPHAGE-XR) 500 mg 24 hr tablet Take 1 tablet (500 mg total) by mouth daily with dinner 90 tablet 2    Multiple Vitamins-Minerals (HAIR SKIN AND NAILS FORMULA) TABS Take by mouth      multivitamin (THERAGRAN) TABS Take 1 tablet by mouth daily      pantoprazole (PROTONIX) 40 mg tablet Take 1 tablet (40 mg total) by mouth daily 30 tablet 5    Potassium 99 MG TABS Take 99 mg by mouth daily       Selenium 200 MCG CAPS Take 200 mcg by mouth daily       Zinc 50 MG CAPS Take 50 mg by mouth daily       Breo Ellipta 200-25 MCG/INH inhaler INHALE ONE PUFF BY MOUTH EVERY DAY - RINSE MOUTH AFTER  each 0    famotidine (PEPCID) 20 mg tablet Take 1 tablet (20 mg total) by mouth daily 30 tablet 1    hydrocortisone 2 5 % cream       mupirocin (BACTROBAN) 2 % ointment Apply topically 3 (three) times a day To affected site on right foot x 7 days 22 g 0     No current facility-administered medications for this visit  Objective:    /80 (BP Location: Left arm, Patient Position: Sitting, Cuff Size: Standard)   Pulse 78   Temp (!) 97 1 °F (36 2 °C)   Resp 16   Ht 5' 3" (1 6 m)   Wt 76 2 kg (168 lb)   BMI 29 76 kg/m²        Physical Exam  Vitals signs and nursing note reviewed  Constitutional:       General: She is not in acute distress  Appearance: Normal appearance  She is well-developed  HENT:      Mouth/Throat:      Pharynx: No oropharyngeal exudate  Eyes:      General: No scleral icterus  Conjunctiva/sclera: Conjunctivae normal       Comments: Conj  mildly injected   Neck:      Musculoskeletal: Neck supple  Cardiovascular:      Rate and Rhythm: Normal rate and regular rhythm  Pulses: Normal pulses  Pulmonary:      Effort: Pulmonary effort is normal  No respiratory distress  Breath sounds: Normal breath sounds  Abdominal:      General: Bowel sounds are normal       Palpations: Abdomen is soft  Tenderness: There is no abdominal tenderness  Musculoskeletal: Normal range of motion  General: No tenderness  Right lower leg: No edema  Left lower leg: No edema  Skin:     General: Skin is warm and dry  Coloration: Skin is not jaundiced  Comments: Few scattered red, blotchy  w some papular-pustular lesions on face -cheeks and foreheads, no hives   Some discoloration on neck     Neurological:      General: No focal deficit present  Mental Status: She is alert and oriented to person, place, and time  Cranial Nerves: No cranial nerve deficit     Psychiatric:         Mood and Affect: Mood normal          Ester Chaudhary MD

## 2021-08-26 ENCOUNTER — OFFICE VISIT (OUTPATIENT)
Dept: URGENT CARE | Facility: CLINIC | Age: 66
End: 2021-08-26
Payer: MEDICARE

## 2021-08-26 VITALS
RESPIRATION RATE: 14 BRPM | SYSTOLIC BLOOD PRESSURE: 139 MMHG | OXYGEN SATURATION: 98 % | DIASTOLIC BLOOD PRESSURE: 79 MMHG | HEART RATE: 86 BPM | TEMPERATURE: 98.2 F

## 2021-08-26 DIAGNOSIS — R21 RASH: Primary | ICD-10-CM

## 2021-08-26 PROCEDURE — 99203 OFFICE O/P NEW LOW 30 MIN: CPT | Performed by: PHYSICIAN ASSISTANT

## 2021-08-26 RX ORDER — METHYLPREDNISOLONE SODIUM SUCCINATE 40 MG/ML
60 INJECTION, POWDER, LYOPHILIZED, FOR SOLUTION INTRAMUSCULAR; INTRAVENOUS ONCE
Status: DISCONTINUED | OUTPATIENT
Start: 2021-08-26 | End: 2021-08-26

## 2021-08-26 RX ORDER — METHYLPREDNISOLONE SODIUM SUCCINATE 125 MG/2ML
60 INJECTION, POWDER, LYOPHILIZED, FOR SOLUTION INTRAMUSCULAR; INTRAVENOUS ONCE
Status: COMPLETED | OUTPATIENT
Start: 2021-08-26 | End: 2021-08-26

## 2021-08-26 RX ORDER — PREDNISONE 10 MG/1
TABLET ORAL
Qty: 18 TABLET | Refills: 0 | Status: SHIPPED | OUTPATIENT
Start: 2021-08-26 | End: 2022-01-03 | Stop reason: ALTCHOICE

## 2021-08-26 RX ORDER — FAMOTIDINE 20 MG/1
20 TABLET, FILM COATED ORAL 2 TIMES DAILY
Qty: 30 TABLET | Refills: 0 | Status: CANCELLED | OUTPATIENT
Start: 2021-08-26

## 2021-08-26 RX ORDER — HYDROXYZINE HYDROCHLORIDE 25 MG/1
25 TABLET, FILM COATED ORAL EVERY 6 HOURS PRN
Qty: 30 TABLET | Refills: 0 | Status: SHIPPED | OUTPATIENT
Start: 2021-08-26 | End: 2021-11-09

## 2021-08-26 RX ADMIN — METHYLPREDNISOLONE SODIUM SUCCINATE 60 MG: 125 INJECTION, POWDER, LYOPHILIZED, FOR SOLUTION INTRAMUSCULAR; INTRAVENOUS at 14:03

## 2021-08-26 NOTE — PROGRESS NOTES
[facility]        NAME: Cherie Vicente is a 77 y o  female  : 1955    MRN: [id number]  DATE: 2021  TIME: 1:55 PM    Assessment and Plan   Rash [R21]  1  Rash  hydrOXYzine HCL (ATARAX) 25 mg tablet    predniSONE 10 mg tablet    methylPREDNISolone sodium succinate (Solu-MEDROL) injection 60 mg         Patient Instructions     Patient Instructions   ATARAX INSTEAD OF THE BENADRYL   STEROID FOR 9 DAYS   Steroid shot today take your steroid tomorrow         Follow up with PCP in 3-5 days  Proceed to  ER if symptoms worsen  Chief Complaint     Chief Complaint   Patient presents with    Rash     started with a rash on scalp/face in 2020, today started with generalized rash,  sees an allergist and dermatology, unable to see derm until Thursday of next week  History of Present Illness       THE PATIENT IS A 77year old female presenting today for a generalized itchy rash  Starting in 2020 she developed a rash on her scalp and face  Today the rash is more generalized  She sees an allergist and a dermatologist but was unable to get into Dermatology until next week  The rash is itchy and burning  The pt is unable to stop scratching  Review of Systems   Review of Systems   Constitutional: Negative for activity change, appetite change, chills and fever  HENT: Negative for trouble swallowing  Skin: Positive for color change and rash  Negative for pallor and wound  Neurological: Negative for headaches           Current Medications       Current Outpatient Medications:     diphenhydrAMINE HCl (BENADRYL ALLERGY PO), Take by mouth, Disp: , Rfl:     Loratadine (CLARITIN PO), Take by mouth, Disp: , Rfl:     albuterol (Ventolin HFA) 90 mcg/act inhaler, Inhale 2 puffs every 4 (four) hours as needed for wheezing, Disp: 1 Inhaler, Rfl: 5    amoxicillin (AMOXIL) 875 mg tablet, , Disp: , Rfl:     Ascorbic Acid (VITAMIN C) 1000 MG tablet, , Disp: , Rfl:    atorvastatin (LIPITOR) 20 mg tablet, Take 1 tablet (20 mg total) by mouth daily (Patient taking differently: Take 20 mg by mouth daily at bedtime ), Disp: 90 tablet, Rfl: 3    Breo Ellipta 200-25 MCG/INH inhaler, INHALE ONE PUFF BY MOUTH EVERY DAY - RINSE MOUTH AFTER USE, Disp: 180 each, Rfl: 3    cholecalciferol (VITAMIN D3) 1,000 units tablet, Take 2,000 Units by mouth daily, Disp: , Rfl:     clobetasol (TEMOVATE) 0 05 % cream, Apply topically 2 (two) times a day, Disp: 45 g, Rfl: 1    cyclobenzaprine (FLEXERIL) 10 mg tablet, Take 10 mg by mouth 3 (three) times a day as needed for muscle spasms (as needed for pain), Disp: , Rfl:     famotidine (PEPCID) 20 mg tablet, Take 1 tablet (20 mg total) by mouth 2 (two) times a day, Disp: 60 tablet, Rfl: 11    hydrocortisone 2 5 % cream, 2 (two) times a day scalp, Disp: , Rfl:     hydrOXYzine HCL (ATARAX) 25 mg tablet, Take 1 tablet (25 mg total) by mouth every 6 (six) hours as needed for itching, Disp: 30 tablet, Rfl: 0    metFORMIN (GLUCOPHAGE-XR) 500 mg 24 hr tablet, Take 1 tablet (500 mg total) by mouth daily with dinner, Disp: 90 tablet, Rfl: 2    multivitamin (THERAGRAN) TABS, Take 1 tablet by mouth daily Last dose 3/21/21, Disp: , Rfl:     naproxen (NAPROSYN) 500 mg tablet, Take 1 tablet (500 mg total) by mouth 2 (two) times a day with meals, Disp: 60 tablet, Rfl: 0    neomycin-bacitracin-polymyxin b (NEOSPORIN) ointment, Apply topically 2 (two) times a day for 7 days (Patient not taking: Reported on 7/1/2021), Disp: 15 g, Rfl: 0    predniSONE 10 mg tablet, 3 tabs daily for 3 days, 2 tabs daily for 3 days, 1 tab daily for 3 days, Disp: 18 tablet, Rfl: 0    triamcinolone (KENALOG) 0 025 % cream, Apply topically 2 (two) times a day, Disp: 80 g, Rfl: 5    Zinc 50 MG CAPS, Take 50 mg by mouth daily , Disp: , Rfl:     Current Facility-Administered Medications:     methylPREDNISolone sodium succinate (Solu-MEDROL) injection 60 mg, 60 mg, Intramuscular, Once, Pierce Ramachandran PA-C    Current Allergies     Allergies as of 2021 - Reviewed 2021   Allergen Reaction Noted    Latex Rash 10/05/2016    Adhesive [medical tape] Other (See Comments) 2021            The following portions of the patient's history were reviewed and updated as appropriate: allergies, current medications, past family history, past medical history, past social history, past surgical history and problem list      Past Medical History:   Diagnosis Date    Abnormal glucose     last assessed 16    Arthritis     Asthma     w/ exacerbation; last assessed 5/14/15    Atypical nevi     Atypical nevus     last assessed 17    Breast lump     last assessed 3/6/14    Cataract     Cervical adenopathy     last assessed  17    Colon polyp     CPAP (continuous positive airway pressure) dependence     Dizziness     Dyslipidemia     last assessed 17    Facial droop     last assessed  16    Fibromyalgia, primary     Flu 2018    Foot abrasion     right between the 4th and 5th toe    Genital herpes     resolved 16    Headache, tension-type     Herpes zoster     last assessed 16    History of shingles     may 2016    History of stomach ulcers     Hx of abnormal mammogram     last assessed  3/5/14    Hyperlipidemia     Myalgia     last assessed  12    Myositis     12    Neck pain     Pain involving joints of fingers of both hands 2021    Peripheral neuropathy     Seborrheic keratosis     Shingles     Skin neoplasm     of the lower limb, including hip; onset 12; last assessed  12    Sleep apnea        Past Surgical History:   Procedure Laterality Date    ABDOMINAL SURGERY      release of adhesions    BLADDER SURGERY      mesh-lift    BREAST SURGERY      lift    CATARACT EXTRACTION Bilateral      SECTION      x [phone]    CHOLECYSTECTOMY      lap    COLONOSCOPY      COSMETIC SURGERY tummy and breast lift    ESOPHAGOGASTRODUODENOSCOPY      OOPHORECTOMY Left     OTHER SURGICAL HISTORY      vocal cord surgery, scraping    CO HYSTEROSCOPY,W/ENDO BX N/A 11/3/2016    Procedure: DILATATION AND CURETTAGE (D&C) WITH HYSTEROSCOPY;  Surgeon: Stefan Villalba MD;  Location: [address] [place] [address];  Service: Gynecology    REDUCTION MAMMAPLASTY Bilateral 2008    [place] [facility] PROCEDURE  5/25/2021       Family History   Problem Relation Age of Onset    Hypertension Mother     Alzheimer's disease Mother     Arthritis Mother     Hypertension Father     Arthritis Father     Heart attack Father     Heart disease Father     Stroke Father     Other Brother         vertigo, tinnitus    Diabetes Daughter     Breast cancer Sister 28    Skin cancer Sister     Other Son         downs syndrome    Abdominal aortic aneurysm Sister     Cancer Sister         T cell sarcoma    No Known Problems Brother     Diabetes Brother     Other Sister         anemia from the diet    No Known Problems Son          Medications have been verified  Objective   /79   Pulse 86   Temp 98 2 °F (36 8 °C)   Resp 14   SpO2 98%        Physical Exam     Physical Exam  Vitals reviewed  Constitutional:       General: She is not in acute distress  Appearance: Normal appearance  She is normal weight  She is not ill-appearing, toxic-appearing or diaphoretic  HENT:      Head: Normocephalic and atraumatic  Cardiovascular:      Rate and Rhythm: Normal rate and regular rhythm  Heart sounds: Normal heart sounds  No murmur heard  No friction rub  No gallop  Pulmonary:      Effort: Pulmonary effort is normal  No respiratory distress  Breath sounds: Normal breath sounds  No stridor  No wheezing, rhonchi or rales  Chest:      Chest wall: No tenderness  Skin:     General: Skin is warm and dry  Capillary Refill: Capillary refill takes less than 2 seconds     Neurological:      Mental Status: She is alert

## 2021-08-28 DIAGNOSIS — R73.03 PREDIABETES: ICD-10-CM

## 2021-08-28 RX ORDER — METFORMIN HYDROCHLORIDE 500 MG/1
TABLET, EXTENDED RELEASE ORAL
Qty: 90 TABLET | Refills: 2 | Status: SHIPPED | OUTPATIENT
Start: 2021-08-28 | End: 2022-03-08

## 2021-09-02 ENCOUNTER — OFFICE VISIT (OUTPATIENT)
Dept: DERMATOLOGY | Age: 66
End: 2021-09-02
Payer: MEDICARE

## 2021-09-02 VITALS — TEMPERATURE: 98.4 F | BODY MASS INDEX: 28.24 KG/M2 | WEIGHT: 164.5 LBS

## 2021-09-02 DIAGNOSIS — L21.9 SEBORRHEIC DERMATITIS OF SCALP: ICD-10-CM

## 2021-09-02 DIAGNOSIS — R21 RASH: Primary | ICD-10-CM

## 2021-09-02 PROCEDURE — 88350 IMFLUOR EA ADDL 1ANTB STN PX: CPT | Performed by: DERMATOLOGY

## 2021-09-02 PROCEDURE — 88342 IMHCHEM/IMCYTCHM 1ST ANTB: CPT | Performed by: PATHOLOGY

## 2021-09-02 PROCEDURE — 11105 PUNCH BX SKIN EA SEP/ADDL: CPT | Performed by: DERMATOLOGY

## 2021-09-02 PROCEDURE — 88300 SURGICAL PATH GROSS: CPT | Performed by: PATHOLOGY

## 2021-09-02 PROCEDURE — 88341 IMHCHEM/IMCYTCHM EA ADD ANTB: CPT | Performed by: PATHOLOGY

## 2021-09-02 PROCEDURE — 88305 TISSUE EXAM BY PATHOLOGIST: CPT | Performed by: PATHOLOGY

## 2021-09-02 PROCEDURE — 88313 SPECIAL STAINS GROUP 2: CPT | Performed by: PATHOLOGY

## 2021-09-02 PROCEDURE — 99213 OFFICE O/P EST LOW 20 MIN: CPT | Performed by: DERMATOLOGY

## 2021-09-02 PROCEDURE — 11104 PUNCH BX SKIN SINGLE LESION: CPT | Performed by: DERMATOLOGY

## 2021-09-02 RX ORDER — CLOBETASOL PROPIONATE 0.5 MG/G
CREAM TOPICAL 2 TIMES DAILY
Qty: 45 G | Refills: 1 | Status: SHIPPED | OUTPATIENT
Start: 2021-09-02 | End: 2021-09-09 | Stop reason: ALTCHOICE

## 2021-09-02 NOTE — PROGRESS NOTES
[facility] Follow Up Note    Patient Name:  Coast  Encounter Date: 09/02/21    Today's Chief Concerns:  Fredo Love Concern #1:  Rash follow up    Current Medications:    Current Outpatient Medications:     albuterol (Ventolin HFA) 90 mcg/act inhaler, Inhale 2 puffs every 4 (four) hours as needed for wheezing, Disp: 1 Inhaler, Rfl: 5    Ascorbic Acid (VITAMIN C) 1000 MG tablet, , Disp: , Rfl:     atorvastatin (LIPITOR) 20 mg tablet, Take 1 tablet (20 mg total) by mouth daily (Patient taking differently: Take 20 mg by mouth daily at bedtime ), Disp: 90 tablet, Rfl: 3    cholecalciferol (VITAMIN D3) 1,000 units tablet, Take 2,000 Units by mouth daily, Disp: , Rfl:     clobetasol (TEMOVATE) 0 05 % cream, Apply topically 2 (two) times a day, Disp: 45 g, Rfl: 1    diphenhydrAMINE HCl (BENADRYL ALLERGY PO), Take by mouth, Disp: , Rfl:     famotidine (PEPCID) 20 mg tablet, Take 1 tablet (20 mg total) by mouth 2 (two) times a day, Disp: 60 tablet, Rfl: 11    hydrocortisone 2 5 % cream, 2 (two) times a day scalp, Disp: , Rfl:     hydrOXYzine HCL (ATARAX) 25 mg tablet, Take 1 tablet (25 mg total) by mouth every 6 (six) hours as needed for itching, Disp: 30 tablet, Rfl: 0    metFORMIN (GLUCOPHAGE-XR) 500 mg 24 hr tablet, TAKE ONE TABLET BY MOUTH EVERY DAY WITH DINNER, Disp: 90 tablet, Rfl: 2    multivitamin (THERAGRAN) TABS, Take 1 tablet by mouth daily Last dose 3/21/21, Disp: , Rfl:     predniSONE 10 mg tablet, 3 tabs daily for 3 days, 2 tabs daily for 3 days, 1 tab daily for 3 days, Disp: 18 tablet, Rfl: 0    triamcinolone (KENALOG) 0 025 % cream, Apply topically 2 (two) times a day, Disp: 80 g, Rfl: 5    amoxicillin (AMOXIL) 875 mg tablet, , Disp: , Rfl:     Breo Ellipta 200-25 MCG/INH inhaler, INHALE ONE PUFF BY MOUTH EVERY DAY - RINSE MOUTH AFTER USE (Patient not taking: Reported on 9/2/2021), Disp: 180 each, Rfl: 3    cyclobenzaprine (FLEXERIL) 10 mg tablet, Take 10 mg by mouth 3 (three) times a day as needed for muscle spasms (as needed for pain) (Patient not taking: Reported on 9/2/2021), Disp: , Rfl:     Loratadine (CLARITIN PO), Take by mouth (Patient not taking: Reported on 9/2/2021), Disp: , Rfl:     naproxen (NAPROSYN) 500 mg tablet, Take 1 tablet (500 mg total) by mouth 2 (two) times a day with meals (Patient not taking: Reported on 9/2/2021), Disp: 60 tablet, Rfl: 0    neomycin-bacitracin-polymyxin b (NEOSPORIN) ointment, Apply topically 2 (two) times a day for 7 days (Patient not taking: Reported on 7/1/2021), Disp: 15 g, Rfl: 0    Zinc 50 MG CAPS, Take 50 mg by mouth daily  (Patient not taking: Reported on 9/2/2021), Disp: , Rfl:     CONSTITUTIONAL:   Vitals:    09/02/21 1340   Temp: 98 4 °F (36 9 °C)   TempSrc: Temporal   Weight: 74 6 kg (164 lb 8 oz)             Specific Alerts:    Have you been seen by a [facility] Dermatologist in the last 3 years? No    Are you pregnant or planning to become pregnant? No    Are you currently or planning to be nursing or breast feeding? No    Allergies   Allergen Reactions    Latex Rash     Burn-skin irritation    Adhesive [Medical Tape] Other (See Comments)     bandaid-red,itch       May we call your Preferred Phone number to discuss your specific medical information? YES    May we leave a detailed message that includes your specific medical information? YES    Have you traveled outside of the [facility] in the past 3 months? YES    Do you currently have a pacemaker or defibrillator? No    Do you have any artificial heart valves, joints, plates, screws, rods, stents, pins, etc? No   - If Yes, were any placed within the last 2 years? Do you require any medications prior to a surgical procedure? No   - If Yes, for which procedure? - If Yes, what medications to you require?      Are you taking any medications that cause you to bleed more easily ("blood thinners") No    Have you ever experienced a rapid heartbeat with epinephrine? No    Have you ever been treated with "gold" (gold sodium thiomalate) therapy? No    [facility] can help with wrinkles, "laugh lines," facial volume loss, "double chin," "love handles," age spots, and more  Are you interested in learning today about some of the skin enhancement procedures that we offer? (If Yes, please provide more detail) No    Review of Systems:  Have you recently had or currently have any of the following? · Fever or chills: No  · Night Sweats: No  · Headaches: No  · Weight Gain: No  · Weight Loss: No  · Blurry Vision: No  · Nausea: No  · Vomiting: No  · Diarrhea: No  · Blood in Stool: No  · Abdominal Pain: No  · Itchy Skin: YES  · Painful Joints: No  · Swollen Joints: No  · Muscle Pain: No  · Irregular Mole: No  · Sun Burn: No  · Dry Skin: YES  · Skin Color Changes: No  · Scar or Keloid: No  · Cold Sores/Fever Blisters: No  · Bacterial Infections/MRSA: No  · Anxiety: No  · Depression: No  · Suicidal or Homicidal Thoughts: No      PSYCH: Normal mood and affect  EYES: Normal conjunctiva  ENT: Normal lips and oral mucosa  CARDIOVASCULAR: No edema  RESPIRATORY: Normal respirations  HEME/LYMPH/IMMUNO:  No regional lymphadenopathy except as noted below in ASSESSMENT AND PLAN BY DIAGNOSIS    FULL ORGAN SYSTEM SKIN EXAM (SKIN)   Hair, Scalp, Ears, Face Normal except as noted below in Assessment   Neck, Cervical Chain Nodes Normal except as noted below in Assessment   Right Arm/Hand/Fingers Normal except as noted below in Assessment   Left Arm/Hand/Fingers Normal except as noted below in Assessment   Chest/Axillae Viewed areas Normal except as noted below in Assessment     RASH    Physical Exam:   (Anatomic Location); (Size and Morphological Description); (Differential Diagnosis):  o Specimen A: right medial chest; erdecaryal red plaques and aphotodistribution;  Diffdx: photodermatitis; rule out lupus, rule out polymorphous light eruption  o   o Specimen B - immunofluorescence: right lateral chest; erdecaryal red plaques and aphotodistribution; Diffdx: photodermatitis; rule out lupus, rule out polymorphous light eruption   Pertinent Positives:   Pertinent Negatives: Additional History of Present Condition:  Itchy, scalp face, chest upper arms;  clobetasol has helped the scalp  Was given prednisone by ER  Was painting outside prior to worsening of rash  Patient was evaluated by allergist and her tests were negative  He recommended biopsy  Patient with history of lupus, 1 episode in 1972  Assessment and Plan:  Based on a thorough discussion of this condition and the management approach to it (including a comprehensive discussion of the known risks, side effects and potential benefits of treatment), the patient (family) agrees to implement the following specific plan:   punch biopsy done in office today    PROCEDURE NOTE:  PUNCH BIOPSY      Performing Physician: Dr Echols    Anatomic Location; Clinical Description with size (cm); Pre-Op Diagnosis:   o  Specimen A: right medial chest; erdecaryal red plaques and aphotodistribution; Diffdx: photodermatitis; rule out lupus, rule out polymorphous light eruption  o    Specimen B - immunofluorescence: right lateral chest; erdecaryal red plaques and aphotodistribution; Diffdx: photodermatitis; rule out lupus, rule out polymorphous light eruption       Anesthesia: 1% xylocaine with epi       Topical anesthesia: None       Indications: To indicate diagnosis and management plan  Procedure Details     Patient informed of the risks (including bleeding,scaring and infection) and benefits of the procedure explained  Verbal and written informed consent obtained  The area was prepped and draped in the usual fashion  Anesthesia was obtained with 1% lidocaine with epinephrine   The skin was then stretched perpendicular to the skin tension lines and a punch biopsy to an appropriate sampling depth was obtained with a 3 mm punch with a forceps and iris scissors  Hemostasis was obtained with 4-0 Ethilon x 2 sutures  Complications:  None      Specimen has been sent for review by Dermatopathology  Plan:  1  Instructed to keep the wound dry and covered for 24-48h and clean thereafter  2  Warning signs of infection were reviewed  3  Recommended that the patient use acetaminophen as needed for pain  4  Sutures if any should be removed in 14 days      Standard post-procedure care has been explained and has been included in written form within the patient's copy of Informed Consent      Scribe Attestation    I,:  Sariah Gaitan am acting as a scribe while in the presence of the attending physician :       I,:  Santo Brandon MD personally performed the services described in this documentation    as scribed in my presence :

## 2021-09-02 NOTE — PATIENT INSTRUCTIONS
RASH     Assessment and Plan:  Based on a thorough discussion of this condition and the management approach to it (including a comprehensive discussion of the known risks, side effects and potential benefits of treatment), the patient (family) agrees to implement the following specific plan:  · Consent obtained for shave biopsy  · Shave biopsy done in office today     PROCEDURE NOTE:  PUNCH BIOPSY    Indications: To indicate diagnosis and management plan      Procedure Details      Patient informed of the risks (including bleeding,scaring and infection) and benefits of the procedure explained  Verbal and written informed consent obtained  The area was prepped and draped in the usual fashion  Anesthesia was obtained with 1% lidocaine with epinephrine  The skin was then stretched perpendicular to the skin tension lines and a punch biopsy to an appropriate sampling depth was obtained with a 3 mm punch with a forceps and iris scissors       Hemostasis was obtained with 4-0 Ethilon x 2 sutures  Complications:  None        Specimen has been sent for review by Dermatopathology         Plan:  1  Instructed to keep the wound dry and covered for 24-48h and clean thereafter  2  Warning signs of infection were reviewed  3  Recommended that the patient use acetaminophen as needed for pain  4  Sutures if any should be removed in 14 days        Standard post-procedure care has been explained and has been included in written form within the patient's copy of Informed Consent

## 2021-09-09 ENCOUNTER — TELEPHONE (OUTPATIENT)
Dept: DERMATOLOGY | Facility: CLINIC | Age: 66
End: 2021-09-09

## 2021-09-09 DIAGNOSIS — L56.8 PHOTOALLERGIC DERMATITIS: Primary | ICD-10-CM

## 2021-09-09 RX ORDER — CLOBETASOL PROPIONATE 0.46 MG/ML
SOLUTION TOPICAL 2 TIMES DAILY
Qty: 50 ML | Refills: 2 | Status: SHIPPED | OUTPATIENT
Start: 2021-09-09 | End: 2022-02-25

## 2021-09-09 NOTE — TELEPHONE ENCOUNTER
Tried to call patient twice, but unable to hear her on the phone  Call disconnected twice    Will try again

## 2021-09-09 NOTE — TELEPHONE ENCOUNTER
Patient left message on voicemail stating that the script she got was wrong  She said she was suppose to get a liquid not a cream       She would like a call back

## 2021-09-10 ENCOUNTER — TELEPHONE (OUTPATIENT)
Dept: DERMATOLOGY | Facility: CLINIC | Age: 66
End: 2021-09-10

## 2021-09-10 NOTE — TELEPHONE ENCOUNTER
Pt called RE: cream instead of liquid for her prescription, informed her that prescription was resent to the pharmacy for the liquid  Pt contacting pharmacy to see if they will take back incorrect medication previously prescribed  Pt thankful for the change in prescription  Thank you!

## 2021-09-15 NOTE — RESULT ENCOUNTER NOTE
Tissue Exam: [id number]  Order: [id number]  Status:  Final result   Visible to patient:  Yes ([address]) Next appt:  09/16/2021 at 04:40 PM in Dermatology Brigitte Cabezas MD) Dx:  Rash  0 Result Notes  Component   Case Report  Surgical Pathology Report                         Case: [id number]                                   Authorizing Provider: Jacek Isaacs MD      Collected:           09/02/2021 [facility]              Ordering Location:     [facility]      Received:            09/02/2021 [address] [place] [address]                                                                   Pathologist:           Cecilia Soria MD                                                           Specimens:   A) - Skin, Other, Specimen A: right medial chest                                                    B) - Skin, Other, Specimen B: right lateral chest (immunofluorescence)                   Final Diagnosis  A  Skin, right medial chest, punch biopsy:  Dermal edema with perivascular and interstitial neutrophils and lymphocytes, leukocytoclasis and intravascular neutrophils  See microscopic description and note  B  Skin, right lateral chest (immunofluorescence), punch biopsy:  Gross skin biopsy submitted for gross diagnosis only  Specimen entirely sent to [facility] for immunofluorescence analysis  A separate report will be issued by [facility] following completion of their studies          Note:  The findings are those of an urticarial reaction, that is rich in neutrophils  These histopathologic changes can be seen in early lesions of urticaria, physical urticaria and also in neutrophilic urticarial dermatosis that is seen in the setting of systemic lupus erythematosus, Still's disease and Schnitzler's syndrome  Clinicopathologic correlation is necessary     Electronically signed by Cecilia Soria MD on 9/13/2021 at  7:22 PM    DERMATOPATHOLOGY RESULT NOTE    Results reviewed by ordering physician  Called patient to personally discuss results  Discussed results with patient         Instructions for Clinical Derm Team:   (remember to route Result Note to appropriate staff):    None    Result & Plan by Specimen:    Specimen A: neutrophilic urticaria  Plan: referral to rheumatologists      Specimen B: neutrophilic urticaria  Plan: see above

## 2021-09-16 ENCOUNTER — OFFICE VISIT (OUTPATIENT)
Dept: DERMATOLOGY | Age: 66
End: 2021-09-16

## 2021-09-16 VITALS — BODY MASS INDEX: 27.9 KG/M2 | WEIGHT: 163.4 LBS | HEIGHT: 64 IN | TEMPERATURE: 98 F

## 2021-09-16 DIAGNOSIS — L50.9 URTICARIAL DERMATITIS: Primary | ICD-10-CM

## 2021-09-16 PROCEDURE — RECHECK: Performed by: DERMATOLOGY

## 2021-09-16 NOTE — PROGRESS NOTES
Suture removal    Date/Time: 9/16/2021 4:29 PM  Performed by: Brian Loo  Authorized by: Maddy Crocker MD   Universal Protocol:  Consent: Verbal consent obtained  Consent given by: patient  Timeout called at: 9/16/2021 4:29 PM   Patient understanding: patient states understanding of the procedure being performed  Patient consent: the patient's understanding of the procedure matches consent given  Procedure consent: procedure consent matches procedure scheduled  Patient identity confirmed: verbally with patient        Patient location:  Clinic  Location:     Laterality:  Right    Location:  Upper extremity (chest)  Procedure details: Tools used:  Suture removal kit    Wound appearance:  No sign(s) of infection, nonpurulent, good wound healing, nontender and clean    Sutures removed: 2  Post-procedure details:     Post-removal:  No dressing applied    Patient tolerance of procedure:   Tolerated well, no immediate complications      Scribe Attestation    I,:  Brian Loo am acting as a scribe while in the presence of the attending physician :       I,:  Maddy Crocker MD personally performed the services described in this documentation    as scribed in my presence :

## 2021-09-29 ENCOUNTER — APPOINTMENT (OUTPATIENT)
Dept: LAB | Facility: CLINIC | Age: 66
End: 2021-09-29
Payer: MEDICARE

## 2021-09-29 DIAGNOSIS — L50.9 URTICARIA, UNSPECIFIED: ICD-10-CM

## 2021-09-29 DIAGNOSIS — R21 RASH AND OTHER NONSPECIFIC SKIN ERUPTION: ICD-10-CM

## 2021-09-29 DIAGNOSIS — M25.50 PAIN IN JOINT, MULTIPLE SITES: ICD-10-CM

## 2021-09-29 DIAGNOSIS — M79.10 MYALGIA: ICD-10-CM

## 2021-09-29 DIAGNOSIS — R68.2 DRY MOUTH: ICD-10-CM

## 2021-09-29 DIAGNOSIS — R53.82 CHRONIC FATIGUE SYNDROME: ICD-10-CM

## 2021-09-29 LAB
ALBUMIN SERPL BCP-MCNC: 3.7 G/DL (ref 3.5–5)
ALP SERPL-CCNC: 76 U/L (ref 46–116)
ALT SERPL W P-5'-P-CCNC: 47 U/L (ref 12–78)
ANION GAP SERPL CALCULATED.3IONS-SCNC: 6 MMOL/L (ref 4–13)
AST SERPL W P-5'-P-CCNC: 28 U/L (ref 5–45)
BILIRUB SERPL-MCNC: 0.25 MG/DL (ref 0.2–1)
BUN SERPL-MCNC: 11 MG/DL (ref 5–25)
C3 SERPL-MCNC: 158 MG/DL (ref 90–180)
C4 SERPL-MCNC: 41 MG/DL (ref 10–40)
CALCIUM SERPL-MCNC: 9.9 MG/DL (ref 8.3–10.1)
CHLORIDE SERPL-SCNC: 106 MMOL/L (ref 100–108)
CO2 SERPL-SCNC: 25 MMOL/L (ref 21–32)
CREAT SERPL-MCNC: 0.95 MG/DL (ref 0.6–1.3)
GFR SERPL CREATININE-BSD FRML MDRD: 62 ML/MIN/1.73SQ M
GLUCOSE P FAST SERPL-MCNC: 129 MG/DL (ref 65–99)
POTASSIUM SERPL-SCNC: 4.3 MMOL/L (ref 3.5–5.3)
PROT SERPL-MCNC: 8 G/DL (ref 6.4–8.2)
RHEUMATOID FACT SER QL LA: NEGATIVE
SODIUM SERPL-SCNC: 137 MMOL/L (ref 136–145)

## 2021-09-29 PROCEDURE — 86038 ANTINUCLEAR ANTIBODIES: CPT

## 2021-09-29 PROCEDURE — 80053 COMPREHEN METABOLIC PANEL: CPT

## 2021-09-29 PROCEDURE — 86430 RHEUMATOID FACTOR TEST QUAL: CPT

## 2021-09-29 PROCEDURE — 86160 COMPLEMENT ANTIGEN: CPT

## 2021-09-29 PROCEDURE — 86235 NUCLEAR ANTIGEN ANTIBODY: CPT

## 2021-09-29 PROCEDURE — 86225 DNA ANTIBODY NATIVE: CPT

## 2021-09-30 LAB
DSDNA AB SER-ACNC: <1 IU/ML (ref 0–9)
ENA RNP AB SER-ACNC: <0.2 AI (ref 0–0.9)
ENA SM AB SER-ACNC: <0.2 AI (ref 0–0.9)
ENA SS-A AB SER-ACNC: <0.2 AI (ref 0–0.9)
ENA SS-B AB SER-ACNC: <0.2 AI (ref 0–0.9)

## 2021-10-01 LAB — RYE IGE QN: NEGATIVE

## 2021-11-09 ENCOUNTER — OFFICE VISIT (OUTPATIENT)
Dept: OBGYN CLINIC | Facility: CLINIC | Age: 66
End: 2021-11-09
Payer: MEDICARE

## 2021-11-09 ENCOUNTER — APPOINTMENT (OUTPATIENT)
Dept: RADIOLOGY | Facility: CLINIC | Age: 66
End: 2021-11-09
Payer: MEDICARE

## 2021-11-09 VITALS
HEART RATE: 80 BPM | WEIGHT: 165 LBS | HEIGHT: 64 IN | BODY MASS INDEX: 28.17 KG/M2 | DIASTOLIC BLOOD PRESSURE: 78 MMHG | SYSTOLIC BLOOD PRESSURE: 110 MMHG

## 2021-11-09 DIAGNOSIS — M72.2 PLANTAR FASCIITIS, RIGHT: ICD-10-CM

## 2021-11-09 DIAGNOSIS — S86.311A STRAIN OF PERONEAL TENDON, RIGHT, INITIAL ENCOUNTER: Primary | ICD-10-CM

## 2021-11-09 DIAGNOSIS — M79.671 PAIN IN RIGHT FOOT: ICD-10-CM

## 2021-11-09 PROCEDURE — 73630 X-RAY EXAM OF FOOT: CPT

## 2021-11-09 PROCEDURE — 99214 OFFICE O/P EST MOD 30 MIN: CPT | Performed by: ORTHOPAEDIC SURGERY

## 2021-11-16 ENCOUNTER — APPOINTMENT (OUTPATIENT)
Dept: LAB | Facility: CLINIC | Age: 66
End: 2021-11-16
Payer: MEDICARE

## 2021-11-16 ENCOUNTER — CLINICAL SUPPORT (OUTPATIENT)
Dept: FAMILY MEDICINE CLINIC | Facility: CLINIC | Age: 66
End: 2021-11-16
Payer: MEDICARE

## 2021-11-16 ENCOUNTER — TELEPHONE (OUTPATIENT)
Dept: FAMILY MEDICINE CLINIC | Facility: CLINIC | Age: 66
End: 2021-11-16

## 2021-11-16 DIAGNOSIS — J45.40 MODERATE PERSISTENT ASTHMA, UNSPECIFIED WHETHER COMPLICATED: ICD-10-CM

## 2021-11-16 DIAGNOSIS — K57.92 DIVERTICULITIS: ICD-10-CM

## 2021-11-16 DIAGNOSIS — Z23 NEED FOR PNEUMOCOCCAL VACCINATION: Primary | ICD-10-CM

## 2021-11-16 DIAGNOSIS — Z23 NEED FOR INFLUENZA VACCINATION: ICD-10-CM

## 2021-11-16 LAB
BASOPHILS # BLD AUTO: 0.07 THOUSANDS/ΜL (ref 0–0.1)
BASOPHILS NFR BLD AUTO: 1 % (ref 0–1)
EOSINOPHIL # BLD AUTO: 0.16 THOUSAND/ΜL (ref 0–0.61)
EOSINOPHIL NFR BLD AUTO: 2 % (ref 0–6)
ERYTHROCYTE [DISTWIDTH] IN BLOOD BY AUTOMATED COUNT: 13.2 % (ref 11.6–15.1)
HCT VFR BLD AUTO: 45 % (ref 34.8–46.1)
HGB BLD-MCNC: 13.9 G/DL (ref 11.5–15.4)
IMM GRANULOCYTES # BLD AUTO: 0.05 THOUSAND/UL (ref 0–0.2)
IMM GRANULOCYTES NFR BLD AUTO: 1 % (ref 0–2)
LYMPHOCYTES # BLD AUTO: 1.98 THOUSANDS/ΜL (ref 0.6–4.47)
LYMPHOCYTES NFR BLD AUTO: 23 % (ref 14–44)
MCH RBC QN AUTO: 30.6 PG (ref 26.8–34.3)
MCHC RBC AUTO-ENTMCNC: 30.9 G/DL (ref 31.4–37.4)
MCV RBC AUTO: 99 FL (ref 82–98)
MONOCYTES # BLD AUTO: 1.11 THOUSAND/ΜL (ref 0.17–1.22)
MONOCYTES NFR BLD AUTO: 13 % (ref 4–12)
NEUTROPHILS # BLD AUTO: 5.41 THOUSANDS/ΜL (ref 1.85–7.62)
NEUTS SEG NFR BLD AUTO: 60 % (ref 43–75)
NRBC BLD AUTO-RTO: 0 /100 WBCS
PLATELET # BLD AUTO: 342 THOUSANDS/UL (ref 149–390)
PMV BLD AUTO: 10 FL (ref 8.9–12.7)
RBC # BLD AUTO: 4.54 MILLION/UL (ref 3.81–5.12)
WBC # BLD AUTO: 8.78 THOUSAND/UL (ref 4.31–10.16)

## 2021-11-16 PROCEDURE — G0009 ADMIN PNEUMOCOCCAL VACCINE: HCPCS

## 2021-11-16 PROCEDURE — G0008 ADMIN INFLUENZA VIRUS VAC: HCPCS

## 2021-11-16 PROCEDURE — 90662 IIV NO PRSV INCREASED AG IM: CPT

## 2021-11-16 PROCEDURE — 82785 ASSAY OF IGE: CPT

## 2021-11-16 PROCEDURE — 90670 PCV13 VACCINE IM: CPT

## 2021-11-16 PROCEDURE — 36415 COLL VENOUS BLD VENIPUNCTURE: CPT

## 2021-11-16 PROCEDURE — 85025 COMPLETE CBC W/AUTO DIFF WBC: CPT

## 2021-11-17 LAB — TOTAL IGE SMQN RAST: 13.8 KU/L (ref 0–113)

## 2021-11-18 ENCOUNTER — OFFICE VISIT (OUTPATIENT)
Dept: DERMATOLOGY | Age: 66
End: 2021-11-18
Payer: MEDICARE

## 2021-11-18 VITALS — TEMPERATURE: 97.7 F | HEIGHT: 64 IN | BODY MASS INDEX: 28.51 KG/M2 | WEIGHT: 167 LBS

## 2021-11-18 DIAGNOSIS — L50.9 URTICARIA: Primary | ICD-10-CM

## 2021-11-18 PROCEDURE — 99213 OFFICE O/P EST LOW 20 MIN: CPT | Performed by: DERMATOLOGY

## 2022-01-03 ENCOUNTER — OFFICE VISIT (OUTPATIENT)
Dept: FAMILY MEDICINE CLINIC | Facility: CLINIC | Age: 67
End: 2022-01-03
Payer: COMMERCIAL

## 2022-01-03 VITALS
SYSTOLIC BLOOD PRESSURE: 120 MMHG | HEIGHT: 63 IN | TEMPERATURE: 97.5 F | BODY MASS INDEX: 29.06 KG/M2 | WEIGHT: 164 LBS | DIASTOLIC BLOOD PRESSURE: 76 MMHG

## 2022-01-03 DIAGNOSIS — Z12.4 SCREENING FOR CERVICAL CANCER: ICD-10-CM

## 2022-01-03 DIAGNOSIS — Z12.31 SCREENING MAMMOGRAM FOR BREAST CANCER: ICD-10-CM

## 2022-01-03 DIAGNOSIS — G89.29 CHRONIC FOOT PAIN, RIGHT: Primary | ICD-10-CM

## 2022-01-03 DIAGNOSIS — N76.0 ACUTE VAGINITIS: ICD-10-CM

## 2022-01-03 DIAGNOSIS — M79.671 CHRONIC FOOT PAIN, RIGHT: Primary | ICD-10-CM

## 2022-01-03 PROCEDURE — 99214 OFFICE O/P EST MOD 30 MIN: CPT | Performed by: FAMILY MEDICINE

## 2022-01-03 PROCEDURE — 1160F RVW MEDS BY RX/DR IN RCRD: CPT | Performed by: FAMILY MEDICINE

## 2022-01-03 PROCEDURE — 1036F TOBACCO NON-USER: CPT | Performed by: FAMILY MEDICINE

## 2022-01-03 RX ORDER — MELOXICAM 15 MG/1
15 TABLET ORAL DAILY
Qty: 30 TABLET | Refills: 0 | Status: SHIPPED | OUTPATIENT
Start: 2022-01-03 | End: 2022-03-16 | Stop reason: HOSPADM

## 2022-01-03 NOTE — PROGRESS NOTES
Assessment/Plan:    1  Chronic foot pain, right  -     MRI foot/forefoot toes right wo contrast; Future; Expected date: 01/03/2022  -     meloxicam (Mobic) 15 mg tablet; Take 1 tablet (15 mg total) by mouth daily Take w food  2  Screening for cervical cancer  -     Liquid-based pap, screening    3  Acute vaginitis  -     Liquid-based pap, screening    4  Screening mammogram for breast cancer  -     Mammo screening bilateral w 3d & cad; Future; Expected date: 01/03/2022    5  BMI 29 0-29 9,adult      BMI Counseling: Body mass index is 29 05 kg/m²  The BMI is above normal  Nutrition recommendations include encouraging healthy choices of fruits and vegetables, consuming healthier snacks, moderation in carbohydrate intake, increasing intake of lean protein, reducing intake of saturated and trans fat and reducing intake of cholesterol  Exercise recommendations include exercising 3-5 times per week and strength training exercises  No pharmacotherapy was ordered  Rationale for BMI follow-up plan is due to patient being overweight or obese  Subjective:      Patient ID: Beatriz Jackson is a 74 y o  female  HPI  C/o chronic, intermittent right foot pain  otc analgesics not helping  No known specific injury  Saw ortho--Dr Rao   X-rays showed small calcaneal spur-otherwise negative  Also req gyn exam  occ vag d/c, no lesions, no pain  Past paps all wnl per pt  Non-smoker-not currently SA    The following portions of the patient's history were reviewed and updated as appropriate: allergies, current medications, past family history, past medical history, past social history, past surgical history and problem list     Review of Systems   Constitutional: Positive for fatigue  Negative for fever  Cardiovascular: Negative  Musculoskeletal: Positive for arthralgias and gait problem          Foot pain         Current Outpatient Medications   Medication Sig Dispense Refill    albuterol (Ventolin HFA) 90 mcg/act inhaler Inhale 2 puffs every 4 (four) hours as needed for wheezing 1 Inhaler 5    Ascorbic Acid (VITAMIN C) 1000 MG tablet       atorvastatin (LIPITOR) 20 mg tablet Take 1 tablet (20 mg total) by mouth daily (Patient taking differently: Take 20 mg by mouth daily at bedtime ) 90 tablet 3    cholecalciferol (VITAMIN D3) 1,000 units tablet Take 2,000 Units by mouth daily      clobetasol (TEMOVATE) 0 05 % external solution Apply topically 2 (two) times a day 50 mL 2    cyclobenzaprine (FLEXERIL) 10 mg tablet Take 10 mg by mouth 3 (three) times a day as needed for muscle spasms (as needed for pain)        famotidine (PEPCID) 20 mg tablet Take 1 tablet (20 mg total) by mouth 2 (two) times a day 60 tablet 11    hydrocortisone 2 5 % cream 2 (two) times a day scalp      Loratadine (CLARITIN PO) Take by mouth        meloxicam (Mobic) 15 mg tablet Take 1 tablet (15 mg total) by mouth daily Take w food  30 tablet 0    metFORMIN (GLUCOPHAGE-XR) 500 mg 24 hr tablet TAKE ONE TABLET BY MOUTH EVERY DAY WITH DINNER 90 tablet 2    multivitamin (THERAGRAN) TABS Take 1 tablet by mouth daily Last dose 3/21/21      triamcinolone (KENALOG) 0 025 % cream Apply topically 2 (two) times a day 80 g 5    Zinc 50 MG CAPS Take 50 mg by mouth daily         No current facility-administered medications for this visit  Objective:    /76 (BP Location: Left arm, Patient Position: Sitting)   Temp 97 5 °F (36 4 °C)   Ht 5' 3" (1 6 m)   Wt 74 4 kg (164 lb)   BMI 29 05 kg/m²        Physical Exam  Vitals and nursing note reviewed  Constitutional:       General: She is not in acute distress  Appearance: Normal appearance  Eyes:      General: No scleral icterus  Conjunctiva/sclera: Conjunctivae normal    Cardiovascular:      Rate and Rhythm: Normal rate and regular rhythm  Pulmonary:      Effort: Pulmonary effort is normal  No respiratory distress  Breath sounds: Normal breath sounds     Abdominal:      General: Bowel sounds are normal       Palpations: Abdomen is soft  Tenderness: There is no abdominal tenderness  There is no right CVA tenderness, left CVA tenderness, guarding or rebound  Genitourinary:     General: Normal vulva  Vagina: Vaginal discharge (minimal-nl appearing) present  Comments: No cmt  No pelvic tenderness  Musculoskeletal:         General: Swelling (right foot) and tenderness (right forefoot and lateral edge) present  Cervical back: Neck supple  Skin:     General: Skin is warm and dry  Capillary Refill: Capillary refill takes less than 2 seconds  Coloration: Skin is not jaundiced  Findings: Bruising (right lateral and dorsal foot) present  Neurological:      General: No focal deficit present  Mental Status: She is alert and oriented to person, place, and time  Cranial Nerves: No cranial nerve deficit     Psychiatric:         Mood and Affect: Mood normal            Saintclair Halter, MD

## 2022-01-05 LAB
CYTOLOGIST CVX/VAG CYTO: NORMAL
DX ICD CODE: NORMAL
OTHER STN SPEC: NORMAL
OTHER STN SPEC: NORMAL
PATH REPORT.FINAL DX SPEC: NORMAL
SL AMB NOTE:: NORMAL
SL AMB SPECIMEN ADEQUACY: NORMAL
SL AMB TEST METHODOLOGY: NORMAL

## 2022-01-17 ENCOUNTER — TELEPHONE (OUTPATIENT)
Dept: DERMATOLOGY | Facility: CLINIC | Age: 67
End: 2022-01-17

## 2022-01-17 NOTE — TELEPHONE ENCOUNTER
renetta recd from patient asking if her appt will be in [place] or [facility]    Returned call; informed her the appt for 1/18 will in the [place] location- she stated she was made aware already via text and also she received a call earlier today

## 2022-01-18 ENCOUNTER — OFFICE VISIT (OUTPATIENT)
Dept: DERMATOLOGY | Age: 67
End: 2022-01-18
Payer: COMMERCIAL

## 2022-01-18 VITALS — TEMPERATURE: 98 F | HEIGHT: 63 IN | BODY MASS INDEX: 28.95 KG/M2 | WEIGHT: 163.4 LBS

## 2022-01-18 DIAGNOSIS — L98.2 NEUTROPHILIC DERMATOSIS: Primary | ICD-10-CM

## 2022-01-18 PROCEDURE — 17110 DESTRUCTION B9 LES UP TO 14: CPT | Performed by: DERMATOLOGY

## 2022-01-18 PROCEDURE — 3008F BODY MASS INDEX DOCD: CPT | Performed by: FAMILY MEDICINE

## 2022-01-18 RX ORDER — MONTELUKAST SODIUM 10 MG/1
TABLET ORAL
COMMUNITY
Start: 2021-10-28

## 2022-01-18 NOTE — PROGRESS NOTES
[facility] Follow Up Note    Patient Name: Taisha Welch  Encounter Date: 1/18/22    Today's Chief Concerns:  Elizabeth Flood Concern #1:  Urticaria follow up   Concern #2:    Elizabeth Flood Concern #3:      Current Medications:    Current Outpatient Medications:     Advair Diskus 250-50 MCG/DOSE inhaler, , Disp: , Rfl:     albuterol (Ventolin HFA) 90 mcg/act inhaler, Inhale 2 puffs every 4 (four) hours as needed for wheezing, Disp: 1 Inhaler, Rfl: 5    Ascorbic Acid (VITAMIN C) 1000 MG tablet, , Disp: , Rfl:     atorvastatin (LIPITOR) 20 mg tablet, Take 1 tablet (20 mg total) by mouth daily (Patient taking differently: Take 20 mg by mouth daily at bedtime ), Disp: 90 tablet, Rfl: 3    cholecalciferol (VITAMIN D3) 1,000 units tablet, Take 2,000 Units by mouth daily, Disp: , Rfl:     clobetasol (TEMOVATE) 0 05 % external solution, Apply topically 2 (two) times a day, Disp: 50 mL, Rfl: 2    cyclobenzaprine (FLEXERIL) 10 mg tablet, Take 10 mg by mouth 3 (three) times a day as needed for muscle spasms (as needed for pain)  , Disp: , Rfl:     famotidine (PEPCID) 20 mg tablet, Take 1 tablet (20 mg total) by mouth 2 (two) times a day, Disp: 60 tablet, Rfl: 11    hydrocortisone 2 5 % cream, 2 (two) times a day scalp, Disp: , Rfl:     Loratadine (CLARITIN PO), Take by mouth  , Disp: , Rfl:     meloxicam (Mobic) 15 mg tablet, Take 1 tablet (15 mg total) by mouth daily Take w food  , Disp: 30 tablet, Rfl: 0    metFORMIN (GLUCOPHAGE-XR) 500 mg 24 hr tablet, TAKE ONE TABLET BY MOUTH EVERY DAY WITH DINNER, Disp: 90 tablet, Rfl: 2    montelukast (SINGULAIR) 10 mg tablet, , Disp: , Rfl:     multivitamin (THERAGRAN) TABS, Take 1 tablet by mouth daily Last dose 3/21/21, Disp: , Rfl:     triamcinolone (KENALOG) 0 025 % cream, Apply topically 2 (two) times a day, Disp: 80 g, Rfl: 5    Zinc 50 MG CAPS, Take 50 mg by mouth daily  , Disp: , Rfl:     CONSTITUTIONAL:   Vitals:    01/18/22 1443   Temp: 98 °F (36 7 °C)   TempSrc: Temporal   Weight: 74 1 kg (163 lb 6 4 oz)   Height: 5' 3" (1 6 m)             Specific Alerts:    Have you been seen by a [facility] in the last 3 years? YES    Are you pregnant or planning to become pregnant? No    Are you currently or planning to be nursing or breast feeding? No    Allergies   Allergen Reactions    Latex Rash     Burn-skin irritation    Adhesive [Medical Tape] Other (See Comments)     bandaid-red,itch       May we call your Preferred Phone number to discuss your specific medical information? YES    May we leave a detailed message that includes your specific medical information? YES    Have you traveled outside of the [facility] in the past 3 months? YES    Do you currently have a pacemaker or defibrillator? No    Do you have any artificial heart valves, joints, plates, screws, rods, stents, pins, etc? No   - If Yes, were any placed within the last 2 years? Do you require any medications prior to a surgical procedure? No   - If Yes, for which procedure? - If Yes, what medications to you require? Are you taking any medications that cause you to bleed more easily ("blood thinners") No    Have you ever experienced a rapid heartbeat with epinephrine? No    Have you ever been treated with "gold" (gold sodium thiomalate) therapy? No    [facility] can help with wrinkles, "laugh lines," facial volume loss, "double chin," "love handles," age spots, and more  Are you interested in learning today about some of the skin enhancement procedures that we offer? (If Yes, please provide more detail) No    Review of Systems:  Have you recently had or currently have any of the following?     · Fever or chills: No  · Night Sweats: No  · Headaches: No  · Weight Gain: No  · Weight Loss: No  · Blurry Vision: No  · Nausea: No  · Vomiting: No  · Diarrhea: No  · Blood in Stool: No  · Abdominal Pain: No  · Itchy Skin: YES  · Painful Joints: No  · Swollen Joints: No  · Muscle Pain: No  · Irregular Mole: No  · Sun Burn: No  · Dry Skin: No  · Skin Color Changes: No  · Scar or Keloid: No  · Cold Sores/Fever Blisters: No  · Bacterial Infections/MRSA: No  · Anxiety: No  · Depression: No  · Suicidal or Homicidal Thoughts: No      PSYCH: Normal mood and affect  EYES: Normal conjunctiva  ENT: Normal lips and oral mucosa  CARDIOVASCULAR: No edema  RESPIRATORY: Normal respirations  HEME/LYMPH/IMMUNO:  No regional lymphadenopathy except as noted below in ASSESSMENT AND PLAN BY DIAGNOSIS    FULL ORGAN SYSTEM SKIN EXAM (SKIN)  Scalp, Face Normal except as noted below in Assessment   Neck Normal except as noted below in Assessment   Chest/Breasts/Axillae Viewed areas Normal except as noted below in Assessment   Abdomen, Umbilicus Normal except as noted below in Assessment   Back/Spine Normal except as noted below in Assessment     PHOTOSENSITIVE DERMATITIS / [facility]clusky Paganini with PRURIGO NODULES OF SCALP    Physical Exam:   Anatomic Location Affected:  Scalp neck upper back   Morphological Description:  Some pink edematous plaques some crusted keratotic erosions   Pertinent Positives:   Pertinent Negatives: Additional History of Present Condition:  Some improvement, not exposed to the sun as much and not breaking out as much, scalp very itchy, only thing that helps is freezing    Assessment and Plan:  Based on a thorough discussion of this condition and the management approach to it (including a comprehensive discussion of the known risks, side effects and potential benefits of treatment), the patient (family) agrees to implement the following specific plan:   Discussed some alternate treatments   Colchicine,    Dapsone   Continue clobetasol as needed   Continue treatment of liquid nitrogen therapy   Signed and verbal consent obtained   Follow up in 3 months   Nutrafol supplements can help with hairloss  What is urticaria?   Urticaria is characterised by weals (hives) or angioedema (swellings, in 10%) or both (in 40%)  There are several types of urticaria  The name urticaria is derived from the common [place] stinging nettle 'Urtica dioica'  A weal (or wheal) is a superficial skin-coloured or pale skin swelling, usually surrounded by erythema (redness) that lasts anything from a few minutes to 24 hours  Usually very itchy, it may have a burning sensation  Angioedema is deeper swelling within the skin or mucous membranes and can be skin-coloured or red  It resolves within 72 hours  Angioedema may be itchy or painful but is often asymptomatic  What is acute urticaria? Acute urticaria is urticaria, with or without angioedema, that is present for less than 6 weeks  It is often gone within hours to days  Who gets acute urticaria? One in five children or adults has an episode of acute urticaria during their lifetime  It is more common in atopic individuals  It affects all races and both sexes  What are the clinical features of acute urticaria? Urticarial weals can be a few millimeters or several centimeters in diameter, colored white or red, with or without a red flare  Each weal may last a few minutes or several hours and may change shape  Weals may be round, or form rings, a map-like pattern, targetoid lesions, or giant patches  Acute urticaria can affect any site of the body and tends to be distributed widely  Angioedema is more often localised  It commonly affects the face (especially eyelids and perioral sites), hands, feet and genitalia  It may involve tongue, uvula, soft palate, larynx  Serum sickness due to blood transfusion and serum sickness-like reactions due to certain drugs cause acute urticaria leaving bruises, fever, swollen lymph glands, joint pain and swelling  What causes acute urticaria? Weals are due to release of chemical mediators from tissue mast cells and circulating basophils   These chemical mediators include histamine, platelet-activating factor and cytokines  The mediators activate sensory nerves and cause dilation of blood vessels and leakage of fluid into surrounding tissues  Bradykinin release causes angioedema  Several hypotheses have been proposed to explain urticaria  The immune, arachidonic acid and coagulation systems are involved, and genetic mutations are under investigation  Serum sickness and serum sickness-like reactions are due to immune complex deposition in affected tissues  Acute urticaria can be induced by the following factors but the cause is not always identified   Acute viral infection -- an upper respiratory infection, viral hepatitis, infectious mononucleosis, mycoplasma    Acute bacterial infection -- a dental abscess, sinusitis    Food allergy (IgE mediated) -- usually milk, egg, peanut, shellfish    Drug allergy (IgE mediated) -- often an antibiotic    Drug pseudoallergy -- aspirin, nonselective nonsteroidal anti-inflammatory drugs, opiates, radiocontrast media; these cause urticaria without immune activation    Vaccination    Bee or wasp stings     Widespread reaction following localized contact urticaria -- rubber latex  Severe allergic urticaria may lead to anaphylactic shock (bronchospasm, collapse)  A single episode or recurrent episodes of angioedema without urticaria can be due to an angiotensin-converting enzyme (ACE) inhibitor drug  How is acute urticaria diagnosed? Acute urticaria is diagnosed in people with a short history of weals that last less than 24 hours, with or without angioedema  A thorough physical examination should be undertaken to look for underlying causes  Skin prick tests and radioallergosorbent tests (RAST) or CAP fluoroimmunoassay may be requested if a drug or food allergy is suspected in acute urticaria  Biopsy of urticaria can be non-specific and difficult to interpret   The pathology shows oedema in the dermis and dilated blood vessels, with a variable mixed inflammatory infiltrate  Vessel-wall damage indicates urticarial vasculitis  What is the treatment for acute urticaria? The main treatment for acute urticaria in adults and in children is with an oral second-generation antihistamine chosen from the list below  If the standard dose (eg 10 mg for cetirizine) is not effective, the dose can be increased fourfold (eg 40 mg cetirizine daily)  They are best taken continuously rather than on demand  They are stopped when the acute urticaria has settled down  There is not thought to be any benefit from adding a second antihistamine   Cetirizine    Loratadine    Fexofenadine    Desloratadine    Levocetirizine    Rupatadine    Bilastine  Terfenadine and astemizole should not be used as they are cardiotoxic in combination with ketoconazole or erythromycin  They are no longer available in [place]  Although systemic treatment is best avoided during pregnancy and breastfeeding, there have been no reports that second-generation antihistamines cause birth defects  If treatment is required, loratadine and cetirizine are currently preferred  Conventional first-generation antihistamines such as promethazine or chlorpheniramine are no longer recommended for urticaria  Avoidance of trigger factors  In addition to antihistamines, the cause of urticaria should be eliminated if known (eg drug or food allergy)  Avoidance of relevant type 1 (IgE-mediated) allergens clears urticaria within 48 hours  In addition to antihistamines, the triggers for urticaria should be avoided where possible  For example:   Avoid aspirin, opiates and nonsteroidal anti-inflammatory drugs (paracetamol is generally safe)   Avoid known allergies that have been confirmed by positive specific IgE/skin prick tests if these have clinical relevance for urticaria   Cool the affected area with a fan, cold flannel, ice pack or soothing moisturising lotion       Treatment of refractory acute urticaria  If non-sedating antihistamines are not effective, a 4 to 5-day course of oral prednisone (prednisolone) may be warranted in severe acute urticaria, particularly if there is angioedema  Systemic steroids do not speed up the resolution of symptoms  Intramuscular injection of adrenaline (epinephrine) is reserved for life-threatening anaphylaxis or swelling of the throat  PROCEDURE:  DESTRUCTION OF BENIGN LESIONS  After a thorough discussion of treatment options and risk/benefits/alternatives (including but not limited to local pain, scarring, dyspigmentation, blistering, and possible superinfection), verbal and written consent were obtained and the aforementioned lesions were treated on with cryotherapy using liquid nitrogen x 1 cycle for 5-10 seconds   TOTAL NUMBER of 4 lesions were treated today on the ANATOMIC LOCATION: scalp  The patient tolerated the procedure well, and after-care instructions were provided      Scribe Attestation    I,:  Kavitha Almonte am acting as a scribe while in the presence of the attending physician :       I,:  Jorge Velasco MD personally performed the services described in this documentation    as scribed in my presence :

## 2022-01-18 NOTE — PATIENT INSTRUCTIONS
PHOTOSENSITIVE DERMATITIS / NEUTROPHILIC URTICARIA  Assessment and Plan:  Based on a thorough discussion of this condition and the management approach to it (including a comprehensive discussion of the known risks, side effects and potential benefits of treatment), the patient (family) agrees to implement the following specific plan:   Discussed some alternate treatments   Colchicine,    Dapsone   Continue clobetasol as needed   Continue treatment of liquid nitrogen therapy   Signed and verbal consent obtained   Follow up in 3 months   Nutrafol supplement    What is urticaria? Urticaria is characterised by weals (hives) or angioedema (swellings, in 10%) or both (in 40%)  There are several types of urticaria  The name urticaria is derived from the common [place] stinging nettle 'Urtica dioica'  A weal (or wheal) is a superficial skin-coloured or pale skin swelling, usually surrounded by erythema (redness) that lasts anything from a few minutes to 24 hours  Usually very itchy, it may have a burning sensation  Angioedema is deeper swelling within the skin or mucous membranes and can be skin-coloured or red  It resolves within 72 hours  Angioedema may be itchy or painful but is often asymptomatic  What is acute urticaria? Acute urticaria is urticaria, with or without angioedema, that is present for less than 6 weeks  It is often gone within hours to days  Who gets acute urticaria? One in five children or adults has an episode of acute urticaria during their lifetime  It is more common in atopic individuals  It affects all races and both sexes  What are the clinical features of acute urticaria? Urticarial weals can be a few millimeters or several centimeters in diameter, colored white or red, with or without a red flare  Each weal may last a few minutes or several hours and may change shape   Weals may be round, or form rings, a map-like pattern, targetoid lesions, or giant patches  Acute urticaria can affect any site of the body and tends to be distributed widely  Angioedema is more often localised  It commonly affects the face (especially eyelids and perioral sites), hands, feet and genitalia  It may involve tongue, uvula, soft palate, larynx  Serum sickness due to blood transfusion and serum sickness-like reactions due to certain drugs cause acute urticaria leaving bruises, fever, swollen lymph glands, joint pain and swelling  What causes acute urticaria? Weals are due to release of chemical mediators from tissue mast cells and circulating basophils  These chemical mediators include histamine, platelet-activating factor and cytokines  The mediators activate sensory nerves and cause dilation of blood vessels and leakage of fluid into surrounding tissues  Bradykinin release causes angioedema  Several hypotheses have been proposed to explain urticaria  The immune, arachidonic acid and coagulation systems are involved, and genetic mutations are under investigation  Serum sickness and serum sickness-like reactions are due to immune complex deposition in affected tissues  Acute urticaria can be induced by the following factors but the cause is not always identified   Acute viral infection -- an upper respiratory infection, viral hepatitis, infectious mononucleosis, mycoplasma    Acute bacterial infection -- a dental abscess, sinusitis    Food allergy (IgE mediated) -- usually milk, egg, peanut, shellfish    Drug allergy (IgE mediated) -- often an antibiotic    Drug pseudoallergy -- aspirin, nonselective nonsteroidal anti-inflammatory drugs, opiates, radiocontrast media; these cause urticaria without immune activation    Vaccination    Bee or wasp stings     Widespread reaction following localized contact urticaria -- rubber latex  Severe allergic urticaria may lead to anaphylactic shock (bronchospasm, collapse)    A single episode or recurrent episodes of angioedema without urticaria can be due to an angiotensin-converting enzyme (ACE) inhibitor drug  How is acute urticaria diagnosed? Acute urticaria is diagnosed in people with a short history of weals that last less than 24 hours, with or without angioedema  A thorough physical examination should be undertaken to look for underlying causes  Skin prick tests and radioallergosorbent tests (RAST) or CAP fluoroimmunoassay may be requested if a drug or food allergy is suspected in acute urticaria  Biopsy of urticaria can be non-specific and difficult to interpret  The pathology shows oedema in the dermis and dilated blood vessels, with a variable mixed inflammatory infiltrate  Vessel-wall damage indicates urticarial vasculitis  What is the treatment for acute urticaria? The main treatment for acute urticaria in adults and in children is with an oral second-generation antihistamine chosen from the list below  If the standard dose (eg 10 mg for cetirizine) is not effective, the dose can be increased fourfold (eg 40 mg cetirizine daily)  They are best taken continuously rather than on demand  They are stopped when the acute urticaria has settled down  There is not thought to be any benefit from adding a second antihistamine   Cetirizine    Loratadine    Fexofenadine    Desloratadine    Levocetirizine    Rupatadine    Bilastine  Terfenadine and astemizole should not be used as they are cardiotoxic in combination with ketoconazole or erythromycin  They are no longer available in [place]  Although systemic treatment is best avoided during pregnancy and breastfeeding, there have been no reports that second-generation antihistamines cause birth defects  If treatment is required, loratadine and cetirizine are currently preferred  Conventional first-generation antihistamines such as promethazine or chlorpheniramine are no longer recommended for urticaria      Avoidance of trigger factors  In addition to antihistamines, the cause of urticaria should be eliminated if known (eg drug or food allergy)  Avoidance of relevant type 1 (IgE-mediated) allergens clears urticaria within 48 hours  In addition to antihistamines, the triggers for urticaria should be avoided where possible  For example:   Avoid aspirin, opiates and nonsteroidal anti-inflammatory drugs (paracetamol is generally safe)   Avoid known allergies that have been confirmed by positive specific IgE/skin prick tests if these have clinical relevance for urticaria   Cool the affected area with a fan, cold flannel, ice pack or soothing moisturising lotion     Treatment of refractory acute urticaria  If non-sedating antihistamines are not effective, a 4 to 5-day course of oral prednisone (prednisolone) may be warranted in severe acute urticaria, particularly if there is angioedema  Systemic steroids do not speed up the resolution of symptoms  Intramuscular injection of adrenaline (epinephrine) is reserved for life-threatening anaphylaxis or swelling of the throat

## 2022-02-01 ENCOUNTER — HOSPITAL ENCOUNTER (OUTPATIENT)
Dept: RADIOLOGY | Facility: HOSPITAL | Age: 67
Discharge: HOME | End: 2022-02-01
Attending: FAMILY MEDICINE
Payer: COMMERCIAL

## 2022-02-01 VITALS — BODY MASS INDEX: 28.88 KG/M2 | HEIGHT: 63 IN | WEIGHT: 163 LBS

## 2022-02-01 DIAGNOSIS — Z12.31 SCREENING MAMMOGRAM FOR BREAST CANCER: ICD-10-CM

## 2022-02-01 PROCEDURE — 77067 SCR MAMMO BI INCL CAD: CPT

## 2022-02-01 PROCEDURE — 77063 BREAST TOMOSYNTHESIS BI: CPT

## 2022-02-24 ENCOUNTER — HOSPITAL ENCOUNTER (OUTPATIENT)
Dept: RADIOLOGY | Facility: HOSPITAL | Age: 67
Discharge: HOME | End: 2022-02-24
Attending: FAMILY MEDICINE
Payer: COMMERCIAL

## 2022-02-24 DIAGNOSIS — M79.671 CHRONIC FOOT PAIN, RIGHT: ICD-10-CM

## 2022-02-24 DIAGNOSIS — G89.29 CHRONIC FOOT PAIN, RIGHT: ICD-10-CM

## 2022-02-24 PROCEDURE — G1004 CDSM NDSC: HCPCS

## 2022-02-24 PROCEDURE — 73718 MRI LOWER EXTREMITY W/O DYE: CPT

## 2022-02-25 ENCOUNTER — TELEPHONE (OUTPATIENT)
Dept: FAMILY MEDICINE CLINIC | Facility: CLINIC | Age: 67
End: 2022-02-25

## 2022-02-25 DIAGNOSIS — L56.8 PHOTOALLERGIC DERMATITIS: ICD-10-CM

## 2022-02-25 RX ORDER — CLOBETASOL PROPIONATE 0.46 MG/ML
SOLUTION TOPICAL
Qty: 50 ML | Refills: 2 | Status: SHIPPED | OUTPATIENT
Start: 2022-02-25 | End: 2022-05-06

## 2022-03-16 ENCOUNTER — OFFICE VISIT (OUTPATIENT)
Dept: FAMILY MEDICINE CLINIC | Facility: CLINIC | Age: 67
End: 2022-03-16
Payer: COMMERCIAL

## 2022-03-16 VITALS
SYSTOLIC BLOOD PRESSURE: 98 MMHG | OXYGEN SATURATION: 98 % | DIASTOLIC BLOOD PRESSURE: 70 MMHG | RESPIRATION RATE: 16 BRPM | WEIGHT: 162 LBS | BODY MASS INDEX: 28.7 KG/M2 | TEMPERATURE: 98.7 F | HEART RATE: 72 BPM | HEIGHT: 63 IN

## 2022-03-16 DIAGNOSIS — K04.7 TOOTH INFECTION: Primary | ICD-10-CM

## 2022-03-16 DIAGNOSIS — R05.9 COUGH: ICD-10-CM

## 2022-03-16 DIAGNOSIS — Z00.00 MEDICARE ANNUAL WELLNESS VISIT, SUBSEQUENT: ICD-10-CM

## 2022-03-16 PROCEDURE — 3288F FALL RISK ASSESSMENT DOCD: CPT | Performed by: FAMILY MEDICINE

## 2022-03-16 PROCEDURE — 99213 OFFICE O/P EST LOW 20 MIN: CPT | Performed by: FAMILY MEDICINE

## 2022-03-16 PROCEDURE — 1101F PT FALLS ASSESS-DOCD LE1/YR: CPT | Performed by: FAMILY MEDICINE

## 2022-03-16 PROCEDURE — G0439 PPPS, SUBSEQ VISIT: HCPCS | Performed by: FAMILY MEDICINE

## 2022-03-16 PROCEDURE — 3008F BODY MASS INDEX DOCD: CPT | Performed by: FAMILY MEDICINE

## 2022-03-16 PROCEDURE — 1125F AMNT PAIN NOTED PAIN PRSNT: CPT | Performed by: FAMILY MEDICINE

## 2022-03-16 PROCEDURE — 1160F RVW MEDS BY RX/DR IN RCRD: CPT | Performed by: FAMILY MEDICINE

## 2022-03-16 PROCEDURE — 3725F SCREEN DEPRESSION PERFORMED: CPT | Performed by: FAMILY MEDICINE

## 2022-03-16 PROCEDURE — 1036F TOBACCO NON-USER: CPT | Performed by: FAMILY MEDICINE

## 2022-03-16 PROCEDURE — 1170F FXNL STATUS ASSESSED: CPT | Performed by: FAMILY MEDICINE

## 2022-03-16 RX ORDER — CLINDAMYCIN HYDROCHLORIDE 150 MG/1
150 CAPSULE ORAL EVERY 6 HOURS SCHEDULED
Qty: 28 CAPSULE | Refills: 0 | Status: SHIPPED | OUTPATIENT
Start: 2022-03-16 | End: 2022-03-23

## 2022-03-16 RX ORDER — GUAIFENESIN 600 MG
1200 TABLET, EXTENDED RELEASE 12 HR ORAL EVERY 12 HOURS SCHEDULED
COMMUNITY

## 2022-03-16 NOTE — PROGRESS NOTES
Assessment and Plan:     Problem List Items Addressed This Visit     None           Preventive health issues were discussed with patient, and age appropriate screening tests were ordered as noted in patient's After Visit Summary  Personalized health advice and appropriate referrals for health education or preventive services given if needed, as noted in patient's After Visit Summary       History of Present Illness:     Patient presents for Medicare Annual Wellness visit    Patient Care Team:  Anya Pettit MD as PCP - General (Family Medicine)  MD Deborah Perez MD Kristian Leyland, MD Cheryll Gins, MD (Gastroenterology)     Problem List:     Patient Active Problem List   Diagnosis    Asthma    Fibromyalgia    Facial droop    Headache    Hyperlipidemia    Impaired fasting glucose    Mediastinal lymphadenopathy    Allergic asthma, mild persistent, uncomplicated    Cough    Obstructive sleep apnea hypopnea, mild    Allergic rhinitis    Cervical radiculopathy    Shoulder instability, left    Tension type headache    Varicose veins of left lower extremity with pain    Hypothyroidism    Abnormal EKG    Chest pain    Dyslipidemia    Chronic obstructive pulmonary disease with acute exacerbation (HCC)    Obstructive sleep apnea    Gastroesophageal reflux disease without esophagitis    Screening mammogram for breast cancer    Prediabetes    Seborrheic dermatitis of scalp    Rash of face    Pain involving joints of fingers of both hands    Infected blister of toe of right foot    Acute cystitis without hematuria    BMI 29 0-29 9,adult    Diverticulosis    Atypical nevi    Neutrophilic dermatosis    Thyroid nodule    Diverticulitis    Hypotension due to hypovolemia    Chronic foot pain, right    Screening for cervical cancer    Acute vaginitis      Past Medical and Surgical History:     Past Medical History:   Diagnosis Date    Abnormal glucose last assessed 16    Arthritis     Asthma     w/ exacerbation; last assessed 5/14/15    Atypical nevi     Atypical nevus     last assessed 17    Breast lump     last assessed 3/6/14    Cataract     Cervical adenopathy     last assessed  17    Colon polyp     CPAP (continuous positive airway pressure) dependence     Dizziness     Dyslipidemia     last assessed 17    Facial droop     last assessed  16    Fibromyalgia, primary     Flu 2018    Foot abrasion     right between the 4th and 5th toe    Fractures     Genital herpes     resolved 16    Headache, tension-type     Herpes zoster     last assessed 16    History of shingles     may 2016    History of stomach ulcers     Hx of abnormal mammogram     last assessed  3/5/14    Hyperlipidemia     Myalgia     last assessed  12    Myositis     12    Neck pain     Pain involving joints of fingers of both hands 2021    Peripheral neuropathy     Seborrheic keratosis     Shingles     Skin disorder     Skin neoplasm     of the lower limb, including hip; onset 12; last assessed  12    Sleep apnea     Stomach disorder      Past Surgical History:   Procedure Laterality Date    ABDOMINAL SURGERY      release of adhesions    BLADDER SURGERY      mesh-lift    BREAST CYST ASPIRATION Right     does not know the year    BREAST SURGERY      lift    CATARACT EXTRACTION Bilateral      SECTION      x [id number]    CHOLECYSTECTOMY      lap    COLONOSCOPY      COSMETIC SURGERY      tummy and breast lift    ESOPHAGOGASTRODUODENOSCOPY      OOPHORECTOMY Left     OTHER SURGICAL HISTORY      vocal cord surgery, scraping    CO HYSTEROSCOPY,W/ENDO BX N/A 11/3/2016    Procedure: DILATATION AND CURETTAGE (D&C) WITH HYSTEROSCOPY;  Surgeon: Divya Alexander MD;  Location: [address] [place] [address];  Service: Gynecology    REDUCTION MAMMAPLASTY Bilateral 2008    TONSILLECTOMY     [facility] 45 MSK PROCEDURE  5/25/2021      Family History:     Family History   Problem Relation Age of Onset    Hypertension Mother     Alzheimer's disease Mother     Arthritis Mother     Hypertension Father     Arthritis Father     Heart attack Father     Heart disease Father     Stroke Father     Other Brother         vertigo, tinnitus    Diabetes Daughter     Breast cancer Sister 28    Skin cancer Sister     Cancer Sister     Other Son         downs syndrome    Abdominal aortic aneurysm Sister     Cancer Sister         T cell sarcoma    No Known Problems Brother     Diabetes Brother     Other Sister         anemia from the diet    No Known Problems Son     No Known Problems Maternal Aunt     No Known Problems Maternal Aunt     No Known Problems Paternal Aunt     No Known Problems Paternal Aunt     No Known Problems Paternal Aunt       Social History:     Social History     Socioeconomic History    Marital status:      Spouse name: None    Number of children: None    Years of education: None    Highest education level: None   Occupational History    None   Tobacco Use    Smoking status: Never Smoker    Smokeless tobacco: Never Used   Vaping Use    Vaping Use: Never used   Substance and Sexual Activity    Alcohol use: No    Drug use: No    Sexual activity: Not Currently     Birth control/protection: Post-menopausal   Other Topics Concern    None   Social History Narrative    Daily caffinated coffee consumption    Exercise habits- walking sometimes 1x per day- last impression 5/22/17- Jannette Steve    Good sleep hygiene    Pet - fish     Social Determinants of Health     Financial Resource Strain: Not on file   Food Insecurity: Not on file   Transportation Needs: Not on file   Physical Activity: Not on file   Stress: Not on file   Social Connections: Not on file   Intimate Partner Violence: Not on file   Housing Stability: Not on file      Medications and Allergies: Current Outpatient Medications   Medication Sig Dispense Refill    Advair Diskus 250-50 MCG/DOSE inhaler       albuterol (Ventolin HFA) 90 mcg/act inhaler Inhale 2 puffs every 4 (four) hours as needed for wheezing 1 Inhaler 5    Ascorbic Acid (VITAMIN C) 1000 MG tablet       atorvastatin (LIPITOR) 20 mg tablet Take 1 tablet (20 mg total) by mouth daily at bedtime 90 tablet 0    cholecalciferol (VITAMIN D3) 1,000 units tablet Take 2,000 Units by mouth daily      clobetasol (TEMOVATE) 0 05 % external solution APPLY TOPICALLY TWO TIMES A DAY (GENERIC FOR TEMOVATE) 50 mL 2    cyclobenzaprine (FLEXERIL) 10 mg tablet Take 10 mg by mouth 3 (three) times a day as needed for muscle spasms (as needed for pain)        famotidine (PEPCID) 20 mg tablet Take 1 tablet (20 mg total) by mouth 2 (two) times a day 60 tablet 11    guaiFENesin (MUCINEX) 600 mg 12 hr tablet Take 1,200 mg by mouth every 12 (twelve) hours      hydrocortisone 2 5 % cream 2 (two) times a day scalp      Loratadine (CLARITIN PO) Take by mouth        metFORMIN (GLUCOPHAGE-XR) 500 mg 24 hr tablet TAKE ONE TABLET BY MOUTH EVERY DAY WITH DINNER 90 tablet 0    montelukast (SINGULAIR) 10 mg tablet       multivitamin (THERAGRAN) TABS Take 1 tablet by mouth daily Last dose 3/21/21      pantoprazole (PROTONIX) 40 mg tablet TAKE ONE TABLET BY MOUTH EVERY DAY 30 tablet 0    Probiotic Product (PROBIOTIC-10 PO)       triamcinolone (KENALOG) 0 025 % cream Apply topically 2 (two) times a day 80 g 5    Zinc 50 MG CAPS Take 50 mg by mouth daily         No current facility-administered medications for this visit       Allergies   Allergen Reactions    Latex Rash     Burn-skin irritation    Adhesive [Medical Tape] Other (See Comments)     bandaid-red,itch      Immunizations:     Immunization History   Administered Date(s) Administered    COVID-19 MODERNA VACC 0 5 ML IM 01/20/2021, 01/20/2021, 02/17/2021, 10/28/2021    Influenza, Quadrivalent (nasal) 12/06/2016    Influenza, high dose seasonal 0 7 mL 10/18/2019, 09/14/2020, 11/16/2021    Influenza, injectable, quadrivalent, preservative free 0 5 mL 10/22/2018    Influenza, seasonal, injectable 09/15/2015, 11/20/2017    Pneumococcal Conjugate 13-Valent 02/02/2016, 11/16/2021    Pneumococcal Polysaccharide PPV23 10/09/2019    Tuberculin Skin Test-PPD Intradermal 06/12/2017      Health Maintenance:         Topic Date Due    Breast Cancer Screening: Mammogram  02/01/2023    Colorectal Cancer Screening  03/26/2024    Hepatitis C Screening  Completed     There are no preventive care reminders to display for this patient  Medicare Health Risk Assessment:     BP 98/70 (BP Location: Left arm, Patient Position: Sitting, Cuff Size: Large)   Pulse 72   Temp 98 7 °F (37 1 °C)   Resp 16   Ht 5' 3" (1 6 m)   Wt 73 5 kg (162 lb)   BMI 28 70 kg/m²      Yony Johnson is here for her Subsequent Wellness visit  Last Medicare Wellness visit information reviewed, patient interviewed and updates made to the record today  Health Risk Assessment:   Patient rates overall health as good  Patient feels that their physical health rating is slightly better  Patient is very satisfied with their life  Eyesight was rated as same  Hearing was rated as same  Patient feels that their emotional and mental health rating is slightly better  Patients states they are never, rarely angry  Patient states they are never, rarely unusually tired/fatigued  Pain experienced in the last 7 days has been none  Patient states that she has experienced no weight loss or gain in last 6 months  Depression Screening:   PHQ-2 Score: 0      Fall Risk Screening: In the past year, patient has experienced: no history of falling in past year      Urinary Incontinence Screening:   Patient has leaked urine accidently in the last six months  Home Safety:  Patient does not have trouble with stairs inside or outside of their home   Patient has working smoke alarms and has working carbon monoxide detector  Home safety hazards include: none  Nutrition:   Current diet is Regular  Medications:   Patient is currently taking over-the-counter supplements  OTC medications include: see medication list  Patient is able to manage medications  Activities of Daily Living (ADLs)/Instrumental Activities of Daily Living (IADLs):   Walk and transfer into and out of bed and chair?: Yes  Dress and groom yourself?: Yes    Bathe or shower yourself?: Yes    Feed yourself?  Yes  Do your laundry/housekeeping?: Yes  Manage your money, pay your bills and track your expenses?: Yes  Make your own meals?: Yes    Do your own shopping?: Yes    Previous Hospitalizations:   Any hospitalizations or ED visits within the last 12 months?: No      Advance Care Planning:   Living will: No    Durable POA for healthcare: No    Advanced directive: No    Advanced directive counseling given: Yes    Five wishes given: Yes      Cognitive Screening:   Provider or family/friend/caregiver concerned regarding cognition?: No    PREVENTIVE SCREENINGS      Cardiovascular Screening:    General: Screening Not Indicated and History Lipid Disorder      Diabetes Screening:     General: Screening Current      Colorectal Cancer Screening:     General: Screening Current      Breast Cancer Screening:     General: Screening Current      Cervical Cancer Screening:    General: Screening Not Indicated      Osteoporosis Screening:    General: Risks and Benefits Discussed      Abdominal Aortic Aneurysm (AAA) Screening:    Risk factors include: family history of AAA        General: Risks and Benefits Discussed      Lung Cancer Screening:     General: Screening Not Indicated      Hepatitis C Screening:    General: Screening Current    Hep C Screening Accepted: Yes      Screening, Brief Intervention, and Referral to Treatment (SBIRT)    Screening  Typical number of drinks in a day: 0  Typical number of drinks in a week: 0  Interpretation: Low risk drinking behavior  AUDIT-C Screenin) How often did you have a drink containing alcohol in the past year? never  2) How many drinks did you have on a typical day when you were drinking in the past year? 0  3) How often did you have 6 or more drinks on one occasion in the past year? never    AUDIT-C Score: 0  Interpretation: Score 0-2 (female): Negative screen for alcohol misuse    Single Item Drug Screening:  How often have you used an illegal drug (including marijuana) or a prescription medication for non-medical reasons in the past year? never    Single Item Drug Screen Score: 0  Interpretation: Negative screen for possible drug use disorder    Other Counseling Topics:   Car/seat belt/driving safety, skin self-exam, sunscreen and regular weightbearing exercise and calcium and vitamin D intake         Oscar Miranda MD

## 2022-03-16 NOTE — PATIENT INSTRUCTIONS
Medicare Preventive Visit Patient Instructions  Thank you for completing your Welcome to Medicare Visit or Medicare Annual Wellness Visit today  Your next wellness visit will be due in one year (3/17/2023)  The screening/preventive services that you may require over the next 5-10 years are detailed below  Some tests may not apply to you based off risk factors and/or age  Screening tests ordered at today's visit but not completed yet may show as past due  Also, please note that scanned in results may not display below  Preventive Screenings:  Service Recommendations Previous Testing/Comments   Colorectal Cancer Screening  * Colonoscopy    * Fecal Occult Blood Test (FOBT)/Fecal Immunochemical Test (FIT)  * Fecal DNA/Cologuard Test  * Flexible Sigmoidoscopy Age: 54-65 years old   Colonoscopy: every 10 years (may be performed more frequently if at higher risk)  OR  FOBT/FIT: every 1 year  OR  Cologuard: every 3 years  OR  Sigmoidoscopy: every 5 years  Screening may be recommended earlier than age 48 if at higher risk for colorectal cancer  Also, an individualized decision between you and your healthcare provider will decide whether screening between the ages of 74-80 would be appropriate  Colonoscopy: 03/26/2021  FOBT/FIT: Not on file  Cologuard: Not on file  Sigmoidoscopy: Not on file    Screening Current     Breast Cancer Screening Age: 36 years old  Frequency: every 1-2 years  Not required if history of left and right mastectomy Mammogram: 02/01/2022    Screening Current   Cervical Cancer Screening Between the ages of 21-29, pap smear recommended once every 3 years  Between the ages of 33-67, can perform pap smear with HPV co-testing every 5 years     Recommendations may differ for women with a history of total hysterectomy, cervical cancer, or abnormal pap smears in past  Pap Smear: Not on file    Screening Not Indicated   Hepatitis C Screening Once for adults born between 1945 and 1965  More frequently in patients at high risk for Hepatitis C Hep C Antibody: 04/26/2019    Screening Current   Diabetes Screening 1-2 times per year if you're at risk for diabetes or have pre-diabetes Fasting glucose: 129 mg/dL   A1C: 6 0    Screening Current   Cholesterol Screening Once every 5 years if you don't have a lipid disorder  May order more often based on risk factors  Lipid panel: 06/25/2021    Screening Not Indicated  History Lipid Disorder     Other Preventive Screenings Covered by Medicare:  1  Abdominal Aortic Aneurysm (AAA) Screening: covered once if your at risk  You're considered to be at risk if you have a family history of AAA  2  Lung Cancer Screening: covers low dose CT scan once per year if you meet all of the following conditions: (1) Age 50-69; (2) No signs or symptoms of lung cancer; (3) Current smoker or have quit smoking within the last 15 years; (4) You have a tobacco smoking history of at least 30 pack years (packs per day multiplied by number of years you smoked); (5) You get a written order from a healthcare provider  3  Glaucoma Screening: covered annually if you're considered high risk: (1) You have diabetes OR (2) Family history of glaucoma OR (3)  aged 48 and older OR (3)  American aged 72 and older  3  Osteoporosis Screening: covered every 2 years if you meet one of the following conditions: (1) You're estrogen deficient and at risk for osteoporosis based off medical history and other findings; (2) Have a vertebral abnormality; (3) On glucocorticoid therapy for more than 3 months; (4) Have primary hyperparathyroidism; (5) On osteoporosis medications and need to assess response to drug therapy  · Last bone density test (DXA Scan): 11/22/2017  5  HIV Screening: covered annually if you're between the age of 12-76  Also covered annually if you are younger than 13 and older than 72 with risk factors for HIV infection   For pregnant patients, it is covered up to 3 times per pregnancy  Immunizations:  Immunization Recommendations   Influenza Vaccine Annual influenza vaccination during flu season is recommended for all persons aged >= 6 months who do not have contraindications   Pneumococcal Vaccine (Prevnar and Pneumovax)  * Prevnar = PCV13  * Pneumovax = PPSV23   Adults 25-60 years old: 1-3 doses may be recommended based on certain risk factors  Adults 72 years old: Prevnar (PCV13) vaccine recommended followed by Pneumovax (PPSV23) vaccine  If already received PPSV23 since turning 65, then PCV13 recommended at least one year after PPSV23 dose  Hepatitis B Vaccine 3 dose series if at intermediate or high risk (ex: diabetes, end stage renal disease, liver disease)   Tetanus (Td) Vaccine - COST NOT COVERED BY MEDICARE PART B Following completion of primary series, a booster dose should be given every 10 years to maintain immunity against tetanus  Td may also be given as tetanus wound prophylaxis  Tdap Vaccine - COST NOT COVERED BY MEDICARE PART B Recommended at least once for all adults  For pregnant patients, recommended with each pregnancy  Shingles Vaccine (Shingrix) - COST NOT COVERED BY MEDICARE PART B  2 shot series recommended in those aged 48 and above     Health Maintenance Due:      Topic Date Due    Breast Cancer Screening: Mammogram  02/01/2023    Colorectal Cancer Screening  03/26/2024    Hepatitis C Screening  Completed     Immunizations Due:  There are no preventive care reminders to display for this patient  Advance Directives   What are advance directives? Advance directives are legal documents that state your wishes and plans for medical care  These plans are made ahead of time in case you lose your ability to make decisions for yourself  Advance directives can apply to any medical decision, such as the treatments you want, and if you want to donate organs  What are the types of advance directives?   There are many types of advance directives, and each state has rules about how to use them  You may choose a combination of any of the following:  · Living will: This is a written record of the treatment you want  You can also choose which treatments you do not want, which to limit, and which to stop at a certain time  This includes surgery, medicine, IV fluid, and tube feedings  · Durable power of  for healthcare [place]): This is a written record that states who you want to make healthcare choices for you when you are unable to make them for yourself  This person, called a proxy, is usually a family member or a friend  You may choose more than 1 proxy  · Do not resuscitate (DNR) order:  A DNR order is used in case your heart stops beating or you stop breathing  It is a request not to have certain forms of treatment, such as CPR  A DNR order may be included in other types of advance directives  · Medical directive: This covers the care that you want if you are in a coma, near death, or unable to make decisions for yourself  You can list the treatments you want for each condition  Treatment may include pain medicine, surgery, blood transfusions, dialysis, IV or tube feedings, and a ventilator (breathing machine)  · Values history: This document has questions about your views, beliefs, and how you feel and think about life  This information can help others choose the care that you would choose  Why are advance directives important? An advance directive helps you control your care  Although spoken wishes may be used, it is better to have your wishes written down  Spoken wishes can be misunderstood, or not followed  Treatments may be given even if you do not want them  An advance directive may make it easier for your family to make difficult choices about your care  Urinary Incontinence   Urinary incontinence (UI)  is when you lose control of your bladder  UI develops because your bladder cannot store or empty urine properly   The 3 most common types of UI are stress incontinence, urge incontinence, or both  Medicines:   · May be given to help strengthen your bladder control  Report any side effects of medication to your healthcare provider  Do pelvic muscle exercises often:  Your pelvic muscles help you stop urinating  Squeeze these muscles tight for 5 seconds, then relax for 5 seconds  Gradually work up to squeezing for 10 seconds  Do 3 sets of 15 repetitions a day, or as directed  This will help strengthen your pelvic muscles and improve bladder control  Train your bladder:  Go to the bathroom at set times, such as every 2 hours, even if you do not feel the urge to go  You can also try to hold your urine when you feel the urge to go  For example, hold your urine for 5 minutes when you feel the urge to go  As that becomes easier, hold your urine for 10 minutes  Self-care:   · Keep a UI record  Write down how often you leak urine and how much you leak  Make a note of what you were doing when you leaked urine  · Drink liquids as directed  You may need to limit the amount of liquid you drink to help control your urine leakage  Do not drink any liquid right before you go to bed  Limit or do not have drinks that contain caffeine or alcohol  · Prevent constipation  Eat a variety of high-fiber foods  Good examples are high-fiber cereals, beans, vegetables, and whole-grain breads  Walking is the best way to trigger your intestines to have a bowel movement  · Exercise regularly and maintain a healthy weight  Weight loss and exercise will decrease pressure on your bladder and help you control your leakage  · Use a catheter as directed  to help empty your bladder  A catheter is a tiny, plastic tube that is put into your bladder to drain your urine  · Go to behavior therapy as directed  Behavior therapy may be used to help you learn to control your urge to urinate      Weight Management   Why it is important to manage your weight:  Being overweight increases your risk of health conditions such as heart disease, high blood pressure, type 2 diabetes, and certain types of cancer  It can also increase your risk for osteoarthritis, sleep apnea, and other respiratory problems  Aim for a slow, steady weight loss  Even a small amount of weight loss can lower your risk of health problems  How to lose weight safely:  A safe and healthy way to lose weight is to eat fewer calories and get regular exercise  You can lose up about 1 pound a week by decreasing the number of calories you eat by 500 calories each day  Healthy meal plan for weight management:  A healthy meal plan includes a variety of foods, contains fewer calories, and helps you stay healthy  A healthy meal plan includes the following:  · Eat whole-grain foods more often  A healthy meal plan should contain fiber  Fiber is the part of grains, fruits, and vegetables that is not broken down by your body  Whole-grain foods are healthy and provide extra fiber in your diet  Some examples of whole-grain foods are whole-wheat breads and pastas, oatmeal, brown rice, and bulgur  · Eat a variety of vegetables every day  Include dark, leafy greens such as spinach, kale, katelynn greens, and mustard greens  Eat yellow and orange vegetables such as carrots, sweet potatoes, and winter squash  · Eat a variety of fruits every day  Choose fresh or canned fruit (canned in its own juice or light syrup) instead of juice  Fruit juice has very little or no fiber  · Eat low-fat dairy foods  Drink fat-free (skim) milk or 1% milk  Eat fat-free yogurt and low-fat cottage cheese  Try low-fat cheeses such as mozzarella and other reduced-fat cheeses  · Choose meat and other protein foods that are low in fat  Choose beans or other legumes such as split peas or lentils  Choose fish, skinless poultry (chicken or turkey), or lean cuts of red meat (beef or pork)  Before you cook meat or poultry, cut off any visible fat     · Use less fat and oil  Try baking foods instead of frying them  Add less fat, such as margarine, sour cream, regular salad dressing and mayonnaise to foods  Eat fewer high-fat foods  Some examples of high-fat foods include french fries, doughnuts, ice cream, and cakes  · Eat fewer sweets  Limit foods and drinks that are high in sugar  This includes candy, cookies, regular soda, and sweetened drinks  Exercise:  Exercise at least 30 minutes per day on most days of the week  Some examples of exercise include walking, biking, dancing, and swimming  You can also fit in more physical activity by taking the stairs instead of the elevator or parking farther away from stores  Ask your healthcare provider about the best exercise plan for you  © Copyright [employer] 2018 Information is for End User's use only and may not be sold, redistributed or otherwise used for commercial purposes   All illustrations and images included in CareNotes® are the copyrighted property of [employer]  or [address] [place] [address]

## 2022-03-29 PROBLEM — K04.7 TOOTH INFECTION: Status: ACTIVE | Noted: 2022-03-29

## 2022-03-30 NOTE — PROGRESS NOTES
Assessment/Plan:    1  Tooth infection  Assessment & Plan:  Mouth rinses  tylenol prn  Dental follow-up      Orders:  -     clindamycin (CLEOCIN) 150 mg capsule; Take 1 capsule (150 mg total) by mouth every 6 (six) hours for 7 days    2  Cough  Assessment & Plan:  otc cough suppressant prn  Fluids/immune supplements      3  Medicare annual wellness visit, subsequent    4  BMI 28 0-28 9,adult      Depression Screening and Follow-up Plan: Patient was screened for depression during today's encounter  They screened negative with a PHQ-2 score of 0  Patient Instructions       Medicare Preventive Visit Patient Instructions  Thank you for completing your Welcome to Medicare Visit or Medicare Annual Wellness Visit today  Your next wellness visit will be due in one year (3/17/2023)  The screening/preventive services that you may require over the next 5-10 years are detailed below  Some tests may not apply to you based off risk factors and/or age  Screening tests ordered at today's visit but not completed yet may show as past due  Also, please note that scanned in results may not display below  Preventive Screenings:  Service Recommendations Previous Testing/Comments   Colorectal Cancer Screening  * Colonoscopy    * Fecal Occult Blood Test (FOBT)/Fecal Immunochemical Test (FIT)  * Fecal DNA/Cologuard Test  * Flexible Sigmoidoscopy Age: 54-65 years old   Colonoscopy: every 10 years (may be performed more frequently if at higher risk)  OR  FOBT/FIT: every 1 year  OR  Cologuard: every 3 years  OR  Sigmoidoscopy: every 5 years  Screening may be recommended earlier than age 48 if at higher risk for colorectal cancer  Also, an individualized decision between you and your healthcare provider will decide whether screening between the ages of 74-80 would be appropriate   Colonoscopy: 03/26/2021  FOBT/FIT: Not on file  Cologuard: Not on file  Sigmoidoscopy: Not on file    Screening Current     Breast Cancer Screening Age: 36+ years old  Frequency: every 1-2 years  Not required if history of left and right mastectomy Mammogram: 02/01/2022    Screening Current   Cervical Cancer Screening Between the ages of 21-29, pap smear recommended once every 3 years  Between the ages of 33-67, can perform pap smear with HPV co-testing every 5 years  Recommendations may differ for women with a history of total hysterectomy, cervical cancer, or abnormal pap smears in past  Pap Smear: Not on file    Screening Not Indicated   Hepatitis C Screening Once for adults born between 1945 and 1965  More frequently in patients at high risk for Hepatitis C Hep C Antibody: 04/26/2019    Screening Current   Diabetes Screening 1-2 times per year if you're at risk for diabetes or have pre-diabetes Fasting glucose: 129 mg/dL   A1C: 6 0    Screening Current   Cholesterol Screening Once every 5 years if you don't have a lipid disorder  May order more often based on risk factors  Lipid panel: 06/25/2021    Screening Not Indicated  History Lipid Disorder     Other Preventive Screenings Covered by Medicare:  1  Abdominal Aortic Aneurysm (AAA) Screening: covered once if your at risk  You're considered to be at risk if you have a family history of AAA  2  Lung Cancer Screening: covers low dose CT scan once per year if you meet all of the following conditions: (1) Age 50-69; (2) No signs or symptoms of lung cancer; (3) Current smoker or have quit smoking within the last 15 years; (4) You have a tobacco smoking history of at least 30 pack years (packs per day multiplied by number of years you smoked); (5) You get a written order from a healthcare provider  3  Glaucoma Screening: covered annually if you're considered high risk: (1) You have diabetes OR (2) Family history of glaucoma OR (3)  aged 48 and older OR (3)  American aged 72 and older  3   Osteoporosis Screening: covered every 2 years if you meet one of the following conditions: (1) You're estrogen deficient and at risk for osteoporosis based off medical history and other findings; (2) Have a vertebral abnormality; (3) On glucocorticoid therapy for more than 3 months; (4) Have primary hyperparathyroidism; (5) On osteoporosis medications and need to assess response to drug therapy  · Last bone density test (DXA Scan): 11/22/2017  5  HIV Screening: covered annually if you're between the age of 12-76  Also covered annually if you are younger than 13 and older than 72 with risk factors for HIV infection  For pregnant patients, it is covered up to 3 times per pregnancy  Immunizations:  Immunization Recommendations   Influenza Vaccine Annual influenza vaccination during flu season is recommended for all persons aged >= 6 months who do not have contraindications   Pneumococcal Vaccine (Prevnar and Pneumovax)  * Prevnar = PCV13  * Pneumovax = PPSV23   Adults 25-60 years old: 1-3 doses may be recommended based on certain risk factors  Adults 72 years old: Prevnar (PCV13) vaccine recommended followed by Pneumovax (PPSV23) vaccine  If already received PPSV23 since turning 65, then PCV13 recommended at least one year after PPSV23 dose  Hepatitis B Vaccine 3 dose series if at intermediate or high risk (ex: diabetes, end stage renal disease, liver disease)   Tetanus (Td) Vaccine - COST NOT COVERED BY MEDICARE PART B Following completion of primary series, a booster dose should be given every 10 years to maintain immunity against tetanus  Td may also be given as tetanus wound prophylaxis  Tdap Vaccine - COST NOT COVERED BY MEDICARE PART B Recommended at least once for all adults  For pregnant patients, recommended with each pregnancy     Shingles Vaccine (Shingrix) - COST NOT COVERED BY MEDICARE PART B  2 shot series recommended in those aged 48 and above     Health Maintenance Due:      Topic Date Due    Breast Cancer Screening: Mammogram  02/01/2023    Colorectal Cancer Screening  03/26/2024    Hepatitis C Screening  Completed     Immunizations Due:  There are no preventive care reminders to display for this patient  Advance Directives   What are advance directives? Advance directives are legal documents that state your wishes and plans for medical care  These plans are made ahead of time in case you lose your ability to make decisions for yourself  Advance directives can apply to any medical decision, such as the treatments you want, and if you want to donate organs  What are the types of advance directives? There are many types of advance directives, and each state has rules about how to use them  You may choose a combination of any of the following:  · Living will: This is a written record of the treatment you want  You can also choose which treatments you do not want, which to limit, and which to stop at a certain time  This includes surgery, medicine, IV fluid, and tube feedings  · Durable power of  for healthcare [facility]): This is a written record that states who you want to make healthcare choices for you when you are unable to make them for yourself  This person, called a proxy, is usually a family member or a friend  You may choose more than 1 proxy  · Do not resuscitate (DNR) order:  A DNR order is used in case your heart stops beating or you stop breathing  It is a request not to have certain forms of treatment, such as CPR  A DNR order may be included in other types of advance directives  · Medical directive: This covers the care that you want if you are in a coma, near death, or unable to make decisions for yourself  You can list the treatments you want for each condition  Treatment may include pain medicine, surgery, blood transfusions, dialysis, IV or tube feedings, and a ventilator (breathing machine)  · Values history: This document has questions about your views, beliefs, and how you feel and think about life   This information can help others choose the care that you would choose  Why are advance directives important? An advance directive helps you control your care  Although spoken wishes may be used, it is better to have your wishes written down  Spoken wishes can be misunderstood, or not followed  Treatments may be given even if you do not want them  An advance directive may make it easier for your family to make difficult choices about your care  Urinary Incontinence   Urinary incontinence (UI)  is when you lose control of your bladder  UI develops because your bladder cannot store or empty urine properly  The 3 most common types of UI are stress incontinence, urge incontinence, or both  Medicines:   · May be given to help strengthen your bladder control  Report any side effects of medication to your healthcare provider  Do pelvic muscle exercises often:  Your pelvic muscles help you stop urinating  Squeeze these muscles tight for 5 seconds, then relax for 5 seconds  Gradually work up to squeezing for 10 seconds  Do 3 sets of 15 repetitions a day, or as directed  This will help strengthen your pelvic muscles and improve bladder control  Train your bladder:  Go to the bathroom at set times, such as every 2 hours, even if you do not feel the urge to go  You can also try to hold your urine when you feel the urge to go  For example, hold your urine for 5 minutes when you feel the urge to go  As that becomes easier, hold your urine for 10 minutes  Self-care:   · Keep a UI record  Write down how often you leak urine and how much you leak  Make a note of what you were doing when you leaked urine  · Drink liquids as directed  You may need to limit the amount of liquid you drink to help control your urine leakage  Do not drink any liquid right before you go to bed  Limit or do not have drinks that contain caffeine or alcohol  · Prevent constipation  Eat a variety of high-fiber foods  Good examples are high-fiber cereals, beans, vegetables, and whole-grain breads  Walking is the best way to trigger your intestines to have a bowel movement  · Exercise regularly and maintain a healthy weight  Weight loss and exercise will decrease pressure on your bladder and help you control your leakage  · Use a catheter as directed  to help empty your bladder  A catheter is a tiny, plastic tube that is put into your bladder to drain your urine  · Go to behavior therapy as directed  Behavior therapy may be used to help you learn to control your urge to urinate  Weight Management   Why it is important to manage your weight:  Being overweight increases your risk of health conditions such as heart disease, high blood pressure, type 2 diabetes, and certain types of cancer  It can also increase your risk for osteoarthritis, sleep apnea, and other respiratory problems  Aim for a slow, steady weight loss  Even a small amount of weight loss can lower your risk of health problems  How to lose weight safely:  A safe and healthy way to lose weight is to eat fewer calories and get regular exercise  You can lose up about 1 pound a week by decreasing the number of calories you eat by 500 calories each day  Healthy meal plan for weight management:  A healthy meal plan includes a variety of foods, contains fewer calories, and helps you stay healthy  A healthy meal plan includes the following:  · Eat whole-grain foods more often  A healthy meal plan should contain fiber  Fiber is the part of grains, fruits, and vegetables that is not broken down by your body  Whole-grain foods are healthy and provide extra fiber in your diet  Some examples of whole-grain foods are whole-wheat breads and pastas, oatmeal, brown rice, and bulgur  · Eat a variety of vegetables every day  Include dark, leafy greens such as spinach, kale, katelynn greens, and mustard greens  Eat yellow and orange vegetables such as carrots, sweet potatoes, and winter squash  · Eat a variety of fruits every day    Choose fresh or canned fruit (canned in its own juice or light syrup) instead of juice  Fruit juice has very little or no fiber  · Eat low-fat dairy foods  Drink fat-free (skim) milk or 1% milk  Eat fat-free yogurt and low-fat cottage cheese  Try low-fat cheeses such as mozzarella and other reduced-fat cheeses  · Choose meat and other protein foods that are low in fat  Choose beans or other legumes such as split peas or lentils  Choose fish, skinless poultry (chicken or turkey), or lean cuts of red meat (beef or pork)  Before you cook meat or poultry, cut off any visible fat  · Use less fat and oil  Try baking foods instead of frying them  Add less fat, such as margarine, sour cream, regular salad dressing and mayonnaise to foods  Eat fewer high-fat foods  Some examples of high-fat foods include french fries, doughnuts, ice cream, and cakes  · Eat fewer sweets  Limit foods and drinks that are high in sugar  This includes candy, cookies, regular soda, and sweetened drinks  Exercise:  Exercise at least 30 minutes per day on most days of the week  Some examples of exercise include walking, biking, dancing, and swimming  You can also fit in more physical activity by taking the stairs instead of the elevator or parking farther away from stores  Ask your healthcare provider about the best exercise plan for you  © Copyright [employer] 2018 Information is for End User's use only and may not be sold, redistributed or otherwise used for commercial purposes  All illustrations and images included in CareNotes® are the copyrighted property of [employer]  or [employer]      Subjective:      Patient ID: Ty Weathers is a 74 y o  female      Chief Complaint   Patient presents with    Cough     pt bringing up some mucus , has a pain on left chest over above left breast pain radiates sometimes it is constant but then yesterday she did not feel it at [address] says when she swallows it doesnt feel right (has been having a sore throat also)     Dizziness     but she didnt eat anything     Medicare Wellness Visit       HPI  Main c/o tooth infection  req abx until able to get in w dentist  No fever, +intermittent rash  +cough, congestion and s/t  No n/v or change in b/b  BP low end, occ dizziness  The following portions of the patient's history were reviewed and updated as appropriate: allergies, current medications, past family history, past medical history, past social history, past surgical history and problem list     Review of Systems   Constitutional: Positive for fatigue  Negative for fever  HENT: Positive for congestion and dental problem  Respiratory: Positive for cough  Negative for shortness of breath and wheezing  Cardiovascular: Negative  Gastrointestinal:        Occ GERD   Musculoskeletal: Positive for arthralgias  Skin: Positive for rash (intermittent)  Neurological: Positive for dizziness           Current Outpatient Medications   Medication Sig Dispense Refill    Advair Diskus 250-50 MCG/DOSE inhaler       albuterol (Ventolin HFA) 90 mcg/act inhaler Inhale 2 puffs every 4 (four) hours as needed for wheezing 1 Inhaler 5    Ascorbic Acid (VITAMIN C) 1000 MG tablet       atorvastatin (LIPITOR) 20 mg tablet Take 1 tablet (20 mg total) by mouth daily at bedtime 90 tablet 0    cholecalciferol (VITAMIN D3) 1,000 units tablet Take 2,000 Units by mouth daily      clobetasol (TEMOVATE) 0 05 % external solution APPLY TOPICALLY TWO TIMES A DAY (GENERIC FOR TEMOVATE) 50 mL 2    cyclobenzaprine (FLEXERIL) 10 mg tablet Take 10 mg by mouth 3 (three) times a day as needed for muscle spasms (as needed for pain)        famotidine (PEPCID) 20 mg tablet Take 1 tablet (20 mg total) by mouth 2 (two) times a day 60 tablet 11    guaiFENesin (MUCINEX) 600 mg 12 hr tablet Take 1,200 mg by mouth every 12 (twelve) hours      hydrocortisone 2 5 % cream 2 (two) times a day scalp      Loratadine (CLARITIN PO) Take by mouth  metFORMIN (GLUCOPHAGE-XR) 500 mg 24 hr tablet TAKE ONE TABLET BY MOUTH EVERY DAY WITH DINNER 90 tablet 0    montelukast (SINGULAIR) 10 mg tablet       multivitamin (THERAGRAN) TABS Take 1 tablet by mouth daily Last dose 3/21/21      pantoprazole (PROTONIX) 40 mg tablet TAKE ONE TABLET BY MOUTH EVERY DAY 30 tablet 0    Probiotic Product (PROBIOTIC-10 PO)       triamcinolone (KENALOG) 0 025 % cream Apply topically 2 (two) times a day 80 g 5    Zinc 50 MG CAPS Take 50 mg by mouth daily         No current facility-administered medications for this visit  Objective:    BP 98/70 (BP Location: Left arm, Patient Position: Sitting, Cuff Size: Large)   Pulse 72   Temp 98 7 °F (37 1 °C)   Resp 16   Ht 5' 3" (1 6 m)   Wt 73 5 kg (162 lb)   SpO2 98%   BMI 28 70 kg/m²        Physical Exam  Vitals and nursing note reviewed  Constitutional:       General: She is not in acute distress  HENT:      Right Ear: Tympanic membrane normal       Left Ear: Tympanic membrane normal       Nose: Congestion present  Mouth/Throat:      Pharynx: No oropharyngeal exudate  Eyes:      General: No scleral icterus  Conjunctiva/sclera: Conjunctivae normal    Cardiovascular:      Rate and Rhythm: Normal rate and regular rhythm  Pulmonary:      Effort: Pulmonary effort is normal  No respiratory distress  Comments: No localized w/r/r  Abdominal:      General: Bowel sounds are normal       Palpations: Abdomen is soft  Tenderness: There is no abdominal tenderness  Musculoskeletal:      Cervical back: Neck supple  Skin:     General: Skin is warm and dry  Coloration: Skin is not jaundiced  Neurological:      General: No focal deficit present  Mental Status: She is alert and oriented to person, place, and time  Cranial Nerves: No cranial nerve deficit     Psychiatric:         Mood and Affect: Mood normal          Miguel Carreon MD

## 2022-04-04 ENCOUNTER — OFFICE VISIT (OUTPATIENT)
Dept: FAMILY MEDICINE CLINIC | Facility: CLINIC | Age: 67
End: 2022-04-04
Payer: COMMERCIAL

## 2022-04-04 VITALS
TEMPERATURE: 98.1 F | RESPIRATION RATE: 16 BRPM | HEART RATE: 76 BPM | BODY MASS INDEX: 28.77 KG/M2 | SYSTOLIC BLOOD PRESSURE: 110 MMHG | DIASTOLIC BLOOD PRESSURE: 72 MMHG | HEIGHT: 63 IN | WEIGHT: 162.4 LBS

## 2022-04-04 DIAGNOSIS — J45.20 MILD INTERMITTENT ASTHMA WITHOUT COMPLICATION: ICD-10-CM

## 2022-04-04 DIAGNOSIS — R32 URINARY INCONTINENCE, UNSPECIFIED TYPE: ICD-10-CM

## 2022-04-04 DIAGNOSIS — K04.7 DENTAL INFECTION: ICD-10-CM

## 2022-04-04 DIAGNOSIS — J40 BRONCHITIS: Primary | ICD-10-CM

## 2022-04-04 DIAGNOSIS — R30.0 DYSURIA: ICD-10-CM

## 2022-04-04 DIAGNOSIS — R35.0 URINE FREQUENCY: ICD-10-CM

## 2022-04-04 LAB
SL AMB  POCT GLUCOSE, UA: NORMAL
SL AMB LEUKOCYTE ESTERASE,UA: NORMAL
SL AMB POCT BILIRUBIN,UA: NORMAL
SL AMB POCT BLOOD,UA: NORMAL
SL AMB POCT CLARITY,UA: CLEAR
SL AMB POCT COLOR,UA: YELLOW
SL AMB POCT KETONES,UA: NORMAL
SL AMB POCT NITRITE,UA: NORMAL
SL AMB POCT PH,UA: 5
SL AMB POCT SPECIFIC GRAVITY,UA: 1.02
SL AMB POCT URINE PROTEIN: NORMAL
SL AMB POCT UROBILINOGEN: NORMAL

## 2022-04-04 PROCEDURE — 81003 URINALYSIS AUTO W/O SCOPE: CPT | Performed by: FAMILY MEDICINE

## 2022-04-04 PROCEDURE — 1160F RVW MEDS BY RX/DR IN RCRD: CPT | Performed by: FAMILY MEDICINE

## 2022-04-04 PROCEDURE — 1036F TOBACCO NON-USER: CPT | Performed by: FAMILY MEDICINE

## 2022-04-04 PROCEDURE — 99213 OFFICE O/P EST LOW 20 MIN: CPT | Performed by: FAMILY MEDICINE

## 2022-04-04 PROCEDURE — 3008F BODY MASS INDEX DOCD: CPT | Performed by: FAMILY MEDICINE

## 2022-04-04 RX ORDER — AMOXICILLIN 875 MG/1
875 TABLET, COATED ORAL 2 TIMES DAILY
Qty: 20 TABLET | Refills: 0 | Status: SHIPPED | OUTPATIENT
Start: 2022-04-04 | End: 2022-04-14

## 2022-04-04 RX ORDER — ALBUTEROL SULFATE 2.5 MG/3ML
2.5 SOLUTION RESPIRATORY (INHALATION) EVERY 6 HOURS PRN
Qty: 180 ML | Refills: 5 | Status: SHIPPED | OUTPATIENT
Start: 2022-04-04

## 2022-04-04 RX ORDER — METHYLPREDNISOLONE 4 MG/1
TABLET ORAL
Qty: 21 EACH | Refills: 0 | Status: SHIPPED | OUTPATIENT
Start: 2022-04-04

## 2022-04-04 NOTE — PROGRESS NOTES
Assessment/Plan:    1  Bronchitis  -     methylPREDNISolone 4 MG tablet therapy pack; Use as directed on package    2  Dental infection  -     amoxicillin (AMOXIL) 875 mg tablet; Take 1 tablet (875 mg total) by mouth 2 (two) times a day for 10 days    3  Dysuria    4  Urine frequency  -     POCT urine dip auto non-scope    5  Urinary incontinence, unspecified type  -     Ambulatory Referral to Urogynecology; Future    6  Mild intermittent asthma without complication  -     albuterol (2 5 mg/3 mL) 0 083 % nebulizer solution; Take 3 mL (2 5 mg total) by nebulization every 6 (six) hours as needed for wheezing or shortness of breath          Subjective:      Patient ID: Rossy Hagen is a 74 y o  female  Chief Complaint   Patient presents with    Cough     cough is very deep and productive, no fever, + tooth extracted)    Asthma    Difficulty Urinating     pt had to stop taking her antibiotic for her congestion because she felt like she was getting a uti , urine frequency also        Cough  The current episode started 1 to 4 weeks ago  The cough is productive of sputum  Associated symptoms include nasal congestion, postnasal drip, a rash, rhinorrhea and wheezing  Pertinent negatives include no fever, hemoptysis or shortness of breath  She has tried rest and OTC cough suppressant for the symptoms  Her past medical history is significant for asthma, bronchitis, environmental allergies and pneumonia  Urinary Frequency   The current episode started 1 to 4 weeks ago  The quality of the pain is described as shooting and burning  Associated symptoms include frequency, hesitancy and urgency       Had tooth extraction today prior to visit    The following portions of the patient's history were reviewed and updated as appropriate: allergies, current medications, past family history, past medical history, past social history, past surgical history and problem list     Review of Systems   Constitutional: Positive for fatigue  Negative for fever  HENT: Positive for congestion, dental problem, postnasal drip and rhinorrhea  Respiratory: Positive for cough and wheezing  Negative for hemoptysis and shortness of breath  Cardiovascular:        Chest tenderness   Genitourinary: Positive for dysuria, frequency, hesitancy and urgency  Musculoskeletal: Positive for arthralgias  Skin: Positive for rash  Allergic/Immunologic: Positive for environmental allergies           Current Outpatient Medications   Medication Sig Dispense Refill    Advair Diskus 250-50 MCG/DOSE inhaler       albuterol (Ventolin HFA) 90 mcg/act inhaler Inhale 2 puffs every 4 (four) hours as needed for wheezing 1 Inhaler 5    Ascorbic Acid (VITAMIN C) 1000 MG tablet       atorvastatin (LIPITOR) 20 mg tablet Take 1 tablet (20 mg total) by mouth daily at bedtime 90 tablet 0    cholecalciferol (VITAMIN D3) 1,000 units tablet Take 2,000 Units by mouth daily      clobetasol (TEMOVATE) 0 05 % external solution APPLY TOPICALLY TWO TIMES A DAY (GENERIC FOR TEMOVATE) 50 mL 2    cyclobenzaprine (FLEXERIL) 10 mg tablet Take 10 mg by mouth 3 (three) times a day as needed for muscle spasms (as needed for pain)        famotidine (PEPCID) 20 mg tablet Take 1 tablet (20 mg total) by mouth 2 (two) times a day 60 tablet 11    guaiFENesin (MUCINEX) 600 mg 12 hr tablet Take 1,200 mg by mouth every 12 (twelve) hours      hydrocortisone 2 5 % cream 2 (two) times a day scalp      Loratadine (CLARITIN PO) Take by mouth        metFORMIN (GLUCOPHAGE-XR) 500 mg 24 hr tablet TAKE ONE TABLET BY MOUTH EVERY DAY WITH DINNER 90 tablet 0    montelukast (SINGULAIR) 10 mg tablet       multivitamin (THERAGRAN) TABS Take 1 tablet by mouth daily Last dose 3/21/21      pantoprazole (PROTONIX) 40 mg tablet TAKE ONE TABLET BY MOUTH EVERY DAY 30 tablet 0    Probiotic Product (PROBIOTIC-10 PO)       triamcinolone (KENALOG) 0 025 % cream Apply topically 2 (two) times a day 80 g 5    Zinc 50 MG CAPS Take 50 mg by mouth daily        albuterol (2 5 mg/3 mL) 0 083 % nebulizer solution Take 3 mL (2 5 mg total) by nebulization every 6 (six) hours as needed for wheezing or shortness of breath 180 mL 5    amoxicillin (AMOXIL) 875 mg tablet Take 1 tablet (875 mg total) by mouth 2 (two) times a day for 10 days 20 tablet 0    methylPREDNISolone 4 MG tablet therapy pack Use as directed on package 21 each 0     No current facility-administered medications for this visit  Objective:    /72 (BP Location: Left arm, Patient Position: Sitting, Cuff Size: Standard)   Pulse 76   Temp 98 1 °F (36 7 °C)   Resp 16   Ht 5' 3" (1 6 m)   Wt 73 7 kg (162 lb 6 4 oz)   BMI 28 77 kg/m²        Physical Exam  Vitals and nursing note reviewed  Constitutional:       General: She is not in acute distress  HENT:      Nose: Congestion present  Mouth/Throat:      Pharynx: No oropharyngeal exudate  Eyes:      General: No scleral icterus  Conjunctiva/sclera: Conjunctivae normal    Cardiovascular:      Rate and Rhythm: Normal rate and regular rhythm  Pulmonary:      Effort: Pulmonary effort is normal  No respiratory distress  Breath sounds: Wheezing present  Abdominal:      General: Bowel sounds are normal       Palpations: Abdomen is soft  Tenderness: There is no abdominal tenderness  Musculoskeletal:      Cervical back: Neck supple  Skin:     General: Skin is warm and dry  Neurological:      General: No focal deficit present  Mental Status: She is alert and oriented to person, place, and time  Cranial Nerves: No cranial nerve deficit             Paresh Jimenez MD

## 2022-04-10 PROBLEM — R32 URINARY INCONTINENCE: Status: ACTIVE | Noted: 2022-04-10

## 2022-04-10 PROBLEM — R30.0 DYSURIA: Status: ACTIVE | Noted: 2022-04-10

## 2022-04-10 PROBLEM — R35.0 URINE FREQUENCY: Status: ACTIVE | Noted: 2022-04-10

## 2022-04-14 DIAGNOSIS — K21.9 GASTROESOPHAGEAL REFLUX DISEASE WITHOUT ESOPHAGITIS: ICD-10-CM

## 2022-04-14 RX ORDER — PANTOPRAZOLE SODIUM 40 MG/1
TABLET, DELAYED RELEASE ORAL
Qty: 30 TABLET | Refills: 0 | Status: SHIPPED | OUTPATIENT
Start: 2022-04-14 | End: 2022-05-18

## 2022-05-06 DIAGNOSIS — L56.8 PHOTOALLERGIC DERMATITIS: ICD-10-CM

## 2022-05-06 RX ORDER — CLOBETASOL PROPIONATE 0.46 MG/ML
SOLUTION TOPICAL
Qty: 50 ML | Refills: 2 | Status: SHIPPED | OUTPATIENT
Start: 2022-05-06

## 2022-05-18 DIAGNOSIS — K21.9 GASTROESOPHAGEAL REFLUX DISEASE WITHOUT ESOPHAGITIS: ICD-10-CM

## 2022-05-18 RX ORDER — PANTOPRAZOLE SODIUM 40 MG/1
TABLET, DELAYED RELEASE ORAL
Qty: 30 TABLET | Refills: 0 | Status: SHIPPED | OUTPATIENT
Start: 2022-05-18 | End: 2022-05-20

## 2022-05-21 DIAGNOSIS — K21.9 GASTROESOPHAGEAL REFLUX DISEASE WITHOUT ESOPHAGITIS: ICD-10-CM

## 2022-05-21 RX ORDER — PANTOPRAZOLE SODIUM 40 MG/1
TABLET, DELAYED RELEASE ORAL
Qty: 30 TABLET | Refills: 0 | Status: SHIPPED | OUTPATIENT
Start: 2022-05-21 | End: 2022-06-13 | Stop reason: SDUPTHER

## 2022-06-06 ENCOUNTER — RA CDI HCC (OUTPATIENT)
Dept: OTHER | Facility: HOSPITAL | Age: 67
End: 2022-06-06

## 2022-06-06 NOTE — PROGRESS NOTES
[facility] 75  coding opportunities       Chart reviewed, no opportunity found:   [address]        Patients Insurance     Medicare Insurance: [facility]

## 2022-06-08 DIAGNOSIS — R73.03 PREDIABETES: ICD-10-CM

## 2022-06-08 RX ORDER — METFORMIN HYDROCHLORIDE 500 MG/1
TABLET, EXTENDED RELEASE ORAL
Qty: 90 TABLET | Refills: 2 | Status: SHIPPED | OUTPATIENT
Start: 2022-06-08

## 2022-06-13 ENCOUNTER — OFFICE VISIT (OUTPATIENT)
Dept: FAMILY MEDICINE CLINIC | Facility: CLINIC | Age: 67
End: 2022-06-13
Payer: COMMERCIAL

## 2022-06-13 VITALS
DIASTOLIC BLOOD PRESSURE: 64 MMHG | HEART RATE: 78 BPM | TEMPERATURE: 98.3 F | WEIGHT: 162.8 LBS | BODY MASS INDEX: 28.84 KG/M2 | RESPIRATION RATE: 16 BRPM | HEIGHT: 63 IN | SYSTOLIC BLOOD PRESSURE: 120 MMHG

## 2022-06-13 DIAGNOSIS — K21.9 GASTROESOPHAGEAL REFLUX DISEASE WITHOUT ESOPHAGITIS: ICD-10-CM

## 2022-06-13 DIAGNOSIS — R10.84 GENERALIZED ABDOMINAL PAIN: Primary | ICD-10-CM

## 2022-06-13 LAB
SL AMB  POCT GLUCOSE, UA: ABNORMAL
SL AMB LEUKOCYTE ESTERASE,UA: ABNORMAL
SL AMB POCT BILIRUBIN,UA: ABNORMAL
SL AMB POCT BLOOD,UA: ABNORMAL
SL AMB POCT CLARITY,UA: CLEAR
SL AMB POCT COLOR,UA: YELLOW
SL AMB POCT KETONES,UA: 15
SL AMB POCT NITRITE,UA: ABNORMAL
SL AMB POCT PH,UA: 5
SL AMB POCT SPECIFIC GRAVITY,UA: 1.02
SL AMB POCT URINE PROTEIN: ABNORMAL
SL AMB POCT UROBILINOGEN: ABNORMAL

## 2022-06-13 PROCEDURE — 99213 OFFICE O/P EST LOW 20 MIN: CPT | Performed by: FAMILY MEDICINE

## 2022-06-13 PROCEDURE — 81003 URINALYSIS AUTO W/O SCOPE: CPT | Performed by: FAMILY MEDICINE

## 2022-06-13 RX ORDER — FAMOTIDINE 40 MG/1
40 TABLET, FILM COATED ORAL
Qty: 90 TABLET | Refills: 0 | Status: SHIPPED | OUTPATIENT
Start: 2022-06-13

## 2022-06-13 RX ORDER — PANTOPRAZOLE SODIUM 40 MG/1
40 TABLET, DELAYED RELEASE ORAL DAILY
Qty: 90 TABLET | Refills: 0 | Status: SHIPPED | OUTPATIENT
Start: 2022-06-13 | End: 2022-06-27

## 2022-06-14 ENCOUNTER — APPOINTMENT (OUTPATIENT)
Dept: LAB | Facility: CLINIC | Age: 67
End: 2022-06-14
Payer: COMMERCIAL

## 2022-06-14 DIAGNOSIS — E65 CENTRAL ADIPOSITY: ICD-10-CM

## 2022-06-14 DIAGNOSIS — R10.84 GENERALIZED ABDOMINAL PAIN: ICD-10-CM

## 2022-06-14 LAB
ALBUMIN SERPL BCP-MCNC: 3.5 G/DL (ref 3.5–5)
ALP SERPL-CCNC: 62 U/L (ref 46–116)
ALT SERPL W P-5'-P-CCNC: 52 U/L (ref 12–78)
ANION GAP SERPL CALCULATED.3IONS-SCNC: 4 MMOL/L (ref 4–13)
AST SERPL W P-5'-P-CCNC: 24 U/L (ref 5–45)
BILIRUB SERPL-MCNC: 0.27 MG/DL (ref 0.2–1)
BUN SERPL-MCNC: 14 MG/DL (ref 5–25)
CALCIUM SERPL-MCNC: 9.2 MG/DL (ref 8.3–10.1)
CHLORIDE SERPL-SCNC: 108 MMOL/L (ref 100–108)
CO2 SERPL-SCNC: 27 MMOL/L (ref 21–32)
CORTIS AM PEAK SERPL-MCNC: 15.2 UG/DL (ref 4.2–22.4)
CREAT SERPL-MCNC: 0.87 MG/DL (ref 0.6–1.3)
EST. AVERAGE GLUCOSE BLD GHB EST-MCNC: 131 MG/DL
GFR SERPL CREATININE-BSD FRML MDRD: 68 ML/MIN/1.73SQ M
GLUCOSE P FAST SERPL-MCNC: 131 MG/DL (ref 65–99)
HBA1C MFR BLD: 6.2 %
POTASSIUM SERPL-SCNC: 3.9 MMOL/L (ref 3.5–5.3)
PROT SERPL-MCNC: 7.8 G/DL (ref 6.4–8.2)
SODIUM SERPL-SCNC: 139 MMOL/L (ref 136–145)

## 2022-06-14 PROCEDURE — 80053 COMPREHEN METABOLIC PANEL: CPT

## 2022-06-14 PROCEDURE — 83036 HEMOGLOBIN GLYCOSYLATED A1C: CPT

## 2022-06-14 PROCEDURE — 82533 TOTAL CORTISOL: CPT

## 2022-06-30 ENCOUNTER — OFFICE VISIT (OUTPATIENT)
Dept: DERMATOLOGY | Age: 67
End: 2022-06-30
Payer: COMMERCIAL

## 2022-06-30 VITALS — TEMPERATURE: 98.7 F | HEIGHT: 63 IN | WEIGHT: 157.4 LBS | BODY MASS INDEX: 27.89 KG/M2

## 2022-06-30 DIAGNOSIS — L56.8 PHOTOSENSITIVE CONTACT DERMATITIS: Primary | ICD-10-CM

## 2022-06-30 PROBLEM — R10.84 GENERALIZED ABDOMINAL PAIN: Status: ACTIVE | Noted: 2022-06-30

## 2022-06-30 PROCEDURE — 1160F RVW MEDS BY RX/DR IN RCRD: CPT | Performed by: DERMATOLOGY

## 2022-06-30 PROCEDURE — 99213 OFFICE O/P EST LOW 20 MIN: CPT | Performed by: DERMATOLOGY

## 2022-06-30 RX ORDER — TRIAMCINOLONE ACETONIDE 0.25 MG/G
CREAM TOPICAL 2 TIMES DAILY
Qty: 30 G | Refills: 4 | Status: SHIPPED | OUTPATIENT
Start: 2022-06-30 | End: 2022-10-07 | Stop reason: HOSPADM

## 2022-06-30 RX ORDER — CLOBETASOL PROPIONATE 0.5 MG/G
CREAM TOPICAL 2 TIMES DAILY
Qty: 60 G | Refills: 2 | Status: SHIPPED | OUTPATIENT
Start: 2022-06-30

## 2022-06-30 NOTE — PATIENT INSTRUCTIONS
PHOTOSENSITIVE CONTACT DERMATITIS   Physical Exam:  Anatomic Location Affected: Face and upper back   Morphological Description: erythema of face , keratotic Scaly plates on  Right shoulder   Pertinent Positives:  Pertinent Negatives: Additional History of Present Condition:      Assessment and Plan:  Based on a thorough discussion of this condition and the management approach to it (including a comprehensive discussion of the known risks, side effects and potential benefits of treatment), the patient (family) agrees to implement the following specific plan:  Discussed with patient treatment options   1 Heliocare over the counter helps the skin tolerate sun exposure  Start Heliocare 1 tab orally  every morning   If is not improvement in one month  Treatment option will be Hydroxychloroquine   Start clobetasol 0 05% cream  apply topically on scalp and right shoulder two times a day   Start Triamcinolone 0 025% cream on affected areas of skin twice a day   Avoid eyes   Follow up in one month     When outside we recommend using a wide brim hat, sunglasses, long sleeve and pants, sunscreen with SPF 48+ with reapplication every 2 hours, or SPF specific clothing

## 2022-06-30 NOTE — PROGRESS NOTES
[facility] Follow Up Note    Patient Name: Satish Saab  Encounter Date: 09 30 2022    Today's Chief Concerns:   Concern #1:  Rash on face and  back     Current Medications:    Current Outpatient Medications:     Advair Diskus 250-50 MCG/DOSE inhaler, , Disp: , Rfl:     albuterol (2 5 mg/3 mL) 0 083 % nebulizer solution, Take 3 mL (2 5 mg total) by nebulization every 6 (six) hours as needed for wheezing or shortness of breath, Disp: 180 mL, Rfl: 5    albuterol (Ventolin HFA) 90 mcg/act inhaler, Inhale 2 puffs every 4 (four) hours as needed for wheezing, Disp: 1 Inhaler, Rfl: 5    Ascorbic Acid (VITAMIN C) 1000 MG tablet, , Disp: , Rfl:     atorvastatin (LIPITOR) 20 mg tablet, Take 1 tablet (20 mg total) by mouth daily at bedtime, Disp: 90 tablet, Rfl: 0    cholecalciferol (VITAMIN D3) 1,000 units tablet, Take 2,000 Units by mouth daily, Disp: , Rfl:     clobetasol (TEMOVATE) 0 05 % external solution, APPLY TOPICALLY TO AFFECTED AREA(S) TWO TIMES A DAY (GENERIC FOR TEMOVATE), Disp: 50 mL, Rfl: 2    cyclobenzaprine (FLEXERIL) 10 mg tablet, Take 10 mg by mouth 3 (three) times a day as needed for muscle spasms (as needed for pain)  , Disp: , Rfl:     famotidine (PEPCID) 40 MG tablet, Take 1 tablet (40 mg total) by mouth daily at bedtime, Disp: 90 tablet, Rfl: 0    guaiFENesin (MUCINEX) 600 mg 12 hr tablet, Take 1,200 mg by mouth every 12 (twelve) hours, Disp: , Rfl:     hydrocortisone 2 5 % cream, 2 (two) times a day scalp, Disp: , Rfl:     Loratadine (CLARITIN PO), Take by mouth  , Disp: , Rfl:     metFORMIN (GLUCOPHAGE-XR) 500 mg 24 hr tablet, TAKE ONE TABLET BY MOUTH EVERY DAY WITH DINNER, Disp: 90 tablet, Rfl: 2    montelukast (SINGULAIR) 10 mg tablet, , Disp: , Rfl:     multivitamin (THERAGRAN) TABS, Take 1 tablet by mouth daily Last dose 3/21/21, Disp: , Rfl:     pantoprazole (PROTONIX) 40 mg tablet, TAKE ONE TABLET BY MOUTH EVERY DAY, Disp: 30 tablet, Rfl: 2    triamcinolone (KENALOG) 0 025 % cream, Apply topically 2 (two) times a day, Disp: 80 g, Rfl: 5    Zinc 50 MG CAPS, Take 50 mg by mouth daily  , Disp: , Rfl:     methylPREDNISolone 4 MG tablet therapy pack, Use as directed on package, Disp: 21 each, Rfl: 0    Probiotic Product (PROBIOTIC-10 PO), , Disp: , Rfl:     CONSTITUTIONAL:   There were no vitals filed for this visit  Specific Alerts:    Have you been seen by a [facility]atologist in the last 3 years? YES    Are you pregnant or planning to become pregnant? No    Are you currently or planning to be nursing or breast feeding? No    Allergies   Allergen Reactions    Latex Rash     Burn-skin irritation    Adhesive [Medical Tape] Other (See Comments)     bandaid-red,itch       May we call your Preferred Phone number to discuss your specific medical information? YES    May we leave a detailed message that includes your specific medical information? YES    Have you traveled outside of the [facility] in the past 3 months? No    Do you currently have a pacemaker or defibrillator? No    Do you have any artificial heart valves, joints, plates, screws, rods, stents, pins, etc? No   - If Yes, were any placed within the last 2 years? Do you require any medications prior to a surgical procedure? No   - If Yes, for which procedure? - If Yes, what medications to you require? Are you taking any medications that cause you to bleed more easily ("blood thinners") No    Have you ever experienced a rapid heartbeat with epinephrine? No    Have you ever been treated with "gold" (gold sodium thiomalate) therapy? No    [facility] can help with wrinkles, "laugh lines," facial volume loss, "double chin," "love handles," age spots, and more  Are you interested in learning today about some of the skin enhancement procedures that we offer?  (If Yes, please provide more detail) No    Review of Systems:  Have you recently had or currently have any of the following? · Fever or chills: No  · Night Sweats: No  · Headaches: No  · Weight Gain: No  · Weight Loss: No  · Blurry Vision: No  · Nausea: No  · Vomiting: No  · Diarrhea: No  · Blood in Stool: No  · Abdominal Pain: No  · Itchy Skin: No  · Painful Joints: No  · Swollen Joints: No  · Muscle Pain: No  · Irregular Mole: No  · Sun Burn: No  · Dry Skin: No  · Skin Color Changes: No  · Scar or Keloid: No  · Cold Sores/Fever Blisters: No  · Bacterial Infections/MRSA: No  · Anxiety: No  · Depression: No  · Suicidal or Homicidal Thoughts: No      PSYCH: Normal mood and affect  EYES: Normal conjunctiva  ENT: Normal lips and oral mucosa  CARDIOVASCULAR: No edema  RESPIRATORY: Normal respirations  HEME/LYMPH/IMMUNO:  No regional lymphadenopathy except as noted below in ASSESSMENT AND PLAN BY DIAGNOSIS    FULL ORGAN SYSTEM SKIN EXAM (SKIN)    Scalp, Face Normal except as noted below in Assessment   Right Shoulder Normal except as noted below in Assessment   Back/Spine Normal except as noted below in Assessment     PHOTOSENSITIVE CONTACT DERMATITIS   Physical Exam:   Anatomic Location Affected: Face and upper back    Morphological Description: erythema of face , keratotic Scaly plaques on  Right shoulder, crusted erosions scalp   Pertinent Positives:   Pertinent Negatives: Additional History of Present Condition:  Pt admits have rash acommpanied of itching on face and right shoulder for several months but is getting worse with sun exposure  Assessment and Plan:  Based on a thorough discussion of this condition and the management approach to it (including a comprehensive discussion of the known risks, side effects and potential benefits of treatment), the patient (family) agrees to implement the following specific plan:   Discussed with patient treatment options   1  Heliocare over the counter helps the skin tolerate sun exposure   Start Heliocare 1 tab orally  every morning       If is not improvement in one month  Treatment option will be Hydroxychloroquine    Start clobetasol 0 05% cream  apply topically on scalp and right shoulder two times a day    Start Triamcinolone 0 025% cream on affected areas of face twice a day   Avoid eyes   When outside we recommend using a wide brim hat, sunglasses, long sleeve and pants, sunscreen with SPF 61+ with reapplication every 2 hours, or SPF specific clothing    Follow up in one month         Scribe Attestation    I,:  Fabricio Beltran am acting as a scribe while in the presence of the attending physician :       I,:  Clark Lopez MD personally performed the services described in this documentation    as scribed in my presence :

## 2022-07-01 NOTE — PROGRESS NOTES
Assessment/Plan:    1  Generalized abdominal pain  -     CBC; Future  -     Comprehensive metabolic panel; Future  -     Hemoglobin A1C; Future  -     Cortisol Level, AM Specimen; Future    2  Gastroesophageal reflux disease without esophagitis  -     POCT urine dip auto non-scope  -     famotidine (PEPCID) 40 MG tablet; Take 1 tablet (40 mg total) by mouth daily at bedtime              Subjective:      Patient ID: Jayna Neal is a 74 y o  female  Chief Complaint   Patient presents with    Abdominal Pain     Stomach bloating that is starting to worry her, cant hold her urine but has an appointment with gyno        HPI  Follow-up  Interval hx reviewed  Cont abd c/o/urinary urg  No f/c  Has upcoming appt wurogyn    The following portions of the patient's history were reviewed and updated as appropriate: allergies, current medications, past family history, past medical history, past social history, past surgical history and problem list     Review of Systems   Constitutional: Positive for fatigue  Negative for fever  Eyes:        Wears glasses   Respiratory:        Hx RAD   Cardiovascular: Negative  Gastrointestinal:        GERD   Endocrine:        PreDM   Musculoskeletal: Positive for arthralgias  Skin: Positive for rash  Allergic/Immunologic: Positive for environmental allergies  Neurological: Negative  Psychiatric/Behavioral: Positive for sleep disturbance  The patient is nervous/anxious            Current Outpatient Medications   Medication Sig Dispense Refill    Advair Diskus 250-50 MCG/DOSE inhaler       albuterol (2 5 mg/3 mL) 0 083 % nebulizer solution Take 3 mL (2 5 mg total) by nebulization every 6 (six) hours as needed for wheezing or shortness of breath 180 mL 5    albuterol (Ventolin HFA) 90 mcg/act inhaler Inhale 2 puffs every 4 (four) hours as needed for wheezing 1 Inhaler 5    Ascorbic Acid (VITAMIN C) 1000 MG tablet       atorvastatin (LIPITOR) 20 mg tablet Take 1 tablet (20 mg total) by mouth daily at bedtime 90 tablet 0    cholecalciferol (VITAMIN D3) 1,000 units tablet Take 2,000 Units by mouth daily      clobetasol (TEMOVATE) 0 05 % external solution APPLY TOPICALLY TO AFFECTED AREA(S) TWO TIMES A DAY (GENERIC FOR TEMOVATE) 50 mL 2    cyclobenzaprine (FLEXERIL) 10 mg tablet Take 10 mg by mouth 3 (three) times a day as needed for muscle spasms (as needed for pain)        famotidine (PEPCID) 40 MG tablet Take 1 tablet (40 mg total) by mouth daily at bedtime 90 tablet 0    guaiFENesin (MUCINEX) 600 mg 12 hr tablet Take 1,200 mg by mouth every 12 (twelve) hours      hydrocortisone 2 5 % cream 2 (two) times a day scalp      Loratadine (CLARITIN PO) Take by mouth        metFORMIN (GLUCOPHAGE-XR) 500 mg 24 hr tablet TAKE ONE TABLET BY MOUTH EVERY DAY WITH DINNER 90 tablet 2    methylPREDNISolone 4 MG tablet therapy pack Use as directed on package 21 each 0    montelukast (SINGULAIR) 10 mg tablet       multivitamin (THERAGRAN) TABS Take 1 tablet by mouth daily Last dose 3/21/21      Probiotic Product (PROBIOTIC-10 PO)       triamcinolone (KENALOG) 0 025 % cream Apply topically 2 (two) times a day 80 g 5    Zinc 50 MG CAPS Take 50 mg by mouth daily        clobetasol (TEMOVATE) 0 05 % cream Apply topically 2 (two) times a day Apply topically on scalp and right shoulder two times a day 60 g 2    pantoprazole (PROTONIX) 40 mg tablet TAKE ONE TABLET BY MOUTH EVERY DAY 30 tablet 2    triamcinolone (KENALOG) 0 025 % cream Apply topically 2 (two) times a day Apply topically on affected areas of face two times a day   30 g 4     No current facility-administered medications for this visit  Objective:    /64 (BP Location: Left arm, Patient Position: Sitting, Cuff Size: Large)   Pulse 78   Temp 98 3 °F (36 8 °C)   Resp 16   Ht 5' 3" (1 6 m)   Wt 73 8 kg (162 lb 12 8 oz)   BMI 28 84 kg/m²        Physical Exam  Vitals and nursing note reviewed     Constitutional: General: She is not in acute distress  Appearance: Normal appearance  Eyes:      General: No scleral icterus  Conjunctiva/sclera: Conjunctivae normal    Cardiovascular:      Rate and Rhythm: Normal rate and regular rhythm  Pulmonary:      Effort: Pulmonary effort is normal  No respiratory distress  Breath sounds: Normal breath sounds  Abdominal:      General: Bowel sounds are normal       Palpations: Abdomen is soft  Tenderness: There is abdominal tenderness (mild LLQ)  There is no right CVA tenderness, left CVA tenderness, guarding or rebound  Musculoskeletal:      Cervical back: Neck supple  Skin:     General: Skin is warm and dry  Coloration: Skin is not jaundiced  Neurological:      General: No focal deficit present  Mental Status: She is alert and oriented to person, place, and time  Cranial Nerves: No cranial nerve deficit     Psychiatric:         Mood and Affect: Mood normal                 Ana Luisa Mace MD

## 2022-07-26 ENCOUNTER — OFFICE VISIT (OUTPATIENT)
Dept: OBGYN CLINIC | Facility: CLINIC | Age: 67
End: 2022-07-26
Payer: COMMERCIAL

## 2022-07-26 ENCOUNTER — APPOINTMENT (OUTPATIENT)
Dept: RADIOLOGY | Facility: CLINIC | Age: 67
End: 2022-07-26
Payer: COMMERCIAL

## 2022-07-26 VITALS
DIASTOLIC BLOOD PRESSURE: 68 MMHG | HEIGHT: 64 IN | WEIGHT: 150 LBS | BODY MASS INDEX: 25.61 KG/M2 | HEART RATE: 78 BPM | RESPIRATION RATE: 19 BRPM | TEMPERATURE: 98.2 F | SYSTOLIC BLOOD PRESSURE: 118 MMHG

## 2022-07-26 DIAGNOSIS — M76.31 ILIOTIBIAL BAND SYNDROME AFFECTING LOWER LEG, RIGHT: ICD-10-CM

## 2022-07-26 DIAGNOSIS — R29.898 WEAKNESS OF RIGHT HIP: ICD-10-CM

## 2022-07-26 DIAGNOSIS — M25.551 RIGHT HIP PAIN: Primary | ICD-10-CM

## 2022-07-26 DIAGNOSIS — M25.551 RIGHT HIP PAIN: ICD-10-CM

## 2022-07-26 DIAGNOSIS — M70.61 TROCHANTERIC BURSITIS OF RIGHT HIP: ICD-10-CM

## 2022-07-26 PROCEDURE — 73502 X-RAY EXAM HIP UNI 2-3 VIEWS: CPT

## 2022-07-26 PROCEDURE — 99203 OFFICE O/P NEW LOW 30 MIN: CPT | Performed by: PHYSICIAN ASSISTANT

## 2022-07-26 NOTE — PROGRESS NOTES
Assessment/Plan:  1  Right hip pain  XR hip/pelv 2-3 vws right if performed    XR hip/pelv 2-3 vws right if performed    Ambulatory Referral to Physical Therapy   2  Trochanteric bursitis of right hip  Ambulatory Referral to Physical Therapy   3  Iliotibial band syndrome affecting lower leg, right  Ambulatory Referral to Physical Therapy   4  Weakness of right hip       Mary Madison developed a tight ITB band and hip weakness with associated trochanteric bursitis after slipping on 06/10/2022  She will be started in formal physical therapy to decrease her symptoms, improve range of motion and improve strength  She will continue the Tylenol as needed for pain but add Aleve 1 tablet twice daily with food stopping calling if any stomach upset occurs  Subjective:   Patient ID: Jesus Gonsalez is a 74 y o  female is a new patient with right hip pain  Patient has a contributing medical history of fibromyalgia and diabetes  She reports that on Jessica 10, 2022 she was walking in her house and slipped on water from a plant causing her to have lateral hip pain and knee pain  She has tried over-the-counter Tylenol for this pain which helped decrease the knee pain but she is left with lateral hip pain  She denies any groin pain  She denies any low back or radiating radicular pain  She has had no loss of bowel or bladder function  She denies any fever chills  She has not tried any oral anti-inflammatories or therapy  She did not fall to the ground with the slip injury  Review of Systems   Constitutional: Negative for chills and fever  HENT: Negative for ear pain and sore throat  Eyes: Negative for pain and visual disturbance  Respiratory: Negative for cough and shortness of breath  Cardiovascular: Negative for chest pain and palpitations  Gastrointestinal: Negative for abdominal pain and vomiting  Genitourinary: Negative for dysuria and hematuria     Musculoskeletal: Negative for arthralgias and back pain    Skin: Negative for color change and rash  Neurological: Negative for seizures and syncope  All other systems reviewed and are negative          Past Medical History:   Diagnosis Date    Abnormal glucose     last assessed 16    Arthritis     Asthma     w/ exacerbation; last assessed 5/14/15    Atypical nevi     Atypical nevus     last assessed 17    Breast lump     last assessed 3/6/14    Cataract     Cervical adenopathy     last assessed  17    Colon polyp     CPAP (continuous positive airway pressure) dependence     Dizziness     Dyslipidemia     last assessed 17    Facial droop     last assessed  16    Fibromyalgia, primary     Flu 2018    Foot abrasion     right between the 4th and 5th toe    Fractures     Genital herpes     resolved 16    Headache, tension-type     Herpes zoster     last assessed 16    History of shingles     may 2016    History of stomach ulcers     Hx of abnormal mammogram     last assessed  3/5/14    Hyperlipidemia     Myalgia     last assessed  12    Myositis     12    Neck pain     Pain involving joints of fingers of both hands 2021    Peripheral neuropathy     Seborrheic keratosis     Shingles     Skin disorder     Skin neoplasm     of the lower limb, including hip; onset 12; last assessed  12    Sleep apnea     Stomach disorder        Past Surgical History:   Procedure Laterality Date    ABDOMINAL SURGERY      release of adhesions    BLADDER SURGERY      mesh-lift    BREAST CYST ASPIRATION Right     does not know the year    BREAST SURGERY      lift    CATARACT EXTRACTION Bilateral      SECTION      x [id number]    CHOLECYSTECTOMY      lap    COLONOSCOPY      COSMETIC SURGERY      tummy and breast lift    ESOPHAGOGASTRODUODENOSCOPY      OOPHORECTOMY Left     OTHER SURGICAL HISTORY      vocal cord surgery, scraping    MA HYSTEROSCOPY,W/ENDO BX N/A 11/3/2016    Procedure: DILATATION AND CURETTAGE (D&C) WITH HYSTEROSCOPY;  Surgeon: Nelia Neely MD;  Location: [address] [place] [address];  Service: Gynecology    REDUCTION MAMMAPLASTY Bilateral 2008   [facility] PROCEDURE  5/25/2021       Family History   Problem Relation Age of Onset    Hypertension Mother     Alzheimer's disease Mother     Arthritis Mother     Hypertension Father     Arthritis Father     Heart attack Father     Heart disease Father     Stroke Father     Other Brother         vertigo, tinnitus    Diabetes Daughter     Breast cancer Sister 28    Skin cancer Sister     Cancer Sister     Other Son         downs syndrome    Abdominal aortic aneurysm Sister     Cancer Sister         T cell sarcoma    No Known Problems Brother     Diabetes Brother     Other Sister         anemia from the diet    No Known Problems Son     No Known Problems Maternal Aunt     No Known Problems Maternal Aunt     No Known Problems Paternal Aunt     No Known Problems Paternal Aunt     No Known Problems Paternal Aunt        Social History     Occupational History    Not on file   Tobacco Use    Smoking status: Never Smoker    Smokeless tobacco: Never Used   Vaping Use    Vaping Use: Never used   Substance and Sexual Activity    Alcohol use: No    Drug use: No    Sexual activity: Not Currently     Birth control/protection: Post-menopausal         Current Outpatient Medications:     Advair Diskus 250-50 MCG/DOSE inhaler, , Disp: , Rfl:     albuterol (2 5 mg/3 mL) 0 083 % nebulizer solution, Take 3 mL (2 5 mg total) by nebulization every 6 (six) hours as needed for wheezing or shortness of breath, Disp: 180 mL, Rfl: 5    albuterol (Ventolin HFA) 90 mcg/act inhaler, Inhale 2 puffs every 4 (four) hours as needed for wheezing, Disp: 1 Inhaler, Rfl: 5    Ascorbic Acid (VITAMIN C) 1000 MG tablet, , Disp: , Rfl:     atorvastatin (LIPITOR) 20 mg tablet, Take 1 tablet (20 mg total) by mouth daily at bedtime, Disp: 90 tablet, Rfl: 0    cholecalciferol (VITAMIN D3) 1,000 units tablet, Take 2,000 Units by mouth daily, Disp: , Rfl:     clobetasol (TEMOVATE) 0 05 % cream, Apply topically 2 (two) times a day Apply topically on scalp and right shoulder two times a day, Disp: 60 g, Rfl: 2    clobetasol (TEMOVATE) 0 05 % external solution, APPLY TOPICALLY TO AFFECTED AREA(S) TWO TIMES A DAY (GENERIC FOR TEMOVATE), Disp: 50 mL, Rfl: 2    cyclobenzaprine (FLEXERIL) 10 mg tablet, Take 10 mg by mouth 3 (three) times a day as needed for muscle spasms (as needed for pain)  , Disp: , Rfl:     famotidine (PEPCID) 40 MG tablet, Take 1 tablet (40 mg total) by mouth daily at bedtime, Disp: 90 tablet, Rfl: 0    guaiFENesin (MUCINEX) 600 mg 12 hr tablet, Take 1,200 mg by mouth every 12 (twelve) hours, Disp: , Rfl:     hydrocortisone 2 5 % cream, 2 (two) times a day scalp, Disp: , Rfl:     Loratadine (CLARITIN PO), Take by mouth  , Disp: , Rfl:     metFORMIN (GLUCOPHAGE-XR) 500 mg 24 hr tablet, TAKE ONE TABLET BY MOUTH EVERY DAY WITH DINNER, Disp: 90 tablet, Rfl: 2    montelukast (SINGULAIR) 10 mg tablet, , Disp: , Rfl:     multivitamin (THERAGRAN) TABS, Take 1 tablet by mouth daily Last dose 3/21/21, Disp: , Rfl:     pantoprazole (PROTONIX) 40 mg tablet, TAKE ONE TABLET BY MOUTH EVERY DAY, Disp: 30 tablet, Rfl: 2    triamcinolone (KENALOG) 0 025 % cream, Apply topically 2 (two) times a day, Disp: 80 g, Rfl: 5    triamcinolone (KENALOG) 0 025 % cream, Apply topically 2 (two) times a day Apply topically on affected areas of face two times a day  , Disp: 30 g, Rfl: 4    Zinc 50 MG CAPS, Take 50 mg by mouth daily  , Disp: , Rfl:     methylPREDNISolone 4 MG tablet therapy pack, Use as directed on package, Disp: 21 each, Rfl: 0    Probiotic Product (PROBIOTIC-10 PO), , Disp: , Rfl:     Allergies   Allergen Reactions    Latex Rash     Burn-skin irritation    Adhesive [Medical Tape] Other (See Comments) bandaid-red,itch       Objective:  Vitals:    07/26/22 1027   BP: 118/68   Pulse: 78   Resp: 19   Temp: 98 2 °F (36 8 °C)       Right Hip Exam     Tenderness   The patient is experiencing tenderness in the greater trochanter  Range of Motion   The patient has normal right hip ROM  Muscle Strength   Abduction: 4/5   Adduction: 4/5   Flexion: 4/5     Tests   FERNANDO: negative  Dwight: negative    Other   Erythema: absent  Sensation: normal  Pulse: present    Comments:  Patient has a tight ITB band on the right side compared to the left  She is tender to palpation along the course of the iliotibial band  She has a negative sitting and supine straight leg raise  She has no calf tenderness and negative Homans  Gait is heel-to-toe  She is tender to palpation over the trochanteric bursa  Physical Exam  Constitutional:       General: She is not in acute distress  Appearance: Normal appearance  HENT:      Head: Normocephalic and atraumatic  Nose: Nose normal    Cardiovascular:      Pulses: Normal pulses  Skin:     General: Skin is warm and dry  Coloration: Skin is not jaundiced  Findings: No bruising, erythema or rash  Neurological:      General: No focal deficit present  Mental Status: She is alert and oriented to person, place, and time  Psychiatric:         Mood and Affect: Mood normal          Behavior: Behavior normal          Thought Content: Thought content normal          Judgment: Judgment normal          I have personally reviewed pertinent films in PACS and my interpretation is three views of the right hip show no acute fractures or dislocations  Mild degenerative changes are seen in the right hip and acetabulum  This is read from an orthopedic standpoint will await official radiologist interpretation           1 Result Note    Component Ref Range & Units 6/14/22  8:01 AM 6/25/21 12:17 PM 1/11/21 11:57 AM 8/21/19  8:08 AM 12/14/18  3:49 PM 6/16/18  7:44 AM 6/16/18 12:00 AM   Hemoglobin A1C Normal 3 8-5 6%; PreDiabetic 5 7-6 4%; Diabetic >=6 5%; Glycemic control for adults with diabetes <7 0% % 6 2 High   6 0 R  6 0 R               Study Result    Narrative & Impression   CT ABDOMEN AND PELVIS WITH IV CONTRAST     INDICATION:   LLQ abdominal pain, diverticulitis suspected  diffuse abd pain      COMPARISON:  CT abdomen pelvis 2/4/2021      TECHNIQUE:  CT examination of the abdomen and pelvis was performed  Axial, sagittal, and coronal 2D reformatted images were created from the source data and submitted for interpretation      Radiation dose length product (DLP) for this visit:  489 89 mGy-cm   This examination, like all CT scans performed in the [facility], was performed utilizing techniques to minimize radiation dose exposure, including the use of iterative   reconstruction and automated exposure control      IV Contrast:  100 mL of iohexol (OMNIPAQUE)  Enteric Contrast:  Enteric contrast was not administered      FINDINGS:     ABDOMEN     LOWER CHEST:  No clinically significant abnormality identified in the visualized lower chest      LIVER/BILIARY TREE:  Prominence of the CBD  This has been variably prominent on prior exams, and likely related to post cholecystectomy state  No calcified stones  Liver is otherwise unremarkable      GALLBLADDER:  Gallbladder is surgically absent      SPLEEN:  Unremarkable      PANCREAS:  Unremarkable      ADRENAL GLANDS:  Unremarkable      KIDNEYS/URETERS:  No hydronephrosis or urinary tract calculus  One or more sharply circumscribed subcentimeter renal hypodensities are present, too small to accurately characterize, and statistically most likely benign findings  According to recent   literature (Radiology 2019) no further workup of these findings is recommended      STOMACH AND BOWEL:  Wall thickening and infiltrative changes at the mid sigmoid colon where there are numerous diverticula   Findings compatible with acute diverticulitis  No findings for diverticular abscess  No bowel obstruction      APPENDIX:  A normal appendix was visualized      ABDOMINOPELVIC CAVITY:  No ascites  No pneumoperitoneum  No lymphadenopathy      VESSELS:  Unremarkable for patient's age      PELVIS     REPRODUCTIVE ORGANS:  Unremarkable for patient's age      URINARY BLADDER:  Unremarkable      ABDOMINAL WALL/INGUINAL REGIONS:  Unremarkable      OSSEOUS STRUCTURES:  No acute fracture or destructive osseous lesion  Metallic anchors at the superior pubic rami bilaterally      IMPRESSION:     1  Acute diverticulitis at the mid sigmoid colon  No findings for diverticular abscess   No bowel obstruction      The study was marked in EPIC for immediate notification       Workstation performed: [id number]        CT images were reviewed by myself in the office today and I do not appreciate any severe degenerative changes of the right hip or significant cystic changes in the right acetabulum

## 2022-07-26 NOTE — PATIENT INSTRUCTIONS
Hip Bursitis   WHAT YOU NEED TO KNOW:   What is hip bursitis? Hip bursitis is inflammation of the bursa in your hip  The bursa is a fluid-filled sac that acts as a cushion between a bone and a tendon  A tendon is a cord of strong tissue that connects muscles to bones  What increases my risk for hip bursitis? An injury, such as a fall    Bacterial infection    Constant pressure on your hips, such as when you stand or sit on hard surfaces for long periods of time    Overuse of your hips, such as when you run, climb stairs, or ride a bike    Medical conditions, such as scoliosis, rheumatoid arthritis, or gout    Leg length is not the same    Bone spurs or calcium that builds up in certain areas of bone and irritate the bursa    Past surgeries, such as hip joint replacement    What are the signs and symptoms of hip bursitis? Pain on the side of your hip or at the base of your hips when you sit down    Decreased movement or stiffness of your hip    Crunching or popping when you move your hip    Redness or swelling of the skin on your hip    How is hip bursitis diagnosed? Your healthcare provider will examine your hip and ask about your injury or activities  You may need any of the following:  Blood tests  may be used to check for signs of infection  Healthcare providers may also check for diseases that may be causing your bursitis, such as rheumatoid arthritis  X-ray or MRI  pictures may show bone position problems, arthritis, or a fracture  You may be given contrast liquid to help your knee show up better in the pictures  Tell the healthcare provider if you have ever had an allergic reaction to contrast liquid  Do not enter the MRI room with anything metal  Metal can cause serious injury  Tell the healthcare provider if you have any metal in or on your body  A fluid culture  means healthcare providers use a needle to drain fluid from your bursa  The fluid will be sent to a lab and tested for infection  Removal of bursa fluid may also help relieve your symptoms  How is hip bursitis treated? Medicines:      NSAIDs , such as ibuprofen, help decrease swelling, pain, and fever  NSAIDs can cause stomach bleeding or kidney problems in certain people  If you take blood thinner medicine, always ask your healthcare provider if NSAIDs are safe for you  Always read the medicine label and follow directions  Aspirin  helps relieve pain and swelling  Take aspirin exactly as directed by your healthcare provider  Antibiotics  help fight an infection caused by bacteria  Steroids  help relieve pain and swelling  Steroid injections are given directly into the painful area  Steroid pills may be given for a short time  Surgery  may be needed to remove your bursa or part of your elbow bone  Surgery is only done when other treatments do not work  How can I manage hip bursitis? Rest your hip as much as possible to decrease pain and swelling  Slowly start to do more each day  Return to your daily activities as directed  You may be able to use a cane or other device to take pressure off the hip as you walk  Apply ice to help decrease swelling and pain  Use an ice pack, or put crushed ice in a plastic bag  Cover the bag with a towel before you place it on your elbow  Apply ice for 15 to 20 minutes, 3 to 4 times each day, as directed  Do not lie on your injured hip  You may be more comfortable if you sleep on your stomach or back  Go to physical therapy, if directed  A physical therapist teaches you exercises to help improve movement and strength, and to decrease pain  How can I prevent hip bursitis? Do not overuse your hips  Shorten the time you spend running, climbing stairs, or riding a bike  Take breaks as you do these activities  Try not to do the same activities each day  For example, run every other day or every 3 days instead of daily  Stretch, warm up, and cool down    Always stretch and do warmup and cool-down exercises before and after you exercise  This will help loosen your muscles and decrease stress on your hips  Rest between workouts  Wear proper shoes  Wear shoes that fit properly and support your feet  You may need to wear shoe inserts called orthotics  Orthotics help position your foot correctly as you walk or exercise  Maintain a healthy weight  Ask your healthcare provider what a healthy weight is for you  Ask him or her to help you create a weight loss plan if you are overweight  Keep pressure off your hips  Do not stand or sit for long periods of time  Sit on padded surfaces, such as a cushion or pad, whenever possible  Bend your knees when you  objects from the ground  When should I call my doctor? Your pain and swelling increase  Your symptoms do not improve with treatment  You have a fever  You have questions or concerns about your condition or care  CARE AGREEMENT:   You have the right to help plan your care  Learn about your health condition and how it may be treated  Discuss treatment options with your healthcare providers to decide what care you want to receive  You always have the right to refuse treatment  The above information is an  only  It is not intended as medical advice for individual conditions or treatments  Talk to your doctor, nurse or pharmacist before following any medical regimen to see if it is safe and effective for you  © Copyright [employer] 2022 Information is for End User's use only and may not be sold, redistributed or otherwise used for commercial purposes   All illustrations and images included in CareNotes® are the copyrighted property of [employer]  or [address] [place] [address]

## 2022-08-01 ENCOUNTER — TELEPHONE (OUTPATIENT)
Dept: DERMATOLOGY | Facility: CLINIC | Age: 67
End: 2022-08-01

## 2022-08-01 NOTE — TELEPHONE ENCOUNTER
Pharmacist called to verify 2 topical steroids that we ordered for the patient  Triamcinolone cream, clobetasol cream and clobetasol solution  I told the pharmacist that according to the last visit patient was informed to use only 2 creams: Triamcinolone 0 025% cream and clobetasol 0 05% cream  Pharmacist will discontinue Clobetasol solution

## 2022-08-08 ENCOUNTER — EVALUATION (OUTPATIENT)
Dept: PHYSICAL THERAPY | Facility: CLINIC | Age: 67
End: 2022-08-08
Payer: COMMERCIAL

## 2022-08-08 DIAGNOSIS — M76.31 ILIOTIBIAL BAND SYNDROME AFFECTING LOWER LEG, RIGHT: ICD-10-CM

## 2022-08-08 DIAGNOSIS — M25.551 RIGHT HIP PAIN: ICD-10-CM

## 2022-08-08 DIAGNOSIS — M70.61 TROCHANTERIC BURSITIS OF RIGHT HIP: ICD-10-CM

## 2022-08-08 PROCEDURE — 97161 PT EVAL LOW COMPLEX 20 MIN: CPT | Performed by: PHYSICAL THERAPIST

## 2022-08-08 NOTE — PROGRESS NOTES
PT Evaluation     Today's date: 2022  Patient name: Satish Saab  : 1955  MRN: [id number]  Referring provider: JESUS Barriga*  Dx:   Encounter Diagnosis     ICD-10-CM    1  Right hip pain  M25 551 Ambulatory Referral to Physical Therapy   2  Trochanteric bursitis of right hip  M70 61 Ambulatory Referral to Physical Therapy   3  Iliotibial band syndrome affecting lower leg, right  M76 31 Ambulatory Referral to Physical Therapy                  Assessment  Assessment details: Zaheer Chanpresents with signs and symptoms consistent with Right hip pain   Trochanteric bursitis of right hip  Iliotibial band syndrome affecting lower leg, right, with loss of range of motion, strength and spinal stabilization  Presents with high reactivity  Satish Saab would benefit with physical therapy to address these impairments to return to prior level of function  Impairments: abnormal gait, abnormal or restricted ROM, activity intolerance, impaired balance, impaired physical strength, lacks appropriate home exercise program, pain with function and weight-bearing intolerance    Goals  STG  Initiate HEP  Decrease pain by 50% in 3 weeks  LTG  Independent with HEP  Decrease pain by 90% in 6 weeks  FOTO > 43 in 6 weeks    Plan  Planned therapy interventions: manual therapy, neuromuscular re-education, balance, balance/weight bearing training, patient education, strengthening, stretching, therapeutic exercise, functional ROM exercises, home exercise program and flexibility  Frequency: 2x week  Duration in visits: 12  Duration in weeks: 6  Treatment plan discussed with: patient        Subjective Evaluation    History of Present Illness  Mechanism of injury: Patient slip and fall at home on 6/10/22, and landed on the right hip  The pain was severe, and has limited walking ablity            Recurrent probem    Quality of life: good    Pain  Current pain ratin  At best pain ratin  At worst pain rating: 7  Location: right hip  Quality: needle-like, dull ache and knife-like  Relieving factors: heat and relaxation  Aggravating factors: stair climbing, walking and standing  Progression: improved    Treatments  Current treatment: physical therapy  Patient Goals  Patient goals for therapy: decreased pain, improved balance, increased motion, increased strength, independence with ADLs/IADLs and return to sport/leisure activities          Objective     Active Range of Motion   Left Hip   Flexion: 130 degrees   Abduction: 40 degrees   External rotation (90/90): 40 degrees   Internal rotation (90/90): 25 degrees     Right Hip   Flexion: 110 degrees with pain  Abduction: 10 degrees with pain  External rotation (90/90): 30 degrees with pain  Internal rotation (90/90): 15 degrees with pain    Strength/Myotome Testing     Left Hip   Planes of Motion   Flexion: 5  Abduction: 5  External rotation: 5  Internal rotation: 5    Right Hip   Planes of Motion   Flexion: 4  Abduction: 3+  External rotation: 3+  Internal rotation: 3+      Flowsheet Rows    Flowsheet Row Most Recent Value   PT/OT G-Codes    Current Score 1   Projected Score 43   FOTO information reviewed Yes   Assessment Type Evaluation   G code set Mobility: Walking & Moving Around             Precautions: Standard      Manuals                                                                 Neuro Re-Ed                                                                                                        Ther Ex             KTC 10x            Piriformis stretch 10x            clamshell 10x            Reverse clamshell 10x                                                                Ther Activity                                       Gait Training                                       Modalities

## 2022-08-15 ENCOUNTER — APPOINTMENT (OUTPATIENT)
Dept: PHYSICAL THERAPY | Facility: CLINIC | Age: 67
End: 2022-08-15
Payer: COMMERCIAL

## 2022-08-26 ENCOUNTER — OFFICE VISIT (OUTPATIENT)
Dept: PHYSICAL THERAPY | Facility: CLINIC | Age: 67
End: 2022-08-26
Payer: COMMERCIAL

## 2022-08-26 ENCOUNTER — APPOINTMENT (OUTPATIENT)
Dept: PHYSICAL THERAPY | Facility: CLINIC | Age: 67
End: 2022-08-26
Payer: COMMERCIAL

## 2022-08-26 DIAGNOSIS — M25.551 RIGHT HIP PAIN: Primary | ICD-10-CM

## 2022-08-26 PROCEDURE — 97110 THERAPEUTIC EXERCISES: CPT | Performed by: PHYSICAL THERAPIST

## 2022-08-26 NOTE — PROGRESS NOTES
PT Discharge    Today's date: 2022  Patient name: Vanessa Valerio  : 1955  MRN: [id number]  Referring provider: JESUS Carlson*  Dx:   Encounter Diagnosis     ICD-10-CM    1  Right hip pain  M25 551                   Assessment  Assessment details: Vanessa Valerio has been seen for Right hip pain  (primary encounter diagnosis),and has met the goals of physical therapy  And is independent with a HEP  Subjective Evaluation    History of Present Illness  Mechanism of injury: I have no hip pain, no limitations          Objective     Active Range of Motion   Left Hip   Normal active range of motion    Right Hip   Normal active range of motion    Strength/Myotome Testing     Left Hip   Normal muscle strength    Right Hip   Normal muscle strength             Precautions: standard      Manuals                                                                 Neuro Re-Ed                                                                                                        Ther Ex             Nustep  10m L4            bridge 20x            Supine pelvic lift w PB 20x            Trunk rot 20x            Hip ABD 20x            Clamshells 20x            Reverse clamshells 20x                         Ther Activity                                       Gait Training                                       Modalities

## 2022-08-30 ENCOUNTER — APPOINTMENT (OUTPATIENT)
Dept: PHYSICAL THERAPY | Facility: CLINIC | Age: 67
End: 2022-08-30
Payer: COMMERCIAL

## 2022-09-23 DIAGNOSIS — K21.9 GASTROESOPHAGEAL REFLUX DISEASE WITHOUT ESOPHAGITIS: ICD-10-CM

## 2022-09-24 RX ORDER — FAMOTIDINE 40 MG/1
TABLET, FILM COATED ORAL
Qty: 90 TABLET | Refills: 0 | Status: SHIPPED | OUTPATIENT
Start: 2022-09-24

## 2022-10-01 ENCOUNTER — OFFICE VISIT (OUTPATIENT)
Dept: FAMILY MEDICINE CLINIC | Facility: CLINIC | Age: 67
End: 2022-10-01
Payer: COMMERCIAL

## 2022-10-01 VITALS
OXYGEN SATURATION: 98 % | SYSTOLIC BLOOD PRESSURE: 120 MMHG | TEMPERATURE: 98.7 F | RESPIRATION RATE: 14 BRPM | BODY MASS INDEX: 25.12 KG/M2 | HEART RATE: 82 BPM | HEIGHT: 64 IN | DIASTOLIC BLOOD PRESSURE: 70 MMHG | WEIGHT: 147.13 LBS

## 2022-10-01 DIAGNOSIS — J45.21 MILD INTERMITTENT ASTHMA WITH EXACERBATION: Primary | ICD-10-CM

## 2022-10-01 DIAGNOSIS — J40 BRONCHITIS: ICD-10-CM

## 2022-10-01 PROCEDURE — 99214 OFFICE O/P EST MOD 30 MIN: CPT | Performed by: FAMILY MEDICINE

## 2022-10-01 RX ORDER — AZITHROMYCIN 250 MG/1
TABLET, FILM COATED ORAL
Qty: 6 TABLET | Refills: 0 | Status: SHIPPED | OUTPATIENT
Start: 2022-10-01 | End: 2022-10-05

## 2022-10-01 RX ORDER — METHYLPREDNISOLONE 4 MG/1
TABLET ORAL
Qty: 21 EACH | Refills: 0 | Status: SHIPPED | OUTPATIENT
Start: 2022-10-01 | End: 2022-10-07

## 2022-10-01 RX ORDER — GINKGO BILOBA 60 MG
TABLET ORAL
COMMUNITY
Start: 2022-06-01

## 2022-10-01 NOTE — PROGRESS NOTES
Dank Morgan 1955 female MRN: [id number]    [address] [place] [address]      ASSESSMENT/PLAN  Dank Morgan is a 74 y o  female presents to the office for    Diagnoses and all orders for this visit:    Mild intermittent asthma with exacerbation  -     azithromycin (ZITHROMAX) 250 mg tablet; Take 2 tablets today then 1 tablet daily x 4 days    Bronchitis  -     methylPREDNISolone 4 MG tablet therapy pack; Use as directed on package  -     azithromycin (ZITHROMAX) 250 mg tablet; Take 2 tablets today then 1 tablet daily x 4 days    Other orders  -     Ginkgo 60 MG TABS   If no improvement please contact our office  Depression Screening and Follow-up Plan: Patient was screened for depression during today's encounter  They screened negative with a PHQ-2 score of 0  No future appointments  SUBJECTIVE  CC: Nasal Congestion (Cough, negative COVID test)      HPI:  Dank Morgan is a 74 y o  female who presents for   Since Monday Congestion, post nasal drip, Coughing, wheezing  History of asthma  States that her albuterol isn't helping  Usually she needs steroids  Review of Systems   Constitutional: Negative for activity change, appetite change, chills, fatigue and fever  HENT: Positive for congestion  Respiratory: Positive for cough, shortness of breath and wheezing  Negative for chest tightness  Cardiovascular: Negative for chest pain and leg swelling  Gastrointestinal: Negative for abdominal distention, abdominal pain, constipation, diarrhea, nausea and vomiting  All other systems reviewed and are negative        Historical Information   The patient history was reviewed as follows:  Past Medical History:   Diagnosis Date    Abnormal glucose     last assessed 2/25/16    Arthritis     Asthma     w/ exacerbation; last assessed 5/14/15    Atypical nevi     Atypical nevus     last assessed 1/18/17    Breast lump     last assessed 3/6/14    Cataract     Cervical adenopathy     last assessed  2/7/17    Colon polyp     CPAP (continuous positive airway pressure) dependence     Dizziness     Dyslipidemia     last assessed 5/22/17    Facial droop     last assessed  12/5/16    Fibromyalgia, primary     Flu 01/20/2018    Foot abrasion     right between the 4th and 5th toe    Fractures     Genital herpes     resolved 9/1/16    Headache, tension-type     Herpes zoster     last assessed 5/20/16    History of shingles     may 2016    History of stomach ulcers     Hx of abnormal mammogram     last assessed  3/5/14    Hyperlipidemia     Myalgia     last assessed  11/21/12    Myositis     11/21/12    Neck pain     Pain involving joints of fingers of both hands 1/19/2021    Peripheral neuropathy     Seborrheic keratosis     Shingles     Skin disorder     Skin neoplasm     of the lower limb, including hip; onset 1/23/12; last assessed  1/23/12    Sleep apnea     Stomach disorder          Medications:     Current Outpatient Medications:     albuterol (2 5 mg/3 mL) 0 083 % nebulizer solution, Take 3 mL (2 5 mg total) by nebulization every 6 (six) hours as needed for wheezing or shortness of breath, Disp: 180 mL, Rfl: 5    albuterol (Ventolin HFA) 90 mcg/act inhaler, Inhale 2 puffs every 4 (four) hours as needed for wheezing, Disp: 1 Inhaler, Rfl: 5    Ascorbic Acid (VITAMIN C) 1000 MG tablet, , Disp: , Rfl:     atorvastatin (LIPITOR) 20 mg tablet, Take 1 tablet (20 mg total) by mouth daily at bedtime, Disp: 90 tablet, Rfl: 0    azithromycin (ZITHROMAX) 250 mg tablet, Take 2 tablets today then 1 tablet daily x 4 days, Disp: 6 tablet, Rfl: 0    cholecalciferol (VITAMIN D3) 1,000 units tablet, Take 2,000 Units by mouth daily, Disp: , Rfl:     clobetasol (TEMOVATE) 0 05 % cream, Apply topically 2 (two) times a day Apply topically on scalp and right shoulder two times a day, Disp: 60 g, Rfl: 2    clobetasol (TEMOVATE) 0 05 % external solution, APPLY TOPICALLY TO AFFECTED AREA(S) TWO TIMES A DAY (GENERIC FOR TEMOVATE), Disp: 50 mL, Rfl: 2    cyclobenzaprine (FLEXERIL) 10 mg tablet, Take 10 mg by mouth 3 (three) times a day as needed for muscle spasms (as needed for pain)  , Disp: , Rfl:     famotidine (PEPCID) 40 MG tablet, TAKE 1 TABLET BY MOUTH AT BEDTIME, Disp: 90 tablet, Rfl: 0    Fluticasone-Salmeterol (Advair Diskus) 250-50 mcg/dose inhaler, Inhale 1 puff every morning Rinse mouth after use , Disp: 60 blister, Rfl: 5    Ginkgo 60 MG TABS, , Disp: , Rfl:     guaiFENesin (MUCINEX) 600 mg 12 hr tablet, Take 1,200 mg by mouth every 12 (twelve) hours, Disp: , Rfl:     hydrocortisone 2 5 % cream, 2 (two) times a day scalp, Disp: , Rfl:     Loratadine (CLARITIN PO), Take by mouth  , Disp: , Rfl:     metFORMIN (GLUCOPHAGE-XR) 500 mg 24 hr tablet, TAKE ONE TABLET BY MOUTH EVERY DAY WITH DINNER, Disp: 90 tablet, Rfl: 2    methylPREDNISolone 4 MG tablet therapy pack, Use as directed on package, Disp: 21 each, Rfl: 0    montelukast (SINGULAIR) 10 mg tablet, TAKE ONE TABLET BY MOUTH EVERY DAY, Disp: 60 tablet, Rfl: 5    multivitamin (THERAGRAN) TABS, Take 1 tablet by mouth daily Last dose 3/21/21, Disp: , Rfl:     pantoprazole (PROTONIX) 40 mg tablet, TAKE ONE TABLET BY MOUTH EVERY DAY, Disp: 30 tablet, Rfl: 2    triamcinolone (KENALOG) 0 025 % cream, Apply topically 2 (two) times a day, Disp: 80 g, Rfl: 5    triamcinolone (KENALOG) 0 025 % cream, Apply topically 2 (two) times a day Apply topically on affected areas of face two times a day  , Disp: 30 g, Rfl: 4    Zinc 50 MG CAPS, Take 50 mg by mouth daily   (Patient not taking: No sig reported), Disp: , Rfl:     Allergies   Allergen Reactions    Latex Rash     Burn-skin irritation    Adhesive [Medical Tape] Other (See Comments)     bandaid-red,itch       OBJECTIVE  Vitals:   Vitals:    10/01/22 1106   BP: 120/70   BP Location: Left arm   Patient Position: Sitting   Cuff Size: Standard   Pulse: 82   Resp: 14   Temp: 98 7 °F (37 1 °C)   SpO2: 98%   Weight: 66 7 kg (147 lb 2 oz)   Height: 5' 4" (1 626 m)         Physical Exam  Vitals reviewed  Constitutional:       Appearance: She is well-developed  HENT:      Head: Normocephalic and atraumatic  Eyes:      Conjunctiva/sclera: Conjunctivae normal       Pupils: Pupils are equal, round, and reactive to light  Cardiovascular:      Rate and Rhythm: Normal rate and regular rhythm  Heart sounds: Normal heart sounds  Pulmonary:      Effort: Pulmonary effort is normal  No respiratory distress  Breath sounds: Wheezing present  Musculoskeletal:         General: Normal range of motion  Cervical back: Normal range of motion and neck supple  Skin:     General: Skin is warm  Capillary Refill: Capillary refill takes less than 2 seconds  Neurological:      Mental Status: She is alert and oriented to person, place, and time                      Mia [address] [place] [address]  10/2/2022

## 2022-10-04 ENCOUNTER — TELEPHONE (OUTPATIENT)
Dept: FAMILY MEDICINE CLINIC | Facility: CLINIC | Age: 67
End: 2022-10-04

## 2022-10-04 NOTE — TELEPHONE ENCOUNTER
Please advise her to give it 1 more days, if there is no improvement have her come in tomorrow to see Dr Sue Ying for stronger steriods

## 2022-10-04 NOTE — TELEPHONE ENCOUNTER
Patient of Dr Rosa Poole seen by Dr Cobos Bachelor on 10/1  She finished antibiotic today, still has 2 more days for prednisone  She is still coughing and feels like needles going down chest- back when she coughs  Please advise

## 2022-10-07 ENCOUNTER — HOSPITAL ENCOUNTER (OUTPATIENT)
Dept: RADIOLOGY | Facility: HOSPITAL | Age: 67
Discharge: HOME | End: 2022-10-07
Payer: COMMERCIAL

## 2022-10-07 ENCOUNTER — OFFICE VISIT (OUTPATIENT)
Dept: FAMILY MEDICINE CLINIC | Facility: CLINIC | Age: 67
End: 2022-10-07

## 2022-10-07 ENCOUNTER — APPOINTMENT (OUTPATIENT)
Dept: LAB | Facility: HOSPITAL | Age: 67
End: 2022-10-07
Payer: COMMERCIAL

## 2022-10-07 VITALS
OXYGEN SATURATION: 96 % | WEIGHT: 147.8 LBS | BODY MASS INDEX: 25.23 KG/M2 | HEIGHT: 64 IN | HEART RATE: 72 BPM | SYSTOLIC BLOOD PRESSURE: 108 MMHG | DIASTOLIC BLOOD PRESSURE: 68 MMHG | TEMPERATURE: 98.7 F | RESPIRATION RATE: 16 BRPM

## 2022-10-07 DIAGNOSIS — J40 BRONCHITIS: ICD-10-CM

## 2022-10-07 DIAGNOSIS — J40 BRONCHITIS: Primary | ICD-10-CM

## 2022-10-07 DIAGNOSIS — Z86.16 HISTORY OF COVID-19: ICD-10-CM

## 2022-10-07 DIAGNOSIS — R05.9 COUGH, UNSPECIFIED TYPE: ICD-10-CM

## 2022-10-07 PROCEDURE — 71046 X-RAY EXAM CHEST 2 VIEWS: CPT

## 2022-10-07 RX ORDER — PROMETHAZINE HYDROCHLORIDE AND CODEINE PHOSPHATE 6.25; 1 MG/5ML; MG/5ML
5 SYRUP ORAL 3 TIMES DAILY PRN
Qty: 118 ML | Refills: 0 | Status: SHIPPED | OUTPATIENT
Start: 2022-10-07

## 2022-10-07 RX ORDER — CEFDINIR 300 MG/1
300 CAPSULE ORAL EVERY 12 HOURS SCHEDULED
Qty: 20 CAPSULE | Refills: 0 | Status: SHIPPED | OUTPATIENT
Start: 2022-10-07 | End: 2022-10-17

## 2022-10-07 RX ORDER — PREDNISONE 20 MG/1
40 TABLET ORAL DAILY
Qty: 10 TABLET | Refills: 0 | Status: SHIPPED | OUTPATIENT
Start: 2022-10-07 | End: 2022-10-12

## 2022-10-09 DIAGNOSIS — K21.9 GASTROESOPHAGEAL REFLUX DISEASE WITHOUT ESOPHAGITIS: ICD-10-CM

## 2022-10-10 RX ORDER — PANTOPRAZOLE SODIUM 40 MG/1
TABLET, DELAYED RELEASE ORAL
Qty: 90 TABLET | Refills: 0 | Status: SHIPPED | OUTPATIENT
Start: 2022-10-10

## 2022-10-11 ENCOUNTER — TELEPHONE (OUTPATIENT)
Dept: PULMONOLOGY | Facility: MEDICAL CENTER | Age: 67
End: 2022-10-11

## 2022-10-11 DIAGNOSIS — R05.9 COUGH, UNSPECIFIED TYPE: Primary | ICD-10-CM

## 2022-10-11 DIAGNOSIS — Z86.16 HISTORY OF COVID-19: ICD-10-CM

## 2022-10-11 PROBLEM — Z00.00 MEDICARE ANNUAL WELLNESS VISIT, SUBSEQUENT: Status: RESOLVED | Noted: 2020-06-25 | Resolved: 2022-10-11

## 2022-10-12 PROBLEM — N30.00 ACUTE CYSTITIS WITHOUT HEMATURIA: Status: RESOLVED | Noted: 2021-01-19 | Resolved: 2022-10-12

## 2022-10-12 PROBLEM — Z12.4 SCREENING FOR CERVICAL CANCER: Status: RESOLVED | Noted: 2022-01-03 | Resolved: 2022-10-12

## 2022-10-18 NOTE — TELEPHONE ENCOUNTER
Dr Olivia Angel,    Please advise specialty that deals with clavicle, 1st/2nd rib fractures?  [facility] ambulatory encounter  ENT OFFICE VISIT    SUBJECTIVE:  Viktoriya Parry is a 64 year old female who presented requesting evaluation for a follow-up of chronic mastoiditis.  Patient has had improvement since our last visit, she reports she is not seen anymore ear drainage.  Her hearing remains poor.      PAST HISTORIES:  ALLERGIES:  No Known Allergies  Current Outpatient Medications   Medication Sig Dispense Refill   • levothyroxine 88 MCG tablet Take 1 tablet by mouth once daily 90 tablet 3   • clotrimazole (LOTRIMIN) 1 % topical solution 8 drops left ear(s) bid 1 each 0   • lovastatin (MEVACOR) 40 MG tablet Take 1 tablet by mouth nightly. 90 tablet 3   • estradiol 0.2 mg/g (0.02 %) cream (in natural base) Apply 1 gram vaginally using applicator daily for 2 weeks then three times weekly 30 g 11   • Multiple Vitamins-Minerals (MULTIVITAMIN ADULT PO) Take 1 tablet by mouth daily.       No current facility-administered medications for this visit.     Past Medical History:   Diagnosis Date   • Family history of colon cancer     in brother    • Graves' disease 1985   • Hearing loss, left    • HLD (hyperlipidemia)    • Left cataract 05/26/2020   • PVC (premature ventricular contraction)    • Wears dentures     full upper and lower   • Wears glasses      Past Surgical History:   Procedure Laterality Date   • Colonoscopy  11/21/2007    normal exam, Dr Thomas   • Colonoscopy  09/25/2019    no polyps    • Egd  11/21/2007    Dr Ryder    • Eye surgery Left 06/01/2020    cataract, June 2020   • Inner ear surgery Left 10/27/2016    CANALPLASTY FOR REMOVAL OF EAR CANAL CHOLESTEATOMA-Dr Maradiaga    • Tonsillectomy     • Tympanomastoidectomy w/ ossiculoplasty Left 11/26/2012   • Tympanoplasty Left 02/15/2021    Dr. Sanchez        Review of systems:  12 point review of systems negative except as in HPI    PHYSICAL EXAM:  Vital Signs:  weight is 58.9 kg (129 lb 13.6 oz). Her temporal temperature is 98.1 °F (36.7 °C). Her blood  pressure is 114/68.   Constitutional: Patient is a well-nourished, well-developed 64 year old female in no distress with fluent speech, strong voice, no stridor, and unlabored respirations. Affect is calm and patient is alert.  EARS:  Left auricle: Normally-formed. No lesions.  Left external canal:  Large mastoid cavity, some dried drainage and cerumen at the inferior aspect of the cavity.  Left tympanic membrane: Tympanic membrane translucent, mobile, without retraction or effusion. Normal landmarks present.    Right auricle: Normally-formed. No lesions.  Right external canal: No edema, erythema, or exudate.  Right tympanic membrane: Tympanic membrane translucent, mobile, without retraction or effusion. Normal landmarks present.        PROCEDURE:  Mastoid debridement:  [place] microscope was used to provide for binocular vision to allow for thorough debridement of the left mastoid cavity using combination of Armendariz curette, and micro alligator forceps.  Patient tolerated well without dizziness or injury    ASSESSMENT/PLAN:  Mastoiditis of left side  (primary encounter diagnosis)      Continuing to key mastoid cavity dry, return 6 months for next mastoid debridement.  We also rediscussed hearing amplification, patient plans to reconsider at next visit.      Instructions provided as documented in the After Visit Summary.    The patient indicated understanding of the diagnosis and agreed with the plan of care.

## 2022-11-02 ENCOUNTER — CONSULT (OUTPATIENT)
Dept: PULMONOLOGY | Facility: MEDICAL CENTER | Age: 67
End: 2022-11-02

## 2022-11-02 VITALS
HEIGHT: 64 IN | BODY MASS INDEX: 26.05 KG/M2 | RESPIRATION RATE: 12 BRPM | HEART RATE: 79 BPM | DIASTOLIC BLOOD PRESSURE: 82 MMHG | SYSTOLIC BLOOD PRESSURE: 122 MMHG | OXYGEN SATURATION: 98 % | WEIGHT: 152.6 LBS

## 2022-11-02 DIAGNOSIS — J45.30 MILD PERSISTENT ASTHMA WITHOUT COMPLICATION: ICD-10-CM

## 2022-11-02 DIAGNOSIS — R05.2 SUBACUTE COUGH: Primary | ICD-10-CM

## 2022-11-02 DIAGNOSIS — G47.33 OBSTRUCTIVE SLEEP APNEA: ICD-10-CM

## 2022-11-02 RX ORDER — PREDNISONE 20 MG/1
TABLET ORAL
Qty: 30 TABLET | Refills: 0 | Status: SHIPPED | OUTPATIENT
Start: 2022-11-02

## 2022-11-02 RX ORDER — BENZONATATE 100 MG/1
100 CAPSULE ORAL 2 TIMES DAILY PRN
Qty: 30 CAPSULE | Refills: 1 | Status: SHIPPED | OUTPATIENT
Start: 2022-11-02

## 2022-11-02 NOTE — PATIENT INSTRUCTIONS
I prescribed benzonatate 100 mg capsule take 1 capsule twice a day as needed for cough    Take prednisone 20 mg - 2 tablets for 5 days then 1 tablet for 5 days    I change her CPAP pressure from CPAP of 6 to CPAP of 6-12 cm water    Call back in 3 to 4 weeks    Back telephone number:  [phone]    Use Trelegy 100 mcg 1 puff daily in place of your Wixela inhaler for now

## 2022-11-02 NOTE — PROGRESS NOTES
Assessment/Plan:       Problem List Items Addressed This Visit        Respiratory    Mild persistent asthma without complication     Spirometry today shows lung volumes are normal      She does have nebulizer albuterol she can use as needed  I gave her samples of Trelegy 100 mcg 1 puff daily that she use 1 puff daily for next 4 weeks  She will use this in place for Wixela 250 mcg 1 puff b i d  Trelegy inhaler is not covered by her insurance but I did give her 2 samples to get her through for the next month  Relevant Medications    predniSONE 20 mg tablet    Obstructive sleep apnea     She just received a new replacement CPAP machine for her auto dream station CPAP machine  Presently she set on CPAP of 8 and I will change her to auto CPAP with pressure range of 6-12 cm water  She does use nasal mask interface  I did tell she can call us in 3-4 weeks so we can check her my fed data to see if her machine is function properly  She can also bring her CPAP machine here on Tuesday when respiratory technician from [facility] is here to review her CPAP machine to see if it set up properly  Relevant Orders    PAP DME Pressure Change       Other    Subacute cough - Primary     She is had a persistent cough since late September-early October this year  Not much improvement of 5 day Z-Adam and Medrol Dosepak  I will prescribe her prednisone 40 mg for 5 days then 20 mg for 5 days  Also I gave her prescription for benzonatate she can take 100 mg b i d  as needed for cough  Cough does not improve she will call me  I did review chest x-ray from October 7th which was normal   She does have postnasal drainage which may be a large factor in her cough  She does take singular 10 mg daily and Claritin 10    I told her she also try saline nasal spray         Relevant Medications    benzonatate (TESSALON PERLES) 100 mg capsule    Other Relevant Orders    POCT spirometry (Completed)            Plan for follow up:  Return in about 2 months (around 1/2/2023)  All questions are answered to the patient's satisfaction and understanding  She verbalizes understanding  She is encouraged to call with any further questions or concerns  Portions of the record may have been created with voice recognition software  Occasional wrong word or "sound a like" substitutions may have occurred due to the inherent limitations of voice recognition software  Read the chart carefully and recognize, using context, where substitutions have occurred  a    Electronically Signed by Nael Xie DO    ______________________________________________________________________    Chief Complaint:cough       Patient ID: Aysha Cabrera is a 69 y o  y o  female has a past medical history of Abnormal glucose, Arthritis, Asthma, Atypical nevi, Atypical nevus, Breast lump, Cataract, Cervical adenopathy, Colon polyp, CPAP (continuous positive airway pressure) dependence, Dizziness, Dyslipidemia, Facial droop, Fibromyalgia, primary, Flu (01/20/2018), Foot abrasion, Fractures, Genital herpes, Headache, tension-type, Herpes zoster, History of shingles, History of stomach ulcers, abnormal mammogram, Hyperlipidemia, Myalgia, Myositis, Neck pain, Pain involving joints of fingers of both hands (1/19/2021), Peripheral neuropathy, Seborrheic keratosis, Shingles, Skin disorder, Skin neoplasm, Sleep apnea, and Stomach disorder  11/2/2022  Patient presents today for initial visit for persistent cough    HPI     Aysha Cabrera has having a persistent cough since late September-early October  She was seen in office by Dr Jairo Arroyo on October 1st for her cough and wheezing  She was prescribed a Medrol Dosepak and 5 day azithromycin pack  She had partial improvement in her symptoms but her cough has persisted  She does have occasional wheezing and occasional shortness of breath  Cough often occurs at night    She is having postnasal drainage which she thinks is causing her cough  Not have any yellow discharge from her nose on occasion she will expectorate some white mucus  She does have mild persistent asthma  Al Eddie also has obstructive sleep apnea and is on nasal CPAP with pressure of 6 cm water  Her [employer] company is [employer]     She did have a diagnostic study back in April of 2015 which showed mild ERINN with AHI of 7 6 and this increased to 10 in supine position  Her oxygen gennaro was 84%  She did have a CPAP titration study done on 05/18/2015 and required a CPAP pressure of 6 to eliminate respiratory events  Nasal interface was used  She does use her CPAP on regular basis  He did receive a new CPAP machine about 2 weeks ago as previous 1 was on recall  He is an auto dream station CPAP machine  Does have seasonal allergies and does use Claritin 10 mg daily  She does use montelukast 10 mg daily as well  Also has diabetes mellitus and takes Glucophage  For her asthma she is on Wixela 250 mcg 1 puff twice a day  Does have a rescue albuterol inhaler she can use  Review of Systems   Constitutional: Negative for chills, fever and unexpected weight change  HENT: Negative for congestion, rhinorrhea and sore throat  Eyes: Negative for discharge and redness  Respiratory: Positive for cough  Cardiovascular: Negative for chest pain, palpitations and leg swelling  Gastrointestinal: Negative for abdominal distention, abdominal pain and nausea  Endocrine: Negative for polydipsia and polyphagia  Genitourinary: Negative for dysuria  Musculoskeletal: Negative for joint swelling and myalgias  Skin: Negative for rash  Neurological: Negative for light-headedness  Psychiatric/Behavioral: Negative for confusion  Social history: She reports that she has never smoked  She has never used smokeless tobacco  She reports that she does not drink alcohol and does not use drugs      Past surgical history:   Past Surgical History:   Procedure Laterality Date   • ABDOMINAL SURGERY      release of adhesions   • BLADDER SURGERY      mesh-lift   • BREAST CYST ASPIRATION Right     does not know the year   • BREAST SURGERY      lift   • CATARACT EXTRACTION Bilateral    •  SECTION      x [id number]   • CHOLECYSTECTOMY      lap   • COLONOSCOPY     • COSMETIC SURGERY      tummy and breast lift   • ESOPHAGOGASTRODUODENOSCOPY     • OOPHORECTOMY Left    • OTHER SURGICAL HISTORY      vocal cord surgery, scraping   • ND HYSTEROSCOPY,W/ENDO BX N/A 11/3/2016    Procedure: DILATATION AND CURETTAGE (D&C) WITH HYSTEROSCOPY;  Surgeon: Marisol Clay MD;  Location: [address] [place] [address];  Service: Gynecology   • REDUCTION MAMMAPLASTY Bilateral    • TONSILLECTOMY     • [address] MSK PROCEDURE  2021     Family history:   Family History   Problem Relation Age of Onset   • Hypertension Mother    • Alzheimer's disease Mother    • Arthritis Mother    • Hypertension Father    • Arthritis Father    • Heart attack Father    • Heart disease Father    • Stroke Father    • Other Brother         vertigo, tinnitus   • Diabetes Daughter    • Breast cancer Sister 28   • Skin cancer Sister    • Cancer Sister    • Other Son         downs syndrome   • Abdominal aortic aneurysm Sister    • Cancer Sister         T cell sarcoma   • No Known Problems Brother    • Diabetes Brother    • Other Sister         anemia from the diet   • No Known Problems Son    • No Known Problems Maternal Aunt    • No Known Problems Maternal Aunt    • No Known Problems Paternal Aunt    • No Known Problems Paternal Aunt    • No Known Problems Paternal Aunt        Immunization History   Administered Date(s) Administered   • COVID-19 MODERNA VACC 0 5 ML IM 2021, 2021, 2021, 10/28/2021   • Influenza, Quadrivalent (nasal) 2016   • Influenza, high dose seasonal 0 7 mL 10/18/2019, 2020, 2021   • Influenza, injectable, quadrivalent, preservative free 0 5 mL 10/22/2018   • Influenza, seasonal, injectable 09/15/2015, 11/20/2017   • Pneumococcal Conjugate 13-Valent 02/02/2016, 11/16/2021   • Pneumococcal Polysaccharide PPV23 10/09/2019   • Tuberculin Skin Test-PPD Intradermal 06/12/2017     Current Outpatient Medications   Medication Sig Dispense Refill   • albuterol (2 5 mg/3 mL) 0 083 % nebulizer solution Take 3 mL (2 5 mg total) by nebulization every 6 (six) hours as needed for wheezing or shortness of breath 180 mL 5   • Ascorbic Acid (VITAMIN C) 1000 MG tablet      • atorvastatin (LIPITOR) 20 mg tablet Take 1 tablet (20 mg total) by mouth daily at bedtime 90 tablet 0   • benzonatate (TESSALON PERLES) 100 mg capsule Take 1 capsule (100 mg total) by mouth 2 (two) times a day as needed for cough 30 capsule 1   • cholecalciferol (VITAMIN D3) 1,000 units tablet Take 2,000 Units by mouth daily     • clobetasol (TEMOVATE) 0 05 % cream Apply topically 2 (two) times a day Apply topically on scalp and right shoulder two times a day 60 g 2   • cyclobenzaprine (FLEXERIL) 10 mg tablet Take 10 mg by mouth 3 (three) times a day as needed for muscle spasms (as needed for pain)       • famotidine (PEPCID) 40 MG tablet TAKE 1 TABLET BY MOUTH AT BEDTIME 90 tablet 0   • Fluticasone-Salmeterol (Advair Diskus) 250-50 mcg/dose inhaler Inhale 1 puff every morning Rinse mouth after use   60 blister 5   • Ginkgo 60 MG TABS      • guaiFENesin (MUCINEX) 600 mg 12 hr tablet Take 1,200 mg by mouth every 12 (twelve) hours     • hydrocortisone 2 5 % cream 2 (two) times a day scalp     • Loratadine (CLARITIN PO) Take by mouth       • metFORMIN (GLUCOPHAGE-XR) 500 mg 24 hr tablet TAKE ONE TABLET BY MOUTH EVERY DAY WITH DINNER 90 tablet 2   • montelukast (SINGULAIR) 10 mg tablet TAKE ONE TABLET BY MOUTH EVERY DAY 60 tablet 5   • multivitamin (THERAGRAN) TABS Take 1 tablet by mouth daily Last dose 3/21/21     • pantoprazole (PROTONIX) 40 mg tablet TAKE ONE TABLET BY MOUTH EVERY DAY IN THE MORNING 90 tablet 0   • predniSONE 20 mg tablet Take 2 tablets for 5 days then 1 tab 5 days 30 tablet 0   • promethazine-codeine (PHENERGAN WITH CODEINE) 6 25-10 mg/5 mL syrup Take 5 mL by mouth 3 (three) times a day as needed for cough 118 mL 0   • triamcinolone (KENALOG) 0 025 % cream Apply topically 2 (two) times a day 80 g 5   • Zinc 50 MG CAPS Take 50 mg by mouth daily       No current facility-administered medications for this visit  Allergies: Latex and Adhesive [medical tape]    Objective:  Vitals:    11/02/22 1128   BP: 122/82   Pulse: 79   Resp: 12   SpO2: 98%   Weight: 69 2 kg (152 lb 9 6 oz)   Height: 5' 4" (1 626 m)   Oxygen Therapy  SpO2: 98 %    Wt Readings from Last 3 Encounters:   11/02/22 69 2 kg (152 lb 9 6 oz)   10/07/22 67 kg (147 lb 12 8 oz)   10/01/22 66 7 kg (147 lb 2 oz)     Body mass index is 26 19 kg/m²  Physical Exam  Constitutional:       General: She is not in acute distress  Appearance: Normal appearance  She is well-developed  HENT:      Head: Normocephalic  Right Ear: External ear normal       Left Ear: External ear normal       Nose: Nose normal       Mouth/Throat:      Mouth: Mucous membranes are moist       Pharynx: Oropharynx is clear  No oropharyngeal exudate  Eyes:      Conjunctiva/sclera: Conjunctivae normal       Pupils: Pupils are equal, round, and reactive to light  Neck:      Vascular: No JVD  Trachea: No tracheal deviation  Cardiovascular:      Rate and Rhythm: Normal rate and regular rhythm  Heart sounds: Normal heart sounds  Pulmonary:      Effort: Pulmonary effort is normal       Comments: Lung sounds are clear  No wheezes, crackles or rhonchi  Abdominal:      General: There is no distension  Palpations: Abdomen is soft  Tenderness: There is no abdominal tenderness  There is no guarding  Musculoskeletal:      Cervical back: Neck supple  Comments: No edema, cyanosis, or clubbing   Lymphadenopathy:      Cervical: No cervical adenopathy  Skin:     General: Skin is warm and dry  Findings: No rash  Neurological:      General: No focal deficit present  Mental Status: She is alert and oriented to person, place, and time  Psychiatric:         Behavior: Behavior normal          Thought Content: Thought content normal          Lab Review:   Lab Results   Component Value Date     12/16/2017    K 3 9 06/14/2022    K 4 4 06/16/2018     06/14/2022     06/16/2018    CO2 27 06/14/2022    CO2 24 06/16/2018    BUN 14 06/14/2022    BUN 18 06/16/2018    CREATININE 0 87 06/14/2022    CREATININE 0 79 12/16/2017    GLUCOSE 122 (H) 12/16/2017    CALCIUM 9 2 06/14/2022    CALCIUM 9 4 12/16/2017     Lab Results   Component Value Date    WBC 6 92 06/14/2022    WBC 8 8 12/16/2017    WBC 6-10 (A) 12/16/2017    HGB 14 1 06/14/2022    HGB 14 0 12/16/2017    HCT 44 0 06/14/2022    HCT 42 3 12/16/2017    MCV 97 06/14/2022    MCV 93 12/16/2017     06/14/2022     12/16/2017        Diagnostics:  I have personally reviewed pertinent films in PACS  Chest x-ray:  Done on 10/07/2022 was normal    Office Spirometry Results: done today    FVC - 2 35 L   78%  FEV1 - 1 89 L  82%  FEV1/FVC% - 81%    Minimal restrictive impairment    XR chest pa & lateral    Result Date: 10/9/2022  Narrative: CHEST INDICATION:   J40: Bronchitis, not specified as acute or chronic Z86 16: Personal history of COVID-19  COMPARISON:  6/13/2019 EXAM PERFORMED/VIEWS:  XR CHEST PA & LATERAL Images: 2 FINDINGS:  Cholecystectomy clips noted  Cardiomediastinal silhouette appears unremarkable  The lungs are clear  No pneumothorax or pleural effusion  Osseous structures appear within normal limits for patient age  Impression: No acute cardiopulmonary disease   Workstation performed: [id number]

## 2022-11-04 LAB
DME PARACHUTE DELIVERY DATE ACTUAL: NORMAL
DME PARACHUTE DELIVERY DATE REQUESTED: NORMAL
DME PARACHUTE ITEM DESCRIPTION: NORMAL
DME PARACHUTE ORDER STATUS: NORMAL
DME PARACHUTE SUPPLIER NAME: NORMAL
DME PARACHUTE SUPPLIER PHONE: NORMAL

## 2022-11-06 PROBLEM — R05.2 SUBACUTE COUGH: Status: ACTIVE | Noted: 2018-03-18

## 2022-11-07 NOTE — ASSESSMENT & PLAN NOTE
She just received a new replacement CPAP machine for her auto dream station CPAP machine  Presently she set on CPAP of 8 and I will change her to auto CPAP with pressure range of 6-12 cm water  She does use nasal mask interface  I did tell she can call us in 3-4 weeks so we can check her my fed data to see if her machine is function properly  She can also bring her CPAP machine here on Tuesday when respiratory technician from [facility] is here to review her CPAP machine to see if it set up properly

## 2022-11-07 NOTE — ASSESSMENT & PLAN NOTE
She is had a persistent cough since late September-early October this year  Not much improvement of 5 day Z-Adam and Medrol Dosepak  I will prescribe her prednisone 40 mg for 5 days then 20 mg for 5 days  Also I gave her prescription for benzonatate she can take 100 mg b i d  as needed for cough  Cough does not improve she will call me  I did review chest x-ray from October 7th which was normal   She does have postnasal drainage which may be a large factor in her cough  She does take singular 10 mg daily and Claritin 10    I told her she also try saline nasal spray

## 2022-11-11 NOTE — PROGRESS NOTES
COVID-19 Outpatient Progress Note    Assessment/Plan:    Problem List Items Addressed This Visit     Bronchitis - Primary    Relevant Medications    promethazine-codeine (PHENERGAN WITH CODEINE) 6 25-10 mg/5 mL syrup    Other Relevant Orders    XR chest pa & lateral (Completed)    Subacute cough    Relevant Medications    promethazine-codeine (PHENERGAN WITH CODEINE) 6 25-10 mg/5 mL syrup    History of COVID-19    Relevant Orders    XR chest pa & lateral (Completed)         COVID-19 Plan    Encounter provider: Jessy Kessler MD     Provider located at: [address] [place] [id number]     Recent Visits  No visits were found meeting these conditions  Showing recent visits within past 7 days and meeting all other requirements  Future Appointments  No visits were found meeting these conditions    Showing future appointments within next 150 days and meeting all other requirements     COVID-19 HPI  Lab Results   Component Value Date    SARSCOV2 Negative 03/20/2021       Review of Systems  Current Outpatient Medications on File Prior to Visit   Medication Sig   • albuterol (2 5 mg/3 mL) 0 083 % nebulizer solution Take 3 mL (2 5 mg total) by nebulization every 6 (six) hours as needed for wheezing or shortness of breath   • Ascorbic Acid (VITAMIN C) 1000 MG tablet    • atorvastatin (LIPITOR) 20 mg tablet Take 1 tablet (20 mg total) by mouth daily at bedtime   • cholecalciferol (VITAMIN D3) 1,000 units tablet Take 2,000 Units by mouth daily   • clobetasol (TEMOVATE) 0 05 % cream Apply topically 2 (two) times a day Apply topically on scalp and right shoulder two times a day   • cyclobenzaprine (FLEXERIL) 10 mg tablet Take 10 mg by mouth 3 (three) times a day as needed for muscle spasms (as needed for pain)     • famotidine (PEPCID) 40 MG tablet TAKE 1 TABLET BY MOUTH AT BEDTIME   • Fluticasone-Salmeterol (Advair Diskus) 250-50 mcg/dose inhaler Inhale 1 puff every morning Rinse mouth after use     • Ginkgo 60 MG TABS    • guaiFENesin (MUCINEX) 600 mg 12 hr tablet Take 1,200 mg by mouth every 12 (twelve) hours   • hydrocortisone 2 5 % cream 2 (two) times a day scalp   • Loratadine (CLARITIN PO) Take by mouth     • metFORMIN (GLUCOPHAGE-XR) 500 mg 24 hr tablet TAKE ONE TABLET BY MOUTH EVERY DAY WITH DINNER   • montelukast (SINGULAIR) 10 mg tablet TAKE ONE TABLET BY MOUTH EVERY DAY   • multivitamin (THERAGRAN) TABS Take 1 tablet by mouth daily Last dose 3/21/21   • triamcinolone (KENALOG) 0 025 % cream Apply topically 2 (two) times a day   • Zinc 50 MG CAPS Take 50 mg by mouth daily       Objective:    /68 (BP Location: Left arm, Patient Position: Sitting, Cuff Size: Standard)   Pulse 72   Temp 98 7 °F (37 1 °C)   Resp 16   Ht 5' 4" (1 626 m)   Wt 67 kg (147 lb 12 8 oz)   SpO2 96%   BMI 25 37 kg/m²      Physical Exam  Iraida Lima MD

## 2022-11-11 NOTE — PROGRESS NOTES
Assessment/Plan:    1  Bronchitis  -     cefdinir (OMNICEF) 300 mg capsule; Take 1 capsule (300 mg total) by mouth every 12 (twelve) hours for 10 days  -     predniSONE 20 mg tablet; Take 2 tablets (40 mg total) by mouth daily for 5 days  -     XR chest pa & lateral; Future; Expected date: 10/07/2022    2  History of COVID-19  -     XR chest pa & lateral; Future; Expected date: 10/07/2022    3  Cough, unspecified type  -     promethazine-codeine (PHENERGAN WITH CODEINE) 6 25-10 mg/5 mL syrup; Take 5 mL by mouth 3 (three) times a day as needed for cough       Subjective:      Patient ID: Mauricio Arteaga is a 69 y o  female  Chief Complaint   Patient presents with   • Cough     Her ribs hurt from coughing so much , Congestion , fatigue , pt was in the office this passed Saturday    • COVID-19       Cough  This is a recurrent problem  The current episode started 1 to 4 weeks ago  Associated symptoms include headaches, nasal congestion, postnasal drip, a rash, rhinorrhea and wheezing  Pertinent negatives include no fever or hemoptysis  She has tried rest, OTC cough suppressant and oral steroids ([id number]) for the symptoms  Her past medical history is significant for asthma, bronchitis and environmental allergies  The following portions of the patient's history were reviewed and updated as appropriate: allergies, current medications, past family history, past medical history, past social history, past surgical history and problem list     Review of Systems   Constitutional: Negative for fever  HENT: Positive for postnasal drip and rhinorrhea  Respiratory: Positive for cough and wheezing  Negative for hemoptysis  Skin: Positive for rash  Allergic/Immunologic: Positive for environmental allergies  Neurological: Positive for headaches           Current Outpatient Medications   Medication Sig Dispense Refill   • albuterol (2 5 mg/3 mL) 0 083 % nebulizer solution Take 3 mL (2 5 mg total) by nebulization every 6 (six) hours as needed for wheezing or shortness of breath 180 mL 5   • Ascorbic Acid (VITAMIN C) 1000 MG tablet      • atorvastatin (LIPITOR) 20 mg tablet Take 1 tablet (20 mg total) by mouth daily at bedtime 90 tablet 0   • cholecalciferol (VITAMIN D3) 1,000 units tablet Take 2,000 Units by mouth daily     • clobetasol (TEMOVATE) 0 05 % cream Apply topically 2 (two) times a day Apply topically on scalp and right shoulder two times a day 60 g 2   • cyclobenzaprine (FLEXERIL) 10 mg tablet Take 10 mg by mouth 3 (three) times a day as needed for muscle spasms (as needed for pain)       • famotidine (PEPCID) 40 MG tablet TAKE 1 TABLET BY MOUTH AT BEDTIME 90 tablet 0   • Fluticasone-Salmeterol (Advair Diskus) 250-50 mcg/dose inhaler Inhale 1 puff every morning Rinse mouth after use   60 blister 5   • Ginkgo 60 MG TABS      • guaiFENesin (MUCINEX) 600 mg 12 hr tablet Take 1,200 mg by mouth every 12 (twelve) hours     • hydrocortisone 2 5 % cream 2 (two) times a day scalp     • Loratadine (CLARITIN PO) Take by mouth       • metFORMIN (GLUCOPHAGE-XR) 500 mg 24 hr tablet TAKE ONE TABLET BY MOUTH EVERY DAY WITH DINNER 90 tablet 2   • montelukast (SINGULAIR) 10 mg tablet TAKE ONE TABLET BY MOUTH EVERY DAY 60 tablet 5   • multivitamin (THERAGRAN) TABS Take 1 tablet by mouth daily Last dose 3/21/21     • promethazine-codeine (PHENERGAN WITH CODEINE) 6 25-10 mg/5 mL syrup Take 5 mL by mouth 3 (three) times a day as needed for cough 118 mL 0   • triamcinolone (KENALOG) 0 025 % cream Apply topically 2 (two) times a day 80 g 5   • Zinc 50 MG CAPS Take 50 mg by mouth daily     • benzonatate (TESSALON PERLES) 100 mg capsule Take 1 capsule (100 mg total) by mouth 2 (two) times a day as needed for cough 30 capsule 1   • pantoprazole (PROTONIX) 40 mg tablet TAKE ONE TABLET BY MOUTH EVERY DAY IN THE MORNING 90 tablet 0   • predniSONE 20 mg tablet Take 2 tablets for 5 days then 1 tab 5 days 30 tablet 0     No current facility-administered medications for this visit  Objective:    /68 (BP Location: Left arm, Patient Position: Sitting, Cuff Size: Standard)   Pulse 72   Temp 98 7 °F (37 1 °C)   Resp 16   Ht 5' 4" (1 626 m)   Wt 67 kg (147 lb 12 8 oz)   SpO2 96%   BMI 25 37 kg/m²        Physical Exam  Vitals and nursing note reviewed  Constitutional:       Comments: Looks tired   HENT:      Nose: Congestion present  Mouth/Throat:      Pharynx: Posterior oropharyngeal erythema present  No oropharyngeal exudate  Cardiovascular:      Rate and Rhythm: Normal rate and regular rhythm  Pulmonary:      Comments: Scattered wheezes/rhonchi---no localization  Abdominal:      General: Bowel sounds are normal       Palpations: Abdomen is soft  Tenderness: There is no abdominal tenderness  Musculoskeletal:      Cervical back: Neck supple  Skin:     General: Skin is warm and dry  Findings: Rash ( fine red, papular rash anterior neck) present  Neurological:      Mental Status: She is alert and oriented to person, place, and time                  Carl Sullivan MD

## 2022-11-25 ENCOUNTER — HOSPITAL ENCOUNTER (EMERGENCY)
Facility: HOSPITAL | Age: 67
Discharge: HOME | End: 2022-11-25
Attending: EMERGENCY MEDICINE

## 2022-11-25 VITALS
WEIGHT: 156.6 LBS | HEART RATE: 90 BPM | SYSTOLIC BLOOD PRESSURE: 113 MMHG | HEIGHT: 64 IN | OXYGEN SATURATION: 93 % | DIASTOLIC BLOOD PRESSURE: 54 MMHG | RESPIRATION RATE: 20 BRPM | BODY MASS INDEX: 26.73 KG/M2 | TEMPERATURE: 100.1 F

## 2022-11-25 DIAGNOSIS — J10.1 INFLUENZA A: Primary | ICD-10-CM

## 2022-11-25 LAB
ALBUMIN SERPL BCP-MCNC: 3.4 G/DL (ref 3.5–5)
ALP SERPL-CCNC: 77 U/L (ref 46–116)
ALT SERPL W P-5'-P-CCNC: 44 U/L (ref 12–78)
ANION GAP SERPL CALCULATED.3IONS-SCNC: 6 MMOL/L (ref 4–13)
BASOPHILS # BLD AUTO: 0.08 THOUSANDS/ÂΜL (ref 0–0.1)
BASOPHILS NFR BLD AUTO: 1 % (ref 0–1)
BILIRUB SERPL-MCNC: 0.44 MG/DL (ref 0.2–1)
BILIRUB UR QL STRIP: NEGATIVE
BUN SERPL-MCNC: 16 MG/DL (ref 5–25)
CALCIUM ALBUM COR SERPL-MCNC: 9.6 MG/DL (ref 8.3–10.1)
CALCIUM SERPL-MCNC: 9.1 MG/DL (ref 8.3–10.1)
CHLORIDE SERPL-SCNC: 98 MMOL/L (ref 96–108)
CLARITY UR: NORMAL
CO2 SERPL-SCNC: 31 MMOL/L (ref 21–32)
COLOR UR: YELLOW
CREAT SERPL-MCNC: 0.79 MG/DL (ref 0.6–1.3)
EOSINOPHIL # BLD AUTO: 0.05 THOUSAND/ÂΜL (ref 0–0.61)
EOSINOPHIL NFR BLD AUTO: 0 % (ref 0–6)
ERYTHROCYTE [DISTWIDTH] IN BLOOD BY AUTOMATED COUNT: 13.9 % (ref 11.6–15.1)
FLUAV RNA RESP QL NAA+PROBE: POSITIVE
FLUBV RNA RESP QL NAA+PROBE: NEGATIVE
GFR SERPL CREATININE-BSD FRML MDRD: 76 ML/MIN/1.73SQ M
GLUCOSE SERPL-MCNC: 157 MG/DL (ref 65–140)
GLUCOSE UR STRIP-MCNC: NEGATIVE MG/DL
HCT VFR BLD AUTO: 40.2 % (ref 34.8–46.1)
HGB BLD-MCNC: 13 G/DL (ref 11.5–15.4)
HGB UR QL STRIP.AUTO: NEGATIVE
IMM GRANULOCYTES # BLD AUTO: 0.13 THOUSAND/UL (ref 0–0.2)
IMM GRANULOCYTES NFR BLD AUTO: 1 % (ref 0–2)
KETONES UR STRIP-MCNC: NEGATIVE MG/DL
LEUKOCYTE ESTERASE UR QL STRIP: NEGATIVE
LYMPHOCYTES # BLD AUTO: 0.65 THOUSANDS/ÂΜL (ref 0.6–4.47)
LYMPHOCYTES NFR BLD AUTO: 5 % (ref 14–44)
MCH RBC QN AUTO: 31.6 PG (ref 26.8–34.3)
MCHC RBC AUTO-ENTMCNC: 32.3 G/DL (ref 31.4–37.4)
MCV RBC AUTO: 98 FL (ref 82–98)
MONOCYTES # BLD AUTO: 2.56 THOUSAND/ÂΜL (ref 0.17–1.22)
MONOCYTES NFR BLD AUTO: 19 % (ref 4–12)
NEUTROPHILS # BLD AUTO: 10.01 THOUSANDS/ÂΜL (ref 1.85–7.62)
NEUTS SEG NFR BLD AUTO: 74 % (ref 43–75)
NITRITE UR QL STRIP: NEGATIVE
NRBC BLD AUTO-RTO: 0 /100 WBCS
PH UR STRIP.AUTO: 7 [PH]
PLATELET # BLD AUTO: 278 THOUSANDS/UL (ref 149–390)
PMV BLD AUTO: 9.7 FL (ref 8.9–12.7)
POTASSIUM SERPL-SCNC: 3.8 MMOL/L (ref 3.5–5.3)
PROT SERPL-MCNC: 7.4 G/DL (ref 6.4–8.4)
PROT UR STRIP-MCNC: NEGATIVE MG/DL
RBC # BLD AUTO: 4.11 MILLION/UL (ref 3.81–5.12)
RSV RNA RESP QL NAA+PROBE: NEGATIVE
SARS-COV-2 RNA RESP QL NAA+PROBE: NEGATIVE
SODIUM SERPL-SCNC: 135 MMOL/L (ref 135–147)
SP GR UR STRIP.AUTO: 1.02 (ref 1–1.03)
UROBILINOGEN UR QL STRIP.AUTO: 0.2 E.U./DL
WBC # BLD AUTO: 13.48 THOUSAND/UL (ref 4.31–10.16)

## 2022-11-25 RX ORDER — ONDANSETRON 2 MG/ML
4 INJECTION INTRAMUSCULAR; INTRAVENOUS ONCE
Status: COMPLETED | OUTPATIENT
Start: 2022-11-25 | End: 2022-11-25

## 2022-11-25 RX ORDER — KETOROLAC TROMETHAMINE 30 MG/ML
15 INJECTION, SOLUTION INTRAMUSCULAR; INTRAVENOUS ONCE
Status: COMPLETED | OUTPATIENT
Start: 2022-11-25 | End: 2022-11-25

## 2022-11-25 RX ADMIN — ONDANSETRON 4 MG: 2 INJECTION INTRAMUSCULAR; INTRAVENOUS at 11:33

## 2022-11-25 RX ADMIN — SODIUM CHLORIDE 1000 ML: 0.9 INJECTION, SOLUTION INTRAVENOUS at 11:33

## 2022-11-25 RX ADMIN — KETOROLAC TROMETHAMINE 15 MG: 30 INJECTION, SOLUTION INTRAMUSCULAR at 11:33

## 2022-11-25 NOTE — ED PROVIDER NOTES
History  Chief Complaint   Patient presents with   • Fever - 9 weeks to 74 years     Pt states starting last night she started to have nausea, vomiting, & diarrhea  Pt c/o fever, chills, weakness, cough, & dizziness  Pt states she did not take any medication for fever  Pt states friend that brought her has cough  Pt states she used neb x3 last night and today  Home COVID test negative  Patient developed multiple flu-like symptoms last night including nausea vomiting diarrhea, fever and chills, body aches, malaise, weakness, poorly productive cough and lightheadedness  Patient states she used her nebulized treatment last night with mild improvement  Patient took a home COVID test today which she reports was negative  Patient states she is quite concerned about the possibility of COVID because her son who lives with her is scheduled for surgery next week  Prior to Admission Medications   Prescriptions Last Dose Informant Patient Reported? Taking? Ascorbic Acid (VITAMIN C) 1000 MG tablet   Yes No   Fluticasone-Salmeterol (Advair Diskus) 250-50 mcg/dose inhaler   No No   Sig: Inhale 1 puff every morning Rinse mouth after use     Ginkgo 60 MG TABS   Yes No   Loratadine (CLARITIN PO)   Yes No   Sig: Take by mouth     Zinc 50 MG CAPS   Yes No   Sig: Take 50 mg by mouth daily   albuterol (2 5 mg/3 mL) 0 083 % nebulizer solution   No No   Sig: Take 3 mL (2 5 mg total) by nebulization every 6 (six) hours as needed for wheezing or shortness of breath   atorvastatin (LIPITOR) 20 mg tablet   No No   Sig: Take 1 tablet (20 mg total) by mouth daily at bedtime   benzonatate (TESSALON PERLES) 100 mg capsule   No No   Sig: Take 1 capsule (100 mg total) by mouth 2 (two) times a day as needed for cough   cholecalciferol (VITAMIN D3) 1,000 units tablet   Yes No   Sig: Take 2,000 Units by mouth daily   clobetasol (TEMOVATE) 0 05 % cream   No No   Sig: Apply topically 2 (two) times a day Apply topically on scalp and right shoulder two times a day   cyclobenzaprine (FLEXERIL) 10 mg tablet   Yes No   Sig: Take 10 mg by mouth 3 (three) times a day as needed for muscle spasms (as needed for pain)     famotidine (PEPCID) 40 MG tablet   No No   Sig: TAKE 1 TABLET BY MOUTH AT BEDTIME   guaiFENesin (MUCINEX) 600 mg 12 hr tablet   Yes No   Sig: Take 1,200 mg by mouth every 12 (twelve) hours   hydrocortisone 2 5 % cream   Yes No   Si (two) times a day scalp   metFORMIN (GLUCOPHAGE-XR) 500 mg 24 hr tablet   No No   Sig: TAKE ONE TABLET BY MOUTH EVERY DAY WITH DINNER   montelukast (SINGULAIR) 10 mg tablet   No No   Sig: TAKE ONE TABLET BY MOUTH EVERY DAY   multivitamin (THERAGRAN) TABS   Yes No   Sig: Take 1 tablet by mouth daily Last dose 3/21/21   pantoprazole (PROTONIX) 40 mg tablet   No No   Sig: TAKE ONE TABLET BY MOUTH EVERY DAY IN THE MORNING   predniSONE 20 mg tablet   No No   Sig: Take 2 tablets for 5 days then 1 tab 5 days   promethazine-codeine (PHENERGAN WITH CODEINE) 6 25-10 mg/5 mL syrup   No No   Sig: Take 5 mL by mouth 3 (three) times a day as needed for cough   triamcinolone (KENALOG) 0 025 % cream   No No   Sig: Apply topically 2 (two) times a day      Facility-Administered Medications: None       Past Medical History:   Diagnosis Date   • Abnormal glucose     last assessed 16   • Arthritis    • Asthma     w/ exacerbation; last assessed 5/14/15   • Atypical nevi    • Atypical nevus     last assessed 17   • Breast lump     last assessed 3/6/14   • Cataract    • Cervical adenopathy     last assessed  17   • Colon polyp    • CPAP (continuous positive airway pressure) dependence    • Dizziness    • Dyslipidemia     last assessed 17   • Facial droop     last assessed  16   • Fibromyalgia, primary    • Flu 2018   • Foot abrasion     right between the 4th and 5th toe   • Fractures    • Genital herpes     resolved 16   • Headache, tension-type    • Herpes zoster     last assessed 16   • History of shingles     may 2016   • History of stomach ulcers    • Hx of abnormal mammogram     last assessed  3/5/14   • Hyperlipidemia    • Myalgia     last assessed  12   • Myositis     12   • Neck pain    • Pain involving joints of fingers of both hands 2021   • Peripheral neuropathy    • Seborrheic keratosis    • Shingles    • Skin disorder    • Skin neoplasm     of the lower limb, including hip; onset 12; last assessed  12   • Sleep apnea    • Stomach disorder        Past Surgical History:   Procedure Laterality Date   • ABDOMINAL SURGERY      release of adhesions   • BLADDER SURGERY      mesh-lift   • BREAST CYST ASPIRATION Right     does not know the year   • BREAST SURGERY      lift   • CATARACT EXTRACTION Bilateral    •  SECTION      x [phone]   • CHOLECYSTECTOMY      lap   • COLONOSCOPY     • COSMETIC SURGERY      tummy and breast lift   • ESOPHAGOGASTRODUODENOSCOPY     • OOPHORECTOMY Left    • OTHER SURGICAL HISTORY      vocal cord surgery, scraping   • [place] HYSTEROSCOPY,W/ENDO BX N/A 11/3/2016    Procedure: DILATATION AND CURETTAGE (D&C) WITH HYSTEROSCOPY;  Surgeon: Lainey Mcqueen MD;  Location: [address] [place] [address];  Service: Gynecology   • REDUCTION MAMMAPLASTY Bilateral    • TONSILLECTOMY     • Cindy 634 MSK PROCEDURE  2021       Family History   Problem Relation Age of Onset   • Hypertension Mother    • Alzheimer's disease Mother    • Arthritis Mother    • Hypertension Father    • Arthritis Father    • Heart attack Father    • Heart disease Father    • Stroke Father    • Other Brother         vertigo, tinnitus   • Diabetes Daughter    • Breast cancer Sister 28   • Skin cancer Sister    • Cancer Sister    • Other Son         downs syndrome   • Abdominal aortic aneurysm Sister    • Cancer Sister         T cell sarcoma   • No Known Problems Brother    • Diabetes Brother    • Other Sister         anemia from the diet   • No Known Problems Son    • No Known Problems Maternal Aunt    • No Known Problems Maternal Aunt    • No Known Problems Paternal Aunt    • No Known Problems Paternal Aunt    • No Known Problems Paternal Aunt      I have reviewed and agree with the history as documented  E-Cigarette/Vaping   • E-Cigarette Use Never User      E-Cigarette/Vaping Substances   • Nicotine No    • THC No    • CBD No    • Flavoring No    • Other No    • Unknown No      Social History     Tobacco Use   • Smoking status: Never   • Smokeless tobacco: Never   Vaping Use   • Vaping Use: Never used   Substance Use Topics   • Alcohol use: No   • Drug use: No       Review of Systems   Constitutional: Positive for chills and fever  HENT: Positive for congestion and sore throat  Eyes: Negative for visual disturbance  Respiratory: Positive for cough and wheezing  Cardiovascular: Negative for chest pain  Gastrointestinal: Positive for diarrhea, nausea and vomiting  Genitourinary: Negative for dysuria  Musculoskeletal: Positive for arthralgias and myalgias  Negative for back pain  Skin: Negative for rash  Neurological: Positive for headaches  Hematological: Does not bruise/bleed easily  Psychiatric/Behavioral: Negative for confusion  All other systems reviewed and are negative  Physical Exam  Physical Exam  Vitals and nursing note reviewed  Constitutional:       Appearance: Normal appearance  HENT:      Head: Normocephalic  Right Ear: External ear normal       Left Ear: External ear normal       Nose: Nose normal       Mouth/Throat:      Pharynx: Oropharynx is clear  Eyes:      Conjunctiva/sclera: Conjunctivae normal    Cardiovascular:      Rate and Rhythm: Normal rate and regular rhythm  Pulses: Normal pulses  Pulmonary:      Effort: Pulmonary effort is normal    Abdominal:      Palpations: Abdomen is soft  Tenderness: There is no abdominal tenderness  Musculoskeletal:         General: Normal range of motion        Cervical back: Normal range of motion and neck supple  Right lower leg: No edema  Left lower leg: No edema  Skin:     General: Skin is warm and dry  Capillary Refill: Capillary refill takes less than 2 seconds  Neurological:      General: No focal deficit present  Mental Status: She is alert and oriented to person, place, and time  Psychiatric:         Mood and Affect: Mood normal          Behavior: Behavior normal          Vital Signs  ED Triage Vitals [11/25/22 1100]   Temperature Pulse Respirations Blood Pressure SpO2   100 1 °F (37 8 °C) 80 20 136/64 94 %      Temp Source Heart Rate Source Patient Position - Orthostatic VS BP Location FiO2 (%)   Oral Monitor Sitting Left arm --      Pain Score       9           Vitals:    11/25/22 1100 11/25/22 1339   BP: 136/64 113/54   Pulse: 80 90   Patient Position - Orthostatic VS: Sitting          Visual Acuity      ED Medications  Medications   sodium chloride 0 9 % bolus 1,000 mL (0 mL Intravenous Stopped 11/25/22 1339)   ketorolac (TORADOL) injection 15 mg (15 mg Intravenous Given 11/25/22 1133)   ondansetron (ZOFRAN) injection 4 mg (4 mg Intravenous Given 11/25/22 1133)       Diagnostic Studies  Results Reviewed     Procedure Component Value Units Date/Time    FLU/RSV/COVID - if FLU/RSV clinically relevant [[id number]]  (Abnormal) Collected: 11/25/22 1132    Lab Status: Final result Specimen: Nares from Nose Updated: 11/25/22 1220     SARS-CoV-2 Negative     INFLUENZA A PCR Positive     INFLUENZA B PCR Negative     RSV PCR Negative    Narrative:      FOR PEDIATRIC PATIENTS - copy/paste COVID Guidelines URL to browser: [email or web address]    SARS-CoV-2 assay is a Nucleic Acid Amplification assay intended for the  qualitative detection of nucleic acid from SARS-CoV-2 in nasopharyngeal  swabs  Results are for the presumptive identification of SARS-CoV-2 RNA      Positive results are indicative of infection with SARS-CoV-2, the virus  causing COVID-19, but do not rule out bacterial infection or co-infection  with other viruses  Laboratories within the [place] and its  territories are required to report all positive results to the appropriate  public health authorities  Negative results do not preclude SARS-CoV-2  infection and should not be used as the sole basis for treatment or other  patient management decisions  Negative results must be combined with  clinical observations, patient history, and epidemiological information  This test has not been FDA cleared or approved  This test has been authorized by FDA under an Emergency Use Authorization  (EUA)  This test is only authorized for the duration of time the  declaration that circumstances exist justifying the authorization of the  emergency use of an in vitro diagnostic tests for detection of SARS-CoV-2  virus and/or diagnosis of COVID-19 infection under section 564(b)(1) of  the Act, 21 U  S C  658KLW-7(A)(8), unless the authorization is terminated  or revoked sooner  The test has been validated but independent review by FDA  and CLIA is pending  Test performed using [employer] GeneXpert: This RT-PCR assay targets N2,  a region unique to SARS-CoV-2  A conserved region in the E-gene was chosen  for pan-Sarbecovirus detection which includes SARS-CoV-2  According to CMS-2020-01-R, this platform meets the definition of high-throughput technology      Comprehensive metabolic panel [[id number]]  (Abnormal) Collected: 11/25/22 1132    Lab Status: Final result Specimen: Blood from Arm, Left Updated: 11/25/22 1156     Sodium 135 mmol/L      Potassium 3 8 mmol/L      Chloride 98 mmol/L      CO2 31 mmol/L      ANION GAP 6 mmol/L      BUN 16 mg/dL      Creatinine 0 79 mg/dL      Glucose 157 mg/dL      Calcium 9 1 mg/dL      Corrected Calcium 9 6 mg/dL      AST --     ALT 44 U/L      Alkaline Phosphatase 77 U/L      Total Protein 7 4 g/dL      Albumin 3 4 g/dL Total Bilirubin 0 44 mg/dL      eGFR 76 ml/min/1 73sq m     Narrative:      National Kidney Disease Foundation guidelines for Chronic Kidney Disease (CKD):   •  Stage 1 with normal or high GFR (GFR > 90 mL/min/1 73 square meters)  •  Stage 2 Mild CKD (GFR = 60-89 mL/min/1 73 square meters)  •  Stage 3A Moderate CKD (GFR = 45-59 mL/min/1 73 square meters)  •  Stage 3B Moderate CKD (GFR = 30-44 mL/min/1 73 square meters)  •  Stage 4 Severe CKD (GFR = 15-29 mL/min/1 73 square meters)  •  Stage 5 End Stage CKD (GFR <15 mL/min/1 73 square meters)  Note: GFR calculation is accurate only with a steady state creatinine    UA (URINE) with reflex to Scope [[id number]] Collected: 11/25/22 1132    Lab Status: Final result Specimen: Urine, Clean Catch Updated: 11/25/22 1147     Color, UA Yellow     Clarity, UA Cloudy     Specific [place], UA 1 020     pH, UA 7 0     Leukocytes, UA Negative     Nitrite, UA Negative     Protein, UA Negative mg/dl      Glucose, UA Negative mg/dl      Ketones, UA Negative mg/dl      Urobilinogen, UA 0 2 E U /dl      Bilirubin, UA Negative     Occult Blood, UA Negative    CBC and differential [[id number]]  (Abnormal) Collected: 11/25/22 1132    Lab Status: Final result Specimen: Blood from Arm, Left Updated: 11/25/22 1141     WBC 13 48 Thousand/uL      RBC 4 11 Million/uL      Hemoglobin 13 0 g/dL      Hematocrit 40 2 %      MCV 98 fL      MCH 31 6 pg      MCHC 32 3 g/dL      RDW 13 9 %      MPV 9 7 fL      Platelets 965 Thousands/uL      nRBC 0 /100 WBCs      Neutrophils Relative 74 %      Immat GRANS % 1 %      Lymphocytes Relative 5 %      Monocytes Relative 19 %      Eosinophils Relative 0 %      Basophils Relative 1 %      Neutrophils Absolute 10 01 Thousands/µL      Immature Grans Absolute 0 13 Thousand/uL      Lymphocytes Absolute 0 65 Thousands/µL      Monocytes Absolute 2 56 Thousand/µL      Eosinophils Absolute 0 05 Thousand/µL      Basophils Absolute 0 08 Thousands/µL                  No orders to display              Procedures  Procedures         ED Course                               SBIRT 20yo+    Flowsheet Row Most Recent Value   SBIRT (25 yo +)    In order to provide better care to our patients, we are screening all of our patients for alcohol and drug use  Would it be okay to ask you these screening questions? Yes Filed at: 11/25/2022 1108   Initial Alcohol Screen: US AUDIT-C     1  How often do you have a drink containing alcohol? 0 Filed at: 11/25/2022 1108   2  How many drinks containing alcohol do you have on a typical day you are drinking? 0 Filed at: 11/25/2022 1108   3a  Male UNDER 65: How often do you have five or more drinks on one occasion? 0 Filed at: 11/25/2022 1108   3b  FEMALE Any Age, or MALE 65+: How often do you have 4 or more drinks on one occassion? 0 Filed at: 11/25/2022 1108   Audit-C Score 0 Filed at: 11/25/2022 1108   MARILOU: How many times in the past year have you    Used an illegal drug or used a prescription medication for non-medical reasons? Never Filed at: 11/25/2022 1108                    MDM  Number of Diagnoses or Management Options  Influenza A  Diagnosis management comments: Flu-like symptoms associated with low-grade fever  Will check viral studies      Disposition  Final diagnoses:   Influenza A     Time reflects when diagnosis was documented in both MDM as applicable and the Disposition within this note     Time User Action Codes Description Comment    11/25/2022  1:26 PM Ruby Robb Add [J10 1] Influenza A       ED Disposition     ED Disposition   Discharge    Condition   Stable    Date/Time   Fri Nov 25, 2022  1:25 PM    Comment   Deidre Balderas discharge to home/self care                 Follow-up Information     Follow up With Specialties Details Why Cassy Sánchez MD Family Medicine Schedule an appointment as soon as possible for a visit   [id number] [facility] [id number] [phone]            Discharge Medication List as of 11/25/2022  1:35 PM      CONTINUE these medications which have NOT CHANGED    Details   albuterol (2 5 mg/3 mL) 0 083 % nebulizer solution Take 3 mL (2 5 mg total) by nebulization every 6 (six) hours as needed for wheezing or shortness of breath, Starting Mon 4/4/2022, Normal      Ascorbic Acid (VITAMIN C) 1000 MG tablet Starting Mon 4/20/2020, Historical Med      atorvastatin (LIPITOR) 20 mg tablet Take 1 tablet (20 mg total) by mouth daily at bedtime, Starting Tue 3/8/2022, Normal      benzonatate (TESSALON PERLES) 100 mg capsule Take 1 capsule (100 mg total) by mouth 2 (two) times a day as needed for cough, Starting Wed 11/2/2022, Normal      cholecalciferol (VITAMIN D3) 1,000 units tablet Take 2,000 Units by mouth daily, Historical Med      clobetasol (TEMOVATE) 0 05 % cream Apply topically 2 (two) times a day Apply topically on scalp and right shoulder two times a day, Starting Thu 6/30/2022, Normal      cyclobenzaprine (FLEXERIL) 10 mg tablet Take 10 mg by mouth 3 (three) times a day as needed for muscle spasms (as needed for pain)  , Historical Med      famotidine (PEPCID) 40 MG tablet TAKE 1 TABLET BY MOUTH AT BEDTIME, Normal      Fluticasone-Salmeterol (Advair Diskus) 250-50 mcg/dose inhaler Inhale 1 puff every morning Rinse mouth after use , Starting Tue 8/2/2022, Until Sun 1/29/2023, Normal      Ginkgo 60 MG TABS Starting Wed 6/1/2022, Historical Med      guaiFENesin (MUCINEX) 600 mg 12 hr tablet Take 1,200 mg by mouth every 12 (twelve) hours, Historical Med      hydrocortisone 2 5 % cream 2 (two) times a day scalp, Starting Mon 12/14/2020, Historical Med      Loratadine (CLARITIN PO) Take by mouth  , Historical Med      metFORMIN (GLUCOPHAGE-XR) 500 mg 24 hr tablet TAKE ONE TABLET BY MOUTH EVERY DAY WITH DINNER, Normal      montelukast (SINGULAIR) 10 mg tablet TAKE ONE TABLET BY MOUTH EVERY DAY, Normal      multivitamin (THERAGRAN) TABS Take 1 tablet by mouth daily Last dose 3/21/21, Historical Med      pantoprazole (PROTONIX) 40 mg tablet TAKE ONE TABLET BY MOUTH EVERY DAY IN THE MORNING, Normal      predniSONE 20 mg tablet Take 2 tablets for 5 days then 1 tab 5 days, Normal      promethazine-codeine (PHENERGAN WITH CODEINE) 6 25-10 mg/5 mL syrup Take 5 mL by mouth 3 (three) times a day as needed for cough, Starting Fri 10/7/2022, Print      triamcinolone (KENALOG) 0 025 % cream Apply topically 2 (two) times a day, Starting Tue 7/27/2021, Normal      Zinc 50 MG CAPS Take 50 mg by mouth daily, Starting Mon 4/20/2020, Historical Med             No discharge procedures on file      PDMP Review     None          ED Provider  Electronically Signed by           Basia Hernandez MD  11/25/22 0090

## 2022-11-29 ENCOUNTER — OFFICE VISIT (OUTPATIENT)
Dept: FAMILY MEDICINE CLINIC | Facility: CLINIC | Age: 67
End: 2022-11-29

## 2022-11-29 ENCOUNTER — TELEPHONE (OUTPATIENT)
Dept: FAMILY MEDICINE CLINIC | Facility: CLINIC | Age: 67
End: 2022-11-29

## 2022-11-29 VITALS
HEART RATE: 83 BPM | RESPIRATION RATE: 18 BRPM | TEMPERATURE: 97.7 F | WEIGHT: 157.4 LBS | DIASTOLIC BLOOD PRESSURE: 78 MMHG | SYSTOLIC BLOOD PRESSURE: 128 MMHG | HEIGHT: 64 IN | BODY MASS INDEX: 26.87 KG/M2

## 2022-11-29 DIAGNOSIS — J45.31 MILD PERSISTENT ASTHMA WITH ACUTE EXACERBATION: ICD-10-CM

## 2022-11-29 DIAGNOSIS — J10.1 INFLUENZA A: Primary | ICD-10-CM

## 2022-11-29 DIAGNOSIS — N90.89 VULVAL LESION: ICD-10-CM

## 2022-11-29 RX ORDER — PREDNISONE 20 MG/1
40 TABLET ORAL DAILY
Qty: 10 TABLET | Refills: 0 | Status: SHIPPED | OUTPATIENT
Start: 2022-11-29 | End: 2022-11-29

## 2022-11-29 RX ORDER — DEXTROMETHORPHAN HYDROBROMIDE AND PROMETHAZINE HYDROCHLORIDE 15; 6.25 MG/5ML; MG/5ML
5 SOLUTION ORAL 4 TIMES DAILY PRN
Qty: 180 ML | Refills: 0 | Status: SHIPPED | OUTPATIENT
Start: 2022-11-29

## 2022-11-29 RX ORDER — PREDNISONE 20 MG/1
TABLET ORAL
Qty: 15 TABLET | Refills: 0 | Status: SHIPPED | OUTPATIENT
Start: 2022-11-29 | End: 2022-12-09

## 2022-11-29 NOTE — PROGRESS NOTES
Chief Complaint   Patient presents with   • Cough   • Cold Like Symptoms   • Shortness of Breath   • Nasal Congestion   • Rash     Open wound rash vagina and rectum/ burn with urine        Patient ID: Carlo Rodriguez is a 69 y o  female  HPI  Pt is seeing for f/u Er visit 5 days ago for flu like symptoms -  Was Dx with influenza A  -  No Tx -  Has Asthma -  Was Rx Prednisone by pulmonologist 1 wk prior -  Taking 20 mg instead of 40 mg -  Using neb Tx 2 times a day -  Noticed painful lesion on the vulva x few days    The following portions of the patient's history were reviewed and updated as appropriate: allergies, current medications, past family history, past medical history, past social history, past surgical history and problem list     Review of Systems   HENT: Positive for congestion, postnasal drip and rhinorrhea  Negative for sore throat, trouble swallowing and voice change  Respiratory: Positive for cough and chest tightness  Negative for shortness of breath and wheezing  Gastrointestinal: Negative  Skin: Negative for rash  All other systems reviewed and are negative        Current Outpatient Medications   Medication Sig Dispense Refill   • albuterol (2 5 mg/3 mL) 0 083 % nebulizer solution Take 3 mL (2 5 mg total) by nebulization every 6 (six) hours as needed for wheezing or shortness of breath 180 mL 5   • Ascorbic Acid (VITAMIN C) 1000 MG tablet      • atorvastatin (LIPITOR) 20 mg tablet Take 1 tablet (20 mg total) by mouth daily at bedtime 90 tablet 0   • benzonatate (TESSALON PERLES) 100 mg capsule Take 1 capsule (100 mg total) by mouth 2 (two) times a day as needed for cough 30 capsule 1   • cholecalciferol (VITAMIN D3) 1,000 units tablet Take 2,000 Units by mouth daily     • clobetasol (TEMOVATE) 0 05 % cream Apply topically 2 (two) times a day Apply topically on scalp and right shoulder two times a day 60 g 2   • cyclobenzaprine (FLEXERIL) 10 mg tablet Take 10 mg by mouth 3 (three) times a day as needed for muscle spasms (as needed for pain)       • famotidine (PEPCID) 40 MG tablet TAKE 1 TABLET BY MOUTH AT BEDTIME 90 tablet 0   • Fluticasone-Salmeterol (Advair Diskus) 250-50 mcg/dose inhaler Inhale 1 puff every morning Rinse mouth after use  60 blister 5   • guaiFENesin (MUCINEX) 600 mg 12 hr tablet Take 1,200 mg by mouth every 12 (twelve) hours     • hydrocortisone 2 5 % cream 2 (two) times a day scalp     • Loratadine (CLARITIN PO) Take by mouth       • metFORMIN (GLUCOPHAGE-XR) 500 mg 24 hr tablet TAKE ONE TABLET BY MOUTH EVERY DAY WITH DINNER 90 tablet 2   • montelukast (SINGULAIR) 10 mg tablet TAKE ONE TABLET BY MOUTH EVERY DAY 60 tablet 5   • multivitamin (THERAGRAN) TABS Take 1 tablet by mouth daily Last dose 3/21/21     • mupirocin (BACTROBAN) 2 % ointment Apply topically 3 (three) times a day 22 g 0   • pantoprazole (PROTONIX) 40 mg tablet TAKE ONE TABLET BY MOUTH EVERY DAY IN THE MORNING 90 tablet 0   • predniSONE 20 mg tablet Take 2 tablets (40 mg total) by mouth daily for 5 days Take 2 tablets for 5 days then 1 tab 5 days 10 tablet 0   • Promethazine-DM (PHENERGAN-DM) 6 25-15 mg/5 mL oral syrup Take 5 mL by mouth 4 (four) times a day as needed for cough 180 mL 0   • triamcinolone (KENALOG) 0 025 % cream Apply topically 2 (two) times a day 80 g 5     No current facility-administered medications for this visit  Objective:    /78 (BP Location: Left arm, Patient Position: Sitting, Cuff Size: Standard)   Pulse 83   Temp 97 7 °F (36 5 °C) (Temporal)   Resp 18   Ht 5' 4" (1 626 m)   Wt 71 4 kg (157 lb 6 4 oz)   BMI 27 02 kg/m²        Physical Exam  Constitutional:       General: She is not in acute distress  Appearance: She is not ill-appearing  HENT:      Nose: Congestion present  No rhinorrhea  Mouth/Throat:      Pharynx: No oropharyngeal exudate or posterior oropharyngeal erythema  Cardiovascular:      Rate and Rhythm: Normal rate and regular rhythm  Pulmonary:      Effort: Pulmonary effort is normal  No respiratory distress  Breath sounds: No wheezing, rhonchi or rales  Genitourinary:      Neurological:      Mental Status: She is alert  Assessment/Plan:         Diagnoses and all orders for this visit:    Influenza A  -     Promethazine-DM (PHENERGAN-DM) 6 25-15 mg/5 mL oral syrup; Take 5 mL by mouth 4 (four) times a day as needed for cough    Mild persistent asthma with acute exacerbation  -     predniSONE 20 mg tablet; Take 2 tablets (40 mg total) by mouth daily for 5 days Take 2 tablets for 5 days then 1 tab 5 days    Vulval lesion  -     mupirocin (BACTROBAN) 2 % ointment;  Apply topically 3 (three) times a day    will need to see Gyn if vulva lesion is not healing     rto prn                   Soni Last MD

## 2022-12-14 DIAGNOSIS — K21.9 GASTROESOPHAGEAL REFLUX DISEASE WITHOUT ESOPHAGITIS: ICD-10-CM

## 2022-12-14 RX ORDER — FAMOTIDINE 40 MG/1
TABLET, FILM COATED ORAL
Qty: 90 TABLET | Refills: 0 | Status: SHIPPED | OUTPATIENT
Start: 2022-12-14

## 2023-01-05 PROBLEM — R05.2 SUBACUTE COUGH: Status: RESOLVED | Noted: 2018-03-18 | Resolved: 2023-01-05

## 2023-01-08 ENCOUNTER — APPOINTMENT (EMERGENCY)
Dept: RADIOLOGY | Facility: HOSPITAL | Age: 68
End: 2023-01-08

## 2023-01-08 ENCOUNTER — HOSPITAL ENCOUNTER (EMERGENCY)
Facility: HOSPITAL | Age: 68
Discharge: HOME | End: 2023-01-08
Attending: EMERGENCY MEDICINE

## 2023-01-08 VITALS
BODY MASS INDEX: 26.8 KG/M2 | HEART RATE: 90 BPM | OXYGEN SATURATION: 95 % | HEIGHT: 64 IN | RESPIRATION RATE: 20 BRPM | DIASTOLIC BLOOD PRESSURE: 75 MMHG | WEIGHT: 157 LBS | SYSTOLIC BLOOD PRESSURE: 143 MMHG | TEMPERATURE: 98.7 F

## 2023-01-08 DIAGNOSIS — M54.2 NECK PAIN: ICD-10-CM

## 2023-01-08 DIAGNOSIS — S06.0X0A CONCUSSION WITHOUT LOSS OF CONSCIOUSNESS, INITIAL ENCOUNTER: ICD-10-CM
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
## 2023-01-08 DIAGNOSIS — S13.4XXA WHIPLASH INJURY TO NECK, INITIAL ENCOUNTER: ICD-10-CM

## 2023-01-08 RX ORDER — METHOCARBAMOL 500 MG/1
500 TABLET, FILM COATED ORAL ONCE
Status: COMPLETED | OUTPATIENT
Start: 2023-01-08 | End: 2023-01-08

## 2023-01-08 RX ORDER — METHOCARBAMOL 500 MG/1
500 TABLET, FILM COATED ORAL 2 TIMES DAILY
Qty: 20 TABLET | Refills: 0 | Status: SHIPPED | OUTPATIENT
Start: 2023-01-08

## 2023-01-08 RX ORDER — ACETAMINOPHEN 325 MG/1
650 TABLET ORAL ONCE
Status: COMPLETED | OUTPATIENT
Start: 2023-01-08 | End: 2023-01-08

## 2023-01-08 RX ORDER — HYDROMORPHONE HCL/PF 1 MG/ML
1 SYRINGE (ML) INJECTION ONCE
Status: COMPLETED | OUTPATIENT
Start: 2023-01-08 | End: 2023-01-08

## 2023-01-08 RX ORDER — IBUPROFEN 400 MG/1
400 TABLET ORAL EVERY 6 HOURS PRN
Qty: 300000 TABLET | Refills: 0 | Status: SHIPPED | OUTPATIENT
Start: 2023-01-08 | End: 2023-01-22

## 2023-01-08 RX ADMIN — METHOCARBAMOL 500 MG: 500 TABLET ORAL at 16:31

## 2023-01-08 RX ADMIN — HYDROMORPHONE HYDROCHLORIDE 1 MG: 1 INJECTION, SOLUTION INTRAMUSCULAR; INTRAVENOUS; SUBCUTANEOUS at 16:31

## 2023-01-08 RX ADMIN — ACETAMINOPHEN 650 MG: 325 TABLET, FILM COATED ORAL at 16:31

## 2023-01-08 NOTE — ED NOTES
Patient transported to [facility], [address] [place] [address]  01/08/23 [id number]

## 2023-01-08 NOTE — ED NOTES
Patient waiting for CT SCAN to be done       Shirley Mckee RN  01/08/23 Jimbo Borges, TREVOR  01/08/23 0349

## 2023-01-08 NOTE — ED NOTES
Patient in cervical collar from EMS, no sign of distress noted,noted to be talking on cellphone since arrival      Candice Oliveira RN  01/08/23 [id number]
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
## 2023-01-09 ENCOUNTER — TELEPHONE (OUTPATIENT)
Dept: PULMONOLOGY | Facility: MEDICAL CENTER | Age: 68
End: 2023-01-09

## 2023-01-09 DIAGNOSIS — K21.9 GASTROESOPHAGEAL REFLUX DISEASE WITHOUT ESOPHAGITIS: ICD-10-CM

## 2023-01-09 RX ORDER — PANTOPRAZOLE SODIUM 40 MG/1
TABLET, DELAYED RELEASE ORAL
Qty: 30 TABLET | Refills: 2 | Status: SHIPPED | OUTPATIENT
Start: 2023-01-09 | End: 2023-01-10
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
## 2023-01-10 DIAGNOSIS — K21.9 GASTROESOPHAGEAL REFLUX DISEASE WITHOUT ESOPHAGITIS: ICD-10-CM

## 2023-01-10 RX ORDER — PANTOPRAZOLE SODIUM 40 MG/1
TABLET, DELAYED RELEASE ORAL
Qty: 30 TABLET | Refills: 2 | Status: SHIPPED | OUTPATIENT
Start: 2023-01-10

## 2023-01-12 ENCOUNTER — TELEPHONE (OUTPATIENT)
Dept: FAMILY MEDICINE CLINIC | Facility: CLINIC | Age: 68
End: 2023-01-12

## 2023-01-12 ENCOUNTER — OFFICE VISIT (OUTPATIENT)
Dept: FAMILY MEDICINE CLINIC | Facility: CLINIC | Age: 68
End: 2023-01-12

## 2023-01-12 ENCOUNTER — APPOINTMENT (OUTPATIENT)
Dept: RADIOLOGY | Facility: CLINIC | Age: 68
End: 2023-01-12

## 2023-01-12 VITALS
HEART RATE: 82 BPM | HEIGHT: 64 IN | DIASTOLIC BLOOD PRESSURE: 76 MMHG | WEIGHT: 156.2 LBS | RESPIRATION RATE: 14 BRPM | SYSTOLIC BLOOD PRESSURE: 118 MMHG | BODY MASS INDEX: 26.67 KG/M2 | TEMPERATURE: 98 F

## 2023-01-12 DIAGNOSIS — K21.9 GASTROESOPHAGEAL REFLUX DISEASE WITHOUT ESOPHAGITIS: ICD-10-CM

## 2023-01-12 DIAGNOSIS — J45.30 MILD PERSISTENT ASTHMA WITHOUT COMPLICATION: ICD-10-CM

## 2023-01-12 DIAGNOSIS — J44.1 CHRONIC OBSTRUCTIVE PULMONARY DISEASE WITH ACUTE EXACERBATION (HCC): ICD-10-CM

## 2023-01-12 DIAGNOSIS — R05.2 SUBACUTE COUGH: ICD-10-CM

## 2023-01-12 DIAGNOSIS — R07.82 INTERCOSTAL PAIN: ICD-10-CM

## 2023-01-12 DIAGNOSIS — M94.0 COSTOCHONDRITIS: Primary | ICD-10-CM

## 2023-01-12 DIAGNOSIS — M94.0 COSTOCHONDRITIS: ICD-10-CM
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
## 2023-01-12 RX ORDER — MELOXICAM 7.5 MG/1
7.5 TABLET ORAL DAILY
Qty: 10 TABLET | Refills: 0 | Status: SHIPPED | OUTPATIENT
Start: 2023-01-12

## 2023-01-12 NOTE — TELEPHONE ENCOUNTER
Dr Jorge Gonzales called from [facility] regarding meloxicam prescribed today as patient is also taking ibuprofen 400 mg which is same class  I advised that Dr Fidencio Granados is out of the office at this time
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
## 2023-01-19 ENCOUNTER — OFFICE VISIT (OUTPATIENT)
Dept: GASTROENTEROLOGY | Facility: CLINIC | Age: 68
End: 2023-01-19

## 2023-01-19 VITALS
SYSTOLIC BLOOD PRESSURE: 114 MMHG | DIASTOLIC BLOOD PRESSURE: 79 MMHG | HEIGHT: 64 IN | BODY MASS INDEX: 26.7 KG/M2 | WEIGHT: 156.4 LBS | TEMPERATURE: 98.2 F | HEART RATE: 91 BPM

## 2023-01-19 DIAGNOSIS — R11.0 NAUSEA: Primary | ICD-10-CM

## 2023-01-19 DIAGNOSIS — Z86.010 HISTORY OF COLON POLYPS: ICD-10-CM

## 2023-01-19 PROBLEM — Z86.0100 HISTORY OF COLON POLYPS: Status: ACTIVE | Noted: 2023-01-19

## 2023-01-19 RX ORDER — SUCRALFATE 1 G/1
1 TABLET ORAL 4 TIMES DAILY
Qty: 40 TABLET | Refills: 0 | Status: SHIPPED | OUTPATIENT
Start: 2023-01-19

## 2023-01-19 NOTE — ASSESSMENT & PLAN NOTE
Probable gastric erosions or gastritis  Rule out peptic ulcer disease  Also consider gastroparesis    -Discussed with patient in detail about the different possible etiologies and given reassurance  -Advised about small frequent low-fat diet    -Continue Protonix and also add Carafate    -Schedule for EGD    -Patient was explained about the lifestyle and dietary modifications  Advised to avoid fatty foods, chocolates, caffeine, alcohol and any other triggering foods  Avoid eating for at least 3 hours before going to bed     -Patient was explained about  the risks and benefits of the procedure  Risks including but not limited to bleeding, infection, perforation were explained in detail  Also explained about less than 100% sensitivity with the exam and other alternatives

## 2023-01-19 NOTE — H&P (VIEW-ONLY)
Follow-up Note -  [facility]  Barron Harrison 1955 female         Reason: Nausea    HPI:  Ms Mia Daniels was seen couple of years ago for both EGD and colonoscopies in March 2021  She came in because of symptoms of nausea for about a month  Complaining about episodes of nausea with some discomfort in the stomach and decreased appetite  She reports drinking coffee on empty stomach  She takes pantoprazole in the morning and Pepcid at nighttime  Denies any NSAID use  She has history of diabetes mellitus for which she takes metformin regularly  Regular bowel movements and denies any blood or mucus in the stool  Chaperon: Ms Valeria Lan: Review of Systems   Constitutional: Negative for activity change, appetite change, chills, diaphoresis, fatigue, fever and unexpected weight change  HENT: Negative for ear discharge, ear pain, facial swelling, hearing loss, nosebleeds, sore throat, tinnitus and voice change  Eyes: Negative for pain, discharge, redness, itching and visual disturbance  Respiratory: Negative for apnea, cough, chest tightness, shortness of breath and wheezing  Cardiovascular: Negative for chest pain and palpitations  Gastrointestinal:        As noted in HPI   Endocrine: Negative for cold intolerance, heat intolerance and polyuria  Genitourinary: Negative for difficulty urinating, dysuria, flank pain, hematuria and urgency  Musculoskeletal: Negative for arthralgias, back pain, gait problem, joint swelling and myalgias  Skin: Negative for rash and wound  Neurological: Negative for dizziness, tremors, seizures, speech difficulty, light-headedness, numbness and headaches  Hematological: Negative for adenopathy  Does not bruise/bleed easily  Psychiatric/Behavioral: Negative for agitation, behavioral problems and confusion  The patient is not nervous/anxious           Past Medical History:   Diagnosis Date   • Abnormal glucose     last assessed 16   • Arthritis    • Asthma     w/ exacerbation; last assessed 5/14/15   • Atypical nevi    • Atypical nevus     last assessed 17   • Breast lump     last assessed 3/6/14   • Cataract    • Cervical adenopathy     last assessed  17   • Colon polyp    • CPAP (continuous positive airway pressure) dependence    • Diverticulitis of colon    • Dizziness    • Dyslipidemia     last assessed 17   • Facial droop     last assessed  16   • Fibromyalgia, primary    • Flu 2018   • Foot abrasion     right between the 4th and 5th toe   • Fractures    • Genital herpes     resolved 16   • GERD (gastroesophageal reflux disease)    • Headache, tension-type    • Herpes zoster     last assessed 16   • History of shingles     may 2016   • History of stomach ulcers    • Hx of abnormal mammogram     last assessed  3/5/14   • Hyperlipidemia    • Myalgia     last assessed  12   • Myositis     12   • Neck pain    • Pain involving joints of fingers of both hands 2021   • Peripheral neuropathy    • Seborrheic keratosis    • Shingles    • Skin disorder    • Skin neoplasm     of the lower limb, including hip; onset 12; last assessed  12   • Sleep apnea    • Stomach disorder       Past Surgical History:   Procedure Laterality Date   • ABDOMINAL SURGERY      release of adhesions   • BLADDER SURGERY      mesh-lift   • BREAST CYST ASPIRATION Right     does not know the year   • BREAST SURGERY      lift   • CATARACT EXTRACTION Bilateral    •  SECTION      x [id number]   • CHOLECYSTECTOMY      lap   • COLONOSCOPY     • COSMETIC SURGERY      tummy and breast lift   • ESOPHAGOGASTRODUODENOSCOPY     • OOPHORECTOMY Left    • OTHER SURGICAL HISTORY      vocal cord surgery, scraping   • [place] HYSTEROSCOPY BX ENDOMETRIUM&/POLYPC W/WO D&C N/A 2016    Procedure: DILATATION AND CURETTAGE (D&C) WITH HYSTEROSCOPY;  Surgeon: Efrain Barnett MD;  Location: [address] [place] [address]; Service: Gynecology   • REDUCTION MAMMAPLASTY Bilateral 2008   • TONSILLECTOMY     • TUBAL LIGATION  10/29/1984   • US GUIDED MSK PROCEDURE  05/25/2021     Social History     Socioeconomic History   • Marital status:      Spouse name: Not on file   • Number of children: Not on file   • Years of education: Not on file   • Highest education level: Not on file   Occupational History   • Not on file   Tobacco Use   • Smoking status: Never   • Smokeless tobacco: Never   Vaping Use   • Vaping Use: Never used   Substance and Sexual Activity   • Alcohol use: No   • Drug use: No   • Sexual activity: Not Currently     Birth control/protection: Post-menopausal   Other Topics Concern   • Not on file   Social History Narrative    Daily caffinated coffee consumption    Exercise habits- walking sometimes 1x per day- last impression 5/22/17- Lucila Chin    Good sleep hygiene    Pet - fish     Social Determinants of Health     Financial Resource Strain: Not on file   Food Insecurity: Not on file   Transportation Needs: Not on file   Physical Activity: Not on file   Stress: Not on file   Social Connections: Not on file   Intimate Partner Violence: Not on file   Housing Stability: Not on file     Family History   Problem Relation Age of Onset   • Hypertension Mother    • Alzheimer's disease Mother    • Arthritis Mother    • Hypertension Father    • Arthritis Father    • Heart attack Father    • Heart disease Father    • Stroke Father    • Other Brother         vertigo, tinnitus   • Diabetes Daughter    • Breast cancer Sister 28   • Skin cancer Sister    • Cancer Sister    • Other Son         downs syndrome   • Abdominal aortic aneurysm Sister    • Cancer Sister         T cell sarcoma   • Breast cancer Sister    • No Known Problems Brother    • Diabetes Brother    • Other Sister         anemia from the diet   • No Known Problems Son    • No Known Problems Maternal Aunt    • No Known Problems Maternal Aunt    • No Known Problems Paternal Aunt    • No Known Problems Paternal Aunt    • No Known Problems Paternal Aunt    • Asthma Sister      Latex and Adhesive [medical tape]  Current Outpatient Medications   Medication Sig Dispense Refill   • albuterol (2 5 mg/3 mL) 0 083 % nebulizer solution Take 3 mL (2 5 mg total) by nebulization every 6 (six) hours as needed for wheezing or shortness of breath 180 mL 5   • Ascorbic Acid (VITAMIN C) 1000 MG tablet      • atorvastatin (LIPITOR) 20 mg tablet TAKE ONE TABLET BY MOUTH EVERY DAY AT BEDTIME 90 tablet 1   • cholecalciferol (VITAMIN D3) 1,000 units tablet Take 2,000 Units by mouth daily     • clobetasol (TEMOVATE) 0 05 % cream Apply topically 2 (two) times a day Apply topically on scalp and right shoulder two times a day 60 g 2   • cyclobenzaprine (FLEXERIL) 10 mg tablet Take 10 mg by mouth 3 (three) times a day as needed for muscle spasms (as needed for pain)       • famotidine (PEPCID) 40 MG tablet TAKE ONE TABLET BY MOUTH AT BEDTIME 90 tablet 0   • Fluticasone-Salmeterol (Advair Diskus) 250-50 mcg/dose inhaler Inhale 1 puff every morning Rinse mouth after use   60 blister 5   • guaiFENesin (MUCINEX) 600 mg 12 hr tablet Take 1,200 mg by mouth every 12 (twelve) hours     • hydrocortisone 2 5 % cream 2 (two) times a day scalp     • ibuprofen (MOTRIN) 400 mg tablet Take 1 tablet (400 mg total) by mouth every 6 (six) hours as needed for moderate pain for up to 14 days [id number] tablet 0   • Loratadine (CLARITIN PO) Take by mouth       • meloxicam (Mobic) 7 5 mg tablet Take 1 tablet (7 5 mg total) by mouth daily 10 tablet 0   • metFORMIN (GLUCOPHAGE-XR) 500 mg 24 hr tablet TAKE ONE TABLET BY MOUTH EVERY DAY WITH DINNER 90 tablet 2   • methocarbamol (ROBAXIN) 500 mg tablet Take 1 tablet (500 mg total) by mouth 2 (two) times a day 20 tablet 0   • montelukast (SINGULAIR) 10 mg tablet TAKE ONE TABLET BY MOUTH EVERY DAY 60 tablet 5   • multivitamin (THERAGRAN) TABS Take 1 tablet by mouth daily Last dose 3/21/21     • mupirocin (BACTROBAN) 2 % ointment Apply topically 3 (three) times a day 22 g 0   • pantoprazole (PROTONIX) 40 mg tablet TAKE ONE TABLET BY MOUTH EVERY DAY 30 tablet 2   • sucralfate (CARAFATE) 1 g tablet Take 1 tablet (1 g total) by mouth 4 (four) times a day 40 tablet 0   • triamcinolone (KENALOG) 0 025 % cream Apply topically 2 (two) times a day 80 g 5     No current facility-administered medications for this visit  Blood pressure 114/79, pulse 91, temperature 98 2 °F (36 8 °C), temperature source Temporal, height 5' 4" (1 626 m), weight 70 9 kg (156 lb 6 4 oz), not currently breastfeeding  PHYSICAL EXAM: Physical Exam  Constitutional:       Appearance: Normal appearance  She is well-developed  HENT:      Head: Normocephalic and atraumatic  Nose: Nose normal    Eyes:      Conjunctiva/sclera: Conjunctivae normal    Neck:      Thyroid: No thyromegaly  Vascular: No JVD  Trachea: No tracheal deviation  Cardiovascular:      Rate and Rhythm: Normal rate and regular rhythm  Heart sounds: Normal heart sounds  No murmur heard  No friction rub  No gallop  Pulmonary:      Effort: Pulmonary effort is normal  No respiratory distress  Breath sounds: Normal breath sounds  No wheezing or rales  Abdominal:      General: Bowel sounds are normal  There is no distension  Palpations: Abdomen is soft  There is no mass  Tenderness: There is no abdominal tenderness  There is no guarding  Hernia: No hernia is present  Musculoskeletal:         General: No tenderness or deformity  Cervical back: Neck supple  Right lower leg: No edema  Left lower leg: No edema  Lymphadenopathy:      Cervical: No cervical adenopathy  Skin:     General: Skin is warm and dry  Findings: No erythema or rash  Neurological:      Mental Status: She is alert and oriented to person, place, and time     Psychiatric:         Mood and Affect: Mood normal  Behavior: Behavior normal          Thought Content: Thought content normal           Lab Results   Component Value Date    WBC 13 48 (H) 11/25/2022    HGB 13 0 11/25/2022    HCT 40 2 11/25/2022    MCV 98 11/25/2022     11/25/2022     Lab Results   Component Value Date    GLUCOSE 122 (H) 12/16/2017    CALCIUM 9 1 11/25/2022     12/16/2017    K 3 8 11/25/2022    CO2 31 11/25/2022    CL 98 11/25/2022    BUN 16 11/25/2022    CREATININE 0 79 11/25/2022     Lab Results   Component Value Date    ALT 44 11/25/2022    AST  11/25/2022      Comment:      No result  If an AST is required for patient care, it must be ordered separately  Specimen collection should occur prior to Sulfasalazine administration due to the potential for falsely depressed results  ALKPHOS 77 11/25/2022    BILITOT 0 2 12/16/2017     Lab Results   Component Value Date    INR 0 96 02/01/2018    INR 0 95 05/10/2017    INR 0 96 12/05/2016    PROTIME 10 1 02/01/2018    PROTIME 10 0 05/10/2017    PROTIME 10 1 12/05/2016       XR chest pa & lateral    Result Date: 1/14/2023  Impression: No acute cardiopulmonary disease  Workstation performed: [id number]     CT head without contrast    Result Date: 1/8/2023  Impression: No acute intracranial abnormality  Left parietal scalp hematoma  No fracture  The study was marked in [facility] for immediate notification  Workstation performed: [id number]     CT cervical spine without contrast    Result Date: 1/8/2023  Impression: No cervical spine fracture or traumatic malalignment  Workstation performed: [id number]       ASSESSMENT & PLAN:    Nausea  Probable gastric erosions or gastritis  Rule out peptic ulcer disease  Also consider gastroparesis    -Discussed with patient in detail about the different possible etiologies and given reassurance      -Advised about small frequent low-fat diet    -Continue Protonix and also add Carafate    -Schedule for EGD    -Patient was explained about the lifestyle and dietary modifications  Advised to avoid fatty foods, chocolates, caffeine, alcohol and any other triggering foods  Avoid eating for at least 3 hours before going to bed     -Patient was explained about  the risks and benefits of the procedure  Risks including but not limited to bleeding, infection, perforation were explained in detail  Also explained about less than 100% sensitivity with the exam and other alternatives

## 2023-01-19 NOTE — PATIENT INSTRUCTIONS
Scheduled date of EGD(as of today): 02/15/23  Physician performing EGD: Kaiden Estrada  [facility]  Instructions reviewed with patient by: VIRGINIA  Clearances: ELVA

## 2023-01-19 NOTE — PROGRESS NOTES
Follow-up Note -  [facility]  Jenise Chirinos 1955 female         Reason: Nausea    HPI:  Ms Josue Currie was seen couple of years ago for both EGD and colonoscopies in March 2021  She came in because of symptoms of nausea for about a month  Complaining about episodes of nausea with some discomfort in the stomach and decreased appetite  She reports drinking coffee on empty stomach  She takes pantoprazole in the morning and Pepcid at nighttime  Denies any NSAID use  She has history of diabetes mellitus for which she takes metformin regularly  Regular bowel movements and denies any blood or mucus in the stool  Chaperon: Ms Khanna Gift: Review of Systems   Constitutional: Negative for activity change, appetite change, chills, diaphoresis, fatigue, fever and unexpected weight change  HENT: Negative for ear discharge, ear pain, facial swelling, hearing loss, nosebleeds, sore throat, tinnitus and voice change  Eyes: Negative for pain, discharge, redness, itching and visual disturbance  Respiratory: Negative for apnea, cough, chest tightness, shortness of breath and wheezing  Cardiovascular: Negative for chest pain and palpitations  Gastrointestinal:        As noted in HPI   Endocrine: Negative for cold intolerance, heat intolerance and polyuria  Genitourinary: Negative for difficulty urinating, dysuria, flank pain, hematuria and urgency  Musculoskeletal: Negative for arthralgias, back pain, gait problem, joint swelling and myalgias  Skin: Negative for rash and wound  Neurological: Negative for dizziness, tremors, seizures, speech difficulty, light-headedness, numbness and headaches  Hematological: Negative for adenopathy  Does not bruise/bleed easily  Psychiatric/Behavioral: Negative for agitation, behavioral problems and confusion  The patient is not nervous/anxious           Past Medical History:   Diagnosis Date   • Abnormal glucose     last assessed 16   • Arthritis    • Asthma     w/ exacerbation; last assessed 5/14/15   • Atypical nevi    • Atypical nevus     last assessed 17   • Breast lump     last assessed 3/6/14   • Cataract    • Cervical adenopathy     last assessed  17   • Colon polyp    • CPAP (continuous positive airway pressure) dependence    • Diverticulitis of colon    • Dizziness    • Dyslipidemia     last assessed 17   • Facial droop     last assessed  16   • Fibromyalgia, primary    • Flu 2018   • Foot abrasion     right between the 4th and 5th toe   • Fractures    • Genital herpes     resolved 16   • GERD (gastroesophageal reflux disease)    • Headache, tension-type    • Herpes zoster     last assessed 16   • History of shingles     may 2016   • History of stomach ulcers    • Hx of abnormal mammogram     last assessed  3/5/14   • Hyperlipidemia    • Myalgia     last assessed  12   • Myositis     12   • Neck pain    • Pain involving joints of fingers of both hands 2021   • Peripheral neuropathy    • Seborrheic keratosis    • Shingles    • Skin disorder    • Skin neoplasm     of the lower limb, including hip; onset 12; last assessed  12   • Sleep apnea    • Stomach disorder       Past Surgical History:   Procedure Laterality Date   • ABDOMINAL SURGERY      release of adhesions   • BLADDER SURGERY      mesh-lift   • BREAST CYST ASPIRATION Right     does not know the year   • BREAST SURGERY      lift   • CATARACT EXTRACTION Bilateral    •  SECTION      x [id number]   • CHOLECYSTECTOMY      lap   • COLONOSCOPY     • COSMETIC SURGERY      tummy and breast lift   • ESOPHAGOGASTRODUODENOSCOPY     • OOPHORECTOMY Left    • OTHER SURGICAL HISTORY      vocal cord surgery, scraping   • SD HYSTEROSCOPY BX ENDOMETRIUM&/POLYPC W/WO D&C N/A 2016    Procedure: DILATATION AND CURETTAGE (D&C) WITH HYSTEROSCOPY;  Surgeon: Issac Blandon MD;  Location[facility]; Service: Gynecology   • REDUCTION MAMMAPLASTY Bilateral 2008   • TONSILLECTOMY     • TUBAL LIGATION  10/29/1984   • US GUIDED MSK PROCEDURE  05/25/2021     Social History     Socioeconomic History   • Marital status:      Spouse name: Not on file   • Number of children: Not on file   • Years of education: Not on file   • Highest education level: Not on file   Occupational History   • Not on file   Tobacco Use   • Smoking status: Never   • Smokeless tobacco: Never   Vaping Use   • Vaping Use: Never used   Substance and Sexual Activity   • Alcohol use: No   • Drug use: No   • Sexual activity: Not Currently     Birth control/protection: Post-menopausal   Other Topics Concern   • Not on file   Social History Narrative    Daily caffinated coffee consumption    Exercise habits- walking sometimes 1x per day- last impression 5/22/17- [facility] [place]    Good sleep hygiene    Pet - fish     Social Determinants of Health     Financial Resource Strain: Not on file   Food Insecurity: Not on file   Transportation Needs: Not on file   Physical Activity: Not on file   Stress: Not on file   Social Connections: Not on file   Intimate Partner Violence: Not on file   Housing Stability: Not on file     Family History   Problem Relation Age of Onset   • Hypertension Mother    • Alzheimer's disease Mother    • Arthritis Mother    • Hypertension Father    • Arthritis Father    • Heart attack Father    • Heart disease Father    • Stroke Father    • Other Brother         vertigo, tinnitus   • Diabetes Daughter    • Breast cancer Sister 28   • Skin cancer Sister    • Cancer Sister    • Other Son         downs syndrome   • Abdominal aortic aneurysm Sister    • Cancer Sister         T cell sarcoma   • Breast cancer Sister    • No Known Problems Brother    • Diabetes Brother    • Other Sister         anemia from the diet   • No Known Problems Son    • No Known Problems Maternal Aunt    • No Known Problems Maternal Aunt    • No Known Problems Paternal Aunt    • No Known Problems Paternal Aunt    • No Known Problems Paternal Aunt    • Asthma Sister      Latex and Adhesive [medical tape]  Current Outpatient Medications   Medication Sig Dispense Refill   • albuterol (2 5 mg/3 mL) 0 083 % nebulizer solution Take 3 mL (2 5 mg total) by nebulization every 6 (six) hours as needed for wheezing or shortness of breath 180 mL 5   • Ascorbic Acid (VITAMIN C) 1000 MG tablet      • atorvastatin (LIPITOR) 20 mg tablet TAKE ONE TABLET BY MOUTH EVERY DAY AT BEDTIME 90 tablet 1   • cholecalciferol (VITAMIN D3) 1,000 units tablet Take 2,000 Units by mouth daily     • clobetasol (TEMOVATE) 0 05 % cream Apply topically 2 (two) times a day Apply topically on scalp and right shoulder two times a day 60 g 2   • cyclobenzaprine (FLEXERIL) 10 mg tablet Take 10 mg by mouth 3 (three) times a day as needed for muscle spasms (as needed for pain)       • famotidine (PEPCID) 40 MG tablet TAKE ONE TABLET BY MOUTH AT BEDTIME 90 tablet 0   • Fluticasone-Salmeterol (Advair Diskus) 250-50 mcg/dose inhaler Inhale 1 puff every morning Rinse mouth after use   60 blister 5   • guaiFENesin (MUCINEX) 600 mg 12 hr tablet Take 1,200 mg by mouth every 12 (twelve) hours     • hydrocortisone 2 5 % cream 2 (two) times a day scalp     • ibuprofen (MOTRIN) 400 mg tablet Take 1 tablet (400 mg total) by mouth every 6 (six) hours as needed for moderate pain for up to 14 days 519206 tablet 0   • Loratadine (CLARITIN PO) Take by mouth       • meloxicam (Mobic) 7 5 mg tablet Take 1 tablet (7 5 mg total) by mouth daily 10 tablet 0   • metFORMIN (GLUCOPHAGE-XR) 500 mg 24 hr tablet TAKE ONE TABLET BY MOUTH EVERY DAY WITH DINNER 90 tablet 2   • methocarbamol (ROBAXIN) 500 mg tablet Take 1 tablet (500 mg total) by mouth 2 (two) times a day 20 tablet 0   • montelukast (SINGULAIR) 10 mg tablet TAKE ONE TABLET BY MOUTH EVERY DAY 60 tablet 5   • multivitamin (THERAGRAN) TABS Take 1 tablet by mouth daily Last dose 3/21/21     • mupirocin (BACTROBAN) 2 % ointment Apply topically 3 (three) times a day 22 g 0   • pantoprazole (PROTONIX) 40 mg tablet TAKE ONE TABLET BY MOUTH EVERY DAY 30 tablet 2   • sucralfate (CARAFATE) 1 g tablet Take 1 tablet (1 g total) by mouth 4 (four) times a day 40 tablet 0   • triamcinolone (KENALOG) 0 025 % cream Apply topically 2 (two) times a day 80 g 5     No current facility-administered medications for this visit  Blood pressure 114/79, pulse 91, temperature 98 2 °F (36 8 °C), temperature source Temporal, height 5' 4" (1 626 m), weight 70 9 kg (156 lb 6 4 oz), not currently breastfeeding  PHYSICAL EXAM: Physical Exam  Constitutional:       Appearance: Normal appearance  She is well-developed  HENT:      Head: Normocephalic and atraumatic  Nose: Nose normal    Eyes:      Conjunctiva/sclera: Conjunctivae normal    Neck:      Thyroid: No thyromegaly  Vascular: No JVD  Trachea: No tracheal deviation  Cardiovascular:      Rate and Rhythm: Normal rate and regular rhythm  Heart sounds: Normal heart sounds  No murmur heard  No friction rub  No gallop  Pulmonary:      Effort: Pulmonary effort is normal  No respiratory distress  Breath sounds: Normal breath sounds  No wheezing or rales  Abdominal:      General: Bowel sounds are normal  There is no distension  Palpations: Abdomen is soft  There is no mass  Tenderness: There is no abdominal tenderness  There is no guarding  Hernia: No hernia is present  Musculoskeletal:         General: No tenderness or deformity  Cervical back: Neck supple  Right lower leg: No edema  Left lower leg: No edema  Lymphadenopathy:      Cervical: No cervical adenopathy  Skin:     General: Skin is warm and dry  Findings: No erythema or rash  Neurological:      Mental Status: She is alert and oriented to person, place, and time     Psychiatric:         Mood and Affect: Mood normal  Behavior: Behavior normal          Thought Content: Thought content normal           Lab Results   Component Value Date    WBC 13 48 (H) 11/25/2022    HGB 13 0 11/25/2022    HCT 40 2 11/25/2022    MCV 98 11/25/2022     11/25/2022     Lab Results   Component Value Date    GLUCOSE 122 (H) 12/16/2017    CALCIUM 9 1 11/25/2022     12/16/2017    K 3 8 11/25/2022    CO2 31 11/25/2022    CL 98 11/25/2022    BUN 16 11/25/2022    CREATININE 0 79 11/25/2022     Lab Results   Component Value Date    ALT 44 11/25/2022    AST  11/25/2022      Comment:      No result  If an AST is required for patient care, it must be ordered separately  Specimen collection should occur prior to Sulfasalazine administration due to the potential for falsely depressed results  ALKPHOS 77 11/25/2022    BILITOT 0 2 12/16/2017     Lab Results   Component Value Date    INR 0 96 02/01/2018    INR 0 95 05/10/2017    INR 0 96 12/05/2016    PROTIME 10 1 02/01/2018    PROTIME 10 0 05/10/2017    PROTIME 10 1 12/05/2016       XR chest pa & lateral    Result Date: 1/14/2023  Impression: No acute cardiopulmonary disease  Workstation performed: [id number]     CT head without contrast    Result Date: 1/8/2023  Impression: No acute intracranial abnormality  Left parietal scalp hematoma  No fracture  The study was marked in [facility] for immediate notification  Workstation performed: [id number]     CT cervical spine without contrast    Result Date: 1/8/2023  Impression: No cervical spine fracture or traumatic malalignment  Workstation performed: [id number]       ASSESSMENT & PLAN:    Nausea  Probable gastric erosions or gastritis  Rule out peptic ulcer disease  Also consider gastroparesis    -Discussed with patient in detail about the different possible etiologies and given reassurance      -Advised about small frequent low-fat diet    -Continue Protonix and also add Carafate    -Schedule for EGD    -Patient was explained about the lifestyle and dietary modifications  Advised to avoid fatty foods, chocolates, caffeine, alcohol and any other triggering foods  Avoid eating for at least 3 hours before going to bed     -Patient was explained about  the risks and benefits of the procedure  Risks including but not limited to bleeding, infection, perforation were explained in detail  Also explained about less than 100% sensitivity with the exam and other alternatives

## 2023-02-03 ENCOUNTER — TELEPHONE (OUTPATIENT)
Dept: GASTROENTEROLOGY | Facility: CLINIC | Age: 68
End: 2023-02-03

## 2023-02-03 NOTE — TELEPHONE ENCOUNTER
Pt called to r/s procedure with Dr Ellie Cosby on 2/15  She needs a Monday or a Friday  I rescheduled her for 2/10 w/ Dr Ellie Cosby at [facility]

## 2023-02-07 ENCOUNTER — APPOINTMENT (OUTPATIENT)
Dept: RADIOLOGY | Facility: CLINIC | Age: 68
End: 2023-02-07

## 2023-02-07 ENCOUNTER — OFFICE VISIT (OUTPATIENT)
Dept: OBGYN CLINIC | Facility: CLINIC | Age: 68
End: 2023-02-07

## 2023-02-07 VITALS
HEART RATE: 76 BPM | WEIGHT: 159 LBS | BODY MASS INDEX: 27.14 KG/M2 | SYSTOLIC BLOOD PRESSURE: 123 MMHG | DIASTOLIC BLOOD PRESSURE: 80 MMHG | HEIGHT: 64 IN | TEMPERATURE: 98.5 F

## 2023-02-07 DIAGNOSIS — M75.82 TENDINITIS OF LEFT ROTATOR CUFF: ICD-10-CM

## 2023-02-07 DIAGNOSIS — M75.42 IMPINGEMENT SYNDROME OF LEFT SHOULDER: ICD-10-CM

## 2023-02-07 DIAGNOSIS — M25.512 ACUTE PAIN OF LEFT SHOULDER: Primary | ICD-10-CM

## 2023-02-07 DIAGNOSIS — M25.512 ACUTE PAIN OF LEFT SHOULDER: ICD-10-CM

## 2023-02-07 RX ORDER — MELOXICAM 15 MG/1
15 TABLET ORAL DAILY
Qty: 30 TABLET | Refills: 0 | Status: SHIPPED | OUTPATIENT
Start: 2023-02-07
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
## 2023-02-07 NOTE — PATIENT INSTRUCTIONS
Rotator Cuff Tendinitis   AMBULATORY CARE:   Rotator cuff tendinitis  is inflammation of the tendons in your shoulder joint  A tendon is a cord of tough tissue that connects your muscles to your bones  The rotator cuff is made up of a group of muscles and tendons that hold the shoulder joint in place  Common signs and symptoms:   Pain and swelling in your shoulder, especially when you lift your arm over your head    Pain that is worse after you sleep on the affected shoulder    Pain can become worse and you may have pain even when you are resting    Shoulder and arm weakness    Call your doctor or orthopedist if:   You have sudden shortness of breath or chest pain  Any part of your arm is numb, tingly, cold, blue, or pale  You have pain and swelling in your shoulder even after you take pain medicine  Your skin is itchy, swollen, or has a rash  Your symptoms are not getting better or are getting worse  You have questions or concerns about your condition or care  Treatment  may include any of the following:  Medicines  such as steroids or NSAIDs may be used to reduce swelling  This can help relieve pain  Steroids may be injected into the rotator cuff area  NSAIDs are available without a doctor's order  Ask your healthcare provider which medicine is right for you  Ask how much to take and when to take it  Take as directed  NSAIDs can cause stomach bleeding or kidney problems if not taken correctly  Surgery  may be needed if the pain and tightening in your shoulder do not go away  This may also be done if pain worsens or is so severe that it affects your daily activities  During surgery, your healthcare provider may remove bone spurs and inflamed tissue around the shoulder  Physical therapy  can help you improve movement and strength, and decrease pain  A physical therapist will teach you safe exercises   The exercises may help you move your shoulder normally again and strengthen your rotator cuff  You may learn changes to make to your daily activities that will help decrease stress on your tendons  Care for your rotator cuff tendinitis at home:   Rest as directed  Limit activity on your affected shoulder to decrease stress on the tendon  This may help prevent further damage, decrease pain, and promote healing  Apply ice on your shoulder area  Ice helps decrease swelling and pain  Ice may also help prevent tissue damage  Use an ice pack, or put crushed ice in a plastic bag  Cover it with a towel and place it on your shoulder for 15 to 20 minutes every hour or as directed  Keep your shoulder in the correct position so it will heal faster  This may be done by increasing the height of armrests while you work, drive, and sit  Try not to sleep on the side of your injured shoulder  If you are a woman, wear a sports bra so that the straps are closer to your neck  This may help decrease stress in the affected shoulder  Follow up with your doctor or orthopedist as directed:  Write down your questions so you remember to ask them during your visits  © Copyright [employer] 2022 Information is for End User's use only and may not be sold, redistributed or otherwise used for commercial purposes  All illustrations and images included in CareNotes® are the copyrighted property of A [employer]  or Kelsey Mcmullen   The above information is an  only  It is not intended as medical advice for individual conditions or treatments  Talk to your doctor, nurse or pharmacist before following any medical regimen to see if it is safe and effective for you

## 2023-02-08 LAB
LEFT EYE DIABETIC RETINOPATHY: NORMAL
RIGHT EYE DIABETIC RETINOPATHY: NORMAL

## 2023-02-09 ENCOUNTER — ANESTHESIA EVENT (OUTPATIENT)
Dept: ANESTHESIOLOGY | Facility: HOSPITAL | Age: 68
End: 2023-02-09

## 2023-02-09 ENCOUNTER — ANESTHESIA (OUTPATIENT)
Dept: ANESTHESIOLOGY | Facility: HOSPITAL | Age: 68
End: 2023-02-09

## 2023-02-09 RX ORDER — SODIUM CHLORIDE, SODIUM LACTATE, POTASSIUM CHLORIDE, CALCIUM CHLORIDE 600; 310; 30; 20 MG/100ML; MG/100ML; MG/100ML; MG/100ML
125 INJECTION, SOLUTION INTRAVENOUS CONTINUOUS
Status: CANCELLED | OUTPATIENT
Start: 2023-02-09

## 2023-02-10 ENCOUNTER — ANESTHESIA EVENT (OUTPATIENT)
Dept: GASTROENTEROLOGY | Facility: AMBULARY SURGERY CENTER | Age: 68
End: 2023-02-10

## 2023-02-10 ENCOUNTER — HOSPITAL ENCOUNTER (OUTPATIENT)
Dept: GASTROENTEROLOGY | Facility: AMBULARY SURGERY CENTER | Age: 68
Setting detail: OUTPATIENT SURGERY
Discharge: HOME | End: 2023-02-10
Attending: INTERNAL MEDICINE

## 2023-02-10 ENCOUNTER — ANESTHESIA (OUTPATIENT)
Dept: GASTROENTEROLOGY | Facility: AMBULARY SURGERY CENTER | Age: 68
End: 2023-02-10

## 2023-02-10 VITALS
DIASTOLIC BLOOD PRESSURE: 71 MMHG | HEART RATE: 72 BPM | WEIGHT: 159 LBS | OXYGEN SATURATION: 98 % | SYSTOLIC BLOOD PRESSURE: 118 MMHG | TEMPERATURE: 96.9 F | RESPIRATION RATE: 18 BRPM | BODY MASS INDEX: 27.29 KG/M2

## 2023-02-10 DIAGNOSIS — R11.0 NAUSEA: ICD-10-CM

## 2023-02-10 PROBLEM — R07.9 CHEST PAIN: Status: RESOLVED | Noted: 2019-01-21 | Resolved: 2023-02-10

## 2023-02-10 LAB — GLUCOSE SERPL-MCNC: 113 MG/DL (ref 65–140)

## 2023-02-10 RX ORDER — PROPOFOL 10 MG/ML
INJECTION, EMULSION INTRAVENOUS AS NEEDED
Status: DISCONTINUED | OUTPATIENT
Start: 2023-02-10 | End: 2023-02-10

## 2023-02-10 RX ORDER — LIDOCAINE HYDROCHLORIDE 10 MG/ML
INJECTION, SOLUTION EPIDURAL; INFILTRATION; INTRACAUDAL; PERINEURAL AS NEEDED
Status: DISCONTINUED | OUTPATIENT
Start: 2023-02-10 | End: 2023-02-10

## 2023-02-10 RX ORDER — SODIUM CHLORIDE, SODIUM LACTATE, POTASSIUM CHLORIDE, CALCIUM CHLORIDE 600; 310; 30; 20 MG/100ML; MG/100ML; MG/100ML; MG/100ML
INJECTION, SOLUTION INTRAVENOUS CONTINUOUS PRN
Status: DISCONTINUED | OUTPATIENT
Start: 2023-02-10 | End: 2023-02-10

## 2023-02-10 RX ORDER — SODIUM CHLORIDE, SODIUM LACTATE, POTASSIUM CHLORIDE, CALCIUM CHLORIDE 600; 310; 30; 20 MG/100ML; MG/100ML; MG/100ML; MG/100ML
125 INJECTION, SOLUTION INTRAVENOUS CONTINUOUS
Status: DISCONTINUED | OUTPATIENT
Start: 2023-02-10 | End: 2023-02-14 | Stop reason: HOSPADM

## 2023-02-10 RX ADMIN — PROPOFOL 100 MG: 10 INJECTION, EMULSION INTRAVENOUS at 08:08

## 2023-02-10 RX ADMIN — LIDOCAINE HYDROCHLORIDE 50 MG: 10 INJECTION, SOLUTION EPIDURAL; INFILTRATION; INTRACAUDAL at 08:08

## 2023-02-10 RX ADMIN — SODIUM CHLORIDE, SODIUM LACTATE, POTASSIUM CHLORIDE, AND CALCIUM CHLORIDE: .6; .31; .03; .02 INJECTION, SOLUTION INTRAVENOUS at 08:03

## 2023-02-10 NOTE — INTERVAL H&P NOTE
H&P reviewed  After examining the patient I find no changes in the patients condition since the H&P had been written      Vitals:    02/10/23 0703   BP: 121/58   Pulse: 75   Resp: 16   Temp: (!) 96 9 °F (36 1 °C)   SpO2: 100%

## 2023-02-10 NOTE — ANESTHESIA PREPROCEDURE EVALUATION
Procedure:  PRE-OP ONLY    Relevant Problems   CARDIO   (+) Hyperlipidemia      ENDO   (+) Hypothyroidism      GI/HEPATIC   (+) Gastroesophageal reflux disease without esophagitis      MUSCULOSKELETAL   (+) Fibromyalgia      NEURO/PSYCH   (+) Chronic foot pain, right   (+) Fibromyalgia   (+) Headache   (+) Tension type headache      PULMONARY   (+) Asthma   (+) Chronic obstructive pulmonary disease with acute exacerbation (HCC)   (+) Mild persistent asthma without complication   (+) Obstructive sleep apnea      Endocrine   (+) Impaired fasting glucose      Other   (+) CPAP (continuous positive airway pressure) dependence   (+) Mediastinal lymphadenopathy   (+) Prediabetes             Anesthesia Plan  ASA Score- 2     Anesthesia Type- IV sedation with anesthesia with ASA Monitors  Additional Monitors:   Airway Plan:           Plan Factors-    Chart reviewed  EKG reviewed  Imaging results reviewed  Existing labs reviewed  Patient summary reviewed  Patient is not a current smoker  Patient did not smoke on day of surgery  Induction- intravenous  Postoperative Plan-     Informed Consent- Anesthetic plan and risks discussed with patient  I personally reviewed this patient with the CRNA  Discussed and agreed on the Anesthesia Plan with the CRNA  Shaun Topete             Plan: IV sedation/MAC, GA as backup

## 2023-02-10 NOTE — ANESTHESIA PREPROCEDURE EVALUATION
Procedure:  EGD    Relevant Problems   ANESTHESIA (within normal limits)   (-) History of anesthesia complications      CARDIO   (+) Hyperlipidemia      ENDO   (+) Hypothyroidism      GI/HEPATIC   (+) Gastroesophageal reflux disease without esophagitis      /RENAL (within normal limits)      HEMATOLOGY (within normal limits)      MUSCULOSKELETAL   (+) Fibromyalgia      NEURO/PSYCH   (+) Chronic foot pain, right   (+) Fibromyalgia   (+) Headache   (+) History of colon polyps   (+) Tension type headache      PULMONARY   (+) Asthma   (+) Mild persistent asthma without complication   (+) Obstructive sleep apnea      Other   (+) CPAP (continuous positive airway pressure) dependence   (+) Mediastinal lymphadenopathy   (+) Prediabetes        Physical Exam    Airway    Mallampati score: II  TM Distance: >3 FB  Neck ROM: full     Dental   No notable dental hx     Cardiovascular  Rhythm: regular, Rate: normal, Cardiovascular exam normal    Pulmonary  Pulmonary exam normal Breath sounds clear to auscultation,     Other Findings        Anesthesia Plan  ASA Score- 2     Anesthesia Type- IV sedation with anesthesia with ASA Monitors  Additional Monitors:   Airway Plan:           Plan Factors-Exercise tolerance (METS): >4 METS  Chart reviewed  EKG reviewed  Imaging results reviewed  Existing labs reviewed  Patient summary reviewed  Patient is not a current smoker  Patient did not smoke on day of surgery  Induction- intravenous  Postoperative Plan-     Informed Consent- Anesthetic plan and risks discussed with patient  I personally reviewed this patient with the CRNA  Discussed and agreed on the Anesthesia Plan with the CRNA             NPO and allergies verified  Plan: IV sedation/MAC, GA as backup    Benefits and risks of sedation were discussed with the patient including possibility of recall under sedation and the potential for conversion to general anesthesia if necessary    All questions were answered  Anesthesia consent was obtained from the patient

## 2023-02-10 NOTE — ANESTHESIA POSTPROCEDURE EVALUATION
Post-Op Assessment Note    CV Status:  Stable  Pain Score: 0    Pain management: adequate     Mental Status:  Sleepy   Hydration Status:  Stable   PONV Controlled:  None   Airway Patency:  Patent   Two or more mitigation strategies used for obstructive sleep apnea   Post Op Vitals Reviewed: Yes      Staff: CRNA         No notable events documented      BP   128/60   Temp     Pulse 67   Resp 16   SpO2 99

## 2023-02-14 ENCOUNTER — EVALUATION (OUTPATIENT)
Dept: PHYSICAL THERAPY | Facility: CLINIC | Age: 68
End: 2023-02-14

## 2023-02-14 DIAGNOSIS — M75.42 IMPINGEMENT SYNDROME OF LEFT SHOULDER: ICD-10-CM

## 2023-02-14 DIAGNOSIS — M25.512 ACUTE PAIN OF LEFT SHOULDER: ICD-10-CM

## 2023-02-14 DIAGNOSIS — M75.82 TENDINITIS OF LEFT ROTATOR CUFF: ICD-10-CM
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
## 2023-02-16 ENCOUNTER — APPOINTMENT (OUTPATIENT)
Dept: PHYSICAL THERAPY | Facility: CLINIC | Age: 68
End: 2023-02-16

## 2023-02-20 ENCOUNTER — TELEPHONE (OUTPATIENT)
Dept: OBGYN CLINIC | Facility: CLINIC | Age: 68
End: 2023-02-20

## 2023-02-20 DIAGNOSIS — M25.512 ACUTE PAIN OF LEFT SHOULDER: Primary | ICD-10-CM

## 2023-02-20 DIAGNOSIS — M75.42 IMPINGEMENT SYNDROME OF LEFT SHOULDER: ICD-10-CM

## 2023-02-20 DIAGNOSIS — M75.82 TENDINITIS OF LEFT ROTATOR CUFF: ICD-10-CM

## 2023-02-20 NOTE — TELEPHONE ENCOUNTER
Pt calling again regarding this message  Updated rx placed with duration 2-3 times a week for 6 weeks
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
## 2023-02-22 ENCOUNTER — OFFICE VISIT (OUTPATIENT)
Dept: PHYSICAL THERAPY | Facility: CLINIC | Age: 68
End: 2023-02-22

## 2023-02-22 DIAGNOSIS — M25.512 ACUTE PAIN OF LEFT SHOULDER: Primary | ICD-10-CM

## 2023-02-22 DIAGNOSIS — M75.42 IMPINGEMENT SYNDROME OF LEFT SHOULDER: ICD-10-CM

## 2023-02-22 DIAGNOSIS — M75.82 TENDINITIS OF LEFT ROTATOR CUFF: ICD-10-CM

## 2023-02-22 NOTE — PROGRESS NOTES
Daily Note     Today's date: 2023  Patient name: Demetri Tarango  : 1955  MRN: [id number]  Referring provider: Celso Cowden, PA*  Dx:   Encounter Diagnosis     ICD-10-CM    1  Acute pain of left shoulder  M25 512       2  Tendinitis of left rotator cuff  M75 82       3  Impingement syndrome of left shoulder  M75 42                      Subjective: Patient reports symptoms have improved since initial evaluation as she has been doing HEP however notes they do persist, noting keeping her left arm up while driving causes quite a bit of pain and at times even numbness in her left hand  Objective: See treatment diary below      Assessment: Tolerated treatment fair  Some soreness present with exercises today, not able to reproduce symptoms of numbness  Patient exhibited good technique with therapeutic exercises and would benefit from continued PT      Plan: Continue per plan of care        Precautions: Standard        Date: 2023      Visit # 1 Eval w/ MV 2      Manuals        L shld light mobilization post/inferior  TC                              Neuro Re-Ed        Scap squeezes   20x 5" holds against half foam       Seated shld ER  RTB 20x      Rows  B tubing 20x      Shld ext  B tubing 20x                              Ther Ex        Pulleys  20x      FIS  20x 5" hilds                                                      Ther Activity                        Gait Training                        Modalities

## 2023-02-23 ENCOUNTER — OFFICE VISIT (OUTPATIENT)
Dept: PHYSICAL THERAPY | Facility: CLINIC | Age: 68
End: 2023-02-23

## 2023-02-23 DIAGNOSIS — M75.82 TENDINITIS OF LEFT ROTATOR CUFF: ICD-10-CM

## 2023-02-23 DIAGNOSIS — M25.512 ACUTE PAIN OF LEFT SHOULDER: Primary | ICD-10-CM

## 2023-02-23 NOTE — PROGRESS NOTES
Daily Note     Today's date: 2023  Patient name: Don Alcala  : 1955  MRN: [id number]  Referring provider: Ralph Snellen, PA*  Dx:   Encounter Diagnosis     ICD-10-CM    1  Acute pain of left shoulder  M25 512       2  Tendinitis of left rotator cuff  M75 82                      Subjective: My left shoulder is painful, but improving  Objective: See treatment diary below      Assessment: Tolerated treatment well  Patient demonstrated fatigue post treatment and exhibited good technique with therapeutic exercises      Plan: Continue per plan of care        Precautions: Standard        Date: 2023     Visit # 1 Eval w/ MV 2 3     Manuals        L shld light mobilization post/inferior  TC Gr3 jt mob left gh jt  Inferior glide                             Neuro Re-Ed        Scap squeezes   20x 5" holds against half foam  20x     Seated shld ER  RTB 20x 20x     Rows  B tubing 20x      Shld ext  B tubing 20x                              Ther Ex        Pulleys  20x      FIS  20x 5" hilds      Mat: AAROM W cane   Flex 20x  ER 20x     AROM punchouts   Supine/prone   2x20     SDLY ER   #1 20x     Prone   #1 20x                     Ther Activity                        Gait Training                        Modalities

## 2023-02-27 ENCOUNTER — OFFICE VISIT (OUTPATIENT)
Dept: PHYSICAL THERAPY | Facility: CLINIC | Age: 68
End: 2023-02-27

## 2023-02-27 DIAGNOSIS — M25.512 ACUTE PAIN OF LEFT SHOULDER: Primary | ICD-10-CM

## 2023-02-27 DIAGNOSIS — M75.82 TENDINITIS OF LEFT ROTATOR CUFF: ICD-10-CM

## 2023-02-27 NOTE — PROGRESS NOTES
Daily Note     Today's date: 2023  Patient name: Yanci Delgado  : 1955  MRN: [id number]  Referring provider: JESUS Chavarria*  Dx:   Encounter Diagnosis     ICD-10-CM    1  Acute pain of left shoulder  M25 512       2  Tendinitis of left rotator cuff  M75 82                      Subjective: Pain continues with left shoulder  Objective: See treatment diary below      Assessment: Tolerated treatment well  Patient demonstrated fatigue post treatment and exhibited good technique with therapeutic exercises      Plan: Continue per plan of care        Precautions: Standard        Date: 2023    Visit # 1 Eval w/ MV 2 3 4    Manuals        L shld light mobilization post/inferior  TC Gr3 jt mob left gh jt  Inferior glide Gr 4 jt mob left G-H inferior glide                            Neuro Re-Ed        Scap squeezes   20x 5" holds against half foam  20x 20x    Seated shld ER  RTB 20x 20x 20x    Rows  B tubing 20x  20x    Shld ext  B tubing 20x  20x                            Ther Ex        Pulleys  20x  20x    FIS  20x 5" hilds  20x    Mat: AAROM W cane   Flex 20x  ER 20x     AROM punchouts   Supine/prone   2x20 #2 2x20    SDLY ER   #1 20x #2 20x    Prone   #1 20x #2 20x                    Ther Activity                        Gait Training                        Modalities

## 2023-03-02 ENCOUNTER — OFFICE VISIT (OUTPATIENT)
Dept: PHYSICAL THERAPY | Facility: CLINIC | Age: 68
End: 2023-03-02

## 2023-03-02 DIAGNOSIS — M75.42 IMPINGEMENT SYNDROME OF LEFT SHOULDER: ICD-10-CM

## 2023-03-02 DIAGNOSIS — M25.512 ACUTE PAIN OF LEFT SHOULDER: Primary | ICD-10-CM

## 2023-03-02 DIAGNOSIS — M75.82 TENDINITIS OF LEFT ROTATOR CUFF: ICD-10-CM

## 2023-03-02 NOTE — PROGRESS NOTES
Daily Note     Today's date: 3/2/2023  Patient name: Ilya Chacon  : 1955  MRN: [id number]  Referring provider: JESUS Anderson*  Dx:   Encounter Diagnosis     ICD-10-CM    1  Acute pain of left shoulder  M25 512       2  Tendinitis of left rotator cuff  M75 82       3  Impingement syndrome of left shoulder  M75 42                      Subjective: Patient reports that her shoulder is beginning to feel a bit better  Objective: See treatment diary below      Assessment: Tolerated treatment well  Patient demonstrated fatigue post treatment and exhibited good technique with therapeutic exercises      Plan: Continue per plan of care        Precautions: Standard        Date: 2023 2023 2/23 2/27 3/2/2023   Visit # 1 Eval w/ MV 2 3 4 5   Manuals        L shld light mobilization post/inferior  TC Gr3 jt mob left gh jt  Inferior glide Gr 4 jt mob left G-H inferior glide TC   CFM to LHB     TC left                   Neuro Re-Ed        Scap squeezes   20x 5" holds against half foam  20x 20x 20x 5" holds against half foam   Seated shld ER  RTB 20x 20x 20x RTB 20x   Rows  B tubing 20x  20x B tubing 20x   Shld ext  B tubing 20x  20x B tubing 20x   Wall walks     YTB 2x10                   Ther Ex        Pulleys  20x  20x 20x   FIS  20x 5" hilds  20x 20x   Mat: AAROM W cane   Flex 20x  ER 20x  Flex 20x  ER 20x   AROM punchouts   Supine/prone   2x20 #2 2x20 #2 2x20   SDLY ER   #1 20x #2 20x #2 2x20   Prone   #1 20x #2 20x #2 2x20                   Ther Activity                        Gait Training                        Modalities

## 2023-03-06 ENCOUNTER — OFFICE VISIT (OUTPATIENT)
Dept: PHYSICAL THERAPY | Facility: CLINIC | Age: 68
End: 2023-03-06

## 2023-03-06 DIAGNOSIS — M25.512 ACUTE PAIN OF LEFT SHOULDER: Primary | ICD-10-CM

## 2023-03-06 NOTE — PROGRESS NOTES
Daily Note     Today's date: 3/6/2023  Patient name: Jenise Chirinos  : 1955  MRN: [id number]  Referring provider: JESUS Florentino*  Dx:   Encounter Diagnosis     ICD-10-CM    1  Acute pain of left shoulder  M25 512                      Subjective: Reports left shoulder pain with elevation:  PreTx 7/10    Post Tx 3/10      Objective: See treatment diary below  Pre-[place]: Flex 0-160 painful     Post [place]  Flex 0-180 No pain        Assessment: Tolerated treatment well  Patient demonstrated fatigue post treatment and exhibited good technique with therapeutic exercises      Plan: Continue per plan of care        Precautions: Standard      Neurac Daily Treatment Diary     Manual  3/6       PROM l sh perf                                           Exercise Diary  3/6       Supine Pelvic Tilt 2x5       Supine Bridging 2x5       Prone Bridging 2x5       Side-lying Hip Abduction 2x5       Side-lying Hip Adduction 2x5       Scap Retract supine 2x5       Sh ER supine 2x5       Sh flex kneel 2x5                   Modalities

## 2023-03-10 ENCOUNTER — OFFICE VISIT (OUTPATIENT)
Dept: PHYSICAL THERAPY | Facility: CLINIC | Age: 68
End: 2023-03-10

## 2023-03-10 DIAGNOSIS — M75.82 TENDINITIS OF LEFT ROTATOR CUFF: ICD-10-CM

## 2023-03-10 DIAGNOSIS — M75.42 IMPINGEMENT SYNDROME OF LEFT SHOULDER: ICD-10-CM

## 2023-03-10 DIAGNOSIS — M25.512 ACUTE PAIN OF LEFT SHOULDER: Primary | ICD-10-CM

## 2023-03-10 NOTE — PROGRESS NOTES
Daily Note     Today's date: 3/10/2023  Patient name: Arjun Rangel  : 1955  MRN: [id number]  Referring provider: JESUS Valadez*  Dx:   Encounter Diagnosis     ICD-10-CM    1  Acute pain of left shoulder  M25 512       2  Tendinitis of left rotator cuff  M75 82       3  Impingement syndrome of left shoulder  M75 42           Start Time: 1052          Subjective: Holding steering wheel remains very painful  Patient reports increased L shoulder pain "from carrying firewood in the house this morning " Patient also notes occasional numbness L hand digit fingertips  Objective: See treatment diary below        Date: 2023 2023 2/23 2/27 3/10/2023 / FOTO   Visit # 1 Eval w/ MV 2 3 4 7   Manuals        L shld light mobilization post/inferior  TC Gr3 jt mob left gh jt  Inferior glide Gr 4 jt mob left G-H inferior glide ---   CFM to LHB     AD (L)                   Neuro Re-Ed        Scap squeezes   20x 5" holds against half foam  20x 20x hep   Seated shld ER  RTB 20x 20x 20x RTB x10 reps (no monies)   Rows  B tubing 20x  20x Blue TB 20x   Shld ext  B tubing 20x  20x Blue TH 20x   Wall walks     YTB 3 x 10 sets                   Ther Ex        Pulleys  20x  20x 10x   FIS  20x 5" hilds  20x 10x   Mat: AAROM W cane   Flex 20x  ER 20x  ------   AROM punchouts   Supine/prone   2x20 #2 2x20 #2 20 x prone    SDLY ER   #1 20x #2 20x ----   Prone   #1 20x #2 20x 2# rows / ext                    Ther Activity                        Gait Training                        Modalities             MH x 8 minutes in R S/L               Assessment: Tolerated treatment fair  Patient demonstrated fatigue post treatment, exhibited good technique with therapeutic exercises and would benefit from continued PT      Plan: Progress treatment as tolerated         Precautions: Standard      Neurac Daily Treatment Diary     Manual  3/6       PROM l sh perf                                           Exercise Diary  3/6 Supine Pelvic Tilt 2x5       Supine Bridging 2x5       Prone Bridging 2x5       Side-lying Hip Abduction 2x5       Side-lying Hip Adduction 2x5       Scap Retract supine 2x5       Sh ER supine 2x5       Sh flex kneel 2x5                   Modalities

## 2023-03-13 ENCOUNTER — OFFICE VISIT (OUTPATIENT)
Dept: PHYSICAL THERAPY | Facility: CLINIC | Age: 68
End: 2023-03-13

## 2023-03-13 DIAGNOSIS — M25.512 ACUTE PAIN OF LEFT SHOULDER: Primary | ICD-10-CM

## 2023-03-13 DIAGNOSIS — M75.82 TENDINITIS OF LEFT ROTATOR CUFF: ICD-10-CM

## 2023-03-13 DIAGNOSIS — M75.42 IMPINGEMENT SYNDROME OF LEFT SHOULDER: ICD-10-CM

## 2023-03-13 NOTE — PROGRESS NOTES
Daily Note     Today's date: 3/13/2023  Patient name: Barron Harrison  : 1955  MRN: [id number]  Referring provider: JESUS Adler*  Dx:   Encounter Diagnosis     ICD-10-CM    1  Acute pain of left shoulder  M25 512       2  Tendinitis of left rotator cuff  M75 82       3  Impingement syndrome of left shoulder  M75 42           Start Time: 1055          Subjective: I had to do a lot of driving over the weekend & that really aggravated my shoulder  Objective: See treatment diary below        Date: 3/13  2/23 2/27 3/10/2023 / FOTO   Visit # 8  3 4 7   Manuals        L shld light mobilization post/inferior AD  Gr3 jt mob left gh jt  Inferior glide Gr 4 jt mob left G-H inferior glide ---   CFM to LHB (L) AD    AD (L)   L shld IR/ER MRE's  AD               Neuro Re-Ed        Scap squeezes  20x 5" holds against half foam 20x 5" holds against half foam  20x 20x hep   Standing shld ER RTB x 20 reps  RTB 20x 20x 20x RTB x10 reps (no monies)   Rows 20x blue TB B tubing 20x  20x Blue TB 20x   Shld ext 20c blue TB B tubing 20x  20x Blue TH 20x   Wall walks 3x10 sets YTB    YTB 3 x 10 sets                   Ther Ex        Pulleys 10x 20x  20x 10x   FIS ---- 20x 5" hilds  20x 10x   Mat: AAROM W cane -----  Flex 20x  ER 20x  ------   AROM punchouts prone 2# x 20 reps   2x20 #2 2x20 #2 20 x prone    SDLY ER 2# x 20 reps   #1 20x #2 20x ----   Prone rows / ext  2# x 20 reps each   #1 20x #2 20x 2# rows / ext                    Ther Activity                        Gait Training                        Modalities         MH x 8 minutes in supine    MH x 8 minutes in R S/L               Assessment: Tolerated treatment fair  Patient demonstrated fatigue post treatment, exhibited good technique with therapeutic exercises and would benefit from continued PT      Plan: Progress treatment as tolerated         Precautions: Standard

## 2023-03-14 ENCOUNTER — TELEPHONE (OUTPATIENT)
Dept: FAMILY MEDICINE CLINIC | Facility: CLINIC | Age: 68
End: 2023-03-14

## 2023-03-14 ENCOUNTER — OFFICE VISIT (OUTPATIENT)
Dept: PULMONOLOGY | Facility: MEDICAL CENTER | Age: 68
End: 2023-03-14

## 2023-03-14 DIAGNOSIS — J45.30 MILD PERSISTENT ASTHMA WITHOUT COMPLICATION: ICD-10-CM

## 2023-03-14 DIAGNOSIS — Z13.820 ENCOUNTER FOR OSTEOPOROSIS SCREENING IN ASYMPTOMATIC POSTMENOPAUSAL PATIENT: ICD-10-CM

## 2023-03-14 DIAGNOSIS — G47.33 OBSTRUCTIVE SLEEP APNEA: Primary | ICD-10-CM

## 2023-03-14 DIAGNOSIS — Z12.31 SCREENING MAMMOGRAM FOR BREAST CANCER: Primary | ICD-10-CM

## 2023-03-14 DIAGNOSIS — J30.1 SEASONAL ALLERGIC RHINITIS DUE TO POLLEN: ICD-10-CM

## 2023-03-14 DIAGNOSIS — Z78.0 ENCOUNTER FOR OSTEOPOROSIS SCREENING IN ASYMPTOMATIC POSTMENOPAUSAL PATIENT: ICD-10-CM

## 2023-03-14 PROBLEM — J44.1 CHRONIC OBSTRUCTIVE PULMONARY DISEASE WITH ACUTE EXACERBATION (HCC): Status: RESOLVED | Noted: 2019-06-04 | Resolved: 2023-03-14

## 2023-03-14 RX ORDER — FLUTICASONE FUROATE, UMECLIDINIUM BROMIDE AND VILANTEROL TRIFENATATE 100; 62.5; 25 UG/1; UG/1; UG/1
1 POWDER RESPIRATORY (INHALATION) DAILY
Qty: 60 BLISTER | Refills: 11 | Status: SHIPPED | OUTPATIENT
Start: 2023-03-14 | End: 2023-04-13

## 2023-03-14 NOTE — PROGRESS NOTES
Assessment/Plan        Problem List Items Addressed This Visit        Respiratory    Mild persistent asthma without complication     Doing well on her regimen of Wixela 250 mcg 1  puff BID  Still using sample of Trelegy 100 mcg 1 puff daily temporarily             Relevant Medications    fluticasone-umeclidinium-vilanterol (Trelegy Ellipta) 100-62 5-25 mcg/actuation inhaler    Allergic rhinitis     Not having and problems with allergies at this time  She uses claritin when needed  Does have some post nasal drainage         Obstructive sleep apnea - Primary     Mild obstructive sleep apnea with good compliance to CPAP therapy  She is having some irritation of the side of her face from the headgear  I did show her nasal pillow and headgear for this  Also I did recommend she [facility] to [facility] different mask and headgear  I reviewed compliance data from past month and she has been compliant using her CPAP  She average 6 hours of usage per night  Her CPAP is set at 6 cm water and this resulted in AHI of 1 5 which is satisfactory  She has a DreamStation CPAP machine  She has been using AirFit N10 mask  Her DME is [employer]              Cc: some irritation of face from headgear for CPAP mask      NOHELIA Ching has obstructive sleep apnea and had received a new CPAP in place machine last year for her auto DreamStation CPAP machine that was on recall  She is on set pressure of 6 cm of water and she has been using her CPAP 1 regular basis for average of 6 hours per night  I did review compliance data for past 1 month and her average AHI was 1 5 which is good  [place] [facility]   She does use nasal mask interface  Diagnostic study done April 2015 showed mild ERINN with AHI of 7 6 and is increased to 10 in the supine position  She also has mild persistent asthma  She does have nebulizer with albuterol at home  She also has seasonal allergies and does use Claritin  She has diabetes mellitus for which she takes Glucophage  Is using sample of Trelegy 100 mcg 1 puff daily and I gave her  She is feels this is about equivalent to Wixela 250 mcg 1 puff twice daily she had been on before  Right now not having any wheezing or shortness of breath  Not have much problem with her seasonal allergies at this time  She does use a nasal mask feels that the headgear may be irritating the skin on the sides of her face just above maxillary regions  She has an appointment on March 20 to see respiratory therapist at [facility] to see if there is alternative mask and headgear she can use  She did not have any excessive daytime somnolence or nocturnal dyspnea  No shortness of breath      Past Medical History:   Diagnosis Date   • Abnormal glucose     last assessed 2/25/16   • Arthritis    • Asthma     w/ exacerbation; last assessed 5/14/15   • Atypical nevi    • Atypical nevus     last assessed 1/18/17   • Breast lump     last assessed 3/6/14   • Cataract    • Cervical adenopathy     last assessed  2/7/17   • Colon polyp    • CPAP (continuous positive airway pressure) dependence    • Diverticulitis of colon    • Dizziness    • Dyslipidemia     last assessed 5/22/17   • Facial droop     last assessed  12/5/16   • Fibromyalgia, primary    • Flu 01/20/2018   • Foot abrasion     right between the 4th and 5th toe   • Fractures    • Genital herpes     resolved 9/1/16   • GERD (gastroesophageal reflux disease)    • Headache, tension-type    • Herpes zoster     last assessed 5/20/16   • History of shingles     may 2016   • History of stomach ulcers    • Hx of abnormal mammogram     last assessed  3/5/14   • Hyperlipidemia    • Myalgia     last assessed  11/21/12   • Myositis     11/21/12   • Neck pain    • Pain involving joints of fingers of both hands 01/19/2021   • Peripheral neuropathy    • Seborrheic keratosis    • Shingles    • Skin disorder    • Skin neoplasm     of the lower limb, including hip; onset 12; last assessed  12   • Sleep apnea    • Stomach disorder        Past Surgical History:   Procedure Laterality Date   • ABDOMINAL SURGERY      release of adhesions   • BLADDER SURGERY      mesh-lift   • BREAST CYST ASPIRATION Right     does not know the year   • BREAST SURGERY      lift   • CATARACT EXTRACTION Bilateral    •  SECTION      x [id number]   • CHOLECYSTECTOMY      lap   • COLONOSCOPY     • COSMETIC SURGERY      tummy and breast lift   • ESOPHAGOGASTRODUODENOSCOPY     • OOPHORECTOMY Left    • OTHER SURGICAL HISTORY      vocal cord surgery, scraping   • HI HYSTEROSCOPY BX ENDOMETRIUM&/POLYPC W/WO D&C N/A 2016    Procedure: DILATATION AND CURETTAGE (D&C) WITH HYSTEROSCOPY;  Surgeon: Sean Stokes MD;  Location: [address] [place] [address];  Service: Gynecology   • REDUCTION MAMMAPLASTY Bilateral    • TONSILLECTOMY     • TUBAL LIGATION  10/29/1984   • US GUIDED MSK PROCEDURE  2021         Current Outpatient Medications:   •  albuterol (2 5 mg/3 mL) 0 083 % nebulizer solution, Take 3 mL (2 5 mg total) by nebulization every 6 (six) hours as needed for wheezing or shortness of breath, Disp: 180 mL, Rfl: 5  •  Ascorbic Acid (VITAMIN C) 1000 MG tablet, , Disp: , Rfl:   •  atorvastatin (LIPITOR) 20 mg tablet, TAKE ONE TABLET BY MOUTH EVERY DAY AT BEDTIME, Disp: 90 tablet, Rfl: 1  •  cholecalciferol (VITAMIN D3) 1,000 units tablet, Take 2,000 Units by mouth daily, Disp: , Rfl:   •  clobetasol (TEMOVATE) 0 05 % cream, Apply topically 2 (two) times a day Apply topically on scalp and right shoulder two times a day, Disp: 60 g, Rfl: 2  •  famotidine (PEPCID) 40 MG tablet, TAKE ONE TABLET BY MOUTH AT BEDTIME, Disp: 90 tablet, Rfl: 0  •  fluticasone-umeclidinium-vilanterol (Trelegy Ellipta) 100-62 5-25 mcg/actuation inhaler, Inhale 1 puff daily Rinse mouth after use , Disp: 60 blister, Rfl: 11  •  guaiFENesin (MUCINEX) 600 mg 12 hr tablet, Take 1,200 mg by mouth every 12 (twelve) hours, Disp: , Rfl:   •  hydrocortisone 2 5 % cream, 2 (two) times a day scalp, Disp: , Rfl:   •  Loratadine (CLARITIN PO), Take by mouth 2 (two) times a day as needed, Disp: , Rfl:   •  metFORMIN (GLUCOPHAGE-XR) 500 mg 24 hr tablet, TAKE ONE TABLET BY MOUTH EVERY DAY WITH DINNER, Disp: 90 tablet, Rfl: 2  •  methocarbamol (ROBAXIN) 500 mg tablet, Take 1 tablet (500 mg total) by mouth 2 (two) times a day, Disp: 20 tablet, Rfl: 0  •  montelukast (SINGULAIR) 10 mg tablet, TAKE ONE TABLET BY MOUTH EVERY DAY, Disp: 60 tablet, Rfl: 5  •  multivitamin (THERAGRAN) TABS, Take 1 tablet by mouth daily Last dose 3/21/21, Disp: , Rfl:   •  mupirocin (BACTROBAN) 2 % ointment, Apply topically 3 (three) times a day, Disp: 22 g, Rfl: 0  •  pantoprazole (PROTONIX) 40 mg tablet, TAKE ONE TABLET BY MOUTH EVERY DAY, Disp: 30 tablet, Rfl: 2  •  triamcinolone (KENALOG) 0 025 % cream, Apply topically 2 (two) times a day, Disp: 80 g, Rfl: 5  •  cyclobenzaprine (FLEXERIL) 10 mg tablet, Take 10 mg by mouth 3 (three) times a day as needed for muscle spasms (as needed for pain)   (Patient not taking: Reported on 3/14/2023), Disp: , Rfl:   •  Fluticasone-Salmeterol (Advair Diskus) 250-50 mcg/dose inhaler, Inhale 1 puff every morning Rinse mouth after use , Disp: 60 blister, Rfl: 5  •  ibuprofen (MOTRIN) 400 mg tablet, Take 1 tablet (400 mg total) by mouth every 6 (six) hours as needed for moderate pain for up to 14 days, Disp: 498321 tablet, Rfl: 0  •  meloxicam (Mobic) 15 mg tablet, Take 1 tablet (15 mg total) by mouth daily (Patient not taking: Reported on 3/14/2023), Disp: 30 tablet, Rfl: 0    Allergies   Allergen Reactions   • Latex Rash     Burn-skin irritation   • Adhesive [Medical Tape] Other (See Comments)     bandaid-red,itch       Social History     Tobacco Use   • Smoking status: Never   • Smokeless tobacco: Never   Substance Use Topics   • Alcohol use: No         Family History   Problem Relation Age of Onset   • Hypertension Mother    • Alzheimer's disease Mother    • Arthritis Mother    • Hypertension Father    • Arthritis Father    • Heart attack Father    • Heart disease Father    • Stroke Father    • Other Brother         vertigo, tinnitus   • Diabetes Daughter    • Breast cancer Sister 28   • Skin cancer Sister    • Cancer Sister    • Other Son         downs syndrome   • Abdominal aortic aneurysm Sister    • Cancer Sister         T cell sarcoma   • Breast cancer Sister    • No Known Problems Brother    • Diabetes Brother    • Other Sister         anemia from the diet   • No Known Problems Son    • No Known Problems Maternal Aunt    • No Known Problems Maternal Aunt    • No Known Problems Paternal Aunt    • No Known Problems Paternal Aunt    • No Known Problems Paternal Aunt    • Asthma Sister        Review of Systems   Constitutional: Negative for appetite change and fever  HENT: Positive for ear pain, postnasal drip and sneezing  Negative for rhinorrhea, sore throat and trouble swallowing  Eyes: Positive for redness  Respiratory: Positive for cough  Negative for shortness of breath  Cardiovascular: Negative for chest pain  Gastrointestinal: Negative for abdominal pain  Endocrine: Negative for polydipsia and polyphagia  Musculoskeletal: Negative for myalgias  Neurological: Positive for headaches  Vitals:    03/14/23 1010   BP: 118/74   Pulse: 80   Temp: 98 °F (36 7 °C)   SpO2: 96%           There is no height or weight on file to calculate BMI  Weight (last 2 days)     None              Physical Exam  Vitals and nursing note reviewed  Constitutional:       General: She is not in acute distress  Appearance: Normal appearance  She is well-developed  HENT:      Head: Normocephalic and atraumatic  Comments: Has some minimal redness along the right lateral sides of face  No papules or other lesions       Right Ear: External ear normal       Left Ear: External ear normal       Nose: Nose normal       Mouth/Throat:      Mouth: Mucous membranes are moist    Eyes:      Conjunctiva/sclera: Conjunctivae normal    Cardiovascular:      Rate and Rhythm: Normal rate and regular rhythm  Heart sounds: No murmur heard  Pulmonary:      Effort: Pulmonary effort is normal  No respiratory distress  Breath sounds: Normal breath sounds  Comments: Lung sounds are clear  No wheezes, crackles or rhonchi  Abdominal:      Palpations: Abdomen is soft  Tenderness: There is no abdominal tenderness  Musculoskeletal:         General: No swelling  Cervical back: Neck supple  Comments: No cyanosis   Skin:     General: Skin is warm and dry  Capillary Refill: Capillary refill takes less than 2 seconds  Neurological:      General: No focal deficit present  Mental Status: She is alert     Psychiatric:         Mood and Affect: Mood normal                   Answers for HPI/ROS submitted by the patient on 3/7/2023  Chronicity: new  When did you first notice your symptoms?: yesterday  How often do your symptoms occur?: 2 to 4 times per day  Since you first noticed this problem, how has it changed?: gradually improving  Do you have shortness of breath that occurs with effort or exertion?: No  Do you have ear congestion?: Yes  Do you have heartburn?: No  Do you have fatigue?: No  Do you have nasal congestion?: Yes  Do you have shortness of breath when lying flat?: No  Do you have shortness of breath when you wake up?: No  Do you have sweats?: No  Have you experienced weight loss?: No  Which of the following makes your symptoms worse?: exposure to fumes, exposure to smoke  Which of the following makes your symptoms better?: OTC cough suppressant

## 2023-03-15 ENCOUNTER — OFFICE VISIT (OUTPATIENT)
Dept: PHYSICAL THERAPY | Facility: CLINIC | Age: 68
End: 2023-03-15

## 2023-03-15 VITALS
TEMPERATURE: 98 F | OXYGEN SATURATION: 96 % | DIASTOLIC BLOOD PRESSURE: 74 MMHG | HEART RATE: 80 BPM | SYSTOLIC BLOOD PRESSURE: 118 MMHG

## 2023-03-15 DIAGNOSIS — M75.42 IMPINGEMENT SYNDROME OF LEFT SHOULDER: ICD-10-CM

## 2023-03-15 DIAGNOSIS — M75.82 TENDINITIS OF LEFT ROTATOR CUFF: ICD-10-CM

## 2023-03-15 DIAGNOSIS — M25.512 ACUTE PAIN OF LEFT SHOULDER: Primary | ICD-10-CM

## 2023-03-15 NOTE — ASSESSMENT & PLAN NOTE
Not having and problems with allergies at this time  She uses claritin when needed    Does have some post nasal drainage

## 2023-03-15 NOTE — ASSESSMENT & PLAN NOTE
Doing well on her regimen of Wixela 250 mcg 1  puff BID    Still using sample of Trelegy 100 mcg 1 puff daily temporarily

## 2023-03-15 NOTE — PROGRESS NOTES
Daily Note     Today's date: 3/15/2023  Patient name: Ibis Smith  : 1955  MRN: [id number]  Referring provider: JESUS Glover*  Dx:   Encounter Diagnosis     ICD-10-CM    1  Acute pain of left shoulder  M25 512       2  Tendinitis of left rotator cuff  M75 82       3  Impingement syndrome of left shoulder  M75 42           Start Time: 1055          Subjective: My L shoulder still feels weak  Objective: See treatment diary below            Date: 3/13 3/15  2/27 3/10/2023 / FOTO   Visit # 8 9  4 7   Manuals        L shld light mobilization post/inferior AD AD   ---   CFM to LHB (L) AD AD   AD (L)   L shld IR/ER MRE's  AD AD              Neuro Re-Ed        Scap squeezes  20x 5" holds against half foam 10x 5 sec hold (seated/supine)  20x hep   Standing shld ER RTB x 20 reps  RTB 20x  20x RTB x10 reps (no monies)   Rows 20x blue TB Blue TB 20x  20x Blue TB 20x   Shld ext 20c blue TB Blue TB 20x  20x Blue TH 20x   Wall walks 3x10 sets YTB 3x10 sets YTB   YTB 3 x 10 sets                   Ther Ex        Pulleys 10x 20x  20x 10x   FIS ---- 20x 5" hold  20x 10x   Mat: DEIDRE KING cane -----    ------   AROM punchouts prone 2# x 20 reps  3# x 20 reps   #2 2x20 #2 20 x prone    SDLY ER 2# x 20 reps  2# x 20 reps   #2 20x ----   Prone rows / ext  2# x 20 reps each  3# x 20 reps each   #2 20x 2# rows / ext                    Ther Activity                        Gait Training                        Modalities         MH x 8 minutes in supine MH x 8 minutes in supine   MH x 8 minutes in R S/L               Assessment: Tolerated treatment fair  Patient demonstrated fatigue post treatment, exhibited good technique with therapeutic exercises and would benefit from continued PT      Plan: Progress treatment as tolerated         Precautions: Standard

## 2023-03-15 NOTE — ASSESSMENT & PLAN NOTE
Mild obstructive sleep apnea with good compliance to CPAP therapy  She is having some irritation of the side of her face from the headgear  I did show her nasal pillow and headgear for this  Also I did recommend she contact her DME [facility] to look at different mask and headgear  I reviewed compliance data from past month and she has been compliant using her CPAP  She average 6 hours of usage per night  Her CPAP is set at 6 cm water and this resulted in AHI of 1 5 which is satisfactory  She has a DreamStation CPAP machine  She has been using AirFit N10 mask    Her DME is [employer]

## 2023-03-15 NOTE — TELEPHONE ENCOUNTER
Called and spoke with pt  She is aware both test are ordered in her chart  She has her Mammo scheduled for May  She will call to schedule bone density test now

## 2023-03-20 ENCOUNTER — OFFICE VISIT (OUTPATIENT)
Dept: PHYSICAL THERAPY | Facility: CLINIC | Age: 68
End: 2023-03-20

## 2023-03-20 ENCOUNTER — TELEPHONE (OUTPATIENT)
Dept: FAMILY MEDICINE CLINIC | Facility: CLINIC | Age: 68
End: 2023-03-20

## 2023-03-20 DIAGNOSIS — M75.82 TENDINITIS OF LEFT ROTATOR CUFF: ICD-10-CM

## 2023-03-20 DIAGNOSIS — M25.512 ACUTE PAIN OF LEFT SHOULDER: Primary | ICD-10-CM

## 2023-03-20 DIAGNOSIS — M75.42 IMPINGEMENT SYNDROME OF LEFT SHOULDER: ICD-10-CM

## 2023-03-20 NOTE — PROGRESS NOTES
Daily Note     Today's date: 3/20/2023  Patient name: Jessee Jeffers  : 1955  MRN: [id number]  Referring provider: JESUS Isidro*  Dx:   Encounter Diagnosis     ICD-10-CM    1  Acute pain of left shoulder  M25 512       2  Tendinitis of left rotator cuff  M75 82       3  Impingement syndrome of left shoulder  M75 42           Start Time: 1050          Subjective: Patient reports L shoulder deltoid area pain this morning, "but I was very active over the weekend "      Objective: See treatment diary below          Date: 3/13 3/15 3/20  3/10/2023 / FOTO   Visit # 8 9 10  7   Manuals        L shld light mobilization post/inferior AD AD AD  ---   CFM to LHB (L) AD AD AD  AD (L)   L shld IR/ER MRE's  AD AD AD             Neuro Re-Ed        Scap squeezes  20x 5" holds against half foam 10x 5 sec hold (seated/supine) 10x 5 sec hold (supine) 20x hep   Standing shld ER RTB x 20 reps  RTB 20x RTB 20x 20x RTB x10 reps (no monies)   Rows 20x blue TB Blue TB 20x Blue TB 20x 20x Blue TB 20x   Shld ext 20c blue TB Blue TB 20x Blue TB 20x 20x Blue TH 20x   Wall walks 3x10 sets YTB 3x10 sets YTB 2x10 sets YTB  YTB 3 x 10 sets                   Ther Ex        Pulleys 10x 20x 20x 20x 10x   FIS ---- 20x 5" hold 20x 20x 10x   Mat: DEIDRE KING cane -----  ---  ------   AROM punchouts prone 2# x 20 reps  3# x 20 reps  3# x 20 reps  #2 2x20 #2 20 x prone    SDLY ER 2# x 20 reps  2# x 20 reps  2# x 20 reps  #2 20x ----   Prone rows / ext  2# x 20 reps each  3# x 20 reps each  3# x 20 reps each #2 20x 2# rows / ext       Supine shoulder flexion holding red ball x 10 reps to 90 degrees  Ther Activity                        Gait Training                        Modalities         MH x 8 minutes in supine MH x 8 minutes in supine MH x 8 minutes in supine  MH x 8 minutes in R S/L               Assessment: Tolerated treatment fair   Patient demonstrated fatigue post treatment, exhibited good technique with therapeutic exercises and would benefit from continued PT      Plan: Progress treatment as tolerated         Precautions: Standard

## 2023-03-20 NOTE — TELEPHONE ENCOUNTER
Infectious Disease doctor she was referred to in [place] does not take patients insurance  She asks for an referral to see a doctor located at [facility] on [address] in [place]  Please inform her when she can call for apt  Office will not schedule apt until she has a referral to the doctor

## 2023-03-23 DIAGNOSIS — R21 RASH AND NONSPECIFIC SKIN ERUPTION: Primary | ICD-10-CM

## 2023-03-23 NOTE — TELEPHONE ENCOUNTER
I spoke to pt  She said she is having no other sx  She stated she just needs you to write an order for her to see ID so they will schedule her an appt to be seen  Pt stated she has tried 6 different creams from derm and nothing has helped

## 2023-03-23 NOTE — TELEPHONE ENCOUNTER
Please confirm current sxs --she was having recurrent skin rashes--is she having any other sxs at this time-eg fever, respiratory,gi--etc-

## 2023-03-24 ENCOUNTER — EVALUATION (OUTPATIENT)
Dept: PHYSICAL THERAPY | Facility: CLINIC | Age: 68
End: 2023-03-24

## 2023-03-24 DIAGNOSIS — M75.82 TENDINITIS OF LEFT ROTATOR CUFF: ICD-10-CM

## 2023-03-24 DIAGNOSIS — M75.42 IMPINGEMENT SYNDROME OF LEFT SHOULDER: ICD-10-CM

## 2023-03-24 DIAGNOSIS — M25.512 ACUTE PAIN OF LEFT SHOULDER: Primary | ICD-10-CM

## 2023-03-24 NOTE — PROGRESS NOTES
Daily Note     Today's date: 3/24/2023  Patient name: Ladonna Marroquin  : 1955  MRN: [id number]  Referring provider: Virlinda Landau, PA*  Dx:   Encounter Diagnosis     ICD-10-CM    1  Acute pain of left shoulder  M25 512       2  Tendinitis of left rotator cuff  M75 82       3  Impingement syndrome of left shoulder  M75 42           Start Time: 1057          Subjective: Patient reports continued anterior L shoulder pain         Objective: See treatment diary below  Active Range of Motion   Left Shoulder   Flexion: 170 degrees  Abduction: 150 degrees   External rotation 0°: 80 degrees   Internal rotation 0°: 40 degrees with pain    Right Shoulder   Flexion: 180 degrees   Abduction: 175 degrees   External rotation 0°: 80 degrees   Internal rotation 0°: 40 degrees     Strength/Myotome Testing     Left Shoulder     Planes of Motion   Flexion: 4   Abduction: 4  External rotation at 0°: 4   Internal rotation at 0°: 4     Right Shoulder     Planes of Motion   Flexion: 5   Abduction: 5   External rotation at 0°: 5   Internal rotation at 0°: 5         Date: 3/13 3/15 3/24  3/10/2023 / FOTO   Visit # 8 9 11  7   Manuals        L shld light mobilization post/inferior AD AD AD  ---   CFM to LHB (L) AD AD AD  AD (L)   L shld IR/ER MRE's  AD AD AD             Neuro Re-Ed        Scap squeezes  20x 5" holds against half foam 10x 5 sec hold (seated/supine) 10x 5 sec hold (supine)  hep   Standing shld ER RTB x 20 reps  RTB 20x RTB 20x  RTB x10 reps (no monies)   Rows 20x blue TB Blue TB 20x Blue TB 20x  Blue TB 20x   Shld ext 20c blue TB Blue TB 20x Blue TB 20x  Blue TH 20x   Wall walks 3x10 sets YTB 3x10 sets YTB 2x10 sets YTB  YTB 3 x 10 sets                   Ther Ex        Pulleys 10x 20x 20x  10x   FIS ---- 20x 5" hold 20x  10x   Mat: DEIDRE bonds -----  ---  ------   AROM punchouts prone 2# x 20 reps  3# x 20 reps  3# x 20 reps   #2 20 x prone    SDLY ER 2# x 20 reps  2# x 20 reps  2# x 20 reps   ----   Prone rows / ext  2# x 20 reps each  3# x 20 reps each  3# x 20 reps each  2# rows / ext       Supine shoulder flexion holding red ball x 10 reps to 90 degrees  Ther Activity                        Gait Training                        Modalities         MH x 8 minutes in supine MH x 8 minutes in supine -----  MH x 8 minutes in R S/L             Assessment: Tolerated treatment fair  Patient demonstrated fatigue post treatment and would benefit from continued PT     + TTP L shoulder AC joint, biceps tendon (proximal), bicipital groove and supraspinatus tendon  Plan: Progress treatment as tolerated         Precautions: Standard

## 2023-03-25 ENCOUNTER — HOSPITAL ENCOUNTER (EMERGENCY)
Facility: HOSPITAL | Age: 68
Discharge: HOME | End: 2023-03-26
Attending: EMERGENCY MEDICINE

## 2023-03-25 DIAGNOSIS — S01.01XA LACERATION OF SCALP, INITIAL ENCOUNTER: ICD-10-CM

## 2023-03-25 DIAGNOSIS — R19.7 VOMITING AND DIARRHEA: Primary | ICD-10-CM

## 2023-03-25 DIAGNOSIS — R11.10 VOMITING AND DIARRHEA: Primary | ICD-10-CM

## 2023-03-25 DIAGNOSIS — S09.90XA INJURY OF HEAD, INITIAL ENCOUNTER: ICD-10-CM

## 2023-03-26 ENCOUNTER — APPOINTMENT (EMERGENCY)
Dept: RADIOLOGY | Facility: HOSPITAL | Age: 68
End: 2023-03-26

## 2023-03-26 VITALS
OXYGEN SATURATION: 94 % | DIASTOLIC BLOOD PRESSURE: 64 MMHG | TEMPERATURE: 98.3 F | HEART RATE: 94 BPM | SYSTOLIC BLOOD PRESSURE: 117 MMHG | RESPIRATION RATE: 16 BRPM

## 2023-03-26 LAB
ALBUMIN SERPL BCP-MCNC: 4.7 G/DL (ref 3.5–5)
ALP SERPL-CCNC: 73 U/L (ref 34–104)
ALT SERPL W P-5'-P-CCNC: 37 U/L (ref 7–52)
ANION GAP SERPL CALCULATED.3IONS-SCNC: 10 MMOL/L (ref 4–13)
AST SERPL W P-5'-P-CCNC: 25 U/L (ref 13–39)
BASOPHILS # BLD AUTO: 0.05 THOUSANDS/ÂΜL (ref 0–0.1)
BASOPHILS NFR BLD AUTO: 0 % (ref 0–1)
BILIRUB SERPL-MCNC: 0.49 MG/DL (ref 0.2–1)
BUN SERPL-MCNC: 18 MG/DL (ref 5–25)
CALCIUM SERPL-MCNC: 9.9 MG/DL (ref 8.4–10.2)
CHLORIDE SERPL-SCNC: 102 MMOL/L (ref 96–108)
CO2 SERPL-SCNC: 24 MMOL/L (ref 21–32)
CREAT SERPL-MCNC: 0.94 MG/DL (ref 0.6–1.3)
EOSINOPHIL # BLD AUTO: 0.15 THOUSAND/ÂΜL (ref 0–0.61)
EOSINOPHIL NFR BLD AUTO: 1 % (ref 0–6)
ERYTHROCYTE [DISTWIDTH] IN BLOOD BY AUTOMATED COUNT: 12.7 % (ref 11.6–15.1)
GFR SERPL CREATININE-BSD FRML MDRD: 62 ML/MIN/1.73SQ M
GLUCOSE SERPL-MCNC: 185 MG/DL (ref 65–140)
HCT VFR BLD AUTO: 46.4 % (ref 34.8–46.1)
HGB BLD-MCNC: 15.4 G/DL (ref 11.5–15.4)
IMM GRANULOCYTES # BLD AUTO: 0.1 THOUSAND/UL (ref 0–0.2)
IMM GRANULOCYTES NFR BLD AUTO: 1 % (ref 0–2)
LIPASE SERPL-CCNC: 18 U/L (ref 11–82)
LYMPHOCYTES # BLD AUTO: 1.32 THOUSANDS/ÂΜL (ref 0.6–4.47)
LYMPHOCYTES NFR BLD AUTO: 8 % (ref 14–44)
MCH RBC QN AUTO: 31.8 PG (ref 26.8–34.3)
MCHC RBC AUTO-ENTMCNC: 33.2 G/DL (ref 31.4–37.4)
MCV RBC AUTO: 96 FL (ref 82–98)
MONOCYTES # BLD AUTO: 1.5 THOUSAND/ÂΜL (ref 0.17–1.22)
MONOCYTES NFR BLD AUTO: 9 % (ref 4–12)
NEUTROPHILS # BLD AUTO: 13.2 THOUSANDS/ÂΜL (ref 1.85–7.62)
NEUTS SEG NFR BLD AUTO: 81 % (ref 43–75)
NRBC BLD AUTO-RTO: 0 /100 WBCS
PLATELET # BLD AUTO: 331 THOUSANDS/UL (ref 149–390)
PMV BLD AUTO: 9.9 FL (ref 8.9–12.7)
POTASSIUM SERPL-SCNC: 4 MMOL/L (ref 3.5–5.3)
PROT SERPL-MCNC: 8.3 G/DL (ref 6.4–8.4)
RBC # BLD AUTO: 4.85 MILLION/UL (ref 3.81–5.12)
SODIUM SERPL-SCNC: 136 MMOL/L (ref 135–147)
WBC # BLD AUTO: 16.32 THOUSAND/UL (ref 4.31–10.16)

## 2023-03-26 RX ORDER — ONDANSETRON 2 MG/ML
4 INJECTION INTRAMUSCULAR; INTRAVENOUS ONCE
Status: COMPLETED | OUTPATIENT
Start: 2023-03-26 | End: 2023-03-26

## 2023-03-26 RX ORDER — ONDANSETRON 4 MG/1
4 TABLET, FILM COATED ORAL EVERY 6 HOURS
Qty: 12 TABLET | Refills: 0 | Status: SHIPPED | OUTPATIENT
Start: 2023-03-26 | End: 2023-04-07

## 2023-03-26 RX ADMIN — IOHEXOL 100 ML: 350 INJECTION, SOLUTION INTRAVENOUS at 01:51

## 2023-03-26 RX ADMIN — ONDANSETRON 4 MG: 2 INJECTION INTRAMUSCULAR; INTRAVENOUS at 00:15

## 2023-03-26 NOTE — ED NOTES
Pt returned from ct     Jami Tejeda, [facility]  03/26/23 7123

## 2023-03-27 ENCOUNTER — APPOINTMENT (OUTPATIENT)
Dept: PHYSICAL THERAPY | Facility: CLINIC | Age: 68
End: 2023-03-27

## 2023-03-27 LAB
ATRIAL RATE: 91 BPM
P AXIS: 23 DEGREES
PR INTERVAL: 152 MS
QRS AXIS: -23 DEGREES
QRSD INTERVAL: 78 MS
QT INTERVAL: 350 MS
QTC INTERVAL: 430 MS
T WAVE AXIS: 25 DEGREES
VENTRICULAR RATE: 91 BPM

## 2023-03-27 NOTE — ED PROVIDER NOTES
History  Chief Complaint   Patient presents with   • Vomiting     Pt arrives from home with NVD starting 8pm, reports that nausea started this morning and vomiting and diarrhea started at 8pm  Also reports that she started with dizziness afterward and fell twice, hitting right side of head  Laceration to right side of scalp  38-year-old female presents to the emergency department for evaluation of a little over 24 hours of nausea vomiting diarrhea  Patient states that she had at least 8 nonbloody nonbilious bowel movements and 2 episodes of nonprojectile vomiting  She states that she has been unable to keep anything down  Patient states that she got slightly lightheaded with position change, lost her balance and fell striking her head on the floor  She denies LOC, seizure or vomiting after the incident  Patient does not take any blood thinners or antiplatelet agents  She denies any other systemic symptoms at this time  History provided by:  Patient   used: No        Prior to Admission Medications   Prescriptions Last Dose Informant Patient Reported? Taking? Ascorbic Acid (VITAMIN C) 1000 MG tablet   Yes No   Fluticasone-Salmeterol (Advair Diskus) 250-50 mcg/dose inhaler   No No   Sig: Inhale 1 puff every morning Rinse mouth after use     Loratadine (CLARITIN PO)   Yes No   Sig: Take by mouth 2 (two) times a day as needed   albuterol (2 5 mg/3 mL) 0 083 % nebulizer solution   No No   Sig: Take 3 mL (2 5 mg total) by nebulization every 6 (six) hours as needed for wheezing or shortness of breath   atorvastatin (LIPITOR) 20 mg tablet   No No   Sig: TAKE ONE TABLET BY MOUTH EVERY DAY AT BEDTIME   cholecalciferol (VITAMIN D3) 1,000 units tablet   Yes No   Sig: Take 2,000 Units by mouth daily   clobetasol (TEMOVATE) 0 05 % cream   No No   Sig: Apply topically 2 (two) times a day Apply topically on scalp and right shoulder two times a day   cyclobenzaprine (FLEXERIL) 10 mg tablet   Yes No   Sig: Take 10 mg by mouth 3 (three) times a day as needed for muscle spasms (as needed for pain)     Patient not taking: Reported on 3/14/2023   famotidine (PEPCID) 40 MG tablet   No No   Sig: TAKE ONE TABLET BY MOUTH AT BEDTIME   fluticasone-umeclidinium-vilanterol (Trelegy Ellipta) 100-62 5-25 mcg/actuation inhaler   No No   Sig: Inhale 1 puff daily Rinse mouth after use     guaiFENesin (MUCINEX) 600 mg 12 hr tablet   Yes No   Sig: Take 1,200 mg by mouth every 12 (twelve) hours   hydrocortisone 2 5 % cream   Yes No   Si (two) times a day scalp   ibuprofen (MOTRIN) 400 mg tablet   No No   Sig: Take 1 tablet (400 mg total) by mouth every 6 (six) hours as needed for moderate pain for up to 14 days   meloxicam (Mobic) 15 mg tablet   No No   Sig: Take 1 tablet (15 mg total) by mouth daily   Patient not taking: Reported on 3/14/2023   metFORMIN (GLUCOPHAGE-XR) 500 mg 24 hr tablet   No No   Sig: TAKE ONE TABLET BY MOUTH EVERY DAY WITH DINNER   methocarbamol (ROBAXIN) 500 mg tablet   No No   Sig: Take 1 tablet (500 mg total) by mouth 2 (two) times a day   montelukast (SINGULAIR) 10 mg tablet   No No   Sig: TAKE ONE TABLET BY MOUTH EVERY DAY   multivitamin (THERAGRAN) TABS   Yes No   Sig: Take 1 tablet by mouth daily Last dose 3/21/21   mupirocin (BACTROBAN) 2 % ointment   No No   Sig: Apply topically 3 (three) times a day   pantoprazole (PROTONIX) 40 mg tablet   No No   Sig: TAKE ONE TABLET BY MOUTH EVERY DAY   triamcinolone (KENALOG) 0 025 % cream   No No   Sig: Apply topically 2 (two) times a day      Facility-Administered Medications: None       Past Medical History:   Diagnosis Date   • Abnormal glucose     last assessed 16   • Arthritis    • Asthma     w/ exacerbation; last assessed 5/14/15   • Atypical nevi    • Atypical nevus     last assessed 17   • Breast lump     last assessed 3/6/14   • Cataract    • Cervical adenopathy     last assessed  17   • Colon polyp    • CPAP (continuous positive airway pressure) dependence    • Diverticulitis of colon    • Dizziness    • Dyslipidemia     last assessed 17   • Facial droop     last assessed  16   • Fibromyalgia, primary    • Flu 2018   • Foot abrasion     right between the 4th and 5th toe   • Fractures    • Genital herpes     resolved 16   • GERD (gastroesophageal reflux disease)    • Headache, tension-type    • Herpes zoster     last assessed 16   • History of shingles     may 2016   • History of stomach ulcers    • Hx of abnormal mammogram     last assessed  3/5/14   • Hyperlipidemia    • Myalgia     last assessed  12   • Myositis     12   • Neck pain    • Pain involving joints of fingers of both hands 2021   • Peripheral neuropathy    • Seborrheic keratosis    • Shingles    • Skin disorder    • Skin neoplasm     of the lower limb, including hip; onset 12; last assessed  12   • Sleep apnea    • Stomach disorder        Past Surgical History:   Procedure Laterality Date   • ABDOMINAL SURGERY      release of adhesions   • BLADDER SURGERY      mesh-lift   • BREAST CYST ASPIRATION Right     does not know the year   • BREAST SURGERY      lift   • CATARACT EXTRACTION Bilateral    •  SECTION      x [phone]   • CHOLECYSTECTOMY      lap   • COLONOSCOPY     • COSMETIC SURGERY      tummy and breast lift   • ESOPHAGOGASTRODUODENOSCOPY     • OOPHORECTOMY Left    • OTHER SURGICAL HISTORY      vocal cord surgery, scraping   • VA HYSTEROSCOPY BX ENDOMETRIUM&/POLYPC W/WO D&C N/A 2016    Procedure: DILATATION AND CURETTAGE (D&C) WITH HYSTEROSCOPY;  Surgeon: Louise Harada, MD;  Location: [address] [place] [address];  Service: Gynecology   • REDUCTION MAMMAPLASTY Bilateral    • TONSILLECTOMY     • TUBAL LIGATION  10/29/1984   • Cindy 634 MSK PROCEDURE  2021       Family History   Problem Relation Age of Onset   • Hypertension Mother    • Alzheimer's disease Mother    • Arthritis Mother    • Hypertension Father • Arthritis Father    • Heart attack Father    • Heart disease Father    • Stroke Father    • Other Brother         vertigo, tinnitus   • Diabetes Daughter    • Breast cancer Sister 28   • Skin cancer Sister    • Cancer Sister    • Other Son         downs syndrome   • Abdominal aortic aneurysm Sister    • Cancer Sister         T cell sarcoma   • Breast cancer Sister    • No Known Problems Brother    • Diabetes Brother    • Other Sister         anemia from the diet   • No Known Problems Son    • No Known Problems Maternal Aunt    • No Known Problems Maternal Aunt    • No Known Problems Paternal Aunt    • No Known Problems Paternal Aunt    • No Known Problems Paternal Aunt    • Asthma Sister      I have reviewed and agree with the history as documented  E-Cigarette/Vaping   • E-Cigarette Use Never User      E-Cigarette/Vaping Substances   • Nicotine No    • THC No    • CBD No    • Flavoring No    • Other No    • Unknown No      Social History     Tobacco Use   • Smoking status: Never   • Smokeless tobacco: Never   Vaping Use   • Vaping Use: Never used   Substance Use Topics   • Alcohol use: No   • Drug use: No       Review of Systems   Constitutional: Negative for fever  Respiratory: Negative for shortness of breath  Cardiovascular: Negative for chest pain  Gastrointestinal: Positive for abdominal pain, diarrhea, nausea and vomiting  Genitourinary: Negative for dysuria  Musculoskeletal: Negative for back pain  Skin: Negative for color change  Neurological: Positive for dizziness and light-headedness  All other systems reviewed and are negative  Physical Exam  Physical Exam  Vitals and nursing note reviewed  Constitutional:       General: She is not in acute distress  Appearance: She is well-developed  HENT:      Head: Normocephalic  Laceration present  Comments: Less than 1 5 cm superficial laceration to the right temporal area    No active bleeding no foreign body notedSmall laceration to the right side of her head  Superficial , not bleeding     Mouth/Throat:      Mouth: Mucous membranes are moist    Eyes:      Conjunctiva/sclera: Conjunctivae normal    Cardiovascular:      Rate and Rhythm: Normal rate and regular rhythm  Pulses: Normal pulses  Heart sounds: No murmur heard  Pulmonary:      Effort: Pulmonary effort is normal  No respiratory distress  Breath sounds: Normal breath sounds  Abdominal:      General: Bowel sounds are normal  There is no distension  Palpations: Abdomen is soft  Tenderness: There is no abdominal tenderness  Hernia: No hernia is present  Musculoskeletal:         General: No swelling  Cervical back: Neck supple  No rigidity  Skin:     General: Skin is warm and dry  Capillary Refill: Capillary refill takes less than 2 seconds  Neurological:      General: No focal deficit present  Mental Status: She is alert and oriented to person, place, and time  Mental status is at baseline  Cranial Nerves: No cranial nerve deficit  Sensory: No sensory deficit  Motor: No weakness     Psychiatric:         Mood and Affect: Mood normal          Vital Signs  ED Triage Vitals [03/26/23 0001]   Temperature Pulse Respirations Blood Pressure SpO2   98 3 °F (36 8 °C) 79 16 136/60 95 %      Temp Source Heart Rate Source Patient Position - Orthostatic VS BP Location FiO2 (%)   Oral Monitor Lying Left arm --      Pain Score       --           Vitals:    03/26/23 0001 03/26/23 0230 03/26/23 0330 03/26/23 0345   BP: 136/60 116/56 115/58 117/64   Pulse: 79 76 96 94   Patient Position - Orthostatic VS: Lying            Visual Acuity      ED Medications  Medications   ondansetron (ZOFRAN) injection 4 mg (4 mg Intravenous Given 3/26/23 0015)   iohexol (OMNIPAQUE) 350 MG/ML injection (SINGLE-DOSE) 100 mL (100 mL Intravenous Given 3/26/23 0151)       Diagnostic Studies  Results Reviewed     Procedure Component Value Units Date/Time    Lipase [[id number]]  (Normal) Collected: 03/26/23 0016    Lab Status: Final result Specimen: Blood from Arm, Left Updated: 03/26/23 0048     Lipase 18 u/L     Comprehensive metabolic panel [[id number]]  (Abnormal) Collected: 03/26/23 0016    Lab Status: Final result Specimen: Blood from Arm, Left Updated: 03/26/23 0048     Sodium 136 mmol/L      Potassium 4 0 mmol/L      Chloride 102 mmol/L      CO2 24 mmol/L      ANION GAP 10 mmol/L      BUN 18 mg/dL      Creatinine 0 94 mg/dL      Glucose 185 mg/dL      Calcium 9 9 mg/dL      AST 25 U/L      ALT 37 U/L      Alkaline Phosphatase 73 U/L      Total Protein 8 3 g/dL      Albumin 4 7 g/dL      Total Bilirubin 0 49 mg/dL      eGFR 62 ml/min/1 73sq m     Narrative:      [place] guidelines for Chronic Kidney Disease (CKD):   •  Stage 1 with normal or high GFR (GFR > 90 mL/min/1 73 square meters)  •  Stage 2 Mild CKD (GFR = 60-89 mL/min/1 73 square meters)  •  Stage 3A Moderate CKD (GFR = 45-59 mL/min/1 73 square meters)  •  Stage 3B Moderate CKD (GFR = 30-44 mL/min/1 73 square meters)  •  Stage 4 Severe CKD (GFR = 15-29 mL/min/1 73 square meters)  •  Stage 5 End Stage CKD (GFR <15 mL/min/1 73 square meters)  Note: GFR calculation is accurate only with a steady state creatinine    CBC and differential [[id number]]  (Abnormal) Collected: 03/26/23 0016    Lab Status: Final result Specimen: Blood from Arm, Left Updated: 03/26/23 0029     WBC 16 32 Thousand/uL      RBC 4 85 Million/uL      Hemoglobin 15 4 g/dL      Hematocrit 46 4 %      MCV 96 fL      MCH 31 8 pg      MCHC 33 2 g/dL      RDW 12 7 %      MPV 9 9 fL      Platelets 361 Thousands/uL      nRBC 0 /100 WBCs      Neutrophils Relative 81 %      Immat GRANS % 1 %      Lymphocytes Relative 8 %      Monocytes Relative 9 %      Eosinophils Relative 1 %      Basophils Relative 0 %      Neutrophils Absolute 13 20 Thousands/µL      Immature Grans Absolute 0 10 Thousand/uL      Lymphocytes Absolute 1 32 Thousands/µL      Monocytes Absolute 1 50 Thousand/µL      Eosinophils Absolute 0 15 Thousand/µL      Basophils Absolute 0 05 Thousands/µL                  CT abdomen pelvis with contrast   Final Result by Sandra Ortiz MD (03/26 0321)      Diverticulosis without evidence of diverticulitis  Workstation performed: [id number]         CT head without contrast   Final Result by Sandra Ortiz MD (03/26 0321)      No acute intracranial hemorrhage, midline shift, or mass effect  Workstation performed: [id number]                    Procedures  Procedures         ED Course                                             Medical Decision Making  56-year-old female presents with acute nausea vomiting and diarrhea  She has a benign examination is not toxic appearing  She is afebrile with normal vital signs  Labs and imaging reviewed with patient  She is tolerating p o  and has serial neurological examinations that are within normal limits  Patient did not have a head CT because she is not on any antiplatelet agents has a normal neurological exam a GCS of 15 and only a small amount of minor trauma to her head  She will be discharged home with instructions regarding viral gastroenteritis as well as head injuries  I have also discussed with her prompt follow-up plan as well as for strict return precautions  Injury of head, initial encounter: acute illness or injury  Laceration of scalp, initial encounter: acute illness or injury  Amount and/or Complexity of Data Reviewed  Labs: ordered  Radiology: ordered  Risk  Prescription drug management            Disposition  Final diagnoses:   Vomiting and diarrhea   Injury of head, initial encounter   Laceration of scalp, initial encounter     Time reflects when diagnosis was documented in both MDM as applicable and the Disposition within this note     Time User Action Codes Description Comment    3/26/2023  3:30 AM Raiza Julian Add [R11 10,  R19 7] Vomiting and diarrhea     3/26/2023  3:30 AM Alexander Spencer Add [S09 90XA] Injury of head, initial encounter     3/26/2023  3:30 AM Alexander Spencer Isabela [S01 01XA] Laceration of scalp, initial encounter       ED Disposition     ED Disposition   Discharge    Condition   Stable    Date/Time   Sun Mar 26, 2023  3:31 AM    Comment   [place] Shorten discharge to home/self care                 Follow-up Information     Follow up With Specialties Details Why Contact Blanca Parsons MD Family Medicine Call in 1 day  [address] [facility] [address]  [phone]            Discharge Medication List as of 3/26/2023  3:32 AM      START taking these medications    Details   ondansetron (ZOFRAN) 4 mg tablet Take 1 tablet (4 mg total) by mouth every 6 (six) hours, Starting Sun 3/26/2023, Normal         CONTINUE these medications which have NOT CHANGED    Details   albuterol (2 5 mg/3 mL) 0 083 % nebulizer solution Take 3 mL (2 5 mg total) by nebulization every 6 (six) hours as needed for wheezing or shortness of breath, Starting Mon 4/4/2022, Normal      Ascorbic Acid (VITAMIN C) 1000 MG tablet Starting Mon 4/20/2020, Historical Med      atorvastatin (LIPITOR) 20 mg tablet TAKE ONE TABLET BY MOUTH EVERY DAY AT BEDTIME, Normal      cholecalciferol (VITAMIN D3) 1,000 units tablet Take 2,000 Units by mouth daily, Historical Med      clobetasol (TEMOVATE) 0 05 % cream Apply topically 2 (two) times a day Apply topically on scalp and right shoulder two times a day, Starting u 6/30/2022, Normal      cyclobenzaprine (FLEXERIL) 10 mg tablet Take 10 mg by mouth 3 (three) times a day as needed for muscle spasms (as needed for pain)  , Historical Med      famotidine (PEPCID) 40 MG tablet TAKE ONE TABLET BY MOUTH AT BEDTIME, Normal      Fluticasone-Salmeterol (Advair Diskus) 250-50 mcg/dose inhaler Inhale 1 puff every morning Rinse mouth after use , Starting Tue 8/2/2022, Until Wed 2/8/2023, Normal fluticasone-umeclidinium-vilanterol (Trelegy Ellipta) 100-62 5-25 mcg/actuation inhaler Inhale 1 puff daily Rinse mouth after use , Starting Tue 3/14/2023, Until Thu 4/13/2023, Normal      guaiFENesin (MUCINEX) 600 mg 12 hr tablet Take 1,200 mg by mouth every 12 (twelve) hours, Historical Med      hydrocortisone 2 5 % cream 2 (two) times a day scalp, Starting Mon 12/14/2020, Historical Med      ibuprofen (MOTRIN) 400 mg tablet Take 1 tablet (400 mg total) by mouth every 6 (six) hours as needed for moderate pain for up to 14 days, Starting Sun 1/8/2023, Until Wed 2/8/2023 at 2359, Normal      Loratadine (CLARITIN PO) Take by mouth 2 (two) times a day as needed, Historical Med      meloxicam (Mobic) 15 mg tablet Take 1 tablet (15 mg total) by mouth daily, Starting Tue 2/7/2023, Normal      metFORMIN (GLUCOPHAGE-XR) 500 mg 24 hr tablet TAKE ONE TABLET BY MOUTH EVERY DAY WITH DINNER, Normal      methocarbamol (ROBAXIN) 500 mg tablet Take 1 tablet (500 mg total) by mouth 2 (two) times a day, Starting Sun 1/8/2023, Normal      montelukast (SINGULAIR) 10 mg tablet TAKE ONE TABLET BY MOUTH EVERY DAY, Normal      multivitamin (THERAGRAN) TABS Take 1 tablet by mouth daily Last dose 3/21/21, Historical Med      mupirocin (BACTROBAN) 2 % ointment Apply topically 3 (three) times a day, Starting Tue 11/29/2022, Normal      pantoprazole (PROTONIX) 40 mg tablet TAKE ONE TABLET BY MOUTH EVERY DAY, Normal      triamcinolone (KENALOG) 0 025 % cream Apply topically 2 (two) times a day, Starting Tue 7/27/2021, Normal             No discharge procedures on file      PDMP Review     None          ED Provider  Electronically Signed by           Cari Rivera MD  03/27/23 2958

## 2023-03-28 ENCOUNTER — TELEPHONE (OUTPATIENT)
Dept: INFECTIOUS DISEASES | Facility: CLINIC | Age: 68
End: 2023-03-28

## 2023-03-28 NOTE — TELEPHONE ENCOUNTER
Called and spoke with patient today regarding referral      Informed patient her referral was received and reviewed by the AP in the office  After review of her records she would not be scheduled for an appointment in our office  Per the AP the patient needs to follow up with dermatology or PCP  Patient verbalizes understanding at this time

## 2023-03-31 ENCOUNTER — RA CDI HCC (OUTPATIENT)
Dept: OTHER | Facility: HOSPITAL | Age: 68
End: 2023-03-31

## 2023-03-31 NOTE — PROGRESS NOTES
[facility]  coding opportunities       Chart reviewed, no opportunity found:   [address]        Patients Insurance     Medicare Insurance: [facility]

## 2023-04-03 ENCOUNTER — OFFICE VISIT (OUTPATIENT)
Dept: CARDIOLOGY CLINIC | Facility: CLINIC | Age: 68
End: 2023-04-03

## 2023-04-03 ENCOUNTER — OFFICE VISIT (OUTPATIENT)
Dept: OBGYN CLINIC | Facility: CLINIC | Age: 68
End: 2023-04-03

## 2023-04-03 VITALS
WEIGHT: 159 LBS | HEIGHT: 64 IN | DIASTOLIC BLOOD PRESSURE: 80 MMHG | BODY MASS INDEX: 27.14 KG/M2 | SYSTOLIC BLOOD PRESSURE: 117 MMHG | HEART RATE: 66 BPM

## 2023-04-03 VITALS
HEIGHT: 64 IN | SYSTOLIC BLOOD PRESSURE: 118 MMHG | HEART RATE: 84 BPM | WEIGHT: 153 LBS | BODY MASS INDEX: 26.12 KG/M2 | DIASTOLIC BLOOD PRESSURE: 60 MMHG | OXYGEN SATURATION: 94 %

## 2023-04-03 DIAGNOSIS — R07.9 CHEST PAIN, UNSPECIFIED TYPE: ICD-10-CM

## 2023-04-03 DIAGNOSIS — E78.5 DYSLIPIDEMIA: ICD-10-CM

## 2023-04-03 DIAGNOSIS — M24.812 INTERNAL DERANGEMENT OF LEFT SHOULDER: Primary | ICD-10-CM

## 2023-04-03 DIAGNOSIS — J45.30 MILD PERSISTENT ASTHMA WITHOUT COMPLICATION: ICD-10-CM

## 2023-04-03 DIAGNOSIS — R55 SYNCOPE, UNSPECIFIED SYNCOPE TYPE: ICD-10-CM

## 2023-04-03 DIAGNOSIS — R94.31 ABNORMAL EKG: ICD-10-CM

## 2023-04-03 DIAGNOSIS — G47.33 OBSTRUCTIVE SLEEP APNEA HYPOPNEA, MILD: ICD-10-CM
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
## 2023-04-03 NOTE — PROGRESS NOTES
Consultation - Cardiology Office  [facility]  Chalmer Landau 69 y o  female MRN: [id number]  : 1955  Unit/Bed#:  Encounter: [id number]      Assessment:     1  Syncope, unspecified syncope type    2  Chest pain, unspecified type    3  Abnormal EKG    4  Dyslipidemia    5  Obstructive sleep apnea hypopnea, mild    6  Mild persistent asthma without complication        Discussion summary and Plan:  1  Episode of syncope  Most likely etiology appears to be her dehydration as she was having diarrhea vomiting however  We will monitor her and check an echo Doppler to rule out any structural heart disease  EKG was mildly normal but no significant change  She agrees with the plan  No more episodes she is trying to keep herself hydrated  2   Episodes of atypical chest pain  She will be scheduled for exercise stress test       3  Dyslipidemia  Diabetic she is on statin but she has not had any blood test for cholesterol since  I took the liberty of ordering fasting lipids and LFTs  4  Hypothyroidism  Continue levothyroxine  , monitor TSH    5  Diabetes mellitus  She is borderline diabetic on metformin now  Blood sugar is better  Is on metformin that is helping her  6  History of obstructive sleep apnea  Patient has been compliant with CPAP  Continue using CPAP  7   Abnormal EKG  Abnormal EKG shows Q-wave more like pseudoinfarction pattern previously noted changes also  8   History of bronchial asthma  She had longstanding history of seasonal bronchial asthma  Currently she has some cough with expectoration it has slightly worsened after COVID but slowly getting better  Continue current Rx  All issues related to cardiac condition discussed with patient at length  All her questions answered  Further plan as also stress test become available  Counseling :  A description of the counseling  Goals and Barriers    Patient's ability to self care: Yes  Medication side effect reviewed with patient in detail and all their questions answered to their satisfaction  Primary Care Physiciant: Zaria Wilson MD    HPI :     Jose Dolan is a 69y o  year old female who was last seen in our office in 2020 and has a medical history significant for dyslipidemia, impaired glucose metabolism, history of bronchial asthma which is seasonal and mostly well controlled, sleep apnea on CPAP, family history of heart disease who was in the emergency room with episode of diarrhea vomiting and near syncope and have a laceration on her forehead  She was initially seen by us in 2018 for abnormal EKG which shows Q waves in inferior leads and possible old inferior wall infarction  Repeat EKG done also showed similar Q waves  But in between patient has cardiac work-up which shows she has a normal LV systolic function and no signal valvular disease  Syncopal episode happened as she has it episodes of vomiting and diarrhea and she was feeling dizzy and lightheaded  She is feeling better but still occasionally get episodes of chest pain  She has been compliant with her medication though she has not seen us since 2020 and has been taking her medications  She is also on cholesterol therapy  He does not smoke does not drink  She has last stress test 2019 which has been acceptable  In July 2022 she has COVID-19 infection and she has some cough slowly getting better  Review of Systems   Constitutional: Negative for activity change, chills, diaphoresis, fever and unexpected weight change  HENT: Negative for congestion  Eyes: Negative for discharge and redness  Respiratory: Positive for cough and wheezing  Negative for chest tightness and shortness of breath  Cardiovascular: Positive for chest pain  Negative for palpitations and leg swelling  Gastrointestinal: Negative for abdominal pain, diarrhea and nausea  Endocrine: Negative      Genitourinary: Negative for decreased urine volume and urgency  Musculoskeletal: Negative  Negative for arthralgias, back pain and gait problem  Skin: Negative for rash and wound  Allergic/Immunologic: Negative  Neurological: Negative for dizziness, seizures, syncope, weakness, light-headedness and headaches  Hematological: Negative  Psychiatric/Behavioral: Negative for agitation and confusion  The patient is nervous/anxious          Historical Information   Past Medical History:   Diagnosis Date   • Abnormal glucose     last assessed 2/25/16   • Arthritis    • Asthma     w/ exacerbation; last assessed 5/14/15   • Atypical nevi    • Atypical nevus     last assessed 1/18/17   • Breast lump     last assessed 3/6/14   • Cataract    • Cervical adenopathy     last assessed  2/7/17   • Colon polyp    • CPAP (continuous positive airway pressure) dependence    • Diverticulitis of colon    • Dizziness    • Dyslipidemia     last assessed 5/22/17   • Facial droop     last assessed  12/5/16   • Fibromyalgia, primary    • Flu 01/20/2018   • Foot abrasion     right between the 4th and 5th toe   • Fractures    • Genital herpes     resolved 9/1/16   • GERD (gastroesophageal reflux disease)    • Headache, tension-type    • Herpes zoster     last assessed 5/20/16   • History of shingles     may 2016   • History of stomach ulcers    • Hx of abnormal mammogram     last assessed  3/5/14   • Hyperlipidemia    • Myalgia     last assessed  11/21/12   • Myositis     11/21/12   • Neck pain    • Pain involving joints of fingers of both hands 01/19/2021   • Peripheral neuropathy    • Seborrheic keratosis    • Shingles    • Skin disorder    • Skin neoplasm     of the lower limb, including hip; onset 1/23/12; last assessed  1/23/12   • Sleep apnea    • Stomach disorder      Past Surgical History:   Procedure Laterality Date   • ABDOMINAL SURGERY      release of adhesions   • BLADDER SURGERY      mesh-lift   • BREAST CYST ASPIRATION Right     does not know the year   • BREAST SURGERY      lift   • CATARACT EXTRACTION Bilateral    •  SECTION      x [phone]   • CHOLECYSTECTOMY      lap   • COLONOSCOPY     • COSMETIC SURGERY      tummy and breast lift   • ESOPHAGOGASTRODUODENOSCOPY     • OOPHORECTOMY Left    • OTHER SURGICAL HISTORY      vocal cord surgery, scraping   • MA HYSTEROSCOPY BX ENDOMETRIUM&/POLYPC W/WO D&C N/A 2016    Procedure: DILATATION AND CURETTAGE (D&C) WITH HYSTEROSCOPY;  Surgeon: Radha Diallo MD;  Location: [address] [place] [address];  Service: Gynecology   • REDUCTION MAMMAPLASTY Bilateral    • TONSILLECTOMY     • TUBAL LIGATION  10/29/1984   • [address] MSK PROCEDURE  2021     Social History     Substance and Sexual Activity   Alcohol Use No     Social History     Substance and Sexual Activity   Drug Use No     Social History     Tobacco Use   Smoking Status Never   Smokeless Tobacco Never     Family History:   Family History   Problem Relation Age of Onset   • Hypertension Mother    • Alzheimer's disease Mother    • Arthritis Mother    • Hypertension Father    • Arthritis Father    • Heart attack Father    • Heart disease Father    • Stroke Father    • Other Brother         vertigo, tinnitus   • Diabetes Daughter    • Breast cancer Sister 28   • Skin cancer Sister    • Cancer Sister    • Other Son         downs syndrome   • Abdominal aortic aneurysm Sister    • Cancer Sister         T cell sarcoma   • Breast cancer Sister    • No Known Problems Brother    • Diabetes Brother    • Other Sister         anemia from the diet   • No Known Problems Son    • No Known Problems Maternal Aunt    • No Known Problems Maternal Aunt    • No Known Problems Paternal Aunt    • No Known Problems Paternal Aunt    • No Known Problems Paternal Aunt    • Asthma Sister        Meds/Allergies     Allergies   Allergen Reactions   • Latex Rash     Burn-skin irritation   • Adhesive [Medical Tape] Other (See Comments)     bandaid-red,itch       Current Outpatient Medications:   •  albuterol (2 5 mg/3 mL) 0 083 % nebulizer solution, Take 3 mL (2 5 mg total) by nebulization every 6 (six) hours as needed for wheezing or shortness of breath, Disp: 180 mL, Rfl: 5  •  Ascorbic Acid (VITAMIN C) 1000 MG tablet, , Disp: , Rfl:   •  atorvastatin (LIPITOR) 20 mg tablet, TAKE ONE TABLET BY MOUTH EVERY DAY AT BEDTIME, Disp: 90 tablet, Rfl: 1  •  cholecalciferol (VITAMIN D3) 1,000 units tablet, Take 2,000 Units by mouth daily, Disp: , Rfl:   •  clobetasol (TEMOVATE) 0 05 % cream, Apply topically 2 (two) times a day Apply topically on scalp and right shoulder two times a day, Disp: 60 g, Rfl: 2  •  famotidine (PEPCID) 40 MG tablet, TAKE ONE TABLET BY MOUTH AT BEDTIME, Disp: 90 tablet, Rfl: 0  •  fluticasone-umeclidinium-vilanterol (Trelegy Ellipta) 100-62 5-25 mcg/actuation inhaler, Inhale 1 puff daily Rinse mouth after use , Disp: 60 blister, Rfl: 11  •  guaiFENesin (MUCINEX) 600 mg 12 hr tablet, Take 1,200 mg by mouth every 12 (twelve) hours, Disp: , Rfl:   •  hydrocortisone 2 5 % cream, 2 (two) times a day scalp, Disp: , Rfl:   •  Loratadine (CLARITIN PO), Take by mouth 2 (two) times a day as needed, Disp: , Rfl:   •  metFORMIN (GLUCOPHAGE-XR) 500 mg 24 hr tablet, TAKE ONE TABLET BY MOUTH EVERY DAY WITH DINNER, Disp: 90 tablet, Rfl: 2  •  methocarbamol (ROBAXIN) 500 mg tablet, Take 1 tablet (500 mg total) by mouth 2 (two) times a day, Disp: 20 tablet, Rfl: 0  •  montelukast (SINGULAIR) 10 mg tablet, TAKE ONE TABLET BY MOUTH EVERY DAY, Disp: 60 tablet, Rfl: 5  •  multivitamin (THERAGRAN) TABS, Take 1 tablet by mouth daily Last dose 3/21/21, Disp: , Rfl:   •  mupirocin (BACTROBAN) 2 % ointment, Apply topically 3 (three) times a day, Disp: 22 g, Rfl: 0  •  ondansetron (ZOFRAN) 4 mg tablet, Take 1 tablet (4 mg total) by mouth every 6 (six) hours, Disp: 12 tablet, Rfl: 0  •  pantoprazole (PROTONIX) 40 mg tablet, TAKE ONE TABLET BY MOUTH EVERY DAY, Disp: 30 tablet, Rfl: 2  • "triamcinolone (KENALOG) 0 025 % cream, Apply topically 2 (two) times a day, Disp: 80 g, Rfl: 5  •  cyclobenzaprine (FLEXERIL) 10 mg tablet, Take 10 mg by mouth 3 (three) times a day as needed for muscle spasms (as needed for pain)   (Patient not taking: Reported on 3/14/2023), Disp: , Rfl:   •  Fluticasone-Salmeterol (Advair Diskus) 250-50 mcg/dose inhaler, Inhale 1 puff every morning Rinse mouth after use , Disp: 60 blister, Rfl: 5  •  meloxicam (Mobic) 15 mg tablet, Take 1 tablet (15 mg total) by mouth daily (Patient not taking: Reported on 3/14/2023), Disp: 30 tablet, Rfl: 0    Vitals: Blood pressure 118/60, pulse 84, height 5' 4\" (1 626 m), weight 69 4 kg (153 lb), SpO2 94 %, not currently breastfeeding  ?  Body mass index is 26 26 kg/m²  Vitals:    04/03/23 1446   Weight: 69 4 kg (153 lb)     BP Readings from Last 3 Encounters:   04/03/23 118/60   04/03/23 117/80   03/26/23 117/64         Physical Exam  Constitutional:       General: She is not in acute distress  Appearance: She is well-developed  She is not diaphoretic  Neck:      Thyroid: No thyromegaly  Vascular: No JVD  Trachea: No tracheal deviation  Cardiovascular:      Rate and Rhythm: Normal rate and regular rhythm  Heart sounds: S1 normal and S2 normal  Heart sounds not distant  Murmur heard  Systolic (ejection) murmur is present with a grade of 2/6  No friction rub  No gallop  No S3 or S4 sounds  Pulmonary:      Effort: Pulmonary effort is normal  No respiratory distress  Breath sounds: Normal breath sounds  No wheezing or rales  Chest:      Chest wall: No tenderness  Abdominal:      General: Bowel sounds are normal  There is no distension  Palpations: Abdomen is soft  Tenderness: There is no abdominal tenderness  Musculoskeletal:         General: No deformity  Cervical back: Neck supple  Skin:     General: Skin is warm and dry  Coloration: Skin is not pale  Findings: No rash   " Neurological:      Mental Status: She is alert and oriented to person, place, and time  Psychiatric:         Behavior: Behavior normal          Judgment: Judgment normal            Diagnostic Studies Review Cardio:    Stress test   Stress EKG test 2017 was normal    She walked 8 minutes and 49 seconds  No symptoms no EKG changes      Echo Doppler  Done 2017  EF was 60%, trace MR, trace AI, trace TR PA pressure 25 mm of mercury  EKG:  Twelve lead EKG 2019 shows normal sinus rhythm heart rate 69 beats per minute  No change from old EKG  Twelve-lead EKG done when she was in the emergency room with syncope and vomiting shows sinus rhythm with small Q waves in inferior lead most like pseudoinfarction pattern  Cardiac testing:   Results for orders placed during the hospital encounter of 17   Echo complete with contrast if indicated    Christine James [address]  [address] [facility] [place]  [phone]    Transthoracic Echocardiogram  2D, M-mode, Doppler, and Color Doppler    Study date:  31-May-2017    Patient: Alanda Lombard  MR number: [id number]  Account number: [de-identified]  : 1955  Age: 59 years  Gender: Female  Status: Routine  Location: Echo lab  Height: 65 in  Weight: 163 7 lb  BP: 117/ 81 mmHg    Indications: Dizziness    Diagnoses: R42  - Dizziness and giddiness    Sonographer:  LORE Edwards  Primary Physician:  Cortez West MD  Referring Physician:  Ruben Roman DO  Group:  [place]  Interpreting Physician:  Marli Gonzalez MD    SUMMARY    LEFT VENTRICLE:  Systolic function was normal  Ejection fraction was estimated in the range of 55 % to 60 % to be 60 %  There were no regional wall motion abnormalities  Wall thickness was at the upper limits of normal   Doppler parameters were consistent with abnormal left ventricular relaxation (grade 1 diastolic dysfunction)      MITRAL VALVE:  There was trace regurgitation  AORTIC VALVE:  There was trace regurgitation  TRICUSPID VALVE:  There was trace regurgitation  Estimated peak PA pressure was 25 mmHg  HISTORY: PRIOR HISTORY: Shingles, Fibromyalgia, Asthma,Dyslipidemia    PROCEDURE: The procedure was performed in the echo lab  This was a routine study  The transthoracic approach was used  The study included complete 2D imaging, M-mode, complete spectral Doppler, and color Doppler  The heart rate was 74 bpm,  at the start of the study  Images were obtained from the parasternal, apical, subcostal, and suprasternal notch acoustic windows  Image quality was adequate  LEFT VENTRICLE: Size was normal  Systolic function was normal  Ejection fraction was estimated in the range of 55 % to 60 % to be 60 %  There were no regional wall motion abnormalities  Wall thickness was at the upper limits of normal   DOPPLER: Doppler parameters were consistent with abnormal left ventricular relaxation (grade 1 diastolic dysfunction)  RIGHT VENTRICLE: The size was normal  Systolic function was normal     LEFT ATRIUM: Size was normal     RIGHT ATRIUM: Size was normal     MITRAL VALVE: Valve structure was normal  There was normal leaflet separation  DOPPLER: The transmitral velocity was within the normal range  There was no evidence for stenosis  There was trace regurgitation  AORTIC VALVE: The valve was trileaflet  Leaflets exhibited normal thickness and normal cuspal separation  DOPPLER: Transaortic velocity was within the normal range  There was no evidence for stenosis  There was trace regurgitation  TRICUSPID VALVE: DOPPLER: There was trace regurgitation  Estimated peak PA pressure was 25 mmHg  PULMONIC VALVE: DOPPLER: There was no significant regurgitation  PERICARDIUM: There was no thickening or calcification  There was no pericardial effusion  AORTA: The root exhibited normal size  SYSTEMIC VEINS: IVC: The inferior vena cava was normal in size  Respirophasic changes were normal     SYSTEM MEASUREMENT TABLES    2D mode  AoR Diam 2D: 2 9 cm  LA Diam (2D): 3 1 cm  LA/Ao (2D): 1 07  FS (2D Teich): 30 2 %  IVSd (2D): 1 15 cm  LVDEV: 65 1 cm³  LVEDV MOD BP: 64 cm³  LVESV: 27 3 cm³  LVIDd(2D): 3 88 cm  LVISd (2D): 2 71 cm  LVOT Area 2D: 3 14 cm squared  LVPWd (2D): 1 09 cm  SV (Teich): 37 8 cm³    Apical four chamber  LVEF A4C: 60 %    Apical two chamber  LVEF A2C: 50 %    Unspecified Scan Mode  LISA Cont Eq (Peak Matthew): 2 22 cm squared  LVOT Diam : 2 cm  LVOT Vmax: 918 mm/s  LVOT Vmax; Mean: 918 mm/s  Peak Grad ; Mean: 3 mm[Hg]  MV Peak A Matthew: 785 mm/s  MV Peak E Matthew  Mean: 617 mm/s  MVA (PHT): 4 4 cm squared  PHT: 50 ms  Max P mm[Hg]  V Max: 2090 mm/s  Vmax: 1990 mm/s  RA Area: 9 2 cm squared  RA Volume: 17 6 cm³  TAPSE: 2 3 cm    [employer] Accredited Echocardiography [facility]    Prepared and electronically signed by    Cornell Redmond MD  Signed 31-May-2017 12:04:08           Imaging:  Chest X-Ray:   Xr Chest 2 Views    Result Date: 2018  Impression No active pulmonary disease   Workstation performed: [id number]       Lab Review   Lab Results   Component Value Date    WBC 16 32 (H) 2023    HGB 15 4 2023    HCT 46 4 (H) 2023    MCV 96 2023    RDW 12 7 2023     2023     BMP:  Lab Results   Component Value Date    SODIUM 136 2023    K 4 0 2023     2023    CO2 24 2023    BUN 18 2023    CREATININE 0 94 2023    GLUC 185 (H) 2023    GLUF 131 (H) 2022    CALCIUM 9 9 2023    CORRECTEDCA 9 6 2022    EGFR 62 2023    MG 2 0 2021     LFT:  Lab Results   Component Value Date    AST 25 2023    ALT 37 2023    ALKPHOS 73 2023    TP 8 3 2023    ALB 4 7 2023      Lab Results   Component Value Date    PMZ8CTJBLTYC 0 884 2021     Lab Results   Component Value Date    HGBA1C 6 2 (H) 2022 "    Lipid Profile:   Lab Results   Component Value Date    CHOLESTEROL 184 06/25/2021    HDL 56 06/25/2021    LDLCALC 106 (H) 06/25/2021    TRIG 108 06/25/2021     Lab Results   Component Value Date    CHOLESTEROL 184 06/25/2021    CHOLESTEROL 183 01/11/2021     Lab Results   Component Value Date    CKTOTAL 123 09/29/2021    TROPONINI <0 02 02/01/2018           Dr Marli Gonzalez MD [facility]      \"This note has been constructed using a voice recognition system  Therefore there may be syntax, spelling, and/or grammatical errors  Please call if you have any questions   \"  "

## 2023-04-07 PROBLEM — Z87.19 HISTORY OF GASTROENTERITIS: Status: ACTIVE | Noted: 2023-01-19

## 2023-04-07 PROBLEM — R55 SYNCOPE: Status: ACTIVE | Noted: 2023-04-07

## 2023-04-16 PROBLEM — D72.829 LEUKOCYTOSIS: Status: ACTIVE | Noted: 2023-04-16

## 2023-04-26 ENCOUNTER — APPOINTMENT (OUTPATIENT)
Dept: RADIOLOGY | Facility: HOSPITAL | Age: 68
End: 2023-04-26
Attending: ORTHOPAEDIC SURGERY

## 2023-04-26 ENCOUNTER — HOSPITAL ENCOUNTER (OUTPATIENT)
Dept: NON INVASIVE DIAGNOSTICS | Facility: HOSPITAL | Age: 68
Discharge: HOME | End: 2023-04-26
Attending: INTERNAL MEDICINE

## 2023-04-26 DIAGNOSIS — E78.5 DYSLIPIDEMIA: ICD-10-CM

## 2023-04-26 DIAGNOSIS — J45.30 MILD PERSISTENT ASTHMA WITHOUT COMPLICATION: ICD-10-CM

## 2023-04-26 DIAGNOSIS — G47.33 OBSTRUCTIVE SLEEP APNEA HYPOPNEA, MILD: ICD-10-CM

## 2023-04-26 DIAGNOSIS — R94.31 ABNORMAL EKG: ICD-10-CM

## 2023-04-26 DIAGNOSIS — R07.9 CHEST PAIN, UNSPECIFIED TYPE: ICD-10-CM

## 2023-04-26 DIAGNOSIS — R55 SYNCOPE, UNSPECIFIED SYNCOPE TYPE: ICD-10-CM

## 2023-04-26 LAB
CHEST PAIN STATEMENT: NORMAL
MAX DIASTOLIC BP: 78 MMHG
MAX HEART RATE: 134 BPM
MAX PREDICTED HEART RATE: 151 BPM
MAX. SYSTOLIC BP: 164 MMHG
PROTOCOL NAME: NORMAL
TARGET HR FORMULA: NORMAL
TEST INDICATION: NORMAL
TIME IN EXERCISE PHASE: NORMAL

## 2023-04-28 ENCOUNTER — TELEPHONE (OUTPATIENT)
Dept: CARDIOLOGY CLINIC | Facility: CLINIC | Age: 68
End: 2023-04-28

## 2023-04-28 LAB
MAX HR PERCENT: 88 %
MAX HR: 134 BPM
RATE PRESSURE PRODUCT: NORMAL
SL CV STRESS RECOVERY BP: NORMAL MMHG
SL CV STRESS RECOVERY HR: 79 BPM
SL CV STRESS RECOVERY O2 SAT: 100 %
STRESS ANGINA INDEX: 0
STRESS BASELINE BP: NORMAL MMHG
STRESS BASELINE HR: 75 BPM
STRESS O2 SAT REST: 99 %
STRESS PEAK HR: 133 BPM
STRESS POST ESTIMATED WORKLOAD: 9.1 METS
STRESS POST EXERCISE DUR MIN: 6 MIN
STRESS POST EXERCISE DUR SEC: 42 SEC
STRESS POST O2 SAT PEAK: 100 %
STRESS POST PEAK BP: 164 MMHG

## 2023-04-28 NOTE — TELEPHONE ENCOUNTER
----- Message from Mayra Mahmood MD sent at 4/28/2023  3:30 PM EDT -----  Exercise stress test probably is normal   Though her exercise capacity has decreased  Keep herself hydrated  She has any symptoms of chest pain let us know  Her cholesterol test done in April showed triglyceride elevated with low HDL  Continue statins  If she has any change in symptoms or chest pain let us know

## 2023-05-01 ENCOUNTER — HOSPITAL ENCOUNTER (OUTPATIENT)
Dept: RADIOLOGY | Facility: HOSPITAL | Age: 68
Discharge: HOME | End: 2023-05-01
Attending: ORTHOPAEDIC SURGERY

## 2023-05-01 DIAGNOSIS — M24.812 INTERNAL DERANGEMENT OF LEFT SHOULDER: ICD-10-CM

## 2023-05-03 ENCOUNTER — TELEPHONE (OUTPATIENT)
Dept: CARDIOLOGY CLINIC | Facility: CLINIC | Age: 68
End: 2023-05-03

## 2023-05-03 NOTE — TELEPHONE ENCOUNTER
Pt called back and nurse not at desk, so I read her the note and she is aware nothing significant, is to see Dr again routinely

## 2023-05-03 NOTE — TELEPHONE ENCOUNTER
----- Message from Asif Louis MD sent at 5/2/2023  5:23 PM EDT -----  Pt's Holter shows no significant tachy or Александр arrhthymias, few PACs and PVCs were noted    Pt can keep his appointment  Please call patient

## 2023-05-05 ENCOUNTER — TELEPHONE (OUTPATIENT)
Dept: OBGYN CLINIC | Facility: CLINIC | Age: 68
End: 2023-05-05

## 2023-05-05 NOTE — TELEPHONE ENCOUNTER
santy pt and advised of below  Pt is scheduled with Dr Citlalli Bassett     ----- Message from Lawerence Boast, DO sent at 5/5/2023  4:02 PM EDT -----  Maldonado Miles had a MRI of her shoulder that showed a partial rotator cuff tear that was similar to her prior MRI  There is no large rotator cuff tear  She does have some arthritis in the shoulder as well  Since she has failed conservative treatment of physical therapy then a would recommend she can follow-up with me for possible injection in her shoulder or she could see my partner Dr Citlalli Bassett who could discuss arthroscopy of her shoulder to address the tear

## 2023-05-18 ENCOUNTER — OFFICE VISIT (OUTPATIENT)
Age: 68
End: 2023-05-18

## 2023-05-18 VITALS — TEMPERATURE: 97.4 F | WEIGHT: 159.8 LBS | BODY MASS INDEX: 27.28 KG/M2 | HEIGHT: 64 IN

## 2023-05-18 DIAGNOSIS — L56.8 PHOTOSENSITIVE CONTACT DERMATITIS: ICD-10-CM

## 2023-05-18 DIAGNOSIS — L93.0 LUPUS ERYTHEMATOSUS, UNSPECIFIED FORM: Primary | ICD-10-CM

## 2023-05-18 NOTE — PATIENT INSTRUCTIONS
PHOTOSENSITIVE CONTACT DERMATITIS   Physical Exam:  Anatomic Location Affected:  clear at the moment   Morphological Description:  Pertinent Positives:  Pertinent Negatives: Additional History of Present Condition:  patient states her skin is worse , admits severe dryness , previous treatment did not help   Denies sun exposure   Assessment and Plan:  Based on a thorough discussion of this condition and the management approach to it (including a comprehensive discussion of the known risks, side effects and potential benefits of treatment), the patient (family) agrees to implement the following specific plan:  Discussed with patient treatment options   1  Heliocare over the counter helps the skin tolerate sun exposure  Start Heliocare 1 tab orally  every morning   When outside we recommend using a wide brim hat, sunglasses, long sleeve and pants, sunscreen with SPF 59+ with reapplication every 2 hours, or SPF specific clothing   LUPUS ERYTHEMATOSUS  Assessment and Plan:  Based on a thorough discussion of this condition and the management approach to it (including a comprehensive discussion of the known risks, side effects and potential benefits of treatment), the patient (family) agrees to implement the following specific plan: Intralesional Kenalog given today's office visit   Consent obtained and signed  Follow up 6-8 weeks      What is lupus erythematosus? Lupus erythematosus (LE) is a connective tissue autoimmune disorder that can affect one or several organs  Circulating autoantibodies and immune complexes are due to loss of normal immune tolerance and are pathogenic  Clinical features of LE are highly variable  LE nearly always affects the skin to some degree  What is cutaneous lupus erythematosus? Cutaneous LE comprises several chronic and relapsing LE-specific and LE-nonspecific inflammatory conditions  There can be some overlap    LE-specific cutaneous LE has been classified as acute, subacute, intermittent and chronic  Lesions may be localized or generalized  In LE-specific cutaneous LE, lesions are often induced by exposure to sunlight  LE-nonspecific cutaneous LE may relate to systemic LE or another autoimmune disease  Who gets cutaneous lupus erythematosus? Cutaneous LE most often affects young to middle-aged adult women (aged 20-50 years) but children, the elderly, and males may be affected  Important predisposing factors for cutaneous LE include:  Female sex   Genes: ? 25 risk loci have been identified, and there are HLA associations   Skin of color    What causes lupus erythematosus? LE is classified as an autoimmune disorder, as it is associated with pathogenic antibodies directed against components of cell nuclei in various tissues  UVB irradiation causes keratinocyte necrosis, immune system activation and antibody formation  Factors that aggravate LE include:  Sun exposure   Cigarette smoking   Hormones   Viral infection   Certain drugs    What are the specific features of cutaneous lupus erythematosus? There are various types of cutaneous LE, classified as acute, subacute, intermittent and chronic cutaneous LE  The revised Cutaneous Lupus Erythematosus Disease Area and Severity Index (RCLASI) can be used to assess disease activity and damage  Acute cutaneous LE affects at least 50% of patients with systemic lupus erythematosus (SLE)  Many are sick, young, fair-skinned females  Specific features of acute cutaneous LE may include: Malar eruption or 'butterfly rash' (erythema and oedema of cheeks, sparing nasolabial folds) lasting hours to days   Erythematous papular rash on arms, sometimes forming large plaques and spreading widely   Photosensitivity (a rash on all recently sun-exposed skin)   Cheilitis and mouth ulcers   Blisters (bullous LE) and erosions  SLE may also affect joints, kidneys, lungs, heart, liver, brain, blood vessels (vasculitis) and blood cells   It may be accompanied by the antiphospholipid syndrome  Subacute cutaneous lupus erythematosus    About 15% of patients with cutaneous LE have subacute cutaneous LE  One-third of the cases are due to previous drug exposure  Features of subacute cutaneous LE include:  Precipitation or aggravation by sun exposure   Non-itchy psoriasis-like papulosquamous rash on the upper back, chest and upper arms   Annular or polycyclic plaques that clear centrally   The absence of scarring when the rash has resolved  Up to 50% of patients with subacute cutaneous LE may also have a mild form of SLE, resulting in arthralgia (painful joints) or arthritis (joint disease) and low blood counts  Severe SLE is rare in patients with subacute cutaneous LE  More than 100 drugs have been associated with the onset of subacute cutaneous LE  They include:  Terbinafine   Tumor necrosis factor-alpha (TNF-?) inhibitors (biologics)   Anticonvulsants   Proton-pump inhibitors   cutaneous LE arises within 2 months of birth to mothers with known or subclinical subacute cutaneous LE  Features of  cutaneous LE may include: An annular erythematous rash, which slowly resolves over 6 months   The rash is most often periorbital   Photosensitivity   Blood count abnormalities: hemolytic anemia, leukopenia, thrombocytopenia   Hepatobiliary disease   Persistent congenital heart block    A pediatrician should assess all babies born to mothers with subacute LE (or carrying anti-Ro/La) at birth  Mortality in babies with heart block is up to 20%, despite pacemaker implantation  Intermittent cutaneous LE, more often known as lupus tumidus, is a dermal form of lupus    Features of lupus tumidus include:  Affects sun-exposed sites such as cheeks, neck, anterior chest   Erythematous, urticaria-like patches and plaques with a smooth surface   Round or annular shapes   Clears during the winter months   Non-scarring    Lupus tumidus is similar to Jessner lymphocytic infiltrate, in which diagnostic criteria for lupus are absent  Chronic cutaneous LE accounts for 80% of presentations with cutaneous LE  About 25% of patients with chronic cutaneous LE also have systemic LE  Discoid LE  Discoid LE is the most common form of chronic cutaneous LE  It is more prevalent in patients with skin of color, who are at greater risk of post inflammatory hyperpigmentation and hypertrophic scarring  Discoid LE is confined to the skin above the neck in most patients but can spread below the neck to affect upper back, V of neck, forearms and backs of hands  Scalp, ears, cheeks, nose are the most common sites  Most patients have photosensitivity  New lesions are destructive, erythematous scaly plaques with follicular prominence  Scalp discoid LE presents as red, scaly and bald plaques  Slow healing leads to post-inflammatory pigmentation and white scars  Hair growth may partially or completely recover with treatment  Cicatricial (scarring) alopecia can be permanent  Hypertrophic LE  Hypertrophic LE is a variant of discoid LE in which there are thickened and warty plaques resembling viral warts or skin cancers  Hypertrophic LE can occur on palms and/or soles  This is also called palmoplantar LE and is a form of acquired keratoderma  Mucosal LE  Mucosal LE presents with plaques, ulcers and scaling  Mucosal lesions may predispose to squamous cell carcinoma  Common sites are:  Lips and inside the mouth   Lower eyelid with madarosis (loss of eyelashes)   Rarely, vulva/penis  Lupus profundus  Lupus profundus affects subcutaneous tissue  Other names for lupus profundus are lupus panniculitis and subcutaneous LE  Lupus profundus may develop at any age, including childhood  It may involve face, buttocks, limbs or anywhere  Firm deep and tender nodules persist for some months  Lesions resolve leaving dented, atrophic scars (lipoatrophy)      What are the nonspecific cutaneous features of lupus erythematosus? LE nonspecific cutaneous features are most often associated with SLE  They include:  Diffuse hair thinning   Urticaria   Raynaud phenomenon: abnormal blanching of fingers and toes in response to cold weather, followed by numbness and slow rewarming by the fingers which go blue then red  Lupus chilblains: painful erythematous nodules on fingers and toes during cooler months  Dilated periungual telangiectasia, ragged cuticles and nail dystrophy   Digital ulcers and pitting scars   Thrombophlebitis   Papular and nodular mucinosis on cheeks, upper chest, upper arms or back  Vasculitis: small vessel vasculitis, urticarial vasculitis and less often, vasculitis of medium and large vessels   Livedo reticularis and antiphospholipid syndrome    Complications of cutaneous lupus erythematosus   Chronic cutaneous LE causes facial deformity and scarring  The active and burned-out disease can lead to social isolation and depression  Systemic LE may involve heart, lung and brain with significant morbidity and mortality  Vasculitis and antiphospholipid syndrome involving internal organs can be serious  How is cutaneous lupus erythematosus diagnosed? SLE is associated with high titer antinuclear antibodies  About 70% of patients with subacute LE have antiRo/La extractable nuclear antigens (SAHRA)  The severity of LE may be reflected in the titer of GISELA and/or SAHRA  GISELA and SAHRA are often negative in a patient with chronic cutaneous LE  Mild anemia or leukopenia may be present in patients that do not have SLE    A skin biopsy may be diagnostic, showing a lichenoid tissue reaction and features specific to the kind of cutaneous LE  Direct immunofluorescence tests may show positive antibody deposition along the basement membrane (lupus band test)    Diagnostic features on biopsy are more likely to be found in LE-specific skin lesions than in LE-nonspecific cutaneous LE     Note: All women with positive anti-Ro or anti-La antibodies should be advised that if they become pregnant there may be a risk to their infant of developing  lupus and congenital heart block  Women with these antibodies should be referred to an obstetrician and consideration may be given to prophylactic administration of hydroxychloroquine or low dose prednisone to prevent heart block in the fetus  How can cutaneous lupus erythematosus be prevented? Carefully protect all exposed skin from sun exposure with covering clothing and SPF50+ broad-spectrum sunscreen (see sun protection)  Smoking cessation is essential - it is best to avoid nicotine replacement as nicotine in any form may exacerbate cutaneous LE  If subacute LE is drug-induced, stop the responsible medication  What is the treatment for cutaneous lupus erythematosus? The aim of treatment for cutaneous LE is to prevent flares, improve appearance and to prevent scarring  Local therapy  Potent or ultra potent topical steroids are applied to chronic discoid LE plaques  Calcineurin inhibitors, pimecrolimus cream or tacrolimus ointment can be used instead of topical steroids  Intralesional corticosteroid can be injected into small lesions resistant to topical therapy  Topical retinoid, calcipotriol and imiquimod have also been reported to be helpful in a few patients  Cosmetic camouflage may be used to disguise unsightly plaques  Systemic therapy  Treatment for cutaneous and systemic LE may include:  Antimalarials especially hydroxychloroquine   Immune modulators such as methotrexate, mycophenolate, dapsone, ciclosporin   Retinoids, ie acitretin, isotretinoin   Systemic corticosteroids   If sun protection is strict, vitamin D supplementation    Severe disease may require more aggressive treatment:  Cyclophosphamide   Thalidomide   Photopheresis   Intravenous immunoglobulin   Monoclonal antibodies targeting T and B cells and cytokines: rituximab  Procedures  Phototherapy using UVA1 may be useful to treat skin lesions of cutaneous LE  Photodynamic therapy (PDT) has been reported to clear chronic cutaneous LE  A vascular laser can reduce telangiectasia  Surgery may improve the appearance of disfiguring scars  What is the outlook for cutaneous lupus erythematosus? The prognosis for cutaneous LE is variable  The skin involvement in SLE tends to mirror systemic involvement  Drug-induced SCLE clears within a few weeks of withdrawal of the responsible drug  Untreated chronic cutaneous LE tends to persist, but the severity is lessened by strict sun protection and avoidance of nicotine

## 2023-05-18 NOTE — PROGRESS NOTES
"[place] [address] [facility] Note     Patient Name: Derek Cuevas  Encounter Date: 05 18 2023     Have you been cared for by a [place] [address] Dermatologist in the last 3 years and, if so, which description applies to you? Yes  I have been here within the last 3 years, and my medical history has NOT changed since that time  I am FEMALE/of child-bearing potential     REVIEW OF SYSTEMS:  Have you recently had or currently have any of the following? · No changes in my recent health  PAST MEDICAL HISTORY:  Have you personally ever had or currently have any of the following? If \"YES,\" then please provide more detail  · No changes in my medical history  FAMILY HISTORY:  Any \"first degree relatives\" (parent, brother, sister, or child) with the following? • No changes in my family's known health  PATIENT EXPERIENCE:    • Do you want the Dermatologist to perform a COMPLETE skin exam today including a clinical examination under the \"bra and underwear\" areas? NO  • If necessary, do we have your permission to call and leave a detailed message on your Preferred Phone number that includes your specific medical information?   Yes      Allergies   Allergen Reactions   • Latex Rash     Burn-skin irritation   • Adhesive [Medical Tape] Other (See Comments)     bandaid-red,itch      Current Outpatient Medications:   •  albuterol (2 5 mg/3 mL) 0 083 % nebulizer solution, Take 3 mL (2 5 mg total) by nebulization every 6 (six) hours as needed for wheezing or shortness of breath, Disp: 180 mL, Rfl: 5  •  Ascorbic Acid (VITAMIN C) 1000 MG tablet, , Disp: , Rfl:   •  atorvastatin (LIPITOR) 20 mg tablet, TAKE ONE TABLET BY MOUTH EVERY DAY AT BEDTIME, Disp: 90 tablet, Rfl: 1  •  chlorhexidine (PERIDEX) 0 12 % solution, , Disp: , Rfl:   •  cholecalciferol (VITAMIN D3) 1,000 units tablet, Take 2,000 Units by mouth daily, Disp: , Rfl:   •  clobetasol (TEMOVATE) 0 05 % cream, Apply topically 2 (two) times a day Apply topically on scalp " and right shoulder two times a day, Disp: 60 g, Rfl: 2  •  famotidine (PEPCID) 40 MG tablet, TAKE ONE TABLET BY MOUTH AT BEDTIME, Disp: 90 tablet, Rfl: 0  •  Fluticasone-Salmeterol (Advair Diskus) 250-50 mcg/dose inhaler, Inhale 1 puff every morning Rinse mouth after use , Disp: 60 blister, Rfl: 5  •  fluticasone-umeclidinium-vilanterol (Trelegy Ellipta) 100-62 5-25 mcg/actuation inhaler, Inhale 1 puff daily Rinse mouth after use , Disp: 60 blister, Rfl: 11  •  HYDROcodone-acetaminophen (NORCO) 5-325 mg per tablet, , Disp: , Rfl:   •  hydrocortisone 2 5 % cream, 2 (two) times a day scalp, Disp: , Rfl:   •  ibuprofen (MOTRIN) 600 mg tablet, , Disp: , Rfl:   •  metFORMIN (GLUCOPHAGE-XR) 500 mg 24 hr tablet, TAKE ONE TABLET BY MOUTH EVERY DAY WITH DINNER, Disp: 90 tablet, Rfl: 2  •  methocarbamol (ROBAXIN) 500 mg tablet, Take 1 tablet (500 mg total) by mouth 2 (two) times a day, Disp: 20 tablet, Rfl: 0  •  multivitamin (THERAGRAN) TABS, Take 1 tablet by mouth daily Last dose 3/21/21, Disp: , Rfl:   •  pantoprazole (PROTONIX) 40 mg tablet, TAKE ONE TABLET BY MOUTH EVERY DAY, Disp: 30 tablet, Rfl: 2  •  penicillin V potassium (VEETID) 500 mg tablet, , Disp: , Rfl:   •  sucralfate (CARAFATE) 1 g tablet, Take 1 tablet (1 g total) by mouth 4 (four) times a day, Disp: 60 tablet, Rfl: 0          • Whom besides the patient is providing clinical information about today's encounter?   o NO ADDITIONAL HISTORIAN (patient alone provided history)    Physical Exam and Assessment/Plan by Diagnosis:    PHOTOSENSITIVE  DERMATITIS   Physical Exam:  • Anatomic Location Affected:  face, arms  • Morphological Description: clear  • Pertinent Positives:  • Pertinent Negatives:     Additional History of Present Condition:  patient states her skin is worse , admits severe dryness , previous treatment did not help   Denies sun exposure      Assessment and Plan:  Based on a thorough discussion of this condition and the management approach to it (including a comprehensive discussion of the known risks, side effects and potential benefits of treatment), the patient (family) agrees to implement the following specific plan:  • Discussed with patient treatment options   1  Heliocare over the counter helps the skin tolerate sun exposure  • Start Heliocare 1 tab orally  every morning   •   • When outside we recommend using a wide brim hat, sunglasses, long sleeve and pants, sunscreen with SPF 31+ with reapplication every 2 hours, or SPF specific clothing   LUPUS ERYTHEMATOSUS - DISCOID    Physical Exam:  • Anatomic Location Affected:  Scalp   • Morphological Description:  Pink scar- like scaly plaques   • Pertinent Positives:  • Pertinent Negatives: Additional History of Present Condition:  Patient states scaly scalp for few months but it got worse during the last month   GISELA on 9/29/2021 Negative but past hx of lupus as teen and history of neutrophillic urticaria    Assessment and Plan:  Based on a thorough discussion of this condition and the management approach to it (including a comprehensive discussion of the known risks, side effects and potential benefits of treatment), the patient (family) agrees to implement the following specific plan:  • Intralesional Kenalog given today's office visit   • Consent obtained and signed  • Follow up 6-8 weeks      What is lupus erythematosus? Lupus erythematosus (LE) is a connective tissue autoimmune disorder that can affect one or several organs  Circulating autoantibodies and immune complexes are due to loss of normal immune tolerance and are pathogenic  Clinical features of LE are highly variable  LE nearly always affects the skin to some degree  What is cutaneous lupus erythematosus? Cutaneous LE comprises several chronic and relapsing LE-specific and LE-nonspecific inflammatory conditions  There can be some overlap    • LE-specific cutaneous LE has been classified as acute, subacute, intermittent and chronic  Lesions may be localized or generalized  In LE-specific cutaneous LE, lesions are often induced by exposure to sunlight  • LE-nonspecific cutaneous LE may relate to systemic LE or another autoimmune disease  Who gets cutaneous lupus erythematosus? Cutaneous LE most often affects young to middle-aged adult women (aged 20-50 years) but children, the elderly, and males may be affected  Important predisposing factors for cutaneous LE include:  • Female sex   • Genes: ? 25 risk loci have been identified, and there are HLA associations   • Skin of color    What causes lupus erythematosus? LE is classified as an autoimmune disorder, as it is associated with pathogenic antibodies directed against components of cell nuclei in various tissues  UVB irradiation causes keratinocyte necrosis, immune system activation and antibody formation  Factors that aggravate LE include:  • Sun exposure   • Cigarette smoking   • Hormones   • Viral infection   • Certain drugs    What are the specific features of cutaneous lupus erythematosus? There are various types of cutaneous LE, classified as acute, subacute, intermittent and chronic cutaneous LE  The revised Cutaneous Lupus Erythematosus Disease Area and Severity Index (RCLASI) can be used to assess disease activity and damage  Acute cutaneous LE affects at least 50% of patients with systemic lupus erythematosus (SLE)  Many are sick, young, fair-skinned females  Specific features of acute cutaneous LE may include:  • Malar eruption or 'butterfly rash' (erythema and oedema of cheeks, sparing nasolabial folds) lasting hours to days   • Erythematous papular rash on arms, sometimes forming large plaques and spreading widely   • Photosensitivity (a rash on all recently sun-exposed skin)   • Cheilitis and mouth ulcers   • Blisters (bullous LE) and erosions  SLE may also affect joints, kidneys, lungs, heart, liver, brain, blood vessels (vasculitis) and blood cells   It may be accompanied by the antiphospholipid syndrome  Subacute cutaneous lupus erythematosus    About 15% of patients with cutaneous LE have subacute cutaneous LE  One-third of the cases are due to previous drug exposure  Features of subacute cutaneous LE include:  • Precipitation or aggravation by sun exposure   • Non-itchy psoriasis-like papulosquamous rash on the upper back, chest and upper arms   • Annular or polycyclic plaques that clear centrally   • The absence of scarring when the rash has resolved  Up to 50% of patients with subacute cutaneous LE may also have a mild form of SLE, resulting in arthralgia (painful joints) or arthritis (joint disease) and low blood counts  Severe SLE is rare in patients with subacute cutaneous LE  More than 100 drugs have been associated with the onset of subacute cutaneous LE  They include:  • Terbinafine   • Tumor necrosis factor-alpha (TNF-?) inhibitors (biologics)   • Anticonvulsants   • Proton-pump inhibitors   cutaneous LE arises within 2 months of birth to mothers with known or subclinical subacute cutaneous LE  Features of  cutaneous LE may include:  • An annular erythematous rash, which slowly resolves over 6 months   • The rash is most often periorbital   • Photosensitivity   • Blood count abnormalities: hemolytic anemia, leukopenia, thrombocytopenia   • Hepatobiliary disease   • Persistent congenital heart block    A pediatrician should assess all babies born to mothers with subacute LE (or carrying anti-Ro/La) at birth  Mortality in babies with heart block is up to 20%, despite pacemaker implantation  Intermittent cutaneous LE, more often known as lupus tumidus, is a dermal form of lupus    Features of lupus tumidus include:  • Affects sun-exposed sites such as cheeks, neck, anterior chest   • Erythematous, urticaria-like patches and plaques with a smooth surface   • Round or annular shapes   • Clears during the winter months • Non-scarring    Lupus tumidus is similar to Jessner lymphocytic infiltrate, in which diagnostic criteria for lupus are absent  Chronic cutaneous LE accounts for 80% of presentations with cutaneous LE  About 25% of patients with chronic cutaneous LE also have systemic LE  • Discoid LE  o Discoid LE is the most common form of chronic cutaneous LE  It is more prevalent in patients with skin of color, who are at greater risk of post inflammatory hyperpigmentation and hypertrophic scarring  o Discoid LE is confined to the skin above the neck in most patients but can spread below the neck to affect upper back, V of neck, forearms and backs of hands  o Scalp, ears, cheeks, nose are the most common sites  o Most patients have photosensitivity  o New lesions are destructive, erythematous scaly plaques with follicular prominence    o Scalp discoid LE presents as red, scaly and bald plaques  o Slow healing leads to post-inflammatory pigmentation and white scars  o Hair growth may partially or completely recover with treatment  Cicatricial (scarring) alopecia can be permanent  • Hypertrophic LE  o Hypertrophic LE is a variant of discoid LE in which there are thickened and warty plaques resembling viral warts or skin cancers  Hypertrophic LE can occur on palms and/or soles  This is also called palmoplantar LE and is a form of acquired keratoderma  • Mucosal LE  o Mucosal LE presents with plaques, ulcers and scaling  Mucosal lesions may predispose to squamous cell carcinoma  Common sites are:  o Lips and inside the mouth   o Lower eyelid with madarosis (loss of eyelashes)   o Rarely, vulva/penis  • Lupus profundus  o Lupus profundus affects subcutaneous tissue  Other names for lupus profundus are lupus panniculitis and subcutaneous LE  • Lupus profundus may develop at any age, including childhood  • It may involve face, buttocks, limbs or anywhere  • Firm deep and tender nodules persist for some months  • Lesions resolve leaving dented, atrophic scars (lipoatrophy)  What are the nonspecific cutaneous features of lupus erythematosus? LE nonspecific cutaneous features are most often associated with SLE  They include:  • Diffuse hair thinning   • Urticaria   • Raynaud phenomenon: abnormal blanching of fingers and toes in response to cold weather, followed by numbness and slow rewarming by the fingers which go blue then red  • Lupus chilblains: painful erythematous nodules on fingers and toes during cooler months  • Dilated periungual telangiectasia, ragged cuticles and nail dystrophy   • Digital ulcers and pitting scars   • Thrombophlebitis   • Papular and nodular mucinosis on cheeks, upper chest, upper arms or back  • Vasculitis: small vessel vasculitis, urticarial vasculitis and less often, vasculitis of medium and large vessels   • Livedo reticularis and antiphospholipid syndrome    Complications of cutaneous lupus erythematosus   Chronic cutaneous LE causes facial deformity and scarring  The active and burned-out disease can lead to social isolation and depression  Systemic LE may involve heart, lung and brain with significant morbidity and mortality  Vasculitis and antiphospholipid syndrome involving internal organs can be serious  How is cutaneous lupus erythematosus diagnosed? • SLE is associated with high titer antinuclear antibodies  • About 70% of patients with subacute LE have antiRo/La extractable nuclear antigens (SAHRA)  • The severity of LE may be reflected in the titer of GISELA and/or SAHRA  • GISELA and SAHRA are often negative in a patient with chronic cutaneous LE    • Mild anemia or leukopenia may be present in patients that do not have SLE    A skin biopsy may be diagnostic, showing a lichenoid tissue reaction and features specific to the kind of cutaneous LE  Direct immunofluorescence tests may show positive antibody deposition along the basement membrane (lupus band test)  Diagnostic features on biopsy are more likely to be found in LE-specific skin lesions than in LE-nonspecific cutaneous LE  Note: All women with positive anti-Ro or anti-La antibodies should be advised that if they become pregnant there may be a risk to their infant of developing  lupus and congenital heart block  Women with these antibodies should be referred to an obstetrician and consideration may be given to prophylactic administration of hydroxychloroquine or low dose prednisone to prevent heart block in the fetus  How can cutaneous lupus erythematosus be prevented? Carefully protect all exposed skin from sun exposure with covering clothing and SPF50+ broad-spectrum sunscreen (see sun protection)  Smoking cessation is essential - it is best to avoid nicotine replacement as nicotine in any form may exacerbate cutaneous LE  If subacute LE is drug-induced, stop the responsible medication  What is the treatment for cutaneous lupus erythematosus? The aim of treatment for cutaneous LE is to prevent flares, improve appearance and to prevent scarring  Local therapy  • Potent or ultra potent topical steroids are applied to chronic discoid LE plaques  • Calcineurin inhibitors, pimecrolimus cream or tacrolimus ointment can be used instead of topical steroids  • Intralesional corticosteroid can be injected into small lesions resistant to topical therapy  • Topical retinoid, calcipotriol and imiquimod have also been reported to be helpful in a few patients  • Cosmetic camouflage may be used to disguise unsightly plaques  Systemic therapy  Treatment for cutaneous and systemic LE may include:  • Antimalarials especially hydroxychloroquine   • Immune modulators such as methotrexate, mycophenolate, dapsone, ciclosporin   • Retinoids, ie acitretin, isotretinoin   • Systemic corticosteroids   • If sun protection is strict, vitamin D supplementation    Severe disease may require more aggressive treatment:  • Cyclophosphamide   • Thalidomide   • Photopheresis   • Intravenous immunoglobulin   • Monoclonal antibodies targeting T and B cells and cytokines: rituximab  Procedures  • Phototherapy using UVA1 may be useful to treat skin lesions of cutaneous LE  • Photodynamic therapy (PDT) has been reported to clear chronic cutaneous LE  • A vascular laser can reduce telangiectasia  • Surgery may improve the appearance of disfiguring scars  What is the outlook for cutaneous lupus erythematosus? • The prognosis for cutaneous LE is variable  • The skin involvement in SLE tends to mirror systemic involvement  • Drug-induced SCLE clears within a few weeks of withdrawal of the responsible drug  • Untreated chronic cutaneous LE tends to persist, but the severity is lessened by strict sun protection and avoidance of nicotine  PROCEDURE:  INTRALESIONAL STEROID INJECTION (KENALOG INJECTION)    Purpose: Triamcinolone is a synthetic glucocorticoid corticosteroid that has marked anti-inflammatory action  It is prepared in sterile aqueous suspension suitable for injecting directly into a lesion on or immediately below the skin to treat a dermal inflammatory process  Indications: It is indicated for alopecia areata; inflammatory acne cysts; discoid lupus erythematosus; keloids and hypertrophic scars; inflammatory lesions of granuloma annulare, lichen planus, lichen simplex chronicus (neurodermatitis), psoriatic plaques, and other localized inflammatory skin conditions       Potential Side Effects: I understand that triamcinolone injection can potentially cause early and/or delayed adverse effects such as:   • Pain   • Impaired wound healing   • Increased hair growth   • Bleeding   • White or brown marks   • Steroid acne   • Infection   • Telangiectasia   • Skin thinning   • Cutaneous and subcutaneous lipoatrophy (most common) appearing as skin indentations or dimples around the injection sites a few weeks after treatment     PROCEDURE NOTE:  After verbal and written consent were obtained, the to-be-treated area was wiped and cleaned with rubbing alcohol 70%  Then, a total of 0 7 mL of Kenalog CONCENTRATION:  10 mg/mL   (Lot# [id number]; Expiration aug 2024, NDC#: 1920-2410-50) was injected intralesionally into a total of 3 lesion/s on the following anatomic areas:  Scalp  using a 1-mL syringe and a 30-gauge needle  There was less than 1 mL of blood loss and little to no discomfort  The area was bandaged with a Band-aid  The patient tolerated the procedure well and remained in the office for observation  With no signs of an adverse reaction, the patient was eventually discharged from clinic        Scribe Attestation    I,:  Verner Grew am acting as a scribe while in the presence of the attending physician :       I,:  Mele Ramsey MD personally performed the services described in this documentation    as scribed in my presence :

## 2023-05-23 ENCOUNTER — OFFICE VISIT (OUTPATIENT)
Dept: FAMILY MEDICINE CLINIC | Facility: CLINIC | Age: 68
End: 2023-05-23

## 2023-05-23 VITALS
HEART RATE: 76 BPM | OXYGEN SATURATION: 99 % | HEIGHT: 64 IN | RESPIRATION RATE: 14 BRPM | SYSTOLIC BLOOD PRESSURE: 102 MMHG | WEIGHT: 157 LBS | TEMPERATURE: 98.6 F | BODY MASS INDEX: 26.8 KG/M2 | DIASTOLIC BLOOD PRESSURE: 70 MMHG

## 2023-05-23 DIAGNOSIS — J45.30 MILD PERSISTENT ASTHMA WITHOUT COMPLICATION: ICD-10-CM

## 2023-05-23 DIAGNOSIS — R09.81 SINUS CONGESTION: Primary | ICD-10-CM

## 2023-05-23 DIAGNOSIS — J06.9 VIRAL UPPER RESPIRATORY TRACT INFECTION: ICD-10-CM

## 2023-05-23 RX ORDER — MONTELUKAST SODIUM 10 MG/1
10 TABLET ORAL
COMMUNITY
Start: 2023-04-26

## 2023-05-23 RX ORDER — METHYLPREDNISOLONE 4 MG/1
TABLET ORAL
Qty: 21 EACH | Refills: 0 | Status: SHIPPED | OUTPATIENT
Start: 2023-05-23

## 2023-05-23 NOTE — PROGRESS NOTES
Clinic Visit Note  Preeti Harmon 69 y o  female   MRN: [id number]    Assessment and Plan      Diagnoses and all orders for this visit:    Viral upper respiratory tract infection  Sinus congestion  Given symptoms recommending anti-inflammatory steroid taper, hold off on antibiotic therapy as this appears to be viral, if symptoms persist or worsen reevaluation recommended  -     methylPREDNISolone 4 MG tablet therapy pack; Use as directed on package    Mild persistent asthma without complication  Continue inhaler therapy, lungs clear to auscultation bilaterally    Other orders  -     montelukast (SINGULAIR) 10 mg tablet; Take 10 mg by mouth daily at bedtime      My impressions and treatment recommendations were discussed in detail with the patient who verbalized understanding and had no further questions  Discharge instructions were provided  Subjective     Chief Complaint: Acute care visit    History of Present Illness:    Patient is a 68-year-old female coming in for acute care visit secondary to upper respiratory tract infection symptoms, sinus congestion that is causing headaches and dizziness  The following portions of the patient's history were reviewed and updated as appropriate: allergies, current medications, past family history, past medical history, past social history, past surgical history and problem list     REVIEW OF SYSTEMS:  A complete 12-point review of systems is negative other than that noted in the HPI  Review of Systems   Constitutional: Negative for chills, fatigue and fever  HENT: Positive for congestion, ear pain and postnasal drip  Respiratory: Negative for cough, shortness of breath and wheezing  Cardiovascular: Negative for chest pain, palpitations and leg swelling  Neurological: Positive for dizziness and headaches           Current Outpatient Medications:   •  albuterol (2 5 mg/3 mL) 0 083 % nebulizer solution, Take 3 mL (2 5 mg total) by nebulization every 6 (six) hours as needed for wheezing or shortness of breath, Disp: 180 mL, Rfl: 5  •  Ascorbic Acid (VITAMIN C) 1000 MG tablet, , Disp: , Rfl:   •  atorvastatin (LIPITOR) 20 mg tablet, TAKE ONE TABLET BY MOUTH EVERY DAY AT BEDTIME, Disp: 90 tablet, Rfl: 1  •  chlorhexidine (PERIDEX) 0 12 % solution, , Disp: , Rfl:   •  cholecalciferol (VITAMIN D3) 1,000 units tablet, Take 2,000 Units by mouth daily, Disp: , Rfl:   •  clobetasol (TEMOVATE) 0 05 % cream, Apply topically 2 (two) times a day Apply topically on scalp and right shoulder two times a day, Disp: 60 g, Rfl: 2  •  famotidine (PEPCID) 40 MG tablet, TAKE ONE TABLET BY MOUTH AT BEDTIME, Disp: 90 tablet, Rfl: 0  •  fluticasone-umeclidinium-vilanterol (Trelegy Ellipta) 100-62 5-25 mcg/actuation inhaler, Inhale 1 puff daily Rinse mouth after use , Disp: 60 blister, Rfl: 11  •  hydrocortisone 2 5 % cream, 2 (two) times a day scalp, Disp: , Rfl:   •  metFORMIN (GLUCOPHAGE-XR) 500 mg 24 hr tablet, TAKE ONE TABLET BY MOUTH EVERY DAY WITH DINNER, Disp: 90 tablet, Rfl: 2  •  methocarbamol (ROBAXIN) 500 mg tablet, Take 1 tablet (500 mg total) by mouth 2 (two) times a day, Disp: 20 tablet, Rfl: 0  •  methylPREDNISolone 4 MG tablet therapy pack, Use as directed on package, Disp: 21 each, Rfl: 0  •  montelukast (SINGULAIR) 10 mg tablet, Take 10 mg by mouth daily at bedtime, Disp: , Rfl:   •  multivitamin (THERAGRAN) TABS, Take 1 tablet by mouth daily Last dose 3/21/21, Disp: , Rfl:   •  sucralfate (CARAFATE) 1 g tablet, Take 1 tablet (1 g total) by mouth 4 (four) times a day, Disp: 60 tablet, Rfl: 0  Past Medical History:   Diagnosis Date   • Abnormal glucose     last assessed 2/25/16   • Arthritis    • Asthma     w/ exacerbation; last assessed 5/14/15   • Atypical nevi    • Atypical nevus     last assessed 1/18/17   • Breast lump     last assessed 3/6/14   • Cataract    • Cervical adenopathy     last assessed  2/7/17   • Colon polyp    • CPAP (continuous positive airway pressure) dependence    • Diverticulitis of colon    • Dizziness    • Dyslipidemia     last assessed 17   • Facial droop     last assessed  16   • Fibromyalgia, primary    • Flu 2018   • Foot abrasion     right between the 4th and 5th toe   • Fractures    • Genital herpes     resolved 16   • GERD (gastroesophageal reflux disease)    • Headache, tension-type    • Herpes zoster     last assessed 16   • History of shingles     may 2016   • History of stomach ulcers    • Hx of abnormal mammogram     last assessed  3/5/14   • Hyperlipidemia    • Myalgia     last assessed  12   • Myositis     12   • Neck pain    • Pain involving joints of fingers of both hands 2021   • Peripheral neuropathy    • Seborrheic keratosis    • Shingles    • Skin disorder    • Skin neoplasm     of the lower limb, including hip; onset 12; last assessed  12   • Sleep apnea    • Stomach disorder      Past Surgical History:   Procedure Laterality Date   • ABDOMINAL SURGERY      release of adhesions   • BLADDER SURGERY      mesh-lift   • BREAST CYST ASPIRATION Right     does not know the year   • BREAST SURGERY      lift   • CATARACT EXTRACTION Bilateral    •  SECTION      x [phone]   • CHOLECYSTECTOMY      lap   • COLONOSCOPY     • COSMETIC SURGERY      tummy and breast lift   • ESOPHAGOGASTRODUODENOSCOPY     • OOPHORECTOMY Left    • OTHER SURGICAL HISTORY      vocal cord surgery, scraping   • OK HYSTEROSCOPY BX ENDOMETRIUM&/POLYPC W/WO D&C N/A 2016    Procedure: DILATATION AND CURETTAGE (D&C) WITH HYSTEROSCOPY;  Surgeon: Jose Rivers MD;  Location: [address] [place] [address];  Service: Gynecology   • REDUCTION MAMMAPLASTY Bilateral    • TONSILLECTOMY     • TUBAL LIGATION  10/29/1984   • US GUIDED MSK PROCEDURE  2021     Social History     Socioeconomic History   • Marital status:      Spouse name: Not on file   • Number of children: Not on file   • Years of education: Not on file • Highest education level: Not on file   Occupational History   • Not on file   Tobacco Use   • Smoking status: Never   • Smokeless tobacco: Never   Vaping Use   • Vaping Use: Never used   Substance and Sexual Activity   • Alcohol use: No   • Drug use: No   • Sexual activity: Not Currently     Birth control/protection: Post-menopausal   Other Topics Concern   • Not on file   Social History Narrative    Daily caffinated coffee consumption    Exercise habits- walking sometimes 1x per day- last impression 5/22/17- Terrance Hutchinson    Good sleep hygiene    Pet - fish     Social Determinants of Health     Financial Resource Strain: Not on file   Food Insecurity: Not on file   Transportation Needs: Not on file   Physical Activity: Not on file   Stress: Not on file   Social Connections: Not on file   Intimate Partner Violence: Not on file   Housing Stability: Not on file     Family History   Problem Relation Age of Onset   • Hypertension Mother    • Alzheimer's disease Mother    • Arthritis Mother    • Hypertension Father    • Arthritis Father    • Heart attack Father    • Heart disease Father    • Stroke Father    • Other Brother         vertigo, tinnitus   • Diabetes Daughter    • Breast cancer Sister 28   • Skin cancer Sister    • Cancer Sister    • Other Son         downs syndrome   • Abdominal aortic aneurysm Sister    • Cancer Sister         T cell sarcoma   • Breast cancer Sister    • No Known Problems Brother    • Diabetes Brother    • Other Sister         anemia from the diet   • No Known Problems Son    • No Known Problems Maternal Aunt    • No Known Problems Maternal Aunt    • No Known Problems Paternal Aunt    • No Known Problems Paternal Aunt    • No Known Problems Paternal Aunt    • Asthma Sister      Allergies   Allergen Reactions   • Latex Rash     Burn-skin irritation   • Adhesive [Medical Tape] Other (See Comments)     bandaid-red,itch       Objective     Vitals:    05/23/23 1002   BP: 102/70   BP "Location: Left arm   Patient Position: Sitting   Cuff Size: Standard   Pulse: 76   Resp: 14   Temp: 98 6 °F (37 °C)   TempSrc: Temporal   SpO2: 99%   Weight: 71 2 kg (157 lb)   Height: 5' 4\" (1 626 m)       Physical Exam:     GENERAL: NAD, pleasant   HEENT:  NC/AT, PERRL, EOMI, no scleral icterus, tympanic membrane bulging with clear fluid bilaterally  CARDIAC:  RRR, +S1/S2, no S3/S4 appreciated, no m/g/r  PULMONARY:  CTA B/L, no wheezing/rales/rhonci, non-labored breathing  ABDOMEN:  Soft, NT/ND, no rebound/guarding/rigidity  Extremities:  No edema, cyanosis, or clubbing  Musculoskeletal:  Full range of motion intact in all extremities   NEUROLOGIC: Grossly intact, no focal deficits  SKIN:  No rashes or erythema noted on exposed skin  Psych: Normal affect, mood stable    ==  PLEASE NOTE:  This encounter was completed utilizing the [employer] Direct Speech Voice Recognition Software  Grammatical errors, random word insertions, pronoun errors and incomplete sentences are occasional consequences of the system due to software limitations, ambient noise and hardware issues  These may be missed by proof reading prior to affixing electronic signature  Any questions or concerns about the content, text or information contained within the body of this dictation should be directly addressed to the physician for clarification  Please do not hesitate to call me directly if you have any any questions or concerns      DO Doug Baxter [address] Internal Medicine   [facility]    "

## 2023-05-24 ENCOUNTER — TELEPHONE (OUTPATIENT)
Dept: FAMILY MEDICINE CLINIC | Facility: CLINIC | Age: 68
End: 2023-05-24

## 2023-05-24 NOTE — TELEPHONE ENCOUNTER
Patient received prednisone med from [facility]  She says antibiotic was also to be sent for sinus infection  I advised that Dr Jazlyn Collins is not in office today

## 2023-05-26 ENCOUNTER — HOSPITAL ENCOUNTER (OUTPATIENT)
Dept: RADIOLOGY | Facility: HOSPITAL | Age: 68
Discharge: HOME | End: 2023-05-26

## 2023-05-26 ENCOUNTER — OFFICE VISIT (OUTPATIENT)
Dept: OBGYN CLINIC | Facility: CLINIC | Age: 68
End: 2023-05-26

## 2023-05-26 VITALS — BODY MASS INDEX: 26.8 KG/M2 | WEIGHT: 157 LBS | HEIGHT: 64 IN

## 2023-05-26 VITALS
HEIGHT: 64 IN | WEIGHT: 157 LBS | SYSTOLIC BLOOD PRESSURE: 124 MMHG | BODY MASS INDEX: 26.8 KG/M2 | DIASTOLIC BLOOD PRESSURE: 70 MMHG | HEART RATE: 80 BPM

## 2023-05-26 DIAGNOSIS — M67.814 BICEPS TENDINOSIS OF LEFT SHOULDER: ICD-10-CM

## 2023-05-26 DIAGNOSIS — M19.019 GLENOHUMERAL ARTHRITIS: ICD-10-CM

## 2023-05-26 DIAGNOSIS — M54.12 RADICULOPATHY, CERVICAL REGION: ICD-10-CM

## 2023-05-26 DIAGNOSIS — M19.012 ARTHRITIS OF LEFT ACROMIOCLAVICULAR JOINT: ICD-10-CM

## 2023-05-26 DIAGNOSIS — Z12.31 SCREENING MAMMOGRAM FOR BREAST CANCER: ICD-10-CM

## 2023-05-26 DIAGNOSIS — M75.82 TENDINITIS OF LEFT ROTATOR CUFF: Primary | ICD-10-CM

## 2023-05-26 DIAGNOSIS — S46.012A TRAUMATIC INCOMPLETE TEAR OF LEFT ROTATOR CUFF, INITIAL ENCOUNTER: ICD-10-CM

## 2023-05-26 RX ADMIN — LIDOCAINE HYDROCHLORIDE 4 ML: 10 INJECTION, SOLUTION INFILTRATION; PERINEURAL at 10:00

## 2023-05-26 RX ADMIN — BETAMETHASONE SODIUM PHOSPHATE AND BETAMETHASONE ACETATE 6 MG: 3; 3 INJECTION, SUSPENSION INTRA-ARTICULAR; INTRALESIONAL; INTRAMUSCULAR; SOFT TISSUE at 10:00
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
## 2023-05-29 RX ORDER — BETAMETHASONE SODIUM PHOSPHATE AND BETAMETHASONE ACETATE 3; 3 MG/ML; MG/ML
6 INJECTION, SUSPENSION INTRA-ARTICULAR; INTRALESIONAL; INTRAMUSCULAR; SOFT TISSUE
Status: COMPLETED | OUTPATIENT
Start: 2023-05-26 | End: 2023-05-26

## 2023-05-29 RX ORDER — LIDOCAINE HYDROCHLORIDE 10 MG/ML
4 INJECTION, SOLUTION INFILTRATION; PERINEURAL
Status: COMPLETED | OUTPATIENT
Start: 2023-05-26 | End: 2023-05-26

## 2023-05-30 ENCOUNTER — TELEPHONE (OUTPATIENT)
Dept: PHYSICAL THERAPY | Facility: OTHER | Age: 68
End: 2023-05-30
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
## 2023-05-31 NOTE — TELEPHONE ENCOUNTER
Caller: Patient    Doctor: barber    Reason for call: Questioned what to do   Advised per previous conversation patient should contact PCP    Call back#: na
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
## 2023-06-01 ENCOUNTER — TELEPHONE (OUTPATIENT)
Dept: OBGYN CLINIC | Facility: HOSPITAL | Age: 68
End: 2023-06-01

## 2023-06-01 DIAGNOSIS — K21.9 GASTROESOPHAGEAL REFLUX DISEASE WITHOUT ESOPHAGITIS: ICD-10-CM

## 2023-06-01 RX ORDER — FAMOTIDINE 40 MG/1
40 TABLET, FILM COATED ORAL
Qty: 90 TABLET | Refills: 0 | Status: SHIPPED | OUTPATIENT
Start: 2023-06-01

## 2023-06-02 ENCOUNTER — EVALUATION (OUTPATIENT)
Dept: PHYSICAL THERAPY | Facility: CLINIC | Age: 68
End: 2023-06-02

## 2023-06-02 DIAGNOSIS — M67.814 BICEPS TENDINOSIS OF LEFT SHOULDER: ICD-10-CM

## 2023-06-02 DIAGNOSIS — M54.12 RADICULOPATHY, CERVICAL REGION: ICD-10-CM

## 2023-06-02 DIAGNOSIS — S46.012A TRAUMATIC INCOMPLETE TEAR OF LEFT ROTATOR CUFF, INITIAL ENCOUNTER: ICD-10-CM

## 2023-06-02 DIAGNOSIS — M19.012 ARTHRITIS OF LEFT ACROMIOCLAVICULAR JOINT: ICD-10-CM

## 2023-06-02 DIAGNOSIS — M75.82 TENDINITIS OF LEFT ROTATOR CUFF: ICD-10-CM

## 2023-06-02 DIAGNOSIS — M19.019 GLENOHUMERAL ARTHRITIS: ICD-10-CM
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
## 2023-06-07 ENCOUNTER — OFFICE VISIT (OUTPATIENT)
Dept: FAMILY MEDICINE CLINIC | Facility: CLINIC | Age: 68
End: 2023-06-07
Payer: COMMERCIAL

## 2023-06-07 VITALS
SYSTOLIC BLOOD PRESSURE: 130 MMHG | DIASTOLIC BLOOD PRESSURE: 68 MMHG | HEART RATE: 80 BPM | BODY MASS INDEX: 27.59 KG/M2 | RESPIRATION RATE: 16 BRPM | TEMPERATURE: 98.2 F | WEIGHT: 161.6 LBS | HEIGHT: 64 IN

## 2023-06-07 DIAGNOSIS — Z00.00 MEDICARE ANNUAL WELLNESS VISIT, SUBSEQUENT: Primary | ICD-10-CM

## 2023-06-07 PROCEDURE — G0439 PPPS, SUBSEQ VISIT: HCPCS | Performed by: FAMILY MEDICINE

## 2023-06-07 RX ORDER — PANTOPRAZOLE SODIUM 40 MG/1
TABLET, DELAYED RELEASE ORAL
COMMUNITY
Start: 2023-06-01

## 2023-06-07 NOTE — PROGRESS NOTES
Assessment and Plan:     Problem List Items Addressed This Visit     Medicare annual wellness visit, subsequent - Primary        Preventive health issues were discussed with patient, and age appropriate screening tests were ordered as noted in patient's After Visit Summary  Personalized health advice and appropriate referrals for health education or preventive services given if needed, as noted in patient's After Visit Summary  History of Present Illness:     Patient presents for a Medicare Wellness Visit    HPI   Patient Care Team:  Mason Aschoff, MD as PCP - General (Family Medicine)  MD Landry Stafford MD Benny Meek, MD Bertis Pares, MD (Gastroenterology)     Review of Systems:     Review of Systems   Constitutional: Positive for fatigue  Negative for fever  Respiratory: Negative  Cardiovascular: Negative  Musculoskeletal: Positive for arthralgias  Neurological: Negative           Problem List:     Patient Active Problem List   Diagnosis   • Asthma   • Fibromyalgia   • Facial droop   • Headache   • Hyperlipidemia   • Bronchitis   • Impaired fasting glucose   • Mediastinal lymphadenopathy   • Mild persistent asthma without complication   • Obstructive sleep apnea hypopnea, mild   • Allergic rhinitis   • Cervical radiculopathy   • Shoulder instability, left   • Tension type headache   • Varicose veins of left lower extremity with pain   • Hypothyroidism   • Abnormal EKG   • Dyslipidemia   • Obstructive sleep apnea   • Gastroesophageal reflux disease without esophagitis   • Medicare annual wellness visit, subsequent   • Prediabetes   • Seborrheic dermatitis of scalp   • Rash of face   • Pain involving joints of fingers of both hands   • Infected blister of toe of right foot   • BMI 26 0-26 9,adult   • Diverticulosis   • Atypical nevi   • Neutrophilic dermatosis   • Thyroid nodule   • Diverticulitis   • Hypotension due to hypovolemia   • Chronic foot pain, right   • Acute vaginitis   • Dental infection   • Dysuria   • Urine frequency   • Urinary incontinence   • Generalized abdominal pain   • History of COVID-19   • Nausea   • History of gastroenteritis   • CPAP (continuous positive airway pressure) dependence   • Syncope   • Leukocytosis      Past Medical and Surgical History:     Past Medical History:   Diagnosis Date   • Abnormal glucose     last assessed 16   • Arthritis    • Asthma     w/ exacerbation; last assessed 5/14/15   • Atypical nevi    • Atypical nevus     last assessed 17   • Breast lump     last assessed 3/6/14   • Cataract    • Cervical adenopathy     last assessed  17   • Colon polyp    • CPAP (continuous positive airway pressure) dependence    • Diverticulitis of colon    • Dizziness    • Dyslipidemia     last assessed 17   • Facial droop     last assessed  16   • Fibromyalgia, primary    • Flu 2018   • Foot abrasion     right between the 4th and 5th toe   • Fractures    • Genital herpes     resolved 16   • GERD (gastroesophageal reflux disease)    • Headache, tension-type    • Herpes zoster     last assessed 16   • History of shingles     may 2016   • History of stomach ulcers    • Hx of abnormal mammogram     last assessed  3/5/14   • Hyperlipidemia    • Myalgia     last assessed  12   • Myositis     12   • Neck pain    • Pain involving joints of fingers of both hands 2021   • Peripheral neuropathy    • Seborrheic keratosis    • Shingles    • Skin disorder    • Skin neoplasm     of the lower limb, including hip; onset 12; last assessed  12   • Sleep apnea    • Stomach disorder      Past Surgical History:   Procedure Laterality Date   • ABDOMINAL SURGERY      release of adhesions   • BLADDER SURGERY      mesh-lift   • BREAST CYST ASPIRATION Right     does not know the year   • BREAST SURGERY      lift   • CATARACT EXTRACTION Bilateral    •  SECTION      x [id number] • CHOLECYSTECTOMY      lap   • COLONOSCOPY     • COSMETIC SURGERY      tummy and breast lift   • ESOPHAGOGASTRODUODENOSCOPY     • OOPHORECTOMY Left    • OTHER SURGICAL HISTORY      vocal cord surgery, scraping   • FL HYSTEROSCOPY BX ENDOMETRIUM&/POLYPC W/WO D&C N/A 11/03/2016    Procedure: DILATATION AND CURETTAGE (D&C) WITH HYSTEROSCOPY;  Surgeon: Prieto Infante MD;  Location: [address] [place] [address];  Service: Gynecology   • REDUCTION MAMMAPLASTY Bilateral 2008   • TONSILLECTOMY     • TUBAL LIGATION  10/29/1984   • [facility] 634 MSK PROCEDURE  05/25/2021      Family History:     Family History   Problem Relation Age of Onset   • Hypertension Mother    • Alzheimer's disease Mother    • Arthritis Mother    • Hypertension Father    • Arthritis Father    • Heart attack Father    • Heart disease Father    • Stroke Father    • Other Brother         vertigo, tinnitus   • Diabetes Daughter    • Breast cancer Sister 28   • Skin cancer Sister    • Cancer Sister    • Other Son         downs syndrome   • Abdominal aortic aneurysm Sister    • Cancer Sister         T cell sarcoma   • Breast cancer Sister    • No Known Problems Brother    • Diabetes Brother    • Other Sister         anemia from the diet   • No Known Problems Son    • No Known Problems Maternal Aunt    • No Known Problems Maternal Aunt    • No Known Problems Paternal Aunt    • No Known Problems Paternal Aunt    • No Known Problems Paternal Aunt    • Asthma Sister       Social History:     Social History     Socioeconomic History   • Marital status:      Spouse name: None   • Number of children: None   • Years of education: None   • Highest education level: None   Occupational History   • None   Tobacco Use   • Smoking status: Never   • Smokeless tobacco: Never   Vaping Use   • Vaping Use: Never used   Substance and Sexual Activity   • Alcohol use: No   • Drug use: Never   • Sexual activity: Not Currently     Birth control/protection: Post-menopausal   Other Topics Concern   • None   Social History Narrative    Daily caffinated coffee consumption    Exercise habits- walking sometimes 1x per day- last impression 5/22/17- Evangelina Archuleta    Good sleep hygiene    Pet - fish     Social Determinants of Health     Financial Resource Strain: Medium Risk (6/7/2023)    Overall Financial Resource Strain (CARDIA)    • Difficulty of Paying Living Expenses: Somewhat hard   Food Insecurity: Not on file   Transportation Needs: No Transportation Needs (6/7/2023)    PRAPARE - Transportation    • Lack of Transportation (Medical): No    • Lack of Transportation (Non-Medical):  No   Physical Activity: Not on file   Stress: Not on file   Social Connections: Not on file   Intimate Partner Violence: Not on file   Housing Stability: Not on file      Medications and Allergies:     Current Outpatient Medications   Medication Sig Dispense Refill   • albuterol (2 5 mg/3 mL) 0 083 % nebulizer solution Take 3 mL (2 5 mg total) by nebulization every 6 (six) hours as needed for wheezing or shortness of breath 180 mL 5   • Ascorbic Acid (VITAMIN C) 1000 MG tablet      • atorvastatin (LIPITOR) 20 mg tablet TAKE ONE TABLET BY MOUTH EVERY DAY AT BEDTIME 90 tablet 1   • chlorhexidine (PERIDEX) 0 12 % solution      • cholecalciferol (VITAMIN D3) 1,000 units tablet Take 2,000 Units by mouth daily     • clobetasol (TEMOVATE) 0 05 % cream Apply topically 2 (two) times a day Apply topically on scalp and right shoulder two times a day 60 g 2   • famotidine (PEPCID) 40 MG tablet Take 1 tablet (40 mg total) by mouth daily at bedtime 90 tablet 0   • hydrocortisone 2 5 % cream 2 (two) times a day scalp     • metFORMIN (GLUCOPHAGE-XR) 500 mg 24 hr tablet TAKE ONE TABLET BY MOUTH EVERY DAY WITH DINNER 90 tablet 2   • methocarbamol (ROBAXIN) 500 mg tablet Take 1 tablet (500 mg total) by mouth 2 (two) times a day 20 tablet 0   • montelukast (SINGULAIR) 10 mg tablet Take 10 mg by mouth daily at bedtime     • multivitamin [facility]) TABS Take 1 tablet by mouth daily Last dose 3/21/21     • pantoprazole (PROTONIX) 40 mg tablet      • fluticasone-umeclidinium-vilanterol (Trelegy Ellipta) 100-62 5-25 mcg/actuation inhaler Inhale 1 puff daily Rinse mouth after use  60 blister 11     No current facility-administered medications for this visit  Allergies   Allergen Reactions   • Latex Rash     Burn-skin irritation   • Adhesive [Medical Tape] Other (See Comments)     bandaid-red,itch      Immunizations:     Immunization History   Administered Date(s) Administered   • COVID-19 MODERNA VACC 0 5 ML IM 01/20/2021, 02/17/2021, 10/28/2021, 01/05/2023, 04/11/2023   • Influenza, Quadrivalent (nasal) 12/06/2016   • Influenza, high dose seasonal 0 7 mL 10/18/2019, 09/14/2020, 11/16/2021   • Influenza, injectable, quadrivalent, preservative free 0 5 mL 10/22/2018   • Influenza, seasonal, injectable 09/15/2015, 11/20/2017   • Pneumococcal Conjugate 13-Valent 02/02/2016, 11/16/2021   • Pneumococcal Polysaccharide PPV23 10/09/2019   • Tuberculin Skin Test-PPD Intradermal 06/12/2017      Health Maintenance:         Topic Date Due   • Colorectal Cancer Screening  03/26/2024   • Breast Cancer Screening: Mammogram  05/26/2024   • Hepatitis C Screening  Completed         Topic Date Due   • COVID-19 Vaccine (6 - Moderna series) 06/06/2023   • Influenza Vaccine (Season Ended) 09/01/2023      Medicare Screening Tests and Risk Assessments:     Bryce Kimbrough is here for her Subsequent Wellness visit  Last Medicare Wellness visit information reviewed, patient interviewed and updates made to the record today  Health Risk Assessment:   Patient rates overall health as good  Patient feels that their physical health rating is same  Patient is very satisfied with their life  Eyesight was rated as same  Hearing was rated as same  Patient feels that their emotional and mental health rating is same  Patients states they are never, rarely angry   Patient states they are sometimes unusually tired/fatigued  Pain experienced in the last 7 days has been some  Patient's pain rating has been 7/10  Patient states that she has experienced no weight loss or gain in last 6 months  fibromyalgia    Depression Screening:   PHQ-2 Score: 0      Fall Risk Screening: In the past year, patient has experienced: history of falling in past year    Number of falls: 1  Injured during fall?: No    Feels unsteady when standing or walking?: No    Worried about falling?: No      Home Safety:  Patient does not have trouble with stairs inside or outside of their home  Patient has working smoke alarms and has working carbon monoxide detector  Home safety hazards include: none  Nutrition:   Current diet is Regular  Medications:   Patient is not currently taking any over-the-counter supplements  Patient is able to manage medications  Activities of Daily Living (ADLs)/Instrumental Activities of Daily Living (IADLs):   Walk and transfer into and out of bed and chair?: Yes  Dress and groom yourself?: Yes    Bathe or shower yourself?: Yes    Feed yourself?  Yes  Do your laundry/housekeeping?: Yes  Manage your money, pay your bills and track your expenses?: Yes  Make your own meals?: Yes    Do your own shopping?: Yes    Previous Hospitalizations:   Any hospitalizations or ED visits within the last 12 months?: Yes    How many hospitalizations have you had in the last year?: 1-2    Hospitalization Comments: ED visit    Advance Care Planning:   Living will: No    Durable POA for healthcare: No    Advanced directive: No    Five wishes given: Yes      PREVENTIVE SCREENINGS      Cardiovascular Screening:    General: History Lipid Disorder      Diabetes Screening:     General: Screening Current      Colorectal Cancer Screening:     General: Screening Current      Breast Cancer Screening:     General: Screening Current      Cervical Cancer Screening:    General: Screening Not Indicated      Osteoporosis "Screening:    General: Risks and Benefits Discussed      Abdominal Aortic Aneurysm (AAA) Screening:    Risk factors include: family history of AAA        General: Risks and Benefits Discussed      Lung Cancer Screening:     General: Screening Not Indicated      Hepatitis C Screening:    General: Screening Current    Screening, Brief Intervention, and Referral to Treatment (SBIRT)    Screening  Typical number of drinks in a day: 0  Typical number of drinks in a week: 0  Interpretation: Low risk drinking behavior  Single Item Drug Screening:  How often have you used an illegal drug (including marijuana) or a prescription medication for non-medical reasons in the past year? never    Single Item Drug Screen Score: 0  Interpretation: Negative screen for possible drug use disorder    Brief Intervention  Alcohol & drug use screenings were reviewed  No concerns regarding substance use disorder identified  No results found  Physical Exam:     /68 (BP Location: Left arm, Patient Position: Sitting, Cuff Size: Large)   Pulse 80   Temp 98 2 °F (36 8 °C)   Resp 16   Ht 5' 4\" (1 626 m)   Wt 73 3 kg (161 lb 9 6 oz)   BMI 27 74 kg/m²     Physical Exam  Vitals and nursing note reviewed  Constitutional:       General: She is not in acute distress  Appearance: Normal appearance  Cardiovascular:      Rate and Rhythm: Normal rate and regular rhythm  Pulmonary:      Breath sounds: Normal breath sounds  Abdominal:      General: Bowel sounds are normal       Palpations: Abdomen is soft  Musculoskeletal:         General: Tenderness (knees/shoulders-l>r) present  Cervical back: Neck supple  Skin:     Findings: Lesion (tiny, firm subcut skin lesion on mons--?ingrown hair--lifted w needle tip) present  Neurological:      General: No focal deficit present  Mental Status: She is alert and oriented to person, place, and time  Cranial Nerves: No cranial nerve deficit     Psychiatric:         " Mood and Affect: Mood normal           Mason Aschoff, MD

## 2023-06-07 NOTE — PATIENT INSTRUCTIONS
Medicare Preventive Visit Patient Instructions  Thank you for completing your Welcome to Medicare Visit or Medicare Annual Wellness Visit today  Your next wellness visit will be due in one year (6/7/2024)  The screening/preventive services that you may require over the next 5-10 years are detailed below  Some tests may not apply to you based off risk factors and/or age  Screening tests ordered at today's visit but not completed yet may show as past due  Also, please note that scanned in results may not display below  Preventive Screenings:  Service Recommendations Previous Testing/Comments   Colorectal Cancer Screening  * Colonoscopy    * Fecal Occult Blood Test (FOBT)/Fecal Immunochemical Test (FIT)  * Fecal DNA/Cologuard Test  * Flexible Sigmoidoscopy Age: 39-70 years old   Colonoscopy: every 10 years (may be performed more frequently if at higher risk)  OR  FOBT/FIT: every 1 year  OR  Cologuard: every 3 years  OR  Sigmoidoscopy: every 5 years  Screening may be recommended earlier than age 39 if at higher risk for colorectal cancer  Also, an individualized decision between you and your healthcare provider will decide whether screening between the ages of 74-80 would be appropriate  Colonoscopy: 03/26/2021  FOBT/FIT: Not on file  Cologuard: Not on file  Sigmoidoscopy: Not on file    Screening Current     Breast Cancer Screening Age: 36 years old  Frequency: every 1-2 years  Not required if history of left and right mastectomy Mammogram: 05/26/2023    Screening Current   Cervical Cancer Screening Between the ages of 21-29, pap smear recommended once every 3 years  Between the ages of 33-67, can perform pap smear with HPV co-testing every 5 years     Recommendations may differ for women with a history of total hysterectomy, cervical cancer, or abnormal pap smears in past  Pap Smear: Not on file    Screening Not Indicated   Hepatitis C Screening Once for adults born between 1945 and 1965  More frequently in patients at high risk for Hepatitis C Hep C Antibody: 04/26/2019    Screening Current   Diabetes Screening 1-2 times per year if you're at risk for diabetes or have pre-diabetes Fasting glucose: 120 mg/dL (4/10/2023)  A1C: 6 2 % (6/14/2022)  Screening Current   Cholesterol Screening Once every 5 years if you don't have a lipid disorder  May order more often based on risk factors  Lipid panel: 04/10/2023    Screening Not Indicated  History Lipid Disorder     Other Preventive Screenings Covered by Medicare:  1  Abdominal Aortic Aneurysm (AAA) Screening: covered once if your at risk  You're considered to be at risk if you have a family history of AAA  2  Lung Cancer Screening: covers low dose CT scan once per year if you meet all of the following conditions: (1) Age 50-69; (2) No signs or symptoms of lung cancer; (3) Current smoker or have quit smoking within the last 15 years; (4) You have a tobacco smoking history of at least 20 pack years (packs per day multiplied by number of years you smoked); (5) You get a written order from a healthcare provider  3  Glaucoma Screening: covered annually if you're considered high risk: (1) You have diabetes OR (2) Family history of glaucoma OR (3)  aged 48 and older OR (3)  American aged 72 and older  3  Osteoporosis Screening: covered every 2 years if you meet one of the following conditions: (1) You're estrogen deficient and at risk for osteoporosis based off medical history and other findings; (2) Have a vertebral abnormality; (3) On glucocorticoid therapy for more than 3 months; (4) Have primary hyperparathyroidism; (5) On osteoporosis medications and need to assess response to drug therapy  · Last bone density test (DXA Scan): 11/22/2017  5  HIV Screening: covered annually if you're between the age of 12-76  Also covered annually if you are younger than 13 and older than 72 with risk factors for HIV infection   For pregnant patients, it is covered up to 3 times per pregnancy  Immunizations:  Immunization Recommendations   Influenza Vaccine Annual influenza vaccination during flu season is recommended for all persons aged >= 6 months who do not have contraindications   Pneumococcal Vaccine   * Pneumococcal conjugate vaccine = PCV13 (Prevnar 13), PCV15 (Vaxneuvance), PCV20 (Prevnar 20)  * Pneumococcal polysaccharide vaccine = PPSV23 (Pneumovax) Adults 25-60 years old: 1-3 doses may be recommended based on certain risk factors  Adults 72 years old: 1-2 doses may be recommended based off what pneumonia vaccine you previously received   Hepatitis B Vaccine 3 dose series if at intermediate or high risk (ex: diabetes, end stage renal disease, liver disease)   Tetanus (Td) Vaccine - COST NOT COVERED BY MEDICARE PART B Following completion of primary series, a booster dose should be given every 10 years to maintain immunity against tetanus  Td may also be given as tetanus wound prophylaxis  Tdap Vaccine - COST NOT COVERED BY MEDICARE PART B Recommended at least once for all adults  For pregnant patients, recommended with each pregnancy  Shingles Vaccine (Shingrix) - COST NOT COVERED BY MEDICARE PART B  2 shot series recommended in those aged 48 and above     Health Maintenance Due:      Topic Date Due   • Colorectal Cancer Screening  03/26/2024   • Breast Cancer Screening: Mammogram  05/26/2024   • Hepatitis C Screening  Completed     Immunizations Due:      Topic Date Due   • COVID-19 Vaccine (6 - Moderna series) 06/06/2023   • Influenza Vaccine (Season Ended) 09/01/2023     Advance Directives   What are advance directives? Advance directives are legal documents that state your wishes and plans for medical care  These plans are made ahead of time in case you lose your ability to make decisions for yourself  Advance directives can apply to any medical decision, such as the treatments you want, and if you want to donate organs     What are the types of advance directives? There are many types of advance directives, and each state has rules about how to use them  You may choose a combination of any of the following:  · Living will: This is a written record of the treatment you want  You can also choose which treatments you do not want, which to limit, and which to stop at a certain time  This includes surgery, medicine, IV fluid, and tube feedings  · Durable power of  for healthcare [facility]): This is a written record that states who you want to make healthcare choices for you when you are unable to make them for yourself  This person, called a proxy, is usually a family member or a friend  You may choose more than 1 proxy  · Do not resuscitate (DNR) order:  A DNR order is used in case your heart stops beating or you stop breathing  It is a request not to have certain forms of treatment, such as CPR  A DNR order may be included in other types of advance directives  · Medical directive: This covers the care that you want if you are in a coma, near death, or unable to make decisions for yourself  You can list the treatments you want for each condition  Treatment may include pain medicine, surgery, blood transfusions, dialysis, IV or tube feedings, and a ventilator (breathing machine)  · Values history: This document has questions about your views, beliefs, and how you feel and think about life  This information can help others choose the care that you would choose  Why are advance directives important? An advance directive helps you control your care  Although spoken wishes may be used, it is better to have your wishes written down  Spoken wishes can be misunderstood, or not followed  Treatments may be given even if you do not want them  An advance directive may make it easier for your family to make difficult choices about your care  Fall Prevention    Fall prevention  includes ways to make your home and other areas safer   It also includes ways you can move more carefully to prevent a fall  Health conditions that cause changes in your blood pressure, vision, or muscle strength and coordination may increase your risk for falls  Medicines may also increase your risk for falls if they make you dizzy, weak, or sleepy  Fall prevention tips:   · Stand or sit up slowly  · Use assistive devices as directed  · Wear shoes that fit well and have soles that   · Wear a personal alarm  · Stay active  · Manage your medical conditions  Home Safety Tips:  · Add items to prevent falls in the bathroom  · Keep paths clear  · Install bright lights in your home  · Keep items you use often on shelves within reach  · Paint or place reflective tape on the edges of your stairs  Weight Management   Why it is important to manage your weight:  Being overweight increases your risk of health conditions such as heart disease, high blood pressure, type 2 diabetes, and certain types of cancer  It can also increase your risk for osteoarthritis, sleep apnea, and other respiratory problems  Aim for a slow, steady weight loss  Even a small amount of weight loss can lower your risk of health problems  How to lose weight safely:  A safe and healthy way to lose weight is to eat fewer calories and get regular exercise  You can lose up about 1 pound a week by decreasing the number of calories you eat by 500 calories each day  Healthy meal plan for weight management:  A healthy meal plan includes a variety of foods, contains fewer calories, and helps you stay healthy  A healthy meal plan includes the following:  · Eat whole-grain foods more often  A healthy meal plan should contain fiber  Fiber is the part of grains, fruits, and vegetables that is not broken down by your body  Whole-grain foods are healthy and provide extra fiber in your diet  Some examples of whole-grain foods are whole-wheat breads and pastas, oatmeal, brown rice, and bulgur    · Eat a variety of vegetables every day  Include dark, leafy greens such as spinach, kale, katelynn greens, and mustard greens  Eat yellow and orange vegetables such as carrots, sweet potatoes, and winter squash  · Eat a variety of fruits every day  Choose fresh or canned fruit (canned in its own juice or light syrup) instead of juice  Fruit juice has very little or no fiber  · Eat low-fat dairy foods  Drink fat-free (skim) milk or 1% milk  Eat fat-free yogurt and low-fat cottage cheese  Try low-fat cheeses such as mozzarella and other reduced-fat cheeses  · Choose meat and other protein foods that are low in fat  Choose beans or other legumes such as split peas or lentils  Choose fish, skinless poultry (chicken or turkey), or lean cuts of red meat (beef or pork)  Before you cook meat or poultry, cut off any visible fat  · Use less fat and oil  Try baking foods instead of frying them  Add less fat, such as margarine, sour cream, regular salad dressing and mayonnaise to foods  Eat fewer high-fat foods  Some examples of high-fat foods include french fries, doughnuts, ice cream, and cakes  · Eat fewer sweets  Limit foods and drinks that are high in sugar  This includes candy, cookies, regular soda, and sweetened drinks  Exercise:  Exercise at least 30 minutes per day on most days of the week  Some examples of exercise include walking, biking, dancing, and swimming  You can also fit in more physical activity by taking the stairs instead of the elevator or parking farther away from stores  Ask your healthcare provider about the best exercise plan for you  © Copyright [employer] 2018 Information is for End User's use only and may not be sold, redistributed or otherwise used for commercial purposes   All illustrations and images included in CareNotes® are the copyrighted property of A D A M , Inc  or [address] [place] [address] [facility]

## 2023-06-08 ENCOUNTER — OFFICE VISIT (OUTPATIENT)
Dept: PHYSICAL THERAPY | Facility: CLINIC | Age: 68
End: 2023-06-08
Payer: COMMERCIAL

## 2023-06-08 DIAGNOSIS — M54.12 RADICULOPATHY, CERVICAL REGION: ICD-10-CM

## 2023-06-08 DIAGNOSIS — M67.814 BICEPS TENDINOSIS OF LEFT SHOULDER: Primary | ICD-10-CM

## 2023-06-08 PROCEDURE — 97112 NEUROMUSCULAR REEDUCATION: CPT | Performed by: PHYSICAL THERAPIST

## 2023-06-08 NOTE — PROGRESS NOTES
Daily Note     Today's date: 2023  Patient name: Eliot Hancock  : 1955  MRN: [id number]  Referring provider: JESUS Ayala*  Dx:   Encounter Diagnosis     ICD-10-CM    1  Biceps tendinosis of left shoulder  M67 814       2  Radiculopathy, cervical region  M54 12                      Subjective: I have pain in my neck and left shoulder  Objective: See treatment diary below      Assessment: Tolerated treatment well  Patient demonstrated fatigue post treatment and exhibited good technique with therapeutic exercises      Plan: Continue per plan of care        Precautions: standard      Manuals                                                                 Neuro Re-Ed             Neurac scap retract supine  2x5           Neurac Cerv retract supine  2x5           Neurac Sh ER supine  2x5           Neurac sh flex kneel  2x5                                                  Ther Ex             MAT AROM sh er, ext, flex #1 3x10                                                                                                       Ther Activity                                       Gait Training                                       Modalities

## 2023-06-13 ENCOUNTER — VBI (OUTPATIENT)
Dept: ADMINISTRATIVE | Facility: OTHER | Age: 68
End: 2023-06-13

## 2023-06-13 ENCOUNTER — HOSPITAL ENCOUNTER (OUTPATIENT)
Dept: RADIOLOGY | Facility: HOSPITAL | Age: 68
Discharge: HOME | End: 2023-06-13
Attending: FAMILY MEDICINE
Payer: COMMERCIAL

## 2023-06-13 DIAGNOSIS — Z13.820 ENCOUNTER FOR OSTEOPOROSIS SCREENING IN ASYMPTOMATIC POSTMENOPAUSAL PATIENT: ICD-10-CM

## 2023-06-13 DIAGNOSIS — Z78.0 ENCOUNTER FOR OSTEOPOROSIS SCREENING IN ASYMPTOMATIC POSTMENOPAUSAL PATIENT: ICD-10-CM

## 2023-06-13 PROCEDURE — 77080 DXA BONE DENSITY AXIAL: CPT

## 2023-06-13 NOTE — TELEPHONE ENCOUNTER
06/13/23 11:36 AM     [employer] SmartForm used to document caregap status      [place], [place]

## 2023-06-20 ENCOUNTER — OFFICE VISIT (OUTPATIENT)
Dept: PHYSICAL THERAPY | Facility: CLINIC | Age: 68
End: 2023-06-20
Payer: COMMERCIAL

## 2023-06-20 DIAGNOSIS — M19.019 GLENOHUMERAL ARTHRITIS: ICD-10-CM

## 2023-06-20 DIAGNOSIS — M75.82 TENDINITIS OF LEFT ROTATOR CUFF: ICD-10-CM

## 2023-06-20 DIAGNOSIS — S46.012A TRAUMATIC INCOMPLETE TEAR OF LEFT ROTATOR CUFF, INITIAL ENCOUNTER: ICD-10-CM

## 2023-06-20 DIAGNOSIS — M67.814 BICEPS TENDINOSIS OF LEFT SHOULDER: Primary | ICD-10-CM

## 2023-06-20 DIAGNOSIS — M54.12 RADICULOPATHY, CERVICAL REGION: ICD-10-CM

## 2023-06-20 DIAGNOSIS — M19.012 ARTHRITIS OF LEFT ACROMIOCLAVICULAR JOINT: ICD-10-CM

## 2023-06-20 PROCEDURE — 97112 NEUROMUSCULAR REEDUCATION: CPT

## 2023-06-20 PROCEDURE — 97110 THERAPEUTIC EXERCISES: CPT

## 2023-06-20 NOTE — PROGRESS NOTES
Daily Note     Today's date: 2023  Patient name: Windy Ritchie  : 1955  MRN: [id number]  Referring provider: JESUS Olguin*  Dx:   Encounter Diagnosis     ICD-10-CM    1  Biceps tendinosis of left shoulder  M67 814       2  Radiculopathy, cervical region  M54 12       3  Tendinitis of left rotator cuff  M75 82       4  Traumatic incomplete tear of left rotator cuff, initial encounter  S46 012A       5  Glenohumeral arthritis  M19 019       6  Arthritis of left acromioclavicular joint  M19 012           Start Time: 1400          Subjective: My problem is driving  Objective: See treatment diary below      Assessment: Tolerated treatment fair  Patient demonstrated fatigue post treatment, exhibited good technique with therapeutic exercises and would benefit from continued PT      Plan: Progress treatment as tolerated         Precautions: standard      Manuals                                                                 Neuro Re-Ed             Neurac scap retract supine  2x5 --          Neurac Cerv retract supine  2x5 --          Neurac Sh ER supine  2x5 --          Neurac sh flex kneel  2x5 ---                                                 Ther Ex             MAT AROM sh er, ext, flex #1 3x10  #1- 3 x 10 reps          FIS w blue PB   10 x 5 sec hold          pulleys   20x          TB rows, ext    RTB x 20 reps           TB IR   YTB x 20 reps           shld ER @ wall    YTB x 20 reps 5 sec hold w/ scap squeeze                                    Ther Activity                                       Gait Training                                       Modalities

## 2023-06-21 ENCOUNTER — APPOINTMENT (OUTPATIENT)
Dept: PHYSICAL THERAPY | Facility: CLINIC | Age: 68
End: 2023-06-21
Payer: COMMERCIAL

## 2023-06-26 ENCOUNTER — OFFICE VISIT (OUTPATIENT)
Dept: PHYSICAL THERAPY | Facility: CLINIC | Age: 68
End: 2023-06-26
Payer: COMMERCIAL

## 2023-06-26 DIAGNOSIS — M19.019 GLENOHUMERAL ARTHRITIS: Primary | ICD-10-CM

## 2023-06-26 DIAGNOSIS — M54.12 RADICULOPATHY, CERVICAL REGION: ICD-10-CM

## 2023-06-26 DIAGNOSIS — M19.012 ARTHRITIS OF LEFT ACROMIOCLAVICULAR JOINT: ICD-10-CM

## 2023-06-26 PROCEDURE — 97110 THERAPEUTIC EXERCISES: CPT | Performed by: PHYSICAL THERAPIST

## 2023-06-26 PROCEDURE — 97112 NEUROMUSCULAR REEDUCATION: CPT | Performed by: PHYSICAL THERAPIST

## 2023-06-26 NOTE — PROGRESS NOTES
Daily Note     Today's date: 2023  Patient name: Johnnie Blackwood  : 1955  MRN: [id number]  Referring provider: JESUS Blandon*  Dx:   Encounter Diagnosis     ICD-10-CM    1  Glenohumeral arthritis  M19 019       2  Arthritis of left acromioclavicular joint  M19 012       3  Radiculopathy, cervical region  M54 12                      Subjective: The pain in the shoulder and neck is worse driving  Objective: See treatment diary below      Assessment: Tolerated treatment well  Patient demonstrated fatigue post treatment and exhibited good technique with therapeutic exercises      Plan: Continue per plan of care        Precautions: standard      Manuals                                                                 Neuro Re-Ed             Neurac scap retract supine  2x5 --          Neurac Cerv retract supine  2x5 --          Neurac Sh ER supine  2x5 --          Neurac sh flex kneel  2x5 ---          Postural re-ed    mv                                    Ther Ex             MAT AROM sh er, ext, flex #1 3x10  #1- 3 x 10 reps #2 20x         FIS w blue PB   10 x 5 sec hold 20x         pulleys   20x 20x         TB rows, ext    RTB x 20 reps  20x         TB IR   YTB x 20 reps  20x         shld ER @ wall    YTB x 20 reps 5 sec hold w/ scap squeeze 20x                                   Ther Activity                                       Gait Training                                       Modalities

## 2023-06-28 ENCOUNTER — OFFICE VISIT (OUTPATIENT)
Dept: PHYSICAL THERAPY | Facility: CLINIC | Age: 68
End: 2023-06-28
Payer: COMMERCIAL

## 2023-06-28 DIAGNOSIS — M19.019 GLENOHUMERAL ARTHRITIS: Primary | ICD-10-CM

## 2023-06-28 DIAGNOSIS — M19.012 ARTHRITIS OF LEFT ACROMIOCLAVICULAR JOINT: ICD-10-CM

## 2023-06-28 DIAGNOSIS — M54.12 RADICULOPATHY, CERVICAL REGION: ICD-10-CM

## 2023-06-28 PROCEDURE — 97112 NEUROMUSCULAR REEDUCATION: CPT | Performed by: PHYSICAL MEDICINE & REHABILITATION

## 2023-06-28 PROCEDURE — 97110 THERAPEUTIC EXERCISES: CPT | Performed by: PHYSICAL MEDICINE & REHABILITATION

## 2023-06-28 NOTE — PROGRESS NOTES
"Daily Note     Today's date: 2023  Patient name: Will Adams  : 1955  MRN: [id number]  Referring provider: JESUS Hammonds*  Dx:   Encounter Diagnosis     ICD-10-CM    1  Glenohumeral arthritis  M19 019       2  Arthritis of left acromioclavicular joint  M19 012       3  Radiculopathy, cervical region  M54 12                      Subjective: Patient reports her shoulder is still sore with prolonged positional holds, with activities such as cooking, driving, and at time sleeping  Objective: See treatment diary below      Assessment: Tolerated treatment well  Patient demonstrated fatigue post treatment and exhibited good technique with therapeutic exercises      Plan: Continue per plan of care        Precautions: standard      Manuals           PROM left shld     TC                                               Neuro Re-Ed             Neurac scap retract supine  2x5 --          Neurac Cerv retract supine  2x5 --          Neurac Sh ER supine  2x5 --          Neurac sh flex kneel  2x5 ---          Postural re-ed    mv                                    Ther Ex             MAT AROM sh er, ext, flex #1 3x10  #1- 3 x 10 reps #2 20x #2 20x        Shld supine flexion     #2 20x        S/l shld abduction      #20 20x        FIS w blue PB   10 x 5 sec hold 20x 20x with 5\" holds        pulleys   20x 20x 20x        TB rows, ext    RTB x 20 reps  20x 20x        TB IR   YTB x 20 reps  20x 20x        shld ER @ wall    YTB x 20 reps 5 sec hold w/ scap squeeze 20x 20x        Shld flexion at wall     With ball 20x                     Ther Activity                                       Gait Training                                       Modalities                                            "

## 2023-07-10 ENCOUNTER — TELEPHONE (OUTPATIENT)
Dept: PULMONOLOGY | Facility: CLINIC | Age: 68
End: 2023-07-10

## 2023-07-10 DIAGNOSIS — J45.30 MILD PERSISTENT ASTHMA WITHOUT COMPLICATION: Primary | ICD-10-CM

## 2023-07-10 RX ORDER — PREDNISONE 10 MG/1
TABLET ORAL
Qty: 30 TABLET | Refills: 0 | Status: SHIPPED | OUTPATIENT
Start: 2023-07-10 | End: 2023-07-22

## 2023-07-10 NOTE — TELEPHONE ENCOUNTER
Patient lvm stating that ever since yesterday when she was cooking she started coughing a lot from the smoke. She said that she used her nebulizer but is not feeling any relief. She is requesting an appointment for today. Please advise.

## 2023-07-10 NOTE — TELEPHONE ENCOUNTER
I called and spoke with the patient. She is not having any infectious symptoms. I sent prednisone to the pharmacy for her and asked her to call us back if symptoms not improving and go to the ER if she is getting worse.

## 2023-07-10 NOTE — TELEPHONE ENCOUNTER
Spoke with patient and she informed that yesterday her daughter was cooking some wesley and the smoke from it has made her have few asthma attacks. Has been using her Trelegy and albuterol solutions with no relief. Coughing and some sob. Was advisewd if symptoms get worse to please be evaluated in the ER. Had a very hard time talking on the phone. Requested some prednisone to help her in the meantime. Would like to be seen today. Pharmacy [facility] choice [facility] on file. Please adivse.

## 2023-07-13 ENCOUNTER — HOSPITAL ENCOUNTER (EMERGENCY)
Facility: HOSPITAL | Age: 68
Discharge: HOME | End: 2023-07-13
Attending: STUDENT IN AN ORGANIZED HEALTH CARE EDUCATION/TRAINING PROGRAM
Payer: COMMERCIAL

## 2023-07-13 ENCOUNTER — APPOINTMENT (EMERGENCY)
Dept: RADIOLOGY | Facility: HOSPITAL | Age: 68
End: 2023-07-13
Payer: COMMERCIAL

## 2023-07-13 ENCOUNTER — APPOINTMENT (OUTPATIENT)
Dept: PHYSICAL THERAPY | Facility: CLINIC | Age: 68
End: 2023-07-13
Payer: COMMERCIAL

## 2023-07-13 VITALS
DIASTOLIC BLOOD PRESSURE: 61 MMHG | TEMPERATURE: 98.3 F | RESPIRATION RATE: 20 BRPM | HEART RATE: 81 BPM | OXYGEN SATURATION: 97 % | SYSTOLIC BLOOD PRESSURE: 134 MMHG

## 2023-07-13 DIAGNOSIS — J20.9 ACUTE BRONCHITIS, UNSPECIFIED ORGANISM: Primary | ICD-10-CM

## 2023-07-13 PROCEDURE — 87205 SMEAR GRAM STAIN: CPT | Performed by: STUDENT IN AN ORGANIZED HEALTH CARE EDUCATION/TRAINING PROGRAM

## 2023-07-13 PROCEDURE — 87070 CULTURE OTHR SPECIMN AEROBIC: CPT | Performed by: STUDENT IN AN ORGANIZED HEALTH CARE EDUCATION/TRAINING PROGRAM

## 2023-07-13 PROCEDURE — 71046 X-RAY EXAM CHEST 2 VIEWS: CPT

## 2023-07-13 PROCEDURE — 93005 ELECTROCARDIOGRAM TRACING: CPT

## 2023-07-13 RX ORDER — SODIUM CHLORIDE FOR INHALATION 0.9 %
3 VIAL, NEBULIZER (ML) INHALATION ONCE
Status: COMPLETED | OUTPATIENT
Start: 2023-07-13 | End: 2023-07-13

## 2023-07-13 RX ORDER — BENZONATATE 100 MG/1
100 CAPSULE ORAL 3 TIMES DAILY PRN
Qty: 21 CAPSULE | Refills: 0 | Status: SHIPPED | OUTPATIENT
Start: 2023-07-13

## 2023-07-13 RX ORDER — AZITHROMYCIN 250 MG/1
TABLET, FILM COATED ORAL
Qty: 6 TABLET | Refills: 0 | Status: SHIPPED | OUTPATIENT
Start: 2023-07-13 | End: 2023-07-17

## 2023-07-13 RX ADMIN — IPRATROPIUM BROMIDE 0.5 MG: 0.5 SOLUTION RESPIRATORY (INHALATION) at 10:54

## 2023-07-13 RX ADMIN — ISODIUM CHLORIDE 3 ML: 0.03 SOLUTION RESPIRATORY (INHALATION) at 10:54

## 2023-07-13 RX ADMIN — ALBUTEROL SULFATE 5 MG: 2.5 SOLUTION RESPIRATORY (INHALATION) at 10:55

## 2023-07-13 NOTE — DISCHARGE INSTRUCTIONS
You were evaluated in the Emergency Department today for a cough. Your chest xray did not show evidence of a pneumonia- your cough is most likely due to a viral illness which will improve on its own with rest and fluids. You can take over the counter medications such as dextromethorphan to help manage your symptoms. Please schedule an appointment for follow up with your primary care physician within two days. Return to the Emergency Department if you experience worsening cough, fever 100.4 ° F or greater, recurrent vomiting, chest pain, shortness of breath, or any other concerning symptoms.

## 2023-07-13 NOTE — TELEPHONE ENCOUNTER
Pt lvm stating she is still not feeling well, she was coughing during her voicemail. She is asking to be seen.  Please advise

## 2023-07-13 NOTE — ED PROVIDER NOTES
History  Chief Complaint   Patient presents with   • Shortness of Breath   • Cough     Pt report sob since Sunday with cough took her own albuterol     Pt is a 68 YO F, PMhx including asthma, arthritis, HLD, and GERD, who presents to the ED for a cough and SOB. Pt states that since this past Sunday after her family member cooked [place] she has had a cough. Cough is productive of clear sputum. She initially thought it was her asthma so she called her pulmonologist but her pulmonologist could not see her. She was prescribed prednisone which she has been taking. Patient states initially the cough improved but yesterday it worsened again. 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 No other modifying factors. No associated symptoms. Denies any chest pain. No fevers or chills. No other complaints or concerns. Chart reviewed. Patient called her pulmonologist on 7/10/2023 complaining of coughing after being exposed to cooking smoke. She was using her nebulizer without any relief. Requested an appointment. Patient was unable to be seen in the office. Prednisone was ordered (40 mg for 3 days then 30 mg for 3 days then 20 mg for 3 days then 10 mg for 3 days). Prior to Admission Medications   Prescriptions Last Dose Informant Patient Reported? Taking?    Ascorbic Acid (VITAMIN C) 1000 MG tablet  Self Yes No   albuterol (2.5 mg/3 mL) 0.083 % nebulizer solution  Self No No   Sig: Take 3 mL (2.5 mg total) by nebulization every 6 (six) hours as needed for wheezing or shortness of breath   atorvastatin (LIPITOR) 20 mg tablet  Self No No   Sig: TAKE ONE TABLET BY MOUTH EVERY DAY AT BEDTIME   chlorhexidine (PERIDEX) 0.12 % solution   Yes No   cholecalciferol (VITAMIN D3) 1,000 units tablet  Self Yes No   Sig: Take 2,000 Units by mouth daily   clobetasol (TEMOVATE) 0.05 % cream  Self No No   Sig: Apply topically 2 (two) times a day Apply topically on scalp and right shoulder two times a day   famotidine (PEPCID) 40 MG tablet   No No   Sig: Take 1 tablet (40 mg total) by mouth daily at bedtime   fluticasone-umeclidinium-vilanterol (Trelegy Ellipta) 100-62.5-25 mcg/actuation inhaler  Self No No   Sig: Inhale 1 puff daily Rinse mouth after use.   hydrocortisone 2.5 % cream  Self Yes No   Si (two) times a day scalp   metFORMIN (GLUCOPHAGE-XR) 500 mg 24 hr tablet  Self No No   Sig: TAKE ONE TABLET BY MOUTH EVERY DAY WITH DINNER   methocarbamol (ROBAXIN) 500 mg tablet  Self No No   Sig: Take 1 tablet (500 mg total) by mouth 2 (two) times a day   montelukast (SINGULAIR) 10 mg tablet   Yes No   Sig: Take 10 mg by mouth daily at bedtime   multivitamin (THERAGRAN) TABS  Self Yes No   Sig: Take 1 tablet by mouth daily Last dose 3/21/21   pantoprazole (PROTONIX) 40 mg tablet   Yes No   predniSONE 10 mg tablet   No No   Sig: Take 4 tablets (40 mg total) by mouth daily for 3 days, THEN 3 tablets (30 mg total) daily for 3 days, THEN 2 tablets (20 mg total) daily for 3 days, THEN 1 tablet (10 mg total) daily for 3 days.       Facility-Administered Medications: None       Past Medical History:   Diagnosis Date   • Abnormal glucose     last assessed 16   • Arthritis    • Asthma     w/ exacerbation; last assessed 5/14/15   • Atypical nevi    • Atypical nevus     last assessed 17   • Breast lump     last assessed 3/6/14   • Cataract    • Cervical adenopathy     last assessed  17   • Colon polyp    • CPAP (continuous positive airway pressure) dependence    • Diverticulitis of colon    • Dizziness    • Dyslipidemia     last assessed 17   • Facial droop     last assessed  16   • Fibromyalgia, primary    • Flu 2018   • Foot abrasion     right between the 4th and 5th toe   • Fractures    • Genital herpes     resolved 16   • GERD (gastroesophageal reflux disease)    • Headache, tension-type    • Herpes zoster     last assessed 16   • History of shingles     may 2016   • History of stomach ulcers    • Hx of abnormal mammogram     last assessed  3/5/14   • Hyperlipidemia    • Myalgia     last assessed  12   • Myositis     12   • Neck pain    • Pain involving joints of fingers of both hands 2021   • Peripheral neuropathy    • Seborrheic keratosis    • Shingles    • Skin disorder    • Skin neoplasm     of the lower limb, including hip; onset 12; last assessed  12   • Sleep apnea    • Stomach disorder        Past Surgical History:   Procedure Laterality Date   • ABDOMINAL SURGERY      release of adhesions   • BLADDER SURGERY      mesh-lift   • BREAST CYST ASPIRATION Right     does not know the year   • BREAST SURGERY      lift   • CATARACT EXTRACTION Bilateral    •  SECTION      x [phone]   • CHOLECYSTECTOMY      lap   • COLONOSCOPY     • COSMETIC SURGERY      tummy and breast lift   • ESOPHAGOGASTRODUODENOSCOPY     • OOPHORECTOMY Left    • OTHER SURGICAL HISTORY      vocal cord surgery, scraping   • MN HYSTEROSCOPY BX ENDOMETRIUM&/POLYPC W/WO D&C N/A 2016    Procedure: DILATATION AND CURETTAGE (D&C) WITH HYSTEROSCOPY;  Surgeon: Torie Cadena MD;  Location: [facility] (formerly [facility]3];  Service: Gynecology   • REDUCTION MAMMAPLASTY Bilateral    • TONSILLECTOMY     • TUBAL LIGATION  10/29/1984   • [address] [facility] PROCEDURE  2021       Family History   Problem Relation Age of Onset   • Hypertension Mother    • Alzheimer's disease Mother    • Arthritis Mother    • Hypertension Father    • Arthritis Father    • Heart attack Father    • Heart disease Father    • Stroke Father    • Other Brother         vertigo, tinnitus   • Diabetes Daughter    • Breast cancer Sister 28   • Skin cancer Sister    • Cancer Sister    • Other Son         downs syndrome   • Abdominal aortic aneurysm Sister    • Cancer Sister         T cell sarcoma   • Breast cancer Sister    • No Known Problems Brother    • Diabetes Brother    • Other Sister         anemia from the diet   • No Known Problems Son    • No Known Problems Maternal Aunt    • No Known Problems Maternal Aunt    • No Known Problems Paternal Aunt    • No Known Problems Paternal Aunt    • No Known Problems Paternal Aunt    • Asthma Sister      I have reviewed and agree with the history as documented. E-Cigarette/Vaping   • E-Cigarette Use Never User      E-Cigarette/Vaping Substances   • Nicotine No    • THC No    • CBD No    • Flavoring No    • Other No    • Unknown No      Social History     Tobacco Use   • Smoking status: Never   • Smokeless tobacco: Never   Vaping Use   • Vaping Use: Never used   Substance Use Topics   • Alcohol use: No   • Drug use: Never       Review of Systems   Constitutional: Negative for chills and fever. Respiratory: Positive for cough and chest tightness. Cardiovascular: Negative for chest pain. Gastrointestinal: Negative for nausea and vomiting. All other systems reviewed and are negative. Physical Exam  Physical Exam  Vitals and nursing note reviewed. Constitutional:       General: She is not in acute distress. Appearance: She is well-developed. She is not ill-appearing, toxic-appearing or diaphoretic. HENT:      Head: Normocephalic and atraumatic. Right Ear: External ear normal.      Left Ear: External ear normal.      Nose: Nose normal.   Eyes:      General: Lids are normal. No scleral icterus. Cardiovascular:      Rate and Rhythm: Normal rate and regular rhythm. Heart sounds: Normal heart sounds. No murmur heard. No friction rub. No gallop. Pulmonary:      Effort: Pulmonary effort is normal. No respiratory distress. Breath sounds: Wheezing (Faint wheezing heard during patient's coughing at the bases) present. No rales. Abdominal:      Palpations: Abdomen is soft. Tenderness: There is no abdominal tenderness. There is no guarding or rebound. Musculoskeletal:         General: No deformity. Normal range of motion. Cervical back: Normal range of motion and neck supple. Skin:     General: Skin is warm and dry. Neurological:      General: No focal deficit present. Mental Status: She is alert. Psychiatric:         Mood and Affect: Mood normal.         Behavior: Behavior normal.         Vital Signs  ED Triage Vitals [07/13/23 1014]   Temperature Pulse Respirations Blood Pressure SpO2   98.3 °F (36.8 °C) 81 20 134/61 97 %      Temp src Heart Rate Source Patient Position - Orthostatic VS BP Location FiO2 (%)   -- Monitor -- -- --      Pain Score       --           Vitals:    07/13/23 1014   BP: 134/61   Pulse: 81         Visual Acuity      ED Medications  Medications   albuterol inhalation solution 5 mg (5 mg Nebulization Given 7/13/23 1055)     And   ipratropium (ATROVENT) 0.02 % inhalation solution 0.5 mg (0.5 mg Nebulization Given 7/13/23 1054)     And   sodium chloride 0.9 % inhalation solution 3 mL (3 mL Nebulization Given 7/13/23 1054)       Diagnostic Studies  Results Reviewed     Procedure Component Value Units Date/Time    Sputum culture and Gram stain [[id number]] Collected: 07/13/23 1126    Lab Status:  In process Specimen: Expectorated Sputum Updated: 07/13/23 1142                 XR chest 2 views   ED Interpretation by Galdino Morales DO (07/13 1100)   No acute cardiopulmonary disease                 Procedures  ECG 12 Lead Documentation Only    Date/Time: 7/13/2023 10:42 AM    Performed by: Galdino Morales DO  Authorized by: Galdino Morales DO    ECG reviewed by me, the ED Provider: yes    Patient location:  ED  Interpretation:     Interpretation: abnormal    Rate:     ECG rate:  79    ECG rate assessment: normal    Rhythm:     Rhythm: sinus rhythm    Ectopy:     Ectopy: none    QRS:     QRS axis:  Normal  ST segments:     ST segments:  Normal  T waves:     T waves: normal    Q waves:     Q waves:  III and aVF             ED Course  ED Course as of 07/13/23 1959   Thu Jul 13, 2023   1118 Patient reevaluated. Resting comfortably. Reports some improvement. Discussed results and findings. Explained no obvious pneumonia seen on x-ray. Patient requesting sputum culture. Will treat as acute bronchitis. Will prescribe azithromycin and Tessalon Perles. Recommended PCP follow-up. Return precautions discussed. Patient verbalized understanding and agreed to plan of care. Medical Decision Making  Patient is a 69 y.o. female with a past medical history of asthma who presented to the ED for a cough. Patient is nontoxic, well-appearing. Vitals are stable. On exam she has a faint wheeze heard while coughing. Cough is likely secondary to acute bronchitis. Differential includes mild asthma exacerbation. Presentation not consistent with acute bacterial pneumonia, influenza, or asthma. Presentation not consistent with chronic causes of cough (including GERD, postnasal discharge, medication side effect, CHF, lung cancer or mass). Plan: CXR, breathing treatment, supportive care, reassess    Portions of the record may have been created with voice recognition software. Occasional wrong word or "sound a like" substitutions may have occurred due to the inherent limitations of voice recognition software. Read the chart carefully and recognize, using context, where substitutions have occurred. Acute bronchitis, unspecified organism: acute illness or injury  Amount and/or Complexity of Data Reviewed  Labs: ordered. Radiology: ordered and independent interpretation performed. Risk  Prescription drug management.           Disposition  Final diagnoses:   Acute bronchitis, unspecified organism     Time reflects when diagnosis was documented in both MDM as applicable and the Disposition within this note     Time User Action Codes Description Comment    7/13/2023 11:19 AM Estefania Mar [J20.9] Acute bronchitis, unspecified organism       ED Disposition     ED Disposition   Discharge    Condition   Stable    Date/Time   Thu Jul 13, 2023 11:18 AM    Comment   Oliverio Irvin discharge to home/self care.                Follow-up Information     Follow up With Specialties Details Why Contact Info Additional Information    Cuauhtemoc Vidales MD Family Medicine   [address] [facility] [id number] [facility] Emergency Medicine  As needed [address] [address]  [address] [facility], [address] , [facility], [address]          Discharge Medication List as of 7/13/2023 11:20 AM      START taking these medications    Details   azithromycin (ZITHROMAX) 250 mg tablet Take 2 tablets today then 1 tablet daily x 4 days, Normal      benzonatate (TESSALON PERLES) 100 mg capsule Take 1 capsule (100 mg total) by mouth 3 (three) times a day as needed for cough, Starting Thu 7/13/2023, Normal         CONTINUE these medications which have NOT CHANGED    Details   albuterol (2.5 mg/3 mL) 0.083 % nebulizer solution Take 3 mL (2.5 mg total) by nebulization every 6 (six) hours as needed for wheezing or shortness of breath, Starting Mon 4/4/2022, Normal      Ascorbic Acid (VITAMIN C) 1000 MG tablet Starting Mon 4/20/2020, Historical Med      atorvastatin (LIPITOR) 20 mg tablet TAKE ONE TABLET BY MOUTH EVERY DAY AT BEDTIME, Normal      chlorhexidine (PERIDEX) 0.12 % solution Starting Thu 4/6/2023, Historical Med      cholecalciferol (VITAMIN D3) 1,000 units tablet Take 2,000 Units by mouth daily, Historical Med      clobetasol (TEMOVATE) 0.05 % cream Apply topically 2 (two) times a day Apply topically on scalp and right shoulder two times a day, Starting Thu 6/30/2022, Normal      famotidine (PEPCID) 40 MG tablet Take 1 tablet (40 mg total) by mouth daily at bedtime, Starting Thu 6/1/2023, Normal      fluticasone-umeclidinium-vilanterol (Trelegy Ellipta) 100-62.5-25 mcg/actuation inhaler Inhale 1 puff daily Rinse mouth after use., Starting Tue 3/14/2023, Until Tue 5/23/2023, Normal      hydrocortisone 2.5 % cream 2 (two) times a day scalp, Starting Mon 12/14/2020, Historical Med      metFORMIN (GLUCOPHAGE-XR) 500 mg 24 hr tablet TAKE ONE TABLET BY MOUTH EVERY DAY WITH DINNER, Normal      methocarbamol (ROBAXIN) 500 mg tablet Take 1 tablet (500 mg total) by mouth 2 (two) times a day, Starting Sun 1/8/2023, Normal      montelukast (SINGULAIR) 10 mg tablet Take 10 mg by mouth daily at bedtime, Starting Wed 4/26/2023, Historical Med      multivitamin (THERAGRAN) TABS Take 1 tablet by mouth daily Last dose 3/21/21, Historical Med      pantoprazole (PROTONIX) 40 mg tablet Starting Thu 6/1/2023, Historical Med      predniSONE 10 mg tablet Multiple Dosages:Starting Mon 7/10/2023, Until Wed 7/12/2023 at 2359, THEN Starting Thu 7/13/2023, Until Sat 7/15/2023 at 2359, THEN Starting Sun 7/16/2023, Until Tue 7/18/2023 at 2359, THEN Starting Wed 7/19/2023, Until Fri 7/21/2023 at 2359Take 4  tablets (40 mg total) by mouth daily for 3 days, THEN 3 tablets (30 mg total) daily for 3 days, THEN 2 tablets (20 mg total) daily for 3 days, THEN 1 tablet (10 mg total) daily for 3 days. , Normal             No discharge procedures on file.     PDMP Review     None          ED Provider  Electronically Signed by           Galdino Morales DO  07/13/23 1959

## 2023-07-14 LAB
ATRIAL RATE: 79 BPM
P AXIS: 25 DEGREES
PR INTERVAL: 144 MS
QRS AXIS: -10 DEGREES
QRSD INTERVAL: 72 MS
QT INTERVAL: 336 MS
QTC INTERVAL: 385 MS
T WAVE AXIS: 29 DEGREES
VENTRICULAR RATE: 79 BPM

## 2023-07-14 PROCEDURE — 93010 ELECTROCARDIOGRAM REPORT: CPT | Performed by: INTERNAL MEDICINE

## 2023-07-16 LAB
BACTERIA SPT RESP CULT: ABNORMAL
GRAM STN SPEC: ABNORMAL

## 2023-07-17 ENCOUNTER — OFFICE VISIT (OUTPATIENT)
Dept: PHYSICAL THERAPY | Facility: CLINIC | Age: 68
End: 2023-07-17
Payer: COMMERCIAL

## 2023-07-17 DIAGNOSIS — M75.82 TENDINITIS OF LEFT ROTATOR CUFF: Primary | ICD-10-CM

## 2023-07-17 PROCEDURE — 97110 THERAPEUTIC EXERCISES: CPT | Performed by: PHYSICAL THERAPIST

## 2023-07-17 PROCEDURE — 97112 NEUROMUSCULAR REEDUCATION: CPT | Performed by: PHYSICAL THERAPIST

## 2023-07-17 NOTE — PROGRESS NOTES
Daily Note     Today's date: 2023  Patient name: Wally Schulte  : 1955  MRN: [id number]  Referring provider: JESUS Maya*  Dx:   Encounter Diagnosis     ICD-10-CM    1. Tendinitis of left rotator cuff  M75.82                      Subjective: Reports left shoulder pain. Objective: See treatment diary below      Assessment: Tolerated treatment well. Patient demonstrated fatigue post treatment and exhibited good technique with therapeutic exercises      Plan: Continue per plan of care.       Precautions: standard      Manuals          PROM left shld     TC                                               Neuro Re-Ed             Neurac scap retract supine  2x5 --          Neurac Cerv retract supine  2x5 --          Neurac Sh ER supine  2x5 --          Neurac sh flex kneel  2x5 ---          Postural re-ed    mv                                    Ther Ex             MAT AROM sh er, ext, flex #1 3x10  #1- 3 x 10 reps #2 20x #2 20x #2 20x       Shld supine flexion     #2 20x #2 20x       S/l shld abduction      #20 20x #2 20x       FIS w blue PB   10 x 5 sec hold 20x 20x with 5" holds 20x       pulleys   20x 20x 20x 20x       TB rows, ext    RTB x 20 reps  20x 20x 20x       TB IR   YTB x 20 reps  20x 20x 20x       shld ER @ wall    YTB x 20 reps 5 sec hold w/ scap squeeze 20x 20x 20x       Shld flexion at wall     With ball 20x 20x                    Ther Activity                                       Gait Training                                       Modalities

## 2023-07-21 ENCOUNTER — OFFICE VISIT (OUTPATIENT)
Dept: OBGYN CLINIC | Facility: CLINIC | Age: 68
End: 2023-07-21
Payer: COMMERCIAL

## 2023-07-21 VITALS
SYSTOLIC BLOOD PRESSURE: 116 MMHG | BODY MASS INDEX: 27.66 KG/M2 | HEIGHT: 64 IN | DIASTOLIC BLOOD PRESSURE: 71 MMHG | HEART RATE: 83 BPM | WEIGHT: 162 LBS

## 2023-07-21 DIAGNOSIS — M75.82 TENDINITIS OF LEFT ROTATOR CUFF: Primary | ICD-10-CM

## 2023-07-21 DIAGNOSIS — M19.012 ARTHRITIS OF LEFT ACROMIOCLAVICULAR JOINT: ICD-10-CM

## 2023-07-21 DIAGNOSIS — M54.12 RADICULOPATHY, CERVICAL REGION: ICD-10-CM

## 2023-07-21 DIAGNOSIS — S46.012A TRAUMATIC INCOMPLETE TEAR OF LEFT ROTATOR CUFF, INITIAL ENCOUNTER: ICD-10-CM

## 2023-07-21 DIAGNOSIS — M19.019 GLENOHUMERAL ARTHRITIS: ICD-10-CM

## 2023-07-21 PROCEDURE — 99213 OFFICE O/P EST LOW 20 MIN: CPT | Performed by: ORTHOPAEDIC SURGERY

## 2023-07-21 NOTE — PROGRESS NOTES
Assessment/Plan:  1. Tendinitis of left rotator cuff        2. Traumatic incomplete tear of left rotator cuff, initial encounter        3. Glenohumeral arthritis        4. Radiculopathy, cervical region        5. Arthritis of left acromioclavicular joint          Scribe Attestation    I,:  Bayron Arroyo am acting as a scribe while in the presence of the attending physician.:       I,:  Jong Villanueva MD personally performed the services described in this documentation    as scribed in my presence.:         Marcial Larsen upon examination, review of the MRI of the left shoulder as well as review of the physical therapy notes does demonstrate persistent pain that is multifactorial.  I did note that it is likely she is experiencing pain into her shoulder that is referring from the cervical spine as the steroid injection provided her with no relief. Additionally she does have several areas that could potentially be pain generators in the form of the osteoarthritis at the glenohumeral joint, acromioclavicular joint as well as a small partial tear of the rotator cuff. However, nonoperative care can still be considered with continuation of physical therapy. I do find this to be an appropriate course of treatment for her. She may continue with activities of daily living as tolerated. She may utilize oral analgesics as needed as well as ice over her shoulder after activities. Marcial Larsen verbalized understanding had no further questions. I will see her back in 6 to 8 weeks for repeat clinical evaluation. Subjective:   Aleena Hagen is a 69 y.o. female who presents for follow evaluation of her left shoulder. She does have a known history of a rotator cuff tear, glenohumeral joint, acromioclavicular joint osteoarthritis biceps long head tenosynovitis and cervical radiculopathy. She was provided a subacromial steroid injection at her last visit on 5/26/2023. This did not provide her with symptomatic relief.   She was also participating in physical therapy up until June 28, 2023. She unfortunately developed bronchitis that was exacerbated by her asthma. She was evaluated at the emergency department due to her issues with her breathing. Was treated with a nebulizer. She states that she is still experiencing symptoms of tightness in her chest and difficulty taking deep inhalations. She states that due to this disruption in her physical therapy she has not had the desired results in regards to her pain relief and overall function. Today she denies any distal paresthesias. She does have physical therapy scheduled out for the next several weeks. Review of Systems   Constitutional: Negative for chills, fever and unexpected weight change. HENT: Negative for hearing loss, nosebleeds and sore throat. Eyes: Negative for pain, redness and visual disturbance. Respiratory: Negative for cough, shortness of breath and wheezing. Cardiovascular: Negative for chest pain, palpitations and leg swelling. Gastrointestinal: Negative for abdominal pain, nausea and vomiting. Endocrine: Negative for polydipsia and polyuria. Genitourinary: Negative for dysuria and hematuria. Musculoskeletal: Positive for arthralgias and myalgias. See HPI   Skin: Negative for rash and wound. Neurological: Negative for dizziness, numbness and headaches. Psychiatric/Behavioral: Negative for decreased concentration and suicidal ideas. The patient is not nervous/anxious.           Past Medical History:   Diagnosis Date   • Abnormal glucose     last assessed 2/25/16   • Arthritis    • Asthma     w/ exacerbation; last assessed 5/14/15   • Atypical nevi    • Atypical nevus     last assessed 1/18/17   • Breast lump     last assessed 3/6/14   • Cataract    • Cervical adenopathy     last assessed  2/7/17   • Colon polyp    • CPAP (continuous positive airway pressure) dependence    • Diverticulitis of colon    • Dizziness    • Dyslipidemia last assessed 17   • Facial droop     last assessed  16   • Fibromyalgia, primary    • Flu 2018   • Foot abrasion     right between the 4th and 5th toe   • Fractures    • Genital herpes     resolved 16   • GERD (gastroesophageal reflux disease)    • Headache, tension-type    • Herpes zoster     last assessed 16   • History of shingles     may 2016   • History of stomach ulcers    • Hx of abnormal mammogram     last assessed  3/5/14   • Hyperlipidemia    • Myalgia     last assessed  12   • Myositis     12   • Neck pain    • Pain involving joints of fingers of both hands 2021   • Peripheral neuropathy    • Seborrheic keratosis    • Shingles    • Skin disorder    • Skin neoplasm     of the lower limb, including hip; onset 12; last assessed  12   • Sleep apnea    • Stomach disorder        Past Surgical History:   Procedure Laterality Date   • ABDOMINAL SURGERY      release of adhesions   • BLADDER SURGERY      mesh-lift   • BREAST CYST ASPIRATION Right     does not know the year   • BREAST SURGERY      lift   • CATARACT EXTRACTION Bilateral    •  SECTION      x [id number]   • CHOLECYSTECTOMY      lap   • COLONOSCOPY     • COSMETIC SURGERY      tummy and breast lift   • ESOPHAGOGASTRODUODENOSCOPY     • OOPHORECTOMY Left    • OTHER SURGICAL HISTORY      vocal cord surgery, scraping   • [place] HYSTEROSCOPY BX ENDOMETRIUM&/POLYPC W/WO D&C N/A 2016    Procedure: DILATATION AND CURETTAGE (D&C) WITH HYSTEROSCOPY;  Surgeon: Mary Carmen Carbone MD;  Location: [facility];  Service: Gynecology   • REDUCTION MAMMAPLASTY Bilateral    • TONSILLECTOMY     • TUBAL LIGATION  10/29/1984   • [address] MSK PROCEDURE  2021       Family History   Problem Relation Age of Onset   • Hypertension Mother    • Alzheimer's disease Mother    • Arthritis Mother    • Hypertension Father    • Arthritis Father    • Heart attack Father    • Heart disease Father    • Stroke Father • Other Brother         vertigo, tinnitus   • Diabetes Daughter    • Breast cancer Sister 28   • Skin cancer Sister    • Cancer Sister    • Other Son         downs syndrome   • Abdominal aortic aneurysm Sister    • Cancer Sister         T cell sarcoma   • Breast cancer Sister    • No Known Problems Brother    • Diabetes Brother    • Other Sister         anemia from the diet   • No Known Problems Son    • No Known Problems Maternal Aunt    • No Known Problems Maternal Aunt    • No Known Problems Paternal Aunt    • No Known Problems Paternal Aunt    • No Known Problems Paternal Aunt    • Asthma Sister        Social History     Occupational History   • Not on file   Tobacco Use   • Smoking status: Never   • Smokeless tobacco: Never   Vaping Use   • Vaping Use: Never used   Substance and Sexual Activity   • Alcohol use: No   • Drug use: Never   • Sexual activity: Not Currently     Birth control/protection: Post-menopausal         Current Outpatient Medications:   •  albuterol (2.5 mg/3 mL) 0.083 % nebulizer solution, Take 3 mL (2.5 mg total) by nebulization every 6 (six) hours as needed for wheezing or shortness of breath, Disp: 180 mL, Rfl: 5  •  Ascorbic Acid (VITAMIN C) 1000 MG tablet, , Disp: , Rfl:   •  atorvastatin (LIPITOR) 20 mg tablet, TAKE ONE TABLET BY MOUTH EVERY DAY AT BEDTIME, Disp: 90 tablet, Rfl: 1  •  benzonatate (TESSALON PERLES) 100 mg capsule, Take 1 capsule (100 mg total) by mouth 3 (three) times a day as needed for cough, Disp: 21 capsule, Rfl: 0  •  chlorhexidine (PERIDEX) 0.12 % solution, , Disp: , Rfl:   •  cholecalciferol (VITAMIN D3) 1,000 units tablet, Take 2,000 Units by mouth daily, Disp: , Rfl:   •  clobetasol (TEMOVATE) 0.05 % cream, Apply topically 2 (two) times a day Apply topically on scalp and right shoulder two times a day, Disp: 60 g, Rfl: 2  •  famotidine (PEPCID) 40 MG tablet, Take 1 tablet (40 mg total) by mouth daily at bedtime, Disp: 90 tablet, Rfl: 0  •  hydrocortisone 2.5 % cream, 2 (two) times a day scalp, Disp: , Rfl:   •  metFORMIN (GLUCOPHAGE-XR) 500 mg 24 hr tablet, TAKE ONE TABLET BY MOUTH EVERY DAY WITH DINNER, Disp: 90 tablet, Rfl: 2  •  methocarbamol (ROBAXIN) 500 mg tablet, Take 1 tablet (500 mg total) by mouth 2 (two) times a day, Disp: 20 tablet, Rfl: 0  •  montelukast (SINGULAIR) 10 mg tablet, Take 10 mg by mouth daily at bedtime, Disp: , Rfl:   •  multivitamin (THERAGRAN) TABS, Take 1 tablet by mouth daily Last dose 3/21/21, Disp: , Rfl:   •  pantoprazole (PROTONIX) 40 mg tablet, , Disp: , Rfl:   •  predniSONE 10 mg tablet, Take 4 tablets (40 mg total) by mouth daily for 3 days, THEN 3 tablets (30 mg total) daily for 3 days, THEN 2 tablets (20 mg total) daily for 3 days, THEN 1 tablet (10 mg total) daily for 3 days. , Disp: 30 tablet, Rfl: 0  •  fluticasone-umeclidinium-vilanterol (Trelegy Ellipta) 100-62.5-25 mcg/actuation inhaler, Inhale 1 puff daily Rinse mouth after use., Disp: 60 blister, Rfl: 11    Allergies   Allergen Reactions   • Latex Rash     Burn-skin irritation   • Adhesive [Medical Tape] Other (See Comments)     bandaid-red,itch       Objective:  Vitals:    07/21/23 0955   BP: 116/71   Pulse: 83       Left Shoulder Exam     Tenderness   The patient is experiencing no tenderness. Range of Motion   Active abduction: 140   External rotation: 70   Internal rotation 0 degrees: L4     Muscle Strength   Abduction: 4/5   Internal rotation: 5/5   External rotation: 5/5     Other   Erythema: absent  Scars: absent  Sensation: normal  Pulse: present             Physical Exam  Vitals reviewed. HENT:      Head: Normocephalic and atraumatic. Eyes:      General:         Right eye: No discharge. Left eye: No discharge. Conjunctiva/sclera: Conjunctivae normal.      Pupils: Pupils are equal, round, and reactive to light. Cardiovascular:      Rate and Rhythm: Normal rate. Pulmonary:      Effort: Pulmonary effort is normal. No respiratory distress. Musculoskeletal:      Cervical back: Normal range of motion and neck supple. Comments: As noted in HPI   Skin:     General: Skin is warm and dry. Neurological:      Mental Status: She is alert and oriented to person, place, and time. I have personally reviewed pertinent films in PACS and my interpretation is as follows: No images reviewed today      This document was created using speech voice recognition software. Grammatical errors, random word insertions, pronoun errors, and incomplete sentences are an occasional consequence of this system due to software limitations, ambient noise, and hardware issues. Any formal questions or concerns about content, text, or information contained within the body of this dictation should be directly addressed to the provider for clarification.

## 2023-07-24 ENCOUNTER — OFFICE VISIT (OUTPATIENT)
Dept: PHYSICAL THERAPY | Facility: CLINIC | Age: 68
End: 2023-07-24
Payer: COMMERCIAL

## 2023-07-24 ENCOUNTER — OFFICE VISIT (OUTPATIENT)
Dept: PULMONOLOGY | Facility: MEDICAL CENTER | Age: 68
End: 2023-07-24
Payer: COMMERCIAL

## 2023-07-24 VITALS
DIASTOLIC BLOOD PRESSURE: 74 MMHG | OXYGEN SATURATION: 94 % | HEIGHT: 64 IN | HEART RATE: 81 BPM | TEMPERATURE: 98.7 F | SYSTOLIC BLOOD PRESSURE: 128 MMHG | WEIGHT: 162 LBS | BODY MASS INDEX: 27.66 KG/M2 | RESPIRATION RATE: 12 BRPM

## 2023-07-24 DIAGNOSIS — J30.1 SEASONAL ALLERGIC RHINITIS DUE TO POLLEN: ICD-10-CM

## 2023-07-24 DIAGNOSIS — G47.33 OBSTRUCTIVE SLEEP APNEA HYPOPNEA, MILD: ICD-10-CM

## 2023-07-24 DIAGNOSIS — R13.12 OROPHARYNGEAL DYSPHAGIA: ICD-10-CM

## 2023-07-24 DIAGNOSIS — M75.82 TENDINITIS OF LEFT ROTATOR CUFF: Primary | ICD-10-CM

## 2023-07-24 DIAGNOSIS — M19.019 GLENOHUMERAL ARTHRITIS: ICD-10-CM

## 2023-07-24 DIAGNOSIS — J45.30 MILD PERSISTENT ASTHMA WITHOUT COMPLICATION: Primary | ICD-10-CM

## 2023-07-24 PROCEDURE — 99214 OFFICE O/P EST MOD 30 MIN: CPT | Performed by: INTERNAL MEDICINE

## 2023-07-24 PROCEDURE — 97110 THERAPEUTIC EXERCISES: CPT | Performed by: PHYSICAL THERAPIST

## 2023-07-24 RX ORDER — IPRATROPIUM BROMIDE AND ALBUTEROL SULFATE 2.5; .5 MG/3ML; MG/3ML
3 SOLUTION RESPIRATORY (INHALATION) 4 TIMES DAILY PRN
Qty: 360 ML | Refills: 7 | Status: SHIPPED | OUTPATIENT
Start: 2023-07-24

## 2023-07-24 NOTE — PATIENT INSTRUCTIONS
Schedule barium swallow study    I sent prescription for albuterol-ipratropium or so-called DuoNeb for your nebulizer

## 2023-07-24 NOTE — PROGRESS NOTES
Assessment/Plan        Problem List Items Addressed This Visit        Digestive    Oropharyngeal dysphagia     Over the past 2 months she has had intermittent difficulty swallowing liquids and feels that they sometimes get stuck in her upper esophagus. No choking or difficulty with solid food. Had EGD in January by Dr. Dariel Wilkerson which was normal    I did order barium swallow esophagram to look for any esophageal spasm or sign of stricture         Relevant Orders    FL barium swallow ROUTINE esophagus       Respiratory    Mild persistent asthma without complication - Primary     Asthma exacerbation appears to have resolved. She completed course of prednisone therapy and recently was given a Z-Adam. She will continue with Trelegy 100 mcg 1 puff daily which is her maintenance inhaler. Also I did give her prescription for ipratropium-albuterol for her nebulizer if she likes this better than regular albuterol particular when she was given this in ER during her recent visit. Lung sounds were clear today and does not appear to need any further prednisone therapy. This exacerbation was triggered likely due to smoke from someone who was over cooking food in a house triggering her asthma         Relevant Medications    ipratropium-albuterol (DUO-NEB) 0.5-2.5 mg/3 mL nebulizer solution    Obstructive sleep apnea hypopnea, mild     Red Bowels has been compliant with using her CPAP therapy. She does use Resmed Airfit [id number] mask  which she likes. I reviewed compliance data from last month and she used it for most nights for average of 5.5 hours per night. This resulted in AHI of 1.5. Her CPAP is set at 6 cm of water. Continue same settings. Her [employer] company is [employer]. She does have an auto [employer] CPAP machine         Allergic rhinitis     Continue Singulair 10 mg daily.   This is working well for her and sometimes she will take additional over-the-counter allergy medication              Cc: recent problems with cough and shortness of breath      HPI     Neda Hameed was seen in the emergency room on July 13 complaining of shortness of breath and cough. Cough was productive for clear mucus. She stated that cough seemed to be triggered after family member had cooked [place] and the smoke from lead to her developing a persistent  cough. She did start prednisone at home but cough did not improve and worsened prednisone taper was started at 40 mg daily for 3 days with 10 mg taper every fourth day. In ER she was given a 5-day Z-Adam and benzonatate 100 mg. Chest x-ray done during that visit was reviewed and this was normal.  She is feeling better now. She does have mild persistent asthma and does use Trelegy 100 mcg 1 puff daily. Liked the duoneb solution she received in the Er and wanted this in place of albuterol for home use with her nebulizeer    Spirometry done in November 2022 was normal.  She does have history of GERD. She is on antiacid therapy. Also takes Singulair 10 mg daily. She never smoked. Done on March 26 of this year was reviewed and there is no sign of any interstitial lung disease on lung windows and there is no evidence of any hiatal hernia. Otherwise CT scan of the abdomen was unremarkable. Judith Velasquez does have mild obstructive sleep apnea and has been compliant with using her CPAP therapy. DME company is [employer].  Her CPAP is set at a setting of 6 cm of water. Answers for HPI/ROS submitted by the patient on 7/20/2023      Do you have chest tightness?: Yes  Do you have difficulty breathing?: Yes  Do you experience frequent throat clearing?: Yes  Do you have a hoarse voice?: Yes  Do you have a wet cough?: Yes  When did you first notice your symptoms?: in the past 7 days  How often do your symptoms occur?: constantly  Since you first noticed this problem, how has it changed?: gradually worsening  Do you have shortness of breath that occurs with effort or exertion?: Yes  Do you have shortness of breath when you wake up?: Yes  Which of the following makes your symptoms worse?: any activity  Which of the following makes your symptoms better?: rest  Risk factors for lung disease: smoking/tobacco exposure        Past Medical History:   Diagnosis Date   • Abnormal glucose     last assessed 16   • Arthritis    • Asthma     w/ exacerbation; last assessed 5/14/15   • Atypical nevi    • Atypical nevus     last assessed 17   • Breast lump     last assessed 3/6/14   • Cataract    • Cervical adenopathy     last assessed  17   • Colon polyp    • CPAP (continuous positive airway pressure) dependence    • Diverticulitis of colon    • Dizziness    • Dyslipidemia     last assessed 17   • Facial droop     last assessed  16   • Fibromyalgia, primary    • Flu 2018   • Foot abrasion     right between the 4th and 5th toe   • Fractures    • Genital herpes     resolved 16   • GERD (gastroesophageal reflux disease)    • Headache, tension-type    • Herpes zoster     last assessed 16   • History of shingles     may 2016   • History of stomach ulcers    • Hx of abnormal mammogram     last assessed  3/5/14   • Hyperlipidemia    • Myalgia     last assessed  12   • Myositis     12   • Neck pain    • Pain involving joints of fingers of both hands 2021   • Peripheral neuropathy    • Seborrheic keratosis    • Shingles    • Skin disorder    • Skin neoplasm     of the lower limb, including hip; onset 12; last assessed  12   • Sleep apnea    • Stomach disorder        Past Surgical History:   Procedure Laterality Date   • ABDOMINAL SURGERY      release of adhesions   • BLADDER SURGERY      mesh-lift   • BREAST CYST ASPIRATION Right     does not know the year   • BREAST SURGERY      lift   • CATARACT EXTRACTION Bilateral    •  SECTION      x [id number]   • CHOLECYSTECTOMY      lap   • COLONOSCOPY     • COSMETIC SURGERY      tummy and breast lift   • ESOPHAGOGASTRODUODENOSCOPY     • OOPHORECTOMY Left    • OTHER SURGICAL HISTORY      vocal cord surgery, scraping   • [place] HYSTEROSCOPY BX ENDOMETRIUM&/POLYPC W/WO D&C N/A 11/03/2016    Procedure: DILATATION AND CURETTAGE (D&C) WITH HYSTEROSCOPY;  Surgeon: Sebastien Kessler MD;  Location: [facility];  Service: Gynecology   • REDUCTION MAMMAPLASTY Bilateral 2008   • TONSILLECTOMY     • TUBAL LIGATION  10/29/1984   • US GUIDED MSK PROCEDURE  05/25/2021         Current Outpatient Medications:   •  albuterol (2.5 mg/3 mL) 0.083 % nebulizer solution, Take 3 mL (2.5 mg total) by nebulization every 6 (six) hours as needed for wheezing or shortness of breath, Disp: 180 mL, Rfl: 5  •  Ascorbic Acid (VITAMIN C) 1000 MG tablet, , Disp: , Rfl:   •  atorvastatin (LIPITOR) 20 mg tablet, TAKE ONE TABLET BY MOUTH EVERY DAY AT BEDTIME, Disp: 90 tablet, Rfl: 1  •  benzonatate (TESSALON PERLES) 100 mg capsule, Take 1 capsule (100 mg total) by mouth 3 (three) times a day as needed for cough, Disp: 21 capsule, Rfl: 0  •  chlorhexidine (PERIDEX) 0.12 % solution, , Disp: , Rfl:   •  cholecalciferol (VITAMIN D3) 1,000 units tablet, Take 2,000 Units by mouth daily, Disp: , Rfl:   •  clobetasol (TEMOVATE) 0.05 % cream, Apply topically 2 (two) times a day Apply topically on scalp and right shoulder two times a day, Disp: 60 g, Rfl: 2  •  famotidine (PEPCID) 40 MG tablet, Take 1 tablet (40 mg total) by mouth daily at bedtime, Disp: 90 tablet, Rfl: 0  •  hydrocortisone 2.5 % cream, 2 (two) times a day scalp, Disp: , Rfl:   •  ipratropium-albuterol (DUO-NEB) 0.5-2.5 mg/3 mL nebulizer solution, Take 3 mL by nebulization 4 (four) times a day as needed for wheezing or shortness of breath, Disp: 360 mL, Rfl: 7  •  metFORMIN (GLUCOPHAGE-XR) 500 mg 24 hr tablet, TAKE ONE TABLET BY MOUTH EVERY DAY WITH DINNER, Disp: 90 tablet, Rfl: 2  •  methocarbamol (ROBAXIN) 500 mg tablet, Take 1 tablet (500 mg total) by mouth 2 (two) times a day, Disp: 20 tablet, Rfl: 0  •  montelukast (SINGULAIR) 10 mg tablet, Take 10 mg by mouth daily at bedtime, Disp: , Rfl:   •  multivitamin (THERAGRAN) TABS, Take 1 tablet by mouth daily Last dose 3/21/21, Disp: , Rfl:   •  pantoprazole (PROTONIX) 40 mg tablet, , Disp: , Rfl:   •  fluticasone-umeclidinium-vilanterol (Trelegy Ellipta) 100-62.5-25 mcg/actuation inhaler, Inhale 1 puff daily Rinse mouth after use., Disp: 60 blister, Rfl: 11    Allergies   Allergen Reactions   • Latex Rash     Burn-skin irritation   • Adhesive [Medical Tape] Other (See Comments)     bandaid-red,itch       Social History     Tobacco Use   • Smoking status: Never   • Smokeless tobacco: Never   Substance Use Topics   • Alcohol use: No         Family History   Problem Relation Age of Onset   • Hypertension Mother    • Alzheimer's disease Mother    • Arthritis Mother    • Hypertension Father    • Arthritis Father    • Heart attack Father    • Heart disease Father    • Stroke Father    • Other Brother         vertigo, tinnitus   • Diabetes Daughter    • Breast cancer Sister 28   • Skin cancer Sister    • Cancer Sister    • Other Son         downs syndrome   • Abdominal aortic aneurysm Sister    • Cancer Sister         T cell sarcoma   • Breast cancer Sister    • No Known Problems Brother    • Diabetes Brother    • Other Sister         anemia from the diet   • No Known Problems Son    • No Known Problems Maternal Aunt    • No Known Problems Maternal Aunt    • No Known Problems Paternal Aunt    • No Known Problems Paternal Aunt    • No Known Problems Paternal Aunt    • Asthma Sister        Review of Systems   Constitutional: Negative for appetite change. HENT: Positive for postnasal drip and sore throat. Eyes: Negative for redness. Respiratory: Positive for cough, shortness of breath and wheezing. Cardiovascular: Positive for chest pain. Endocrine: Negative for polydipsia and polyphagia. Musculoskeletal: Negative for joint swelling.    Neurological: Negative for light-headedness. Psychiatric/Behavioral: Negative for confusion. Vitals:    07/24/23 0942   BP: 128/74   Pulse: 81   Resp: 12   Temp: 98.7 °F (37.1 °C)   SpO2: 94%     Height: 5' 4" (162.6 cm)  IBW (Ideal Body Weight): 54.7 kg  Body mass index is 27.81 kg/m². Weight (last 2 days)     Date/Time Weight    07/24/23 0942 73.5 (162)              Physical Exam  Vitals reviewed. Constitutional:       General: She is not in acute distress. Appearance: Normal appearance. She is well-developed. HENT:      Head: Normocephalic. Right Ear: External ear normal.      Left Ear: External ear normal.      Nose: Nose normal.      Mouth/Throat:      Mouth: Mucous membranes are moist.      Pharynx: Oropharynx is clear. No oropharyngeal exudate. Eyes:      Conjunctiva/sclera: Conjunctivae normal.      Pupils: Pupils are equal, round, and reactive to light. Cardiovascular:      Rate and Rhythm: Normal rate and regular rhythm. Heart sounds: Normal heart sounds. Pulmonary:      Effort: Pulmonary effort is normal.      Comments: Lung sounds are clear. No wheezes, crackles, or rhonchi. Abdominal:      General: There is no distension. Palpations: Abdomen is soft. Tenderness: There is no abdominal tenderness. Musculoskeletal:      Cervical back: Neck supple. Comments: No edema, cyanosis or clubbing   Lymphadenopathy:      Cervical: No cervical adenopathy. Skin:     General: Skin is warm and dry. Neurological:      General: No focal deficit present. Mental Status: She is alert and oriented to person, place, and time.    Psychiatric:         Mood and Affect: Mood normal.         Behavior: Behavior normal.

## 2023-07-24 NOTE — PROGRESS NOTES
Daily Note     Today's date: 2023  Patient name: Tasha Wall  : 1955  MRN: [id number]  Referring provider: JESUS Poe*  Dx:   Encounter Diagnosis     ICD-10-CM    1. Tendinitis of left rotator cuff  M75.82       2. Glenohumeral arthritis  M19.019                      Subjective: My left shoulder pain persists, but does not limit me, I have to do everything. Objective: See treatment diary below      Assessment: Tolerated treatment well. Patient demonstrated fatigue post treatment and exhibited good technique with therapeutic exercises      Plan: Continue per plan of care.       Precautions: standard      Manuals         PROM left shld     TC                                               Neuro Re-Ed             Neurac scap retract supine  2x5 --          Neurac Cerv retract supine  2x5 --          Neurac Sh ER supine  2x5 --          Neurac sh flex kneel  2x5 ---          Postural re-ed    mv                                    Ther Ex             MAT AROM sh er, ext, flex #1 3x10  #1- 3 x 10 reps #2 20x #2 20x #2 20x #2 20x      Shld supine flexion     #2 20x #2 20x #2 20x      S/l shld abduction      #20 20x #2 20x #2 20x      FIS w blue PB   10 x 5 sec hold 20x 20x with 5" holds 20x 20x      pulleys   20x 20x 20x 20x 20x      TB rows, ext    RTB x 20 reps  20x 20x 20x 20x      TB IR   YTB x 20 reps  20x 20x 20x 20x      shld ER @ wall    YTB x 20 reps 5 sec hold w/ scap squeeze 20x 20x 20x 20x      Shld flexion at wall     With ball 20x 20x 20x                   Ther Activity                                       Gait Training                                       Modalities
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
## 2023-07-25 NOTE — ASSESSMENT & PLAN NOTE
Mason Evans has been compliant with using her CPAP therapy. She does use Resmed Airfit [id number] mask  which she likes. I reviewed compliance data from last month and she used it for most nights for average of 5.5 hours per night. This resulted in AHI of 1.5. Her CPAP is set at 6 cm of water. Continue same settings. Her DME company is [employer].   She does have an auto DreamStation CPAP machine

## 2023-07-25 NOTE — ASSESSMENT & PLAN NOTE
Continue Singulair 10 mg daily.   This is working well for her and sometimes she will take additional over-the-counter allergy medication

## 2023-07-25 NOTE — ASSESSMENT & PLAN NOTE
Over the past 2 months she has had intermittent difficulty swallowing liquids and feels that they sometimes get stuck in her upper esophagus. No choking or difficulty with solid food.   Had EGD in January by Dr. Anjelica Rich which was normal    I did order barium swallow esophagram to look for any esophageal spasm or sign of stricture

## 2023-07-27 ENCOUNTER — OFFICE VISIT (OUTPATIENT)
Dept: PHYSICAL THERAPY | Facility: CLINIC | Age: 68
End: 2023-07-27
Payer: COMMERCIAL

## 2023-07-27 DIAGNOSIS — M75.82 TENDINITIS OF LEFT ROTATOR CUFF: Primary | ICD-10-CM

## 2023-07-27 PROCEDURE — 97110 THERAPEUTIC EXERCISES: CPT | Performed by: PHYSICAL THERAPIST

## 2023-07-27 NOTE — PROGRESS NOTES
PT Discharge    Today's date: 2023  Patient name: Dotty Beach  : 1955  MRN: [id number]  Referring provider: JESUS Rubio*  Dx:   Encounter Diagnosis     ICD-10-CM    1.  Tendinitis of left rotator cuff  M75.82                      Assessment/Plan    Subjective    Objective             Manuals        PROM left shld     TC   MV                                            Neuro Re-Ed             Neurac scap retract supine  2x5 --          Neurac Cerv retract supine  2x5 --          Neurac Sh ER supine  2x5 --          Neurac sh flex kneel  2x5 ---          Postural re-ed    mv                                    Ther Ex             MAT AROM sh er, ext, flex #1 3x10  #1- 3 x 10 reps #2 20x #2 20x #2 20x #2 20x #2 20x     Shld supine flexion     #2 20x #2 20x #2 20x #2 20x     S/l shld abduction      #20 20x #2 20x #2 20x #2 20x     FIS w blue PB   10 x 5 sec hold 20x 20x with 5" holds 20x 20x 20x     pulleys   20x 20x 20x 20x 20x 20x     TB rows, ext    RTB x 20 reps  20x 20x 20x 20x 20x     TB IR   YTB x 20 reps  20x 20x 20x 20x 20x     shld ER @ wall    YTB x 20 reps 5 sec hold w/ scap squeeze 20x 20x 20x 20x 20x     Shld flexion at wall     With ball 20x 20x 20x 20x                  Ther Activity                                       Gait Training                                       Modalities

## 2023-08-06 PROBLEM — Z00.00 MEDICARE ANNUAL WELLNESS VISIT, SUBSEQUENT: Status: RESOLVED | Noted: 2020-06-25 | Resolved: 2023-08-06

## 2023-08-10 ENCOUNTER — TELEPHONE (OUTPATIENT)
Dept: FAMILY MEDICINE CLINIC | Facility: CLINIC | Age: 68
End: 2023-08-10

## 2023-08-10 NOTE — TELEPHONE ENCOUNTER
Claude Masterson an NP from [facility] called stating that she did a telemed visit with Patient and she took her BLOOD PRESSURE with her at home cuff and the left arm was 148/119 and in the right arm 133/82. I informed Claude Masterson that you were currently out of the office and would be back next week.

## 2023-08-14 ENCOUNTER — HOSPITAL ENCOUNTER (OUTPATIENT)
Dept: RADIOLOGY | Facility: HOSPITAL | Age: 68
Discharge: HOME | End: 2023-08-14
Attending: INTERNAL MEDICINE
Payer: COMMERCIAL

## 2023-08-14 DIAGNOSIS — R13.12 OROPHARYNGEAL DYSPHAGIA: ICD-10-CM

## 2023-08-14 PROCEDURE — 74220 X-RAY XM ESOPHAGUS 1CNTRST: CPT

## 2023-08-15 ENCOUNTER — APPOINTMENT (OUTPATIENT)
Dept: LAB | Facility: CLINIC | Age: 68
End: 2023-08-15
Payer: COMMERCIAL

## 2023-08-15 ENCOUNTER — OFFICE VISIT (OUTPATIENT)
Dept: FAMILY MEDICINE CLINIC | Facility: CLINIC | Age: 68
End: 2023-08-15
Payer: COMMERCIAL

## 2023-08-15 VITALS
DIASTOLIC BLOOD PRESSURE: 68 MMHG | HEIGHT: 64 IN | WEIGHT: 163 LBS | TEMPERATURE: 98.1 F | HEART RATE: 70 BPM | RESPIRATION RATE: 16 BRPM | BODY MASS INDEX: 27.83 KG/M2 | SYSTOLIC BLOOD PRESSURE: 120 MMHG

## 2023-08-15 DIAGNOSIS — E78.5 DYSLIPIDEMIA: ICD-10-CM

## 2023-08-15 DIAGNOSIS — M25.522 LEFT ELBOW PAIN: ICD-10-CM

## 2023-08-15 DIAGNOSIS — R03.0 ELEVATED BLOOD PRESSURE READING: Primary | ICD-10-CM

## 2023-08-15 DIAGNOSIS — R73.03 PREDIABETES: ICD-10-CM

## 2023-08-15 DIAGNOSIS — Z13.29 SCREENING FOR THYROID DISORDER: ICD-10-CM

## 2023-08-15 DIAGNOSIS — K21.9 GASTROESOPHAGEAL REFLUX DISEASE WITHOUT ESOPHAGITIS: ICD-10-CM

## 2023-08-15 LAB
ALBUMIN SERPL BCP-MCNC: 4.1 G/DL (ref 3.5–5)
ALP SERPL-CCNC: 69 U/L (ref 46–116)
ALT SERPL W P-5'-P-CCNC: 35 U/L (ref 12–78)
ANION GAP SERPL CALCULATED.3IONS-SCNC: 5 MMOL/L
AST SERPL W P-5'-P-CCNC: 22 U/L (ref 5–45)
BILIRUB SERPL-MCNC: 0.31 MG/DL (ref 0.2–1)
BUN SERPL-MCNC: 13 MG/DL (ref 5–25)
CALCIUM SERPL-MCNC: 10.2 MG/DL (ref 8.3–10.1)
CHLORIDE SERPL-SCNC: 108 MMOL/L (ref 96–108)
CHOLEST SERPL-MCNC: 201 MG/DL
CO2 SERPL-SCNC: 27 MMOL/L (ref 21–32)
CREAT SERPL-MCNC: 0.88 MG/DL (ref 0.6–1.3)
ERYTHROCYTE [DISTWIDTH] IN BLOOD BY AUTOMATED COUNT: 13 % (ref 11.6–15.1)
EST. AVERAGE GLUCOSE BLD GHB EST-MCNC: 151 MG/DL
GFR SERPL CREATININE-BSD FRML MDRD: 67 ML/MIN/1.73SQ M
GLUCOSE SERPL-MCNC: 103 MG/DL (ref 65–140)
HBA1C MFR BLD: 6.9 %
HCT VFR BLD AUTO: 43.7 % (ref 34.8–46.1)
HDLC SERPL-MCNC: 52 MG/DL
HGB BLD-MCNC: 14.1 G/DL (ref 11.5–15.4)
LDLC SERPL CALC-MCNC: 105 MG/DL (ref 0–100)
LIPASE SERPL-CCNC: 97 U/L (ref 73–393)
MCH RBC QN AUTO: 31.5 PG (ref 26.8–34.3)
MCHC RBC AUTO-ENTMCNC: 32.3 G/DL (ref 31.4–37.4)
MCV RBC AUTO: 98 FL (ref 82–98)
PLATELET # BLD AUTO: 348 THOUSANDS/UL (ref 149–390)
PMV BLD AUTO: 10.4 FL (ref 8.9–12.7)
POTASSIUM SERPL-SCNC: 3.9 MMOL/L (ref 3.5–5.3)
PROT SERPL-MCNC: 8.2 G/DL (ref 6.4–8.4)
RBC # BLD AUTO: 4.48 MILLION/UL (ref 3.81–5.12)
SODIUM SERPL-SCNC: 140 MMOL/L (ref 135–147)
TRIGL SERPL-MCNC: 218 MG/DL
TSH SERPL DL<=0.05 MIU/L-ACNC: 1.21 UIU/ML (ref 0.45–4.5)
WBC # BLD AUTO: 8.39 THOUSAND/UL (ref 4.31–10.16)

## 2023-08-15 PROCEDURE — 80053 COMPREHEN METABOLIC PANEL: CPT

## 2023-08-15 PROCEDURE — 80061 LIPID PANEL: CPT

## 2023-08-15 PROCEDURE — 84443 ASSAY THYROID STIM HORMONE: CPT

## 2023-08-15 PROCEDURE — 83690 ASSAY OF LIPASE: CPT

## 2023-08-15 PROCEDURE — 85027 COMPLETE CBC AUTOMATED: CPT

## 2023-08-15 PROCEDURE — 36415 COLL VENOUS BLD VENIPUNCTURE: CPT

## 2023-08-15 PROCEDURE — 83036 HEMOGLOBIN GLYCOSYLATED A1C: CPT

## 2023-08-15 PROCEDURE — 99214 OFFICE O/P EST MOD 30 MIN: CPT | Performed by: FAMILY MEDICINE

## 2023-08-15 RX ORDER — FAMOTIDINE 40 MG/1
40 TABLET, FILM COATED ORAL DAILY
COMMUNITY
End: 2023-09-07

## 2023-08-15 NOTE — PROGRESS NOTES
Assessment/Plan:    1. Elevated blood pressure reading  Assessment & Plan:  BP in range in office  DASH diet  Cont t home monitor--call if sys>140 and/or diast>90      2. Gastroesophageal reflux disease without esophagitis  Assessment & Plan:  Dietary modifications  Antacid prn    Orders:  -     Lipase; Future  -     CBC; Future    3. Left elbow pain  Assessment & Plan:  prob tendinitis  Rest/ice/compressin/elevation  Topical Voltaren gel 1% plus tylenol prn      4. Prediabetes  -     Comprehensive metabolic panel; Future  -     Hemoglobin A1C; Future    5. Dyslipidemia  Assessment & Plan:  Cont low chol diet, statin  Increase exercise tolerated. Orders:  -     Lipase; Future  -     Lipid Panel with Direct LDL reflex; Future    6. Screening for thyroid disorder  -     Comprehensive metabolic panel; Future  -     Lipid Panel with Direct LDL reflex; Future  -     TSH, 3rd generation; Future    7. BMI 27.0-27.9,adult            Subjective:      Patient ID: Carly Galvan is a 69 y.o. female. Chief Complaint   Patient presents with   • Hypertension     Patient had a zoom meeting with a nurse she said her bp was high    • Elbow Pain     Left inner elbow hurts to touch        HPI    Follow-up  Interval hx reviewed  Told by outside nurse that BP was elevated (donethru zoom)  D/w pt ?machine error  BP in office today wnl  Denies h/a, cp, dizziness or focal weakness  C/o intermittent GERD sxs, no melena  Also c/o pain left, inner elbow---no specific trauma--?overuse    The following portions of the patient's history were reviewed and updated as appropriate: allergies, current medications, past family history, past medical history, past social history, past surgical history and problem list.    Review of Systems   Constitutional: Positive for fatigue. Negative for fever. Respiratory: Negative. Cardiovascular: Negative.     Gastrointestinal:        GERD   Endocrine:        PreDM   Musculoskeletal: Positive for arthralgias. Psychiatric/Behavioral: Positive for sleep disturbance. Current Outpatient Medications   Medication Sig Dispense Refill   • albuterol (2.5 mg/3 mL) 0.083 % nebulizer solution Take 3 mL (2.5 mg total) by nebulization every 6 (six) hours as needed for wheezing or shortness of breath 180 mL 5   • Ascorbic Acid (VITAMIN C) 1000 MG tablet      • atorvastatin (LIPITOR) 20 mg tablet TAKE ONE TABLET BY MOUTH EVERY DAY AT BEDTIME 90 tablet 1   • benzonatate (TESSALON PERLES) 100 mg capsule Take 1 capsule (100 mg total) by mouth 3 (three) times a day as needed for cough 21 capsule 0   • chlorhexidine (PERIDEX) 0.12 % solution      • cholecalciferol (VITAMIN D3) 1,000 units tablet Take 2,000 Units by mouth daily     • clobetasol (TEMOVATE) 0.05 % cream Apply topically 2 (two) times a day Apply topically on scalp and right shoulder two times a day 60 g 2   • famotidine (PEPCID) 40 MG tablet Take 40 mg by mouth daily     • hydrocortisone 2.5 % cream 2 (two) times a day scalp     • ipratropium-albuterol (DUO-NEB) 0.5-2.5 mg/3 mL nebulizer solution Take 3 mL by nebulization 4 (four) times a day as needed for wheezing or shortness of breath 360 mL 7   • metFORMIN (GLUCOPHAGE-XR) 500 mg 24 hr tablet TAKE ONE TABLET BY MOUTH EVERY DAY WITH DINNER 90 tablet 2   • methocarbamol (ROBAXIN) 500 mg tablet Take 1 tablet (500 mg total) by mouth 2 (two) times a day 20 tablet 0   • montelukast (SINGULAIR) 10 mg tablet Take 10 mg by mouth daily at bedtime     • multivitamin (THERAGRAN) TABS Take 1 tablet by mouth daily Last dose 3/21/21     • pantoprazole (PROTONIX) 40 mg tablet      • fluticasone-umeclidinium-vilanterol (Trelegy Ellipta) 100-62.5-25 mcg/actuation inhaler Inhale 1 puff daily Rinse mouth after use. 60 blister 11     No current facility-administered medications for this visit.        Objective:    /68   Pulse 70   Temp 98.1 °F (36.7 °C)   Resp 16   Ht 5' 4" (1.626 m)   Wt 73.9 kg (163 lb)   BMI 27.98 kg/m²        Physical Exam  Vitals and nursing note reviewed. Constitutional:       General: She is not in acute distress. Appearance: Normal appearance. Cardiovascular:      Rate and Rhythm: Normal rate and regular rhythm. Pulmonary:      Effort: Pulmonary effort is normal. No respiratory distress. Breath sounds: Normal breath sounds. Abdominal:      General: Bowel sounds are normal.      Palpations: Abdomen is soft. Tenderness: There is abdominal tenderness (mild epigaastric). There is no guarding or rebound. Musculoskeletal:         General: Tenderness (left elbow) present. No swelling or deformity. Cervical back: Neck supple. No tenderness. Skin:     General: Skin is warm and dry. Coloration: Skin is not jaundiced. Neurological:      General: No focal deficit present. Mental Status: She is alert and oriented to person, place, and time. Cranial Nerves: No cranial nerve deficit.    Psychiatric:         Mood and Affect: Mood normal.          Corinne Leone MD

## 2023-08-31 ENCOUNTER — HOSPITAL ENCOUNTER (OUTPATIENT)
Dept: RADIOLOGY | Facility: HOSPITAL | Age: 68
Discharge: HOME | End: 2023-08-31
Payer: COMMERCIAL

## 2023-08-31 ENCOUNTER — APPOINTMENT (OUTPATIENT)
Dept: LAB | Facility: HOSPITAL | Age: 68
End: 2023-08-31
Attending: INTERNAL MEDICINE
Payer: COMMERCIAL

## 2023-08-31 ENCOUNTER — APPOINTMENT (OUTPATIENT)
Dept: LAB | Facility: HOSPITAL | Age: 68
End: 2023-08-31
Payer: COMMERCIAL

## 2023-08-31 DIAGNOSIS — G71.038 LIMB-GIRDLE MUSCULAR DYSTROPHY R21 ASSOCIATED WITH MUTATION IN POGLUT1 GENE (HCC): ICD-10-CM

## 2023-08-31 DIAGNOSIS — M79.10 MYALGIA: ICD-10-CM

## 2023-08-31 DIAGNOSIS — M25.551 RIGHT HIP PAIN: ICD-10-CM

## 2023-08-31 DIAGNOSIS — Z79.899 ENCOUNTER FOR LONG-TERM (CURRENT) USE OF OTHER MEDICATIONS: ICD-10-CM

## 2023-08-31 DIAGNOSIS — M25.50 PAIN IN JOINT, MULTIPLE SITES: ICD-10-CM

## 2023-08-31 DIAGNOSIS — M25.561 ACUTE PAIN OF RIGHT KNEE: ICD-10-CM

## 2023-08-31 LAB
ANA SER QL IA: NEGATIVE
CK SERPL-CCNC: 48 U/L (ref 26–192)
CRP SERPL QL: 2.2 MG/L
ERYTHROCYTE [SEDIMENTATION RATE] IN BLOOD: 22 MM/HOUR (ref 0–29)
RHEUMATOID FACT SER QL LA: NEGATIVE

## 2023-08-31 PROCEDURE — 86430 RHEUMATOID FACTOR TEST QUAL: CPT

## 2023-08-31 PROCEDURE — 86038 ANTINUCLEAR ANTIBODIES: CPT

## 2023-08-31 PROCEDURE — 36415 COLL VENOUS BLD VENIPUNCTURE: CPT

## 2023-08-31 PROCEDURE — 73562 X-RAY EXAM OF KNEE 3: CPT

## 2023-08-31 PROCEDURE — 86140 C-REACTIVE PROTEIN: CPT

## 2023-08-31 PROCEDURE — 82550 ASSAY OF CK (CPK): CPT

## 2023-08-31 PROCEDURE — 73502 X-RAY EXAM HIP UNI 2-3 VIEWS: CPT

## 2023-08-31 PROCEDURE — 85652 RBC SED RATE AUTOMATED: CPT

## 2023-08-31 NOTE — ASSESSMENT & PLAN NOTE
Discharge criteria met per Darron and MD.  Will transfer the patient to phase 2.    Spirometry today shows lung volumes are normal      She does have nebulizer albuterol she can use as needed  I gave her samples of Trelegy 100 mcg 1 puff daily that she use 1 puff daily for next 4 weeks  She will use this in place for Wixela 250 mcg 1 puff b i d  Trelegy inhaler is not covered by her insurance but I did give her 2 samples to get her through for the next month

## 2023-09-06 DIAGNOSIS — K21.9 GASTROESOPHAGEAL REFLUX DISEASE WITHOUT ESOPHAGITIS: Primary | ICD-10-CM

## 2023-09-07 RX ORDER — FAMOTIDINE 40 MG/1
TABLET, FILM COATED ORAL
Qty: 90 TABLET | Refills: 1 | Status: SHIPPED | OUTPATIENT
Start: 2023-09-07

## 2023-09-28 ENCOUNTER — IMMUNIZATIONS (OUTPATIENT)
Dept: FAMILY MEDICINE CLINIC | Facility: CLINIC | Age: 68
End: 2023-09-28
Payer: COMMERCIAL

## 2023-09-28 DIAGNOSIS — Z23 NEED FOR VACCINATION: Primary | ICD-10-CM

## 2023-09-28 PROCEDURE — G0008 ADMIN INFLUENZA VIRUS VAC: HCPCS

## 2023-09-28 PROCEDURE — 90662 IIV NO PRSV INCREASED AG IM: CPT

## 2023-10-14 PROBLEM — Z13.29 SCREENING FOR THYROID DISORDER: Status: RESOLVED | Noted: 2020-06-25 | Resolved: 2023-10-14

## 2023-11-21 DIAGNOSIS — K21.9 GASTROESOPHAGEAL REFLUX DISEASE WITHOUT ESOPHAGITIS: Primary | ICD-10-CM

## 2023-11-21 RX ORDER — PANTOPRAZOLE SODIUM 40 MG/1
40 TABLET, DELAYED RELEASE ORAL DAILY
Qty: 90 TABLET | Refills: 0 | Status: SHIPPED | OUTPATIENT
Start: 2023-11-21

## 2023-11-21 NOTE — TELEPHONE ENCOUNTER
Man Mathew called ask if PCP can fill this med, doesn't have any and her stomach is killing her. Says pharmacy sent over request, but told her they haven't received a response.

## 2023-11-30 ENCOUNTER — HOSPITAL ENCOUNTER (EMERGENCY)
Facility: HOSPITAL | Age: 68
Discharge: HOME | End: 2023-11-30
Attending: EMERGENCY MEDICINE
Payer: COMMERCIAL

## 2023-11-30 VITALS
HEIGHT: 64 IN | TEMPERATURE: 98.9 F | WEIGHT: 156 LBS | BODY MASS INDEX: 26.63 KG/M2 | DIASTOLIC BLOOD PRESSURE: 59 MMHG | RESPIRATION RATE: 20 BRPM | SYSTOLIC BLOOD PRESSURE: 122 MMHG | HEART RATE: 95 BPM | OXYGEN SATURATION: 98 %

## 2023-11-30 DIAGNOSIS — R11.2 NAUSEA AND VOMITING: Primary | ICD-10-CM

## 2023-11-30 LAB
ALBUMIN SERPL BCP-MCNC: 4.1 G/DL (ref 3.5–5)
ALP SERPL-CCNC: 64 U/L (ref 34–104)
ALT SERPL W P-5'-P-CCNC: 26 U/L (ref 7–52)
ANION GAP SERPL CALCULATED.3IONS-SCNC: 8 MMOL/L
AST SERPL W P-5'-P-CCNC: 17 U/L (ref 13–39)
BASOPHILS # BLD AUTO: 0.06 THOUSANDS/ÂΜL (ref 0–0.1)
BASOPHILS NFR BLD AUTO: 0 % (ref 0–1)
BILIRUB SERPL-MCNC: 0.55 MG/DL (ref 0.2–1)
BUN SERPL-MCNC: 18 MG/DL (ref 5–25)
CALCIUM SERPL-MCNC: 9.6 MG/DL (ref 8.4–10.2)
CHLORIDE SERPL-SCNC: 102 MMOL/L (ref 96–108)
CO2 SERPL-SCNC: 27 MMOL/L (ref 21–32)
CREAT SERPL-MCNC: 0.84 MG/DL (ref 0.6–1.3)
EOSINOPHIL # BLD AUTO: 0.01 THOUSAND/ÂΜL (ref 0–0.61)
EOSINOPHIL NFR BLD AUTO: 0 % (ref 0–6)
ERYTHROCYTE [DISTWIDTH] IN BLOOD BY AUTOMATED COUNT: 13.6 % (ref 11.6–15.1)
FLUAV RNA RESP QL NAA+PROBE: NEGATIVE
FLUBV RNA RESP QL NAA+PROBE: NEGATIVE
GFR SERPL CREATININE-BSD FRML MDRD: 70 ML/MIN/1.73SQ M
GLUCOSE SERPL-MCNC: 152 MG/DL (ref 65–140)
HCT VFR BLD AUTO: 44.2 % (ref 34.8–46.1)
HGB BLD-MCNC: 15 G/DL (ref 11.5–15.4)
IMM GRANULOCYTES # BLD AUTO: 0.11 THOUSAND/UL (ref 0–0.2)
IMM GRANULOCYTES NFR BLD AUTO: 1 % (ref 0–2)
LIPASE SERPL-CCNC: <6 U/L (ref 11–82)
LYMPHOCYTES # BLD AUTO: 0.8 THOUSANDS/ÂΜL (ref 0.6–4.47)
LYMPHOCYTES NFR BLD AUTO: 6 % (ref 14–44)
MCH RBC QN AUTO: 32.4 PG (ref 26.8–34.3)
MCHC RBC AUTO-ENTMCNC: 33.9 G/DL (ref 31.4–37.4)
MCV RBC AUTO: 96 FL (ref 82–98)
MONOCYTES # BLD AUTO: 1.11 THOUSAND/ÂΜL (ref 0.17–1.22)
MONOCYTES NFR BLD AUTO: 8 % (ref 4–12)
NEUTROPHILS # BLD AUTO: 12.19 THOUSANDS/ÂΜL (ref 1.85–7.62)
NEUTS SEG NFR BLD AUTO: 85 % (ref 43–75)
NRBC BLD AUTO-RTO: 0 /100 WBCS
PLATELET # BLD AUTO: 326 THOUSANDS/UL (ref 149–390)
PMV BLD AUTO: 9.9 FL (ref 8.9–12.7)
POTASSIUM SERPL-SCNC: 3.9 MMOL/L (ref 3.5–5.3)
PROT SERPL-MCNC: 7.8 G/DL (ref 6.4–8.4)
RBC # BLD AUTO: 4.63 MILLION/UL (ref 3.81–5.12)
RSV RNA RESP QL NAA+PROBE: NEGATIVE
SARS-COV-2 RNA RESP QL NAA+PROBE: NEGATIVE
SODIUM SERPL-SCNC: 137 MMOL/L (ref 135–147)
WBC # BLD AUTO: 14.28 THOUSAND/UL (ref 4.31–10.16)

## 2023-11-30 PROCEDURE — 0241U HB NFCT DS VIR RESP RNA 4 TRGT: CPT | Performed by: EMERGENCY MEDICINE

## 2023-11-30 PROCEDURE — 96361 HYDRATE IV INFUSION ADD-ON: CPT

## 2023-11-30 PROCEDURE — 85025 COMPLETE CBC W/AUTO DIFF WBC: CPT | Performed by: EMERGENCY MEDICINE

## 2023-11-30 PROCEDURE — 36415 COLL VENOUS BLD VENIPUNCTURE: CPT | Performed by: EMERGENCY MEDICINE

## 2023-11-30 PROCEDURE — 83690 ASSAY OF LIPASE: CPT | Performed by: EMERGENCY MEDICINE

## 2023-11-30 PROCEDURE — 99284 EMERGENCY DEPT VISIT MOD MDM: CPT | Performed by: EMERGENCY MEDICINE

## 2023-11-30 PROCEDURE — 96374 THER/PROPH/DIAG INJ IV PUSH: CPT

## 2023-11-30 PROCEDURE — 80053 COMPREHEN METABOLIC PANEL: CPT | Performed by: EMERGENCY MEDICINE

## 2023-11-30 PROCEDURE — 99284 EMERGENCY DEPT VISIT MOD MDM: CPT

## 2023-11-30 RX ORDER — ONDANSETRON 4 MG/1
4 TABLET, ORALLY DISINTEGRATING ORAL EVERY 6 HOURS PRN
Qty: 12 TABLET | Refills: 0 | Status: SHIPPED | OUTPATIENT
Start: 2023-11-30 | End: 2023-12-03

## 2023-11-30 RX ORDER — ONDANSETRON 2 MG/ML
4 INJECTION INTRAMUSCULAR; INTRAVENOUS ONCE
Status: COMPLETED | OUTPATIENT
Start: 2023-11-30 | End: 2023-11-30

## 2023-11-30 RX ADMIN — ONDANSETRON 4 MG: 2 INJECTION INTRAMUSCULAR; INTRAVENOUS at 15:56

## 2023-11-30 RX ADMIN — SODIUM CHLORIDE 1000 ML: 0.9 INJECTION, SOLUTION INTRAVENOUS at 15:54

## 2023-11-30 NOTE — DISCHARGE INSTRUCTIONS
Your lab work today was normal.  We will call you with any positive results of COVID, flu, RSV testing. Use the prescribed Zofran as needed for nausea and vomiting. Drink lots of fluids. If you have any severe worsening of nausea and vomiting or significant abdominal pain, return to the emergency department. Follow-up with your primary care provider in 1 week for reassessment.

## 2023-11-30 NOTE — ED PROVIDER NOTES
History  Chief Complaint   Patient presents with    Vomiting     Patient comes due to vomiting started yesterday, no fevers     HPI  Patient is a 55-year-old female presenting for evaluation of nausea, vomiting, generalized abdominal pain. Patient arrives with son who has had similar symptoms over the same period of time and both state that multiple friends had similar symptoms of nausea, vomiting a few days ago. Patient denies fevers, chills, cough, states some rhinorrhea, denies sore throat. Patient states numerous episodes of nonbloody nonbilious emesis starting this morning and states she has been having difficulty drinking fluids. Patient denies recent travel. Prior to Admission Medications   Prescriptions Last Dose Informant Patient Reported? Taking? Ascorbic Acid (VITAMIN C) 1000 MG tablet  Self Yes No   albuterol (2.5 mg/3 mL) 0.083 % nebulizer solution  Self No No   Sig: Take 3 mL (2.5 mg total) by nebulization every 6 (six) hours as needed for wheezing or shortness of breath   Patient not taking: Reported on 11/20/2023   atorvastatin (LIPITOR) 20 mg tablet  Self No No   Sig: TAKE ONE TABLET BY MOUTH EVERY DAY AT BEDTIME   benzonatate (TESSALON PERLES) 100 mg capsule  Self No No   Sig: Take 1 capsule (100 mg total) by mouth 3 (three) times a day as needed for cough   Patient not taking: Reported on 11/20/2023   cholecalciferol (VITAMIN D3) 1,000 units tablet  Self Yes No   Sig: Take 2,000 Units by mouth daily   clobetasol (TEMOVATE) 0.05 % cream  Self No No   Sig: Apply topically 2 (two) times a day Apply topically on scalp and right shoulder two times a day   famotidine (PEPCID) 40 MG tablet   No No   Sig: TAKE ONE TABLET BY MOUTH AT BEDTIME (PEPCID)   fluticasone-umeclidinium-vilanterol (Trelegy Ellipta) 100-62.5-25 mcg/actuation inhaler  Self No No   Sig: Inhale 1 puff daily Rinse mouth after use.    fluticasone-umeclidinium-vilanterol (Trelegy Ellipta) 200-62.5-25 mcg/actuation AEPB inhaler   No No   Sig: Inhale 1 puff daily in the early morning Rinse mouth after use.   hydrocortisone 2.5 % cream  Self Yes No   Si (two) times a day scalp   Patient not taking: Reported on 2023   ipratropium-albuterol (DUO-NEB) 0.5-2.5 mg/3 mL nebulizer solution   No No   Sig: Take 3 mL by nebulization 4 (four) times a day as needed for wheezing or shortness of breath   Patient not taking: Reported on 2023   metFORMIN (GLUCOPHAGE-XR) 500 mg 24 hr tablet  Self No No   Sig: TAKE ONE TABLET BY MOUTH EVERY DAY WITH DINNER   methocarbamol (ROBAXIN) 500 mg tablet  Self No No   Sig: Take 1 tablet (500 mg total) by mouth 2 (two) times a day   Patient not taking: Reported on 2023   montelukast (SINGULAIR) 10 mg tablet   No No   Sig: TAKE ONE TABLET BY MOUTH EVERY DAY   multivitamin (THERAGRAN) TABS  Self Yes No   Sig: Take 1 tablet by mouth daily Last dose 3/21/21   pantoprazole (PROTONIX) 40 mg tablet   No No   Sig: Take 1 tablet (40 mg total) by mouth daily      Facility-Administered Medications: None       Past Medical History:   Diagnosis Date    Abnormal glucose     last assessed 16    Arthritis     Asthma     w/ exacerbation; last assessed 5/14/15    Atypical nevi     Atypical nevus     last assessed 17    Breast lump     last assessed 3/6/14    Cataract     Cervical adenopathy     last assessed  17    Colon polyp     CPAP (continuous positive airway pressure) dependence     Diverticulitis of colon     Dizziness     Dyslipidemia     last assessed 17    Facial droop     last assessed  16    Fibromyalgia, primary     Flu 2018    Foot abrasion     right between the 4th and 5th toe    Fractures     Genital herpes     resolved 16    GERD (gastroesophageal reflux disease)     Headache, tension-type     Herpes zoster     last assessed 16    History of shingles     may 2016    History of stomach ulcers     Hx of abnormal mammogram     last assessed  3/5/14    Hyperlipidemia Myalgia     last assessed  12    Myositis     12    Neck pain     Pain involving joints of fingers of both hands 2021    Peripheral neuropathy     Seborrheic keratosis     Shingles     Skin disorder     Skin neoplasm     of the lower limb, including hip; onset 12; last assessed  12    Sleep apnea     Stomach disorder        Past Surgical History:   Procedure Laterality Date    ABDOMINAL SURGERY      release of adhesions    BLADDER SURGERY      mesh-lift    BREAST CYST ASPIRATION Right     does not know the year    BREAST SURGERY      lift    CATARACT EXTRACTION Bilateral      SECTION      x [phone]    CHOLECYSTECTOMY      lap    COLONOSCOPY      COSMETIC SURGERY      tummy and breast lift    ESOPHAGOGASTRODUODENOSCOPY      OOPHORECTOMY Left     OTHER SURGICAL HISTORY      vocal cord surgery, scraping    SC HYSTEROSCOPY BX ENDOMETRIUM&/POLYPC W/WO D&C N/A 2016    Procedure: DILATATION AND CURETTAGE (D&C) WITH HYSTEROSCOPY;  Surgeon: Corrinne Barton, MD;  Location: [facility];  Service: Gynecology    REDUCTION MAMMAPLASTY Bilateral 2008    TONSILLECTOMY      TUBAL LIGATION  10/29/1984    US GUIDED MSK PROCEDURE  2021       Family History   Problem Relation Age of Onset    Hypertension Mother     Alzheimer's disease Mother     Arthritis Mother     Hypertension Father     Arthritis Father     Heart attack Father     Heart disease Father     Stroke Father     Other Brother         vertigo, tinnitus    Diabetes Daughter     Breast cancer Sister 28    Skin cancer Sister     Cancer Sister     Other Son         downs syndrome    Abdominal aortic aneurysm Sister     Cancer Sister         T cell sarcoma    Breast cancer Sister     No Known Problems Brother     Diabetes Brother     Other Sister         anemia from the diet    No Known Problems Son     No Known Problems Maternal Aunt     No Known Problems Maternal Aunt     No Known Problems Paternal Aunt     No Known Problems Paternal Aunt     No Known Problems Paternal Aunt     Asthma Sister      I have reviewed and agree with the history as documented. E-Cigarette/Vaping    E-Cigarette Use Never User      E-Cigarette/Vaping Substances    Nicotine No     THC No     CBD No     Flavoring No     Other No     Unknown No      Social History     Tobacco Use    Smoking status: Never    Smokeless tobacco: Never   Vaping Use    Vaping Use: Never used   Substance Use Topics    Alcohol use: No     Comment: rarely    Drug use: Never       Review of Systems   Constitutional:  Negative for chills, fatigue and fever. Respiratory:  Negative for cough and shortness of breath. Gastrointestinal:  Positive for nausea and vomiting. Negative for abdominal pain, anal bleeding, constipation and diarrhea. Genitourinary:  Negative for frequency. Musculoskeletal:  Negative for arthralgias and myalgias. Neurological:  Negative for headaches. Psychiatric/Behavioral:  Negative for confusion. All other systems reviewed and are negative. Physical Exam  Physical Exam  Vitals and nursing note reviewed. Constitutional:       General: She is not in acute distress. Appearance: Normal appearance. She is not ill-appearing, toxic-appearing or diaphoretic. Comments: Well-appearing, nontoxic, nondistressed   HENT:      Head: Normocephalic and atraumatic. Comments: Moist mucous membranes. Holding an emesis bag but not actively vomiting. Right Ear: External ear normal.      Left Ear: External ear normal.   Eyes:      General:         Right eye: No discharge. Left eye: No discharge. Cardiovascular:      Comments: Regular rate and rhythm, no murmurs rubs or gallops. Extremities warm and well-perfused without mottling. Pulmonary:      Effort: No respiratory distress. Comments: No increased work of breathing. Speaking in complete sentences. Lungs clear to auscultation bilaterally without wheezes, rales, rhonchi. Satting 98% on room air indicating adequate oxygenation. Abdominal:      General: There is no distension. Comments: Generalized abdominal tenderness more pronounced in the upper abdominal quadrants. Abdomen nondistended with normal bowel sounds. No rigidity, rebound, guarding   Musculoskeletal:         General: No deformity. Cervical back: Normal range of motion. Skin:     Findings: No lesion or rash. Neurological:      Mental Status: She is alert and oriented to person, place, and time. Mental status is at baseline.       Comments: Awake, alert, pleasant, interactive   Psychiatric:         Mood and Affect: Mood and affect normal.         Vital Signs  ED Triage Vitals [11/30/23 1504]   Temperature Pulse Respirations Blood Pressure SpO2   98.9 °F (37.2 °C) 95 20 122/59 98 %      Temp Source Heart Rate Source Patient Position - Orthostatic VS BP Location FiO2 (%)   Oral Monitor Lying Right arm --      Pain Score       --           Vitals:    11/30/23 1504   BP: 122/59   Pulse: 95   Patient Position - Orthostatic VS: Lying         Visual Acuity      ED Medications  Medications   sodium chloride 0.9 % bolus 1,000 mL (1,000 mL Intravenous New Bag 11/30/23 1554)   ondansetron (ZOFRAN) injection 4 mg (4 mg Intravenous Given 11/30/23 1556)       Diagnostic Studies  Results Reviewed       Procedure Component Value Units Date/Time    Comprehensive metabolic panel [[id number]]  (Abnormal) Collected: 11/30/23 1543    Lab Status: Final result Specimen: Blood from Arm, Left Updated: 11/30/23 1622     Sodium 137 mmol/L      Potassium 3.9 mmol/L      Chloride 102 mmol/L      CO2 27 mmol/L      ANION GAP 8 mmol/L      BUN 18 mg/dL      Creatinine 0.84 mg/dL      Glucose 152 mg/dL      Calcium 9.6 mg/dL      AST 17 U/L      ALT 26 U/L      Alkaline Phosphatase 64 U/L      Total Protein 7.8 g/dL      Albumin 4.1 g/dL      Total Bilirubin 0.55 mg/dL      eGFR 70 ml/min/1.73sq m     Narrative:      National Kidney Disease Foundation guidelines for Chronic Kidney Disease (CKD):     Stage 1 with normal or high GFR (GFR > 90 mL/min/1.73 square meters)    Stage 2 Mild CKD (GFR = 60-89 mL/min/1.73 square meters)    Stage 3A Moderate CKD (GFR = 45-59 mL/min/1.73 square meters)    Stage 3B Moderate CKD (GFR = 30-44 mL/min/1.73 square meters)    Stage 4 Severe CKD (GFR = 15-29 mL/min/1.73 square meters)    Stage 5 End Stage CKD (GFR <15 mL/min/1.73 square meters)  Note: GFR calculation is accurate only with a steady state creatinine    Lipase [[id number]]  (Abnormal) Collected: 11/30/23 1543    Lab Status: Final result Specimen: Blood from Arm, Left Updated: 11/30/23 1622     Lipase <6 u/L     CBC and differential [[id number]]  (Abnormal) Collected: 11/30/23 1543    Lab Status: Final result Specimen: Blood from Arm, Left Updated: 11/30/23 1558     WBC 14.28 Thousand/uL      RBC 4.63 Million/uL      Hemoglobin 15.0 g/dL      Hematocrit 44.2 %      MCV 96 fL      MCH 32.4 pg      MCHC 33.9 g/dL      RDW 13.6 %      MPV 9.9 fL      Platelets 374 Thousands/uL      nRBC 0 /100 WBCs      Neutrophils Relative 85 %      Immat GRANS % 1 %      Lymphocytes Relative 6 %      Monocytes Relative 8 %      Eosinophils Relative 0 %      Basophils Relative 0 %      Neutrophils Absolute 12.19 Thousands/µL      Immature Grans Absolute 0.11 Thousand/uL      Lymphocytes Absolute 0.80 Thousands/µL      Monocytes Absolute 1.11 Thousand/µL      Eosinophils Absolute 0.01 Thousand/µL      Basophils Absolute 0.06 Thousands/µL     COVID/FLU/RSV [[id number]] Collected: 11/30/23 1543    Lab Status: In process Specimen: Nares from Nose Updated: 11/30/23 1555                   No orders to display              Procedures  Procedures         ED Course                               SBIRT 22yo+      Flowsheet Row Most Recent Value   Initial Alcohol Screen: US AUDIT-C     1. How often do you have a drink containing alcohol? 0 Filed at: 11/30/2023 1506   2.  How many drinks containing alcohol do you have on a typical day you are drinking? 0 Filed at: 11/30/2023 1506   3a. Male UNDER 65: How often do you have five or more drinks on one occasion? 0 Filed at: 11/30/2023 1506   3b. FEMALE Any Age, or MALE 65+: How often do you have 4 or more drinks on one occassion? 0 Filed at: 11/30/2023 1506   Audit-C Score 0 Filed at: 11/30/2023 1506   MARILOU: How many times in the past year have you. .. Used an illegal drug or used a prescription medication for non-medical reasons? Never Filed at: 11/30/2023 1506                      Medical Decision Making  I obtained history from the patient. Patient with nausea, vomiting, numerous sick contacts with similar symptoms, most suggestive of a viral gastroenteritis. Given patient's age and comorbidities, I ordered and reviewed lab work including CBC, CMP, lipase to evaluate for electrolyte or renal derangement, leukocytosis, anemia, deranged LFTs, elevated lipase to suggest pancreatitis. I treated patient with a bolus of normal saline as well as Zofran for nausea with plan for p.o. challenge. Patient without significant lab derangement. Improvement on reassessment, able to pass a p.o. challenge. Provided with reassurance, prescription for Zofran, discharged with return precautions. Amount and/or Complexity of Data Reviewed  Labs: ordered. Risk  Prescription drug management. Disposition  Final diagnoses:   Nausea and vomiting     Time reflects when diagnosis was documented in both MDM as applicable and the Disposition within this note       Time User Action Codes Description Comment    11/30/2023  4:25 PM Bill Mar [R11.2] Nausea and vomiting           ED Disposition       ED Disposition   Discharge    Condition   Stable    Date/Time   u Nov 30, 2023 [address] discharge to home/self care.                    Follow-up Information    None         Patient's Medications   Discharge Prescriptions ONDANSETRON (ZOFRAN ODT) 4 MG DISINTEGRATING TABLET    Take 1 tablet (4 mg total) by mouth every 6 (six) hours as needed for nausea or vomiting for up to 3 days       Start Date: 11/30/2023End Date: 12/3/2023       Order Dose: 4 mg       Quantity: 12 tablet    Refills: 0       No discharge procedures on file.     PDMP Review       None            ED Provider  Electronically Signed by             Ravi Calderon MD  11/30/23 8897

## 2023-12-07 ENCOUNTER — TELEPHONE (OUTPATIENT)
Age: 68
End: 2023-12-07

## 2023-12-07 NOTE — TELEPHONE ENCOUNTER
Patient called and said that Dr. Marylu Koroma had iagnosed with Lupus on her skin and wants to know if it is ok for her to color her hair. I advised her that I was not sure but I would reach out to Dr. [place] [facility] office and ask and either myself or someone from the office would call her back to let her know.

## 2023-12-07 NOTE — TELEPHONE ENCOUNTER
Called pt back and LM to let her know I spoke with Susana Hudson our MA who advised she needs to be cautious of merritt dye if she has open or active lesions on the scalp. If not then it s fine but not recommended.

## 2023-12-11 ENCOUNTER — OFFICE VISIT (OUTPATIENT)
Dept: FAMILY MEDICINE CLINIC | Facility: CLINIC | Age: 68
End: 2023-12-11
Payer: COMMERCIAL

## 2023-12-11 VITALS
BODY MASS INDEX: 26.29 KG/M2 | HEART RATE: 88 BPM | HEIGHT: 64 IN | WEIGHT: 154 LBS | SYSTOLIC BLOOD PRESSURE: 110 MMHG | RESPIRATION RATE: 14 BRPM | TEMPERATURE: 99.3 F | DIASTOLIC BLOOD PRESSURE: 70 MMHG

## 2023-12-11 DIAGNOSIS — J45.30 MILD PERSISTENT ASTHMA WITHOUT COMPLICATION: ICD-10-CM

## 2023-12-11 DIAGNOSIS — G47.33 OBSTRUCTIVE SLEEP APNEA: ICD-10-CM

## 2023-12-11 DIAGNOSIS — E03.9 HYPOTHYROIDISM, UNSPECIFIED TYPE: ICD-10-CM

## 2023-12-11 DIAGNOSIS — L23.9 ALLERGIC CONTACT DERMATITIS, UNSPECIFIED TRIGGER: Primary | ICD-10-CM

## 2023-12-11 PROCEDURE — 99214 OFFICE O/P EST MOD 30 MIN: CPT | Performed by: STUDENT IN AN ORGANIZED HEALTH CARE EDUCATION/TRAINING PROGRAM

## 2023-12-11 RX ORDER — GABAPENTIN 100 MG/1
CAPSULE ORAL
COMMUNITY
Start: 2023-09-26

## 2023-12-11 RX ORDER — METHYLPREDNISOLONE 4 MG/1
TABLET ORAL
Qty: 21 EACH | Refills: 0 | Status: SHIPPED | OUTPATIENT
Start: 2023-12-11

## 2023-12-11 NOTE — PROGRESS NOTES
Clinic Visit Note  Tyler Barraza 77 y.o. female   MRN: [id number]    Assessment and Plan      Diagnoses and all orders for this visit:    Allergic contact dermatitis, unspecified trigger  Unspecific trigger at this time, will continue to monitor, no recent changes in routine or medication, continue OTC antihistamine up to twice daily, steroid pack, hydrocortisone 2.5% cream as needed for specific areas, will avoid face, symptoms worsen or not improving reevaluation recommended  -     methylPREDNISolone 4 MG tablet therapy pack; Use as directed on package  -     hydrocortisone 2.5 % cream; Apply topically 2 (two) times a day scalp    Hypothyroidism, unspecified type  Recheck at annual visit    Mild persistent asthma without complication  Continue inhaler therapy, lungs clear to auscultation bilaterally    Obstructive sleep apnea  CPAP at night    Other orders  -     gabapentin (NEURONTIN) 100 mg capsule    My impressions and treatment recommendations were discussed in detail with the patient who verbalized understanding and had no further questions. Discharge instructions were provided. Subjective     Chief Complaint: Acute care visit    History of Present Illness:    Patient is a pleasant 17-year-old female coming in for acute care visit secondary to rash on face, chest, upper back, no specific trigger. The following portions of the patient's history were reviewed and updated as appropriate: allergies, current medications, past family history, past medical history, past social history, past surgical history and problem list.    REVIEW OF SYSTEMS:  A complete 12-point review of systems is negative other than that noted in the HPI. Review of Systems   Constitutional:  Negative for chills and fever. HENT:  Negative for ear pain and sore throat. Eyes:  Negative for pain and visual disturbance. Respiratory:  Negative for cough and shortness of breath.     Cardiovascular:  Negative for chest pain and palpitations. Gastrointestinal:  Negative for abdominal pain and vomiting. Genitourinary:  Negative for dysuria and hematuria. Musculoskeletal:  Negative for arthralgias and back pain. Skin:  Positive for rash. Negative for color change. Neurological:  Negative for seizures and syncope. All other systems reviewed and are negative.         Current Outpatient Medications:   •  Ascorbic Acid (VITAMIN C) 1000 MG tablet, , Disp: , Rfl:   •  atorvastatin (LIPITOR) 20 mg tablet, TAKE ONE TABLET BY MOUTH EVERY DAY AT BEDTIME, Disp: 90 tablet, Rfl: 1  •  cholecalciferol (VITAMIN D3) 1,000 units tablet, Take 2,000 Units by mouth daily, Disp: , Rfl:   •  clobetasol (TEMOVATE) 0.05 % cream, Apply topically 2 (two) times a day Apply topically on scalp and right shoulder two times a day, Disp: 60 g, Rfl: 2  •  fluticasone-umeclidinium-vilanterol (Trelegy Ellipta) 200-62.5-25 mcg/actuation AEPB inhaler, Inhale 1 puff daily in the early morning Rinse mouth after use., Disp: 60 blister, Rfl: 5  •  gabapentin (NEURONTIN) 100 mg capsule, , Disp: , Rfl:   •  hydrocortisone 2.5 % cream, Apply topically 2 (two) times a day scalp, Disp: 20 g, Rfl: 0  •  metFORMIN (GLUCOPHAGE-XR) 500 mg 24 hr tablet, TAKE ONE TABLET BY MOUTH EVERY DAY WITH DINNER, Disp: 90 tablet, Rfl: 2  •  methylPREDNISolone 4 MG tablet therapy pack, Use as directed on package, Disp: 21 each, Rfl: 0  •  montelukast (SINGULAIR) 10 mg tablet, TAKE ONE TABLET BY MOUTH EVERY DAY, Disp: 60 tablet, Rfl: 2  •  multivitamin (THERAGRAN) TABS, Take 1 tablet by mouth daily Last dose 3/21/21, Disp: , Rfl:   •  pantoprazole (PROTONIX) 40 mg tablet, Take 1 tablet (40 mg total) by mouth daily, Disp: 90 tablet, Rfl: 0  •  albuterol (2.5 mg/3 mL) 0.083 % nebulizer solution, Take 3 mL (2.5 mg total) by nebulization every 6 (six) hours as needed for wheezing or shortness of breath (Patient not taking: Reported on 11/20/2023), Disp: 180 mL, Rfl: 5  •  famotidine (PEPCID) 40 MG tablet, TAKE ONE TABLET BY MOUTH AT BEDTIME (PEPCID), Disp: 90 tablet, Rfl: 1  •  fluticasone-umeclidinium-vilanterol (Trelegy Ellipta) 100-62.5-25 mcg/actuation inhaler, Inhale 1 puff daily Rinse mouth after use., Disp: 60 blister, Rfl: 11  •  ipratropium-albuterol (DUO-NEB) 0.5-2.5 mg/3 mL nebulizer solution, Take 3 mL by nebulization 4 (four) times a day as needed for wheezing or shortness of breath (Patient not taking: Reported on 11/20/2023), Disp: 360 mL, Rfl: 7  •  methocarbamol (ROBAXIN) 500 mg tablet, Take 1 tablet (500 mg total) by mouth 2 (two) times a day (Patient not taking: Reported on 11/20/2023), Disp: 20 tablet, Rfl: 0  •  ondansetron (Zofran ODT) 4 mg disintegrating tablet, Take 1 tablet (4 mg total) by mouth every 6 (six) hours as needed for nausea or vomiting for up to 3 days, Disp: 12 tablet, Rfl: 0  Past Medical History:   Diagnosis Date   • Abnormal glucose     last assessed 2/25/16   • Arthritis    • Asthma     w/ exacerbation; last assessed 5/14/15   • Atypical nevi    • Atypical nevus     last assessed 1/18/17   • Breast lump     last assessed 3/6/14   • Cataract    • Cervical adenopathy     last assessed  2/7/17   • Colon polyp    • CPAP (continuous positive airway pressure) dependence    • Diverticulitis of colon    • Dizziness    • Dyslipidemia     last assessed 5/22/17   • Facial droop     last assessed  12/5/16   • Fibromyalgia, primary    • Flu 01/20/2018   • Foot abrasion     right between the 4th and 5th toe   • Fractures    • Genital herpes     resolved 9/1/16   • GERD (gastroesophageal reflux disease)    • Headache, tension-type    • Herpes zoster     last assessed 5/20/16   • History of shingles     may 2016   • History of stomach ulcers    • Hx of abnormal mammogram     last assessed  3/5/14   • Hyperlipidemia    • Myalgia     last assessed  11/21/12   • Myositis     11/21/12   • Neck pain    • Pain involving joints of fingers of both hands 01/19/2021   • Peripheral neuropathy • Seborrheic keratosis    • Shingles    • Skin disorder    • Skin neoplasm     of the lower limb, including hip; onset 12; last assessed  12   • Sleep apnea    • Stomach disorder      Past Surgical History:   Procedure Laterality Date   • ABDOMINAL SURGERY      release of adhesions   • BLADDER SURGERY      mesh-lift   • BREAST CYST ASPIRATION Right     does not know the year   • BREAST SURGERY      lift   • CATARACT EXTRACTION Bilateral    •  SECTION      x [id number]   • CHOLECYSTECTOMY      lap   • COLONOSCOPY     • COSMETIC SURGERY      tummy and breast lift   • ESOPHAGOGASTRODUODENOSCOPY     • OOPHORECTOMY Left    • OTHER SURGICAL HISTORY      vocal cord surgery, scraping   • [place] HYSTEROSCOPY BX ENDOMETRIUM&/POLYPC W/WO D&C N/A 2016    Procedure: DILATATION AND CURETTAGE (D&C) WITH HYSTEROSCOPY;  Surgeon: Bienvenido Chacon MD;  Location: [facility];  Service: Gynecology   • REDUCTION MAMMAPLASTY Bilateral    • TONSILLECTOMY     • TUBAL LIGATION  10/29/1984   • [address] MSK PROCEDURE  2021     Social History     Socioeconomic History   • Marital status:      Spouse name: Not on file   • Number of children: Not on file   • Years of education: Not on file   • Highest education level: Not on file   Occupational History   • Not on file   Tobacco Use   • Smoking status: Never   • Smokeless tobacco: Never   Vaping Use   • Vaping Use: Never used   Substance and Sexual Activity   • Alcohol use: No     Comment: rarely   • Drug use: Never   • Sexual activity: Not Currently     Birth control/protection: Post-menopausal   Other Topics Concern   • Not on file   Social History Narrative    Daily caffinated coffee consumption    Exercise habits- walking sometimes 1x per day- last impression 17- [facility] Mood    Good sleep hygiene    Pet - fish     Social Determinants of Health     Financial Resource Strain: Medium Risk (2023)    Overall Financial Resource Strain (CARDIA)    • Difficulty of Paying Living Expenses: Somewhat hard   Food Insecurity: Not on file   Transportation Needs: No Transportation Needs (6/7/2023)    PRAPARE - Transportation    • Lack of Transportation (Medical): No    • Lack of Transportation (Non-Medical):  No   Physical Activity: Not on file   Stress: Not on file   Social Connections: Not on file   Intimate Partner Violence: Not on file   Housing Stability: Not on file     Family History   Problem Relation Age of Onset   • Hypertension Mother    • Alzheimer's disease Mother    • Arthritis Mother    • Hypertension Father    • Arthritis Father    • Heart attack Father    • Heart disease Father    • Stroke Father    • Other Brother         vertigo, tinnitus   • Diabetes Daughter    • Breast cancer Sister 28   • Skin cancer Sister    • Cancer Sister    • Other Son         downs syndrome   • Abdominal aortic aneurysm Sister    • Cancer Sister         T cell sarcoma   • Breast cancer Sister    • No Known Problems Brother    • Diabetes Brother    • Other Sister         anemia from the diet   • No Known Problems Son    • No Known Problems Maternal Aunt    • No Known Problems Maternal Aunt    • No Known Problems Paternal Aunt    • No Known Problems Paternal Aunt    • No Known Problems Paternal Aunt    • Asthma Sister      Allergies   Allergen Reactions   • Latex Rash     Burn-skin irritation   • Adhesive [Medical Tape] Other (See Comments)     bandaid-red,itch       Objective     Vitals:    12/11/23 1544   BP: 110/70   BP Location: Left arm   Patient Position: Sitting   Cuff Size: Standard   Pulse: 88   Resp: 14   Temp: 99.3 °F (37.4 °C)   TempSrc: Temporal   Weight: 69.9 kg (154 lb)   Height: 5' 4" (1.626 m)       Physical Exam:     GENERAL: NAD, pleasant   HEENT:  NC/AT, PERRL, EOMI, no scleral icterus  CARDIAC:  RRR, +S1/S2, no S3/S4 appreciated, no m/g/r  PULMONARY:  CTA B/L, no wheezing/rales/rhonci, non-labored breathing  ABDOMEN:  Soft, NT/ND, no rebound/guarding/rigidity  Extremities:. No edema, cyanosis, or clubbing  Musculoskeletal:  Full range of motion intact in all extremities   NEUROLOGIC: Grossly intact, no focal deficits  SKIN:  No rashes or erythema noted on exposed skin  Psych: Normal affect, mood stable    ==  PLEASE NOTE:  This encounter was completed utilizing the [employer] Direct Speech Voice Recognition Software. Grammatical errors, random word insertions, pronoun errors and incomplete sentences are occasional consequences of the system due to software limitations, ambient noise and hardware issues. These may be missed by proof reading prior to affixing electronic signature. Any questions or concerns about the content, text or information contained within the body of this dictation should be directly addressed to the physician for clarification. Please do not hesitate to call me directly if you have any any questions or concerns.     DO Tomas Huggins [facility]   [facility]

## 2023-12-19 DIAGNOSIS — R73.03 PREDIABETES: ICD-10-CM

## 2023-12-19 DIAGNOSIS — E78.5 HYPERLIPIDEMIA, UNSPECIFIED HYPERLIPIDEMIA TYPE: ICD-10-CM

## 2023-12-20 RX ORDER — ATORVASTATIN CALCIUM 20 MG/1
TABLET, FILM COATED ORAL
Qty: 90 TABLET | Refills: 1 | Status: SHIPPED | OUTPATIENT
Start: 2023-12-20

## 2023-12-20 RX ORDER — METFORMIN HYDROCHLORIDE 500 MG/1
TABLET, EXTENDED RELEASE ORAL
Qty: 90 TABLET | Refills: 1 | Status: SHIPPED | OUTPATIENT
Start: 2023-12-20

## 2024-01-09 ENCOUNTER — OFFICE VISIT (OUTPATIENT)
Dept: OBGYN CLINIC | Facility: CLINIC | Age: 69
End: 2024-01-09
Payer: COMMERCIAL

## 2024-01-09 ENCOUNTER — APPOINTMENT (OUTPATIENT)
Dept: RADIOLOGY | Facility: CLINIC | Age: 69
End: 2024-01-09
Payer: COMMERCIAL

## 2024-01-09 VITALS
HEART RATE: 78 BPM | HEIGHT: 64 IN | DIASTOLIC BLOOD PRESSURE: 65 MMHG | BODY MASS INDEX: 26.29 KG/M2 | SYSTOLIC BLOOD PRESSURE: 136 MMHG | WEIGHT: 154 LBS

## 2024-01-09 DIAGNOSIS — M25.561 CHRONIC PAIN OF BOTH KNEES: Primary | ICD-10-CM

## 2024-01-09 DIAGNOSIS — G89.29 CHRONIC PAIN OF BOTH KNEES: ICD-10-CM

## 2024-01-09 DIAGNOSIS — M25.561 CHRONIC PAIN OF BOTH KNEES: ICD-10-CM

## 2024-01-09 DIAGNOSIS — M25.562 CHRONIC PAIN OF BOTH KNEES: ICD-10-CM

## 2024-01-09 DIAGNOSIS — M25.551 BILATERAL HIP PAIN: ICD-10-CM

## 2024-01-09 DIAGNOSIS — M25.562 LEFT KNEE PAIN, UNSPECIFIED CHRONICITY: ICD-10-CM

## 2024-01-09 DIAGNOSIS — M25.552 LEFT HIP PAIN: ICD-10-CM

## 2024-01-09 DIAGNOSIS — M25.552 BILATERAL HIP PAIN: ICD-10-CM

## 2024-01-09 DIAGNOSIS — M25.562 CHRONIC PAIN OF BOTH KNEES: Primary | ICD-10-CM

## 2024-01-09 DIAGNOSIS — G89.29 CHRONIC PAIN OF BOTH KNEES: Primary | ICD-10-CM

## 2024-01-09 PROCEDURE — 73502 X-RAY EXAM HIP UNI 2-3 VIEWS: CPT

## 2024-01-09 PROCEDURE — 99214 OFFICE O/P EST MOD 30 MIN: CPT | Performed by: ORTHOPAEDIC SURGERY

## 2024-01-09 PROCEDURE — 73564 X-RAY EXAM KNEE 4 OR MORE: CPT

## 2024-01-09 RX ORDER — MELOXICAM 15 MG/1
15 TABLET ORAL DAILY
Qty: 30 TABLET | Refills: 0 | Status: SHIPPED | OUTPATIENT
Start: 2024-01-09 | End: 2024-02-08

## 2024-01-09 NOTE — PROGRESS NOTES
Assessment/Plan:  1. Chronic pain of both knees  XR knee 4+ vw left injury    XR knee 4+ vw right injury    Ambulatory Referral to Physical Therapy      2. Bilateral hip pain  XR hip/pelv 2-3 vws left if performed    meloxicam (Mobic) 15 mg tablet    Ambulatory Referral to Physical Therapy        68 year old female presenting for evaluation of bilateral hip and bilateral knee pain.  The patient has a sudden onset of pain in multiple joints, concerning for possible lupus/RA flare. I do advise she FU with her rheumatology office as she notes there is another provider there who can see her. I did provide her with a prescription for Mobic today. I do not feel she should have another medrol dose kourtney as she just had one in early December for a rash. The patient is diabetic and we discussed that this can increase her blood sugars. I also prescribed PT for the patients knees and hips. She will FU as needed.     Subjective:   Renetta Chan is a 68 y.o. female who presents today for evaluation of her bilateral knees and hips. She notes the right knee started to bother her about a week and half ago with no inciting event prior to the onset of her symptoms. This then progressed to the right hip and about 3 days ago she started with left knee pain followed by left hip pain. Ibuprofen has helped the right side, but does not seem to be helping the left. She notes bilateral groin and lateral hip pain, as well as medial, latera, and anterior knee pain. She notes left elbow pain as well as a rash on her face.     The patient does have a history of lupus/rheumatoid arthritis and was previously seen by rheumatologist Dr. Tracy , however he retired. She does not currently see a rheumatologist and is not on any rheumatoid medications.       Review of Systems   Constitutional: Negative.  Negative for chills and fever.   HENT: Negative.  Negative for ear pain and sore throat.    Eyes: Negative.  Negative for pain and redness.    Respiratory: Negative.  Negative for shortness of breath and wheezing.    Cardiovascular:  Negative for chest pain and palpitations.   Gastrointestinal: Negative.  Negative for abdominal pain and blood in stool.   Endocrine: Negative.  Negative for polydipsia and polyuria.   Genitourinary: Negative.  Negative for difficulty urinating and dysuria.   Musculoskeletal:         As noted in HPI   Skin: Negative.  Negative for pallor and rash.   Neurological: Negative.  Negative for dizziness and numbness.   Hematological: Negative.  Negative for adenopathy. Does not bruise/bleed easily.   Psychiatric/Behavioral: Negative.  Negative for confusion and suicidal ideas.          Past Medical History:   Diagnosis Date   • Abnormal glucose     last assessed 2/25/16   • Arthritis    • Asthma     w/ exacerbation; last assessed 5/14/15   • Atypical nevi    • Atypical nevus     last assessed 1/18/17   • Breast lump     last assessed 3/6/14   • Cataract    • Cervical adenopathy     last assessed  2/7/17   • Colon polyp    • CPAP (continuous positive airway pressure) dependence    • Diverticulitis of colon    • Dizziness    • Dyslipidemia     last assessed 5/22/17   • Facial droop     last assessed  12/5/16   • Fibromyalgia, primary    • Flu 01/20/2018   • Foot abrasion     right between the 4th and 5th toe   • Fractures    • Genital herpes     resolved 9/1/16   • GERD (gastroesophageal reflux disease)    • Headache, tension-type    • Herpes zoster     last assessed 5/20/16   • History of shingles     may 2016   • History of stomach ulcers    • Hx of abnormal mammogram     last assessed  3/5/14   • Hyperlipidemia    • Myalgia     last assessed  11/21/12   • Myositis     11/21/12   • Neck pain    • Pain involving joints of fingers of both hands 01/19/2021   • Peripheral neuropathy    • Seborrheic keratosis    • Shingles    • Skin disorder    • Skin neoplasm     of the lower limb, including hip; onset 1/23/12; last assessed  1/23/12    • Sleep apnea    • Stomach disorder        Past Surgical History:   Procedure Laterality Date   • ABDOMINAL SURGERY      release of adhesions   • BLADDER SURGERY      mesh-lift   • BREAST CYST ASPIRATION Right     does not know the year   • BREAST SURGERY      lift   • CATARACT EXTRACTION Bilateral    •  SECTION      x 3-,,   • CHOLECYSTECTOMY      lap   • COLONOSCOPY     • COSMETIC SURGERY      tummy and breast lift   • ESOPHAGOGASTRODUODENOSCOPY     • OOPHORECTOMY Left    • OTHER SURGICAL HISTORY      vocal cord surgery, scraping   • [place] HYSTEROSCOPY BX ENDOMETRIUM&/POLYPC W/WO D&C N/A 2016    Procedure: DILATATION AND CURETTAGE (D&C) WITH HYSTEROSCOPY;  Surgeon: Kyle Bethea MD;  Location: [facility];  Service: Gynecology   • REDUCTION MAMMAPLASTY Bilateral    • TONSILLECTOMY     • TUBAL LIGATION  10/29/1984   • US GUIDED MSK PROCEDURE  2021       Family History   Problem Relation Age of Onset   • Hypertension Mother    • Alzheimer's disease Mother    • Arthritis Mother    • Hypertension Father    • Arthritis Father    • Heart attack Father    • Heart disease Father    • Stroke Father    • Other Brother         vertigo, tinnitus   • Diabetes Daughter    • Breast cancer Sister 32   • Skin cancer Sister    • Cancer Sister    • Other Son         downs syndrome   • Abdominal aortic aneurysm Sister    • Cancer Sister         T cell sarcoma   • Breast cancer Sister    • No Known Problems Brother    • Diabetes Brother    • Other Sister         anemia from the diet   • No Known Problems Son    • No Known Problems Maternal Aunt    • No Known Problems Maternal Aunt    • No Known Problems Paternal Aunt    • No Known Problems Paternal Aunt    • No Known Problems Paternal Aunt    • Asthma Sister        Social History     Occupational History   • Not on file   Tobacco Use   • Smoking status: Never   • Smokeless tobacco: Never   Vaping Use   • Vaping status: Never Used   Substance and  Sexual Activity   • Alcohol use: No     Comment: rarely   • Drug use: Never   • Sexual activity: Not Currently     Birth control/protection: Post-menopausal         Current Outpatient Medications:   •  Ascorbic Acid (VITAMIN C) 1000 MG tablet, , Disp: , Rfl:   •  atorvastatin (LIPITOR) 20 mg tablet, TAKE ONE TABLET BY MOUTH EVERY DAY AT BEDTIME, Disp: 90 tablet, Rfl: 1  •  cholecalciferol (VITAMIN D3) 1,000 units tablet, Take 2,000 Units by mouth daily, Disp: , Rfl:   •  clobetasol (TEMOVATE) 0.05 % cream, Apply topically 2 (two) times a day Apply topically on scalp and right shoulder two times a day, Disp: 60 g, Rfl: 2  •  famotidine (PEPCID) 40 MG tablet, TAKE ONE TABLET BY MOUTH AT BEDTIME (PEPCID), Disp: 90 tablet, Rfl: 1  •  fluticasone-umeclidinium-vilanterol (Trelegy Ellipta) 200-62.5-25 mcg/actuation AEPB inhaler, Inhale 1 puff daily in the early morning Rinse mouth after use., Disp: 60 blister, Rfl: 5  •  gabapentin (NEURONTIN) 100 mg capsule, , Disp: , Rfl:   •  hydrocortisone 2.5 % cream, Apply topically 2 (two) times a day scalp, Disp: 20 g, Rfl: 0  •  meloxicam (Mobic) 15 mg tablet, Take 1 tablet (15 mg total) by mouth daily, Disp: 30 tablet, Rfl: 0  •  metFORMIN (GLUCOPHAGE-XR) 500 mg 24 hr tablet, TAKE ONE TABLET BY MOUTH EVERY DAY - TAKE WITH DINNER, Disp: 90 tablet, Rfl: 1  •  methylPREDNISolone 4 MG tablet therapy pack, Use as directed on package, Disp: 21 each, Rfl: 0  •  montelukast (SINGULAIR) 10 mg tablet, TAKE ONE TABLET BY MOUTH EVERY DAY, Disp: 60 tablet, Rfl: 2  •  multivitamin (THERAGRAN) TABS, Take 1 tablet by mouth daily Last dose 3/21/21, Disp: , Rfl:   •  pantoprazole (PROTONIX) 40 mg tablet, Take 1 tablet (40 mg total) by mouth daily, Disp: 90 tablet, Rfl: 0  •  albuterol (2.5 mg/3 mL) 0.083 % nebulizer solution, Take 3 mL (2.5 mg total) by nebulization every 6 (six) hours as needed for wheezing or shortness of breath (Patient not taking: Reported on 11/20/2023), Disp: 180 mL, Rfl:  5  •  fluticasone-umeclidinium-vilanterol (Trelegy Ellipta) 100-62.5-25 mcg/actuation inhaler, Inhale 1 puff daily Rinse mouth after use., Disp: 60 blister, Rfl: 11  •  ipratropium-albuterol (DUO-NEB) 0.5-2.5 mg/3 mL nebulizer solution, Take 3 mL by nebulization 4 (four) times a day as needed for wheezing or shortness of breath (Patient not taking: Reported on 11/20/2023), Disp: 360 mL, Rfl: 7  •  methocarbamol (ROBAXIN) 500 mg tablet, Take 1 tablet (500 mg total) by mouth 2 (two) times a day (Patient not taking: Reported on 11/20/2023), Disp: 20 tablet, Rfl: 0  •  ondansetron (Zofran ODT) 4 mg disintegrating tablet, Take 1 tablet (4 mg total) by mouth every 6 (six) hours as needed for nausea or vomiting for up to 3 days, Disp: 12 tablet, Rfl: 0    Allergies   Allergen Reactions   • Latex Rash     Burn-skin irritation   • Adhesive [Medical Tape] Other (See Comments)     bandaid-red,itch       Objective:  Vitals:    01/09/24 1025   BP: 136/65   Pulse: 78     Pain Score: 10-Worst pain ever      Right Knee Exam     Tenderness   The patient is experiencing tenderness in the medial joint line and lateral joint line.    Range of Motion   Extension:  0   Flexion:  110     Tests   Varus: negative Valgus: negative  Drawer:  Anterior - negative    Posterior - negative    Other   Erythema: absent  Sensation: normal  Pulse: present  Swelling: none  Effusion: no effusion present      Left Knee Exam     Tenderness   The patient is experiencing tenderness in the medial joint line and lateral joint line.    Range of Motion   Extension:  0   Flexion:  110     Tests   Varus: negative Valgus: negative  Drawer:  Anterior - negative     Posterior - negative    Other   Erythema: absent  Sensation: normal  Pulse: present  Swelling: none  Effusion: no effusion present      Right Hip Exam     Range of Motion   Abduction:  35   Adduction:  25   Extension:  20   Flexion:  110   External rotation:  30   Internal rotation:  15     Muscle  Strength   Abduction: 5/5   Adduction: 5/5   Flexion: 5/5     Tests   FERNANDO: positive  Dwight: negative    Other   Erythema: absent  Scars: absent  Sensation: normal  Pulse: present      Left Hip Exam     Range of Motion   Abduction:  35   Adduction:  25   Extension:  20   Flexion:  110   External rotation:  30   Internal rotation: 15     Muscle Strength   Abduction: 5/5   Adduction: 5/5   Flexion: 5/5     Tests   FERNANDO: positive  Dwight: negative    Other   Erythema: absent  Scars: absent  Sensation: normal  Pulse: present          Observations   Left Knee   Negative for effusion.     Right Knee   Negative for effusion.       Physical Exam  Constitutional:       General: She is not in acute distress.     Appearance: She is well-developed.   HENT:      Head: Normocephalic and atraumatic.   Eyes:      General: No scleral icterus.     Conjunctiva/sclera: Conjunctivae normal.   Neck:      Vascular: No JVD.   Cardiovascular:      Rate and Rhythm: Normal rate.   Pulmonary:      Effort: Pulmonary effort is normal. No respiratory distress.   Musculoskeletal:      Right knee: No effusion.      Left knee: No effusion.      Comments: As per HPI   Skin:     General: Skin is warm.      Comments: Butterfly type erythematous rash of face.    Neurological:      Mental Status: She is alert and oriented to person, place, and time.      Coordination: Coordination normal.         I have personally reviewed pertinent films in PACS and my interpretation is as follows:  Xrays bilateral knees: Medial and patellofemoral compartment arthritis, worse on the right.   Xrays bilateral hips: Mild bilateral hip arthritis.        This document was created using speech voice recognition software.   Grammatical errors, random word insertions, pronoun errors, and incomplete sentences are an occasional consequence of this system due to software limitations, ambient noise, and hardware issues.   Any formal questions or concerns about content, text, or  information contained within the body of this dictation should be directly addressed to the provider for clarification.

## 2024-01-09 NOTE — PROGRESS NOTES
Assessment/Plan:  1. Chronic pain of both knees  XR knee 4+ vw left injury    XR knee 4+ vw right injury      2. Bilateral hip pain  XR hip/pelv 2-3 vws left if performed        Scribe Attestation    I,:  Kelli Courtney am acting as a scribe while in the presence of the attending physician.:       I,:  Zachariah Oquendo MD personally performed the services described in this documentation    as scribed in my presence.:             ***    Subjective:   Renetta Chan is a 68 y.o. female who presents for bilateral hip and knee pain.       Review of Systems   Constitutional:  Positive for activity change. Negative for chills and fever.   HENT:  Negative for ear pain and sore throat.    Eyes:  Negative for pain and visual disturbance.   Respiratory:  Negative for cough and shortness of breath.    Cardiovascular:  Negative for chest pain and palpitations.   Gastrointestinal:  Negative for abdominal pain and vomiting.   Genitourinary:  Negative for dysuria and hematuria.   Musculoskeletal:  Positive for arthralgias, gait problem and myalgias. Negative for back pain.   Skin:  Negative for color change and rash.   Neurological:  Negative for seizures and syncope.   All other systems reviewed and are negative.        Past Medical History:   Diagnosis Date   • Abnormal glucose     last assessed 2/25/16   • Arthritis    • Asthma     w/ exacerbation; last assessed 5/14/15   • Atypical nevi    • Atypical nevus     last assessed 1/18/17   • Breast lump     last assessed 3/6/14   • Cataract    • Cervical adenopathy     last assessed  2/7/17   • Colon polyp    • CPAP (continuous positive airway pressure) dependence    • Diverticulitis of colon    • Dizziness    • Dyslipidemia     last assessed 5/22/17   • Facial droop     last assessed  12/5/16   • Fibromyalgia, primary    • Flu 01/20/2018   • Foot abrasion     right between the 4th and 5th toe   • Fractures    • Genital herpes     resolved 9/1/16   • GERD (gastroesophageal reflux  disease)    • Headache, tension-type    • Herpes zoster     last assessed 16   • History of shingles     may 2016   • History of stomach ulcers    • Hx of abnormal mammogram     last assessed  3/5/14   • Hyperlipidemia    • Myalgia     last assessed  12   • Myositis     12   • Neck pain    • Pain involving joints of fingers of both hands 2021   • Peripheral neuropathy    • Seborrheic keratosis    • Shingles    • Skin disorder    • Skin neoplasm     of the lower limb, including hip; onset 12; last assessed  12   • Sleep apnea    • Stomach disorder        Past Surgical History:   Procedure Laterality Date   • ABDOMINAL SURGERY      release of adhesions   • BLADDER SURGERY      mesh-lift   • BREAST CYST ASPIRATION Right     does not know the year   • BREAST SURGERY      lift   • CATARACT EXTRACTION Bilateral    •  SECTION      x 3-,,   • CHOLECYSTECTOMY      lap   • COLONOSCOPY     • COSMETIC SURGERY      tummy and breast lift   • ESOPHAGOGASTRODUODENOSCOPY     • OOPHORECTOMY Left    • OTHER SURGICAL HISTORY      vocal cord surgery, scraping   • OH HYSTEROSCOPY BX ENDOMETRIUM&/POLYPC W/WO D&C N/A 2016    Procedure: DILATATION AND CURETTAGE (D&C) WITH HYSTEROSCOPY;  Surgeon: Kyle Bethae MD;  Location: [facility];  Service: Gynecology   • REDUCTION MAMMAPLASTY Bilateral    • TONSILLECTOMY     • TUBAL LIGATION  10/29/1984   • US GUIDED MSK PROCEDURE  2021       Family History   Problem Relation Age of Onset   • Hypertension Mother    • Alzheimer's disease Mother    • Arthritis Mother    • Hypertension Father    • Arthritis Father    • Heart attack Father    • Heart disease Father    • Stroke Father    • Other Brother         vertigo, tinnitus   • Diabetes Daughter    • Breast cancer Sister 32   • Skin cancer Sister    • Cancer Sister    • Other Son         downs syndrome   • Abdominal aortic aneurysm Sister    • Cancer Sister         T cell sarcoma   •  Breast cancer Sister    • No Known Problems Brother    • Diabetes Brother    • Other Sister         anemia from the diet   • No Known Problems Son    • No Known Problems Maternal Aunt    • No Known Problems Maternal Aunt    • No Known Problems Paternal Aunt    • No Known Problems Paternal Aunt    • No Known Problems Paternal Aunt    • Asthma Sister        Social History     Occupational History   • Not on file   Tobacco Use   • Smoking status: Never   • Smokeless tobacco: Never   Vaping Use   • Vaping status: Never Used   Substance and Sexual Activity   • Alcohol use: No     Comment: rarely   • Drug use: Never   • Sexual activity: Not Currently     Birth control/protection: Post-menopausal         Current Outpatient Medications:   •  Ascorbic Acid (VITAMIN C) 1000 MG tablet, , Disp: , Rfl:   •  atorvastatin (LIPITOR) 20 mg tablet, TAKE ONE TABLET BY MOUTH EVERY DAY AT BEDTIME, Disp: 90 tablet, Rfl: 1  •  cholecalciferol (VITAMIN D3) 1,000 units tablet, Take 2,000 Units by mouth daily, Disp: , Rfl:   •  clobetasol (TEMOVATE) 0.05 % cream, Apply topically 2 (two) times a day Apply topically on scalp and right shoulder two times a day, Disp: 60 g, Rfl: 2  •  famotidine (PEPCID) 40 MG tablet, TAKE ONE TABLET BY MOUTH AT BEDTIME (PEPCID), Disp: 90 tablet, Rfl: 1  •  fluticasone-umeclidinium-vilanterol (Trelegy Ellipta) 200-62.5-25 mcg/actuation AEPB inhaler, Inhale 1 puff daily in the early morning Rinse mouth after use., Disp: 60 blister, Rfl: 5  •  gabapentin (NEURONTIN) 100 mg capsule, , Disp: , Rfl:   •  hydrocortisone 2.5 % cream, Apply topically 2 (two) times a day scalp, Disp: 20 g, Rfl: 0  •  metFORMIN (GLUCOPHAGE-XR) 500 mg 24 hr tablet, TAKE ONE TABLET BY MOUTH EVERY DAY - TAKE WITH DINNER, Disp: 90 tablet, Rfl: 1  •  methylPREDNISolone 4 MG tablet therapy pack, Use as directed on package, Disp: 21 each, Rfl: 0  •  montelukast (SINGULAIR) 10 mg tablet, TAKE ONE TABLET BY MOUTH EVERY DAY, Disp: 60 tablet, Rfl:  2  •  multivitamin (THERAGRAN) TABS, Take 1 tablet by mouth daily Last dose 3/21/21, Disp: , Rfl:   •  pantoprazole (PROTONIX) 40 mg tablet, Take 1 tablet (40 mg total) by mouth daily, Disp: 90 tablet, Rfl: 0  •  albuterol (2.5 mg/3 mL) 0.083 % nebulizer solution, Take 3 mL (2.5 mg total) by nebulization every 6 (six) hours as needed for wheezing or shortness of breath (Patient not taking: Reported on 11/20/2023), Disp: 180 mL, Rfl: 5  •  fluticasone-umeclidinium-vilanterol (Trelegy Ellipta) 100-62.5-25 mcg/actuation inhaler, Inhale 1 puff daily Rinse mouth after use., Disp: 60 blister, Rfl: 11  •  ipratropium-albuterol (DUO-NEB) 0.5-2.5 mg/3 mL nebulizer solution, Take 3 mL by nebulization 4 (four) times a day as needed for wheezing or shortness of breath (Patient not taking: Reported on 11/20/2023), Disp: 360 mL, Rfl: 7  •  methocarbamol (ROBAXIN) 500 mg tablet, Take 1 tablet (500 mg total) by mouth 2 (two) times a day (Patient not taking: Reported on 11/20/2023), Disp: 20 tablet, Rfl: 0  •  ondansetron (Zofran ODT) 4 mg disintegrating tablet, Take 1 tablet (4 mg total) by mouth every 6 (six) hours as needed for nausea or vomiting for up to 3 days, Disp: 12 tablet, Rfl: 0    Allergies   Allergen Reactions   • Latex Rash     Burn-skin irritation   • Adhesive [Medical Tape] Other (See Comments)     bandaid-red,itch       Objective:  Vitals:    01/09/24 1025   BP: 136/65   Pulse: 78       Ortho Exam    Physical Exam  Vitals and nursing note reviewed.   Constitutional:       Appearance: Normal appearance.   HENT:      Head: Normocephalic and atraumatic.      Right Ear: External ear normal.      Left Ear: External ear normal.      Nose: Nose normal.   Eyes:      General: No scleral icterus.     Extraocular Movements: Extraocular movements intact.      Conjunctiva/sclera: Conjunctivae normal.   Cardiovascular:      Rate and Rhythm: Normal rate.   Pulmonary:      Effort: Pulmonary effort is normal. No respiratory  distress.   Musculoskeletal:      Cervical back: Normal range of motion and neck supple.      Comments: See ortho exam   Skin:     General: Skin is warm and dry.   Neurological:      Mental Status: She is alert and oriented to person, place, and time.   Psychiatric:         Mood and Affect: Mood normal.         Behavior: Behavior normal.         I have personally reviewed pertinent films in PACS and my interpretation is as follows:  X-rays of the right knee obtained on 8/31/2023 demonstrate moderate osteoarthritis of the medial compartment and severe osteoarthritis of the patellofemoral compartment.    X-rays of the left knee obtained in the office today demonstrate ***.    X-rays of the right hip demonstrate obtained on 8/31/2023 demonstrate moderate joint space narrowing.    X-rays of the left hip obtained in the office today demonstrate ***.      This document was created using speech voice recognition software.   Grammatical errors, random word insertions, pronoun errors, and incomplete sentences are an occasional consequence of this system due to software limitations, ambient noise, and hardware issues.   Any formal questions or concerns about content, text, or information contained within the body of this dictation should be directly addressed to the provider for clarification.

## 2024-01-11 DIAGNOSIS — M25.50 ARTHRALGIA, UNSPECIFIED JOINT: Primary | ICD-10-CM

## 2024-01-17 ENCOUNTER — EVALUATION (OUTPATIENT)
Dept: PHYSICAL THERAPY | Facility: CLINIC | Age: 69
End: 2024-01-17
Payer: COMMERCIAL

## 2024-01-17 DIAGNOSIS — M25.551 BILATERAL HIP PAIN: ICD-10-CM

## 2024-01-17 DIAGNOSIS — M25.561 CHRONIC PAIN OF BOTH KNEES: Primary | ICD-10-CM

## 2024-01-17 DIAGNOSIS — M25.552 BILATERAL HIP PAIN: ICD-10-CM

## 2024-01-17 DIAGNOSIS — G89.29 CHRONIC PAIN OF BOTH KNEES: Primary | ICD-10-CM

## 2024-01-17 DIAGNOSIS — M25.562 CHRONIC PAIN OF BOTH KNEES: Primary | ICD-10-CM

## 2024-01-17 NOTE — PROGRESS NOTES
PT Evaluation     Today's date: 2024  Patient name: Renetta Chan  : 1955  MRN: [id number]  Referring provider: Allan Santa PA-C  Dx:   Encounter Diagnosis     ICD-10-CM    1. Chronic pain of both knees  M25.561 Ambulatory Referral to Physical Therapy    M25.562     G89.29       2. Bilateral hip pain  M25.551 Ambulatory Referral to Physical Therapy    M25.552                      Assessment  Assessment details: Renetta Chan is an 68 y.o. female  arriving to clinic with complaints of bilateral hip and knee pain. Due to current deficits, patient is limited functionally with ADL's, recreational activities, work-related activities, engaging in social activities, ambulation, stair negotiation, lifting/carrying, transfers. Patient has been educated in home exercise program and plan of care. Patient would benefit from skilled physical therapy services to address their aforementioned functional limitations and progress towards prior level of function and independence with home exercise program.    Impairments: abnormal or restricted ROM, activity intolerance, impaired physical strength, lacks appropriate home exercise program and pain with function    Symptom irritability: moderate  Goals  Short term goals: 4 weeks  1. Improve walking tolerance to 20 minutes to perform household activity  2. Patient to be able to negotiate 13 stairs with reciprocal pattern and one handrail and good safety  3. Patient to reduce pain to 4/10 at its worst to improve activity tolerance  4. Patient to exhibit independence with home exercise program    Long term goals: 12 weeks  1. Improve walking tolerance to 60 minutes to return to community level ambulation  2. Patient to be able to negotiate 13 stairs to equal one flight with no pain to return to community level ambulation   3. Patient to transfer from bed and low chair with good mechanics and minimal pain.  4. Patient to reduce pain to 2/10 at its worst to improve QOL and  return to function      Plan  Patient would benefit from: skilled physical therapy  Planned modality interventions: TENS, unattended electrical stimulation, thermotherapy: hydrocollator packs and cryotherapy  Planned therapy interventions: abdominal trunk stabilization, flexibility, functional ROM exercises, home exercise program, therapeutic exercise, therapeutic activities, stretching, strengthening, self care, postural training, patient education, neuromuscular re-education, massage, manual therapy and joint mobilization  Frequency: 2x week  Duration in weeks: 12  Treatment plan discussed with: patient    Subjective Evaluation    History of Present Illness  Mechanism of injury: Patient reports that beginning roughly 2-3 weeks ago she began to experience right hip and knee pain. Patient notes that she felt she started compensating using her left leg and states a week later her left knee and hip began started hurting as well. Patient notes she did follow up with her orthopedist who took x-ray on her hips and knees that showed mild OA of knees and hips. Patient also has a medical history of RA and azucena. Patient states she will be following up with a rheumatologist next week.          Recurrent probem    Pain  Current pain ratin  At best pain ratin  At worst pain ratin  Location: Bilateral hips/knees  Quality: sharp, discomfort and dull ache  Relieving factors: rest and change in position  Exacerbated by: Overuse.  Progression: improved    Social Support  Steps to enter house: yes  Stairs in house: yes   Lives in: multiple-level home  Lives with: adult children    Employment status: working    Diagnostic Tests  X-ray: abnormal      Objective  *= pain  MMT:    Right  Left    Hip Flexion:   4/5*  4/5*  Hip Abduction:   4-/5*  3+/5*  Hip Adduction:   4+/5  4+/5*  Hip Extension:   NT/5  NT/5  Knee Flexion:   4+/5*  4+/5*  Knee Extension:  4+/5  4+/5  Ankle Dorsiflexion:  5/5  5/5  Ankle  Plantarflexion:  5/5 5/5    AROM:   Right  Left    Knee Flexion:   135  135  Knee Extension:  0  0   Hip flexion:   100*  95*  Hip Internal rotation:  15*  10*     AMBULATION:  Patient presents with antalgic pattern upon arrival    PALPATION:  Tenderness to palpation along bilateral knee medial/lateral joint lines    FLEXIBILITY:  Impairments notes in bilateral TFL, hip flexors, and quadriceps       Precautions:   Past Medical History:   Diagnosis Date   • Abnormal glucose     last assessed 2/25/16   • Arthritis    • Asthma     w/ exacerbation; last assessed 5/14/15   • Atypical nevi    • Atypical nevus     last assessed 1/18/17   • Breast lump     last assessed 3/6/14   • Cataract    • Cervical adenopathy     last assessed  2/7/17   • Colon polyp    • CPAP (continuous positive airway pressure) dependence    • Diverticulitis of colon    • Dizziness    • Dyslipidemia     last assessed 5/22/17   • Facial droop     last assessed  12/5/16   • Fibromyalgia, primary    • Flu 01/20/2018   • Foot abrasion     right between the 4th and 5th toe   • Fractures    • Genital herpes     resolved 9/1/16   • GERD (gastroesophageal reflux disease)    • Headache, tension-type    • Herpes zoster     last assessed 5/20/16   • History of shingles     may 2016   • History of stomach ulcers    • Hx of abnormal mammogram     last assessed  3/5/14   • Hyperlipidemia    • Myalgia     last assessed  11/21/12   • Myositis     11/21/12   • Neck pain    • Pain involving joints of fingers of both hands 01/19/2021   • Peripheral neuropathy    • Seborrheic keratosis    • Shingles    • Skin disorder    • Skin neoplasm     of the lower limb, including hip; onset 1/23/12; last assessed  1/23/12   • Sleep apnea    • Stomach disorder          Date: 01/17/2024       Visit # 1 FAROOQ       Manuals        LE stretching                                Neuro Re-Ed        Hip adduction squeeze        Bridging        LAQ                                         Ther Ex        STS        LTR        BKFO        Resisted walking        FSU/LSU                                Ther Activity                        Gait Training                        Modalities                          Access Code: [email or web address]  URL: [email or web address]  Date: 01/17/2024  Prepared by: Hiram Conte    Exercises  - Bent Knee Fallouts  - 1 x daily - 7 x weekly - 2 sets - 10 reps  - Supine Bridge  - 1 x daily - 7 x weekly - 2 sets - 10 reps  - Seated Long Arc Quad  - 1 x daily - 7 x weekly - 2 sets - 10 reps  - Sit to Stand  - 1 x daily - 7 x weekly - 2 sets - 10 reps

## 2024-01-18 PROCEDURE — 97161 PT EVAL LOW COMPLEX 20 MIN: CPT | Performed by: PHYSICAL MEDICINE & REHABILITATION

## 2024-01-22 ENCOUNTER — OFFICE VISIT (OUTPATIENT)
Dept: PHYSICAL THERAPY | Facility: CLINIC | Age: 69
End: 2024-01-22
Payer: COMMERCIAL

## 2024-01-22 DIAGNOSIS — G89.29 CHRONIC PAIN OF BOTH KNEES: Primary | ICD-10-CM

## 2024-01-22 DIAGNOSIS — M25.552 BILATERAL HIP PAIN: ICD-10-CM

## 2024-01-22 DIAGNOSIS — M25.562 CHRONIC PAIN OF BOTH KNEES: Primary | ICD-10-CM

## 2024-01-22 DIAGNOSIS — M25.561 CHRONIC PAIN OF BOTH KNEES: Primary | ICD-10-CM

## 2024-01-22 DIAGNOSIS — M25.551 BILATERAL HIP PAIN: ICD-10-CM

## 2024-01-22 PROCEDURE — 97112 NEUROMUSCULAR REEDUCATION: CPT | Performed by: PHYSICAL MEDICINE & REHABILITATION

## 2024-01-22 PROCEDURE — 97110 THERAPEUTIC EXERCISES: CPT | Performed by: PHYSICAL MEDICINE & REHABILITATION

## 2024-01-22 PROCEDURE — 97140 MANUAL THERAPY 1/> REGIONS: CPT | Performed by: PHYSICAL MEDICINE & REHABILITATION

## 2024-01-22 NOTE — PROGRESS NOTES
Daily Note     Today's date: 2024  Patient name: Renetta Chan  : 1955  MRN: [id number]  Referring provider: Allan Santa PA-C  Dx:   Encounter Diagnosis     ICD-10-CM    1. Chronic pain of both knees  M25.561     M25.562     G89.29       2. Bilateral hip pain  M25.551     M25.552                      Subjective: Patient reports she did have some soreness in her left leg this morning noting that it was present in her anterior hip. Patient states that symptoms lasted for a few minutes before it eased.      Objective: See treatment diary below      Assessment: Tolerated treatment fair. Patient had a difficult time with SLR today on the left, advised on TrA. Patient exhibited good technique with therapeutic exercises and would benefit from continued PT      Plan: Continue per plan of care.      Precautions:   Past Medical History:   Diagnosis Date    Abnormal glucose     last assessed 16    Arthritis     Asthma     w/ exacerbation; last assessed 5/14/15    Atypical nevi     Atypical nevus     last assessed 17    Breast lump     last assessed 3/6/14    Cataract     Cervical adenopathy     last assessed  17    Colon polyp     CPAP (continuous positive airway pressure) dependence     Diverticulitis of colon     Dizziness     Dyslipidemia     last assessed 17    Facial droop     last assessed  16    Fibromyalgia, primary     Flu 2018    Foot abrasion     right between the 4th and 5th toe    Fractures     Genital herpes     resolved 16    GERD (gastroesophageal reflux disease)     Headache, tension-type     Herpes zoster     last assessed 16    History of shingles     may 2016    History of stomach ulcers     Hx of abnormal mammogram     last assessed  3/5/14    Hyperlipidemia     Myalgia     last assessed  12    Myositis     12    Neck pain     Pain involving joints of fingers of both hands 2021    Peripheral neuropathy     Seborrheic keratosis        "Shingles     Skin disorder     Skin neoplasm     of the lower limb, including hip; onset 1/23/12; last assessed  1/23/12    Sleep apnea     Stomach disorder          Date: 01/17/2024 01/22/2024      Visit # 1 EVAL 2      Manuals        LE stretching  TC                              Neuro Re-Ed        Hip adduction squeeze  5\" holds 20x seated      Bridging  With adduction squeeze 20x      LAQ  20x R/L      Standing hip abd  20x R/L with UEA      SLR  10x R/L                      Ther Ex        STS  20\" 2x10 without UEA      LTR        BKFO  GTB 20x R/L      Resisted walking        FSU/LSU  8\" x10 R/L FSU/LSU                              Ther Activity                          Gait Training                        Modalities                          Access Code: [email or web address]  URL: [email or web address]  Date: 01/17/2024  Prepared by: Hiram Conte    Exercises  - Bent Knee Fallouts  - 1 x daily - 7 x weekly - 2 sets - 10 reps  - Supine Bridge  - 1 x daily - 7 x weekly - 2 sets - 10 reps  - Seated Long Arc Quad  - 1 x daily - 7 x weekly - 2 sets - 10 reps  - Sit to Stand  - 1 x daily - 7 x weekly - 2 sets - 10 reps         "

## 2024-01-29 ENCOUNTER — OFFICE VISIT (OUTPATIENT)
Dept: PHYSICAL THERAPY | Facility: CLINIC | Age: 69
End: 2024-01-29
Payer: COMMERCIAL

## 2024-01-29 DIAGNOSIS — M25.562 CHRONIC PAIN OF BOTH KNEES: Primary | ICD-10-CM

## 2024-01-29 DIAGNOSIS — G89.29 CHRONIC PAIN OF BOTH KNEES: Primary | ICD-10-CM

## 2024-01-29 DIAGNOSIS — M25.561 CHRONIC PAIN OF BOTH KNEES: Primary | ICD-10-CM

## 2024-01-29 DIAGNOSIS — M25.551 BILATERAL HIP PAIN: ICD-10-CM

## 2024-01-29 DIAGNOSIS — M25.552 BILATERAL HIP PAIN: ICD-10-CM

## 2024-01-29 PROCEDURE — 97110 THERAPEUTIC EXERCISES: CPT | Performed by: PHYSICAL MEDICINE & REHABILITATION

## 2024-01-29 PROCEDURE — 97140 MANUAL THERAPY 1/> REGIONS: CPT | Performed by: PHYSICAL MEDICINE & REHABILITATION

## 2024-01-29 NOTE — PROGRESS NOTES
Daily Note     Today's date: 2024  Patient name: Renetta Chan  : 1955  MRN: [id number]  Referring provider: Allan Santa PA-C  Dx:   Encounter Diagnosis     ICD-10-CM    1. Chronic pain of both knees  M25.561     M25.562     G89.29       2. Bilateral hip pain  M25.551     M25.552                      Subjective: Patient reports that her hips feel a bit sore from activity over the weekend. Patient notes that she has been compliant with HEP      Objective: See treatment diary below      Assessment: Tolerated treatment fair. Patient with improved ability to tolerate SLR today with less pain in the anterior hip. Patient with abbreviated session today due to extenuating obligation. Patient exhibited good technique with therapeutic exercises and would benefit from continued PT      Plan: Continue per plan of care.      Precautions:   Past Medical History:   Diagnosis Date    Abnormal glucose     last assessed 16    Arthritis     Asthma     w/ exacerbation; last assessed 5/14/15    Atypical nevi     Atypical nevus     last assessed 17    Breast lump     last assessed 3/6/14    Cataract     Cervical adenopathy     last assessed  17    Colon polyp     CPAP (continuous positive airway pressure) dependence     Diverticulitis of colon     Dizziness     Dyslipidemia     last assessed 17    Facial droop     last assessed  16    Fibromyalgia, primary     Flu 2018    Foot abrasion     right between the 4th and 5th toe    Fractures     Genital herpes     resolved 16    GERD (gastroesophageal reflux disease)     Headache, tension-type     Herpes zoster     last assessed 16    History of shingles     may 2016    History of stomach ulcers     Hx of abnormal mammogram     last assessed  3/5/14    Hyperlipidemia     Myalgia     last assessed  12    Myositis     12    Neck pain     Pain involving joints of fingers of both hands 2021    Peripheral neuropathy      "Seborrheic keratosis       Shingles     Skin disorder     Skin neoplasm     of the lower limb, including hip; onset 1/23/12; last assessed  1/23/12    Sleep apnea     Stomach disorder          Date: 01/17/2024 01/22/2024 01/29/2024     Visit # 1 EVAL 2 3     Manuals        LE stretching  TC TC                             Neuro Re-Ed        Hip adduction squeeze  5\" holds 20x seated 5\" holds 20x seated     Bridging  With adduction squeeze 20x With adduction squeeze 20x     LAQ  20x R/L      Standing hip abd  20x R/L with UEA      SLR  10x R/L 10x R/L     [place]   MV             Ther Ex        STS  20\" 2x10 without UEA 20\" 2x10 without UEA     LTR        BKFO  GTB 20x R/L GTB 20x R/L     Resisted walking        FSU/LSU  8\" x10 R/L FSU/LSU 8\" x10 R/L FSU/LSU                             Ther Activity                          Gait Training                        Modalities                          Access Code: [email or web address]  URL: [email or web address]  Date: 01/17/2024  Prepared by: Hiram Conte    Exercises  - Bent Knee Fallouts  - 1 x daily - 7 x weekly - 2 sets - 10 reps  - Supine Bridge  - 1 x daily - 7 x weekly - 2 sets - 10 reps  - Seated Long Arc Quad  - 1 x daily - 7 x weekly - 2 sets - 10 reps  - Sit to Stand  - 1 x daily - 7 x weekly - 2 sets - 10 reps         "

## 2024-02-02 ENCOUNTER — VBI (OUTPATIENT)
Dept: ADMINISTRATIVE | Facility: OTHER | Age: 69
End: 2024-02-02

## 2024-02-05 ENCOUNTER — OFFICE VISIT (OUTPATIENT)
Dept: PHYSICAL THERAPY | Facility: CLINIC | Age: 69
End: 2024-02-05
Payer: COMMERCIAL

## 2024-02-05 DIAGNOSIS — M25.561 CHRONIC PAIN OF BOTH KNEES: Primary | ICD-10-CM

## 2024-02-05 DIAGNOSIS — G89.29 CHRONIC PAIN OF BOTH KNEES: Primary | ICD-10-CM

## 2024-02-05 DIAGNOSIS — M25.562 CHRONIC PAIN OF BOTH KNEES: Primary | ICD-10-CM

## 2024-02-05 PROCEDURE — 97112 NEUROMUSCULAR REEDUCATION: CPT | Performed by: PHYSICAL THERAPIST

## 2024-02-05 PROCEDURE — 97110 THERAPEUTIC EXERCISES: CPT | Performed by: PHYSICAL THERAPIST

## 2024-02-05 NOTE — PROGRESS NOTES
Daily Note     Today's date: 2024  Patient name: Renetta Chan  : 1955  MRN: [id number]  Referring provider: Allan Santa PA-C  Dx:   Encounter Diagnosis     ICD-10-CM    1. Chronic pain of both knees  M25.561     M25.562     G89.29                      Subjective: I have a torn meniscus in the right knee.      Objective: See treatment diary below      Assessment: Tolerated treatment well. Patient demonstrated fatigue post treatment and exhibited good technique with therapeutic exercises      Plan: Continue per plan of care.      Precautions:   Past Medical History:   Diagnosis Date    Abnormal glucose     last assessed 16    Arthritis     Asthma     w/ exacerbation; last assessed 5/14/15    Atypical nevi     Atypical nevus     last assessed 17    Breast lump     last assessed 3/6/14    Cataract     Cervical adenopathy     last assessed  17    Colon polyp     CPAP (continuous positive airway pressure) dependence     Diverticulitis of colon     Dizziness     Dyslipidemia     last assessed 17    Facial droop     last assessed  16    Fibromyalgia, primary     Flu 2018    Foot abrasion     right between the 4th and 5th toe    Fractures     Genital herpes     resolved 16    GERD (gastroesophageal reflux disease)     Headache, tension-type     Herpes zoster     last assessed 16    History of shingles     may 2016    History of stomach ulcers     Hx of abnormal mammogram     last assessed  3/5/14    Hyperlipidemia     Myalgia     last assessed  12    Myositis     12    Neck pain     Pain involving joints of fingers of both hands 2021    Peripheral neuropathy     Seborrheic keratosis       Shingles     Skin disorder     Skin neoplasm     of the lower limb, including hip; onset 12; last assessed  12    Sleep apnea     Stomach disorder          Date: 2024    Visit # 1 EVAL 2 3     Manuals        LE stretching  TC  "TC                             Neuro Re-Ed        Hip adduction squeeze  5\" holds 20x seated 5\" holds 20x seated     Bridging  With adduction squeeze 20x With adduction squeeze 20x     LAQ  20x R/L      Standing hip abd  20x R/L with UEA  Balance lunge 2x20    SLR  10x R/L 10x R/L     [place]   MV     Balance Kickin    2x20    Ther Ex        STS  20\" 2x10 without UEA 20\" 2x10 without UEA 20x    LTR        BKFO  GTB 20x R/L GTB 20x R/L     Resisted walking        FSU/LSU  8\" x10 R/L FSU/LSU 8\" x10 R/L FSU/LSU 8\" 20x    Lateral Stepping    20'x6 in part squat    Bike    10m L3    Toe raises    20x                              Gait Training                        Modalities                          Access Code: [email or web address]  URL: [email or web address]  Date: 01/17/2024  Prepared by: Hiram Conte    Exercises  - Bent Knee Fallouts  - 1 x daily - 7 x weekly - 2 sets - 10 reps  - Supine Bridge  - 1 x daily - 7 x weekly - 2 sets - 10 reps  - Seated Long Arc Quad  - 1 x daily - 7 x weekly - 2 sets - 10 reps  - Sit to Stand  - 1 x daily - 7 x weekly - 2 sets - 10 reps           "

## 2024-02-12 ENCOUNTER — OFFICE VISIT (OUTPATIENT)
Dept: PULMONOLOGY | Facility: MEDICAL CENTER | Age: 69
End: 2024-02-12
Payer: COMMERCIAL

## 2024-02-12 ENCOUNTER — OFFICE VISIT (OUTPATIENT)
Dept: PHYSICAL THERAPY | Facility: CLINIC | Age: 69
End: 2024-02-12
Payer: COMMERCIAL

## 2024-02-12 VITALS
HEIGHT: 64 IN | HEART RATE: 80 BPM | OXYGEN SATURATION: 95 % | TEMPERATURE: 99.1 F | WEIGHT: 156 LBS | SYSTOLIC BLOOD PRESSURE: 108 MMHG | RESPIRATION RATE: 12 BRPM | DIASTOLIC BLOOD PRESSURE: 66 MMHG | BODY MASS INDEX: 26.63 KG/M2

## 2024-02-12 DIAGNOSIS — G89.29 CHRONIC PAIN OF BOTH KNEES: Primary | ICD-10-CM

## 2024-02-12 DIAGNOSIS — M25.551 BILATERAL HIP PAIN: ICD-10-CM

## 2024-02-12 DIAGNOSIS — K21.9 GASTROESOPHAGEAL REFLUX DISEASE WITHOUT ESOPHAGITIS: ICD-10-CM

## 2024-02-12 DIAGNOSIS — M25.562 CHRONIC PAIN OF BOTH KNEES: Primary | ICD-10-CM

## 2024-02-12 DIAGNOSIS — M25.552 BILATERAL HIP PAIN: ICD-10-CM

## 2024-02-12 DIAGNOSIS — G47.33 OBSTRUCTIVE SLEEP APNEA: Primary | ICD-10-CM

## 2024-02-12 DIAGNOSIS — J45.30 MILD PERSISTENT ASTHMA WITHOUT COMPLICATION: ICD-10-CM

## 2024-02-12 DIAGNOSIS — M25.561 CHRONIC PAIN OF BOTH KNEES: Primary | ICD-10-CM

## 2024-02-12 PROCEDURE — 99214 OFFICE O/P EST MOD 30 MIN: CPT | Performed by: INTERNAL MEDICINE

## 2024-02-12 PROCEDURE — 97110 THERAPEUTIC EXERCISES: CPT

## 2024-02-12 PROCEDURE — 97112 NEUROMUSCULAR REEDUCATION: CPT

## 2024-02-12 NOTE — PROGRESS NOTES
Daily Note     Today's date: 2024  Patient name: Renetta Chan  : 1955  MRN: [id number]  Referring provider: Allan Santa PA-C  Dx:   Encounter Diagnosis     ICD-10-CM    1. Chronic pain of both knees  M25.561     M25.562     G89.29       2. Bilateral hip pain  M25.551     M25.552                      Subjective: Patient reports some L knee & L hip soreness, especially anterior hip. Patient also notes experiencing spasms in L foot with certain exercises.       Objective: See treatment diary below      Assessment: Tolerated treatment well. Patient demonstrated fatigue post treatment, exhibited good technique with therapeutic exercises, and would benefit from continued PT      Plan: Continue per plan of care.      Precautions:   Past Medical History:   Diagnosis Date    Abnormal glucose     last assessed 16    Arthritis     Asthma     w/ exacerbation; last assessed 5/14/15    Atypical nevi     Atypical nevus     last assessed 17    Breast lump     last assessed 3/6/14    Cataract     Cervical adenopathy     last assessed  17    Colon polyp     CPAP (continuous positive airway pressure) dependence     Diverticulitis of colon     Dizziness     Dyslipidemia     last assessed 17    Facial droop     last assessed  16    Fibromyalgia, primary     Flu 2018    Foot abrasion     right between the 4th and 5th toe    Fractures     Genital herpes     resolved 16    GERD (gastroesophageal reflux disease)     Headache, tension-type     Herpes zoster     last assessed 16    History of shingles     may 2016    History of stomach ulcers     Hx of abnormal mammogram     last assessed  3/5/14    Hyperlipidemia     Myalgia     last assessed  12    Myositis     12    Neck pain     Pain involving joints of fingers of both hands 2021    Peripheral neuropathy     Seborrheic keratosis       Shingles     Skin disorder     Skin neoplasm     of the lower limb, including  "hip; onset 1/23/12; last assessed  1/23/12    Sleep apnea     Stomach disorder          Date: 01/17/2024 01/22/2024 01/29/2024 2/5 2/12/2024   Visit # 1 EVAL 2 3     Manuals        LE stretching  TC TC  R/L LE stretching        L hip flexor stretching in R S/L                   Neuro Re-Ed        Hip adduction squeeze  5\" holds 20x seated 5\" holds 20x seated  ---   Bridging  With adduction squeeze 20x With adduction squeeze 20x  2x10 reps on red PB   LAQ  20x R/L   R/L 2# x 10 reps    Standing hip abd  20x R/L with UEA  Balance lunge 2x20 ---   SLR  10x R/L 10x R/L  --   [place]   MV  --   Balance Kickin    2x20 HEP   Ther Ex        STS  20\" 2x10 without UEA 20\" 2x10 without UEA 20x 2x10 reps 20\" mat   LTR     ---   BKFO  GTB 20x R/L GTB 20x R/L  20/20 GTB supine clamshells    Resisted walking     ----   FSU/LSU  8\" x10 R/L FSU/LSU 8\" x10 R/L FSU/LSU 8\" 20x 8\" step x 10 reps each   Lateral Stepping    20'x6 in part squat ---   Bike    10m L3 10 min L3   Toe raises    20x 20x on 1/2 roll                             Gait Training                        Modalities                          Access Code: [email or web address]  URL: [email or web address]  Date: 01/17/2024  Prepared by: Hiram Conte    Exercises  - Bent Knee Fallouts  - 1 x daily - 7 x weekly - 2 sets - 10 reps  - Supine Bridge  - 1 x daily - 7 x weekly - 2 sets - 10 reps  - Seated Long Arc Quad  - 1 x daily - 7 x weekly - 2 sets - 10 reps  - Sit to Stand  - 1 x daily - 7 x weekly - 2 sets - 10 reps             "

## 2024-02-13 NOTE — ASSESSMENT & PLAN NOTE
She does have gastric reflux but this is controlled now on pantoprazole 40 mg daily and Pepcid 40 mg at bedtime.

## 2024-02-13 NOTE — ASSESSMENT & PLAN NOTE
Renetta has mild persistent asthma and is doing well on Trelegy 100 mcg 1 puff daily    She does see allergist Dr. Martins

## 2024-02-13 NOTE — ASSESSMENT & PLAN NOTE
Mild obstructive sleep apnea with good compliance to CPAP therapy.  He uses a small Resmed nasal N30 interface which she likes.  I reviewed compliance data for the past 30 days and she used it on a regular basis.  She average almost 8 hours per night and this resulted in AHI of 2.3 which is good.  She has a auto DreamStation CPAP machine which was replaced less than year ago under recall from [employer].  She is upset on 6 cm of water and will continue to have setting

## 2024-02-26 ENCOUNTER — OFFICE VISIT (OUTPATIENT)
Dept: PHYSICAL THERAPY | Facility: CLINIC | Age: 69
End: 2024-02-26
Payer: COMMERCIAL

## 2024-02-26 DIAGNOSIS — M25.562 CHRONIC PAIN OF BOTH KNEES: Primary | ICD-10-CM

## 2024-02-26 DIAGNOSIS — M25.551 BILATERAL HIP PAIN: ICD-10-CM

## 2024-02-26 DIAGNOSIS — G89.29 CHRONIC PAIN OF BOTH KNEES: Primary | ICD-10-CM

## 2024-02-26 DIAGNOSIS — M25.552 BILATERAL HIP PAIN: ICD-10-CM

## 2024-02-26 DIAGNOSIS — M25.561 CHRONIC PAIN OF BOTH KNEES: Primary | ICD-10-CM

## 2024-02-26 PROCEDURE — 97112 NEUROMUSCULAR REEDUCATION: CPT | Performed by: PHYSICAL THERAPIST

## 2024-02-26 PROCEDURE — 97110 THERAPEUTIC EXERCISES: CPT | Performed by: PHYSICAL THERAPIST

## 2024-02-26 NOTE — PROGRESS NOTES
Daily Note     Today's date: 2024  Patient name: Renetta Chan  : 1955  MRN: [id number]  Referring provider: Allan Santa PA-C  Dx:   Encounter Diagnosis     ICD-10-CM    1. Chronic pain of both knees  M25.561     M25.562     G89.29       2. Bilateral hip pain  M25.551     M25.552                      Subjective: My left knee is the most painful of the 4 joints      Objective: See treatment diary below      Assessment: Tolerated treatment well. Patient demonstrated fatigue post treatment and exhibited good technique with therapeutic exercises      Plan: Continue per plan of care.      Precautions:   Past Medical History:   Diagnosis Date    Abnormal glucose     last assessed 16    Arthritis     Asthma     w/ exacerbation; last assessed 5/14/15    Atypical nevi     Atypical nevus     last assessed 17    Breast lump     last assessed 3/6/14    Cataract     Cervical adenopathy     last assessed  17    Colon polyp     CPAP (continuous positive airway pressure) dependence     Diverticulitis of colon     Dizziness     Dyslipidemia     last assessed 17    Facial droop     last assessed  16    Fibromyalgia, primary     Flu 2018    Foot abrasion     right between the 4th and 5th toe    Fractures     Genital herpes     resolved 16    GERD (gastroesophageal reflux disease)     Headache, tension-type     Herpes zoster     last assessed 16    History of shingles     may 2016    History of stomach ulcers     Hx of abnormal mammogram     last assessed  3/5/14    Hyperlipidemia     Myalgia     last assessed  12    Myositis     12    Neck pain     Pain involving joints of fingers of both hands 2021    Peripheral neuropathy     Seborrheic keratosis       Shingles     Skin disorder     Skin neoplasm     of the lower limb, including hip; onset 12; last assessed  12    Sleep apnea     Stomach disorder          Date: 2024  "2/12/2024   Visit # 6 2 3     Manuals        LE stretching  TC TC  R/L LE stretching        L hip flexor stretching in R S/L                   Neuro Re-Ed        Hip adduction squeeze 20x 5\" holds 20x seated 5\" holds 20x seated  ---   Bridging 20x With adduction squeeze 20x With adduction squeeze 20x  2x10 reps on red PB   LAQ  20x R/L   R/L 2# x 10 reps    Standing hip abd 20x 20x R/L with UEA  Balance lunge 2x20 ---   SLR  10x R/L 10x R/L  --   [place]   MV  --   Balance Kickin 20x   2x20 HEP   Ther Ex        STS 20x 20\" 2x10 without UEA 20\" 2x10 without UEA 20x 2x10 reps 20\" mat   LTR     ---   BKFO 20/20 GTB 20x R/L GTB 20x R/L  20/20 GTB supine clamshells    Resisted walking     ----   FSU/LSU 8\" 20x 8\" x10 R/L FSU/LSU 8\" x10 R/L FSU/LSU 8\" 20x 8\" step x 10 reps each   Lateral Stepping 20x   20'x6 in part squat ---   Bike 10m   10m L3 10 min L3   Toe raises 20x heel raises 20x   20x 20x on 1/2 roll                             Gait Training                        Modalities                          Access Code: [email or web address]  URL: [email or web address]  Date: 01/17/2024  Prepared by: Hiram Conte    Exercises  - Bent Knee Fallouts  - 1 x daily - 7 x weekly - 2 sets - 10 reps  - Supine Bridge  - 1 x daily - 7 x weekly - 2 sets - 10 reps  - Seated Long Arc Quad  - 1 x daily - 7 x weekly - 2 sets - 10 reps  - Sit to Stand  - 1 x daily - 7 x weekly - 2 sets - 10 reps               "

## 2024-03-01 DIAGNOSIS — K21.9 GASTROESOPHAGEAL REFLUX DISEASE WITHOUT ESOPHAGITIS: ICD-10-CM

## 2024-03-01 RX ORDER — PANTOPRAZOLE SODIUM 40 MG/1
40 TABLET, DELAYED RELEASE ORAL DAILY
Qty: 90 TABLET | Refills: 1 | Status: SHIPPED | OUTPATIENT
Start: 2024-03-01

## 2024-03-04 ENCOUNTER — OFFICE VISIT (OUTPATIENT)
Dept: PHYSICAL THERAPY | Facility: CLINIC | Age: 69
End: 2024-03-04
Payer: COMMERCIAL

## 2024-03-04 DIAGNOSIS — M25.562 CHRONIC PAIN OF BOTH KNEES: Primary | ICD-10-CM

## 2024-03-04 DIAGNOSIS — G89.29 CHRONIC PAIN OF BOTH KNEES: Primary | ICD-10-CM

## 2024-03-04 DIAGNOSIS — M25.552 BILATERAL HIP PAIN: ICD-10-CM

## 2024-03-04 DIAGNOSIS — M25.551 BILATERAL HIP PAIN: ICD-10-CM

## 2024-03-04 DIAGNOSIS — M25.561 CHRONIC PAIN OF BOTH KNEES: Primary | ICD-10-CM

## 2024-03-04 PROCEDURE — 97112 NEUROMUSCULAR REEDUCATION: CPT

## 2024-03-04 PROCEDURE — 97110 THERAPEUTIC EXERCISES: CPT

## 2024-03-04 NOTE — PROGRESS NOTES
Daily Note     Today's date: 3/4/2024  Patient name: Renetta Chan  : 1955  MRN: [id number]  Referring provider: Allan Sanat PA-C  Dx:   Encounter Diagnosis     ICD-10-CM    1. Chronic pain of both knees  M25.561     M25.562     G89.29       2. Bilateral hip pain  M25.551     M25.552           Start Time: 1005          Subjective: I saw a Dr in [place] for my L knee  & he said no surgery.      Objective: See treatment diary below      Assessment: Tolerated treatment well. Patient demonstrated fatigue post treatment, exhibited good technique with therapeutic exercises, and would benefit from continued PT      Plan: Continue per plan of care.      Precautions:   Past Medical History:   Diagnosis Date    Abnormal glucose     last assessed 16    Arthritis     Asthma     w/ exacerbation; last assessed 5/14/15    Atypical nevi     Atypical nevus     last assessed 17    Breast lump     last assessed 3/6/14    Cataract     Cervical adenopathy     last assessed  17    Colon polyp     CPAP (continuous positive airway pressure) dependence     Diverticulitis of colon     Dizziness     Dyslipidemia     last assessed 17    Facial droop     last assessed  16    Fibromyalgia, primary     Flu 2018    Foot abrasion     right between the 4th and 5th toe    Fractures     Genital herpes     resolved 16    GERD (gastroesophageal reflux disease)     Headache, tension-type     Herpes zoster     last assessed 16    History of shingles     may 2016    History of stomach ulcers     Hx of abnormal mammogram     last assessed  3/5/14    Hyperlipidemia     Myalgia     last assessed  12    Myositis     12    Neck pain     Pain involving joints of fingers of both hands 2021    Peripheral neuropathy     Seborrheic keratosis       Shingles     Skin disorder     Skin neoplasm     of the lower limb, including hip; onset 12; last assessed  12    Sleep apnea     Stomach  "disorder          Date: 3/4/2024  01/29/2024 2/5 2/12/2024   Visit # 7  3     Manuals        LE stretching AD  TC  R/L LE stretching        L hip flexor stretching in R S/L                   Neuro Re-Ed        Hip adduction squeeze W/ bridging 5\" holds 20x seated 5\" holds 20x seated  ---   Bridging With adduction squeeze 20x With adduction squeeze 20x With adduction squeeze 20x  2x10 reps on red PB   LAQ ---- 20x R/L   R/L 2# x 10 reps    Standing hip abd ---- 20x R/L with UEA  Balance lunge 2x20 ---   SLR ---- 10x R/L 10x R/L  --   TRX squats  25  reps   MV  --   Balance Kickin (HEP)   2x20 HEP   Ther Ex        STS 20x 20\" 2x10 without UEA 20\" 2x10 without UEA 20x 2x10 reps 20\" mat   LTR     ---   clamshells 20/20 GTB supine  GTB 20x R/L GTB 20x R/L  20/20 GTB supine clamshells    Resisted walking Chanel @#8 - 4 x 10 reps     ----   FSU/LSU 8\" step x 10 reps each  8\" x10 R/L FSU/LSU 8\" x10 R/L FSU/LSU 8\" 20x 8\" step x 10 reps each   Lateral Stepping -----   20'x6 in part squat ---   NuStep 10 min L3   10m L3 10 min L3   Toe raises -----   20x 20x on 1/2 roll                             Gait Training                        Modalities                          Access Code: [email or web address]  URL: [email or web address]  Date: 01/17/2024  Prepared by: Hiram Conte    Exercises  - Bent Knee Fallouts  - 1 x daily - 7 x weekly - 2 sets - 10 reps  - Supine Bridge  - 1 x daily - 7 x weekly - 2 sets - 10 reps  - Seated Long Arc Quad  - 1 x daily - 7 x weekly - 2 sets - 10 reps  - Sit to Stand  - 1 x daily - 7 x weekly - 2 sets - 10 reps                 "

## 2024-03-08 DIAGNOSIS — R05.9 COUGH, UNSPECIFIED TYPE: Primary | ICD-10-CM

## 2024-03-08 RX ORDER — DEXTROMETHORPHAN HYDROBROMIDE AND PROMETHAZINE HYDROCHLORIDE 15; 6.25 MG/5ML; MG/5ML
5 SYRUP ORAL 4 TIMES DAILY PRN
Qty: 180 ML | Refills: 0 | Status: SHIPPED | OUTPATIENT
Start: 2024-03-08

## 2024-03-11 ENCOUNTER — OFFICE VISIT (OUTPATIENT)
Dept: PHYSICAL THERAPY | Facility: CLINIC | Age: 69
End: 2024-03-11
Payer: COMMERCIAL

## 2024-03-11 DIAGNOSIS — M25.551 BILATERAL HIP PAIN: ICD-10-CM

## 2024-03-11 DIAGNOSIS — G89.29 CHRONIC PAIN OF BOTH KNEES: Primary | ICD-10-CM

## 2024-03-11 DIAGNOSIS — M25.562 CHRONIC PAIN OF BOTH KNEES: Primary | ICD-10-CM

## 2024-03-11 DIAGNOSIS — M25.561 CHRONIC PAIN OF BOTH KNEES: Primary | ICD-10-CM

## 2024-03-11 DIAGNOSIS — M25.552 BILATERAL HIP PAIN: ICD-10-CM

## 2024-03-11 PROCEDURE — 97112 NEUROMUSCULAR REEDUCATION: CPT

## 2024-03-12 NOTE — PROGRESS NOTES
Daily Note     Today's date: 3/12/2024  Patient name: Renetta Chan  : 1955  MRN: [id number]  Referring provider: Allan Santa PA-C  Dx:   Encounter Diagnosis     ICD-10-CM    1. Chronic pain of both knees  M25.561     M25.562     G89.29       2. Bilateral hip pain  M25.551     M25.552                      Subjective: Patient denies B hip pain & notes minimal B knee pain.      Objective: See treatment diary below      Assessment: Tolerated treatment well. Patient demonstrated fatigue post treatment, exhibited good technique with therapeutic exercises, and would benefit from continued PT      Plan: Continue per plan of care.      Precautions:   Past Medical History:   Diagnosis Date    Abnormal glucose     last assessed 16    Arthritis     Asthma     w/ exacerbation; last assessed 5/14/15    Atypical nevi     Atypical nevus     last assessed 17    Breast lump     last assessed 3/6/14    Cataract     Cervical adenopathy     last assessed  17    Colon polyp     CPAP (continuous positive airway pressure) dependence     Diverticulitis of colon     Dizziness     Dyslipidemia     last assessed 17    Facial droop     last assessed  16    Fibromyalgia, primary     Flu 2018    Foot abrasion     right between the 4th and 5th toe    Fractures     Genital herpes     resolved 16    GERD (gastroesophageal reflux disease)     Headache, tension-type     Herpes zoster     last assessed 16    History of shingles     may 2016    History of stomach ulcers     Hx of abnormal mammogram     last assessed  3/5/14    Hyperlipidemia     Myalgia     last assessed  12    Myositis     12    Neck pain     Pain involving joints of fingers of both hands 2021    Peripheral neuropathy     Seborrheic keratosis       Shingles     Skin disorder     Skin neoplasm     of the lower limb, including hip; onset 12; last assessed  12    Sleep apnea     Stomach disorder   "        Date: 3/11/2024  01/29/2024 2/5 2/12/2024   Visit # 8  3     Manuals        LE stretching AD  TC  R/L LE stretching        L hip flexor stretching in R S/L                   Neuro Re-Ed        Hip adduction squeeze W/ bridging 5\" holds 20x seated 5\" holds 20x seated  ---   Bridging With adduction squeeze 20x With adduction squeeze 20x With adduction squeeze 20x  2x10 reps on red PB   LAQ ---- 20x R/L   R/L 2# x 10 reps    Standing hip abd ---- 20x R/L with UEA  Balance lunge 2x20 ---   SLR ---- 10x R/L 10x R/L  --   TRX squats  25  reps   MV  --   Balance Kickin (HEP)   2x20 HEP   Ther Ex        STS 20x 20\" 2x10 without UEA 20\" 2x10 without UEA 20x 2x10 reps 20\" mat   LTR     ---   clamshells 20/20 GTB supine  GTB 20x R/L GTB 20x R/L  20/20 GTB supine clamshells    Resisted walking Grav[place] @#8 - 4 x 10 reps     ----   FSU/LSU 8\" step x 10 reps each  8\" x10 R/L FSU/LSU 8\" x10 R/L FSU/LSU 8\" 20x 8\" step x 10 reps each   Lateral Stepping -----   20'x6 in part squat ---   NuStep 10 min L3   10m L3 10 min L3   Toe raises -----   20x 20x on 1/2 roll                             Gait Training                        Modalities                          Access Code: [email or web address]  URL: [email or web address]  Date: 01/17/2024  Prepared by: Hiram Conte    Exercises  - Bent Knee Fallouts  - 1 x daily - 7 x weekly - 2 sets - 10 reps  - Supine Bridge  - 1 x daily - 7 x weekly - 2 sets - 10 reps  - Seated Long Arc Quad  - 1 x daily - 7 x weekly - 2 sets - 10 reps  - Sit to Stand  - 1 x daily - 7 x weekly - 2 sets - 10 reps                   "

## 2024-03-27 ENCOUNTER — OFFICE VISIT (OUTPATIENT)
Dept: PHYSICAL THERAPY | Facility: CLINIC | Age: 69
End: 2024-03-27
Payer: COMMERCIAL

## 2024-03-27 DIAGNOSIS — G89.29 CHRONIC PAIN OF BOTH KNEES: Primary | ICD-10-CM

## 2024-03-27 DIAGNOSIS — M25.562 CHRONIC PAIN OF BOTH KNEES: Primary | ICD-10-CM

## 2024-03-27 DIAGNOSIS — M25.561 CHRONIC PAIN OF BOTH KNEES: Primary | ICD-10-CM

## 2024-03-27 DIAGNOSIS — M25.551 BILATERAL HIP PAIN: ICD-10-CM

## 2024-03-27 DIAGNOSIS — M25.552 BILATERAL HIP PAIN: ICD-10-CM

## 2024-03-27 PROCEDURE — 97112 NEUROMUSCULAR REEDUCATION: CPT

## 2024-03-27 NOTE — PROGRESS NOTES
Daily Note     Today's date: 3/27/2024  Patient name: Renetta Chan  : 1955  MRN: [id number]  Referring provider: Allan Santa PA-C  Dx:   Encounter Diagnosis     ICD-10-CM    1. Chronic pain of both knees  M25.561     M25.562     G89.29       2. Bilateral hip pain  M25.551     M25.552                      Subjective: The meloxicam seems to be really helping my hips & knees.       Objective: See treatment diary below      Assessment: Tolerated treatment well. Patient demonstrated fatigue post treatment, exhibited good technique with therapeutic exercises, and would benefit from continued PT      Plan:  Re Mert BURRIS     Precautions:   Past Medical History:   Diagnosis Date    Abnormal glucose     last assessed 16    Arthritis     Asthma     w/ exacerbation; last assessed 5/14/15    Atypical nevi     Atypical nevus     last assessed 17    Breast lump     last assessed 3/6/14    Cataract     Cervical adenopathy     last assessed  17    Colon polyp     CPAP (continuous positive airway pressure) dependence     Diverticulitis of colon     Dizziness     Dyslipidemia     last assessed 17    Facial droop     last assessed  16    Fibromyalgia, primary     Flu 2018    Foot abrasion     right between the 4th and 5th toe    Fractures     Genital herpes     resolved 16    GERD (gastroesophageal reflux disease)     Headache, tension-type     Herpes zoster     last assessed 16    History of shingles     may 2016    History of stomach ulcers     Hx of abnormal mammogram     last assessed  3/5/14    Hyperlipidemia     Myalgia     last assessed  12    Myositis     12    Neck pain     Pain involving joints of fingers of both hands 2021    Peripheral neuropathy     Seborrheic keratosis       Shingles     Skin disorder     Skin neoplasm     of the lower limb, including hip; onset 12; last assessed  12    Sleep apnea     Stomach disorder          Date: 3/27/2024   "01/29/2024 2/5 2/12/2024   Visit # 9  3     Manuals        LE stretching AD  TC  R/L LE stretching        L hip flexor stretching in R S/L                   Neuro Re-Ed        Hip adduction squeeze W/ bridging 5\" holds 20x seated 5\" holds 20x seated  ---   Bridging With adduction squeeze 20x With adduction squeeze 20x With adduction squeeze 20x  2x10 reps on red PB   LAQ ---- 20x R/L   R/L 2# x 10 reps    Standing hip abd ---- 20x R/L with UEA  Balance lunge 2x20 ---   SLR ---- 10x R/L 10x R/L  --   TRX squats  25  reps   MV  --   Balance Kickin (HEP)   2x20 HEP   Ther Ex        STS 20x 20\" 2x10 without UEA 20\" 2x10 without UEA 20x 2x10 reps 20\" mat   LTR     ---   clamshells 20/20 GTB supine  GTB 20x R/L GTB 20x R/L  20/20 GTB supine clamshells    Resisted walking Chanel @#8 - 4 x 10 reps     ----   FSU/LSU 8\" step x 10 reps each  8\" x10 R/L FSU/LSU 8\" x10 R/L FSU/LSU 8\" 20x 8\" step x 10 reps each   Lateral Stepping -----   20'x6 in part squat ---   bike 10 min L1   10m L3 10 min L3   Toe raises -----   20x 20x on 1/2 roll                             Gait Training                        Modalities                          Access Code: [email or web address]  URL: [email or web address]  Date: 01/17/2024  Prepared by: Hiram Conte    Exercises  - Bent Knee Fallouts  - 1 x daily - 7 x weekly - 2 sets - 10 reps  - Supine Bridge  - 1 x daily - 7 x weekly - 2 sets - 10 reps  - Seated Long Arc Quad  - 1 x daily - 7 x weekly - 2 sets - 10 reps  - Sit to Stand  - 1 x daily - 7 x weekly - 2 sets - 10 reps                     "

## 2024-03-29 ENCOUNTER — APPOINTMENT (OUTPATIENT)
Dept: LAB | Facility: CLINIC | Age: 69
End: 2024-03-29
Payer: COMMERCIAL

## 2024-03-29 DIAGNOSIS — M15.9 POLYARTHROSIS: ICD-10-CM

## 2024-03-29 DIAGNOSIS — M79.7 SCAPULOHUMERAL FIBROSITIS: ICD-10-CM

## 2024-03-29 LAB
ALBUMIN SERPL BCP-MCNC: 4.3 G/DL (ref 3.5–5)
ALP SERPL-CCNC: 66 U/L (ref 34–104)
ALT SERPL W P-5'-P-CCNC: 33 U/L (ref 7–52)
ANION GAP SERPL CALCULATED.3IONS-SCNC: 8 MMOL/L (ref 4–13)
AST SERPL W P-5'-P-CCNC: 23 U/L (ref 13–39)
BASOPHILS # BLD AUTO: 0.14 THOUSANDS/ÂΜL (ref 0–0.1)
BASOPHILS NFR BLD AUTO: 1 % (ref 0–1)
BILIRUB SERPL-MCNC: 0.34 MG/DL (ref 0.2–1)
BUN SERPL-MCNC: 15 MG/DL (ref 5–25)
CALCIUM SERPL-MCNC: 9.6 MG/DL (ref 8.4–10.2)
CHLORIDE SERPL-SCNC: 104 MMOL/L (ref 96–108)
CO2 SERPL-SCNC: 27 MMOL/L (ref 21–32)
CREAT SERPL-MCNC: 0.71 MG/DL (ref 0.6–1.3)
CRP SERPL QL: 1.2 MG/L
EOSINOPHIL # BLD AUTO: 0.14 THOUSAND/ÂΜL (ref 0–0.61)
EOSINOPHIL NFR BLD AUTO: 1 % (ref 0–6)
ERYTHROCYTE [DISTWIDTH] IN BLOOD BY AUTOMATED COUNT: 13.1 % (ref 11.6–15.1)
GFR SERPL CREATININE-BSD FRML MDRD: 86 ML/MIN/1.73SQ M
GLUCOSE P FAST SERPL-MCNC: 128 MG/DL (ref 65–99)
HCT VFR BLD AUTO: 44.7 % (ref 34.8–46.1)
HGB BLD-MCNC: 14.3 G/DL (ref 11.5–15.4)
IMM GRANULOCYTES # BLD AUTO: 0.06 THOUSAND/UL (ref 0–0.2)
IMM GRANULOCYTES NFR BLD AUTO: 1 % (ref 0–2)
LYMPHOCYTES # BLD AUTO: 2.77 THOUSANDS/ÂΜL (ref 0.6–4.47)
LYMPHOCYTES NFR BLD AUTO: 27 % (ref 14–44)
MCH RBC QN AUTO: 31 PG (ref 26.8–34.3)
MCHC RBC AUTO-ENTMCNC: 32 G/DL (ref 31.4–37.4)
MCV RBC AUTO: 97 FL (ref 82–98)
MONOCYTES # BLD AUTO: 1.2 THOUSAND/ÂΜL (ref 0.17–1.22)
MONOCYTES NFR BLD AUTO: 12 % (ref 4–12)
NEUTROPHILS # BLD AUTO: 6.03 THOUSANDS/ÂΜL (ref 1.85–7.62)
NEUTS SEG NFR BLD AUTO: 58 % (ref 43–75)
NRBC BLD AUTO-RTO: 0 /100 WBCS
PLATELET # BLD AUTO: 375 THOUSANDS/UL (ref 149–390)
PMV BLD AUTO: 10.5 FL (ref 8.9–12.7)
POTASSIUM SERPL-SCNC: 4.1 MMOL/L (ref 3.5–5.3)
PROT SERPL-MCNC: 7.9 G/DL (ref 6.4–8.4)
RBC # BLD AUTO: 4.61 MILLION/UL (ref 3.81–5.12)
SODIUM SERPL-SCNC: 139 MMOL/L (ref 135–147)
WBC # BLD AUTO: 10.34 THOUSAND/UL (ref 4.31–10.16)

## 2024-03-29 PROCEDURE — 85025 COMPLETE CBC W/AUTO DIFF WBC: CPT

## 2024-03-29 PROCEDURE — 86140 C-REACTIVE PROTEIN: CPT

## 2024-03-29 PROCEDURE — 80053 COMPREHEN METABOLIC PANEL: CPT

## 2024-03-29 PROCEDURE — 36415 COLL VENOUS BLD VENIPUNCTURE: CPT

## 2024-04-02 ENCOUNTER — EVALUATION (OUTPATIENT)
Dept: PHYSICAL THERAPY | Facility: CLINIC | Age: 69
End: 2024-04-02
Payer: COMMERCIAL

## 2024-04-02 ENCOUNTER — TELEPHONE (OUTPATIENT)
Age: 69
End: 2024-04-02

## 2024-04-02 DIAGNOSIS — M25.551 BILATERAL HIP PAIN: ICD-10-CM

## 2024-04-02 DIAGNOSIS — M25.562 CHRONIC PAIN OF BOTH KNEES: Primary | ICD-10-CM

## 2024-04-02 DIAGNOSIS — Z12.31 ENCOUNTER FOR SCREENING MAMMOGRAM FOR BREAST CANCER: Primary | ICD-10-CM

## 2024-04-02 DIAGNOSIS — M25.561 CHRONIC PAIN OF BOTH KNEES: Primary | ICD-10-CM

## 2024-04-02 DIAGNOSIS — M25.552 BILATERAL HIP PAIN: ICD-10-CM

## 2024-04-02 DIAGNOSIS — G89.29 CHRONIC PAIN OF BOTH KNEES: Primary | ICD-10-CM

## 2024-04-02 PROCEDURE — 97112 NEUROMUSCULAR REEDUCATION: CPT | Performed by: PHYSICAL THERAPIST

## 2024-04-02 PROCEDURE — 97110 THERAPEUTIC EXERCISES: CPT | Performed by: PHYSICAL THERAPIST

## 2024-04-02 NOTE — TELEPHONE ENCOUNTER
Pt called in requesting an order for her mammogram be order. Last mammogram was done on 5/26/23    Please advise, thank you

## 2024-04-02 NOTE — PROGRESS NOTES
"PT Discharge    Today's date: 2024  Patient name: Renetta Chan  : 1955  MRN: [id number]  Referring provider: Allan Santa PA-C  Dx:   Encounter Diagnosis     ICD-10-CM    1. Chronic pain of both knees  M25.561     M25.562     G89.29       2. Bilateral hip pain  M25.551     M25.552                      Assessment  Assessment details: Renetta Chan has been seen for Chronic pain of both knees  (primary encounter diagnosis)  Bilateral hip pain,and has met the goals of physical therapy.And is independent with a HEP.       Subjective Evaluation    History of Present Illness  Mechanism of injury: My knees and hips are no longer painful, I am not limited in my walking or stair climbing.    Objective           Precautions: Standard      Manuals                                                                 Neuro Re-Ed             Chanel Walkots #12 x10                                                                                          Ther Ex             Bike 10m            FSU/LSU 6\" 2x20            Heel/Toe lifts 20x            TRX squats 20x                                                                Ther Activity                                       Gait Training                                       Modalities                                            "

## 2024-04-09 ENCOUNTER — APPOINTMENT (OUTPATIENT)
Dept: PHYSICAL THERAPY | Facility: CLINIC | Age: 69
End: 2024-04-09
Payer: COMMERCIAL

## 2024-04-19 DIAGNOSIS — K21.9 GASTROESOPHAGEAL REFLUX DISEASE WITHOUT ESOPHAGITIS: ICD-10-CM

## 2024-04-20 RX ORDER — FAMOTIDINE 40 MG/1
40 TABLET, FILM COATED ORAL
Qty: 90 TABLET | Refills: 1 | Status: SHIPPED | OUTPATIENT
Start: 2024-04-20
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
## 2024-05-15 ENCOUNTER — NURSE TRIAGE (OUTPATIENT)
Age: 69
End: 2024-05-15

## 2024-05-15 NOTE — TELEPHONE ENCOUNTER
See prior--pt unable to come to office earlier today--will proceed to UC or ED if sxs persist and follow-up again with us post eval..

## 2024-05-15 NOTE — TELEPHONE ENCOUNTER
"Patient called in to report sudden onset of mild to moderate right abdominal pain that started to radiate to her back. Patient is concerned due to history of similar pain on left 14-20 years ago that resulted in ER and surgery for left oophorectomy. Denies fever or problems with bowels or urinating. OV available, but patient could not accept due to needs of adult child. Patient reports she will go to ER post responsibilities for child this evening if pain persists. RN advised patient to follow up with PCP if she goes to ER; patient agreed.    Reason for Disposition   MODERATE pain (e.g., interferes with normal activities that comes and goes (cramps) lasts > 24 hours (Exception: pain with Vomiting or Diarrhea - see that Protocol)    Answer Assessment - Initial Assessment Questions  1. LOCATION: \"Where does it hurt?\"       Right side abdomen   2. RADIATION: \"Does the pain shoot anywhere else?\" (e.g., chest, back)      To back  3. ONSET: \"When did the pain begin?\" (e.g., minutes, hours or days ago)       Past 30 minutes  4. SUDDEN: \"Gradual or sudden onset?\"      Sudden onsent  5. PATTERN \"Does the pain come and go, or is it constant?\"     - If constant: \"Is it getting better, staying the same, or worsening?\"       (Note: Constant means the pain never goes away completely; most serious pain is constant and it progresses)      - If intermittent: \"How long does it last?\" \"Do you have pain now?\"      (Note: Intermittent means the pain goes away completely between bouts)      Intermittent; knows the pain is there but not as intense at this time  6. SEVERITY: \"How bad is the pain?\"  (e.g., Scale 1-10; mild, moderate, or severe)    - MILD (1-3): doesn't interfere with normal activities, abdomen soft and not tender to touch     - MODERATE (4-7): interferes with normal activities or awakens from sleep, tender to touch     - SEVERE (8-10): excruciating pain, doubled over, unable to do any normal activities       Current pain is " "3/10; can be 7-8/10  7. RECURRENT SYMPTOM: \"Have you ever had this type of stomach pain before?\" If Yes, ask: \"When was the last time?\" and \"What happened that time?\"       Similar pain on left 14-20 years ago that resulted in surgery left oophorectomy  8. CAUSE: \"What do you think is causing the stomach pain?\"      Unsure  9. RELIEVING/AGGRAVATING FACTORS: \"What makes it better or worse?\" (e.g., movement, antacids, bowel movement)      Unsure  10. OTHER SYMPTOMS: \"Has there been any vomiting, diarrhea, constipation, or urine problems?\"        Denies  11. PREGNANCY: \"Is there any chance you are pregnant?\" \"When was your last menstrual period?\"        Post menopausal    Protocols used: Abdominal Pain - Female-ADULT-[place]    "

## 2024-05-21 ENCOUNTER — TELEPHONE (OUTPATIENT)
Age: 69
End: 2024-05-21

## 2024-05-21 NOTE — TELEPHONE ENCOUNTER
Maxx from [facility] called to request a referral for pt.  She had seen dermatology on 2/23/2024 for rash on her face and body and did not know she needed a referral.  She had an appt and biopsy.  Pt saw Dr Leoncio Muñoz    [address] [place] [address]  [id number]    [phone]   NPI: [id number]  Per Arthur:   CPT codes: 62721, 72625   ICD-10 codes: D485, L309    Please call pt for any additional information.  She is now aware of the need for the referral.

## 2024-05-23 DIAGNOSIS — Z00.00 PHYSICAL EXAM: Primary | ICD-10-CM

## 2024-05-24 NOTE — TELEPHONE ENCOUNTER
Karrie from [facility] called requesting to speak with Kimberly Clarke re: pt's prior auth.  Attempted to reach Kimberly via Teams messaging, but was unsuccessful.  Karrie asked to advise her to please check the authoritzation status through the website.  Ref#[id number]

## 2024-06-04 DIAGNOSIS — K21.9 GASTROESOPHAGEAL REFLUX DISEASE WITHOUT ESOPHAGITIS: ICD-10-CM

## 2024-06-04 RX ORDER — PANTOPRAZOLE SODIUM 40 MG/1
40 TABLET, DELAYED RELEASE ORAL DAILY
Qty: 90 TABLET | Refills: 1 | Status: SHIPPED | OUTPATIENT
Start: 2024-06-04

## 2024-06-20 ENCOUNTER — TELEPHONE (OUTPATIENT)
Dept: OBGYN CLINIC | Facility: CLINIC | Age: 69
End: 2024-06-20

## 2024-06-20 NOTE — TELEPHONE ENCOUNTER
Lvm for pt that 6/21 appt with Dr. Oquendo will need to be rescheduled with Samuel mary, since he does not see hand.

## 2024-06-21 ENCOUNTER — TELEPHONE (OUTPATIENT)
Age: 69
End: 2024-06-21

## 2024-06-21 NOTE — TELEPHONE ENCOUNTER
PT said she received a MY Chart message saying she is due for a tetanus shot. I contacted Marii in the office, and was told that her insurance would not cover the doctor's office and she would have to go to her local pharmacy. PT was informed and acknowledged.

## 2024-06-23 DIAGNOSIS — E78.5 HYPERLIPIDEMIA, UNSPECIFIED HYPERLIPIDEMIA TYPE: ICD-10-CM

## 2024-06-24 RX ORDER — ATORVASTATIN CALCIUM 20 MG/1
TABLET, FILM COATED ORAL
Qty: 90 TABLET | Refills: 1 | Status: SHIPPED | OUTPATIENT
Start: 2024-06-24

## 2024-06-25 ENCOUNTER — APPOINTMENT (OUTPATIENT)
Dept: RADIOLOGY | Facility: CLINIC | Age: 69
End: 2024-06-25
Payer: COMMERCIAL

## 2024-06-25 ENCOUNTER — OFFICE VISIT (OUTPATIENT)
Dept: OBGYN CLINIC | Facility: CLINIC | Age: 69
End: 2024-06-25
Payer: COMMERCIAL

## 2024-06-25 VITALS
WEIGHT: 156 LBS | SYSTOLIC BLOOD PRESSURE: 118 MMHG | HEART RATE: 83 BPM | BODY MASS INDEX: 26.63 KG/M2 | DIASTOLIC BLOOD PRESSURE: 72 MMHG | HEIGHT: 64 IN

## 2024-06-25 DIAGNOSIS — M54.12 RADICULOPATHY, CERVICAL REGION: ICD-10-CM

## 2024-06-25 DIAGNOSIS — M54.2 NECK PAIN: Primary | ICD-10-CM

## 2024-06-25 DIAGNOSIS — M65.332 TRIGGER MIDDLE FINGER OF LEFT HAND: ICD-10-CM

## 2024-06-25 DIAGNOSIS — M54.2 NECK PAIN: ICD-10-CM

## 2024-06-25 PROCEDURE — 72050 X-RAY EXAM NECK SPINE 4/5VWS: CPT

## 2024-06-25 PROCEDURE — 20550 NJX 1 TENDON SHEATH/LIGAMENT: CPT | Performed by: PHYSICIAN ASSISTANT

## 2024-06-25 PROCEDURE — 99203 OFFICE O/P NEW LOW 30 MIN: CPT | Performed by: PHYSICIAN ASSISTANT

## 2024-06-25 RX ADMIN — LIDOCAINE HYDROCHLORIDE 1 ML: 10 INJECTION, SOLUTION INFILTRATION; PERINEURAL at 15:15

## 2024-06-25 RX ADMIN — DEXAMETHASONE SODIUM PHOSPHATE 40 MG: 10 INJECTION, SOLUTION INTRAMUSCULAR; INTRAVENOUS at 15:15

## 2024-06-27 RX ORDER — LIDOCAINE HYDROCHLORIDE 10 MG/ML
1 INJECTION, SOLUTION INFILTRATION; PERINEURAL
Status: COMPLETED | OUTPATIENT
Start: 2024-06-25 | End: 2024-06-25

## 2024-06-27 RX ORDER — DEXAMETHASONE SODIUM PHOSPHATE 10 MG/ML
40 INJECTION, SOLUTION INTRAMUSCULAR; INTRAVENOUS
Status: COMPLETED | OUTPATIENT
Start: 2024-06-25 | End: 2024-06-25

## 2024-07-08 ENCOUNTER — OFFICE VISIT (OUTPATIENT)
Dept: FAMILY MEDICINE CLINIC | Facility: CLINIC | Age: 69
End: 2024-07-08
Payer: COMMERCIAL

## 2024-07-08 VITALS
SYSTOLIC BLOOD PRESSURE: 110 MMHG | WEIGHT: 158.2 LBS | RESPIRATION RATE: 18 BRPM | TEMPERATURE: 98.7 F | OXYGEN SATURATION: 94 % | DIASTOLIC BLOOD PRESSURE: 70 MMHG | HEIGHT: 64 IN | BODY MASS INDEX: 27.01 KG/M2 | HEART RATE: 88 BPM

## 2024-07-08 DIAGNOSIS — R10.2 PERINEUM PAIN, FEMALE: Primary | ICD-10-CM

## 2024-07-08 PROCEDURE — G2211 COMPLEX E/M VISIT ADD ON: HCPCS | Performed by: FAMILY MEDICINE

## 2024-07-08 PROCEDURE — 99214 OFFICE O/P EST MOD 30 MIN: CPT | Performed by: FAMILY MEDICINE

## 2024-07-08 RX ORDER — FLUTICASONE PROPIONATE 50 UG/1
SPRAY, METERED NASAL
COMMUNITY
Start: 2024-04-05

## 2024-07-08 RX ORDER — FLUTICASONE FUROATE AND VILANTEROL 100; 25 UG/1; UG/1
1 POWDER RESPIRATORY (INHALATION) DAILY
COMMUNITY

## 2024-07-08 RX ORDER — FEXOFENADINE HCL 180 MG/1
TABLET ORAL DAILY
COMMUNITY
Start: 2024-03-25

## 2024-07-08 RX ORDER — HYDROCORTISONE 25 MG/G
CREAM TOPICAL 2 TIMES DAILY
Qty: 28 G | Refills: 0 | Status: SHIPPED | OUTPATIENT
Start: 2024-07-08

## 2024-07-08 RX ORDER — FLUOCINONIDE TOPICAL SOLUTION USP, 0.05% 0.5 MG/ML
SOLUTION TOPICAL EVERY 12 HOURS
COMMUNITY
Start: 2024-02-23

## 2024-07-08 NOTE — PROGRESS NOTES
Chief Complaint   Patient presents with   • POSSIBLE HEMORRIOD        Patient ID: Renetta Chan is a 68 y.o. female.    HPI  Pt is seeing for painful swelling in perineum started yesterday -  no therapy tried, no similar issues in the past  -  no associated symptoms     The following portions of the patient's history were reviewed and updated as appropriate: allergies, current medications, past family history, past medical history, past social history, past surgical history and problem list.    Review of Systems   All other systems reviewed and are negative.      Current Outpatient Medications   Medication Sig Dispense Refill   • Ascorbic Acid (VITAMIN C) 1000 MG tablet      • atorvastatin (LIPITOR) 20 mg tablet TAKE ONE TABLET BY MOUTH EVERY DAY AT BEDTIME 90 tablet 1   • cholecalciferol (VITAMIN D3) 1,000 units tablet Take 2,000 Units by mouth daily     • clobetasol (TEMOVATE) 0.05 % cream Apply topically 2 (two) times a day Apply topically on scalp and right shoulder two times a day 60 g 2   • Cymbalta 20 MG capsule Start at one tablet a night, and can increase to 2 tablets nightly 2 weeks later     • famotidine (PEPCID) 40 MG tablet TAKE ONE TABLET BY MOUTH AT BEDTIME 90 tablet 1   • fexofenadine (Allegra Allergy) 180 MG tablet Take by mouth daily     • fluocinonide (LIDEX) 0.05 % external solution Apply topically every 12 (twelve) hours     • Fluticasone Furoate-Vilanterol (Breo Ellipta) 100-25 mcg/actuation inhaler Inhale 1 puff Daily     • fluticasone-umeclidinium-vilanterol (Trelegy Ellipta) 100-62.5-25 mcg/actuation inhaler Inhale 1 puff daily Rinse mouth after use. 60 blister 11   • gabapentin (NEURONTIN) 100 mg capsule      • hydrocortisone 2.5 % cream Apply topically 2 (two) times a day scalp 20 g 0   • KLS Aller-Rick 50 MCG/ACT nasal spray 2 spray by intranasal route  every day in each nostril     • metFORMIN (GLUCOPHAGE-XR) 500 mg 24 hr tablet TAKE ONE TABLET BY MOUTH EVERY DAY - TAKE WITH DINNER 90  "tablet 1   • methocarbamol (ROBAXIN) 500 mg tablet Take 1 tablet (500 mg total) by mouth 2 (two) times a day 20 tablet 0   • montelukast (SINGULAIR) 10 mg tablet TAKE ONE TABLET BY MOUTH EVERY DAY 60 tablet 2   • multivitamin (THERAGRAN) TABS Take 1 tablet by mouth daily Last dose 3/21/21     • pantoprazole (PROTONIX) 40 mg tablet Take 1 tablet (40 mg total) by mouth daily 90 tablet 1   • albuterol (2.5 mg/3 mL) 0.083 % nebulizer solution Take 3 mL (2.5 mg total) by nebulization every 6 (six) hours as needed for wheezing or shortness of breath (Patient not taking: Reported on 11/20/2023) 180 mL 5   • fluticasone-umeclidinium-vilanterol (Trelegy Ellipta) 200-62.5-25 mcg/actuation AEPB inhaler Inhale 1 puff daily in the early morning Rinse mouth after use. 60 blister 5   • ipratropium-albuterol (DUO-NEB) 0.5-2.5 mg/3 mL nebulizer solution Take 3 mL by nebulization 4 (four) times a day as needed for wheezing or shortness of breath (Patient not taking: Reported on 7/8/2024) 360 mL 7   • meloxicam (Mobic) 15 mg tablet Take 1 tablet (15 mg total) by mouth daily 30 tablet 0   • methylPREDNISolone 4 MG tablet therapy pack Use as directed on package (Patient not taking: Reported on 5/20/2024) 21 each 0   • ondansetron (Zofran ODT) 4 mg disintegrating tablet Take 1 tablet (4 mg total) by mouth every 6 (six) hours as needed for nausea or vomiting for up to 3 days (Patient not taking: Reported on 5/20/2024) 12 tablet 0     No current facility-administered medications for this visit.       Objective:    /70 (BP Location: Left arm, Patient Position: Sitting, Cuff Size: Standard)   Pulse 88   Temp 98.7 °F (37.1 °C) (Temporal)   Resp 18   Ht 5' 4\" (1.626 m)   Wt 71.8 kg (158 lb 3.2 oz)   SpO2 94%   BMI 27.15 kg/m²        Physical Exam  Constitutional:       General: She is not in acute distress.     Appearance: She is not ill-appearing.   Cardiovascular:      Rate and Rhythm: Normal rate.   Pulmonary:      Effort: " Pulmonary effort is normal.   Genitourinary:      Skin:     Findings: No rash.   Neurological:      Mental Status: She is alert.                 Assessment/Plan:         Diagnoses and all orders for this visit:    Perineum pain, female  -     hydrocortisone (ANUSOL-HC) 2.5 % rectal cream; Apply topically 2 (two) times a day    Other orders  -     fexofenadine (Allegra Allergy) 180 MG tablet; Take by mouth daily  -     fluocinonide (LIDEX) 0.05 % external solution; Apply topically every 12 (twelve) hours  -     Fluticasone Furoate-Vilanterol (Breo Ellipta) 100-25 mcg/actuation inhaler; Inhale 1 puff Daily  -     KLS Aller-Rick 50 MCG/ACT nasal spray; 2 spray by intranasal route  every day in each nostril          Rto prn                   Cristela Yu MD

## 2024-07-17 ENCOUNTER — HOSPITAL ENCOUNTER (OUTPATIENT)
Dept: RADIOLOGY | Facility: HOSPITAL | Age: 69
Discharge: HOME | End: 2024-07-17
Payer: COMMERCIAL

## 2024-07-17 DIAGNOSIS — M54.2 NECK PAIN: ICD-10-CM

## 2024-07-17 DIAGNOSIS — M54.12 RADICULOPATHY, CERVICAL REGION: ICD-10-CM

## 2024-07-17 PROCEDURE — 72141 MRI NECK SPINE W/O DYE: CPT

## 2024-07-23 ENCOUNTER — TELEPHONE (OUTPATIENT)
Dept: GASTROENTEROLOGY | Facility: CLINIC | Age: 69
End: 2024-07-23

## 2024-07-23 ENCOUNTER — PREP FOR PROCEDURE (OUTPATIENT)
Age: 69
End: 2024-07-23

## 2024-07-23 ENCOUNTER — TELEPHONE (OUTPATIENT)
Age: 69
End: 2024-07-23

## 2024-07-23 DIAGNOSIS — Z86.010 HISTORY OF COLON POLYPS: Primary | ICD-10-CM

## 2024-07-23 NOTE — TELEPHONE ENCOUNTER
Pt is due for a Colonoscopy 3 years- precancerous tubular adenoma with Dr Cruz. I lmom for pt to please call back to schedule. Will call again if do not hear back from her.

## 2024-07-23 NOTE — TELEPHONE ENCOUNTER
07/23/24  Screened by: Simi Ambriz    Referring Provider 3 yr recall    Pre- Screening:     There is no height or weight on file to calculate BMI.  Has patient been referred for a routine screening Colonoscopy? yes  Is the patient between 45-75 years old? yes      Previous Colonoscopy yes   If yes:    Date: 2021     Facility: [facility]    Reason:         Does the patient want to see a Gastroenterologist prior to their procedure OR are they having any GI symptoms? no    Has the patient been hospitalized or had abdominal surgery in the past 6 months? no    Does the patient use supplemental oxygen? no    Does the patient take Coumadin, Lovenox, Plavix, Elliquis, Xarelto, or other blood thinning medication? no    Has the patient had a stroke, cardiac event, or stent placed in the past year? no      If patient is between 45yrs - 49yrs, please advise patient that we will have to confirm benefits & coverage with their insurance company for a routine screening colonoscopy.

## 2024-07-23 NOTE — TELEPHONE ENCOUNTER
Scheduled date of colonoscopy (as of today):09/04/24  Physician performing colonoscopy: Nancy  Location of colonoscopy: [facility]  Bowel prep reviewed with patient:RICCARDO/KARTHIK sent to MY Chart  Instructions reviewed with patient by: SYED  Clearances: CPAP user    : Herman Shantalcharles [phone]

## 2024-08-08 ENCOUNTER — OFFICE VISIT (OUTPATIENT)
Dept: PULMONOLOGY | Facility: MEDICAL CENTER | Age: 69
End: 2024-08-08
Payer: COMMERCIAL

## 2024-08-08 VITALS
RESPIRATION RATE: 12 BRPM | BODY MASS INDEX: 27.66 KG/M2 | SYSTOLIC BLOOD PRESSURE: 120 MMHG | HEART RATE: 86 BPM | HEIGHT: 64 IN | WEIGHT: 162 LBS | TEMPERATURE: 100.2 F | DIASTOLIC BLOOD PRESSURE: 68 MMHG | OXYGEN SATURATION: 97 %

## 2024-08-08 DIAGNOSIS — G47.33 OBSTRUCTIVE SLEEP APNEA HYPOPNEA, MILD: Primary | Chronic | ICD-10-CM

## 2024-08-08 DIAGNOSIS — J45.30 MILD PERSISTENT ASTHMA WITHOUT COMPLICATION: Chronic | ICD-10-CM

## 2024-08-08 DIAGNOSIS — K21.9 GASTROESOPHAGEAL REFLUX DISEASE WITHOUT ESOPHAGITIS: Chronic | ICD-10-CM

## 2024-08-08 PROCEDURE — 99214 OFFICE O/P EST MOD 30 MIN: CPT | Performed by: NURSE PRACTITIONER

## 2024-08-08 RX ORDER — NAPROXEN 500 MG/1
500 TABLET ORAL
COMMUNITY
Start: 2024-07-18 | End: 2025-07-18

## 2024-08-08 NOTE — ASSESSMENT & PLAN NOTE
Does not report any symptoms of GERD currently she remains on the tamp resolved 40 mg daily.  Did advise her to follow her gastroenterologist

## 2024-08-08 NOTE — ASSESSMENT & PLAN NOTE
Patient is here today for annual face-to-face visit.  She was diagnosed with obstructive sleep apnea in 2015.  Her AHI was 17.6.  According to patient she uses and benefits from her CPAP every evening.  Sleeps about 7 to 8 hours.  This can vary but she does wake and feel well rested.  Compliance data is reviewed for the last 30 days from July 2024 to August 6, 2024 patient has been 80% compliant her average usage is 5 hours and 48 minutes.  Her AHI is reduced to 2.8.  This is on a CPAP of 6.

## 2024-08-08 NOTE — PROGRESS NOTES
"Assessment/Plan:     Problem List Items Addressed This Visit          Respiratory    Mild persistent asthma without complication (Chronic)     Renetta will she is doing well on the Trelegy 200 mcg 1 puff daily.  Never has used her nebulizer in 1 year on occasion she will wear use her rescue inhaler.  Recently she was exposed to a smell of cologne and she felt short of breath with a puff she did need rescue inhalation.         Obstructive sleep apnea hypopnea, mild - Primary (Chronic)     Patient is here today for annual face-to-face visit.  She was diagnosed with obstructive sleep apnea in 2015.  Her AHI was 17.6.  According to patient she uses and benefits from her CPAP every evening.  Sleeps about 7 to 8 hours.  This can vary but she does wake and feel well rested.  Compliance data is reviewed for the last 30 days from July 2024 to August 6, 2024 patient has been 80% compliant her average usage is 5 hours and 48 minutes.  Her AHI is reduced to 2.8.  This is on a CPAP of 6.            Digestive    Gastroesophageal reflux disease without esophagitis (Chronic)     Does not report any symptoms of GERD currently she remains on the tamp resolved 40 mg daily.  Did advise her to follow her gastroenterologist              Return in about 1 year (around 8/8/2025).  All questions are answered to the patient's satisfaction and understanding.  She verbalizes understanding.  She is encouraged to call with any further questions or concerns.    Portions of the record may have been created with voice recognition software.  Occasional wrong word or \"sound a like\" substitutions may have occurred due to the inherent limitations of voice recognition software.  Read the chart carefully and recognize, using context, where substitutions have occurred.    Electronically Signed by TANI Gomez    ______________________________________________________________________    Chief Complaint: No chief complaint on file.      Patient ID: Renetta " is a 68 y.o. y.o. female has a past medical history of Abnormal glucose, Arthritis, Asthma, Atypical nevi, Atypical nevus, Breast lump, Cataract, Cervical adenopathy, Colon polyp, CPAP (continuous positive airway pressure) dependence, Diverticulitis of colon, Dizziness, Dyslipidemia, Facial droop, Fibromyalgia, primary, Flu (01/20/2018), Foot abrasion, Fractures, Genital herpes, GERD (gastroesophageal reflux disease), Headache, tension-type, Herpes zoster, History of shingles, History of stomach ulcers, abnormal mammogram, Hyperlipidemia, Myalgia, Myositis, Neck pain, Pain involving joints of fingers of both hands (01/19/2021), Peripheral neuropathy, Seborrheic keratosis, Shingles, Skin disorder, Skin neoplasm, Sleep apnea, and Stomach disorder.    8/8/2024  Patient presents today for follow-up visit.  primary symptoms  Associated symptoms include coughing and myalgias. Pertinent negatives include no chest pain.    Renetta is a 68-year-old female who was last seen in the office in February 2020 for she has a history of GERD mild persistent asthma and also obstructive sleep apnea.  Renetta was diagnosed with obstructive sleep apnea in 2015 her apneic hypopneic index was 17.6 additionally she does use 200 mcg and is here today for follow-up.  Her last chest x-ray was in July 2023 this was done because patient had a cough the lungs were clear and there was no acute cardiopulmonary diseaseAnswers submitted by the patient for this visit:  Pulmonology Questionnaire (Submitted on 8/1/2024)  Chief Complaint: Primary symptoms  Do you experience frequent throat clearing?: Yes  Chronicity: recurrent  When did you first notice your symptoms?: 1 to 4 weeks ago  How often do your symptoms occur?: constantly  Since you first noticed this problem, how has it changed?: unchanged  Have you experienced weight loss?: No  Which of the following makes your symptoms worse?: nothing  Which of the following makes your symptoms better?: OTC  cough suppressant  Risk factors for lung disease: smoking/tobacco exposure      Review of Systems   Constitutional:  Negative for appetite change.   HENT:  Positive for postnasal drip.    Eyes: Negative.    Respiratory:  Positive for cough and shortness of breath.    Cardiovascular: Negative.  Negative for chest pain.   Gastrointestinal: Negative.    Endocrine: Negative.    Genitourinary: Negative.    Musculoskeletal:  Positive for myalgias.   Allergic/Immunologic: Negative.    Neurological: Negative.    Hematological: Negative.    Psychiatric/Behavioral: Negative.         Smoking history: She reports that she has never smoked. She has never used smokeless tobacco.    The following portions of the patient's history were reviewed and updated as appropriate: allergies, current medications, past family history, past medical history, past social history, past surgical history, and problem list.    Immunization History   Administered Date(s) Administered    COVID-19 MODERNA VACC 0.5 ML IM 01/20/2021, 02/17/2021, 10/28/2021, 01/05/2023, 04/11/2023    COVID-19 Moderna mRNA Vaccine 12 Yr+ 50 mcg/0.5 mL (Spikevax) 10/27/2023    Influenza, Quadrivalent (nasal) 12/06/2016    Influenza, high dose seasonal 0.7 mL 10/18/2019, 09/14/2020, 11/16/2021, 09/28/2023    Influenza, injectable, quadrivalent, preservative free 0.5 mL 10/22/2018    Influenza, seasonal, injectable 09/15/2015, 11/20/2017    Pneumococcal Conjugate 13-Valent 02/02/2016, 11/16/2021    Pneumococcal Polysaccharide PPV23 10/09/2019    Tuberculin Skin Test-PPD Intradermal 06/12/2017     Current Outpatient Medications   Medication Sig Dispense Refill    albuterol (2.5 mg/3 mL) 0.083 % nebulizer solution Take 3 mL (2.5 mg total) by nebulization every 6 (six) hours as needed for wheezing or shortness of breath 180 mL 5    Ascorbic Acid (VITAMIN C) 1000 MG tablet       atorvastatin (LIPITOR) 20 mg tablet TAKE ONE TABLET BY MOUTH EVERY DAY AT BEDTIME 90 tablet 1     cholecalciferol (VITAMIN D3) 1,000 units tablet Take 2,000 Units by mouth daily      clobetasol (TEMOVATE) 0.05 % cream Apply topically 2 (two) times a day Apply topically on scalp and right shoulder two times a day 60 g 2    Cymbalta 20 MG capsule Start at one tablet a night, and can increase to 2 tablets nightly 2 weeks later      famotidine (PEPCID) 40 MG tablet TAKE ONE TABLET BY MOUTH AT BEDTIME 90 tablet 1    fexofenadine (Allegra Allergy) 180 MG tablet Take by mouth daily      fluocinonide (LIDEX) 0.05 % external solution Apply topically every 12 (twelve) hours      fluticasone-umeclidinium-vilanterol (Trelegy Ellipta) 200-62.5-25 mcg/actuation AEPB inhaler Inhale 1 puff daily in the early morning Rinse mouth after use. 60 blister 5    gabapentin (NEURONTIN) 100 mg capsule       hydrocortisone (ANUSOL-HC) 2.5 % rectal cream Apply topically 2 (two) times a day 28 g 0    hydrocortisone 2.5 % cream Apply topically 2 (two) times a day scalp 20 g 0    metFORMIN (GLUCOPHAGE-XR) 500 mg 24 hr tablet TAKE ONE TABLET BY MOUTH EVERY DAY - TAKE WITH DINNER 90 tablet 1    methocarbamol (ROBAXIN) 500 mg tablet Take 1 tablet (500 mg total) by mouth 2 (two) times a day 20 tablet 0    montelukast (SINGULAIR) 10 mg tablet TAKE ONE TABLET BY MOUTH EVERY DAY 60 tablet 2    multivitamin (THERAGRAN) TABS Take 1 tablet by mouth daily Last dose 3/21/21      naproxen (NAPROSYN) 500 mg tablet Take 500 mg by mouth      pantoprazole (PROTONIX) 40 mg tablet Take 1 tablet (40 mg total) by mouth daily 90 tablet 1    Fluticasone Furoate-Vilanterol (Breo Ellipta) 100-25 mcg/actuation inhaler Inhale 1 puff Daily (Patient not taking: Reported on 8/8/2024)      fluticasone-umeclidinium-vilanterol (Trelegy Ellipta) 100-62.5-25 mcg/actuation inhaler Inhale 1 puff daily Rinse mouth after use. 60 blister 11    ipratropium-albuterol (DUO-NEB) 0.5-2.5 mg/3 mL nebulizer solution Take 3 mL by nebulization 4 (four) times a day as needed for wheezing or  "shortness of breath (Patient not taking: Reported on 7/8/2024) 360 mL 7    KLS Aller-Rick 50 MCG/ACT nasal spray 2 spray by intranasal route  every day in each nostril (Patient not taking: Reported on 8/8/2024)      meloxicam (Mobic) 15 mg tablet Take 1 tablet (15 mg total) by mouth daily 30 tablet 0    methylPREDNISolone 4 MG tablet therapy pack Use as directed on package (Patient not taking: Reported on 5/20/2024) 21 each 0    ondansetron (Zofran ODT) 4 mg disintegrating tablet Take 1 tablet (4 mg total) by mouth every 6 (six) hours as needed for nausea or vomiting for up to 3 days (Patient not taking: Reported on 5/20/2024) 12 tablet 0     No current facility-administered medications for this visit.     Allergies: Latex and Adhesive [medical tape]    Objective:  Vitals:    08/08/24 1308   BP: 120/68   BP Location: Left arm   Patient Position: Sitting   Cuff Size: Standard   Pulse: 86   Resp: 12   Temp: 100.2 °F (37.9 °C)   TempSrc: Temporal   SpO2: 97%   Weight: 73.5 kg (162 lb)   Height: 5' 4\" (1.626 m)   Oxygen Therapy  SpO2: 97 %  .  Wt Readings from Last 3 Encounters:   08/08/24 73.5 kg (162 lb)   07/08/24 71.8 kg (158 lb 3.2 oz)   06/25/24 70.8 kg (156 lb)     Body mass index is 27.81 kg/m².    Physical Exam  Constitutional:       Appearance: She is normal weight.   HENT:      Head: Normocephalic.      Nose: Nose normal.      Mouth/Throat:      Mouth: Mucous membranes are moist.      Pharynx: Oropharynx is clear.      Comments: Mallampati 4  Cardiovascular:      Pulses: Normal pulses.      Heart sounds: Normal heart sounds.   Pulmonary:      Effort: Pulmonary effort is normal.      Breath sounds: Normal breath sounds.   Abdominal:      General: Abdomen is flat.   Musculoskeletal:         General: Normal range of motion.   Skin:     General: Skin is warm and dry.      Capillary Refill: Capillary refill takes less than 2 seconds.   Neurological:      General: No focal deficit present.      Mental Status: She is " alert and oriented to person, place, and time.   Psychiatric:         Mood and Affect: Mood normal.         Behavior: Behavior normal.     Answers submitted by the patient for this visit:  Pulmonology Questionnaire (Submitted on 8/1/2024)  Chief Complaint: Primary symptoms  Do you experience frequent throat clearing?: Yes  Chronicity: recurrent  When did you first notice your symptoms?: 1 to 4 weeks ago  How often do your symptoms occur?: constantly  Since you first noticed this problem, how has it changed?: unchanged  Have you experienced weight loss?: No  Which of the following makes your symptoms worse?: nothing  Which of the following makes your symptoms better?: OTC cough suppressant  Risk factors for lung disease: smoking/tobacco exposure      Lab Review:   No visits with results within 2 Month(s) from this visit.   Latest known visit with results is:   Appointment on 03/29/2024   Component Date Value    WBC 03/29/2024 10.34 (H)     RBC 03/29/2024 4.61     Hemoglobin 03/29/2024 14.3     Hematocrit 03/29/2024 44.7     MCV 03/29/2024 97     MCH 03/29/2024 31.0     MCHC 03/29/2024 32.0     RDW 03/29/2024 13.1     MPV 03/29/2024 10.5     Platelets 03/29/2024 375     nRBC 03/29/2024 0     Segmented % 03/29/2024 58     Immature Grans % 03/29/2024 1     Lymphocytes % 03/29/2024 27     Monocytes % 03/29/2024 12     Eosinophils Relative 03/29/2024 1     Basophils Relative 03/29/2024 1     Absolute Neutrophils 03/29/2024 6.03     Absolute Immature Grans 03/29/2024 0.06     Absolute Lymphocytes 03/29/2024 2.77     Absolute Monocytes 03/29/2024 1.20     Eosinophils Absolute 03/29/2024 0.14     Basophils Absolute 03/29/2024 0.14 (H)     Sodium 03/29/2024 139     Potassium 03/29/2024 4.1     Chloride 03/29/2024 104     CO2 03/29/2024 27     ANION GAP 03/29/2024 8     BUN 03/29/2024 15     Creatinine 03/29/2024 0.71     Glucose, Fasting 03/29/2024 128 (H)     Calcium 03/29/2024 9.6     AST 03/29/2024 23     ALT 03/29/2024 33      Alkaline Phosphatase 2024 66     Total Protein 2024 7.9     Albumin 2024 4.3     Total Bilirubin 2024 0.34     eGFR 2024 86     CRP 2024 1.2        Past Surgical History:   Procedure Laterality Date    ABDOMINAL SURGERY      release of adhesions    BLADDER SURGERY      mesh-lift    BREAST CYST ASPIRATION Right     does not know the year    BREAST SURGERY      lift    CATARACT EXTRACTION Bilateral      SECTION      x 3-,,    CHOLECYSTECTOMY      lap    COLONOSCOPY      COSMETIC SURGERY      tummy and breast lift    ESOPHAGOGASTRODUODENOSCOPY      EYE SURGERY   right eye and 3/11 left eye    OOPHORECTOMY Left     OTHER SURGICAL HISTORY      vocal cord surgery, scraping    NH HYSTEROSCOPY BX ENDOMETRIUM&/POLYPC W/WO D&C N/A 2016    Procedure: DILATATION AND CURETTAGE (D&C) WITH HYSTEROSCOPY;  Surgeon: Kyle Bethea MD;  Location: [facility];  Service: Gynecology    REDUCTION MAMMAPLASTY Bilateral 2008    TONSILLECTOMY      TUBAL LIGATION  10/29/1984    US GUIDED MSK PROCEDURE  2021        Family History   Problem Relation Age of Onset    Hypertension Mother     Alzheimer's disease Mother     Arthritis Mother     Dementia Mother     Hypertension Father     Arthritis Father     Heart attack Father     Heart disease Father     Stroke Father     Other Brother         vertigo, tinnitus    Diabetes Daughter     Breast cancer Sister 32    Skin cancer Sister     Cancer Sister     Other Son         downs syndrome    Abdominal aortic aneurysm Sister     Cancer Sister         T cell sarcoma    Breast cancer Sister     No Known Problems Brother     Diabetes Brother     Other Sister         anemia from the diet    No Known Problems Son     No Known Problems Maternal Aunt     No Known Problems Maternal Aunt     No Known Problems Paternal Aunt     No Known Problems Paternal Aunt     No Known Problems Paternal Aunt     Asthma Sister     Cancer Brother          Diagnostics:  I have personally reviewed pertinent films in PACS  none pertinent  Office Spirometry Results:     ESS:    MRI cervical spine wo contrast    Result Date: 7/22/2024  Narrative: MRI CERVICAL SPINE WITHOUT CONTRAST INDICATION: M54.2: Cervicalgia M54.12: Radiculopathy, cervical region. COMPARISON: MRI cervical spine 11/21/2017. Cervical spine radiographs 6/25/2024. TECHNIQUE:  Multiplanar, multisequence imaging of the cervical spine was performed. . IMAGE QUALITY:  Diagnostic FINDINGS: ALIGNMENT: Nonspecific reversal of the normal cervical lordosis. Vertebral body heights are preserved. No acute fracture. MARROW SIGNAL: Multilevel endplate centered marrow changes. CERVICAL AND VISUALIZED THORACIC CORD: No convincing cord signal abnormality that is confirmed in 2 planes. PREVERTEBRAL AND PARASPINAL SOFT TISSUES: No acute abnormality. VISUALIZED POSTERIOR FOSSA: No acute abnormality, noting this examination is not tailored for assessment. CERVICAL DISC SPACES: Multilevel up to moderate degenerative disc disease, greatest from C4-C6. C2-C3: No significant neuroforaminal narrowing or spinal canal stenosis. C3-C4: Disc osteophyte complex with superimposed central disc protrusion. Uncovertebral and facet arthropathy contribute to mild left neuroforaminal narrowing. There is ventral indentation of the thecal sac and spinal cord with resultant mild spinal canal stenosis. C4-C5: Disc osteophyte complex. Uncovertebral and facet arthropathy contribute to moderate left and mild-moderate right neuroforaminal narrowing. Mild spinal canal stenosis. C5-C6: Disc osteophyte complex with superimposed central disc protrusion with annular tear. Uncovertebral and facet arthropathy contribute to marked right and moderate-marked left neuroforaminal narrowing. Mild-moderate spinal canal stenosis. C6-C7: Disc osteophyte complex. Uncovertebral and facet arthropathy contribute to moderate-marked right and mild left neuroforaminal  narrowing. Mild spinal canal stenosis. C7-T1: No significant neuroforaminal narrowing or spinal canal stenosis. UPPER THORACIC DISC SPACES: Spondylotic changes without acute critical central canal stenosis.     Impression: Multilevel spondylotic changes of the cervical spine, including multilevel up to marked neuroforaminal narrowing, as detailed above. No high-grade central canal stenosis. Workstation performed: [id number]

## 2024-08-08 NOTE — ASSESSMENT & PLAN NOTE
Renetta will she is doing well on the Trelegy 200 mcg 1 puff daily.  Never has used her nebulizer in 1 year on occasion she will wear use her rescue inhaler.  Recently she was exposed to a smell of cologne and she felt short of breath with a puff she did need rescue inhalation.

## 2024-08-09 ENCOUNTER — RA CDI HCC (OUTPATIENT)
Dept: OTHER | Facility: HOSPITAL | Age: 69
End: 2024-08-09

## 2024-08-09 PROBLEM — L08.9 INFECTED BLISTER OF TOE OF RIGHT FOOT: Status: RESOLVED | Noted: 2021-01-19 | Resolved: 2024-08-09

## 2024-08-09 PROBLEM — S90.424A INFECTED BLISTER OF TOE OF RIGHT FOOT: Status: RESOLVED | Noted: 2021-01-19 | Resolved: 2024-08-09

## 2024-08-09 PROBLEM — N76.0 ACUTE VAGINITIS: Status: RESOLVED | Noted: 2022-01-03 | Resolved: 2024-08-09

## 2024-08-12 ENCOUNTER — APPOINTMENT (OUTPATIENT)
Dept: LAB | Facility: CLINIC | Age: 69
End: 2024-08-12
Payer: COMMERCIAL

## 2024-08-16 ENCOUNTER — OFFICE VISIT (OUTPATIENT)
Dept: FAMILY MEDICINE CLINIC | Facility: CLINIC | Age: 69
End: 2024-08-16
Payer: COMMERCIAL

## 2024-08-16 VITALS
RESPIRATION RATE: 12 BRPM | WEIGHT: 164 LBS | BODY MASS INDEX: 28 KG/M2 | TEMPERATURE: 98.2 F | DIASTOLIC BLOOD PRESSURE: 70 MMHG | SYSTOLIC BLOOD PRESSURE: 110 MMHG | HEART RATE: 70 BPM | HEIGHT: 64 IN | OXYGEN SATURATION: 98 %

## 2024-08-16 DIAGNOSIS — L21.9 SEBORRHEIC DERMATITIS OF SCALP: ICD-10-CM

## 2024-08-16 DIAGNOSIS — R21 RASH OF FACE: ICD-10-CM

## 2024-08-16 DIAGNOSIS — R73.03 PREDIABETES: ICD-10-CM

## 2024-08-16 DIAGNOSIS — Z00.00 MEDICARE ANNUAL WELLNESS VISIT, SUBSEQUENT: Primary | ICD-10-CM

## 2024-08-16 LAB — SL AMB POCT HEMOGLOBIN AIC: 6.4 (ref ?–6.5)

## 2024-08-16 PROCEDURE — G0439 PPPS, SUBSEQ VISIT: HCPCS | Performed by: FAMILY MEDICINE

## 2024-08-16 PROCEDURE — 83036 HEMOGLOBIN GLYCOSYLATED A1C: CPT | Performed by: FAMILY MEDICINE

## 2024-08-16 PROCEDURE — 99213 OFFICE O/P EST LOW 20 MIN: CPT | Performed by: FAMILY MEDICINE

## 2024-08-16 NOTE — PROGRESS NOTES
Ambulatory Visit  Name: Renetta Chan      : 1955      MRN: [id number]  Encounter Provider: Lydia Moore MD  Encounter Date: 2024   Encounter department: [facility]    Assessment & Plan   1. Medicare annual wellness visit, subsequent  -     POCT hemoglobin A1c  2. Rash of face  Comments:  etiology unclear to date.  will consult further w dermre: further eval/tx.  3. Prediabetes  Comments:  Ljy4b=0.4% (down from 6.9%)  4. Seborrheic dermatitis of scalp  5. BMI 28.0-28.9,adult       Preventive health issues were discussed with patient, and age appropriate screening tests were ordered as noted in patient's After Visit Summary. Personalized health advice and appropriate referrals for health education or preventive services given if needed, as noted in patient's After Visit Summary.    History of Present Illness     Rash  This is a chronic problem. The affected locations include the face, neck and scalp. The problem is moderate. The rash is characterized by redness, itchiness and dryness. It is unknown if there was an exposure to a precipitant. Associated symptoms include congestion, facial edema, fatigue, itching and joint pain. Pertinent negatives include no diarrhea, fever or vomiting. Past treatments include oral steroids, topical steroids, moisturizer, cold compress, antibiotics, antihistamine, antibiotic cream and analgesics. The treatment provided no relief. Her past medical history is significant for allergies and eczema. There were no sick contacts.      Patient Care Team:  Lydia Moore MD as PCP - General (Family Medicine)  DO Alber Cramer MD Kheng-Jim Lim, MD (Gastroenterology)    Review of Systems   Constitutional:  Positive for fatigue. Negative for fever.   HENT:  Positive for congestion.    Respiratory: Negative.     Cardiovascular: Negative.    Gastrointestinal:  Negative for diarrhea and vomiting.        GERD   Endocrine:        PreDM    Musculoskeletal:  Positive for arthralgias and joint pain.   Skin:  Positive for itching and rash.   Psychiatric/Behavioral:  Positive for sleep disturbance.      Medical History Reviewed by provider this encounter:  Tobacco  Allergies  Meds  Problems  Med Hx  Surg Hx  Fam Hx       Annual Wellness Visit Questionnaire   Renetta is here for her Subsequent Wellness visit. Last Medicare Wellness visit information reviewed, patient interviewed and updates made to the record today.      Health Risk Assessment:   Patient rates overall health as good. Patient feels that their physical health rating is slightly worse. Patient is satisfied with their life. Eyesight was rated as same. Hearing was rated as same. Patient feels that their emotional and mental health rating is same. Patients states they are never, rarely angry. Patient states they are sometimes unusually tired/fatigued. Pain experienced in the last 7 days has been some. Patient's pain rating has been 4/10. Patient states that she has experienced no weight loss or gain in last 6 months.     Depression Screening:   PHQ-2 Score: 0      Fall Risk Screening:   In the past year, patient has experienced: no history of falling in past year      Urinary Incontinence Screening:   Patient has leaked urine accidently in the last six months.     Home Safety:  Patient does not have trouble with stairs inside or outside of their home. Patient has working smoke alarms and has working carbon monoxide detector. Home safety hazards include: none.     Nutrition:   Current diet is Regular.     Medications:   Patient is currently taking over-the-counter supplements. OTC medications include: see medication list. Patient is able to manage medications.     Activities of Daily Living (ADLs)/Instrumental Activities of Daily Living (IADLs):   Walk and transfer into and out of bed and chair?: Yes  Dress and groom yourself?: Yes    Bathe or shower yourself?: Yes    Feed yourself?  Yes  Do your laundry/housekeeping?: Yes  Manage your money, pay your bills and track your expenses?: Yes  Make your own meals?: Yes    Do your own shopping?: Yes    Durable Medical Equipment Suppliers  CPAP    Previous Hospitalizations:   Any hospitalizations or ED visits within the last 12 months?: No      Advance Care Planning:   Living will: No    Durable POA for healthcare: No    Advanced directive: No    Advanced directive counseling given: Yes    ACP document given: Yes      Cognitive Screening:   Provider or family/friend/caregiver concerned regarding cognition?: No    PREVENTIVE SCREENINGS      Cardiovascular Screening:    General: History Lipid Disorder and Risks and Benefits Discussed      Diabetes Screening:     General: Screening Current      Colorectal Cancer Screening:     General: Screening Current      Breast Cancer Screening:     General: Screening Current      Cervical Cancer Screening:    General: Screening Not Indicated      Osteoporosis Screening:    General: Risks and Benefits Discussed      Abdominal Aortic Aneurysm (AAA) Screening:    Risk factors include: family history of AAA        General: Risks and Benefits Discussed      Lung Cancer Screening:     General: Screening Not Indicated      Hepatitis C Screening:    General: Screening Current    Screening, Brief Intervention, and Referral to Treatment (SBIRT)    Screening  Typical number of drinks in a day: 0  Typical number of drinks in a week: 0  Interpretation: Low risk drinking behavior.    AUDIT-C Screenin) How often did you have a drink containing alcohol in the past year? never  2) How many drinks did you have on a typical day when you were drinking in the past year? 0  3) How often did you have 6 or more drinks on one occasion in the past year? never    AUDIT-C Score: 0  Interpretation: Score 0-2 (female): Negative screen for alcohol misuse    Single Item Drug Screening:  How often have you used an illegal drug (including  "marijuana) or a prescription medication for non-medical reasons in the past year? never    Single Item Drug Screen Score: 0  Interpretation: Negative screen for possible drug use disorder    Brief Intervention  Alcohol & drug use screenings were reviewed. No concerns regarding substance use disorder identified.     Other Counseling Topics:   Car/seat belt/driving safety, skin self-exam, sunscreen and calcium and vitamin D intake and regular weightbearing exercise.     Social Determinants of Health     Financial Resource Strain: Medium Risk (6/7/2023)    Overall Financial Resource Strain (CARDIA)    • Difficulty of Paying Living Expenses: Somewhat hard   Food Insecurity: No Food Insecurity (8/16/2024)    Hunger Vital Sign    • Worried About Running Out of Food in the Last Year: Never true    • Ran Out of Food in the Last Year: Never true   Transportation Needs: No Transportation Needs (8/16/2024)    PRAPARE - Transportation    • Lack of Transportation (Medical): No    • Lack of Transportation (Non-Medical): No   Housing Stability: Low Risk  (8/16/2024)    Housing Stability Vital Sign    • Unable to Pay for Housing in the Last Year: No    • Number of Times Moved in the Last Year: 0    • Homeless in the Last Year: No   Utilities: Not At Risk (8/16/2024)    [facility] Utilities    • Threatened with loss of utilities: No     No results found.    Objective     /70 (BP Location: Left arm, Patient Position: Sitting, Cuff Size: Standard)   Pulse 70   Temp 98.2 °F (36.8 °C) (Temporal)   Resp 12   Ht 5' 4\" (1.626 m)   Wt 74.4 kg (164 lb)   SpO2 98%   BMI 28.15 kg/m²     Physical Exam  Vitals and nursing note reviewed.   Constitutional:       General: She is not in acute distress.     Comments: OW   Cardiovascular:      Rate and Rhythm: Normal rate and regular rhythm.   Pulmonary:      Effort: Pulmonary effort is normal. No respiratory distress.      Breath sounds: Normal breath sounds.   Musculoskeletal:      Cervical " back: Neck supple.   Skin:     General: Skin is warm and dry.      Coloration: Skin is not jaundiced.      Findings: Rash (pink, papular rash neck /facial; cystic lesions on scalp) present.   Neurological:      General: No focal deficit present.      Mental Status: She is alert and oriented to person, place, and time.      Cranial Nerves: No cranial nerve deficit.   Psychiatric:      Comments: anxious

## 2024-08-16 NOTE — PATIENT INSTRUCTIONS
Medicare Preventive Visit Patient Instructions  Thank you for completing your Welcome to Medicare Visit or Medicare Annual Wellness Visit today. Your next wellness visit will be due in one year (8/17/2025).  The screening/preventive services that you may require over the next 5-10 years are detailed below. Some tests may not apply to you based off risk factors and/or age. Screening tests ordered at today's visit but not completed yet may show as past due. Also, please note that scanned in results may not display below.  Preventive Screenings:  Service Recommendations Previous Testing/Comments   Colorectal Cancer Screening  * Colonoscopy    * Fecal Occult Blood Test (FOBT)/Fecal Immunochemical Test (FIT)  * Fecal DNA/Cologuard Test  * Flexible Sigmoidoscopy Age: 45-75 years old   Colonoscopy: every 10 years (may be performed more frequently if at higher risk)  OR  FOBT/FIT: every 1 year  OR  Cologuard: every 3 years  OR  Sigmoidoscopy: every 5 years  Screening may be recommended earlier than age 45 if at higher risk for colorectal cancer. Also, an individualized decision between you and your healthcare provider will decide whether screening between the ages of 76-85 would be appropriate. Colonoscopy: 03/26/2021  FOBT/FIT: Not on file  Cologuard: Not on file  Sigmoidoscopy: Not on file    Screening Current     Breast Cancer Screening Age: 40+ years old  Frequency: every 1-2 years  Not required if history of left and right mastectomy Mammogram: 05/26/2023    Screening Current   Cervical Cancer Screening Between the ages of 21-29, pap smear recommended once every 3 years.   Between the ages of 30-65, can perform pap smear with HPV co-testing every 5 years.   Recommendations may differ for women with a history of total hysterectomy, cervical cancer, or abnormal pap smears in past. Pap Smear: Not on file    Screening Not Indicated   Hepatitis C Screening Once for adults born between 1945 and 1965  More frequently in  patients at high risk for Hepatitis C Hep C Antibody: 04/26/2019    Screening Current   Diabetes Screening 1-2 times per year if you're at risk for diabetes or have pre-diabetes Fasting glucose: 127 mg/dL (8/12/2024)  A1C: 6.9 % (8/15/2023)  Screening Current   Cholesterol Screening Once every 5 years if you don't have a lipid disorder. May order more often based on risk factors. Lipid panel: 08/15/2023    Screening Not Indicated  History Lipid Disorder     Other Preventive Screenings Covered by Medicare:  Abdominal Aortic Aneurysm (AAA) Screening: covered once if your at risk. You're considered to be at risk if you have a family history of AAA.  Lung Cancer Screening: covers low dose CT scan once per year if you meet all of the following conditions: (1) Age 55-77; (2) No signs or symptoms of lung cancer; (3) Current smoker or have quit smoking within the last 15 years; (4) You have a tobacco smoking history of at least 20 pack years (packs per day multiplied by number of years you smoked); (5) You get a written order from a healthcare provider.  Glaucoma Screening: covered annually if you're considered high risk: (1) You have diabetes OR (2) Family history of glaucoma OR (3)  aged 50 and older OR (4)  American aged 65 and older  Osteoporosis Screening: covered every 2 years if you meet one of the following conditions: (1) You're estrogen deficient and at risk for osteoporosis based off medical history and other findings; (2) Have a vertebral abnormality; (3) On glucocorticoid therapy for more than 3 months; (4) Have primary hyperparathyroidism; (5) On osteoporosis medications and need to assess response to drug therapy.   Last bone density test (DXA Scan): 06/13/2023.  HIV Screening: covered annually if you're between the age of 15-65. Also covered annually if you are younger than 15 and older than 65 with risk factors for HIV infection. For pregnant patients, it is covered up to 3 times per  pregnancy.    Immunizations:  Immunization Recommendations   Influenza Vaccine Annual influenza vaccination during flu season is recommended for all persons aged >= 6 months who do not have contraindications   Pneumococcal Vaccine   * Pneumococcal conjugate vaccine = PCV13 (Prevnar 13), PCV15 (Vaxneuvance), PCV20 (Prevnar 20)  * Pneumococcal polysaccharide vaccine = PPSV23 (Pneumovax) Adults 19-65 yo with certain risk factors or if 65+ yo  If never received any pneumonia vaccine: recommend Prevnar 20 (PCV20)  Give PCV20 if previously received 1 dose of PCV13 or PPSV23   Hepatitis B Vaccine 3 dose series if at intermediate or high risk (ex: diabetes, end stage renal disease, liver disease)   Respiratory syncytial virus (RSV) Vaccine - COVERED BY MEDICARE PART D  * RSVPreF3 (Arexvy) CDC recommends that adults 60 years of age and older may receive a single dose of RSV vaccine using shared clinical decision-making (SCDM)   Tetanus (Td) Vaccine - COST NOT COVERED BY MEDICARE PART B Following completion of primary series, a booster dose should be given every 10 years to maintain immunity against tetanus. Td may also be given as tetanus wound prophylaxis.   Tdap Vaccine - COST NOT COVERED BY MEDICARE PART B Recommended at least once for all adults. For pregnant patients, recommended with each pregnancy.   Shingles Vaccine (Shingrix) - COST NOT COVERED BY MEDICARE PART B  2 shot series recommended in those 19 years and older who have or will have weakened immune systems or those 50 years and older     Health Maintenance Due:      Topic Date Due   • Colorectal Cancer Screening  03/26/2024   • Breast Cancer Screening: Mammogram  05/26/2024   • Hepatitis C Screening  Completed     Immunizations Due:      Topic Date Due   • Influenza Vaccine (1) 09/01/2024     Advance Directives   What are advance directives?  Advance directives are legal documents that state your wishes and plans for medical care. These plans are made ahead of  time in case you lose your ability to make decisions for yourself. Advance directives can apply to any medical decision, such as the treatments you want, and if you want to donate organs.   What are the types of advance directives?  There are many types of advance directives, and each state has rules about how to use them. You may choose a combination of any of the following:  Living will:  This is a written record of the treatment you want. You can also choose which treatments you do not want, which to limit, and which to stop at a certain time. This includes surgery, medicine, IV fluid, and tube feedings.   Durable power of  for healthcare (DPAHC):  This is a written record that states who you want to make healthcare choices for you when you are unable to make them for yourself. This person, called a proxy, is usually a family member or a friend. You may choose more than 1 proxy.  Do not resuscitate (DNR) order:  A DNR order is used in case your heart stops beating or you stop breathing. It is a request not to have certain forms of treatment, such as CPR. A DNR order may be included in other types of advance directives.  Medical directive:  This covers the care that you want if you are in a coma, near death, or unable to make decisions for yourself. You can list the treatments you want for each condition. Treatment may include pain medicine, surgery, blood transfusions, dialysis, IV or tube feedings, and a ventilator (breathing machine).  Values history:  This document has questions about your views, beliefs, and how you feel and think about life. This information can help others choose the care that you would choose.  Why are advance directives important?  An advance directive helps you control your care. Although spoken wishes may be used, it is better to have your wishes written down. Spoken wishes can be misunderstood, or not followed. Treatments may be given even if you do not want them. An advance  directive may make it easier for your family to make difficult choices about your care.   Urinary Incontinence   Urinary incontinence (UI)  is when you lose control of your bladder. UI develops because your bladder cannot store or empty urine properly. The 3 most common types of UI are stress incontinence, urge incontinence, or both.  Medicines:   May be given to help strengthen your bladder control. Report any side effects of medication to your healthcare provider.  Do pelvic muscle exercises often:  Your pelvic muscles help you stop urinating. Squeeze these muscles tight for 5 seconds, then relax for 5 seconds. Gradually work up to squeezing for 10 seconds. Do 3 sets of 15 repetitions a day, or as directed. This will help strengthen your pelvic muscles and improve bladder control.  Train your bladder:  Go to the bathroom at set times, such as every 2 hours, even if you do not feel the urge to go. You can also try to hold your urine when you feel the urge to go. For example, hold your urine for 5 minutes when you feel the urge to go. As that becomes easier, hold your urine for 10 minutes.   Self-care:   Keep a UI record.  Write down how often you leak urine and how much you leak. Make a note of what you were doing when you leaked urine.  Drink liquids as directed. You may need to limit the amount of liquid you drink to help control your urine leakage. Do not drink any liquid right before you go to bed. Limit or do not have drinks that contain caffeine or alcohol.   Prevent constipation.  Eat a variety of high-fiber foods. Good examples are high-fiber cereals, beans, vegetables, and whole-grain breads. Walking is the best way to trigger your intestines to have a bowel movement.  Exercise regularly and maintain a healthy weight.  Weight loss and exercise will decrease pressure on your bladder and help you control your leakage.   Use a catheter as directed  to help empty your bladder. A catheter is a tiny, plastic  tube that is put into your bladder to drain your urine.   Go to behavior therapy as directed.  Behavior therapy may be used to help you learn to control your urge to urinate.    Weight Management   Why it is important to manage your weight:  Being overweight increases your risk of health conditions such as heart disease, high blood pressure, type 2 diabetes, and certain types of cancer. It can also increase your risk for osteoarthritis, sleep apnea, and other respiratory problems. Aim for a slow, steady weight loss. Even a small amount of weight loss can lower your risk of health problems.  How to lose weight safely:  A safe and healthy way to lose weight is to eat fewer calories and get regular exercise. You can lose up about 1 pound a week by decreasing the number of calories you eat by 500 calories each day.   Healthy meal plan for weight management:  A healthy meal plan includes a variety of foods, contains fewer calories, and helps you stay healthy. A healthy meal plan includes the following:  Eat whole-grain foods more often.  A healthy meal plan should contain fiber. Fiber is the part of grains, fruits, and vegetables that is not broken down by your body. Whole-grain foods are healthy and provide extra fiber in your diet. Some examples of whole-grain foods are whole-wheat breads and pastas, oatmeal, brown rice, and bulgur.  Eat a variety of vegetables every day.  Include dark, leafy greens such as spinach, kale, katelynn greens, and mustard greens. Eat yellow and orange vegetables such as carrots, sweet potatoes, and winter squash.   Eat a variety of fruits every day.  Choose fresh or canned fruit (canned in its own juice or light syrup) instead of juice. Fruit juice has very little or no fiber.  Eat low-fat dairy foods.  Drink fat-free (skim) milk or 1% milk. Eat fat-free yogurt and low-fat cottage cheese. Try low-fat cheeses such as mozzarella and other reduced-fat cheeses.  Choose meat and other protein  foods that are low in fat.  Choose beans or other legumes such as split peas or lentils. Choose fish, skinless poultry (chicken or turkey), or lean cuts of red meat (beef or pork). Before you cook meat or poultry, cut off any visible fat.   Use less fat and oil.  Try baking foods instead of frying them. Add less fat, such as margarine, sour cream, regular salad dressing and mayonnaise to foods. Eat fewer high-fat foods. Some examples of high-fat foods include french fries, doughnuts, ice cream, and cakes.  Eat fewer sweets.  Limit foods and drinks that are high in sugar. This includes candy, cookies, regular soda, and sweetened drinks.  Exercise:  Exercise at least 30 minutes per day on most days of the week. Some examples of exercise include walking, biking, dancing, and swimming. You can also fit in more physical activity by taking the stairs instead of the elevator or parking farther away from stores. Ask your healthcare provider about the best exercise plan for you.      © Copyright [employer] 2018 Information is for End User's use only and may not be sold, redistributed or otherwise used for commercial purposes. All illustrations and images included in CareNotes® are the copyrighted property of [employer] Inc[employer] or [employer]

## 2024-08-19 DIAGNOSIS — Z00.6 ENCOUNTER FOR EXAMINATION FOR NORMAL COMPARISON OR CONTROL IN CLINICAL RESEARCH PROGRAM: ICD-10-CM

## 2024-08-20 ENCOUNTER — APPOINTMENT (OUTPATIENT)
Dept: LAB | Facility: CLINIC | Age: 69
End: 2024-08-20

## 2024-08-20 DIAGNOSIS — Z00.6 ENCOUNTER FOR EXAMINATION FOR NORMAL COMPARISON OR CONTROL IN CLINICAL RESEARCH PROGRAM: ICD-10-CM

## 2024-08-20 PROCEDURE — 36415 COLL VENOUS BLD VENIPUNCTURE: CPT

## 2024-08-21 ENCOUNTER — ANESTHESIA EVENT (OUTPATIENT)
Dept: ANESTHESIOLOGY | Facility: HOSPITAL | Age: 69
End: 2024-08-21

## 2024-08-21 ENCOUNTER — ANESTHESIA (OUTPATIENT)
Dept: ANESTHESIOLOGY | Facility: HOSPITAL | Age: 69
End: 2024-08-21

## 2024-08-27 ENCOUNTER — VBI (OUTPATIENT)
Dept: ADMINISTRATIVE | Facility: OTHER | Age: 69
End: 2024-08-27

## 2024-08-27 ENCOUNTER — NURSE TRIAGE (OUTPATIENT)
Age: 69
End: 2024-08-27

## 2024-08-27 NOTE — TELEPHONE ENCOUNTER
08/27/24 8:48 AM     Chart reviewed for Hemoglobin A1c was/were submitted to the patient's insurance.     Karsten Matamoros MA   [facility]

## 2024-08-27 NOTE — TELEPHONE ENCOUNTER
"Patient was driving last night when she got a sudden pain in her abdomen.  Above bellybutton.  Radiated to chest for a few seconds and did not return.  Later in the evening she had a pain again in the same area without CP.  Patient states she \"feels\" something moving around above her belly button.  Denies pulsing sensation.  Denies N/V, constipation or diarrhea. Denies fever. Denies SOB or diaphoresis.  No radiating pain.  Eating and drinking normally.  Appointment made for the office tomorrow.  Patient advised of ED protocol should symptoms come back.  Patient verbalized understanding.       Reason for Disposition   Patient wants to be seen    Answer Assessment - Initial Assessment Questions  1. LOCATION: \"Where does it hurt?\"        Left sided chest pain. The night before had belly pain.   2. RADIATION: \"Does the pain go anywhere else?\" (e.g., into neck, jaw, arms, back)      Denies  3. ONSET: \"When did the chest pain begin?\" (Minutes, hours or days)       8/26  4. PATTERN \"Does the pain come and go, or has it been constant since it started?\"  \"Does it get worse with exertion?\"       Intermittent  5. DURATION: \"How long does it last\" (e.g., seconds, minutes, hours)      Half an hour   6. SEVERITY: \"How bad is the pain?\"  (e.g., Scale 1-10; mild, moderate, or severe)     - MILD (1-3): doesn't interfere with normal activities      - MODERATE (4-7): interferes with normal activities or awakens from sleep     - SEVERE (8-10): excruciating pain, unable to do any normal activities        Moderate  7. CARDIAC RISK FACTORS: \"Do you have any history of heart problems or risk factors for heart disease?\" (e.g., angina, prior heart attack; diabetes, high blood pressure, high cholesterol, smoker, or strong family history of heart disease)      N/A  8. PULMONARY RISK FACTORS: \"Do you have any history of lung disease?\"  (e.g., blood clots in lung, asthma, emphysema, birth control pills)      Asthma  9. CAUSE: \"What do you think " "is causing the chest pain?\"      Unknown  10. OTHER SYMPTOMS: \"Do you have any other symptoms?\" (e.g., dizziness, nausea, vomiting, sweating, fever, difficulty breathing, cough)        8/8 at doctors office had 100.2 temp  11. PREGNANCY: \"Is there any chance you are pregnant?\" \"When was your last menstrual period?\"        N/A    Protocols used: Chest Pain-ADULT-[place]    "

## 2024-08-28 ENCOUNTER — OFFICE VISIT (OUTPATIENT)
Dept: FAMILY MEDICINE CLINIC | Facility: CLINIC | Age: 69
End: 2024-08-28
Payer: COMMERCIAL

## 2024-08-28 ENCOUNTER — APPOINTMENT (OUTPATIENT)
Dept: RADIOLOGY | Facility: CLINIC | Age: 69
End: 2024-08-28
Payer: COMMERCIAL

## 2024-08-28 ENCOUNTER — APPOINTMENT (OUTPATIENT)
Dept: LAB | Facility: CLINIC | Age: 69
End: 2024-08-28
Payer: COMMERCIAL

## 2024-08-28 VITALS
BODY MASS INDEX: 27.83 KG/M2 | SYSTOLIC BLOOD PRESSURE: 110 MMHG | OXYGEN SATURATION: 98 % | HEART RATE: 61 BPM | HEIGHT: 64 IN | RESPIRATION RATE: 12 BRPM | WEIGHT: 163 LBS | DIASTOLIC BLOOD PRESSURE: 76 MMHG | TEMPERATURE: 97.9 F

## 2024-08-28 DIAGNOSIS — D72.829 LEUKOCYTOSIS, UNSPECIFIED TYPE: ICD-10-CM

## 2024-08-28 DIAGNOSIS — R14.0 ABDOMINAL BLOATING: ICD-10-CM

## 2024-08-28 DIAGNOSIS — R14.0 ABDOMINAL BLOATING: Primary | ICD-10-CM

## 2024-08-28 DIAGNOSIS — R05.3 CHRONIC COUGH: ICD-10-CM

## 2024-08-28 DIAGNOSIS — R07.89 ATYPICAL CHEST PAIN: ICD-10-CM

## 2024-08-28 DIAGNOSIS — R14.0 ABDOMINAL DISTENSION: ICD-10-CM

## 2024-08-28 LAB
ERYTHROCYTE [DISTWIDTH] IN BLOOD BY AUTOMATED COUNT: 13.5 % (ref 11.6–15.1)
HCT VFR BLD AUTO: 44.4 % (ref 34.8–46.1)
HGB BLD-MCNC: 13.7 G/DL (ref 11.5–15.4)
MCH RBC QN AUTO: 31.1 PG (ref 26.8–34.3)
MCHC RBC AUTO-ENTMCNC: 30.9 G/DL (ref 31.4–37.4)
MCV RBC AUTO: 101 FL (ref 82–98)
PLATELET # BLD AUTO: 366 THOUSANDS/UL (ref 149–390)
PMV BLD AUTO: 11 FL (ref 8.9–12.7)
RBC # BLD AUTO: 4.41 MILLION/UL (ref 3.81–5.12)
WBC # BLD AUTO: 9.21 THOUSAND/UL (ref 4.31–10.16)

## 2024-08-28 PROCEDURE — 99214 OFFICE O/P EST MOD 30 MIN: CPT | Performed by: FAMILY MEDICINE

## 2024-08-28 PROCEDURE — 36415 COLL VENOUS BLD VENIPUNCTURE: CPT

## 2024-08-28 PROCEDURE — 71046 X-RAY EXAM CHEST 2 VIEWS: CPT

## 2024-08-28 PROCEDURE — 85027 COMPLETE CBC AUTOMATED: CPT

## 2024-08-28 NOTE — PROGRESS NOTES
"Assessment/Plan:    1. Abdominal bloating  -     US abdomen complete; Future; Expected date: 08/28/2024  -     CBC; Future  2. Abdominal distension  -     US abdomen complete; Future; Expected date: 08/28/2024  3. Atypical chest pain  -     POCT ECG  4. Chronic cough  -     XR chest pa & lateral; Future; Expected date: 08/28/2024  -     CBC; Future  5. Leukocytosis, unspecified type  -     CBC; Future         Subjective:      Patient ID: Renetta Chan is a 68 y.o. female.    Chief Complaint   Patient presents with   • Hernia     Patient c/o of possible hernia, patient feels something moving up and down around mid belly area    Patient also states has mild chest discomfort       HPI    C/o abdominal bloating and ?hernia--feels \"something moving up and down\" above umbilical area  Occ nausea, no vomiting  No fever ,no melena  Episode atypical chest discomfort--no trauma, no shortness of breath or diaphoresis  EKG in office today wnl as well as BP  +cough-non-productive-COVID 19 tests neg    The following portions of the patient's history were reviewed and updated as appropriate: allergies, current medications, past family history, past medical history, past social history, past surgical history and problem list.  Past Medical History:   Diagnosis Date   • Abnormal glucose     last assessed 2/25/16   • Arthritis    • Asthma     w/ exacerbation; last assessed 5/14/15   • Atypical nevi    • Atypical nevus     last assessed 1/18/17   • Breast lump     last assessed 3/6/14   • Cataract    • Cervical adenopathy     last assessed  2/7/17   • Colon polyp    • CPAP (continuous positive airway pressure) dependence    • Diverticulitis of colon    • Dizziness    • Dyslipidemia     last assessed 5/22/17   • Facial droop     last assessed  12/5/16   • Fibromyalgia, primary    • Flu 01/20/2018   • Foot abrasion     right between the 4th and 5th toe   • Fractures    • Genital herpes     resolved 9/1/16   • GERD (gastroesophageal " reflux disease)    • Headache, tension-type    • Herpes zoster     last assessed 16   • History of shingles     may 2016   • History of stomach ulcers    • Hx of abnormal mammogram     last assessed  3/5/14   • Hyperlipidemia    • Myalgia     last assessed  12   • Myositis     12   • Neck pain    • Pain involving joints of fingers of both hands 2021   • Peripheral neuropathy    • Seborrheic keratosis    • Shingles    • Skin disorder    • Skin neoplasm     of the lower limb, including hip; onset 12; last assessed  12   • Sleep apnea    • Stomach disorder      Past Surgical History:   Procedure Laterality Date   • ABDOMINAL SURGERY      release of adhesions   • BLADDER SURGERY      mesh-lift   • BREAST CYST ASPIRATION Right     does not know the year   • BREAST SURGERY      lift   • CATARACT EXTRACTION Bilateral    •  SECTION      x 3-,,   • CHOLECYSTECTOMY      lap   • COLONOSCOPY     • COSMETIC SURGERY      tummy and breast lift   • ESOPHAGOGASTRODUODENOSCOPY     • EYE SURGERY   right eye and 3/11 left eye   • OOPHORECTOMY Left    • OTHER SURGICAL HISTORY      vocal cord surgery, scraping   • [place] HYSTEROSCOPY BX ENDOMETRIUM&/POLYPC W/WO D&C N/A 2016    Procedure: DILATATION AND CURETTAGE (D&C) WITH HYSTEROSCOPY;  Surgeon: Kyle Bethea MD;  Location: [facility];  Service: Gynecology   • REDUCTION MAMMAPLASTY Bilateral    • TONSILLECTOMY     • TUBAL LIGATION  10/29/1984   • US GUIDED MSK PROCEDURE  2021       Review of Systems    Per hpi    Current Outpatient Medications   Medication Sig Dispense Refill   • albuterol (2.5 mg/3 mL) 0.083 % nebulizer solution Take 3 mL (2.5 mg total) by nebulization every 6 (six) hours as needed for wheezing or shortness of breath 180 mL 5   • Ascorbic Acid (VITAMIN C) 1000 MG tablet      • atorvastatin (LIPITOR) 20 mg tablet TAKE ONE TABLET BY MOUTH EVERY DAY AT BEDTIME 90 tablet 1   • cholecalciferol (VITAMIN D3)  "1,000 units tablet Take 2,000 Units by mouth daily     • clobetasol (TEMOVATE) 0.05 % cream Apply topically 2 (two) times a day Apply topically on scalp and right shoulder two times a day 60 g 2   • famotidine (PEPCID) 40 MG tablet TAKE ONE TABLET BY MOUTH AT BEDTIME 90 tablet 1   • fexofenadine (Allegra Allergy) 180 MG tablet Take by mouth daily     • fluocinonide (LIDEX) 0.05 % external solution Apply topically every 12 (twelve) hours     • gabapentin (NEURONTIN) 100 mg capsule      • hydrocortisone (ANUSOL-HC) 2.5 % rectal cream Apply topically 2 (two) times a day 28 g 0   • hydrocortisone 2.5 % cream Apply topically 2 (two) times a day scalp 20 g 0   • metFORMIN (GLUCOPHAGE-XR) 500 mg 24 hr tablet TAKE ONE TABLET BY MOUTH EVERY DAY - TAKE WITH DINNER 90 tablet 1   • methocarbamol (ROBAXIN) 500 mg tablet Take 1 tablet (500 mg total) by mouth 2 (two) times a day 20 tablet 0   • montelukast (SINGULAIR) 10 mg tablet TAKE ONE TABLET BY MOUTH EVERY DAY 60 tablet 2   • multivitamin (THERAGRAN) TABS Take 1 tablet by mouth daily Last dose 3/21/21     • naproxen (NAPROSYN) 500 mg tablet Take 500 mg by mouth     • pantoprazole (PROTONIX) 40 mg tablet Take 1 tablet (40 mg total) by mouth daily 90 tablet 1   • Cymbalta 20 MG capsule Start at one tablet a night, and can increase to 2 tablets nightly 2 weeks later     • fluticasone-umeclidinium-vilanterol (Trelegy Ellipta) 200-62.5-25 mcg/actuation AEPB inhaler Inhale 1 puff daily in the early morning Rinse mouth after use. 60 blister 5     No current facility-administered medications for this visit.       Objective:    /76 (BP Location: Left arm, Patient Position: Sitting, Cuff Size: Standard)   Pulse 61   Temp 97.9 °F (36.6 °C) (Temporal)   Resp 12   Ht 5' 4\" (1.626 m)   Wt 73.9 kg (163 lb)   SpO2 98%   BMI 27.98 kg/m²        Physical Exam  Vitals and nursing note reviewed.   Constitutional:       General: She is not in acute distress.     Appearance: Normal " appearance.   Eyes:      General: No scleral icterus.     Conjunctiva/sclera: Conjunctivae normal.   Cardiovascular:      Rate and Rhythm: Normal rate and regular rhythm.   Pulmonary:      Effort: Pulmonary effort is normal. No respiratory distress.      Breath sounds: Normal breath sounds.   Abdominal:      General: Bowel sounds are normal.      Palpations: Abdomen is soft.      Tenderness: There is abdominal tenderness (mild epigaastric). There is no guarding or rebound.      Hernia: No hernia is present.   Musculoskeletal:      Cervical back: Neck supple. No tenderness.      Right lower leg: No edema.      Left lower leg: No edema.   Skin:     General: Skin is warm and dry.      Coloration: Skin is not jaundiced.   Neurological:      General: No focal deficit present.      Mental Status: She is alert and oriented to person, place, and time.      Cranial Nerves: No cranial nerve deficit.   Psychiatric:         Mood and Affect: Mood normal.                Lydia Moore MD

## 2024-08-29 ENCOUNTER — HOSPITAL ENCOUNTER (OUTPATIENT)
Dept: RADIOLOGY | Facility: HOSPITAL | Age: 69
Discharge: HOME | End: 2024-08-29
Attending: FAMILY MEDICINE
Payer: COMMERCIAL

## 2024-08-29 VITALS — BODY MASS INDEX: 27.83 KG/M2 | WEIGHT: 163 LBS | HEIGHT: 64 IN

## 2024-08-29 DIAGNOSIS — Z12.31 ENCOUNTER FOR SCREENING MAMMOGRAM FOR BREAST CANCER: ICD-10-CM

## 2024-08-29 PROCEDURE — 77067 SCR MAMMO BI INCL CAD: CPT

## 2024-08-29 PROCEDURE — 77063 BREAST TOMOSYNTHESIS BI: CPT

## 2024-08-31 PROCEDURE — 93000 ELECTROCARDIOGRAM COMPLETE: CPT | Performed by: FAMILY MEDICINE

## 2024-09-03 ENCOUNTER — TELEPHONE (OUTPATIENT)
Age: 69
End: 2024-09-03

## 2024-09-03 RX ORDER — SODIUM CHLORIDE, SODIUM LACTATE, POTASSIUM CHLORIDE, CALCIUM CHLORIDE 600; 310; 30; 20 MG/100ML; MG/100ML; MG/100ML; MG/100ML
75 INJECTION, SOLUTION INTRAVENOUS CONTINUOUS
Status: CANCELLED | OUTPATIENT
Start: 2024-09-03

## 2024-09-03 NOTE — TELEPHONE ENCOUNTER
Patients GI provider:  Dr. HENRIQUEZ     Number to return call: ( [phone]     Reason for call: Pt calling regarding adding an EGD to her colonoscopy tomorrow please contact patient to discuss      Scheduled procedure/appointment date if applicable: Apt/procedure

## 2024-09-03 NOTE — TELEPHONE ENCOUNTER
"Pt called back about scheduling EGD with colonoscopy. Advised    It looks like pt had EGD in feb 2023 which was unremarkable, so unless pt is having symptoms EGD would likely not be covered and if having symptoms she would need to be seen in the office to discuss prior to scheduling EGD.     Pt advised she saw Dr Moore and explained \"it feels like a baby moving in her stomach\" She states that she will just begin her prep for her colonoscopy  "

## 2024-09-04 ENCOUNTER — HOSPITAL ENCOUNTER (OUTPATIENT)
Dept: GASTROENTEROLOGY | Facility: AMBULARY SURGERY CENTER | Age: 69
Setting detail: OUTPATIENT SURGERY
Discharge: HOME | End: 2024-09-04
Attending: INTERNAL MEDICINE
Payer: COMMERCIAL

## 2024-09-04 ENCOUNTER — ANESTHESIA EVENT (OUTPATIENT)
Dept: GASTROENTEROLOGY | Facility: AMBULARY SURGERY CENTER | Age: 69
End: 2024-09-04

## 2024-09-04 ENCOUNTER — ANESTHESIA (OUTPATIENT)
Dept: GASTROENTEROLOGY | Facility: AMBULARY SURGERY CENTER | Age: 69
End: 2024-09-04

## 2024-09-04 VITALS
OXYGEN SATURATION: 97 % | BODY MASS INDEX: 27.83 KG/M2 | SYSTOLIC BLOOD PRESSURE: 113 MMHG | DIASTOLIC BLOOD PRESSURE: 58 MMHG | RESPIRATION RATE: 18 BRPM | TEMPERATURE: 97.8 F | HEIGHT: 64 IN | WEIGHT: 163 LBS | HEART RATE: 69 BPM

## 2024-09-04 DIAGNOSIS — Z86.010 HISTORY OF COLON POLYPS: ICD-10-CM

## 2024-09-04 LAB — GLUCOSE SERPL-MCNC: 114 MG/DL (ref 65–140)

## 2024-09-04 PROCEDURE — 88305 TISSUE EXAM BY PATHOLOGIST: CPT | Performed by: PATHOLOGY

## 2024-09-04 PROCEDURE — 82948 REAGENT STRIP/BLOOD GLUCOSE: CPT

## 2024-09-04 PROCEDURE — 45385 COLONOSCOPY W/LESION REMOVAL: CPT | Performed by: INTERNAL MEDICINE

## 2024-09-04 RX ORDER — LIDOCAINE HYDROCHLORIDE 10 MG/ML
INJECTION, SOLUTION EPIDURAL; INFILTRATION; INTRACAUDAL; PERINEURAL AS NEEDED
Status: DISCONTINUED | OUTPATIENT
Start: 2024-09-04 | End: 2024-09-04

## 2024-09-04 RX ORDER — PROPOFOL 10 MG/ML
INJECTION, EMULSION INTRAVENOUS AS NEEDED
Status: DISCONTINUED | OUTPATIENT
Start: 2024-09-04 | End: 2024-09-04

## 2024-09-04 RX ORDER — SODIUM CHLORIDE, SODIUM LACTATE, POTASSIUM CHLORIDE, CALCIUM CHLORIDE 600; 310; 30; 20 MG/100ML; MG/100ML; MG/100ML; MG/100ML
75 INJECTION, SOLUTION INTRAVENOUS CONTINUOUS
Status: DISCONTINUED | OUTPATIENT
Start: 2024-09-04 | End: 2024-09-08 | Stop reason: HOSPADM

## 2024-09-04 RX ORDER — PROPOFOL 10 MG/ML
INJECTION, EMULSION INTRAVENOUS CONTINUOUS PRN
Status: DISCONTINUED | OUTPATIENT
Start: 2024-09-04 | End: 2024-09-04

## 2024-09-04 RX ORDER — SODIUM CHLORIDE, SODIUM LACTATE, POTASSIUM CHLORIDE, CALCIUM CHLORIDE 600; 310; 30; 20 MG/100ML; MG/100ML; MG/100ML; MG/100ML
INJECTION, SOLUTION INTRAVENOUS CONTINUOUS PRN
Status: DISCONTINUED | OUTPATIENT
Start: 2024-09-04 | End: 2024-09-04

## 2024-09-04 RX ADMIN — SODIUM CHLORIDE, SODIUM LACTATE, POTASSIUM CHLORIDE, AND CALCIUM CHLORIDE: .6; .31; .03; .02 INJECTION, SOLUTION INTRAVENOUS at 07:38

## 2024-09-04 RX ADMIN — SODIUM CHLORIDE, SODIUM LACTATE, POTASSIUM CHLORIDE, AND CALCIUM CHLORIDE 75 ML/HR: .6; .31; .03; .02 INJECTION, SOLUTION INTRAVENOUS at 08:54

## 2024-09-04 RX ADMIN — PROPOFOL 100 MCG/KG/MIN: 10 INJECTION, EMULSION INTRAVENOUS at 09:15

## 2024-09-04 RX ADMIN — PROPOFOL 50 MG: 10 INJECTION, EMULSION INTRAVENOUS at 09:15

## 2024-09-04 RX ADMIN — LIDOCAINE HYDROCHLORIDE 5 ML: 10 INJECTION, SOLUTION EPIDURAL; INFILTRATION; INTRACAUDAL; PERINEURAL at 09:15

## 2024-09-04 NOTE — ANESTHESIA PREPROCEDURE EVALUATION
Procedure:  COLONOSCOPY    Relevant Problems   CARDIO   (+) Atypical chest pain   (+) Hyperlipidemia      ENDO   (+) Hypothyroidism      GI/HEPATIC   (+) Gastroesophageal reflux disease without esophagitis   (+) Oropharyngeal dysphagia      MUSCULOSKELETAL   (+) Fibromyalgia      NEURO/PSYCH   (+) Chronic foot pain, right   (+) Fibromyalgia   (+) Headache   (+) Tension type headache      PULMONARY   (+) Asthma   (+) Mild persistent asthma without complication   (+) Obstructive sleep apnea   (+) Obstructive sleep apnea hypopnea, mild      Other   (+) Mediastinal lymphadenopathy   (+) Prediabetes        Physical Exam    Airway    Mallampati score: II  TM Distance: >3 FB  Neck ROM: full     Dental       Cardiovascular  Rhythm: regular, Rate: normal    Pulmonary   Breath sounds clear to auscultation    Other Findings  post-pubertal.      Anesthesia Plan  ASA Score- 2     Anesthesia Type- IV sedation with anesthesia with ASA Monitors.         Additional Monitors:     Airway Plan:            Plan Factors-    Chart reviewed.        Patient is not a current smoker.              Induction- intravenous.    Postoperative Plan-     Perioperative Resuscitation Plan - Level 1 - Full Code.       Informed Consent- Anesthetic plan and risks discussed with patient.  I personally reviewed this patient with the CRNA. Discussed and agreed on the Anesthesia Plan with the CRNA..

## 2024-09-04 NOTE — ANESTHESIA POSTPROCEDURE EVALUATION
Post-Op Assessment Note    CV Status:  Stable         Mental Status:  Alert   PONV Controlled:  Controlled   Airway Patency:  Patent     Post Op Vitals Reviewed: Yes    No anethesia notable event occurred.    Staff: CRNA               BP   128/58   Temp      Pulse  74   Resp   20   SpO2   99

## 2024-09-04 NOTE — H&P
History and Physical - [facility]  Renetta Chan 68 y.o. female MRN: [id number]        HPI: 68-year-old female with history of colon polyps.  Regular bowel movements.    Historical Information   Past Medical History:   Diagnosis Date    Abnormal glucose     last assessed 16    Arthritis     Asthma     w/ exacerbation; last assessed 5/14/15    Atypical nevi     Atypical nevus     last assessed 17    Breast lump     last assessed 3/6/14    Cataract     Cervical adenopathy     last assessed  17    Colon polyp     CPAP (continuous positive airway pressure) dependence     Diverticulitis of colon     Dizziness     Dyslipidemia     last assessed 17    Facial droop     last assessed  16    Fibromyalgia, primary     Flu 2018    Foot abrasion     right between the 4th and 5th toe    Fractures     Genital herpes     resolved 16    GERD (gastroesophageal reflux disease)     Headache, tension-type     Herpes zoster     last assessed 16    History of shingles     may 2016    History of stomach ulcers     Hx of abnormal mammogram     last assessed  3/5/14    Hyperlipidemia     Myalgia     last assessed  12    Myositis     12    Neck pain     Pain involving joints of fingers of both hands 2021    Peripheral neuropathy     Seborrheic keratosis     Shingles     Skin disorder     Skin neoplasm     of the lower limb, including hip; onset 12; last assessed  12    Sleep apnea     Stomach disorder      Past Surgical History:   Procedure Laterality Date    ABDOMINAL SURGERY      release of adhesions    BLADDER SURGERY      mesh-lift    BREAST CYST ASPIRATION Right     does not know the year    BREAST SURGERY      lift    CATARACT EXTRACTION Bilateral      SECTION      x 3-,,    CHOLECYSTECTOMY      lap    COLONOSCOPY      COSMETIC SURGERY      tummy and breast lift    ESOPHAGOGASTRODUODENOSCOPY      EYE SURGERY   right eye and  "3/11 left eye    OOPHORECTOMY Left     OTHER SURGICAL HISTORY      vocal cord surgery, scraping    [place] HYSTEROSCOPY BX ENDOMETRIUM&/POLYPC W/WO D&C N/A 11/03/2016    Procedure: DILATATION AND CURETTAGE (D&C) WITH HYSTEROSCOPY;  Surgeon: Kyle Bethea MD;  Location: [facility];  Service: Gynecology    REDUCTION MAMMAPLASTY Bilateral 2008    TONSILLECTOMY      TUBAL LIGATION  10/29/1984    US GUIDED MSK PROCEDURE  05/25/2021     Social History   Social History     Substance and Sexual Activity   Alcohol Use No    Comment: rarely     Social History     Substance and Sexual Activity   Drug Use Never     Social History     Tobacco Use   Smoking Status Never   Smokeless Tobacco Never     Family History   Problem Relation Age of Onset    Hypertension Mother     Alzheimer's disease Mother     Arthritis Mother     Dementia Mother     Hypertension Father     Arthritis Father     Heart attack Father     Heart disease Father     Stroke Father     Other Brother         vertigo, tinnitus    Diabetes Daughter     Breast cancer Sister 32    Skin cancer Sister     Cancer Sister     Other Son         downs syndrome    Abdominal aortic aneurysm Sister     Cancer Sister         T cell sarcoma    Breast cancer Sister     No Known Problems Brother     Diabetes Brother     Other Sister         anemia from the diet    No Known Problems Son     No Known Problems Maternal Aunt     No Known Problems Maternal Aunt     No Known Problems Paternal Aunt     No Known Problems Paternal Aunt     No Known Problems Paternal Aunt     Asthma Sister     Cancer Brother        Meds/Allergies     Not in a hospital admission.    Allergies   Allergen Reactions    Latex Rash and Other (See Comments)     Burn-skin irritation    Burning    Adhesive [Medical Tape] Other (See Comments)     bandaid-red,itch       Objective     Blood pressure 113/75, pulse 72, temperature 97.8 °F (36.6 °C), temperature source Temporal, resp. rate 18, height 5' 4\" (1.626 m), weight " 73.9 kg (163 lb), SpO2 98%, not currently breastfeeding.    Physical Exam:    Chest- CTA  Heart- RRR  Abdomen- NT/ND  Extremities- No edema    ASSESSMENT:     History of colon polyps    PLAN:    Colonoscopy

## 2024-09-05 ENCOUNTER — HOSPITAL ENCOUNTER (OUTPATIENT)
Dept: RADIOLOGY | Facility: HOSPITAL | Age: 69
Discharge: HOME | End: 2024-09-05
Attending: FAMILY MEDICINE
Payer: COMMERCIAL

## 2024-09-05 DIAGNOSIS — R14.0 ABDOMINAL BLOATING: ICD-10-CM

## 2024-09-05 DIAGNOSIS — R14.0 ABDOMINAL DISTENSION: ICD-10-CM

## 2024-09-05 PROCEDURE — 76700 US EXAM ABDOM COMPLETE: CPT

## 2024-09-12 PROCEDURE — 88305 TISSUE EXAM BY PATHOLOGIST: CPT | Performed by: PATHOLOGY

## 2024-09-14 LAB
APOB+LDLR+PCSK9 GENE MUT ANL BLD/T: NOT DETECTED
BRCA1+BRCA2 DEL+DUP + FULL MUT ANL BLD/T: NOT DETECTED
MLH1+MSH2+MSH6+PMS2 GN DEL+DUP+FUL M: NOT DETECTED

## 2024-09-15 PROBLEM — Z00.00 MEDICARE ANNUAL WELLNESS VISIT, SUBSEQUENT: Status: RESOLVED | Noted: 2020-06-25 | Resolved: 2024-09-15

## 2024-09-16 NOTE — PROGRESS NOTES
Continue Vac dressing. Apply Santyl cream to wound and then apply sponge for vac dressing, change twice a week.  Follow up in 2 weeks for clinic visit.  Bailey Cancino, APRN-CNP     Diagnoses and all orders for this visit:    Diverticulosis  Comments:  recent bout diverticulitis--[facility] 2/4-/25--txd with antibiotics w improvement  Dietary modif d/w pt  Orders:  -     Ambulatory referral to Gastroenterology; Future    Seborrheic dermatitis of scalp  -     Ambulatory referral to Dermatology; Future    Atypical nevi  -     Ambulatory referral to Dermatology; Future    Colon cancer screening  -     Ambulatory referral to Gastroenterology; Future          Subjective:      Patient ID: Chen Crowell is a 77 y o  female  Chief Complaint   Patient presents with    Follow-up     diverticulitis       HPI  Recent [facility] eval-2/4--2/5  CT abd--+diverticulitis  Pt w +improvement w antibiotic use  Dietary changes addressed  req ref for gi for eval for colonoscopy  Also req red for derm to check changing moles and scalp dermatitis  The following portions of the patient's history were reviewed and updated as appropriate: allergies, current medications, past family history, past medical history, past social history, past surgical history and problem list     Review of Systems   Constitutional: Positive for fatigue  Negative for fever  Respiratory: Negative  Cardiovascular: Negative  Gastrointestinal:        Hx  diverticulosis   Musculoskeletal: Positive for arthralgias  Skin:        Scalp w areas flakiness  Atypical nevi   Psychiatric/Behavioral: Positive for sleep disturbance           Current Outpatient Medications   Medication Sig Dispense Refill    albuterol (Ventolin HFA) 90 mcg/act inhaler Inhale 2 puffs every 4 (four) hours as needed for wheezing 1 Inhaler 5    Ascorbic Acid (VITAMIN C) 1000 MG tablet       atorvastatin (LIPITOR) 20 mg tablet Take 1 tablet (20 mg total) by mouth daily 90 tablet 3    Breo Ellipta 200-25 MCG/INH inhaler INHALE ONE PUFF BY MOUTH EVERY DAY - RINSE MOUTH AFTER  each 0    cholecalciferol (VITAMIN D3) 1,000 units tablet Take 2,000 Units by mouth daily  clobetasol (TEMOVATE) 0 05 % cream Apply topically 2 (two) times a day 45 g 1    cyclobenzaprine (FLEXERIL) 10 mg tablet Take 10 mg by mouth 3 (three) times a day as needed for muscle spasms (as needed for pain)      famotidine (PEPCID) 20 mg tablet Take 1 tablet (20 mg total) by mouth daily 30 tablet 1    fluticasone-salmeterol (Advair Diskus) 250-50 mcg/dose inhaler Inhale 1 puff 2 (two) times a day Rinse mouth after use  1 Inhaler 3    hydrocortisone 2 5 % cream       metFORMIN (GLUCOPHAGE-XR) 500 mg 24 hr tablet Take 1 tablet (500 mg total) by mouth daily with dinner 90 tablet 2    Multiple Vitamins-Minerals (HAIR SKIN AND NAILS FORMULA) TABS Take by mouth      multivitamin (THERAGRAN) TABS Take 1 tablet by mouth daily      pantoprazole (PROTONIX) 40 mg tablet Take 1 tablet (40 mg total) by mouth daily 30 tablet 5    Potassium 99 MG TABS Take 99 mg by mouth daily       Selenium 200 MCG CAPS Take 200 mcg by mouth daily       Zinc 50 MG CAPS Take 50 mg by mouth daily       triamcinolone (KENALOG) 0 025 % cream Apply topically 2 (two) times a day Face, neck 30 g 1     No current facility-administered medications for this visit  Objective:    /70 (BP Location: Left arm, Patient Position: Sitting, Cuff Size: Large)   Pulse 78   Temp 97 7 °F (36 5 °C)   Resp 16   Ht 5' 3" (1 6 m)   Wt 75 8 kg (167 lb 3 2 oz)   BMI 29 62 kg/m²        Physical Exam  Vitals signs and nursing note reviewed  Constitutional:       General: She is not in acute distress  Appearance: Normal appearance  Eyes:      General: No scleral icterus  Conjunctiva/sclera: Conjunctivae normal    Neck:      Musculoskeletal: Neck supple  Cardiovascular:      Rate and Rhythm: Normal rate and regular rhythm  Pulmonary:      Effort: Pulmonary effort is normal  No respiratory distress  Breath sounds: Normal breath sounds  Abdominal:      General: Bowel sounds are normal       Palpations: Abdomen is soft  Tenderness: There is no abdominal tenderness  There is no right CVA tenderness, left CVA tenderness, guarding or rebound  Skin:     General: Skin is warm and dry  Coloration: Skin is not jaundiced  Neurological:      General: No focal deficit present  Mental Status: She is alert and oriented to person, place, and time  Cranial Nerves: No cranial nerve deficit     Psychiatric:         Mood and Affect: Mood normal                 Rogerio Conway MD [facility]

## 2024-09-27 DIAGNOSIS — R05.9 COUGH, UNSPECIFIED TYPE: ICD-10-CM

## 2024-09-27 DIAGNOSIS — E78.5 HYPERLIPIDEMIA, UNSPECIFIED HYPERLIPIDEMIA TYPE: ICD-10-CM

## 2024-09-28 RX ORDER — DEXTROMETHORPHAN HYDROBROMIDE AND PROMETHAZINE HYDROCHLORIDE 15; 6.25 MG/5ML; MG/5ML
SYRUP ORAL
Qty: 180 ML | Refills: 0 | Status: SHIPPED | OUTPATIENT
Start: 2024-09-28

## 2024-09-28 RX ORDER — ATORVASTATIN CALCIUM 20 MG/1
TABLET, FILM COATED ORAL
Qty: 90 TABLET | Refills: 1 | Status: SHIPPED | OUTPATIENT
Start: 2024-09-28

## 2024-10-02 ENCOUNTER — IMMUNIZATIONS (OUTPATIENT)
Dept: FAMILY MEDICINE CLINIC | Facility: CLINIC | Age: 69
End: 2024-10-02
Payer: COMMERCIAL

## 2024-10-02 DIAGNOSIS — Z23 ENCOUNTER FOR IMMUNIZATION: Primary | ICD-10-CM

## 2024-10-02 PROCEDURE — G0008 ADMIN INFLUENZA VIRUS VAC: HCPCS

## 2024-10-02 PROCEDURE — 90662 IIV NO PRSV INCREASED AG IM: CPT

## 2024-10-08 ENCOUNTER — NURSE TRIAGE (OUTPATIENT)
Age: 69
End: 2024-10-08

## 2024-10-08 ENCOUNTER — TELEPHONE (OUTPATIENT)
Age: 69
End: 2024-10-08

## 2024-10-08 NOTE — TELEPHONE ENCOUNTER
Pt called in looking to schedule appt - due to stomach pain/ discomfort -- soonest appt not until Mach 2025--  was going to offer her to speak w/ nurse....lost her before I could do so... unable to get her back on the line.

## 2024-10-08 NOTE — TELEPHONE ENCOUNTER
Pt calling back trying to schedule an appt. Pt has the following symptoms: abdominal discomfort as if something is moving in her stomach left side. When pt presses down she feels pain, constipation, diarrhea and when swallows her food she feels it go down her throat and some food comes back up into her mouth and she has to spit it out. I warm transfer to [facility] gi triage nurse.

## 2024-10-08 NOTE — TELEPHONE ENCOUNTER
"Pt. C/o pain in abdomen when pressing on left mid abdomen, feels something moving around when she presses, pt. Feels maybe it's a hernia, she feels her stomach looks boated , pt. Tried liquid diet with no relief, she has also tried a girdle with no relief, , pt. Had recent colonocopy which was normal, moving bowels everyday with laxative tea, feels that she has full evacuation, denies discomfort getting worse when she eats, pt. Feels something moving in abdomen when sitting or laying down, pt. Is passing gas without difficulty, pt. Has not been seen in office since January 2023, made f/u for 10/23/24     Reason for Disposition   Patient wants to be seen    Answer Assessment - Initial Assessment Questions  1. LOCATION: \"Where does it hurt?\"       Left mid abdomen   2. RADIATION: \"Does the pain shoot anywhere else?\" (e.g., chest, back)      Denies   3. ONSET: \"When did the pain begin?\" (e.g., minutes, hours or days ago)       Ongoing for months   4. SUDDEN: \"Gradual or sudden onset?\"      Gradual   5. PATTERN \"Does the pain come and go, or is it constant?\"     - If constant: \"Is it getting better, staying the same, or worsening?\"       (Note: Constant means the pain never goes away completely; most serious pain is constant and it progresses)      - If intermittent: \"How long does it last?\" \"Do you have pain now?\"      (Note: Intermittent means the pain goes away completely between bouts)      Intermittent   6. SEVERITY: \"How bad is the pain?\"  (e.g., Scale 1-10; mild, moderate, or severe)    - MILD (1-3): doesn't interfere with normal activities, abdomen soft and not tender to touch     - MODERATE (4-7): interferes with normal activities or awakens from sleep, tender to touch     - SEVERE (8-10): excruciating pain, doubled over, unable to do any normal activities       Mild to moderate   7. RECURRENT SYMPTOM: \"Have you ever had this type of stomach pain before?\" If Yes, ask: \"When was the last time?\" and \"What " "happened that time?\"       Yes, unsure   8. CAUSE: \"What do you think is causing the stomach pain?\"      Hernia   9. RELIEVING/AGGRAVATING FACTORS: \"What makes it better or worse?\" (e.g., movement, antacids, bowel movement)      Pushing on area makes it worse   10. OTHER SYMPTOMS: \"Has there been any vomiting, diarrhea, constipation, or urine problems?\"        Denies   11. PREGNANCY: \"Is there any chance you are pregnant?\" \"When was your last menstrual period?\"        Denies    Protocols used: Abdominal Pain - Female-ADULT-[place]    "

## 2024-10-23 ENCOUNTER — EVALUATION (OUTPATIENT)
Dept: PHYSICAL THERAPY | Facility: CLINIC | Age: 69
End: 2024-10-23
Payer: COMMERCIAL

## 2024-10-23 ENCOUNTER — OFFICE VISIT (OUTPATIENT)
Dept: GASTROENTEROLOGY | Facility: CLINIC | Age: 69
End: 2024-10-23
Payer: COMMERCIAL

## 2024-10-23 VITALS
BODY MASS INDEX: 28.34 KG/M2 | HEIGHT: 64 IN | WEIGHT: 166 LBS | HEART RATE: 99 BPM | SYSTOLIC BLOOD PRESSURE: 100 MMHG | DIASTOLIC BLOOD PRESSURE: 63 MMHG

## 2024-10-23 DIAGNOSIS — K76.0 HEPATIC STEATOSIS: ICD-10-CM

## 2024-10-23 DIAGNOSIS — Z86.0100 HISTORY OF COLON POLYPS: ICD-10-CM

## 2024-10-23 DIAGNOSIS — R10.12 LUQ PAIN: Primary | ICD-10-CM

## 2024-10-23 DIAGNOSIS — M17.0 PRIMARY OSTEOARTHRITIS OF BOTH KNEES: Primary | ICD-10-CM

## 2024-10-23 DIAGNOSIS — R07.2 SUBSTERNAL CHEST PAIN: ICD-10-CM

## 2024-10-23 DIAGNOSIS — K57.90 DIVERTICULOSIS: ICD-10-CM

## 2024-10-23 PROCEDURE — G2211 COMPLEX E/M VISIT ADD ON: HCPCS | Performed by: PHYSICIAN ASSISTANT

## 2024-10-23 PROCEDURE — 97161 PT EVAL LOW COMPLEX 20 MIN: CPT | Performed by: PHYSICAL THERAPIST

## 2024-10-23 PROCEDURE — 99214 OFFICE O/P EST MOD 30 MIN: CPT | Performed by: PHYSICIAN ASSISTANT
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
## 2024-10-23 NOTE — ASSESSMENT & PLAN NOTE
-advised high fiber diet, avoiding constipation, good hydration  -advised that she does not need to avoid nuts/seeds/popcorn/salads

## 2024-10-23 NOTE — PROGRESS NOTES
Ambulatory Visit  Name: Renetta Chan      : 1955      MRN: [id number]  Encounter Provider: Dominique Dia PA-C  Encounter Date: 10/23/2024   Encounter department: [facility]    Assessment & Plan  LUQ pain  -reports issues with this since just before her colonoscopy, was seen by PCP, had complete abdominal US which was unrevealing, has discomfort in the left upper quadrant, feels intestines moving underneath. Differential includes gastritis, less likely PUD as she is on PPI, hernia however none appreciated on exam today, adhesions, etc.   -recommend CT abdomen and pelvis   -if unrevealing, will plan for repeat EGD to further assess  -discussed procedure, risks including perforation, infection, and bleeding, missing a diagnosis, and benefits in detail with patient   -on metformin for pre diabetes  -continue pantoprazole in AM and pepcid in PM   -avoid NSAIDs  Orders:    CT abdomen pelvis w contrast; Future    EGD; Future    Substernal chest pain  -has occasional substernal pressure/discomfort, had extensive cardiac work up last year.   -EGD as above, rule out esophagitis  -continue PPI and pepcid   Orders:    EGD; Future    Hepatic steatosis  -Discussed low carb/low fat diet, weight loss, exercise, limiting alcohol use, controlling blood sugar and cholesterol management  -LFTs recently normal. Mild hepatic steatosis noted on recent US         Diverticulosis  -advised high fiber diet, avoiding constipation, good hydration  -advised that she does not need to avoid nuts/seeds/popcorn/salads        History of colon polyps  -repeat colonoscopy in          History of Present Illness     Renetta Chan is a 69 y.o. female with a history of sleep apnea on CPAP, asthma, diverticulosis, GERD, hypothyroidism, fibromyalgia, prediabetes on metformin, hyperlipidemia who presents for a follow-up for left upper quadrant abdominal discomfort.  Patient reports that this started shortly  before having a colonoscopy last month.  She was seen by her PCP and ordered for an abdominal ultrasound which was unrevealing.  She reports a history of heartburn but reports this is better recently on her pantoprazole in the morning and famotidine at night.  She also will sometimes get some substernal chest pressure and discomfort.  She denies any significant vomiting, occasional nausea.  Denies any esophageal dysphagia.  Reports that her bowel movements are relatively regular and denies any blood in the stool or black tarry stool.  Her last EGD was in February 2023 and was normal.  Her most recent colonoscopy was last month and this was notable for 1 polyp and was recommended years.  Denies any excessive NSAID use.  Denies tobacco use.  Denies any excessive use.      Review of Systems   Constitutional:  Negative for activity change, appetite change, fever and unexpected weight change.   HENT:  Negative for drooling, mouth sores, sore throat, trouble swallowing and voice change.    Eyes:  Negative for pain, discharge and visual disturbance.   Respiratory:  Negative for cough, choking, chest tightness and shortness of breath.    Cardiovascular:  Negative for chest pain, palpitations and leg swelling.   Gastrointestinal:  Positive for abdominal distention, abdominal pain and nausea. Negative for anal bleeding, blood in stool, constipation, diarrhea, rectal pain and vomiting.   Genitourinary:  Negative for decreased urine volume, difficulty urinating, dysuria, flank pain and urgency.   Musculoskeletal:  Positive for arthralgias and back pain. Negative for gait problem, myalgias and neck stiffness.   Skin:  Positive for rash. Negative for color change and pallor.   Neurological:  Negative for dizziness, syncope and light-headedness.   Hematological:  Negative for adenopathy.   Psychiatric/Behavioral:  Negative for confusion. The patient is not nervous/anxious.            Objective     /63 (BP Location: Left arm,  "Patient Position: Sitting, Cuff Size: Standard)   Pulse 99   Ht 5' 4\" (1.626 m)   Wt 75.3 kg (166 lb)   BMI 28.49 kg/m²     Physical Exam  Constitutional:       General: She is not in acute distress.     Appearance: Normal appearance. She is well-developed.   HENT:      Head: Normocephalic and atraumatic.      Mouth/Throat:      Mouth: Mucous membranes are moist.   Eyes:      General: No scleral icterus.  Neck:      Trachea: No tracheal deviation.   Cardiovascular:      Rate and Rhythm: Normal rate and regular rhythm.      Heart sounds: Normal heart sounds. No murmur heard.  Pulmonary:      Effort: Pulmonary effort is normal. No respiratory distress.      Breath sounds: Normal breath sounds. No wheezing or rales.   Abdominal:      General: Bowel sounds are normal. There is no distension.      Palpations: Abdomen is soft. There is no mass.      Tenderness: There is abdominal tenderness. There is no guarding or rebound.      Comments: LUQ tenderness, no obvious hernia noted.    Musculoskeletal:         General: Normal range of motion.      Cervical back: Normal range of motion and neck supple.   Skin:     General: Skin is warm and dry.      Coloration: Skin is not pale.      Findings: Rash present.      Comments: Rash on face    Neurological:      Mental Status: She is alert and oriented to person, place, and time. Mental status is at baseline.   Psychiatric:         Mood and Affect: Mood normal.         Behavior: Behavior normal.         "
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
## 2024-10-23 NOTE — PATIENT INSTRUCTIONS
Scheduled date of EGD(as of today): 11/22/24  Physician performing EGD: Dr. Cruz  Location of EGD: [facility]  Instructions reviewed with patient by: N.M.  Clearances: JONNA

## 2024-10-27 DIAGNOSIS — K21.9 GASTROESOPHAGEAL REFLUX DISEASE WITHOUT ESOPHAGITIS: ICD-10-CM

## 2024-10-28 ENCOUNTER — OFFICE VISIT (OUTPATIENT)
Dept: PHYSICAL THERAPY | Facility: CLINIC | Age: 69
End: 2024-10-28
Payer: COMMERCIAL

## 2024-10-28 DIAGNOSIS — M17.0 PRIMARY OSTEOARTHRITIS OF BOTH KNEES: Primary | ICD-10-CM

## 2024-10-28 PROCEDURE — 97112 NEUROMUSCULAR REEDUCATION: CPT | Performed by: PHYSICAL THERAPIST

## 2024-10-28 PROCEDURE — 97110 THERAPEUTIC EXERCISES: CPT | Performed by: PHYSICAL THERAPIST

## 2024-10-28 RX ORDER — FAMOTIDINE 40 MG/1
40 TABLET, FILM COATED ORAL
Qty: 90 TABLET | Refills: 1 | Status: SHIPPED | OUTPATIENT
Start: 2024-10-28

## 2024-10-28 NOTE — PROGRESS NOTES
Daily Note     Today's date: 10/28/2024  Patient name: Renetta Chan  : 1955  MRN: [id number]  Referring provider: Maxime Tatum*  Dx:   Encounter Diagnosis     ICD-10-CM    1. Primary osteoarthritis of both knees  M17.0                      Subjective: I am having knee stiffness      Objective: See treatment diary below      Assessment: Tolerated treatment well. Patient demonstrated fatigue post treatment and exhibited good technique with therapeutic exercises      Plan: Continue per plan of care.      Precautions: Medical History    Diagnosis Date Comment Source   Abnormal glucose  last assessed 16    Arthritis      Asthma  w/ exacerbation; last assessed 5/14/15    Atypical nevi      Atypical nevus  last assessed 17    Breast lump  last assessed 3/6/14    Cataract      Cervical adenopathy  last assessed  17    Colon polyp      CPAP (continuous positive airway pressure) dependence      Diverticulitis of colon      Dizziness      Dyslipidemia  last assessed 17    Facial droop  last assessed  16    Fibromyalgia, primary      Flu 2018     Foot abrasion  right between the 4th and 5th toe    Fractures      Genital herpes  resolved 16    GERD (gastroesophageal reflux disease)      Headache, tension-type      Herpes zoster  last assessed 16    History of shingles  may 2016    History of stomach ulcers      Hx of abnormal mammogram  last assessed  3/5/14    Hyperlipidemia      Myalgia  last assessed  12    Myositis  12    Neck pain      Pain involving joints of fingers of both hands 2021     Peripheral neuropathy      Seborrheic keratosis      Shingles      Skin disorder      Skin neoplasm  of the lower limb, including hip; onset 12; last assessed  12    Sleep apnea      Stomach disorder            Manuals  10/28                        TUG 10.5 s            Chair Rise Test  8 reps            FOTO /            Neuro Re-Ed            "  Chanel Bal Walkouts  #10 4x10           Bal Kicking  20x           Bal Headturn  20x                                                               Ther Ex             Bike seat #3 10m 10m           FSU/LSU 4\" 2x10 6\" 2x10           TRX squats  10x           Heel/Toe Raises 2x10 20x           Leg Press  #75 bilat  #35 Unl 2x10           SLR F, ABD 2x10            Clamshell 2x10                         Ther Activity                                       Gait Training                                       Modalities                                            "

## 2024-10-30 ENCOUNTER — OFFICE VISIT (OUTPATIENT)
Dept: PHYSICAL THERAPY | Facility: CLINIC | Age: 69
End: 2024-10-30
Payer: COMMERCIAL

## 2024-10-30 DIAGNOSIS — M17.0 PRIMARY OSTEOARTHRITIS OF BOTH KNEES: Primary | ICD-10-CM

## 2024-10-30 PROCEDURE — 97112 NEUROMUSCULAR REEDUCATION: CPT | Performed by: PHYSICAL THERAPIST

## 2024-10-30 PROCEDURE — 97110 THERAPEUTIC EXERCISES: CPT | Performed by: PHYSICAL THERAPIST

## 2024-10-30 NOTE — PROGRESS NOTES
Daily Note     Today's date: 10/30/2024  Patient name: Renetta Chan  : 1955  MRN: [id number]  Referring provider: Maxime Tatum*  Dx:   Encounter Diagnosis     ICD-10-CM    1. Primary osteoarthritis of both knees  M17.0                      Subjective: my knees get stiff with prolonged sitting      Objective: See treatment diary below      Assessment: Tolerated treatment well. Patient demonstrated fatigue post treatment and exhibited good technique with therapeutic exercises      Plan: Continue per plan of care.      Precautions: Medical History    Diagnosis Date Comment Source   Abnormal glucose  last assessed 16    Arthritis      Asthma  w/ exacerbation; last assessed 5/14/15    Atypical nevi      Atypical nevus  last assessed 17    Breast lump  last assessed 3/6/14    Cataract      Cervical adenopathy  last assessed  17    Colon polyp      CPAP (continuous positive airway pressure) dependence      Diverticulitis of colon      Dizziness      Dyslipidemia  last assessed 17    Facial droop  last assessed  16    Fibromyalgia, primary      Flu 2018     Foot abrasion  right between the 4th and 5th toe    Fractures      Genital herpes  resolved 16    GERD (gastroesophageal reflux disease)      Headache, tension-type      Herpes zoster  last assessed 16    History of shingles  may 2016    History of stomach ulcers      Hx of abnormal mammogram  last assessed  3/5/14    Hyperlipidemia      Myalgia  last assessed  12    Myositis  12    Neck pain      Pain involving joints of fingers of both hands 2021     Peripheral neuropathy      Seborrheic keratosis      Shingles      Skin disorder      Skin neoplasm  of the lower limb, including hip; onset 12; last assessed  12    Sleep apnea      Stomach disorder            Manuals  10/28 1030                       TUG 10.5 s            Chair Rise Test  8 reps            FOTO /62            Neuro  "Re-Ed             Chanel Bal Walkouts  #10 4x10 #11 4x10          Bal Kicking  20x           Bal Headturn  20x                                                               Ther Ex             Bike seat #3 10m 10m 10m          FSU/LSU 4\" 2x10 6\" 2x10 6\" 2x20          TRX squats  10x           Heel/Toe Raises 2x10 20x 20x          Leg Press  #75 bilat  #35 Unl 2x10 #75 bilat  #35 unilat          SLR F, ABD 2x10            Clamshell 2x10                         Ther Activity                                       Gait Training                                       Modalities                                              "

## 2024-10-31 ENCOUNTER — HOSPITAL ENCOUNTER (OUTPATIENT)
Dept: RADIOLOGY | Facility: HOSPITAL | Age: 69
Discharge: HOME | End: 2024-10-31
Payer: COMMERCIAL

## 2024-10-31 DIAGNOSIS — R10.12 LUQ PAIN: ICD-10-CM

## 2024-10-31 PROCEDURE — 74177 CT ABD & PELVIS W/CONTRAST: CPT

## 2024-10-31 RX ADMIN — IOHEXOL 100 ML: 350 INJECTION, SOLUTION INTRAVENOUS at 11:48

## 2024-11-11 ENCOUNTER — OFFICE VISIT (OUTPATIENT)
Dept: PHYSICAL THERAPY | Facility: CLINIC | Age: 69
End: 2024-11-11
Payer: COMMERCIAL

## 2024-11-11 DIAGNOSIS — M17.0 PRIMARY OSTEOARTHRITIS OF BOTH KNEES: Primary | ICD-10-CM

## 2024-11-11 PROCEDURE — 97110 THERAPEUTIC EXERCISES: CPT | Performed by: PHYSICAL THERAPIST

## 2024-11-11 PROCEDURE — 97112 NEUROMUSCULAR REEDUCATION: CPT | Performed by: PHYSICAL THERAPIST

## 2024-11-11 NOTE — PROGRESS NOTES
Daily Note     Today's date: 2024  Patient name: Renetta Chan  : 1955  MRN: [id number]  Referring provider: Maxime Tatum*  Dx:   Encounter Diagnosis     ICD-10-CM    1. Primary osteoarthritis of both knees  M17.0                      Subjective: I can only afford 1x/wk      Objective: See treatment diary below      Assessment: Tolerated treatment well. Patient demonstrated fatigue post treatment and exhibited good technique with therapeutic exercises      Plan: Continue per plan of care.      Precautions: Medical History    Diagnosis Date Comment Source   Abnormal glucose  last assessed 16    Arthritis      Asthma  w/ exacerbation; last assessed 5/14/15    Atypical nevi      Atypical nevus  last assessed 17    Breast lump  last assessed 3/6/14    Cataract      Cervical adenopathy  last assessed  17    Colon polyp      CPAP (continuous positive airway pressure) dependence      Diverticulitis of colon      Dizziness      Dyslipidemia  last assessed 17    Facial droop  last assessed  16    Fibromyalgia, primary      Flu 2018     Foot abrasion  right between the 4th and 5th toe    Fractures      Genital herpes  resolved 16    GERD (gastroesophageal reflux disease)      Headache, tension-type      Herpes zoster  last assessed 16    History of shingles  may 2016    History of stomach ulcers      Hx of abnormal mammogram  last assessed  3/5/14    Hyperlipidemia      Myalgia  last assessed  12    Myositis  12    Neck pain      Pain involving joints of fingers of both hands 2021     Peripheral neuropathy      Seborrheic keratosis      Shingles      Skin disorder      Skin neoplasm  of the lower limb, including hip; onset 12; last assessed  12    Sleep apnea      Stomach disorder            Manuals  10/28 1030                       TUG 10.5 s            Chair Rise Test  8 reps            FOTO /            Neuro Re-Ed           "   Chanel Bal Walkouts  #10 4x10 #11 4x10 #11 4x10         Bal Kicking  20x           Bal Headturn  20x                                                               Ther Ex             Bike seat #3 10m 10m 10m 10m         FSU/LSU 4\" 2x10 6\" 2x10 6\" 2x20 6\" 2x20         TRX squats  10x  20x         Heel/Toe Raises 2x10 20x 20x 20x         Leg Press  #75 bilat  #35 Unl 2x10 #75 bilat  #35 unilat #75 20x  #35 unilat         SLR F, ABD 2x10   2x20         Clamshell 2x10   2x20                      Ther Activity                                       Gait Training                                       Modalities                                                " PROVIDER:[TOKEN:[[id number]]]

## 2024-11-13 ENCOUNTER — APPOINTMENT (OUTPATIENT)
Dept: PHYSICAL THERAPY | Facility: CLINIC | Age: 69
End: 2024-11-13
Payer: COMMERCIAL

## 2024-11-13 ENCOUNTER — TELEPHONE (OUTPATIENT)
Age: 69
End: 2024-11-13

## 2024-11-13 ENCOUNTER — OFFICE VISIT (OUTPATIENT)
Dept: FAMILY MEDICINE CLINIC | Facility: CLINIC | Age: 69
End: 2024-11-13
Payer: COMMERCIAL

## 2024-11-13 VITALS
SYSTOLIC BLOOD PRESSURE: 102 MMHG | HEIGHT: 64 IN | WEIGHT: 190 LBS | OXYGEN SATURATION: 96 % | HEART RATE: 92 BPM | BODY MASS INDEX: 32.44 KG/M2 | RESPIRATION RATE: 16 BRPM | DIASTOLIC BLOOD PRESSURE: 62 MMHG | TEMPERATURE: 98.2 F

## 2024-11-13 DIAGNOSIS — L50.9 URTICARIA: Primary | ICD-10-CM

## 2024-11-13 DIAGNOSIS — L21.9 SEBORRHEIC DERMATITIS OF SCALP: ICD-10-CM

## 2024-11-13 DIAGNOSIS — L30.9 DERMATITIS: ICD-10-CM

## 2024-11-13 PROCEDURE — 99213 OFFICE O/P EST LOW 20 MIN: CPT | Performed by: FAMILY MEDICINE

## 2024-11-13 PROCEDURE — G2211 COMPLEX E/M VISIT ADD ON: HCPCS | Performed by: FAMILY MEDICINE

## 2024-11-13 RX ORDER — METHYLPREDNISOLONE 4 MG/1
TABLET ORAL
Qty: 21 EACH | Refills: 0 | Status: SHIPPED | OUTPATIENT
Start: 2024-11-13

## 2024-11-13 RX ORDER — CELECOXIB 200 MG/1
200 CAPSULE ORAL DAILY
COMMUNITY
Start: 2024-10-14 | End: 2024-12-13

## 2024-11-13 NOTE — TELEPHONE ENCOUNTER
Received call from Patient asking for a Follow Up with Dr. Sifuentes for a skin eruption on face, chest, back.     Informed patient that Dr. Sifuentes is only avail 2 days a week and is booked up. Explained a new provider is starting early 2025 in [place], but her schedule is not opened up yet.     Patient asked about cancellation list. Informed patient they can call back for cancellations/sooner appt.     Patient verbalized understanding.

## 2024-11-15 NOTE — TELEPHONE ENCOUNTER
Received call from patient checking for cancellations for Follow Up with Dr Sifuentes. None available    Patient is very upset that there are no follow up appointments available and verbalized she may just show up to the [place] office to see what she is going through. (skin eruption on face, chest, back)    Informed patient that Maria has no follow up slots in the [place] office, and that the new provider starts next year.

## 2024-11-15 NOTE — PROGRESS NOTES
"Name: Renetta Chan      : 1955      MRN: [id number]  Encounter Provider: Lydia Moore MD  Encounter Date: 2024   Encounter department: [facility]  :  Assessment & Plan  Urticaria    Orders:    methylPREDNISolone 4 MG tablet therapy pack; Use as directed on package    Dermatitis         Seborrheic dermatitis of scalp                History of Present Illness     HPI  Review of Systems       Objective   /62 (BP Location: Left arm, Patient Position: Sitting, Cuff Size: Standard)   Pulse 92   Temp 98.2 °F (36.8 °C) (Temporal)   Resp 16   Ht 5' 4\" (1.626 m)   Wt 86.2 kg (190 lb)   SpO2 96%   BMI 32.61 kg/m²      Physical Exam    "

## 2024-11-18 ENCOUNTER — APPOINTMENT (OUTPATIENT)
Dept: PHYSICAL THERAPY | Facility: CLINIC | Age: 69
End: 2024-11-18
Payer: COMMERCIAL

## 2024-11-20 ENCOUNTER — OFFICE VISIT (OUTPATIENT)
Dept: PHYSICAL THERAPY | Facility: CLINIC | Age: 69
End: 2024-11-20
Payer: COMMERCIAL

## 2024-11-20 ENCOUNTER — APPOINTMENT (OUTPATIENT)
Dept: PHYSICAL THERAPY | Facility: CLINIC | Age: 69
End: 2024-11-20
Payer: COMMERCIAL

## 2024-11-20 DIAGNOSIS — M17.0 PRIMARY OSTEOARTHRITIS OF BOTH KNEES: Primary | ICD-10-CM

## 2024-11-20 PROCEDURE — 97110 THERAPEUTIC EXERCISES: CPT | Performed by: PHYSICAL THERAPIST

## 2024-11-20 PROCEDURE — 97112 NEUROMUSCULAR REEDUCATION: CPT | Performed by: PHYSICAL THERAPIST

## 2024-11-20 NOTE — PROGRESS NOTES
Daily Note     Today's date: 2024  Patient name: Renetta Chan  : 1955  MRN: [id number]  Referring provider: Maxime Tatum*  Dx:   Encounter Diagnosis     ICD-10-CM    1. Primary osteoarthritis of both knees  M17.0                      Subjective: I am walking better      Objective: See treatment diary below      Assessment: Tolerated treatment well. Patient demonstrated fatigue post treatment and exhibited good technique with therapeutic exercises      Plan: Continue per plan of care.      Precautions: Medical History    Diagnosis Date Comment Source   Abnormal glucose  last assessed 16    Arthritis      Asthma  w/ exacerbation; last assessed 5/14/15    Atypical nevi      Atypical nevus  last assessed 17    Breast lump  last assessed 3/6/14    Cataract      Cervical adenopathy  last assessed  17    Colon polyp      CPAP (continuous positive airway pressure) dependence      Diverticulitis of colon      Dizziness      Dyslipidemia  last assessed 17    Facial droop  last assessed  16    Fibromyalgia, primary      Flu 2018     Foot abrasion  right between the 4th and 5th toe    Fractures      Genital herpes  resolved 16    GERD (gastroesophageal reflux disease)      Headache, tension-type      Herpes zoster  last assessed 16    History of shingles  may 2016    History of stomach ulcers      Hx of abnormal mammogram  last assessed  3/5/14    Hyperlipidemia      Myalgia  last assessed  12    Myositis  12    Neck pain      Pain involving joints of fingers of both hands 2021     Peripheral neuropathy      Seborrheic keratosis      Shingles      Skin disorder      Skin neoplasm  of the lower limb, including hip; onset 12; last assessed  12    Sleep apnea      Stomach disorder            Manuals  10/28 1030                      TUG 10.5 s            Chair Rise Test  8 reps            FOTO /62            Neuro Re-Ed          "    Chanel Bal Walkouts  #10 4x10 #11 4x10 #11 4x10 #12 4x10        Bal Kicking  20x           Bal Headturn  20x                                                               Ther Ex             Bike seat #3 10m 10m 10m 10m 10m        FSU/LSU 4\" 2x10 6\" 2x10 6\" 2x20 6\" 2x20 8\" 2x20        TRX squats  10x  20x 20x        Heel/Toe Raises 2x10 20x 20x 20x 2x20        Leg Press  #75 bilat  #35 Unl 2x10 #75 bilat  #35 unilat #75 20x  #35 unilat         SLR F, ABD 2x10   2x20 2x20        Clamshell 2x10   2x20 2x20                     Ther Activity                                       Gait Training                                       Modalities                                                  "

## 2024-11-22 ENCOUNTER — ANESTHESIA EVENT (OUTPATIENT)
Dept: GASTROENTEROLOGY | Facility: AMBULARY SURGERY CENTER | Age: 69
End: 2024-11-22
Payer: COMMERCIAL

## 2024-11-22 ENCOUNTER — HOSPITAL ENCOUNTER (OUTPATIENT)
Dept: GASTROENTEROLOGY | Facility: AMBULARY SURGERY CENTER | Age: 69
Setting detail: OUTPATIENT SURGERY
End: 2024-11-22
Attending: INTERNAL MEDICINE
Payer: COMMERCIAL

## 2024-11-22 VITALS
OXYGEN SATURATION: 96 % | RESPIRATION RATE: 18 BRPM | HEART RATE: 88 BPM | SYSTOLIC BLOOD PRESSURE: 111 MMHG | TEMPERATURE: 97.9 F | DIASTOLIC BLOOD PRESSURE: 53 MMHG

## 2024-11-22 DIAGNOSIS — Z86.16 HISTORY OF COVID-19: Primary | ICD-10-CM

## 2024-11-22 DIAGNOSIS — R07.2 SUBSTERNAL CHEST PAIN: ICD-10-CM

## 2024-11-22 DIAGNOSIS — R10.12 LUQ PAIN: ICD-10-CM

## 2024-11-22 LAB — GLUCOSE SERPL-MCNC: 124 MG/DL (ref 65–140)

## 2024-11-22 PROCEDURE — 88305 TISSUE EXAM BY PATHOLOGIST: CPT | Performed by: STUDENT IN AN ORGANIZED HEALTH CARE EDUCATION/TRAINING PROGRAM

## 2024-11-22 PROCEDURE — 82948 REAGENT STRIP/BLOOD GLUCOSE: CPT

## 2024-11-22 PROCEDURE — 43239 EGD BIOPSY SINGLE/MULTIPLE: CPT | Performed by: INTERNAL MEDICINE

## 2024-11-22 PROCEDURE — 43450 DILATE ESOPHAGUS 1/MULT PASS: CPT | Performed by: INTERNAL MEDICINE

## 2024-11-22 RX ORDER — LIDOCAINE HYDROCHLORIDE 20 MG/ML
INJECTION, SOLUTION EPIDURAL; INFILTRATION; INTRACAUDAL; PERINEURAL AS NEEDED
Status: DISCONTINUED | OUTPATIENT
Start: 2024-11-22 | End: 2024-11-22

## 2024-11-22 RX ORDER — SUCRALFATE 1 G/1
1 TABLET ORAL 3 TIMES DAILY
Qty: 30 TABLET | Refills: 0 | Status: SHIPPED | OUTPATIENT
Start: 2024-11-22

## 2024-11-22 RX ORDER — ALBUTEROL SULFATE 90 UG/1
INHALANT RESPIRATORY (INHALATION) AS NEEDED
Status: DISCONTINUED | OUTPATIENT
Start: 2024-11-22 | End: 2024-11-22

## 2024-11-22 RX ORDER — PROPOFOL 10 MG/ML
INJECTION, EMULSION INTRAVENOUS AS NEEDED
Status: DISCONTINUED | OUTPATIENT
Start: 2024-11-22 | End: 2024-11-22

## 2024-11-22 RX ORDER — SODIUM CHLORIDE, SODIUM LACTATE, POTASSIUM CHLORIDE, CALCIUM CHLORIDE 600; 310; 30; 20 MG/100ML; MG/100ML; MG/100ML; MG/100ML
125 INJECTION, SOLUTION INTRAVENOUS CONTINUOUS
Status: DISPENSED | OUTPATIENT
Start: 2024-11-22

## 2024-11-22 RX ADMIN — ALBUTEROL SULFATE 2 PUFF: 90 AEROSOL, METERED RESPIRATORY (INHALATION) at 10:25

## 2024-11-22 RX ADMIN — PROPOFOL 50 MG: 10 INJECTION, EMULSION INTRAVENOUS at 10:01

## 2024-11-22 RX ADMIN — PROPOFOL 200 MG: 10 INJECTION, EMULSION INTRAVENOUS at 09:52

## 2024-11-22 RX ADMIN — PROPOFOL 50 MG: 10 INJECTION, EMULSION INTRAVENOUS at 09:57

## 2024-11-22 RX ADMIN — LIDOCAINE HYDROCHLORIDE 100 MG: 20 INJECTION, SOLUTION EPIDURAL; INFILTRATION; INTRACAUDAL; PERINEURAL at 09:52

## 2024-11-22 RX ADMIN — SODIUM CHLORIDE, SODIUM LACTATE, POTASSIUM CHLORIDE, AND CALCIUM CHLORIDE: .6; .31; .03; .02 INJECTION, SOLUTION INTRAVENOUS at 08:59

## 2024-11-22 NOTE — H&P
History and Physical - [facility]  Renetta Chan 69 y.o. female MRN: [id number]        HPI: 69-year-old female was seen in the office last month because of left upper quadrant pain.  CT scan was negative.  Now she reports having some difficulty swallowing in the past 3 days.    Historical Information   Past Medical History:   Diagnosis Date    Abnormal glucose     last assessed 16    Arthritis     Asthma     w/ exacerbation; last assessed 5/14/15    Atypical nevi     Atypical nevus     last assessed 17    Breast lump     last assessed 3/6/14    Cataract     Cervical adenopathy     last assessed  17    Colon polyp     CPAP (continuous positive airway pressure) dependence     Diverticulitis of colon     Dizziness     Dyslipidemia     last assessed 17    Facial droop     last assessed  16    Fibromyalgia, primary     Flu 2018    Foot abrasion     right between the 4th and 5th toe    Fractures     Genital herpes     resolved 16    GERD (gastroesophageal reflux disease)     Headache, tension-type     Herpes zoster     last assessed 16    History of shingles     may 2016    History of stomach ulcers     Hx of abnormal mammogram     last assessed  3/5/14    Hyperlipidemia     Myalgia     last assessed  12    Myositis     12    Neck pain     Pain involving joints of fingers of both hands 2021    Peripheral neuropathy     Seborrheic keratosis     Shingles     Skin disorder     Skin neoplasm     of the lower limb, including hip; onset 12; last assessed  12    Sleep apnea     Stomach disorder      Past Surgical History:   Procedure Laterality Date    ABDOMINAL SURGERY      release of adhesions    BLADDER SURGERY      mesh-lift    BREAST CYST ASPIRATION Right     does not know the year    BREAST SURGERY      lift    CATARACT EXTRACTION Bilateral      SECTION      x 3-,,    CHOLECYSTECTOMY      lap    COLONOSCOPY       COSMETIC SURGERY      tummy and breast lift    ESOPHAGOGASTRODUODENOSCOPY      EYE SURGERY  2/11 right eye and 3/11 left eye    OOPHORECTOMY Left     OTHER SURGICAL HISTORY      vocal cord surgery, scraping    FL HYSTEROSCOPY BX ENDOMETRIUM&/POLYPC W/WO D&C N/A 11/03/2016    Procedure: DILATATION AND CURETTAGE (D&C) WITH HYSTEROSCOPY;  Surgeon: Kyle Bethea MD;  Location: [facility];  Service: Gynecology    REDUCTION MAMMAPLASTY Bilateral 2008    TONSILLECTOMY      TUBAL LIGATION  10/29/1984    US GUIDED MSK PROCEDURE  05/25/2021     Social History   Social History     Substance and Sexual Activity   Alcohol Use No    Comment: rarely     Social History     Substance and Sexual Activity   Drug Use No     Social History     Tobacco Use   Smoking Status Never   Smokeless Tobacco Never     Family History   Problem Relation Age of Onset    Hypertension Mother     Alzheimer's disease Mother     Arthritis Mother     Dementia Mother     Hypertension Father     Arthritis Father     Heart attack Father     Heart disease Father     Stroke Father     Other Brother         vertigo, tinnitus    Diabetes Daughter     Breast cancer Sister 32    Skin cancer Sister     Cancer Sister     Other Son         downs syndrome    Abdominal aortic aneurysm Sister     Cancer Sister         T cell sarcoma    Breast cancer Sister     No Known Problems Brother     Diabetes Brother     Other Sister         anemia from the diet    No Known Problems Son     No Known Problems Maternal Aunt     No Known Problems Maternal Aunt     No Known Problems Paternal Aunt     No Known Problems Paternal Aunt     No Known Problems Paternal Aunt     Asthma Sister     Cancer Brother        Meds/Allergies     Not in a hospital admission.    Allergies   Allergen Reactions    Latex Rash and Other (See Comments)     Burn-skin irritation    Burning    Adhesive [Medical Tape] Other (See Comments)     bandaid-red,itch       Objective     Blood pressure 133/62, pulse 91,  temperature 97.9 °F (36.6 °C), temperature source Temporal, resp. rate 16, SpO2 99%, not currently breastfeeding.    Physical Exam:    Chest- CTA  Heart- RRR  Abdomen- NT/ND  Extremities- No edema    ASSESSMENT:     Left upper quadrant pain, dysphagia    PLAN:    EGD

## 2024-11-22 NOTE — ANESTHESIA POSTPROCEDURE EVALUATION
Post-Op Assessment Note    CV Status:  Stable  Pain Score: 0    Pain management: adequate       Mental Status:  Sleepy and arousable   Hydration Status:  Euvolemic and stable   PONV Controlled:  None   Airway Patency:  Patent and adequate     Post Op Vitals Reviewed: Yes    No anethesia notable event occurred.    Staff: CRNA           Last Filed PACU Vitals:  Vitals Value Taken Time   Temp     Pulse     BP     Resp     SpO2         Modified Sera:  Activity: 2 (11/22/2024  9:16 AM)  Respiration: 2 (11/22/2024  9:16 AM)  Circulation: 2 (11/22/2024  9:16 AM)  Consciousness: 2 (11/22/2024  9:16 AM)  Oxygen Saturation: 2 (11/22/2024  9:16 AM)  Modified Sera Score: 10 (11/22/2024  9:16 AM)

## 2024-11-22 NOTE — ANESTHESIA PREPROCEDURE EVALUATION
Procedure:  EGD    Relevant Problems   CARDIO   (+) Hyperlipidemia   (+) Varicose veins of left lower extremity with pain      ENDO   (+) Hypothyroidism      GI/HEPATIC   (+) Gastroesophageal reflux disease without esophagitis   (+) Oropharyngeal dysphagia      MUSCULOSKELETAL   (+) Fibromyalgia      NEURO/PSYCH   (+) Chronic foot pain, right   (+) Fibromyalgia   (+) Headache   (+) Tension type headache      PULMONARY   (+) Asthma   (+) Mild persistent asthma without complication   (+) Obstructive sleep apnea   (+) Obstructive sleep apnea hypopnea, mild      Other   (+) CPAP (continuous positive airway pressure) dependence        Physical Exam    Airway    Mallampati score: II  TM Distance: >3 FB  Neck ROM: full     Dental   Comment: Denies loose teeth     Cardiovascular  Cardiovascular exam normal    Pulmonary  Pulmonary exam normal     Other Findings  Portions of exam deferred due to low yield and/or unknown COVID status    Rash on left chest, back of neck, and forehead noted. Patient reports she frequently gets this and has an appointment pending with her rheumatologist.post-pubertal.      Anesthesia Plan  ASA Score- 3     Anesthesia Type- IV sedation with anesthesia with ASA Monitors.         Additional Monitors:     Airway Plan:            Plan Factors-Exercise tolerance (METS): >4 METS.    Chart reviewed.   Existing labs reviewed. Patient summary reviewed.    Patient is not a current smoker.              Induction- intravenous.    Postoperative Plan-         Informed Consent- Anesthetic plan and risks discussed with patient.  I personally reviewed this patient with the CRNA. Discussed and agreed on the Anesthesia Plan with the CRNA..

## 2024-11-27 PROCEDURE — 88305 TISSUE EXAM BY PATHOLOGIST: CPT | Performed by: STUDENT IN AN ORGANIZED HEALTH CARE EDUCATION/TRAINING PROGRAM

## 2024-12-03 ENCOUNTER — RESULTS FOLLOW-UP (OUTPATIENT)
Dept: GASTROENTEROLOGY | Facility: AMBULARY SURGERY CENTER | Age: 69
End: 2024-12-03

## 2024-12-11 ENCOUNTER — HOSPITAL ENCOUNTER (OUTPATIENT)
Dept: RADIOLOGY | Facility: HOSPITAL | Age: 69
Discharge: HOME | End: 2024-12-11
Payer: COMMERCIAL

## 2024-12-11 ENCOUNTER — NURSE TRIAGE (OUTPATIENT)
Age: 69
End: 2024-12-11

## 2024-12-11 ENCOUNTER — OFFICE VISIT (OUTPATIENT)
Dept: PULMONOLOGY | Facility: MEDICAL CENTER | Age: 69
End: 2024-12-11
Payer: COMMERCIAL

## 2024-12-11 VITALS
TEMPERATURE: 100 F | BODY MASS INDEX: 28 KG/M2 | DIASTOLIC BLOOD PRESSURE: 60 MMHG | HEIGHT: 64 IN | OXYGEN SATURATION: 97 % | HEART RATE: 98 BPM | WEIGHT: 164 LBS | SYSTOLIC BLOOD PRESSURE: 100 MMHG | RESPIRATION RATE: 18 BRPM

## 2024-12-11 DIAGNOSIS — J20.8 ACUTE BRONCHITIS DUE TO OTHER SPECIFIED ORGANISMS: ICD-10-CM

## 2024-12-11 DIAGNOSIS — R05.1 ACUTE COUGH: ICD-10-CM

## 2024-12-11 DIAGNOSIS — J44.1 ASTHMA EXACERBATION IN COPD (HCC): ICD-10-CM

## 2024-12-11 DIAGNOSIS — J45.30 MILD PERSISTENT ASTHMA WITHOUT COMPLICATION: Chronic | ICD-10-CM

## 2024-12-11 DIAGNOSIS — R05.1 ACUTE COUGH: Primary | ICD-10-CM

## 2024-12-11 PROCEDURE — 99214 OFFICE O/P EST MOD 30 MIN: CPT | Performed by: NURSE PRACTITIONER

## 2024-12-11 PROCEDURE — 71046 X-RAY EXAM CHEST 2 VIEWS: CPT

## 2024-12-11 RX ORDER — AZITHROMYCIN 250 MG/1
TABLET, FILM COATED ORAL
COMMUNITY
Start: 2024-12-03

## 2024-12-11 RX ORDER — CEFUROXIME AXETIL 500 MG/1
500 TABLET ORAL EVERY 12 HOURS SCHEDULED
Qty: 10 TABLET | Refills: 0 | Status: SHIPPED | OUTPATIENT
Start: 2024-12-11 | End: 2024-12-16

## 2024-12-11 NOTE — TELEPHONE ENCOUNTER
Pt stated Pulm Provider: Dr. Serrano    Actionable item: None-appt scheduled    What is the reason for the call/chief complaint?    Pt calling for worsening of her asthma. Pt has a hx of asthma, maintained on Trelegy. Pt c/o increased SOB, wheezing, dry cough. Pt states she was treated for a bronchitis by her ENT but has still been struggling with her asthma since then. This morning, her SOB has worsened and she feels she needs evaluation today. Pt does have a persistent dry cough while on the phone but is able to speak in complete sentences. Scheduled pt for [facility] this morning with Nazanin. Recommended that she proceed to the ER should her SOB get worse before appt time.

## 2024-12-11 NOTE — TELEPHONE ENCOUNTER
"Reason for Disposition  • MILD difficulty breathing (e.g., minimal/no SOB at rest, SOB with walking, pulse < 100) of new-onset or worse than normal    Answer Assessment - Initial Assessment Questions  1. RESPIRATORY STATUS: \"Describe your breathing?\" (e.g., wheezing, shortness of breath, unable to speak, severe coughing)       SOB  2. ONSET: \"When did this breathing problem begin?\"       \"For a while\"  3. PATTERN \"Does the difficult breathing come and go, or has it been constant since it started?\"       Constant  4. SEVERITY: \"How bad is your breathing?\" (e.g., mild, moderate, severe)       Moderate-severe  5. RECURRENT SYMPTOM: \"Have you had difficulty breathing before?\" If Yes, ask: \"When was the last time?\" and \"What happened that time?\"       Yes- hx of asthma  6. CARDIAC HISTORY: \"Do you have any history of heart disease?\" (e.g., heart attack, angina, bypass surgery, angioplasty)       See chart  7. LUNG HISTORY: \"Do you have any history of lung disease?\"  (e.g., pulmonary embolus, asthma, emphysema)      Asthma  8. CAUSE: \"What do you think is causing the breathing problem?\"       Asthma-recent URI  9. OTHER SYMPTOMS: \"Do you have any other symptoms?\" (e.g., chest pain, cough, dizziness, fever, runny nose)      Dry coughing  10. OTHER       Bronchitis-treated with abx by ENT       Has been using neb treatments as needed    Protocols used: Breathing Difficulty-Adult-[place]    "

## 2024-12-11 NOTE — PROGRESS NOTES
Assessment/Plan:     Problem List Items Addressed This Visit          Respiratory    Mild persistent asthma without complication (Chronic)    Has now using Trelegy 200 g 1 puff daily he also has nebulizer that I would like her to use at least 2 or 3 times a day during this acute bronchitis.  She agreed to do so.  Is also using Singulair 10 mg daily and no longer is on any prednisone I do not think she needs at this at this point as her lungs are clear         Acute bronchitis due to other specified organisms (Chronic)    The patient has an acute bronchitis.  He has been coughing.  While she was here today I did look at her sputum which is not discolored but thick and light white.  Will give her a sputum specimen in the event that she can breathe bring up some deep sputum.  Now in the interim I will give her Ceftin 500 mg p.o. twice daily x 5 days she has agreed plan of care additionally she will have a chest x-ray which she can have done at her earliest convenience         Relevant Medications    azithromycin (ZITHROMAX) 250 mg tablet    cefuroxime (CEFTIN) 500 mg tablet    Other Relevant Orders    Sputum culture and Gram stain       Other    Acute cough - Primary (Chronic)    Patient is using cough suppressant given to her by her primary care doctor this was given to her for previous infection, ordering a chest x-ray to rule out any pneumonia.  Sputum culture and sensitivity is also given to her via container she instructed he is using promethazine         Relevant Orders    XR chest pa and lateral     Other Visit Diagnoses         Asthma exacerbation in COPD (HCC)        Relevant Medications    cefuroxime (CEFTIN) 500 mg tablet              Return in about 4 weeks (around 1/8/2025).  All questions are answered to the patient's satisfaction and understanding.  She verbalizes understanding.  She is encouraged to call with any further questions or concerns.    Portions of the record may have been created with voice  "recognition software.  Occasional wrong word or \"sound a like\" substitutions may have occurred due to the inherent limitations of voice recognition software.  Read the chart carefully and recognize, using context, where substitutions have occurred.    Electronically Signed by TANI Gomez    ______________________________________________________________________    Chief Complaint: No chief complaint on file.      Patient ID: Renetta is a 69 y.o. y.o. female has a past medical history of Abnormal glucose, Arthritis, Asthma, Atypical nevi, Atypical nevus, Breast lump, Cataract, Cervical adenopathy, Colon polyp, CPAP (continuous positive airway pressure) dependence, Diverticulitis of colon, Dizziness, Dyslipidemia, Facial droop, Fibromyalgia, primary, Flu (01/20/2018), Foot abrasion, Fractures, Genital herpes, GERD (gastroesophageal reflux disease), Headache, tension-type, Herpes zoster, History of shingles, History of stomach ulcers, abnormal mammogram, Hyperlipidemia, Myalgia, Myositis, Neck pain, Pain involving joints of fingers of both hands (01/19/2021), Peripheral neuropathy, Seborrheic keratosis, Shingles, Skin disorder, Skin neoplasm, Sleep apnea, and Stomach disorder.    12/11/2024  Patient presents today for follow-up visit.  HPI Renetta is a 69-year-old female who has a history of mild persistent asthma.  She is on Trelegy 200 mcg 1 puff daily she also has a history of obstructive sleep apnea for which her AHI is 17.6.  According to patient she underwent endoscopy and she underwent endoscopy on November 22, 24 the esophagus.  Normal she had mild linear erythema in the stomach.  Volume appeared normal according to patient she started to feel his voice after the procedure and also developed this cough about a couple weeks ago.  She has not had a chest x-ray since that period.  He did see her ENT from [facility] this was approximately December 3, 2024 according to patient she was given a Z-Adam and " also tapering dose of prednisone at that time her cough has worsened.    Review of Systems   Constitutional: Negative.    HENT: Negative.     Eyes: Negative.    Respiratory:  Positive for cough and shortness of breath.    Cardiovascular: Negative.    Gastrointestinal: Negative.    Endocrine: Negative.    Musculoskeletal: Negative.    Skin: Negative.    Allergic/Immunologic: Negative.    Neurological: Negative.    Hematological: Negative.    Psychiatric/Behavioral: Negative.         Smoking history: She reports that she has never smoked. She has never used smokeless tobacco.    The following portions of the patient's history were reviewed and updated as appropriate: allergies, current medications, past family history, past medical history, past social history, past surgical history, and problem list.    Immunization History   Administered Date(s) Administered    COVID-19 MODERNA VACC 0.5 ML IM 01/20/2021, 02/17/2021, 10/28/2021, 01/05/2023, 04/11/2023    COVID-19 Moderna mRNA Vaccine 12 Yr+ 50 mcg/0.5 mL (Spikevax) 10/27/2023    Influenza Split High Dose Preservative Free IM 10/02/2024    Influenza, Quadrivalent (nasal) 12/06/2016    Influenza, high dose seasonal 0.7 mL 10/18/2019, 09/14/2020, 11/16/2021, 09/28/2023    Influenza, injectable, quadrivalent, preservative free 0.5 mL 10/22/2018    Influenza, seasonal, injectable 10/11/2007, 09/15/2015, 11/20/2017    Pneumococcal Conjugate 13-Valent 02/02/2016, 11/16/2021    Pneumococcal Polysaccharide PPV23 10/09/2019    Td (adult), adsorbed 10/27/2005    Tuberculin Skin Test-PPD Intradermal 06/12/2017     Current Outpatient Medications   Medication Sig Dispense Refill    albuterol (2.5 mg/3 mL) 0.083 % nebulizer solution Take 3 mL (2.5 mg total) by nebulization every 6 (six) hours as needed for wheezing or shortness of breath 180 mL 5    Ascorbic Acid (VITAMIN C) 1000 MG tablet       atorvastatin (LIPITOR) 20 mg tablet TAKE ONE TABLET BY MOUTH EVERY DAY AT BEDTIME 90  tablet 1    cefuroxime (CEFTIN) 500 mg tablet Take 1 tablet (500 mg total) by mouth every 12 (twelve) hours for 5 days 10 tablet 0    cholecalciferol (VITAMIN D3) 1,000 units tablet Take 2,000 Units by mouth daily      clobetasol (TEMOVATE) 0.05 % cream Apply topically 2 (two) times a day Apply topically on scalp and right shoulder two times a day 60 g 2    Cymbalta 20 MG capsule Start at one tablet a night, and can increase to 2 tablets nightly 2 weeks later      famotidine (PEPCID) 40 MG tablet TAKE ONE TABLET BY MOUTH AT BEDTIME 90 tablet 1    fexofenadine (Allegra Allergy) 180 MG tablet Take by mouth daily      fluocinonide (LIDEX) 0.05 % external solution Apply topically every 12 (twelve) hours      gabapentin (NEURONTIN) 100 mg capsule       hydrocortisone (ANUSOL-HC) 2.5 % rectal cream Apply topically 2 (two) times a day 28 g 0    hydrocortisone 2.5 % cream Apply topically 2 (two) times a day scalp 20 g 0    metFORMIN (GLUCOPHAGE-XR) 500 mg 24 hr tablet TAKE ONE TABLET BY MOUTH EVERY DAY - TAKE WITH DINNER 90 tablet 1    methocarbamol (ROBAXIN) 500 mg tablet Take 1 tablet (500 mg total) by mouth 2 (two) times a day 20 tablet 0    montelukast (SINGULAIR) 10 mg tablet TAKE ONE TABLET BY MOUTH EVERY DAY 60 tablet 2    multivitamin (THERAGRAN) TABS Take 1 tablet by mouth daily Last dose 3/21/21      pantoprazole (PROTONIX) 40 mg tablet Take 1 tablet (40 mg total) by mouth daily 90 tablet 1    sucralfate (CARAFATE) 1 g tablet Take 1 tablet (1 g total) by mouth 3 (three) times a day 30 tablet 0    azithromycin (ZITHROMAX) 250 mg tablet  (Patient not taking: Reported on 12/11/2024)      celecoxib (CeleBREX) 200 mg capsule Take 200 mg by mouth daily      fluticasone-umeclidinium-vilanterol (Trelegy Ellipta) 200-62.5-25 mcg/actuation AEPB inhaler Inhale 1 puff daily in the early morning Rinse mouth after use. 60 blister 5    methylPREDNISolone 4 MG tablet therapy pack Use as directed on package (Patient not taking:  "Reported on 12/11/2024) 21 each 0    naproxen (NAPROSYN) 500 mg tablet Take 500 mg by mouth      promethazine-dextromethorphan (PHENERGAN-DM) 6.25-15 mg/5 mL oral syrup TAKE 5 ML BY MOUTH FOUR TIMES A DAY AS NEEDED FOR COUGH (PHENERGAN DM) (Patient not taking: No sig reported) 180 mL 0     No current facility-administered medications for this visit.     Allergies: Latex and Adhesive [medical tape]    Objective:  Vitals:    12/11/24 0930   BP: 100/60   BP Location: Left arm   Patient Position: Sitting   Cuff Size: Standard   Pulse: 98   Resp: 18   Temp: 100 °F (37.8 °C)   TempSrc: Temporal   SpO2: 97%   Weight: 74.4 kg (164 lb)   Height: 5' 4\" (1.626 m)   Oxygen Therapy  SpO2: 97 %  .  Wt Readings from Last 3 Encounters:   12/11/24 74.4 kg (164 lb)   11/25/24 86.2 kg (190 lb)   11/13/24 86.2 kg (190 lb)     Body mass index is 28.15 kg/m².    Physical Exam  Constitutional:       Appearance: She is normal weight.   HENT:      Head: Normocephalic and atraumatic.      Nose: Nose normal.      Mouth/Throat:      Mouth: Mucous membranes are moist.      Pharynx: Oropharynx is clear.   Eyes:      Pupils: Pupils are equal, round, and reactive to light.   Cardiovascular:      Rate and Rhythm: Normal rate and regular rhythm.      Pulses: Normal pulses.      Heart sounds: Normal heart sounds.   Pulmonary:      Effort: Pulmonary effort is normal.      Breath sounds: Normal breath sounds.   Abdominal:      General: Abdomen is flat.   Musculoskeletal:      Cervical back: Normal range of motion.   Skin:     General: Skin is warm and dry.      Capillary Refill: Capillary refill takes less than 2 seconds.   Neurological:      General: No focal deficit present.      Mental Status: She is alert and oriented to person, place, and time.   Psychiatric:         Mood and Affect: Mood normal.         Behavior: Behavior normal.         Lab Review:   Hospital Outpatient Visit on 11/22/2024   Component Date Value    POC Glucose 11/22/2024 124     " Case Report 11/22/2024                      Value:Surgical Pathology Report                         Case: [id number]                                  Authorizing Provider:  Rika Cruz MD          Collected:           11/22/2024 0956              Ordering Location:     [facility]   Received:            11/22/2024 1209                                     Center                                                                       Pathologist:           Fabricio Wright MD                                                          Specimens:   A) - Stomach, bx. gastric, r/o h. pylori                                                            B) - Esophagus, bx. mid esophagus                                                          Final Diagnosis 11/22/2024                      Value:A. Stomach, biopsy:  -   Gastric oxyntic mucosa with no significant histopathologic alteration.   -   Negative for intestinal metaplasia and dysplasia.  -   Negative for Helicobacter pylori-type organisms on H&E stain.     B. Esophagus,  mid, biopsy:  -   Squamous mucosa with mild reactive changes.  -   No evidence of eosinophilic esophagitis.  -   Separate fragment of detached benign gastric mucosa (favor procedural carry-over).         Additional Information 11/22/2024                      Value:All reported additional testing was performed with appropriately reactive controls.  These tests were developed and their performance characteristics determined by [facility] or appropriate performing facility, though some tests may be performed on tissues which have not been validated for performance characteristics (such as staining performed on alcohol exposed cell blocks and decalcified tissues).  Results should be interpreted with caution and in the context of the patients’ clinical condition. These tests may not be cleared or approved by the U.S. Food and Drug Administration, though the FDA has  "determined that such clearance or approval is not necessary. These tests are used for clinical purposes and they should not be regarded as investigational or for research. This laboratory has been approved by CLIA 88, designated as a high-complexity laboratory and is qualified to perform these tests.  .Interpretation performed at [facility] [address] [place] [address]        Gross Description 11/22/2024                      Value:A. The specimen is received in formalin, labeled with the patient's name and hospital number, and is designated \" stomach gastric biopsy, rule out H. pylori\".  The specimen consists of 2 tan flat and elongated tissue fragments measuring 0.4-0.6 cm in greatest dimension.  Entirely submitted.  One screened cassette.    B. The specimen is received in formalin, labeled with the patient's name and hospital number, and is designated \" midesophagus biopsy\".  The specimen consists of 5 colorless tissue fragments measuring 0.2-0.5 cm in greatest dimension.  Note: due to the size and consistency of specimen, the tissue may not survive histologic processing.  The specimen is entirely submitted in a screened cassette.    Note: The estimated total formalin fixation time based upon information provided by the submitting clinician and the standard processing schedule is under 72 hours. [facility] Outpatient Visit on 09/04/2024   Component Date Value    POC Glucose 09/04/2024 114     Case Report 09/04/2024                      Value:Surgical Pathology Report                         Case: [id number]                                  Authorizing Provider:  Rika Cruz MD          Collected:           09/04/2024 0923              Ordering Location:     [facility]   Received:            09/04/2024 [facility]                                     [place]                                                                       Pathologist:      " "     John Sidhu MD                                                             Specimen:    Large Intestine, Hepatic Flexure, cold snare polyp                                         Final Diagnosis 09/04/2024                      Value:A. Large Intestine, Hepatic Flexure, cold snare polyp:  - Tubular adenoma, with no high grade dysplasia or carcinoma           Additional Information 09/04/2024                      Value:All reported additional testing was performed with appropriately reactive controls.  These tests were developed and their performance characteristics determined by [facility] or appropriate performing facility, though some tests may be performed on tissues which have not been validated for performance characteristics (such as staining performed on alcohol exposed cell blocks and decalcified tissues).  Results should be interpreted with caution and in the context of the patients’ clinical condition. These tests may not be cleared or approved by the U.S. Food and Drug Administration, though the FDA has determined that such clearance or approval is not necessary. These tests are used for clinical purposes and they should not be regarded as investigational or for research. This laboratory has been approved by [facility] 88, designated as a high-complexity laboratory and is qualified to perform these tests.    Interpretation performed at [facility] [address]     .      Synoptic Checklist 09/04/2024                      Value:                            COLON/RECTUM POLYP FORM - GI - All Specimens                                                                                     :    Adenoma(s)      Gross Description 09/04/2024                      Value:A. The specimen is received in formalin, labeled with the patient's name and hospital number, and is designated \" hepatic flexure polyp\".  The specimen consistent 1 tan soft " tissue fragment measuring 0.3 cm in greatest dimension.  Entirely submitted. One screened cassette.    Note: The estimated total formalin fixation time based upon information provided by the submitting clinician and the standard processing schedule is under 72 hours.      Ryan     Appointment on 08/28/2024   Component Date Value    WBC 08/28/2024 9.21     RBC 08/28/2024 4.41     Hemoglobin 08/28/2024 13.7     Hematocrit 08/28/2024 44.4     MCV 08/28/2024 101 (H)     MCH 08/28/2024 31.1     MCHC 08/28/2024 30.9 (L)     RDW 08/28/2024 13.5     Platelets 08/28/2024 366     MPV 08/28/2024 11.0    Appointment on 08/20/2024   Component Date Value    BENITEZ SYNDROME DNA LOURDES* 08/20/2024 Not Detected     HEREDITARY BREAST & OVAR* 08/20/2024 Not Detected     FAMILIAL HYPERCHOLESTERO* 08/20/2024 Not Detected    Office Visit on 08/16/2024   Component Date Value    Hemoglobin A1C 08/16/2024 6.4    Transcribe Orders on 08/12/2024   Component Date Value    WBC 08/12/2024 11.31 (H)     RBC 08/12/2024 4.54     Hemoglobin 08/12/2024 13.9     Hematocrit 08/12/2024 43.9     MCV 08/12/2024 97     MCH 08/12/2024 30.6     MCHC 08/12/2024 31.7     RDW 08/12/2024 13.2     MPV 08/12/2024 10.3     Platelets 08/12/2024 353     nRBC 08/12/2024 0     Segmented % 08/12/2024 60     Immature Grans % 08/12/2024 1     Lymphocytes % 08/12/2024 21     Monocytes % 08/12/2024 15 (H)     Eosinophils Relative 08/12/2024 2     Basophils Relative 08/12/2024 1     Absolute Neutrophils 08/12/2024 6.76     Absolute Immature Grans 08/12/2024 0.07     Absolute Lymphocytes 08/12/2024 2.40     Absolute Monocytes 08/12/2024 1.72 (H)     Eosinophils Absolute 08/12/2024 0.24     Basophils Absolute 08/12/2024 0.12 (H)     Sodium 08/12/2024 137     Potassium 08/12/2024 4.6     Chloride 08/12/2024 101     CO2 08/12/2024 31     ANION GAP 08/12/2024 5     BUN 08/12/2024 22     Creatinine 08/12/2024 0.87     Glucose, Fasting 08/12/2024 127 (H)     Calcium 08/12/2024 9.8      AST 2024 29     ALT 2024 40     Alkaline Phosphatase 2024 64     Total Protein 2024 7.8     Albumin 2024 4.3     Total Bilirubin 2024 0.44     eGFR 2024 67     CRP 2024 5.7 (H)     Sed Rate 2024 25        Past Surgical History:   Procedure Laterality Date    ABDOMINAL SURGERY      release of adhesions    BLADDER SURGERY      mesh-lift    BREAST CYST ASPIRATION Right     does not know the year    BREAST SURGERY      lift    CATARACT EXTRACTION Bilateral      SECTION      x 3-,,    CHOLECYSTECTOMY      lap    COLONOSCOPY      COSMETIC SURGERY      tummy and breast lift    ESOPHAGOGASTRODUODENOSCOPY      EYE SURGERY   right eye and 3/11 left eye    OOPHORECTOMY Left     OTHER SURGICAL HISTORY      vocal cord surgery, scraping    AK HYSTEROSCOPY BX ENDOMETRIUM&/POLYPC W/WO D&C N/A 2016    Procedure: DILATATION AND CURETTAGE (D&C) WITH HYSTEROSCOPY;  Surgeon: Kyle Bethea MD;  Location: [facility];  Service: Gynecology    REDUCTION MAMMAPLASTY Bilateral 2008    TONSILLECTOMY      TUBAL LIGATION  10/29/1984    US GUIDED MSK PROCEDURE  2021        Family History   Problem Relation Age of Onset    Hypertension Mother     Alzheimer's disease Mother     Arthritis Mother     Dementia Mother     Hypertension Father     Arthritis Father     Heart attack Father     Heart disease Father     Stroke Father     Other Brother         vertigo, tinnitus    Diabetes Daughter     Breast cancer Sister 32    Skin cancer Sister     Cancer Sister     Other Son         downs syndrome    Abdominal aortic aneurysm Sister     Cancer Sister         T cell sarcoma    Breast cancer Sister     No Known Problems Brother     Diabetes Brother     Other Sister         anemia from the diet    No Known Problems Son     No Known Problems Maternal Aunt     No Known Problems Maternal Aunt     No Known Problems Paternal Aunt     No Known Problems Paternal Aunt     No  Known Problems Paternal Aunt     Asthma Sister     Cancer Brother         Diagnostics:  Results Review Statement: No pertinent imaging studies reviewed.  none pertinent  Office Spirometry Results:     ESS:    EGD  Result Date: 11/22/2024  Narrative: Table formatting from the original result was not included. [address] [address] [phone] DATE OF SERVICE: 11/22/24 PHYSICIAN(S): Attending: Rika Cruz MD Fellow: No Staff Documented INDICATION: Substernal chest pain, LUQ pain POST-OP DIAGNOSIS: See the impression below. PREPROCEDURE: Informed consent was obtained for the procedure, including sedation.  Risks of perforation, hemorrhage, adverse drug reaction and aspiration were discussed. The patient was placed in the left lateral decubitus position. Patient was explained about the risks and benefits of the procedure. Risks including but not limited to bleeding, infection, and perforation were explained in detail. Also explained about less than 100% sensitivity with the exam and other alternatives. PROCEDURE: EGD DETAILS OF PROCEDURE: Patient was taken to the procedure room where a time out was performed to confirm correct patient and correct procedure. The patient underwent monitored anesthesia care, which was administered by an anesthesia professional. The patient's blood pressure, heart rate, level of consciousness, respirations, oxygen, ECG and ETCO2 were monitored throughout the procedure. The scope was introduced through the mouth and advanced to the second part of the duodenum. Retroflexion was performed in the fundus. The patient experienced no blood loss. The procedure was not difficult. The patient tolerated the procedure well. There were no apparent adverse events. ANESTHESIA INFORMATION: ASA: III Anesthesia Type: IV Sedation with Anesthesia MEDICATIONS: No administrations occurring from 0931 to 1008 on 11/22/24 FINDINGS: The esophagus  appeared normal. Performed random biopsy using biopsy forceps. Empirically passed 56 Kinyarwanda bougie down the esophagus Stomach-mild linear erythema was noted in the antrum.  Biopsies done to check for H. pylori. The duodenum appeared normal. SPECIMENS: ID Type Source Tests Collected by Time Destination 1 : bx. gastric, r/o h. pylori Tissue Stomach TISSUE EXAM Rika Cruz MD 11/22/2024  9:56 AM  2 : bx. mid esophagus Tissue Esophagus TISSUE EXAM Rika Cruz MD 11/22/2024  9:59 AM      Impression: The esophagus appeared normal. Performed random biopsy. Stomach-mild linear erythema was noted in the antrum.  Biopsies done to check for H. pylori. The duodenum appeared normal. RECOMMENDATION: Await pathology results Continue pantoprazole We will add Carafate also for 7 to 10 days    Rika Cruz MD

## 2024-12-11 NOTE — ASSESSMENT & PLAN NOTE
Patient is using cough suppressant given to her by her primary care doctor this was given to her for previous infection, ordering a chest x-ray to rule out any pneumonia.  Sputum culture and sensitivity is also given to her via container she instructed he is using promethazine

## 2024-12-11 NOTE — ASSESSMENT & PLAN NOTE
Has now using Trelegy 200 g 1 puff daily he also has nebulizer that I would like her to use at least 2 or 3 times a day during this acute bronchitis.  She agreed to do so.  Is also using Singulair 10 mg daily and no longer is on any prednisone I do not think she needs at this at this point as her lungs are clear

## 2024-12-11 NOTE — ASSESSMENT & PLAN NOTE
The patient has an acute bronchitis.  He has been coughing.  While she was here today I did look at her sputum which is not discolored but thick and light white.  Will give her a sputum specimen in the event that she can breathe bring up some deep sputum.  Now in the interim I will give her Ceftin 500 mg p.o. twice daily x 5 days she has agreed plan of care additionally she will have a chest x-ray which she can have done at her earliest convenience

## 2024-12-16 ENCOUNTER — APPOINTMENT (OUTPATIENT)
Dept: LAB | Facility: CLINIC | Age: 69
End: 2024-12-16
Payer: COMMERCIAL

## 2024-12-16 DIAGNOSIS — J20.8 ACUTE BRONCHITIS DUE TO OTHER SPECIFIED ORGANISMS: ICD-10-CM

## 2024-12-16 PROCEDURE — 87077 CULTURE AEROBIC IDENTIFY: CPT

## 2024-12-16 PROCEDURE — 87070 CULTURE OTHR SPECIMN AEROBIC: CPT

## 2024-12-16 PROCEDURE — 87205 SMEAR GRAM STAIN: CPT

## 2024-12-16 PROCEDURE — 87186 SC STD MICRODIL/AGAR DIL: CPT

## 2024-12-20 LAB
BACTERIA SPT RESP CULT: ABNORMAL
BACTERIA SPT RESP CULT: ABNORMAL
GRAM STN SPEC: ABNORMAL

## 2024-12-23 ENCOUNTER — TELEPHONE (OUTPATIENT)
Age: 69
End: 2024-12-23

## 2024-12-23 NOTE — TELEPHONE ENCOUNTER
Patient called and stated she had an appointment with [facility] on 12/11/24 and insurance denied it as she did not have a referral.   Please enter an ambulatory referral and then forward message to [facility] Procedure Prior Authorization Pod to complete the insurance referral.       Test Name / Order Name: Dermatology Office Visit - Rash on face, back chest and arms    DX Code:   could not obtain code    Date Of Service: 12/11/24, pt also has a follow up scheduled for 12/26/24    Location/Facility Name/Address/Phone #: [facility], [address] [place] [address], [place] [address] - [phone] (F):[phone]    Location / Facility NPI: [id number]    Best Phone # To Reach The Patient: [phone]     Thank you!

## 2024-12-26 DIAGNOSIS — J20.8 ACUTE BRONCHITIS DUE TO OTHER SPECIFIED ORGANISMS: Primary | ICD-10-CM

## 2024-12-26 RX ORDER — LEVOFLOXACIN 500 MG/1
500 TABLET, FILM COATED ORAL EVERY 24 HOURS
Qty: 7 TABLET | Refills: 0 | Status: SHIPPED | OUTPATIENT
Start: 2024-12-26 | End: 2025-01-02

## 2024-12-26 NOTE — TELEPHONE ENCOUNTER
Patient following up on insurance referral, does not look to be routed to [facility] yet but there is an AMB referral auth from 5/2024, please if can be done asap as patient has another appointment today as well      Test Name / Order Name: Dermatology Office Visit - Rash on face, back chest and arms     DX Code:   could not obtain code     Date Of Service: 12/11/24, pt also has a follow up scheduled for 12/26/24     Location/Facility Name/Address/Phone #: [facility], [address] [place] [address], [place] [address] - [phone] (F):[phone]     Location / Facility NPI: [id number]     Best Phone # To Reach The Patient: [phone]   SENT TO [facility] 11:25 am

## 2024-12-26 NOTE — PROGRESS NOTES
Left VM to patient regarding sputum culture C&S.  Awaiting return call to evaluate pateint's sx and possible alternate antibiotic for Serratia

## 2024-12-26 NOTE — PROGRESS NOTES
I spoke with patient regarding sputum C&S.  She is coughing still with productive sputum.  She has completed previous antibiotic. I ordered levofloxacin for 7 days, 500 mg.  She will get back rto us after 2 weeks.

## 2024-12-26 NOTE — TELEPHONE ENCOUNTER
[employer] rep called in with pt on the other line about referral for dermatology for 12/11 and 12/26. They have not gotten anything and nee it sent for pt reimbursement for payment.  Fax#[phone]

## 2025-01-08 ENCOUNTER — OFFICE VISIT (OUTPATIENT)
Dept: PULMONOLOGY | Facility: MEDICAL CENTER | Age: 70
End: 2025-01-08
Payer: MEDICARE

## 2025-01-08 VITALS
HEART RATE: 88 BPM | DIASTOLIC BLOOD PRESSURE: 64 MMHG | WEIGHT: 164.6 LBS | HEIGHT: 64 IN | BODY MASS INDEX: 28.1 KG/M2 | RESPIRATION RATE: 12 BRPM | TEMPERATURE: 98.2 F | SYSTOLIC BLOOD PRESSURE: 122 MMHG | OXYGEN SATURATION: 98 %

## 2025-01-08 DIAGNOSIS — R05.3 CHRONIC COUGH: Chronic | ICD-10-CM

## 2025-01-08 DIAGNOSIS — R05.9 COUGH, UNSPECIFIED TYPE: ICD-10-CM

## 2025-01-08 DIAGNOSIS — J45.30 MILD PERSISTENT ASTHMA WITHOUT COMPLICATION: Primary | Chronic | ICD-10-CM

## 2025-01-08 DIAGNOSIS — G47.33 OBSTRUCTIVE SLEEP APNEA HYPOPNEA, MILD: Chronic | ICD-10-CM

## 2025-01-08 PROCEDURE — 99214 OFFICE O/P EST MOD 30 MIN: CPT | Performed by: NURSE PRACTITIONER

## 2025-01-08 RX ORDER — DEXTROMETHORPHAN HYDROBROMIDE AND PROMETHAZINE HYDROCHLORIDE 15; 6.25 MG/5ML; MG/5ML
5 SYRUP ORAL 4 TIMES DAILY PRN
Qty: 180 ML | Refills: 0 | Status: SHIPPED | OUTPATIENT
Start: 2025-01-08 | End: 2025-02-07

## 2025-01-08 NOTE — PROGRESS NOTES
Assessment/Plan:     Problem List Items Addressed This Visit          Respiratory    Mild persistent asthma without complication - Primary (Chronic)    Currently stable.  She did have an a bacterial infection and is now status post lesion of levofloxacin.  Lungs are clear to auscultation.  She does have improved cough but on occasion is producing some sputum.  Otherwise she is much improved.  Additionally she is using Trelegy 200 mcg 1 puff daily she also has nebulizer with DuoNebs.  She is using this once daily and is very improved         Obstructive sleep apnea hypopnea, mild (Chronic)              Renetta is here today for a follow-up visit she was diagnosed in 2015 with an apneic hypopneic index of 17.6.  She uses CPAP of 6.  Compliance data is reviewed from December 7, 2024 to January 6, 2025 patient is 73% compliant with her average usage of having hours and 23 minutes.  AHI is reduced to 2.4 events per day.  Patient will be due for a new machine in November 2025.  He continues to use and benefit.                                                                                                        Other    Chronic cough (Chronic)    Has had a chronic cough.  Likely this most recent episode had persisted it was due to a bacterial infection.  However she has used in the past promethazine DM.  I will reorder this for her on a one-time basis.  He has a prescription from September 2025 and is found this very helpful she is aware that this could be habit-forming and will only use when completely necessary.         Relevant Medications    promethazine-dextromethorphan (PHENERGAN-DM) 6.25-15 mg/5 mL oral syrup     Other Visit Diagnoses         Cough, unspecified type                  Return in about 6 months (around 7/8/2025).  All questions are answered to the patient's satisfaction and understanding.  She verbalizes understanding.  She is encouraged to call with any further questions or concerns.    Portions of the  "record may have been created with voice recognition software.  Occasional wrong word or \"sound a like\" substitutions may have occurred due to the inherent limitations of voice recognition software.  Read the chart carefully and recognize, using context, where substitutions have occurred.    Electronically Signed by TANI Gomez    ______________________________________________________________________    Chief Complaint: No chief complaint on file.      Patient ID: Renetta is a 69 y.o. y.o. female has a past medical history of Abnormal glucose, Arthritis, Asthma, Atypical nevi, Atypical nevus, Breast lump, Cataract, Cervical adenopathy, Colon polyp, CPAP (continuous positive airway pressure) dependence, Diverticulitis of colon, Dizziness, Dyslipidemia, Facial droop, Fibromyalgia, primary, Flu (01/20/2018), Foot abrasion, Fractures, Genital herpes, GERD (gastroesophageal reflux disease), Headache, tension-type, Herpes zoster, History of shingles, History of stomach ulcers, abnormal mammogram, Hyperlipidemia, Myalgia, Myositis, Neck pain, Pain involving joints of fingers of both hands (01/19/2021), Peripheral neuropathy, Seborrheic keratosis, Shingles, Skin disorder, Skin neoplasm, Sleep apnea, and Stomach disorder.    1/8/2025  Patient presents today for follow-up visit.  HPI Renetta is a 69-year-old female who was seen in the office December 11, 2020 for she had an acute cough for which she had an x-ray at that time I also gave her a sputum culture container and was treated with Ceftin 500 mg.  She also has history of mild persistent asthma without complication she uses Trelegy 200 mcg 1 puff daily and is here today as a follow-up visit.  She had a chest x-ray that day on December 11, 2024 lungs were clear.  Initially she did have a sputum culture final results were after her visit it was positive for growth of Serratia marcescens that was sensitive to a michael quinolone.  She was ordered levofloxacin 500 mg x 7 " days and is here today for follow-up visit.  Patient is feeling much better.    Review of Systems   Constitutional: Negative.    HENT: Negative.     Eyes: Negative.    Respiratory:  Positive for cough.         Much improved with some yellow sputum   Cardiovascular: Negative.    Gastrointestinal: Negative.    Endocrine: Negative.    Genitourinary: Negative.    Musculoskeletal: Negative.    Skin: Negative.    Allergic/Immunologic: Negative.    Neurological: Negative.    Hematological: Negative.    Psychiatric/Behavioral: Negative.         Smoking history: She reports that she has never smoked. She has never used smokeless tobacco.    The following portions of the patient's history were reviewed and updated as appropriate: allergies, current medications, past family history, past medical history, past social history, past surgical history, and problem list.    Immunization History   Administered Date(s) Administered    COVID-19 MODERNA VACC 0.5 ML IM 01/20/2021, 02/17/2021, 10/28/2021, 01/05/2023, 04/11/2023    COVID-19 Moderna mRNA Vaccine 12 Yr+ 50 mcg/0.5 mL (Spikevax) 10/27/2023    Influenza Split High Dose Preservative Free IM 10/02/2024    Influenza, Quadrivalent (nasal) 12/06/2016    Influenza, high dose seasonal 0.7 mL 10/18/2019, 09/14/2020, 11/16/2021, 09/28/2023    Influenza, injectable, quadrivalent, preservative free 0.5 mL 10/22/2018    Influenza, seasonal, injectable 10/11/2007, 09/15/2015, 11/20/2017    Pneumococcal Conjugate 13-Valent 02/02/2016, 11/16/2021    Pneumococcal Polysaccharide PPV23 10/09/2019    Td (adult), adsorbed 10/27/2005    Tuberculin Skin Test-PPD Intradermal 06/12/2017     Current Outpatient Medications   Medication Sig Dispense Refill    albuterol (2.5 mg/3 mL) 0.083 % nebulizer solution Take 3 mL (2.5 mg total) by nebulization every 6 (six) hours as needed for wheezing or shortness of breath 180 mL 5    Ascorbic Acid (VITAMIN C) 1000 MG tablet       atorvastatin (LIPITOR) 20 mg  tablet TAKE ONE TABLET BY MOUTH EVERY DAY AT BEDTIME 90 tablet 1    cholecalciferol (VITAMIN D3) 1,000 units tablet Take 2,000 Units by mouth daily      clobetasol (TEMOVATE) 0.05 % cream Apply topically 2 (two) times a day Apply topically on scalp and right shoulder two times a day 60 g 2    Cymbalta 20 MG capsule Start at one tablet a night, and can increase to 2 tablets nightly 2 weeks later      famotidine (PEPCID) 40 MG tablet TAKE ONE TABLET BY MOUTH AT BEDTIME 90 tablet 1    fexofenadine (Allegra Allergy) 180 MG tablet Take by mouth daily      fluocinonide (LIDEX) 0.05 % external solution Apply topically every 12 (twelve) hours      gabapentin (NEURONTIN) 100 mg capsule       hydrocortisone (ANUSOL-HC) 2.5 % rectal cream Apply topically 2 (two) times a day 28 g 0    hydrocortisone 2.5 % cream Apply topically 2 (two) times a day scalp 20 g 0    metFORMIN (GLUCOPHAGE-XR) 500 mg 24 hr tablet TAKE ONE TABLET BY MOUTH EVERY DAY - TAKE WITH DINNER 90 tablet 1    methocarbamol (ROBAXIN) 500 mg tablet Take 1 tablet (500 mg total) by mouth 2 (two) times a day 20 tablet 0    montelukast (SINGULAIR) 10 mg tablet TAKE ONE TABLET BY MOUTH EVERY DAY 60 tablet 2    multivitamin (THERAGRAN) TABS Take 1 tablet by mouth daily Last dose 3/21/21      pantoprazole (PROTONIX) 40 mg tablet Take 1 tablet (40 mg total) by mouth daily 90 tablet 1    promethazine-dextromethorphan (PHENERGAN-DM) 6.25-15 mg/5 mL oral syrup Take 5 mL by mouth 4 (four) times a day as needed for cough 180 mL 0    sucralfate (CARAFATE) 1 g tablet Take 1 tablet (1 g total) by mouth 3 (three) times a day 30 tablet 0    azithromycin (ZITHROMAX) 250 mg tablet  (Patient not taking: Reported on 12/11/2024)      celecoxib (CeleBREX) 200 mg capsule Take 200 mg by mouth daily      fluticasone-umeclidinium-vilanterol (Trelegy Ellipta) 200-62.5-25 mcg/actuation AEPB inhaler Inhale 1 puff daily in the early morning Rinse mouth after use. 60 blister 5     "methylPREDNISolone 4 MG tablet therapy pack Use as directed on package (Patient not taking: Reported on 12/11/2024) 21 each 0    naproxen (NAPROSYN) 500 mg tablet Take 500 mg by mouth       No current facility-administered medications for this visit.     Allergies: Latex and Adhesive [medical tape]    Objective:  Vitals:    01/08/25 0930   BP: 122/64   BP Location: Left arm   Patient Position: Sitting   Cuff Size: Extra-Large   Pulse: 88   Resp: 12   Temp: 98.2 °F (36.8 °C)   TempSrc: Temporal   SpO2: 98%   Weight: 74.7 kg (164 lb 9.6 oz)   Height: 5' 4\" (1.626 m)   Oxygen Therapy  SpO2: 98 %  .  Wt Readings from Last 3 Encounters:   01/08/25 74.7 kg (164 lb 9.6 oz)   12/11/24 74.4 kg (164 lb)   11/25/24 86.2 kg (190 lb)     Body mass index is 28.25 kg/m².    Physical Exam  Constitutional:       Appearance: She is normal weight.   HENT:      Head: Normocephalic.      Nose: Nose normal.      Mouth/Throat:      Mouth: Mucous membranes are moist.      Pharynx: Oropharynx is clear.   Eyes:      Pupils: Pupils are equal, round, and reactive to light.   Cardiovascular:      Rate and Rhythm: Normal rate and regular rhythm.      Pulses: Normal pulses.      Heart sounds: Normal heart sounds.   Pulmonary:      Effort: Pulmonary effort is normal.      Breath sounds: Normal breath sounds.   Musculoskeletal:         General: Normal range of motion.      Cervical back: Normal range of motion.   Skin:     General: Skin is warm and dry.      Capillary Refill: Capillary refill takes less than 2 seconds.   Neurological:      General: No focal deficit present.      Mental Status: She is alert and oriented to person, place, and time.   Psychiatric:         Mood and Affect: Mood normal.         Behavior: Behavior normal.         Lab Review:   not applicable    Past Surgical History:   Procedure Laterality Date    ABDOMINAL SURGERY      release of adhesions    BLADDER SURGERY      mesh-lift    BREAST CYST ASPIRATION Right     does not " know the year    BREAST SURGERY      lift    CATARACT EXTRACTION Bilateral      SECTION      x 3-,,    CHOLECYSTECTOMY      lap    COLONOSCOPY      COSMETIC SURGERY      tummy and breast lift    ESOPHAGOGASTRODUODENOSCOPY      EYE SURGERY   right eye and 3/11 left eye    OOPHORECTOMY Left     OTHER SURGICAL HISTORY      vocal cord surgery, scraping    CO HYSTEROSCOPY BX ENDOMETRIUM&/POLYPC W/WO D&C N/A 2016    Procedure: DILATATION AND CURETTAGE (D&C) WITH HYSTEROSCOPY;  Surgeon: Kyle Bethea MD;  Location: [facility];  Service: Gynecology    REDUCTION MAMMAPLASTY Bilateral 2008    TONSILLECTOMY      TUBAL LIGATION  10/29/1984    US GUIDED MSK PROCEDURE  2021        Family History   Problem Relation Age of Onset    Hypertension Mother     Alzheimer's disease Mother     Arthritis Mother     Dementia Mother     Hypertension Father     Arthritis Father     Heart attack Father     Heart disease Father     Stroke Father     Other Brother         vertigo, tinnitus    Diabetes Daughter     Breast cancer Sister 32    Skin cancer Sister     Cancer Sister     Other Son         downs syndrome    Abdominal aortic aneurysm Sister     Cancer Sister         T cell sarcoma    Breast cancer Sister     No Known Problems Brother     Diabetes Brother     Other Sister         anemia from the diet    No Known Problems Son     No Known Problems Maternal Aunt     No Known Problems Maternal Aunt     No Known Problems Paternal Aunt     No Known Problems Paternal Aunt     No Known Problems Paternal Aunt     Asthma Sister     Cancer Brother         Diagnostics:  Results Review Statement: No pertinent imaging studies reviewed.  none pertinent  Office Spirometry Results:     ESS:    XR chest pa and lateral  Result Date: 2024  Narrative: CHEST INDICATION:   Acute cough. COMPARISON:  None. EXAM PERFORMED/VIEWS:  XR CHEST PA AND LATERAL FINDINGS: Cardiomediastinal silhouette appears unremarkable. The lungs  are clear.  No pneumothorax or pleural effusion. Osseous structures appear within normal limits for patient age.     Impression: No acute cardiopulmonary disease. No change from 8/28/2024. Electronically signed: 12/11/2024 02:24 PM Marc Valencia MD

## 2025-01-08 NOTE — ASSESSMENT & PLAN NOTE
Currently stable.  She did have an a bacterial infection and is now status post lesion of levofloxacin.  Lungs are clear to auscultation.  She does have improved cough but on occasion is producing some sputum.  Otherwise she is much improved.  Additionally she is using Trelegy 200 mcg 1 puff daily she also has nebulizer with DuoNebs.  She is using this once daily and is very improved

## 2025-01-08 NOTE — ASSESSMENT & PLAN NOTE
Has had a chronic cough.  Likely this most recent episode had persisted it was due to a bacterial infection.  However she has used in the past promethazine DM.  I will reorder this for her on a one-time basis.  He has a prescription from September 2025 and is found this very helpful she is aware that this could be habit-forming and will only use when completely necessary.

## 2025-01-08 NOTE — ASSESSMENT & PLAN NOTE
Renetta is here today for a follow-up visit she was diagnosed in 2015 with an apneic hypopneic index of 17.6.  She uses CPAP of 6.  Compliance data is reviewed from December 7, 2024 to January 6, 2025 patient is 73% compliant with her average usage of having hours and 23 minutes.  AHI is reduced to 2.4 events per day.  Patient will be due for a new machine in November 2025.  He continues to use and benefit.

## 2025-02-06 DIAGNOSIS — K21.9 GASTROESOPHAGEAL REFLUX DISEASE WITHOUT ESOPHAGITIS: ICD-10-CM

## 2025-02-07 RX ORDER — FAMOTIDINE 40 MG/1
40 TABLET, FILM COATED ORAL
Qty: 90 TABLET | Refills: 1 | Status: SHIPPED | OUTPATIENT
Start: 2025-02-07

## 2025-03-03 ENCOUNTER — APPOINTMENT (OUTPATIENT)
Dept: LAB | Facility: CLINIC | Age: 70
End: 2025-03-03
Payer: COMMERCIAL

## 2025-03-03 ENCOUNTER — OFFICE VISIT (OUTPATIENT)
Dept: FAMILY MEDICINE CLINIC | Facility: CLINIC | Age: 70
End: 2025-03-03
Payer: COMMERCIAL

## 2025-03-03 VITALS
BODY MASS INDEX: 29.23 KG/M2 | WEIGHT: 171.2 LBS | HEIGHT: 64 IN | DIASTOLIC BLOOD PRESSURE: 70 MMHG | TEMPERATURE: 97.4 F | RESPIRATION RATE: 15 BRPM | OXYGEN SATURATION: 98 % | HEART RATE: 86 BPM | SYSTOLIC BLOOD PRESSURE: 110 MMHG

## 2025-03-03 DIAGNOSIS — E03.9 HYPOTHYROIDISM, UNSPECIFIED TYPE: ICD-10-CM

## 2025-03-03 DIAGNOSIS — R14.0 ABDOMINAL BLOATING: ICD-10-CM

## 2025-03-03 DIAGNOSIS — L40.9 PSORIASIS: ICD-10-CM

## 2025-03-03 DIAGNOSIS — R09.81 SINUS CONGESTION: ICD-10-CM

## 2025-03-03 DIAGNOSIS — L50.9 URTICARIA: ICD-10-CM

## 2025-03-03 DIAGNOSIS — L21.9 SEBORRHEIC DERMATITIS OF SCALP: ICD-10-CM

## 2025-03-03 DIAGNOSIS — R73.03 PREDIABETES: ICD-10-CM

## 2025-03-03 DIAGNOSIS — R05.9 COUGH, UNSPECIFIED TYPE: Primary | ICD-10-CM

## 2025-03-03 DIAGNOSIS — E66.3 OVERWEIGHT WITH BODY MASS INDEX (BMI) OF 29 TO 29.9 IN ADULT: ICD-10-CM

## 2025-03-03 DIAGNOSIS — E78.5 DYSLIPIDEMIA: ICD-10-CM

## 2025-03-03 LAB
ALBUMIN SERPL BCG-MCNC: 4.2 G/DL (ref 3.5–5)
ALP SERPL-CCNC: 84 U/L (ref 34–104)
ALT SERPL W P-5'-P-CCNC: 44 U/L (ref 7–52)
ANION GAP SERPL CALCULATED.3IONS-SCNC: 8 MMOL/L (ref 4–13)
AST SERPL W P-5'-P-CCNC: 29 U/L (ref 13–39)
BILIRUB SERPL-MCNC: 0.3 MG/DL (ref 0.2–1)
BUN SERPL-MCNC: 15 MG/DL (ref 5–25)
CALCIUM SERPL-MCNC: 10.2 MG/DL (ref 8.4–10.2)
CHLORIDE SERPL-SCNC: 104 MMOL/L (ref 96–108)
CHOLEST SERPL-MCNC: 189 MG/DL (ref ?–200)
CO2 SERPL-SCNC: 28 MMOL/L (ref 21–32)
CREAT SERPL-MCNC: 0.92 MG/DL (ref 0.6–1.3)
ERYTHROCYTE [DISTWIDTH] IN BLOOD BY AUTOMATED COUNT: 13.2 % (ref 11.6–15.1)
EST. AVERAGE GLUCOSE BLD GHB EST-MCNC: 166 MG/DL
GFR SERPL CREATININE-BSD FRML MDRD: 62 ML/MIN/1.73SQ M
GLUCOSE P FAST SERPL-MCNC: 109 MG/DL (ref 65–99)
HBA1C MFR BLD: 7.4 %
HCT VFR BLD AUTO: 45 % (ref 34.8–46.1)
HDLC SERPL-MCNC: 44 MG/DL
HGB BLD-MCNC: 14.3 G/DL (ref 11.5–15.4)
LDLC SERPL CALC-MCNC: 94 MG/DL (ref 0–100)
LIPASE SERPL-CCNC: 18 U/L (ref 11–82)
MCH RBC QN AUTO: 30.5 PG (ref 26.8–34.3)
MCHC RBC AUTO-ENTMCNC: 31.8 G/DL (ref 31.4–37.4)
MCV RBC AUTO: 96 FL (ref 82–98)
PLATELET # BLD AUTO: 420 THOUSANDS/UL (ref 149–390)
PMV BLD AUTO: 10.2 FL (ref 8.9–12.7)
POTASSIUM SERPL-SCNC: 4.7 MMOL/L (ref 3.5–5.3)
PROT SERPL-MCNC: 8 G/DL (ref 6.4–8.4)
RBC # BLD AUTO: 4.69 MILLION/UL (ref 3.81–5.12)
SODIUM SERPL-SCNC: 140 MMOL/L (ref 135–147)
TRIGL SERPL-MCNC: 255 MG/DL (ref ?–150)
TSH SERPL DL<=0.05 MIU/L-ACNC: 1.28 UIU/ML (ref 0.45–4.5)
WBC # BLD AUTO: 8.97 THOUSAND/UL (ref 4.31–10.16)

## 2025-03-03 PROCEDURE — 36415 COLL VENOUS BLD VENIPUNCTURE: CPT

## 2025-03-03 PROCEDURE — 83690 ASSAY OF LIPASE: CPT

## 2025-03-03 PROCEDURE — G2211 COMPLEX E/M VISIT ADD ON: HCPCS | Performed by: FAMILY MEDICINE

## 2025-03-03 PROCEDURE — 83036 HEMOGLOBIN GLYCOSYLATED A1C: CPT

## 2025-03-03 PROCEDURE — 80053 COMPREHEN METABOLIC PANEL: CPT

## 2025-03-03 PROCEDURE — 84443 ASSAY THYROID STIM HORMONE: CPT

## 2025-03-03 PROCEDURE — 99214 OFFICE O/P EST MOD 30 MIN: CPT | Performed by: FAMILY MEDICINE

## 2025-03-03 PROCEDURE — 80061 LIPID PANEL: CPT

## 2025-03-03 PROCEDURE — 85027 COMPLETE CBC AUTOMATED: CPT

## 2025-03-03 RX ORDER — AZITHROMYCIN 250 MG/1
TABLET, FILM COATED ORAL
Qty: 6 TABLET | Refills: 0 | Status: SHIPPED | OUTPATIENT
Start: 2025-03-03 | End: 2025-03-08

## 2025-03-03 NOTE — PROGRESS NOTES
Name: Renetta Chan      : 1955      MRN: [id number]  Encounter Provider: Lydia Moore MD  Encounter Date: 3/3/2025   Encounter department: [facility]  :  Assessment & Plan  Cough, unspecified type    Orders:  •  azithromycin (Zithromax) 250 mg tablet; Take 2 tablets (500 mg total) by mouth daily for 1 day, THEN 1 tablet (250 mg total) daily for 4 days.    Sinus congestion    Orders:  •  azithromycin (Zithromax) 250 mg tablet; Take 2 tablets (500 mg total) by mouth daily for 1 day, THEN 1 tablet (250 mg total) daily for 4 days.    Seborrheic dermatitis of scalp    Orders:  •  Ambulatory Referral to Dermatology; Future    Urticaria    Orders:  •  Ambulatory Referral to Dermatology; Future  •  CBC; Future  •  Comprehensive metabolic panel; Future    Psoriasis    Orders:  •  Ambulatory Referral to Dermatology; Future    Abdominal bloating  Otc Mylanta Gas  Orders:  •  CBC; Future  •  Comprehensive metabolic panel; Future    Prediabetes    Orders:  •  Comprehensive metabolic panel; Future  •  Hemoglobin A1C; Future    Hypothyroidism, unspecified type    Orders:  •  TSH, 3rd generation; Future    Dyslipidemia    Orders:  •  Lipid Panel with Direct LDL reflex; Future  •  Lipase; Future    Overweight with body mass index (BMI) of 29 to 29.9 in adult                History of Present Illness   Cough  The current episode started 1 to 4 weeks ago. The cough is Non-productive. Associated symptoms include heartburn, nasal congestion, postnasal drip, a rash, rhinorrhea and a sore throat. Pertinent negatives include no chest pain, fever, hemoptysis or shortness of breath. Her past medical history is significant for bronchitis and environmental allergies.     Review of Systems   Constitutional:  Negative for fever.   HENT:  Positive for postnasal drip, rhinorrhea and sore throat.    Respiratory:  Positive for cough. Negative for hemoptysis and shortness of breath.    Cardiovascular:  Negative  "for chest pain.   Gastrointestinal:  Positive for heartburn.   Skin:  Positive for rash.   Allergic/Immunologic: Positive for environmental allergies.       Objective   /70 (BP Location: Left arm, Patient Position: Sitting, Cuff Size: Standard)   Pulse 86   Temp (!) 97.4 °F (36.3 °C) (Temporal)   Resp 15   Ht 5' 4\" (1.626 m)   Wt 77.7 kg (171 lb 3.2 oz)   SpO2 98%   BMI 29.39 kg/m²      Physical Exam  Vitals and nursing note reviewed.   Constitutional:       General: She is not in acute distress.     Appearance: Normal appearance.   HENT:      Nose: Congestion and rhinorrhea present.      Mouth/Throat:      Pharynx: Posterior oropharyngeal erythema present. No oropharyngeal exudate.   Eyes:      General: No scleral icterus.     Conjunctiva/sclera: Conjunctivae normal.   Cardiovascular:      Rate and Rhythm: Regular rhythm.      Comments: Borderline tachy  Pulmonary:      Effort: Pulmonary effort is normal. No respiratory distress.      Comments: No localized w/r/r  Abdominal:      General: Bowel sounds are normal.      Palpations: Abdomen is soft.      Tenderness: There is no abdominal tenderness.   Musculoskeletal:         General: Tenderness present.      Cervical back: Neck supple. No tenderness.   Skin:     General: Skin is warm and dry.      Coloration: Skin is not jaundiced.      Findings: Rash (hive-like lesions anterior neck/ACW/arms/back of head) present.   Neurological:      General: No focal deficit present.      Mental Status: She is alert and oriented to person, place, and time.      Cranial Nerves: No cranial nerve deficit.   Psychiatric:         Mood and Affect: Mood normal.         "

## 2025-03-03 NOTE — ASSESSMENT & PLAN NOTE
Orders:  •  Ambulatory Referral to Dermatology; Future  •  CBC; Future  •  Comprehensive metabolic panel; Future

## 2025-03-05 ENCOUNTER — TELEPHONE (OUTPATIENT)
Age: 70
End: 2025-03-05

## 2025-03-05 NOTE — TELEPHONE ENCOUNTER
Patient called in stated got blood work done on 03/03 and can see some results came back abnormal and would like to discus what next steps would be. Once reviewed patient would like a call back. Thank you.

## 2025-03-07 ENCOUNTER — OFFICE VISIT (OUTPATIENT)
Age: 70
End: 2025-03-07
Payer: COMMERCIAL

## 2025-03-07 ENCOUNTER — APPOINTMENT (OUTPATIENT)
Age: 70
End: 2025-03-07
Payer: COMMERCIAL

## 2025-03-07 VITALS
OXYGEN SATURATION: 94 % | SYSTOLIC BLOOD PRESSURE: 110 MMHG | HEART RATE: 113 BPM | HEIGHT: 64 IN | TEMPERATURE: 98.7 F | WEIGHT: 170 LBS | BODY MASS INDEX: 29.02 KG/M2 | DIASTOLIC BLOOD PRESSURE: 84 MMHG

## 2025-03-07 DIAGNOSIS — J44.1 COPD EXACERBATION (HCC): ICD-10-CM

## 2025-03-07 DIAGNOSIS — J45.31 MILD PERSISTENT ASTHMA WITH (ACUTE) EXACERBATION: Primary | ICD-10-CM

## 2025-03-07 DIAGNOSIS — J20.9 ACUTE TRACHEOBRONCHITIS: ICD-10-CM

## 2025-03-07 DIAGNOSIS — K21.9 GASTROESOPHAGEAL REFLUX DISEASE WITHOUT ESOPHAGITIS: Chronic | ICD-10-CM

## 2025-03-07 PROCEDURE — 87205 SMEAR GRAM STAIN: CPT

## 2025-03-07 PROCEDURE — 71046 X-RAY EXAM CHEST 2 VIEWS: CPT

## 2025-03-07 PROCEDURE — G2211 COMPLEX E/M VISIT ADD ON: HCPCS | Performed by: PHYSICIAN ASSISTANT

## 2025-03-07 PROCEDURE — 87070 CULTURE OTHR SPECIMN AEROBIC: CPT

## 2025-03-07 PROCEDURE — 99214 OFFICE O/P EST MOD 30 MIN: CPT | Performed by: PHYSICIAN ASSISTANT

## 2025-03-07 RX ORDER — BENZONATATE 200 MG/1
CAPSULE ORAL
COMMUNITY
Start: 2025-03-01

## 2025-03-07 RX ORDER — PREDNISONE 20 MG/1
TABLET ORAL
Qty: 11 TABLET | Refills: 0 | Status: SHIPPED | OUTPATIENT
Start: 2025-03-07 | End: 2025-03-16

## 2025-03-07 NOTE — PROGRESS NOTES
Follow-up  Visit - Pulmonary Medicine   Name: Renetta Chan      : 1955      MRN: [id number]  Encounter Provider: Andres Landa PA-C  Encounter Date: 3/7/2025   Encounter department: [facility] PULMONARY [facility]  :  Assessment & Plan  Mild persistent asthma with (acute) exacerbation  Patient with increasing cough, wheezing, chest tightness  She was given Z-Adam by her PCP with little improvement  Will give her a course of prednisone and also check a chest x-ray  Will check sputum culture given history of Serratia previously after an endoscopy  Will hold off on any additional antibiotics until after chest x-ray and sputum culture  She will continue with her Trelegy daily, albuterol 4 times per day as needed, Singulair  Orders:    XR chest pa and lateral; Future    predniSONE 20 mg tablet; Take 2 tablets (40 mg total) by mouth daily for 3 days, THEN 1 tablet (20 mg total) daily for 3 days, THEN 0.5 tablets (10 mg total) daily for 3 days.    Sputum culture and Gram stain; Future    Acute tracheobronchitis  Currently on a Z-Adam, will add course of prednisone  Will check a chest x-ray as well as sputum culture, history of Serratia previously in the sputum       Gastroesophageal reflux disease without esophagitis  Continue Pepcid and Protonix, patient with ongoing reflux symptoms possibly contributing to her exacerbations  Continue to follow-up with GI, reflux precautions         No follow-ups on file.    History of Present Illness   Renetta Chan is a 69 y.o. female with a past medical history of asthma, GERD, ERINN on CPAP, hyperlipidemia who presents for a sick visit.  Over the last few days she has noted increasing cough, chest tightness, chest congestion.  She was seen by her PCP and started on a Z-Adam but has not noted much improvement in her symptoms.  She is using her Trelegy daily with the nebulizer a few times per day.  She had an endoscopy done in November and had an asthma  exacerbation afterwards, grew Serratia in her sputum and was treated with antibiotics.    Review of Systems   Constitutional: Negative.    HENT: Negative.     Respiratory:  Positive for cough, chest tightness, shortness of breath and wheezing.    Cardiovascular: Negative.    Gastrointestinal: Negative.    Genitourinary: Negative.    Musculoskeletal: Negative.    Skin: Negative.    Allergic/Immunologic: Negative.    Neurological: Negative.    Psychiatric/Behavioral: Negative.         Aside from what is mentioned in the HPI, ROS is otherwise negative    Current Outpatient Medications on File Prior to Visit   Medication Sig Dispense Refill    albuterol (2.5 mg/3 mL) 0.083 % nebulizer solution Take 3 mL (2.5 mg total) by nebulization every 6 (six) hours as needed for wheezing or shortness of breath 180 mL 5    Ascorbic Acid (VITAMIN C) 1000 MG tablet       atorvastatin (LIPITOR) 20 mg tablet TAKE ONE TABLET BY MOUTH EVERY DAY AT BEDTIME 90 tablet 1    azithromycin (Zithromax) 250 mg tablet Take 2 tablets (500 mg total) by mouth daily for 1 day, THEN 1 tablet (250 mg total) daily for 4 days. 6 tablet 0    benzonatate (TESSALON) 200 MG capsule       cholecalciferol (VITAMIN D3) 1,000 units tablet Take 2,000 Units by mouth daily      clobetasol (TEMOVATE) 0.05 % cream Apply topically 2 (two) times a day Apply topically on scalp and right shoulder two times a day 60 g 2    Cymbalta 20 MG capsule Start at one tablet a night, and can increase to 2 tablets nightly 2 weeks later      famotidine (PEPCID) 40 MG tablet TAKE ONE TABLET BY MOUTH AT BEDTIME 90 tablet 1    fexofenadine (Allegra Allergy) 180 MG tablet Take by mouth daily      fluocinonide (LIDEX) 0.05 % external solution Apply topically every 12 (twelve) hours      fluticasone-umeclidinium-vilanterol (Trelegy Ellipta) 200-62.5-25 mcg/actuation AEPB inhaler Inhale 1 puff daily in the early morning Rinse mouth after use. 60 blister 5    gabapentin (NEURONTIN) 100 mg  "capsule       hydrocortisone (ANUSOL-HC) 2.5 % rectal cream Apply topically 2 (two) times a day 28 g 0    hydrocortisone 2.5 % cream Apply topically 2 (two) times a day scalp 20 g 0    metFORMIN (GLUCOPHAGE-XR) 500 mg 24 hr tablet TAKE ONE TABLET BY MOUTH EVERY DAY - TAKE WITH DINNER 90 tablet 1    methocarbamol (ROBAXIN) 500 mg tablet Take 1 tablet (500 mg total) by mouth 2 (two) times a day 20 tablet 0    montelukast (SINGULAIR) 10 mg tablet TAKE ONE TABLET BY MOUTH EVERY DAY 60 tablet 2    multivitamin (THERAGRAN) TABS Take 1 tablet by mouth daily Last dose 3/21/21      pantoprazole (PROTONIX) 40 mg tablet Take 1 tablet (40 mg total) by mouth daily 90 tablet 1    celecoxib (CeleBREX) 200 mg capsule Take 200 mg by mouth daily      sucralfate (CARAFATE) 1 g tablet Take 1 tablet (1 g total) by mouth 3 (three) times a day 30 tablet 0     No current facility-administered medications on file prior to visit.         Medical History Reviewed by provider this encounter:     .    Objective   /84   Pulse (!) 113   Temp 98.7 °F (37.1 °C)   Ht 5' 4\" (1.626 m)   Wt 77.1 kg (170 lb)   SpO2 94%   BMI 29.18 kg/m²     Physical Exam  Vitals reviewed.   Constitutional:       General: She is not in acute distress.     Appearance: Normal appearance. She is well-developed. She is not ill-appearing.   HENT:      Head: Normocephalic and atraumatic.      Mouth/Throat:      Pharynx: Oropharynx is clear.   Eyes:      Pupils: Pupils are equal, round, and reactive to light.   Cardiovascular:      Rate and Rhythm: Normal rate and regular rhythm.   Pulmonary:      Effort: Pulmonary effort is normal. No respiratory distress.      Breath sounds: Decreased air movement present. Decreased breath sounds and wheezing present. No rhonchi or rales.   Abdominal:      General: Abdomen is flat. There is no distension.   Musculoskeletal:         General: Normal range of motion.      Cervical back: Normal range of motion.      Right lower leg: " "No edema.      Left lower leg: No edema.   Skin:     General: Skin is warm and dry.      Findings: No rash.   Neurological:      Mental Status: She is alert and oriented to person, place, and time.   Psychiatric:         Mood and Affect: Mood normal.         Behavior: Behavior normal.           Diagnostic Data:  Labs: I personally reviewed the most recent laboratory data pertinent to today's visit.      Radiology results:  Radiology Results Review: I have reviewed radiology reports from December 2024 including: chest xray.      PFT/spirometry results:  No results found for: \"FEV1\", \"FVC\", \"XKO6IJG\", \"TLC\", \"DLCO\"       Oximetry testing:      Other studies:      Andres Landa PA-C      "

## 2025-03-07 NOTE — ASSESSMENT & PLAN NOTE
Continue Pepcid and Protonix, patient with ongoing reflux symptoms possibly contributing to her exacerbations  Continue to follow-up with GI, reflux precautions

## 2025-03-09 LAB
BACTERIA SPT RESP CULT: ABNORMAL
GRAM STN SPEC: ABNORMAL

## 2025-03-10 ENCOUNTER — OFFICE VISIT (OUTPATIENT)
Age: 70
End: 2025-03-10
Payer: COMMERCIAL

## 2025-03-10 ENCOUNTER — RESULTS FOLLOW-UP (OUTPATIENT)
Age: 70
End: 2025-03-10

## 2025-03-10 VITALS — BODY MASS INDEX: 29.02 KG/M2 | WEIGHT: 170 LBS | HEIGHT: 64 IN

## 2025-03-10 DIAGNOSIS — L56.8 PHOTOSENSITIVITY DERMATITIS: Primary | ICD-10-CM

## 2025-03-10 DIAGNOSIS — R21 RASH: ICD-10-CM

## 2025-03-10 LAB
BACTERIA SPT RESP CULT: ABNORMAL
GRAM STN SPEC: ABNORMAL

## 2025-03-10 PROCEDURE — 99214 OFFICE O/P EST MOD 30 MIN: CPT | Performed by: DERMATOLOGY

## 2025-03-10 NOTE — PROGRESS NOTES
"[facility] Note     Patient Name: Renetta Chan  Encounter Date: 3/10/2025     Have you been cared for by a [facility] Dermatologist in the last 3 years and, if so, which description applies to you?    Yes.  I have been here within the last 3 years, and my medical history has NOT changed since that time.  I am FEMALE/not of child-bearing potential.    REVIEW OF SYSTEMS:  Have you recently had or currently have any of the following? No changes in my recent health.   PAST MEDICAL HISTORY:  Have you personally ever had or currently have any of the following?  If \"YES,\" then please provide more detail. No changes in my medical history.   HISTORY OF IMMUNOSUPPRESSION: Do you have a history of any of the following:  Systemic Immunosuppression such as Diabetes, Biologic or Immunotherapy, Chemotherapy, Organ Transplantation, Bone Marrow Transplantation or Prednisone?  No     Answering \"YES\" requires the addition of the dotphrase \"IMMUNOSUPPRESSED\" as the first diagnosis of the patient's visit.   FAMILY HISTORY:  Any \"first degree relatives\" (parent, brother, sister, or child) with the following?    No changes in my family's known health.   PATIENT EXPERIENCE:    Do you want the Dermatologist to perform a COMPLETE skin exam today including a clinical examination under the \"bra and underwear\" areas?  NO  If necessary, do we have your permission to call and leave a detailed message on your Preferred Phone number that includes your specific medical information?  Yes      Allergies   Allergen Reactions    Latex Rash and Other (See Comments)     Burn-skin irritation    Burning    Adhesive [Medical Tape] Other (See Comments)     bandaid-red,itch      Current Outpatient Medications:     albuterol (2.5 mg/3 mL) 0.083 % nebulizer solution, Take 3 mL (2.5 mg total) by nebulization every 6 (six) hours as needed for wheezing or shortness of breath, Disp: 180 mL, Rfl: 5    Ascorbic Acid (VITAMIN C) 1000 MG tablet, , " Disp: , Rfl:     atorvastatin (LIPITOR) 20 mg tablet, TAKE ONE TABLET BY MOUTH EVERY DAY AT BEDTIME, Disp: 90 tablet, Rfl: 1    benzonatate (TESSALON) 200 MG capsule, , Disp: , Rfl:     cholecalciferol (VITAMIN D3) 1,000 units tablet, Take 2,000 Units by mouth daily, Disp: , Rfl:     clobetasol (TEMOVATE) 0.05 % cream, Apply topically 2 (two) times a day Apply topically on scalp and right shoulder two times a day, Disp: 60 g, Rfl: 2    Cymbalta 20 MG capsule, Start at one tablet a night, and can increase to 2 tablets nightly 2 weeks later, Disp: , Rfl:     famotidine (PEPCID) 40 MG tablet, TAKE ONE TABLET BY MOUTH AT BEDTIME, Disp: 90 tablet, Rfl: 1    fexofenadine (Allegra Allergy) 180 MG tablet, Take by mouth daily, Disp: , Rfl:     fluocinonide (LIDEX) 0.05 % external solution, Apply topically every 12 (twelve) hours, Disp: , Rfl:     gabapentin (NEURONTIN) 100 mg capsule, , Disp: , Rfl:     hydrocortisone (ANUSOL-HC) 2.5 % rectal cream, Apply topically 2 (two) times a day, Disp: 28 g, Rfl: 0    hydrocortisone 2.5 % cream, Apply topically 2 (two) times a day scalp, Disp: 20 g, Rfl: 0    metFORMIN (GLUCOPHAGE-XR) 500 mg 24 hr tablet, TAKE ONE TABLET BY MOUTH EVERY DAY - TAKE WITH DINNER, Disp: 90 tablet, Rfl: 1    methocarbamol (ROBAXIN) 500 mg tablet, Take 1 tablet (500 mg total) by mouth 2 (two) times a day, Disp: 20 tablet, Rfl: 0    montelukast (SINGULAIR) 10 mg tablet, TAKE ONE TABLET BY MOUTH EVERY DAY, Disp: 60 tablet, Rfl: 2    multivitamin (THERAGRAN) TABS, Take 1 tablet by mouth daily Last dose 3/21/21, Disp: , Rfl:     pantoprazole (PROTONIX) 40 mg tablet, Take 1 tablet (40 mg total) by mouth daily, Disp: 90 tablet, Rfl: 1    predniSONE 20 mg tablet, Take 2 tablets (40 mg total) by mouth daily for 3 days, THEN 1 tablet (20 mg total) daily for 3 days, THEN 0.5 tablets (10 mg total) daily for 3 days., Disp: 11 tablet, Rfl: 0    celecoxib (CeleBREX) 200 mg capsule, Take 200 mg by mouth daily, Disp: , Rfl:      fluticasone-umeclidinium-vilanterol (Trelegy Ellipta) 200-62.5-25 mcg/actuation AEPB inhaler, Inhale 1 puff daily in the early morning Rinse mouth after use., Disp: 60 blister, Rfl: 5    sucralfate (CARAFATE) 1 g tablet, Take 1 tablet (1 g total) by mouth 3 (three) times a day, Disp: 30 tablet, Rfl: 0          Whom besides the patient is providing clinical information about today's encounter?   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 Patient repordst that the cream she was geven didn't work. Patient reports that her rash is a bit better    Assessment and Plan:  Based on a thorough discussion of this condition and the management approach to it (including a comprehensive discussion of the known risks, side effects and potential benefits of treatment), the patient (family) agrees to implement the following specific plan:  Patient is to start Plaquenil 200 mg twice a day  Various Blood test ordered for patient (see orders)              Scribe Attestation      I,:  Debbie Nolan MA am acting as a scribe while in the presence of the attending physician.:       I,:  Brii Saini MD personally performed the services described in this documentation    as scribed in my presence.:

## 2025-03-11 DIAGNOSIS — D75.839 THROMBOCYTOSIS: ICD-10-CM

## 2025-03-11 DIAGNOSIS — E78.5 DYSLIPIDEMIA: Primary | ICD-10-CM

## 2025-03-11 DIAGNOSIS — E11.9 CONTROLLED TYPE 2 DIABETES MELLITUS WITHOUT COMPLICATION, WITHOUT LONG-TERM CURRENT USE OF INSULIN (HCC): ICD-10-CM

## 2025-03-11 RX ORDER — HYDROXYCHLOROQUINE SULFATE 200 MG/1
200 TABLET, FILM COATED ORAL 2 TIMES DAILY WITH MEALS
Qty: 60 TABLET | Refills: 5 | Status: SHIPPED | OUTPATIENT
Start: 2025-03-11 | End: 2025-04-10

## 2025-03-11 NOTE — TELEPHONE ENCOUNTER
Reviewed labs w pt--agreed t 3 mth trial of diet/exercise modifications prior to any medication adjustment.  If labs remain above range at next check will modify.

## 2025-03-12 ENCOUNTER — APPOINTMENT (OUTPATIENT)
Dept: LAB | Facility: CLINIC | Age: 70
End: 2025-03-12
Payer: COMMERCIAL

## 2025-03-12 DIAGNOSIS — R21 RASH: ICD-10-CM

## 2025-03-12 DIAGNOSIS — L56.8 PHOTOSENSITIVITY DERMATITIS: ICD-10-CM

## 2025-03-12 LAB
ALBUMIN SERPL BCG-MCNC: 4.4 G/DL (ref 3.5–5)
ALP SERPL-CCNC: 88 U/L (ref 34–104)
ALT SERPL W P-5'-P-CCNC: 35 U/L (ref 7–52)
ANION GAP SERPL CALCULATED.3IONS-SCNC: 9 MMOL/L (ref 4–13)
AST SERPL W P-5'-P-CCNC: 24 U/L (ref 13–39)
BASOPHILS # BLD AUTO: 0.19 THOUSANDS/ÂΜL (ref 0–0.1)
BASOPHILS NFR BLD AUTO: 1 % (ref 0–1)
BILIRUB SERPL-MCNC: 0.26 MG/DL (ref 0.2–1)
BUN SERPL-MCNC: 19 MG/DL (ref 5–25)
CALCIUM SERPL-MCNC: 10.2 MG/DL (ref 8.4–10.2)
CHLORIDE SERPL-SCNC: 101 MMOL/L (ref 96–108)
CO2 SERPL-SCNC: 25 MMOL/L (ref 21–32)
CREAT SERPL-MCNC: 0.78 MG/DL (ref 0.6–1.3)
EOSINOPHIL # BLD AUTO: 0.02 THOUSAND/ÂΜL (ref 0–0.61)
EOSINOPHIL NFR BLD AUTO: 0 % (ref 0–6)
ERYTHROCYTE [DISTWIDTH] IN BLOOD BY AUTOMATED COUNT: 13.3 % (ref 11.6–15.1)
ERYTHROCYTE [SEDIMENTATION RATE] IN BLOOD: 59 MM/HOUR (ref 0–29)
GFR SERPL CREATININE-BSD FRML MDRD: 76 ML/MIN/1.73SQ M
GLUCOSE P FAST SERPL-MCNC: 179 MG/DL (ref 65–99)
HCT VFR BLD AUTO: 44.1 % (ref 34.8–46.1)
HGB BLD-MCNC: 14.2 G/DL (ref 11.5–15.4)
IMM GRANULOCYTES # BLD AUTO: 0.36 THOUSAND/UL (ref 0–0.2)
IMM GRANULOCYTES NFR BLD AUTO: 2 % (ref 0–2)
LYMPHOCYTES # BLD AUTO: 3.4 THOUSANDS/ÂΜL (ref 0.6–4.47)
LYMPHOCYTES NFR BLD AUTO: 17 % (ref 14–44)
MCH RBC QN AUTO: 31.4 PG (ref 26.8–34.3)
MCHC RBC AUTO-ENTMCNC: 32.2 G/DL (ref 31.4–37.4)
MCV RBC AUTO: 98 FL (ref 82–98)
MONOCYTES # BLD AUTO: 1.48 THOUSAND/ÂΜL (ref 0.17–1.22)
MONOCYTES NFR BLD AUTO: 8 % (ref 4–12)
NEUTROPHILS # BLD AUTO: 14.12 THOUSANDS/ÂΜL (ref 1.85–7.62)
NEUTS SEG NFR BLD AUTO: 72 % (ref 43–75)
NRBC BLD AUTO-RTO: 0 /100 WBCS
PLATELET # BLD AUTO: 448 THOUSANDS/UL (ref 149–390)
PMV BLD AUTO: 10.8 FL (ref 8.9–12.7)
POTASSIUM SERPL-SCNC: 4.4 MMOL/L (ref 3.5–5.3)
PROT SERPL-MCNC: 8 G/DL (ref 6.4–8.4)
RBC # BLD AUTO: 4.52 MILLION/UL (ref 3.81–5.12)
SODIUM SERPL-SCNC: 135 MMOL/L (ref 135–147)
WBC # BLD AUTO: 19.57 THOUSAND/UL (ref 4.31–10.16)

## 2025-03-12 PROCEDURE — 80053 COMPREHEN METABOLIC PANEL: CPT

## 2025-03-12 PROCEDURE — 85652 RBC SED RATE AUTOMATED: CPT

## 2025-03-12 PROCEDURE — 36415 COLL VENOUS BLD VENIPUNCTURE: CPT

## 2025-03-12 PROCEDURE — 86235 NUCLEAR ANTIGEN ANTIBODY: CPT

## 2025-03-12 PROCEDURE — 86038 ANTINUCLEAR ANTIBODIES: CPT

## 2025-03-12 PROCEDURE — 86225 DNA ANTIBODY NATIVE: CPT

## 2025-03-12 PROCEDURE — 85025 COMPLETE CBC W/AUTO DIFF WBC: CPT

## 2025-03-13 ENCOUNTER — OFFICE VISIT (OUTPATIENT)
Dept: SLEEP CENTER | Facility: CLINIC | Age: 70
End: 2025-03-13
Payer: COMMERCIAL

## 2025-03-13 VITALS
WEIGHT: 170 LBS | SYSTOLIC BLOOD PRESSURE: 128 MMHG | HEART RATE: 74 BPM | DIASTOLIC BLOOD PRESSURE: 72 MMHG | OXYGEN SATURATION: 98 % | BODY MASS INDEX: 29.02 KG/M2 | HEIGHT: 64 IN

## 2025-03-13 DIAGNOSIS — J30.1 SEASONAL ALLERGIC RHINITIS DUE TO POLLEN: ICD-10-CM

## 2025-03-13 DIAGNOSIS — M79.7 FIBROMYALGIA: ICD-10-CM

## 2025-03-13 DIAGNOSIS — G47.33 OBSTRUCTIVE SLEEP APNEA HYPOPNEA, MILD: Primary | Chronic | ICD-10-CM

## 2025-03-13 DIAGNOSIS — E66.3 OVERWEIGHT (BMI 25.0-29.9): ICD-10-CM

## 2025-03-13 DIAGNOSIS — J45.30 MILD PERSISTENT ASTHMA WITHOUT COMPLICATION: Chronic | ICD-10-CM

## 2025-03-13 PROCEDURE — 99214 OFFICE O/P EST MOD 30 MIN: CPT | Performed by: INTERNAL MEDICINE

## 2025-03-13 PROCEDURE — G2211 COMPLEX E/M VISIT ADD ON: HCPCS | Performed by: INTERNAL MEDICINE

## 2025-03-13 NOTE — PROGRESS NOTES
standardName: Renetta Chan      : 1955      MRN: [id number]  Encounter Provider: Michelet Banuelos MD  Encounter Date: 3/13/2025   Encounter department: [facility] SLEEP MEDICINE [facility]  :  Assessment & Plan  Obstructive sleep apnea hypopnea, mild    Orders:    PAP DME Resupply/Reorder    Mild persistent asthma without complication         Seasonal allergic rhinitis due to pollen         Fibromyalgia         Overweight (BMI 25.0-29.9)                  PLAN:   Problems & Comorbidities Addressed this Visit as listed.  Above conditions as reviewed in notes are improved/stable/controlled/resolved.  I reviewed results of prior studies and physiologic data with the patient.   I discussed treatment options with risks and benefits.  Treatment with  PAP is medically necessary and Renetta is agreable to continue use.   Care of equipment, methods to improve comfort using PAP and importance of compliance with therapy were discussed.  Pressure setting: continue 6 cmH2O. Mask type nasal.    Rx provided to replace supplies and Care coordinated with DME provider.   The patient's current unit will have reached its 5 yr reasonable, useful life and needs to be replaced later this year.  She will call for a prescription few months prior to her next visit.  Discussed strategies for weight reduction.    Follow-up is advised in 1 year or sooner if needed to monitor progress, compliance and to adjust therapy.      History of Present Illness   HPI          Follow-Up Note - Sleep Center   Renetta Chan  69 y.o. female  :1955  MRN:[id number]  DOS:3/13/2025    CC: I saw this patient for follow-up in clinic today for Sleep disordered breathing, Coexisting Sleep and Medical Problems.  Patient received ot a refurbished dream Station Version 1 machine from [employer] over a year ago.. Interval changes: None reported.      Results of prior studies: PSG in  demonstrated AHI of 7.6/h, higher while supine.  Minimum oxygen saturation was  84%.  During the subsequent therapeutic study, sleep disordered breathing was successfully treated with CPAP at 6 cm H2O.    PFSH, Problem List, Medications & Allergies were reviewed in EMR.   She  has a past medical history of Abnormal glucose, Arthritis, Asthma, Atypical nevi, Atypical nevus, Breast lump, Cataract, Cervical adenopathy, Colon polyp, CPAP (continuous positive airway pressure) dependence, Diverticulitis of colon, Dizziness, Dyslipidemia, Facial droop, Fibromyalgia, primary, Flu (01/20/2018), Foot abrasion, Fractures, Genital herpes, GERD (gastroesophageal reflux disease), Headache, tension-type, Herpes zoster, History of shingles, History of stomach ulcers, abnormal mammogram, Hyperlipidemia, Myalgia, Myositis, Neck pain, Pain involving joints of fingers of both hands (01/19/2021), Peripheral neuropathy, Seborrheic keratosis, Shingles, Skin disorder, Skin neoplasm, Sleep apnea, and Stomach disorder.    She has a current medication list which includes the following prescription(s): albuterol, vitamin c, atorvastatin, benzonatate, celecoxib, cholecalciferol, clobetasol, cymbalta, famotidine, fexofenadine, fluocinonide, trelegy ellipta, gabapentin, hydrocortisone, hydrocortisone, hydroxychloroquine, metformin, methocarbamol, montelukast, multivitamin, pantoprazole, prednisone, and sucralfate.    PHYSIOLOGICAL DATA REVIEW : Using PAP > 4 hours/night 97%. Estimated EUSEBIO 2.3/hour with pressure of 6cm H2O@90th/95th percentile;.  INTERPRETATION: Compliance is excellent; Pressure setting is:within target range; ;     SUBJECTIVE: With respect to use of PAP, Renetta  is experiencing no adverse effects:.She derives benefit.. Is satisfied with sleep and daytime function.     Sleep Routine: Renetta reports getting (Patient-Rptd) (P) 7 hrs sleep; she has no difficulty initiating or maintaining sleep . She arises needing an alarm and (Patient-Rptd) (P) Usually feels refreshed.Renetta denies   daytime sleepiness,.  She  "rated herself at Total score: (Patient-Rptd) (P) 0 /24 on the [place] Sleepiness Scale.   Other issues: None reported.     Habits: Reports that she has never smoked. She has never used smokeless tobacco.,  Reports no history of alcohol use.,  Reports no history of drug use., Caffeine use: limited until  ; Exercise routine: \"not enough\".      ROS: Significant for weight has been stable.  Allergies are controlled but asthma is not..    Objective   There were no vitals taken for this visit.       EXAM: There were no vitals taken for this visit.    Wt Readings from Last 3 Encounters:   03/10/25 77.1 kg (170 lb)   03/07/25 77.1 kg (170 lb)   03/03/25 77.7 kg (171 lb 3.2 oz)      Patient is well groomed; well appearing.   CNS: Alert, orientated, speech clear & coherent  Psych: cooperative and in no distress. Mental state: Appears normal.  H&N: EOMI; NC/AT: No facial pressure marks, no rashes.    Skin/Extrem: col & hydration normal; no edema  Resp: Respiratory effort is normal  Physical findings otherwise essentially unchanged from previous.    Sincerely,     Authenticated electronically on 03/13/25   Board Certified Specialist     Portions of the record may have been created with voice recognition software. Occasional wrong word or \"sound a like\" substitutions may have occurred due to the inherent limitations of voice recognition software. There may also be notations and random deletions of words or characters from malfunctioning software. Read the chart carefully and recognize, using context, where substitutions/deletions have occurred.       Review of Systems            "

## 2025-03-14 ENCOUNTER — TELEPHONE (OUTPATIENT)
Age: 70
End: 2025-03-14

## 2025-03-14 ENCOUNTER — NURSE TRIAGE (OUTPATIENT)
Age: 70
End: 2025-03-14

## 2025-03-14 ENCOUNTER — TELEPHONE (OUTPATIENT)
Dept: SLEEP CENTER | Facility: CLINIC | Age: 70
End: 2025-03-14

## 2025-03-14 LAB
DME PARACHUTE DELIVERY DATE REQUESTED: NORMAL
DME PARACHUTE ITEM DESCRIPTION: NORMAL
DME PARACHUTE ORDER STATUS: NORMAL
DME PARACHUTE SUPPLIER NAME: NORMAL
DME PARACHUTE SUPPLIER PHONE: NORMAL

## 2025-03-14 NOTE — TELEPHONE ENCOUNTER
FOLLOW UP: Dr. Serrano    REASON FOR CONVERSATION: Cough    SYMPTOMS: Shortness of breath, chest tightness, hoarseness    OTHER: Recommended Urgent Care, pt wants providers advice prior to go.    DISPOSITION: Go to ED/UCC Now (Or to Office with PCP Approval)          Spoke with pt she states this has been going on for 4 months since the 22nd after the endoscopy. She states the sputum test came back positive. She was then on abx for some time with Prednisone. She states she is still having the same issue going on, most recent Xray and Sputum came back negative. She states she is coughing and having a lot of phlegm it was clear, then turned yellow, now is green as yesterday. She states she needs to go to the hospital to have the procedure done to clean out her bronchials. Her voice is hoarse from all the effort she has to use to get the phlegm out. She says everything hurts because of dealing with this for four months.       She uses the nebulizer twice and that she takes 1 treatment and the other. Taking Trelegy and Singulair with no relief. Doing Pepcid and Protonix. She is even trying watercress, radish, garlic, honey with salt. She uses her CPAP, she states even uses a portable nebulizer that she can carry. Looking for advice and what to do. She doesn't want to go to ED. She is afraid that they are going to just make her get labs done and not actually treat her symptoms. Informed pt that if her symptoms have gotten worse she should go at least to Urgent Care to be evaluated.       She has tested for flu and covid and both negative. It's the fluid stuck in her lungs.                   Regarding: patient not felling well  ----- Message from Berkley MCALLISTER sent at 3/14/2025 11:55 AM EDT -----  Patient is still not feeling well congestion cough and phlegm please assist patient with a soon appt thank you

## 2025-03-14 NOTE — TELEPHONE ENCOUNTER
"Reason for Disposition  • Increasing difficulty breathing and always has some difficulty breathing    Answer Assessment - Initial Assessment Questions  1. ONSET: \"When did the cough begin?\"       4 months  2. SEVERITY: \"How bad is the cough today?\"       Moderate  3. SPUTUM: \"Describe the color of your sputum\" (e.g., none, dry cough; clear, white, yellow, green)      Green  4. HEMOPTYSIS: \"Are you coughing up any blood?\" If so ask: \"How much?\" (e.g., flecks, streaks, tablespoons, etc.)      none  5. DIFFICULTY BREATHING: \"Are you having difficulty breathing?\" If Yes, ask: \"How bad is it?\" (e.g., mild, moderate, severe)       Yes moderate  6. FEVER: \"Do you have a fever?\" If Yes, ask: \"What is your temperature, how was it measured, and when did it start?\"      none  7. CARDIAC HISTORY: \"Do you have any history of heart disease?\" (e.g., heart attack, congestive heart failure)       See chart  8. LUNG HISTORY: \"Do you have any history of lung disease?\"  (e.g., pulmonary embolus, asthma, emphysema)      See chart  9. PE RISK FACTORS: \"Do you have a history of blood clots?\" (or: recent major surgery, recent prolonged travel, bedridden)      See chart  10. OTHER SYMPTOMS: \"Do you have any other symptoms?\" (e.g., runny nose, wheezing, chest pain)        Chest tightness, congestion    Protocols used: Cough - Chronic-Adult-[place]    " Bed: ED20  Expected date:   Expected time:   Means of arrival:   Comments:  510-21F Allergic reaction/Epi 2x

## 2025-03-16 DIAGNOSIS — K21.9 GASTROESOPHAGEAL REFLUX DISEASE WITHOUT ESOPHAGITIS: ICD-10-CM

## 2025-03-17 LAB
DSDNA IGG SERPL IA-ACNC: 0.9 IU/ML (ref ?–15)
NUCLEAR IGG SER IA-RTO: 0.3 RATIO (ref ?–1)

## 2025-03-17 RX ORDER — PANTOPRAZOLE SODIUM 40 MG/1
40 TABLET, DELAYED RELEASE ORAL DAILY
Qty: 90 TABLET | Refills: 1 | Status: SHIPPED | OUTPATIENT
Start: 2025-03-17

## 2025-03-17 NOTE — TELEPHONE ENCOUNTER
Do you want to try calling her again today? I had seen her in the office and she wants a bronchoscopy. I did tell her that if the CXR looks clear that likely would not be necessary - CXR was clear and her sputum culture did not grow anything that requires treatment. Not sure if you wanted to give her antibiotics now based on her symptoms as it has been several more days since I saw her

## 2025-03-17 NOTE — TELEPHONE ENCOUNTER
Pt calling as she states she is returning call that stated she was to come in for an apt this Wednesday. No message in chart or on apt desk, she would like a cb to help her schedule a f/u apt

## 2025-03-18 ENCOUNTER — RESULTS FOLLOW-UP (OUTPATIENT)
Age: 70
End: 2025-03-18

## 2025-03-18 LAB
DSDNA IGG SERPL IA-ACNC: <0.9 IU/ML (ref ?–15)
ENA SS-A AB SER IA-ACNC: <0.5 U/ML (ref ?–10)
ENA SS-B IGG SER IA-ACNC: <0.6 U/ML (ref ?–10)

## 2025-03-19 ENCOUNTER — OFFICE VISIT (OUTPATIENT)
Dept: PULMONOLOGY | Facility: MEDICAL CENTER | Age: 70
End: 2025-03-19
Payer: COMMERCIAL

## 2025-03-19 VITALS
HEIGHT: 64 IN | SYSTOLIC BLOOD PRESSURE: 122 MMHG | WEIGHT: 168 LBS | BODY MASS INDEX: 28.68 KG/M2 | OXYGEN SATURATION: 95 % | TEMPERATURE: 98.4 F | DIASTOLIC BLOOD PRESSURE: 82 MMHG | HEART RATE: 86 BPM

## 2025-03-19 DIAGNOSIS — J30.1 SEASONAL ALLERGIC RHINITIS DUE TO POLLEN: ICD-10-CM

## 2025-03-19 DIAGNOSIS — R05.3 PERSISTENT COUGH: Primary | ICD-10-CM

## 2025-03-19 DIAGNOSIS — J45.30 MILD PERSISTENT ASTHMA WITHOUT COMPLICATION: Chronic | ICD-10-CM

## 2025-03-19 PROCEDURE — 99214 OFFICE O/P EST MOD 30 MIN: CPT | Performed by: INTERNAL MEDICINE

## 2025-03-19 RX ORDER — DEXTROMETHORPHAN HYDROBROMIDE AND PROMETHAZINE HYDROCHLORIDE 15; 6.25 MG/5ML; MG/5ML
5 SYRUP ORAL 4 TIMES DAILY PRN
Qty: 180 ML | Refills: 0 | Status: SHIPPED | OUTPATIENT
Start: 2025-03-19

## 2025-03-19 RX ORDER — BENZONATATE 200 MG/1
200 CAPSULE ORAL 2 TIMES DAILY PRN
Qty: 60 CAPSULE | Refills: 1 | Status: SHIPPED | OUTPATIENT
Start: 2025-03-19

## 2025-03-19 NOTE — PATIENT INSTRUCTIONS
Try using ipratropium-albuterol solution and new nebulizer instead of plain albuterol.  This may help your cough more    Continue honey-onion cough mixture    Can use benzonatate 200 mg p.o. twice a day as needed for cough    Dextromethorphan-promethazine 1 teaspoonful up to 4 times a day as needed.  Cough should get better over next few weeks    Continue Trelegy 200 mg 1 puff daily

## 2025-03-19 NOTE — PROGRESS NOTES
Assessment & Plan        Problem List Items Addressed This Visit          Respiratory    Mild persistent asthma without complication (Chronic)    She will continue Trelegy 200 mcg 1 puff daily.  She does have belies her home with albuterol 2.5 mg she can use as needed.         Allergic rhinitis    She will continue with Allegra as needed for her allergy symptoms.  Can also use Flonase nasal spray.            Other    Persistent cough - Primary    She does have a persistent cough but it is gradually improving.  She was treated for bronchitis with antibiotic therapy and just finished prednisone taper.  Her cough is gradually improving.  A sputum culture on 3/7/2025 was negative for any specific organisms.  Chest x-ray done same day was normal.    I did give her prescription for benzonatate 200 mg as this has helped her cough.  She can use this as needed         Relevant Medications    benzonatate (TESSALON) 200 MG capsule    promethazine-dextromethorphan (PHENERGAN-DM) 6.25-15 mg/5 mL oral syrup         Cc:  persistentcough      General  Associated symptoms include chest pain, coughing, headaches, myalgias and a sore throat.       Renetta started with a cough about 4 months ago.  Is aware it has waxed and waned.  She had upper endoscopy done on 11/22/2024 by Dr. Cruz.  Esophagus appeared normal.  She did have some mild linear erythema noted in the stomach and biopsies were done for H. pylori.  Think she may had mild aspiration during procedure as she started to cough within a day or 2 after the procedure was done and cough is persistent since then he was seen in office December 11 complaining for cough and she was prescribed Ceftin 500 mg twice daily for 5 days for acute bronchitis.  A chest x-ray was done same day and this was normal with clear lung fields.    Sputum culture had been ordered and this was collected on 12/16/2024 and showed +3 growth of Serratia marcescens.  This was resistant to cefazolin and  cefuroxime.  It was sensitive to levofloxacin.  Her cough did not improved and subsequently on 12/26/25 she was prescribed Levaquin 500 mg daily for 7 days.  She says this initially did help with cough to persist.      Sometimes will expectorate white mucus with some mucous plugs.  No fever or chills.    His cough was productive of some yellow mucus but now mucus is white.  He was then seen by one of her associates on 3/7/2025 because of her cough she had been on Z-Adam that time.  A few wheezes noted.  She was prescribed prednisone beginning at 40 mg daily for 3 days then 20 mg for 3 days then 10 mg for 3 days.  She finished prednisone a couple days can see if her cough is better now.  Not having wheezing or shortness of breath.  Not have any gastric reflux symptoms.  The sputum culture that was ordered on 3/7/2025 was negative and repeat chest x-ray done the same day on 3/7 was normal.    Renetta does have mild persistent asthma and is on Trelegy 200 mcg 1 puff daily.  Has been using her nebulizer albuterol periodically.  This seems to help.  Also she was given benzonatate 200 mg for this and it helped.  Does not have any left.    She does have mild persistent asthma      Past Medical History:   Diagnosis Date    Abnormal glucose     last assessed 2/25/16    Arthritis     Asthma     w/ exacerbation; last assessed 5/14/15    Atypical nevi     Atypical nevus     last assessed 1/18/17    Breast lump     last assessed 3/6/14    Cataract     Cervical adenopathy     last assessed  2/7/17    Colon polyp     CPAP (continuous positive airway pressure) dependence     Diverticulitis of colon     Dizziness     Dyslipidemia     last assessed 5/22/17    Facial droop     last assessed  12/5/16    Fibromyalgia, primary     Flu 01/20/2018    Foot abrasion     right between the 4th and 5th toe    Fractures     Genital herpes     resolved 9/1/16    GERD (gastroesophageal reflux disease)     Headache, tension-type     Herpes zoster      last assessed 16    History of shingles     may 2016    History of stomach ulcers     Hx of abnormal mammogram     last assessed  3/5/14    Hyperlipidemia     Myalgia     last assessed  12    Myositis     12    Neck pain     Pain involving joints of fingers of both hands 2021    Peripheral neuropathy     Seborrheic keratosis     Shingles     Skin disorder     Skin neoplasm     of the lower limb, including hip; onset 12; last assessed  12    Sleep apnea     Stomach disorder        Past Surgical History:   Procedure Laterality Date    ABDOMINAL SURGERY      release of adhesions    BLADDER SURGERY      mesh-lift    BREAST CYST ASPIRATION Right     does not know the year    BREAST SURGERY      lift    CATARACT EXTRACTION Bilateral      SECTION      x 3-,,    CHOLECYSTECTOMY      lap    COLONOSCOPY      COSMETIC SURGERY      tummy and breast lift    ESOPHAGOGASTRODUODENOSCOPY      EYE SURGERY   right eye and 3/11 left eye    OOPHORECTOMY Left     OTHER SURGICAL HISTORY      vocal cord surgery, scraping    VT HYSTEROSCOPY BX ENDOMETRIUM&/POLYPC W/WO D&C N/A 2016    Procedure: DILATATION AND CURETTAGE (D&C) WITH HYSTEROSCOPY;  Surgeon: Kyle Bethea MD;  Location: [facility];  Service: Gynecology    REDUCTION MAMMAPLASTY Bilateral 2008    TONSILLECTOMY      TUBAL LIGATION  10/29/1984    US GUIDED MSK PROCEDURE  2021         Current Outpatient Medications:     benzonatate (TESSALON) 200 MG capsule, Take 1 capsule (200 mg total) by mouth 2 (two) times a day as needed for cough, Disp: 60 capsule, Rfl: 1    promethazine-dextromethorphan (PHENERGAN-DM) 6.25-15 mg/5 mL oral syrup, Take 5 mL by mouth 4 (four) times a day as needed for cough, Disp: 180 mL, Rfl: 0    albuterol (2.5 mg/3 mL) 0.083 % nebulizer solution, Take 3 mL (2.5 mg total) by nebulization every 6 (six) hours as needed for wheezing or shortness of breath, Disp: 180 mL, Rfl: 5    Ascorbic Acid  (VITAMIN C) 1000 MG tablet, , Disp: , Rfl:     atorvastatin (LIPITOR) 20 mg tablet, TAKE ONE TABLET BY MOUTH EVERY DAY AT BEDTIME, Disp: 90 tablet, Rfl: 1    celecoxib (CeleBREX) 200 mg capsule, Take 200 mg by mouth daily, Disp: , Rfl:     cholecalciferol (VITAMIN D3) 1,000 units tablet, Take 2,000 Units by mouth daily, Disp: , Rfl:     clobetasol (TEMOVATE) 0.05 % cream, Apply topically 2 (two) times a day Apply topically on scalp and right shoulder two times a day, Disp: 60 g, Rfl: 2    Cymbalta 20 MG capsule, Start at one tablet a night, and can increase to 2 tablets nightly 2 weeks later, Disp: , Rfl:     famotidine (PEPCID) 40 MG tablet, TAKE ONE TABLET BY MOUTH AT BEDTIME, Disp: 90 tablet, Rfl: 1    fexofenadine (Allegra Allergy) 180 MG tablet, Take by mouth daily, Disp: , Rfl:     fluocinonide (LIDEX) 0.05 % external solution, Apply topically every 12 (twelve) hours, Disp: , Rfl:     fluticasone-umeclidinium-vilanterol (Trelegy Ellipta) 200-62.5-25 mcg/actuation AEPB inhaler, Inhale 1 puff daily in the early morning Rinse mouth after use., Disp: 60 blister, Rfl: 5    gabapentin (NEURONTIN) 100 mg capsule, , Disp: , Rfl:     hydrocortisone (ANUSOL-HC) 2.5 % rectal cream, Apply topically 2 (two) times a day, Disp: 28 g, Rfl: 0    hydrocortisone 2.5 % cream, Apply topically 2 (two) times a day scalp, Disp: 20 g, Rfl: 0    hydroxychloroquine (PLAQUENIL) 200 mg tablet, Take 1 tablet (200 mg total) by mouth 2 (two) times a day with meals, Disp: 60 tablet, Rfl: 5    metFORMIN (GLUCOPHAGE-XR) 500 mg 24 hr tablet, TAKE ONE TABLET BY MOUTH EVERY DAY - TAKE WITH DINNER, Disp: 90 tablet, Rfl: 1    methocarbamol (ROBAXIN) 500 mg tablet, Take 1 tablet (500 mg total) by mouth 2 (two) times a day, Disp: 20 tablet, Rfl: 0    montelukast (SINGULAIR) 10 mg tablet, TAKE ONE TABLET BY MOUTH EVERY DAY, Disp: 60 tablet, Rfl: 2    multivitamin (THERAGRAN) TABS, Take 1 tablet by mouth daily Last dose 3/21/21, Disp: , Rfl:      "pantoprazole (PROTONIX) 40 mg tablet, TAKE ONE TABLET BY MOUTH EVERY DAY, Disp: 90 tablet, Rfl: 1    sucralfate (CARAFATE) 1 g tablet, Take 1 tablet (1 g total) by mouth 3 (three) times a day, Disp: 30 tablet, Rfl: 0    Allergies   Allergen Reactions    Latex Rash and Other (See Comments)     Burn-skin irritation    Burning    Adhesive [Medical Tape] Other (See Comments)     bandaid-red,itch       Social History     Tobacco Use    Smoking status: Never    Smokeless tobacco: Never   Substance Use Topics    Alcohol use: No     Comment: rarely         Family History   Problem Relation Age of Onset    Hypertension Mother     Alzheimer's disease Mother     Arthritis Mother     Dementia Mother     Hypertension Father     Arthritis Father     Heart attack Father     Heart disease Father     Stroke Father     Diabetes Daughter     Breast cancer Sister 32    Skin cancer Sister     Cancer Sister     Abdominal aortic aneurysm Sister     Cancer Sister         T cell sarcoma    Breast cancer Sister     No Known Problems Brother     Diabetes Brother     No Known Problems Son     No Known Problems Maternal Aunt     No Known Problems Maternal Aunt     No Known Problems Paternal Aunt     No Known Problems Paternal Aunt     No Known Problems Paternal Aunt     Asthma Sister     Cancer Brother        Review of Systems   Constitutional:  Positive for appetite change.   HENT:  Positive for ear pain, postnasal drip, rhinorrhea, sneezing and sore throat.    Eyes:  Negative for redness.   Respiratory:  Positive for cough and wheezing.    Cardiovascular:  Positive for chest pain.   Endocrine: Negative for polydipsia and polyphagia.   Musculoskeletal:  Positive for myalgias.   Neurological:  Positive for headaches.   Psychiatric/Behavioral:  Negative for decreased concentration.            Vitals:    03/19/25 1149   BP: 122/82   Pulse: 86   Temp: 98.4 °F (36.9 °C)   SpO2: 95%     Height: 5' 4\" (162.6 cm)  IBW (Ideal Body Weight): 54.7 kg  " Body mass index is 28.84 kg/m².  Weight (last 2 days)       None                Physical Exam  Vitals reviewed.   Constitutional:       General: She is not in acute distress.     Appearance: Normal appearance. She is well-developed.   HENT:      Head: Normocephalic.      Right Ear: External ear normal.      Left Ear: External ear normal.      Nose: Nose normal.      Mouth/Throat:      Mouth: Mucous membranes are moist.      Pharynx: Oropharynx is clear. No oropharyngeal exudate.   Eyes:      Conjunctiva/sclera: Conjunctivae normal.      Pupils: Pupils are equal, round, and reactive to light.   Cardiovascular:      Rate and Rhythm: Normal rate and regular rhythm.      Heart sounds: Normal heart sounds.   Pulmonary:      Effort: Pulmonary effort is normal.      Comments: Lung sounds are clear.  No wheezes, crackles or rhonchi  Abdominal:      General: There is no distension.      Palpations: Abdomen is soft.      Tenderness: There is no abdominal tenderness.   Musculoskeletal:      Cervical back: Neck supple.      Comments: no edema, cyanosis, or clubbing   Skin:     General: Skin is warm and dry.   Neurological:      General: No focal deficit present.      Mental Status: She is alert and oriented to person, place, and time.   Psychiatric:         Mood and Affect: Mood normal.         Behavior: Behavior normal.         Thought Content: Thought content normal.

## 2025-03-21 ENCOUNTER — TELEPHONE (OUTPATIENT)
Age: 70
End: 2025-03-21

## 2025-03-21 NOTE — TELEPHONE ENCOUNTER
Patient is calling in to get status on when can she start Wegovy. Maintenance.     She had her labs done and is waiting on the next steps.    She is eager to start the medication to loose weight    Please advise

## 2025-03-22 NOTE — ASSESSMENT & PLAN NOTE
She does have a persistent cough but it is gradually improving.  She was treated for bronchitis with antibiotic therapy and just finished prednisone taper.  Her cough is gradually improving.  A sputum culture on 3/7/2025 was negative for any specific organisms.  Chest x-ray done same day was normal.    I did give her prescription for benzonatate 200 mg as this has helped her cough.  She can use this as needed

## 2025-03-22 NOTE — ASSESSMENT & PLAN NOTE
She will continue Trelegy 200 mcg 1 puff daily.  She does have belies her home with albuterol 2.5 mg she can use as needed.

## 2025-03-22 NOTE — ASSESSMENT & PLAN NOTE
She will continue with Allegra as needed for her allergy symptoms.  Can also use Flonase nasal spray.

## 2025-03-27 NOTE — TELEPHONE ENCOUNTER
Patient called regarding next steps to start wegovy which she says was discussed at last visit with Dr Moore. I advised that Dr Moore is out of the office until 3/31.

## 2025-04-02 ENCOUNTER — OFFICE VISIT (OUTPATIENT)
Age: 70
End: 2025-04-02
Payer: COMMERCIAL

## 2025-04-02 ENCOUNTER — OFFICE VISIT (OUTPATIENT)
Dept: FAMILY MEDICINE CLINIC | Facility: CLINIC | Age: 70
End: 2025-04-02
Payer: COMMERCIAL

## 2025-04-02 VITALS — TEMPERATURE: 97.7 F | WEIGHT: 168.7 LBS | BODY MASS INDEX: 28.8 KG/M2 | HEIGHT: 64 IN

## 2025-04-02 DIAGNOSIS — E78.5 DYSLIPIDEMIA: ICD-10-CM

## 2025-04-02 DIAGNOSIS — D72.829 LEUKOCYTOSIS, UNSPECIFIED TYPE: ICD-10-CM

## 2025-04-02 DIAGNOSIS — K21.9 GASTROESOPHAGEAL REFLUX DISEASE WITHOUT ESOPHAGITIS: ICD-10-CM

## 2025-04-02 DIAGNOSIS — L56.8 PHOTOSENSITIVITY DERMATITIS: Primary | ICD-10-CM

## 2025-04-02 DIAGNOSIS — E11.9 CONTROLLED TYPE 2 DIABETES MELLITUS WITHOUT COMPLICATION, WITHOUT LONG-TERM CURRENT USE OF INSULIN (HCC): Primary | ICD-10-CM

## 2025-04-02 DIAGNOSIS — E66.3 OVERWEIGHT WITH BODY MASS INDEX (BMI) OF 29 TO 29.9 IN ADULT: ICD-10-CM

## 2025-04-02 DIAGNOSIS — L50.0 ALLERGIC URTICARIA: ICD-10-CM

## 2025-04-02 PROCEDURE — 99214 OFFICE O/P EST MOD 30 MIN: CPT | Performed by: DERMATOLOGY

## 2025-04-02 PROCEDURE — G2211 COMPLEX E/M VISIT ADD ON: HCPCS | Performed by: FAMILY MEDICINE

## 2025-04-02 PROCEDURE — 99213 OFFICE O/P EST LOW 20 MIN: CPT | Performed by: FAMILY MEDICINE

## 2025-04-02 NOTE — PROGRESS NOTES
"[facility] Note     Patient Name: Renetta Chan  Encounter Date: 4/2/25     Have you been cared for by a [facility] Dermatologist in the last 3 years and, if so, which description applies to you?    Yes.  I have been here within the last 3 years, and my medical history has NOT changed since that time.  I am FEMALE/of child-bearing potential.    REVIEW OF SYSTEMS:  Have you recently had or currently have any of the following? No changes in my recent health.   PAST MEDICAL HISTORY:  Have you personally ever had or currently have any of the following?  If \"YES,\" then please provide more detail. No changes in my medical history.   HISTORY OF IMMUNOSUPPRESSION: Do you have a history of any of the following:  Systemic Immunosuppression such as Diabetes, Biologic or Immunotherapy, Chemotherapy, Organ Transplantation, Bone Marrow Transplantation or Prednisone?  No     Answering \"YES\" requires the addition of the dotphrase \"IMMUNOSUPPRESSED\" as the first diagnosis of the patient's visit.   FAMILY HISTORY:  Any \"first degree relatives\" (parent, brother, sister, or child) with the following?    No changes in my family's known health.   PATIENT EXPERIENCE:    Do you want the Dermatologist to perform a COMPLETE skin exam today including a clinical examination under the \"bra and underwear\" areas?  NO  If necessary, do we have your permission to call and leave a detailed message on your Preferred Phone number that includes your specific medical information?  Yes      Allergies   Allergen Reactions    Latex Rash and Other (See Comments)     Burn-skin irritation    Burning    Adhesive [Medical Tape] Other (See Comments)     bandaid-red,itch      Current Outpatient Medications:     albuterol (2.5 mg/3 mL) 0.083 % nebulizer solution, Take 3 mL (2.5 mg total) by nebulization every 6 (six) hours as needed for wheezing or shortness of breath, Disp: 180 mL, Rfl: 5    Ascorbic Acid (VITAMIN C) 1000 MG tablet, , Disp: , " Rfl:     atorvastatin (LIPITOR) 20 mg tablet, TAKE ONE TABLET BY MOUTH EVERY DAY AT BEDTIME, Disp: 90 tablet, Rfl: 1    benzonatate (TESSALON) 200 MG capsule, Take 1 capsule (200 mg total) by mouth 2 (two) times a day as needed for cough, Disp: 60 capsule, Rfl: 1    cholecalciferol (VITAMIN D3) 1,000 units tablet, Take 2,000 Units by mouth daily, Disp: , Rfl:     clobetasol (TEMOVATE) 0.05 % cream, Apply topically 2 (two) times a day Apply topically on scalp and right shoulder two times a day, Disp: 60 g, Rfl: 2    Cymbalta 20 MG capsule, Start at one tablet a night, and can increase to 2 tablets nightly 2 weeks later, Disp: , Rfl:     famotidine (PEPCID) 40 MG tablet, TAKE ONE TABLET BY MOUTH AT BEDTIME, Disp: 90 tablet, Rfl: 1    fexofenadine (Allegra Allergy) 180 MG tablet, Take by mouth daily, Disp: , Rfl:     fluocinonide (LIDEX) 0.05 % external solution, Apply topically every 12 (twelve) hours, Disp: , Rfl:     gabapentin (NEURONTIN) 100 mg capsule, , Disp: , Rfl:     hydrocortisone (ANUSOL-HC) 2.5 % rectal cream, Apply topically 2 (two) times a day, Disp: 28 g, Rfl: 0    hydrocortisone 2.5 % cream, Apply topically 2 (two) times a day scalp, Disp: 20 g, Rfl: 0    hydroxychloroquine (PLAQUENIL) 200 mg tablet, Take 1 tablet (200 mg total) by mouth 2 (two) times a day with meals, Disp: 60 tablet, Rfl: 5    metFORMIN (GLUCOPHAGE-XR) 500 mg 24 hr tablet, TAKE ONE TABLET BY MOUTH EVERY DAY - TAKE WITH DINNER, Disp: 90 tablet, Rfl: 1    methocarbamol (ROBAXIN) 500 mg tablet, Take 1 tablet (500 mg total) by mouth 2 (two) times a day, Disp: 20 tablet, Rfl: 0    multivitamin (THERAGRAN) TABS, Take 1 tablet by mouth daily Last dose 3/21/21, Disp: , Rfl:     pantoprazole (PROTONIX) 40 mg tablet, TAKE ONE TABLET BY MOUTH EVERY DAY, Disp: 90 tablet, Rfl: 1    promethazine-dextromethorphan (PHENERGAN-DM) 6.25-15 mg/5 mL oral syrup, Take 5 mL by mouth 4 (four) times a day as needed for cough, Disp: 180 mL, Rfl: 0     sucralfate (CARAFATE) 1 g tablet, Take 1 tablet (1 g total) by mouth 3 (three) times a day, Disp: 30 tablet, Rfl: 0    celecoxib (CeleBREX) 200 mg capsule, Take 200 mg by mouth daily, Disp: , Rfl:     fluticasone-umeclidinium-vilanterol (Trelegy Ellipta) 200-62.5-25 mcg/actuation AEPB inhaler, Inhale 1 puff daily in the early morning Rinse mouth after use., Disp: 60 blister, Rfl: 5    montelukast (SINGULAIR) 10 mg tablet, TAKE ONE TABLET BY MOUTH EVERY DAY, Disp: 60 tablet, Rfl: 2          Whom besides the patient is providing clinical information about today's encounter?   NO ADDITIONAL HISTORIAN (patient alone provided history)    Physical Exam and Assessment/Plan by Diagnosis:    Patient here for rash follow up. Taking Plaquenil 200 mg twice daily. Patient reports that the rash is worsening. She has new spots on the chest and upper back. Spots on the head evaluated at last office visit is worse than before. Rash is severely itchy. Have a rheumatologist, she got allergy test about 15 years ago. Both CBC, CMP, and sedimentation rate is abnormal.    PHOTOSENSITIVITY DERMATITIS FOLLOW UP  Physical Exam:  Anatomic Location Affected:  Chest, upper back  Morphological Description:  erythematous patches (less red and juicy than at last visit.)  Pertinent Positives:  Pertinent Negatives:    Additional History of Present Condition:  Patient here for rash follow up. Taking Plaquenil 200 mg twice daily. Patient reports that the rash is worsening. She has new spots on the chest and upper back. Spots on the head evaluated at last office visit is worse than before. Rash is severely itchy. Have a rheumatologist, she got allergy test about 15 years ago. Both CBC, CMP, and sedimentation rate is abnormal.    Assessment and Plan:  Based on a thorough discussion of this condition and the management approach to it (including a comprehensive discussion of the known risks, side effects and potential benefits of treatment), the patient  (family) agrees to implement the following specific plan:  Plan is to stay on Plaquenil until we get the results for the G6PD results which is ordered.  Once results are in for G6PD if normal plan to switch to Dapsone 25 mg tablets daily.  After you start Dapsone you will need to redo Blood test in 1 week.  Referring to allergist and rheumatologist.     Scribe Attestation      I,:  Pavithra Barajas MA am acting as a scribe while in the presence of the attending physician.:       I,:  Brii Saini MD personally performed the services described in this documentation    as scribed in my presence.:

## 2025-04-02 NOTE — PATIENT INSTRUCTIONS
PHOTOSENSITIVITY DERMATITIS FOLLOW UP    Assessment and Plan:  Based on a thorough discussion of this condition and the management approach to it (including a comprehensive discussion of the known risks, side effects and potential benefits of treatment), the patient (family) agrees to implement the following specific plan:  Plan is to stay on Plaquenil until we get the results for the G6PD results which is ordered.  Once results are in for G6PD if normal plan to switch to Dapsone 25 mg tablets daily.  After you start Dapsone you will need to redo Blood test in 1 week.  Referring to allergist and rheumatologist.

## 2025-04-03 ENCOUNTER — APPOINTMENT (OUTPATIENT)
Dept: LAB | Facility: CLINIC | Age: 70
End: 2025-04-03
Payer: COMMERCIAL

## 2025-04-03 ENCOUNTER — TELEPHONE (OUTPATIENT)
Age: 70
End: 2025-04-03

## 2025-04-03 DIAGNOSIS — L50.0 ALLERGIC URTICARIA: ICD-10-CM

## 2025-04-03 DIAGNOSIS — D72.829 LEUKOCYTOSIS, UNSPECIFIED TYPE: ICD-10-CM

## 2025-04-03 DIAGNOSIS — D75.839 THROMBOCYTOSIS: ICD-10-CM

## 2025-04-03 DIAGNOSIS — L56.8 PHOTOSENSITIVITY DERMATITIS: ICD-10-CM

## 2025-04-03 LAB
BASOPHILS # BLD AUTO: 0.11 THOUSANDS/ÂΜL (ref 0–0.1)
BASOPHILS NFR BLD AUTO: 1 % (ref 0–1)
EOSINOPHIL # BLD AUTO: 0.17 THOUSAND/ÂΜL (ref 0–0.61)
EOSINOPHIL NFR BLD AUTO: 2 % (ref 0–6)
ERYTHROCYTE [DISTWIDTH] IN BLOOD BY AUTOMATED COUNT: 13.4 % (ref 11.6–15.1)
HCT VFR BLD AUTO: 47.4 % (ref 34.8–46.1)
HGB BLD-MCNC: 14.8 G/DL (ref 11.5–15.4)
IMM GRANULOCYTES # BLD AUTO: 0.06 THOUSAND/UL (ref 0–0.2)
IMM GRANULOCYTES NFR BLD AUTO: 1 % (ref 0–2)
LYMPHOCYTES # BLD AUTO: 1.93 THOUSANDS/ÂΜL (ref 0.6–4.47)
LYMPHOCYTES NFR BLD AUTO: 24 % (ref 14–44)
MCH RBC QN AUTO: 31 PG (ref 26.8–34.3)
MCHC RBC AUTO-ENTMCNC: 31.2 G/DL (ref 31.4–37.4)
MCV RBC AUTO: 99 FL (ref 82–98)
MONOCYTES # BLD AUTO: 1.05 THOUSAND/ÂΜL (ref 0.17–1.22)
MONOCYTES NFR BLD AUTO: 13 % (ref 4–12)
NEUTROPHILS # BLD AUTO: 4.85 THOUSANDS/ÂΜL (ref 1.85–7.62)
NEUTS SEG NFR BLD AUTO: 59 % (ref 43–75)
NRBC BLD AUTO-RTO: 0 /100 WBCS
PLATELET # BLD AUTO: 327 THOUSANDS/UL (ref 149–390)
PMV BLD AUTO: 11.4 FL (ref 8.9–12.7)
RBC # BLD AUTO: 4.77 MILLION/UL (ref 3.81–5.12)
WBC # BLD AUTO: 8.17 THOUSAND/UL (ref 4.31–10.16)

## 2025-04-03 PROCEDURE — 82955 ASSAY OF G6PD ENZYME: CPT

## 2025-04-03 PROCEDURE — 36415 COLL VENOUS BLD VENIPUNCTURE: CPT

## 2025-04-03 PROCEDURE — 82785 ASSAY OF IGE: CPT

## 2025-04-03 PROCEDURE — 85025 COMPLETE CBC W/AUTO DIFF WBC: CPT

## 2025-04-03 NOTE — TELEPHONE ENCOUNTER
Please have Dr. Moore complete and sign OV notes so Ozempic PA can be submitted. Please notify PA team when completed, thank you!

## 2025-04-04 LAB — TOTAL IGE SMQN RAST: 6.49 KU/L (ref 0–113)

## 2025-04-04 NOTE — ASSESSMENT & PLAN NOTE
Orders:  •  semaglutide, 0.25 or 0.5 mg/dose, (Ozempic, 0.25 or 0.5 MG/DOSE,) 2 mg/3 mL injection pen; 0.25 mg under the skin every 7 days for 4 doses (28 days), THEN 0.5 mg under the skin every 7 days

## 2025-04-04 NOTE — TELEPHONE ENCOUNTER
Note is completed/signed--Ozempic is being prescribed for type 2 diabetes in addition to the bmi >27 so not sure if PA needed--thanks

## 2025-04-04 NOTE — ASSESSMENT & PLAN NOTE
Cont. Acid suppression agents  Dietary modifications  Orders:  •  semaglutide, 0.25 or 0.5 mg/dose, (Ozempic, 0.25 or 0.5 MG/DOSE,) 2 mg/3 mL injection pen; 0.25 mg under the skin every 7 days for 4 doses (28 days), THEN 0.5 mg under the skin every 7 days

## 2025-04-04 NOTE — PROGRESS NOTES
Virtual Regular VisitName: Renetta Chan      : 1955      MRN: [id number]  Encounter Provider: Lydia Moore MD  Encounter Date: 2025   Encounter department: [facility]  :  Assessment & Plan  Controlled type 2 diabetes mellitus without complication, without long-term current use of insulin (HCC)    Lab Results   Component Value Date    HGBA1C 7.4 (H) 2025       Orders:  •  semaglutide, 0.25 or 0.5 mg/dose, (Ozempic, 0.25 or 0.5 MG/DOSE,) 2 mg/3 mL injection pen; 0.25 mg under the skin every 7 days for 4 doses (28 days), THEN 0.5 mg under the skin every 7 days    Leukocytosis, unspecified type    Orders:  •  CBC and differential; Future    Dyslipidemia    Orders:  •  semaglutide, 0.25 or 0.5 mg/dose, (Ozempic, 0.25 or 0.5 MG/DOSE,) 2 mg/3 mL injection pen; 0.25 mg under the skin every 7 days for 4 doses (28 days), THEN 0.5 mg under the skin every 7 days    Gastroesophageal reflux disease without esophagitis  Cont. Acid suppression agents  Dietary modifications  Orders:  •  semaglutide, 0.25 or 0.5 mg/dose, (Ozempic, 0.25 or 0.5 MG/DOSE,) 2 mg/3 mL injection pen; 0.25 mg under the skin every 7 days for 4 doses (28 days), THEN 0.5 mg under the skin every 7 days    Overweight with body mass index (BMI) of 29 to 29.9 in adult      Orders:  •  semaglutide, 0.25 or 0.5 mg/dose, (Ozempic, 0.25 or 0.5 MG/DOSE,) 2 mg/3 mL injection pen; 0.25 mg under the skin every 7 days for 4 doses (28 days), THEN 0.5 mg under the skin every 7 days      History of Present Illness     Diabetes  She has type 2 diabetes mellitus. Her disease course has been worsening. Associated symptoms include fatigue, polydipsia, visual change and weakness. Pertinent negatives for diabetes include no chest pain and no weight loss. There are no hypoglycemic complications. Symptoms are worsening. Risk factors for coronary artery disease include stress, post-menopausal, dyslipidemia, family history and diabetes  mellitus. She is compliant with treatment all of the time. She is following a generally healthy diet. She has not had a previous visit with a dietitian. She participates in exercise intermittently. An ACE inhibitor/angiotensin II receptor blocker is not being taken. She does not see a podiatrist.Eye exam is current.     Review of Systems   Constitutional:  Positive for fatigue. Negative for weight loss.   Respiratory: Negative.     Cardiovascular: Negative.  Negative for chest pain.   Gastrointestinal:         GERD   Endocrine: Positive for polydipsia.        DM-2   Musculoskeletal:  Positive for arthralgias and myalgias.   Skin:  Positive for rash.   Allergic/Immunologic: Positive for environmental allergies.   Neurological:  Positive for weakness.   Psychiatric/Behavioral:  Positive for sleep disturbance.      Current Outpatient Medications on File Prior to Visit   Medication Sig Dispense Refill   • albuterol (2.5 mg/3 mL) 0.083 % nebulizer solution Take 3 mL (2.5 mg total) by nebulization every 6 (six) hours as needed for wheezing or shortness of breath 180 mL 5   • Ascorbic Acid (VITAMIN C) 1000 MG tablet      • atorvastatin (LIPITOR) 20 mg tablet TAKE ONE TABLET BY MOUTH EVERY DAY AT BEDTIME 90 tablet 1   • benzonatate (TESSALON) 200 MG capsule Take 1 capsule (200 mg total) by mouth 2 (two) times a day as needed for cough 60 capsule 1   • celecoxib (CeleBREX) 200 mg capsule Take 200 mg by mouth daily     • cholecalciferol (VITAMIN D3) 1,000 units tablet Take 2,000 Units by mouth daily     • clobetasol (TEMOVATE) 0.05 % cream Apply topically 2 (two) times a day Apply topically on scalp and right shoulder two times a day 60 g 2   • Cymbalta 20 MG capsule Start at one tablet a night, and can increase to 2 tablets nightly 2 weeks later     • famotidine (PEPCID) 40 MG tablet TAKE ONE TABLET BY MOUTH AT BEDTIME 90 tablet 1   • fexofenadine (Allegra Allergy) 180 MG tablet Take by mouth daily     • fluocinonide (LIDEX)  0.05 % external solution Apply topically every 12 (twelve) hours     • fluticasone-umeclidinium-vilanterol (Trelegy Ellipta) 200-62.5-25 mcg/actuation AEPB inhaler Inhale 1 puff daily in the early morning Rinse mouth after use. 60 blister 5   • gabapentin (NEURONTIN) 100 mg capsule      • hydrocortisone (ANUSOL-HC) 2.5 % rectal cream Apply topically 2 (two) times a day 28 g 0   • hydrocortisone 2.5 % cream Apply topically 2 (two) times a day scalp 20 g 0   • hydroxychloroquine (PLAQUENIL) 200 mg tablet Take 1 tablet (200 mg total) by mouth 2 (two) times a day with meals 60 tablet 5   • metFORMIN (GLUCOPHAGE-XR) 500 mg 24 hr tablet TAKE ONE TABLET BY MOUTH EVERY DAY - TAKE WITH DINNER 90 tablet 1   • methocarbamol (ROBAXIN) 500 mg tablet Take 1 tablet (500 mg total) by mouth 2 (two) times a day 20 tablet 0   • montelukast (SINGULAIR) 10 mg tablet TAKE ONE TABLET BY MOUTH EVERY DAY 60 tablet 2   • multivitamin (THERAGRAN) TABS Take 1 tablet by mouth daily Last dose 3/21/21     • pantoprazole (PROTONIX) 40 mg tablet TAKE ONE TABLET BY MOUTH EVERY DAY 90 tablet 1   • promethazine-dextromethorphan (PHENERGAN-DM) 6.25-15 mg/5 mL oral syrup Take 5 mL by mouth 4 (four) times a day as needed for cough 180 mL 0   • sucralfate (CARAFATE) 1 g tablet Take 1 tablet (1 g total) by mouth 3 (three) times a day 30 tablet 0     No current facility-administered medications on file prior to visit.       Objective   There were no vitals taken for this visit.    Physical Exam  Constitutional:       General: She is not in acute distress.  Pulmonary:      Effort: No respiratory distress.   Neurological:      Mental Status: She is alert and oriented to person, place, and time.   Psychiatric:         Mood and Affect: Mood normal.         Administrative Statements   Encounter provider Lydia Moore MD    The Patient is located at Home and in the following state in which I hold an active license [place].    The patient was identified by name  and date of birth. Renetta Chan was informed that this is a telemedicine visit and that the visit is being conducted through Telephone.  My office door was closed. No one else was in the room.  She acknowledged consent and understanding of privacy and security of the video platform. The patient has agreed to participate and understands they can discontinue the visit at any time.  It was my intent to perform this visit via video technology but the patient was not able to do a video connection so the visit was completed via audio telephone only.    I have spent a total time of 15 minutes in caring for this patient on the day of the visit/encounter including Risks and benefits of tx options, Instructions for management, Counseling / Coordination of care, Documenting in the medical record, and Reviewing/placing orders in the medical record (including tests, medications, and/or procedures), not including the time spent for establishing the audio/video connection.

## 2025-04-04 NOTE — TELEPHONE ENCOUNTER
PA Ozempic 0.25 or 0.5 MG/DOSE APPROVED         Patient advised by          []MyChart Message  []Phone call   [x]LMOM  []L/M to call office as no active Communication consent on file  []Unable to leave detailed message as VM not approved on Communication consent       Pharmacy advised by    [x]Fax  []Phone call  []Secure Chat    Specialty Pharmacy    []

## 2025-04-04 NOTE — TELEPHONE ENCOUNTER
PA Ozempic 0.25 or 0.5 MG/DOSE SUBMITTED     to [employer]     via    []CMM-KEY:    [x]Surescripts-Case ID # [id number]   []Availity-Auth ID #  NDC #    []Faxed to plan   []Other website    []Phone call Case ID #      []PA sent as URGENT    All office notes, labs and other pertaining documents and studies sent. Clinical questions answered. Awaiting determination from insurance company.     Turnaround time for your insurance to make a decision on your Prior Authorization can take 7-21 business days.

## 2025-04-07 LAB
G6PD BLD QN: 253 U/10E12 RBC (ref 127–427)
RBC # BLD AUTO: 4.7 X10E6/UL (ref 3.77–5.28)

## 2025-04-15 NOTE — PROGRESS NOTES
Cardiology   Follow Up   Office Visit Note  Renetta Chan   69 y.o.   female   MRN: [id number]  [facility] [place]  [address]  [address]  [place] [address] [phone]    PCP: Lydia Moore MD  Cardiologist: Dr. DELILAH Harding          Summary of Plan:  Heart healthy diet: Mediterranean or DASH.  Education provided  Continue her weight loss journey she is trying to do this on her own.  Continue with regular exercise that she just recently restarted  Conservative management of her musculoskeletal chest discomfort with Voltaren as needed, moist compresses, etc.  She can follow with her PCP in this regard  She can follow with Dr. DELILAH Harding in 6 months, or myself  Colon Ca screenin2024, up-to-date            Assessment/Plan  History atypical chest pain  EST 2023 negative at peak exercise  Echo 2023 normal LV function.  No significant valve disease  Today, -she has musculoskeletal chest pain, its reproducible.  I recommended Voltaren and warm compresses.  She was reassured.  She has a history of fibromyalgia.  Dyslipidemia  3/3/2025: LDL 94, , total cholesterol 189  Now on atorvastatin 20 mg daily.  She is interested in taking medicine as infrequently as possible.  She may be a candidate for switching to something like low-dose rosuvastatin a few times a week.  She just got her medication filled so we will defer for the time being.  Heart healthy diet recommended education provided  Type 2 diabetes  3/3/2025: Hemoglobin A1c 7.4.  Previously 6.4  On metformin, GLP-1  Abnormal EKG.    This shows Q waves more like pseudoinfarction pattern.  Previously noted  ERINN  Treated with CPAP  Follow-up with sleep medicine  Asthma  Episode of syncope in the setting of diarrhea  Echo 2023 normal LV function-- no sign.valve disease  Hypothyroidism, per primary care  Obesity BMI 28.96 kg/m².   GLP-1 was too expensive.  He is on a liqud diet of veTelsar Pharmaalbles, and  oatlmeal along with water. Her goal:  145 lb  Wt Readings from Last 3 Encounters:   04/16/25 74.9 kg (165 lb 3.2 oz)   04/02/25 76.5 kg (168 lb 11.2 oz)   03/19/25 76.2 kg (168 lb)   Hx fibromyalgia  Cardiac testing  TTE 4/18/2023.  EF 62%.  Wall thickness mildly increased.  Mild LVH Global longitudinal strain is normal at 21%. Wall motion is normal. Diastolic function is normal for age.  Atria normal in size.Prior study date: 5/31/2017. No significant changes noted compared to the prior study.  EST 4/28/2023.  Gt protocol for 6 minutes and 42 seconds, achieving 88% MPHR and 9.1 METS of activity..  Patient developed chest discomfort during recovery phase without any EKG changes.  Stress EKG negative for ischemia after maximal exercise  48-hour Holter monitor.  4/26/23.  NSR.  Average heart rate 79 bpm.  No ectopy              HPI  Renetta Chan is a 69 y.o.year old female with dyslipidemia, prediabetes, sleep apnea and asthma.  She has a family history of heart disease.  She follows with Dr. DELILAH Harding and was last seen 4/3/2023.    She has a family history of heart disease.    4/3/2023 Mario Dr. DELILAH Harding  Per his note  Renetta Chan is a 67 y.o. year old female who was last seen in our office in 2020 and has a medical history significant for dyslipidemia, impaired glucose metabolism, history of bronchial asthma which is seasonal and mostly well controlled, sleep apnea on CPAP, family history of heart disease who was in the emergency room with episode of diarrhea vomiting and near syncope and have a laceration on her forehead.  She was initially seen by us in 2018 for abnormal EKG which shows Q waves in inferior leads and possible old inferior wall infarction.  Repeat EKG done also showed similar Q waves.  But in between patient has cardiac work-up which shows she has a normal LV systolic function and no signal valvular disease.  Syncopal episode happened as she has it episodes of vomiting and diarrhea and she was  feeling dizzy and lightheaded.  She is feeling better but still occasionally get episodes of chest pain.  She has been compliant with her medication though she has not seen us since 2020 and has been taking her medications.  She is also on cholesterol therapy.  He does not smoke does not drink.  She has last stress test 2019 which has been acceptable.  In July 2022 she has COVID-19 infection and she has some cough slowly getting better.    4/16/2025  Cardiology follow-up  EKG normal sinus rhythm 76 bpm.  ROS: She has reproducible chest discomfort between her breasts.  She does have a history of fibromyalgia.  I recommend conservative treatment with Voltaren, warm compresses, etc.  BP  Weight: 165 pounds.  She is down a few pounds.  A GLP-1 agonist was too expensive at over $500.  She is eating fruits and vegetables and oatmeal and increasing her free water intake.    She is also started to exercise, on the treadmill.  With this activity she denies any chest pain or shortness of breath.  She is hopeful to try to get off the medications.  She wanted to try to reduce her atorvastatin.  She just got it filled.  Her LDL is 94.  I encouraged her to continue, given that it is beneficial for her diabetes, now that her A1c is 7.4 she has adolfo diabetes.  If in the future she wants to take less medication perhaps we can switch her to something like rosuvastatin 5 mg every other day.  For now, we will defer.    She is supportive of her son who is with her today.  He has disabilities, given Down's syndrome.  He is a DJ.  She is very supportive.  She will follow-up in 6 months sooner if needed          Review of Systems   Constitutional: Negative for chills.   Cardiovascular:  Negative for chest pain, claudication, cyanosis, dyspnea on exertion, irregular heartbeat, leg swelling, near-syncope, orthopnea, palpitations, paroxysmal nocturnal dyspnea and syncope.   Respiratory:  Negative for cough and shortness of breath.     Gastrointestinal:  Negative for heartburn and nausea.   Neurological:  Negative for dizziness, focal weakness, headaches, light-headedness and weakness.   All other systems reviewed and are negative.      Assessment  Diagnoses and all orders for this visit:    Dyslipidemia  -     POCT ECG  -     Lipid panel; Future    Musculoskeletal chest pain    Obstructive sleep apnea    Type 2 diabetes mellitus without complication, without long-term current use of insulin (HCC)    .  Comments:  check lipid profile and thyroid fxn. cont statin, low chol diet, exercise as tolerated.  med ref lipitor given.  Orders:  -     atorvastatin (LIPITOR) 20 mg tablet; Take 1 tablet (20 mg total) by mouth daily at bedtime    Fibromyalgia        Past Medical History:   Diagnosis Date    Abnormal glucose     last assessed 2/25/16    Arthritis     Asthma     w/ exacerbation; last assessed 5/14/15    Atypical nevi     Atypical nevus     last assessed 1/18/17    Breast lump     last assessed 3/6/14    Cataract     Cervical adenopathy     last assessed  2/7/17    Colon polyp     CPAP (continuous positive airway pressure) dependence     Diverticulitis of colon     Dizziness     Dyslipidemia     last assessed 5/22/17    Facial droop     last assessed  12/5/16    Fibromyalgia, primary     Flu 01/20/2018    Foot abrasion     right between the 4th and 5th toe    Fractures     Genital herpes     resolved 9/1/16    GERD (gastroesophageal reflux disease)     Headache, tension-type     Herpes zoster     last assessed 5/20/16    History of shingles     may 2016    History of stomach ulcers     Hx of abnormal mammogram     last assessed  3/5/14    Hyperlipidemia     Myalgia     last assessed  11/21/12    Myositis     11/21/12    Neck pain     Pain involving joints of fingers of both hands 01/19/2021    Peripheral neuropathy     Seborrheic keratosis     Shingles     Skin disorder     Skin neoplasm     of the lower limb, including hip; onset 1/23/12; last  assessed  12    Sleep apnea     Stomach disorder        Past Surgical History:   Procedure Laterality Date    ABDOMINAL SURGERY      release of adhesions    BLADDER SURGERY      mesh-lift    BREAST CYST ASPIRATION Right     does not know the year    BREAST SURGERY      lift    CATARACT EXTRACTION Bilateral      SECTION      x 3-,,    CHOLECYSTECTOMY      lap    COLONOSCOPY      COSMETIC SURGERY      tummy and breast lift    ESOPHAGOGASTRODUODENOSCOPY      EYE SURGERY   right eye and 3/11 left eye    OOPHORECTOMY Left     OTHER SURGICAL HISTORY      vocal cord surgery, scraping    FL HYSTEROSCOPY BX ENDOMETRIUM&/POLYPC W/WO D&C N/A 2016    Procedure: DILATATION AND CURETTAGE (D&C) WITH HYSTEROSCOPY;  Surgeon: Kyle Bethea MD;  Location: [facility];  Service: Gynecology    REDUCTION MAMMAPLASTY Bilateral     TONSILLECTOMY      TUBAL LIGATION  10/29/1984    US GUIDED MSK PROCEDURE  2021           Allergies  Allergies   Allergen Reactions    Latex Rash and Other (See Comments)     Burn-skin irritation    Burning    Adhesive [Medical Tape] Other (See Comments)     bandaid-red,itch         Medications    Current Outpatient Medications:     albuterol (2.5 mg/3 mL) 0.083 % nebulizer solution, Take 3 mL (2.5 mg total) by nebulization every 6 (six) hours as needed for wheezing or shortness of breath, Disp: 180 mL, Rfl: 5    Ascorbic Acid (VITAMIN C) 1000 MG tablet, , Disp: , Rfl:     atorvastatin (LIPITOR) 20 mg tablet, Take 1 tablet (20 mg total) by mouth daily at bedtime, Disp: , Rfl:     benzonatate (TESSALON) 200 MG capsule, Take 1 capsule (200 mg total) by mouth 2 (two) times a day as needed for cough, Disp: 60 capsule, Rfl: 1    celecoxib (CeleBREX) 200 mg capsule, Take 200 mg by mouth daily, Disp: , Rfl:     cholecalciferol (VITAMIN D3) 1,000 units tablet, Take 2,000 Units by mouth daily, Disp: , Rfl:     clobetasol (TEMOVATE) 0.05 % cream, Apply topically 2 (two) times a  day Apply topically on scalp and right shoulder two times a day, Disp: 60 g, Rfl: 2    Cymbalta 20 MG capsule, Start at one tablet a night, and can increase to 2 tablets nightly 2 weeks later, Disp: , Rfl:     famotidine (PEPCID) 40 MG tablet, TAKE ONE TABLET BY MOUTH AT BEDTIME, Disp: 90 tablet, Rfl: 1    fexofenadine (Allegra Allergy) 180 MG tablet, Take by mouth daily, Disp: , Rfl:     fluocinonide (LIDEX) 0.05 % external solution, Apply topically every 12 (twelve) hours, Disp: , Rfl:     fluticasone-umeclidinium-vilanterol (Trelegy Ellipta) 200-62.5-25 mcg/actuation AEPB inhaler, Inhale 1 puff daily in the early morning Rinse mouth after use., Disp: 60 blister, Rfl: 5    gabapentin (NEURONTIN) 100 mg capsule, , Disp: , Rfl:     hydrocortisone (ANUSOL-HC) 2.5 % rectal cream, Apply topically 2 (two) times a day, Disp: 28 g, Rfl: 0    hydrocortisone 2.5 % cream, Apply topically 2 (two) times a day scalp, Disp: 20 g, Rfl: 0    metFORMIN (GLUCOPHAGE-XR) 500 mg 24 hr tablet, TAKE ONE TABLET BY MOUTH EVERY DAY - TAKE WITH DINNER, Disp: 90 tablet, Rfl: 1    methocarbamol (ROBAXIN) 500 mg tablet, Take 1 tablet (500 mg total) by mouth 2 (two) times a day, Disp: 20 tablet, Rfl: 0    multivitamin (THERAGRAN) TABS, Take 1 tablet by mouth daily Last dose 3/21/21, Disp: , Rfl:     pantoprazole (PROTONIX) 40 mg tablet, TAKE ONE TABLET BY MOUTH EVERY DAY, Disp: 90 tablet, Rfl: 1    promethazine-dextromethorphan (PHENERGAN-DM) 6.25-15 mg/5 mL oral syrup, Take 5 mL by mouth 4 (four) times a day as needed for cough, Disp: 180 mL, Rfl: 0    semaglutide, 0.25 or 0.5 mg/dose, (Ozempic, 0.25 or 0.5 MG/DOSE,) 2 mg/3 mL injection pen, 0.25 mg under the skin every 7 days for 4 doses (28 days), THEN 0.5 mg under the skin every 7 days, Disp: 9 mL, Rfl: 0    sucralfate (CARAFATE) 1 g tablet, Take 1 tablet (1 g total) by mouth 3 (three) times a day, Disp: 30 tablet, Rfl: 0    hydroxychloroquine (PLAQUENIL) 200 mg tablet, Take 1 tablet (200 mg  total) by mouth 2 (two) times a day with meals (Patient not taking: Reported on 4/16/2025), Disp: 60 tablet, Rfl: 5      Social History     Socioeconomic History    Marital status:      Spouse name: Not on file    Number of children: Not on file    Years of education: Not on file    Highest education level: Not on file   Occupational History    Not on file   Tobacco Use    Smoking status: Never    Smokeless tobacco: Never   Vaping Use    Vaping status: Never Used   Substance and Sexual Activity    Alcohol use: No     Comment: rarely    Drug use: Never    Sexual activity: Not Currently     Birth control/protection: Post-menopausal   Other Topics Concern    Not on file   Social History Narrative    Daily caffinated coffee consumption    Exercise habits- walking sometimes 1x per day- last impression 5/22/17- Karla Garzon    Good sleep hygiene    Pet - fish     Social Drivers of Health     Financial Resource Strain: Medium Risk (6/7/2023)    Overall Financial Resource Strain (CARDIA)     Difficulty of Paying Living Expenses: Somewhat hard   Food Insecurity: No Food Insecurity (8/16/2024)    Nursing - Inadequate Food Risk Classification     Worried About Running Out of Food in the Last Year: Never true     Ran Out of Food in the Last Year: Never true     Ran Out of Food in the Last Year: Not on file   Transportation Needs: No Transportation Needs (8/16/2024)    PRAPARE - Transportation     Lack of Transportation (Medical): No     Lack of Transportation (Non-Medical): No   Physical Activity: Not on file   Stress: Not on file   Social Connections: Not on file   Intimate Partner Violence: Not on file   Housing Stability: Low Risk  (8/16/2024)    Housing Stability Vital Sign     Unable to Pay for Housing in the Last Year: No     Number of Times Moved in the Last Year: 0     Homeless in the Last Year: No       Family History   Problem Relation Age of Onset    Hypertension Mother     Alzheimer's disease Mother      Arthritis Mother     Dementia Mother     Hypertension Father     Arthritis Father     Heart attack Father     Heart disease Father     Stroke Father     Diabetes Daughter     Breast cancer Sister 32    Skin cancer Sister     Cancer Sister     Abdominal aortic aneurysm Sister     Cancer Sister         T cell sarcoma    Breast cancer Sister     No Known Problems Brother     Diabetes Brother     No Known Problems Son     No Known Problems Maternal Aunt     No Known Problems Maternal Aunt     No Known Problems Paternal Aunt     No Known Problems Paternal Aunt     No Known Problems Paternal Aunt     Asthma Sister     Cancer Brother        Physical Exam  Vitals and nursing note reviewed.   Constitutional:       General: She is not in acute distress.     Appearance: She is not diaphoretic.   HENT:      Head: Normocephalic and atraumatic.   Eyes:      Conjunctiva/sclera: Conjunctivae normal.   Cardiovascular:      Rate and Rhythm: Normal rate and regular rhythm.      Pulses: Intact distal pulses.      Heart sounds: Normal heart sounds.   Pulmonary:      Effort: Pulmonary effort is normal.      Breath sounds: Normal breath sounds.   Abdominal:      General: Bowel sounds are normal.      Palpations: Abdomen is soft.   Musculoskeletal:         General: Normal range of motion.      Cervical back: Normal range of motion and neck supple.   Skin:     General: Skin is warm and dry.   Neurological:      Mental Status: She is alert and oriented to person, place, and time.         Vitals: Blood pressure 121/75, pulse 76, weight 74.9 kg (165 lb 3.2 oz), SpO2 98%, not currently breastfeeding.   Wt Readings from Last 3 Encounters:   04/16/25 74.9 kg (165 lb 3.2 oz)   04/02/25 76.5 kg (168 lb 11.2 oz)   03/19/25 76.2 kg (168 lb)           Labs & Results:  Lab Results   Component Value Date    WBC 8.17 04/03/2025    HGB 14.8 04/03/2025    HCT 47.4 (H) 04/03/2025    MCV 99 (H) 04/03/2025     04/03/2025     No results found for:  "\"BNP\"  No components found for: \"CHEM\"  Total CK   Date Value Ref Range Status   2023 48 26 - 192 U/L Final   2021 123 26 - 192 U/L Final   2018 99 26 - 192 U/L Final     Creatine Kinase, Total   Date Value Ref Range Status   2018 120 24 - 173 U/L Final     Troponin I   Date Value Ref Range Status   2018 <0.02 <=0.04 ng/mL Final     Comment:     3Autovalidation override   2018 <0.02 <=0.04 ng/mL Final     Comment:     3Autovalidation override   05/10/2017 <0.02 <0.04 ng/mL Final     Comment:     3Autovalidation override     Results for orders placed during the hospital encounter of 17    Echo complete with contrast if indicated    [address] [address]  [phone]    Transthoracic Echocardiogram  2D, M-mode, Doppler, and Color Doppler    Study date:  31-May-2017    Patient: NORM JACKSON  MR number: [id number]  Account number: [id number]  : 1955  Age: 61 years  Gender: Female  Status: Routine  Location: Echo lab  Height: 65 in  Weight: 163.7 lb  BP: 117/ 81 mmHg    Indications: Dizziness    Diagnoses: R42. - Dizziness and giddiness    Sonographer:  LORE Erickson  Primary Physician:  Cristela Yu MD  Referring Physician:  Elvi Willson DO  Group:  [facility]  Interpreting Physician:  Ana Paula Harding MD    SUMMARY    LEFT VENTRICLE:  Systolic function was normal. Ejection fraction was estimated in the range of 55 % to 60 % to be 60 %.  There were no regional wall motion abnormalities.  Wall thickness was at the upper limits of normal.  Doppler parameters were consistent with abnormal left ventricular relaxation (grade 1 diastolic dysfunction).    MITRAL VALVE:  There was trace regurgitation.    AORTIC VALVE:  There was trace regurgitation.    TRICUSPID VALVE:  There was trace regurgitation.  Estimated peak PA pressure was 25 mmHg.    HISTORY: PRIOR HISTORY: Shingles, Fibromyalgia, " Asthma,Dyslipidemia    PROCEDURE: The procedure was performed in the echo lab. This was a routine study. The transthoracic approach was used. The study included complete 2D imaging, M-mode, complete spectral Doppler, and color Doppler. The heart rate was 74 bpm,  at the start of the study. Images were obtained from the parasternal, apical, subcostal, and suprasternal notch acoustic windows. Image quality was adequate.    LEFT VENTRICLE: Size was normal. Systolic function was normal. Ejection fraction was estimated in the range of 55 % to 60 % to be 60 %. There were no regional wall motion abnormalities. Wall thickness was at the upper limits of normal.  DOPPLER: Doppler parameters were consistent with abnormal left ventricular relaxation (grade 1 diastolic dysfunction).    RIGHT VENTRICLE: The size was normal. Systolic function was normal.    LEFT ATRIUM: Size was normal.    RIGHT ATRIUM: Size was normal.    MITRAL VALVE: Valve structure was normal. There was normal leaflet separation. DOPPLER: The transmitral velocity was within the normal range. There was no evidence for stenosis. There was trace regurgitation.    AORTIC VALVE: The valve was trileaflet. Leaflets exhibited normal thickness and normal cuspal separation. DOPPLER: Transaortic velocity was within the normal range. There was no evidence for stenosis. There was trace regurgitation.    TRICUSPID VALVE: DOPPLER: There was trace regurgitation. Estimated peak PA pressure was 25 mmHg.    PULMONIC VALVE: DOPPLER: There was no significant regurgitation.    PERICARDIUM: There was no thickening or calcification. There was no pericardial effusion.    AORTA: The root exhibited normal size.    SYSTEMIC VEINS: IVC: The inferior vena cava was normal in size. Respirophasic changes were normal.    SYSTEM MEASUREMENT TABLES    2D mode  AoR Diam 2D: 2.9 cm  LA Diam (2D): 3.1 cm  LA/Ao (2D): 1.07  FS (2D Teich): 30.2 %  IVSd (2D): 1.15 cm  LVDEV: 65.1 cm³  LVEDV MOD BP: 64  cm³  LVESV: 27.3 cm³  LVIDd(2D): 3.88 cm  LVISd (2D): 2.71 cm  LVOT Area 2D: 3.14 cm squared  LVPWd (2D): 1.09 cm  SV (Teich): 37.8 cm³    Apical four chamber  LVEF A4C: 60 %    Apical two chamber  LVEF A2C: 50 %    Unspecified Scan Mode  LISA Cont Eq (Peak Matthew): 2.22 cm squared  LVOT Diam.: 2 cm  LVOT Vmax: 918 mm/s  LVOT Vmax; Mean: 918 mm/s  Peak Grad.; Mean: 3 mm[Hg]  MV Peak A Matthew: 785 mm/s  MV Peak E Matthew. Mean: 617 mm/s  MVA (PHT): 4.4 cm squared  PHT: 50 ms  Max P mm[Hg]  V Max: 2090 mm/s  Vmax: 1990 mm/s  RA Area: 9.2 cm squared  RA Volume: 17.6 cm³  TAPSE: 2.3 cm    [facility] Accredited [facility]    Prepared and electronically signed by    Ana Paula Harding MD  Signed 31-May-2017 12:04:08    No results found for this or any previous visit.    No valid procedures specified.  No results found for this or any previous visit.                This note was completed in part utilizing [employer] direct voice recognition software.   Grammatical errors, random word insertion, spelling mistakes, and incomplete sentences may be an occasional consequence of the system secondary to software limitations, ambient noise and hardware issues. At the time of dictation, efforts were made to edit, clarify and /or correct errors.  Please read the chart carefully and recognize, using context, where substitutions have occurred.  If you have any questions or concerns about the context, text or information contained within the body of this dictation, please contact myself, the provider, for further clarification

## 2025-04-16 ENCOUNTER — OFFICE VISIT (OUTPATIENT)
Dept: CARDIOLOGY CLINIC | Facility: CLINIC | Age: 70
End: 2025-04-16
Payer: COMMERCIAL

## 2025-04-16 VITALS
DIASTOLIC BLOOD PRESSURE: 75 MMHG | WEIGHT: 165.2 LBS | OXYGEN SATURATION: 98 % | HEART RATE: 76 BPM | SYSTOLIC BLOOD PRESSURE: 121 MMHG | BODY MASS INDEX: 28.36 KG/M2

## 2025-04-16 DIAGNOSIS — M79.7 FIBROMYALGIA: ICD-10-CM

## 2025-04-16 DIAGNOSIS — E78.5 HYPERLIPIDEMIA, UNSPECIFIED HYPERLIPIDEMIA TYPE: ICD-10-CM

## 2025-04-16 DIAGNOSIS — R07.89 MUSCULOSKELETAL CHEST PAIN: ICD-10-CM

## 2025-04-16 DIAGNOSIS — E11.9 TYPE 2 DIABETES MELLITUS WITHOUT COMPLICATION, WITHOUT LONG-TERM CURRENT USE OF INSULIN (HCC): ICD-10-CM

## 2025-04-16 DIAGNOSIS — G47.33 OBSTRUCTIVE SLEEP APNEA: ICD-10-CM

## 2025-04-16 DIAGNOSIS — E78.5 DYSLIPIDEMIA: Primary | ICD-10-CM

## 2025-04-16 PROCEDURE — 99214 OFFICE O/P EST MOD 30 MIN: CPT | Performed by: NURSE PRACTITIONER

## 2025-04-16 PROCEDURE — 93000 ELECTROCARDIOGRAM COMPLETE: CPT | Performed by: NURSE PRACTITIONER

## 2025-04-16 RX ORDER — ATORVASTATIN CALCIUM 20 MG/1
20 TABLET, FILM COATED ORAL
Start: 2025-04-16

## 2025-04-16 NOTE — PATIENT INSTRUCTIONS
Patient Education     Mediterranean diet   The Basics   Written by the doctors and editors at [facility]   What is a Mediterranean diet? -- A Mediterranean diet is a heart-healthy way of eating. It includes foods and cooking styles from many countries around the [place], like [place] and [place]. The exact foods included vary from place to place.  A Mediterranean diet involves eating a lot of fruits, vegetables, nuts, and whole grains. It uses olive oil instead of other fats. It also includes some fish, poultry, and dairy products, but not a lot of red meat.  Wine is often thought of as part of a Mediterranean diet. It is not needed, and you might choose not to include it. If you do drink alcohol, limit the amount to:   For females, no more than 1 drink a day   For males, no more than 2 drinks a day  What are the benefits of a Mediterranean diet? -- A Mediterranean diet can help you:   Improve your overall health, and help you lose weight   Lower your risk of stroke   Lower your risk of heart problems such as a heart attack   Manage your blood sugar if you have diabetes  What can I eat and drink on a Mediterranean diet? -- A Mediterranean diet is more of an eating pattern than a strict diet. Try to cover two-thirds of your plate with fresh fruits and vegetables. Some examples of foods that are often part of this pattern:   Grains - Whole grains like whole-grain bread and pasta, oats, couscous, brown rice, barley, and orzo.   Fruits - Many kinds and colors of fresh, frozen, dried, or canned fruits. Frozen or canned fruits with 100% fruit juice or water (without added sugar). Examples include apples, pears, berries, melons, bananas, plums, raisins, figs, and peaches.   Vegetables - Many kinds and colors of fresh, frozen, or canned vegetables. If canned, low sodium or salt free. If frozen, without added fat and sodium. Examples include avocados, peppers, tomatoes, spinach, kale, beans, carrots, peas, olives,  cucumbers, hummus, soybeans, lentils, and kidney beans.   Dairy - Low-fat milk, cheese, and other dairy products. Greek yogurt, kefir, and plant-based milk alternatives like soy milk.   Lean meats, poultry, seafood, and proteins - [place], tuna, cod, and other fish. Shrimp, clams, scallops, and mussels. White meat chicken and turkey, eggs, dried beans, lentils, and tofu. Nuts such as walnuts, almonds, pecans, hazelnuts, cashews, peanuts, and nut butters. Seeds such as pumpkin, sesame, flax, and sunflower seeds.   Fats, oils, and other foods - Foods with healthy fats found in fish, nuts, and avocados. Olive oil or vegetable oils such as canola oil. Use onions, garlic, spices, and herbs to season food.  What foods and drinks should I avoid or limit on a Mediterranean diet? -- A Mediterranean diet involves avoiding or limiting certain types of foods. Try to avoid foods with additives like artificial sweeteners. Avoid foods that are processed, refined, or preserved. These are often foods with a very long shelf life.   Grains to avoid - White bread, pasta, white rice, crackers, and biscuits.   Fruits to avoid - Fruits canned or frozen with extra sugar.   Vegetables to avoid - Commercially prepared potatoes and vegetable mixes, regular canned vegetables and juices, and vegetables frozen with sauce or pickled vegetables.   Dairy to avoid - Whole-fat dairy products like cheese, ice cream, whole milk, cream, and buttermilk.   Lean meats, poultry, seafood, and proteins to avoid - Red meat such as beef, pork, and lamb. Processed meats such as sausages, deli meats, salami, hot dogs, and wesley.   Fats, oils, and other foods to avoid - Butter, margarine, lard, gravies, sauces, and salad dressing. Cookies, cakes, candy, doughnuts, muffins, and other sweets.  All topics are updated as new evidence becomes available and our peer review process is complete.  This topic retrieved from [employer] on: Apr 04, 2024.  Topic 096461 Version  "2.0  Release: 32.2.4 - C32.93  © 2024 UpToDate, Inc. and/or its affiliates. All rights reserved.  Consumer Information Use and Disclaimer   Disclaimer: This generalized information is a limited summary of diagnosis, treatment, and/or medication information. It is not meant to be comprehensive and should be used as a tool to help the user understand and/or assess potential diagnostic and treatment options. It does NOT include all information about conditions, treatments, medications, side effects, or risks that may apply to a specific patient. It is not intended to be medical advice or a substitute for the medical advice, diagnosis, or treatment of a health care provider based on the health care provider's examination and assessment of a patient's specific and unique circumstances. Patients must speak with a health care provider for complete information about their health, medical questions, and treatment options, including any risks or benefits regarding use of medications. This information does not endorse any treatments or medications as safe, effective, or approved for treating a specific patient. UpToDate, Inc. and its affiliates disclaim any warranty or liability relating to this information or the use thereof.The use of this information is governed by the Terms of Use, available at https://www.[employer].com/en/know/clinical-effectiveness-terms. 2024© UpToDate, Inc. and its affiliates and/or licensors. All rights reserved.  Copyright   © 2024 UpToDate, Inc. and/or its affiliates. All rights reserved.  Patient Education     DASH diet   The Basics   Written by the doctors and editors at [employer]   What is the DASH diet? -- DASH stands for \"dietary approaches to stop hypertension.\" It is an eating plan that can help lower blood pressure. It can also help prevent high blood pressure, which doctors call \"hypertension.\" You don't need special foods or recipes to follow the DASH diet. It is more about eating " "certain types of foods in certain amounts.  The DASH diet has lots of fruits and vegetables, whole grains, lean meats, healthy fats, and low-fat or fat-free dairy products (figure 1). It is low in saturated fats, trans fats, cholesterol, added sugars, and sodium (salt).  The standard DASH diet limits sodium to no more than 2300 mg a day. Your doctor or nurse can talk to you about what your specific goals should be.  Why do I need the DASH diet? -- The DASH diet can help you:   Lower your blood pressure and cholesterol   Lower your risk for cancer, heart disease, heart attack, and stroke. It might also lower your risk for heart failure, kidney stones, and diabetes.   Lose weight or keep a healthy weight  What can I eat and drink on the DASH diet? -- Below are some guidelines and examples for your daily and weekly nutrition goals. These are based on a 2000-calorie-per-day eating plan.  Daily goals:   Grains - Try to eat 6 to 8 servings of whole-grain, high-fiber foods each day. Examples of a serving include 1 slice of bread, 1 ounce (30 grams) of dry cereal, or 1/2 cup (120 grams) of cooked cereal, pasta, or brown rice.   Fruits - Try to eat 4 to 5 servings of fruit each day. Examples of a serving include 1 medium fruit or 1/2 cup (75 grams) of fresh, frozen, or canned fruit. Try to eat different kinds and colors. Frozen or canned fruit should not have added sugar. Look for frozen or canned fruits with 100 percent fruit juice or water.   Vegetables - Try to eat 4 to 5 servings of vegetables each day. Examples of a serving include 1 cup (40 grams) of leafy greens or 1/2 cup (75 grams) of fresh or cooked vegetables. Try to pick many kinds and colors. If you buy canned vegetables, look for \"low sodium\" or \"salt free.\" Buy plain, frozen vegetables to avoid added fat and sodium.   Dairy - Try to eat 2 to 3 servings of fat-free or low-fat milk products each day. Examples of a serving include 1 cup (240 mL) of milk or yogurt " or 1.5 ounces (45 grams) of cheese.   Lean meats, poultry, and seafood - Try to eat 6 or fewer servings of lean meat, poultry, and seafood each day. Examples of a serving include 1 egg or 1 ounce (30 grams) of cooked meat, poultry, or fish. Try to choose more low-fat or lean meats like chicken, fish, or turkey. Eat less red meat.   Fats and oils - Try to eat 2 to 3 servings of fats and oils each day. Examples of a serving include 1 teaspoon (5 mL) of soft margarine or vegetable oil, or 1 tablespoon (18 grams) of mayonnaise. Eat healthy fats like those found in fish, nuts, and avocados. Try using olive oil or vegetable oils such as canola oil. You can also try corn, safflower, sunflower, or soybean oils. Use low-sodium and low-fat salad dressing and mayonnaise.  Weekly goals:   Nuts, seeds, and legumes (dry beans and peas) - Try to eat 4 to 5 servings each week. Examples of a serving include 1/3 cup (45 grams) of nuts, 2 tablespoons (50 grams) of nut butter or seeds, or 1/2 cup (75 grams) of cooked legumes. Try almonds and walnuts, sunflower seeds, peanut or other nut butters, soybeans, lentils, kidney beans, and split peas.   Sweets - Try to eat fewer than 5 servings each week. Examples of a serving include 1 tablespoon (14 grams) of sugar or jelly, or 1/2 cup (120 grams) of gelatin. Choose low-fat and trans fat-free desserts. These include fruit-flavored gelatin, sorbet, jellybeans, nohelia crackers, animal crackers, low-fat fig bars, and marcellus snaps. Eat fruit to satisfy the desire for sweets.  To add flavor, use pepper, herbs, spices, vinegar, or lemon or lime juices. Choose low-sodium or salt-free products whenever you can. This is especially important for foods like broths, soups, or soy sauce.  What foods and drinks should I avoid on the DASH diet?    Grains to avoid - Salted breads, rolls, crackers, quick breads, self-rising flours, biscuit mixes, regular breadcrumbs, instant hot cereals, commercially  prepared rice, pasta, stuffing mixes.   Fruits and vegetables to avoid - Store-bought prepared potatoes and vegetable mixes, regular canned vegetables and juices, vegetables frozen with sauce, pickled vegetables, processed fruits with salt or sodium.   Dairy products to avoid - Whole milk, malted milk, chocolate milk, buttermilk, full-fat cheese, ice cream.   Meats to avoid - Smoked, cured, salted, or canned fish such as sardines or anchovies. High-fat cuts of meat like beef, lamb, pork, wesley and sausage, and chicken with the skin on it.   Fats and oils to avoid - Eat fewer solid fats like butter, lard, and hard stick margarine. Eat less saturated fat, trans fat, and total fat.   Condiments and snacks to avoid - Salted and canned peas, beans, and olives. Salted snack foods, fried foods, soda, other sweetened drinks.   Sweets to avoid - High-fat baked goods such as muffins, donuts, pastries, and commercial baked goods. Candy bars.   Alcohol - If you choose to drink alcohol, limit the amount. Most doctors recommend limiting alcohol to no more than 1 drink a day (for females) or 2 drinks a day (for males).  What else do I need to know?    Get regular physical activity to make this diet help you even more. Even gentle forms of activity, like walking, are good for your health.   Try baking or broiling instead of frying foods.   Write down the foods that you eat. This will help you track what you have eaten each week.   When you go to the grocery store, have a list or a meal plan. Don't shop when you are hungry, since this might lead you to buy more unhealthy foods.   Read food labels with care (figure 2). They show you how much is in a serving. The amount is given as a percentage of the total amount that you need each day. Reading labels helps you make healthy food choices.  All topics are updated as new evidence becomes available and our peer review process is complete.  This topic retrieved from [employer] on: Feb 26,  2024.  Topic 210951 Version 1.0  Release: 32.2.4 - C32.56  © 2024 UpToDate, Inc. and/or its affiliates. All rights reserved.  figure 1: DASH diet     Graphic 141533 Version 1.0  figure 2: Food label     Graphic [id number] Version 1.0  Consumer Information Use and Disclaimer   Disclaimer: This generalized information is a limited summary of diagnosis, treatment, and/or medication information. It is not meant to be comprehensive and should be used as a tool to help the user understand and/or assess potential diagnostic and treatment options. It does NOT include all information about conditions, treatments, medications, side effects, or risks that may apply to a specific patient. It is not intended to be medical advice or a substitute for the medical advice, diagnosis, or treatment of a health care provider based on the health care provider's examination and assessment of a patient's specific and unique circumstances. Patients must speak with a health care provider for complete information about their health, medical questions, and treatment options, including any risks or benefits regarding use of medications. This information does not endorse any treatments or medications as safe, effective, or approved for treating a specific patient. UpToDate, Inc. and its affiliates disclaim any warranty or liability relating to this information or the use thereof.The use of this information is governed by the Terms of Use, available at https://www.[employer].com/en/know/clinical-effectiveness-terms. 2024© UpToDate, Inc. and its affiliates and/or licensors. All rights reserved.  Copyright   © 2024 UpToDate, Inc. and/or its affiliates. All rights reserved.

## 2025-04-16 NOTE — LETTER
2025     Lydia Moore MD  [address] [facility] [address]    Patient: Renetta Chan   YOB: 1955   Date of Visit: 2025       Dear MD Ana Paula Ovalle MD:    Thank you for referring Renetta Chan to me for evaluation. Below are my notes for this consultation.    If you have questions, please do not hesitate to call me. I look forward to following your patient along with you.         Sincerely,        TANI Flores        CC: MD Janett Rodrigues CRNP  2025  1:08 PM  Sign when Signing Visit  Cardiology   Follow Up   Office Visit Note  Renetta Chan   69 y.o.   female   MRN: [id number]  [facility] [place]  [address]  [facility] [address]  [phone]    PCP: Lydia Moore MD  Cardiologist: Dr. DELILAH Harding          Summary of Plan:  Heart healthy diet: Mediterranean or DASH.  Education provided  Continue her weight loss journey she is trying to do this on her own.  Continue with regular exercise that she just recently restarted  Conservative management of her musculoskeletal chest discomfort with Voltaren as needed, moist compresses, etc.  She can follow with her PCP in this regard  She can follow with Dr. DELILAH Harding in 6 months, or myself  Colon Ca screenin2024, up-to-date            Assessment/Plan  History atypical chest pain  EST 2023 negative at peak exercise  Echo 2023 normal LV function.  No significant valve disease  Today, -she has musculoskeletal chest pain, its reproducible.  I recommended Voltaren and warm compresses.  She was reassured.  She has a history of fibromyalgia.  Dyslipidemia  3/3/2025: LDL 94, , total cholesterol 189  Now on atorvastatin 20 mg daily.  She is interested in taking medicine as infrequently as possible.  She may be a candidate for switching to something like low-dose rosuvastatin a few times a week.  She  just got her medication filled so we will defer for the time being.  Heart healthy diet recommended education provided  Type 2 diabetes  3/3/2025: Hemoglobin A1c 7.4.  Previously 6.4  On metformin, GLP-1  Abnormal EKG.    This shows Q waves more like pseudoinfarction pattern.  Previously noted  ERINN  Treated with CPAP  Follow-up with sleep medicine  Asthma  Episode of syncope in the setting of diarrhea  Echo 4/18/2023 normal LV function-- no sign.valve disease  Hypothyroidism, per primary care  Obesity BMI 28.96 kg/m².   GLP-1 was too expensive.  He is on a liqud diet of vegatalbles, and oatlmeal along with water. Her goal:  145 lb  Wt Readings from Last 3 Encounters:   04/16/25 74.9 kg (165 lb 3.2 oz)   04/02/25 76.5 kg (168 lb 11.2 oz)   03/19/25 76.2 kg (168 lb)   Hx fibromyalgia  Cardiac testing  TTE 4/18/2023.  EF 62%.  Wall thickness mildly increased.  Mild LVH Global longitudinal strain is normal at 21%. Wall motion is normal. Diastolic function is normal for age.  Atria normal in size.Prior study date: 5/31/2017. No significant changes noted compared to the prior study.  EST 4/28/2023.  Gt protocol for 6 minutes and 42 seconds, achieving 88% MPHR and 9.1 METS of activity..  Patient developed chest discomfort during recovery phase without any EKG changes.  Stress EKG negative for ischemia after maximal exercise  48-hour Holter monitor.  4/26/23.  NSR.  Average heart rate 79 bpm.  No ectopy              HPI  Renetta Chan is a 69 y.o.year old female with dyslipidemia, prediabetes, sleep apnea and asthma.  She has a family history of heart disease.  She follows with Dr. DELILAH Harding and was last seen 4/3/2023.    She has a family history of heart disease.    4/3/2023 Mario Dr. DELILAH Harding  Per his note  Renetta Chan is a 67 y.o. year old female who was last seen in our office in 2020 and has a medical history significant for dyslipidemia, impaired glucose metabolism, history of bronchial asthma which is seasonal  and mostly well controlled, sleep apnea on CPAP, family history of heart disease who was in the emergency room with episode of diarrhea vomiting and near syncope and have a laceration on her forehead.  She was initially seen by us in 2018 for abnormal EKG which shows Q waves in inferior leads and possible old inferior wall infarction.  Repeat EKG done also showed similar Q waves.  But in between patient has cardiac work-up which shows she has a normal LV systolic function and no signal valvular disease.  Syncopal episode happened as she has it episodes of vomiting and diarrhea and she was feeling dizzy and lightheaded.  She is feeling better but still occasionally get episodes of chest pain.  She has been compliant with her medication though she has not seen us since 2020 and has been taking her medications.  She is also on cholesterol therapy.  He does not smoke does not drink.  She has last stress test 2019 which has been acceptable.  In July 2022 she has COVID-19 infection and she has some cough slowly getting better.    4/16/2025  Cardiology follow-up  EKG normal sinus rhythm 76 bpm.  ROS: She has reproducible chest discomfort between her breasts.  She does have a history of fibromyalgia.  I recommend conservative treatment with Voltaren, warm compresses, etc.  BP  Weight: 165 pounds.  She is down a few pounds.  A GLP-1 agonist was too expensive at over $500.  She is eating fruits and vegetables and oatmeal and increasing her free water intake.    She is also started to exercise, on the treadmill.  With this activity she denies any chest pain or shortness of breath.  She is hopeful to try to get off the medications.  She wanted to try to reduce her atorvastatin.  She just got it filled.  Her LDL is 94.  I encouraged her to continue, given that it is beneficial for her diabetes, now that her A1c is 7.4 she has adolfo diabetes.  If in the future she wants to take less medication perhaps we can switch her to  something like rosuvastatin 5 mg every other day.  For now, we will defer.    She is supportive of her son who is with her today.  He has disabilities, given Down's syndrome.  He is a DJ.  She is very supportive.  She will follow-up in 6 months sooner if needed          Review of Systems   Constitutional: Negative for chills.   Cardiovascular:  Negative for chest pain, claudication, cyanosis, dyspnea on exertion, irregular heartbeat, leg swelling, near-syncope, orthopnea, palpitations, paroxysmal nocturnal dyspnea and syncope.   Respiratory:  Negative for cough and shortness of breath.    Gastrointestinal:  Negative for heartburn and nausea.   Neurological:  Negative for dizziness, focal weakness, headaches, light-headedness and weakness.   All other systems reviewed and are negative.      Assessment  Diagnoses and all orders for this visit:    Dyslipidemia  -     POCT ECG  -     Lipid panel; Future    Musculoskeletal chest pain    Obstructive sleep apnea    Type 2 diabetes mellitus without complication, without long-term current use of insulin (HCC)    .  Comments:  check lipid profile and thyroid fxn. cont statin, low chol diet, exercise as tolerated.  med ref lipitor given.  Orders:  -     atorvastatin (LIPITOR) 20 mg tablet; Take 1 tablet (20 mg total) by mouth daily at bedtime    Fibromyalgia        Past Medical History:   Diagnosis Date   • Abnormal glucose     last assessed 2/25/16   • Arthritis    • Asthma     w/ exacerbation; last assessed 5/14/15   • Atypical nevi    • Atypical nevus     last assessed 1/18/17   • Breast lump     last assessed 3/6/14   • Cataract    • Cervical adenopathy     last assessed  2/7/17   • Colon polyp    • CPAP (continuous positive airway pressure) dependence    • Diverticulitis of colon    • Dizziness    • Dyslipidemia     last assessed 5/22/17   • Facial droop     last assessed  12/5/16   • Fibromyalgia, primary    • Flu 01/20/2018   • Foot abrasion     right between the 4th  and 5th toe   • Fractures    • Genital herpes     resolved 16   • GERD (gastroesophageal reflux disease)    • Headache, tension-type    • Herpes zoster     last assessed 16   • History of shingles     may 2016   • History of stomach ulcers    • Hx of abnormal mammogram     last assessed  3/5/14   • Hyperlipidemia    • Myalgia     last assessed  12   • Myositis     12   • Neck pain    • Pain involving joints of fingers of both hands 2021   • Peripheral neuropathy    • Seborrheic keratosis    • Shingles    • Skin disorder    • Skin neoplasm     of the lower limb, including hip; onset 12; last assessed  12   • Sleep apnea    • Stomach disorder        Past Surgical History:   Procedure Laterality Date   • ABDOMINAL SURGERY      release of adhesions   • BLADDER SURGERY      mesh-lift   • BREAST CYST ASPIRATION Right     does not know the year   • BREAST SURGERY      lift   • CATARACT EXTRACTION Bilateral    •  SECTION      x 3-,,   • CHOLECYSTECTOMY      lap   • COLONOSCOPY     • COSMETIC SURGERY      tummy and breast lift   • ESOPHAGOGASTRODUODENOSCOPY     • EYE SURGERY   right eye and 3/11 left eye   • OOPHORECTOMY Left    • OTHER SURGICAL HISTORY      vocal cord surgery, scraping   • [place] HYSTEROSCOPY BX ENDOMETRIUM&/POLYPC W/WO D&C N/A 2016    Procedure: DILATATION AND CURETTAGE (D&C) WITH HYSTEROSCOPY;  Surgeon: Kyle Bethea MD;  Location: [facility];  Service: Gynecology   • REDUCTION MAMMAPLASTY Bilateral    • TONSILLECTOMY     • TUBAL LIGATION  10/29/1984   • US GUIDED MSK PROCEDURE  2021           Allergies  Allergies   Allergen Reactions   • Latex Rash and Other (See Comments)     Burn-skin irritation    Burning   • Adhesive [Medical Tape] Other (See Comments)     bandaid-red,itch         Medications    Current Outpatient Medications:   •  albuterol (2.5 mg/3 mL) 0.083 % nebulizer solution, Take 3 mL (2.5 mg total) by nebulization every  6 (six) hours as needed for wheezing or shortness of breath, Disp: 180 mL, Rfl: 5  •  Ascorbic Acid (VITAMIN C) 1000 MG tablet, , Disp: , Rfl:   •  atorvastatin (LIPITOR) 20 mg tablet, Take 1 tablet (20 mg total) by mouth daily at bedtime, Disp: , Rfl:   •  benzonatate (TESSALON) 200 MG capsule, Take 1 capsule (200 mg total) by mouth 2 (two) times a day as needed for cough, Disp: 60 capsule, Rfl: 1  •  celecoxib (CeleBREX) 200 mg capsule, Take 200 mg by mouth daily, Disp: , Rfl:   •  cholecalciferol (VITAMIN D3) 1,000 units tablet, Take 2,000 Units by mouth daily, Disp: , Rfl:   •  clobetasol (TEMOVATE) 0.05 % cream, Apply topically 2 (two) times a day Apply topically on scalp and right shoulder two times a day, Disp: 60 g, Rfl: 2  •  Cymbalta 20 MG capsule, Start at one tablet a night, and can increase to 2 tablets nightly 2 weeks later, Disp: , Rfl:   •  famotidine (PEPCID) 40 MG tablet, TAKE ONE TABLET BY MOUTH AT BEDTIME, Disp: 90 tablet, Rfl: 1  •  fexofenadine (Allegra Allergy) 180 MG tablet, Take by mouth daily, Disp: , Rfl:   •  fluocinonide (LIDEX) 0.05 % external solution, Apply topically every 12 (twelve) hours, Disp: , Rfl:   •  fluticasone-umeclidinium-vilanterol (Trelegy Ellipta) 200-62.5-25 mcg/actuation AEPB inhaler, Inhale 1 puff daily in the early morning Rinse mouth after use., Disp: 60 blister, Rfl: 5  •  gabapentin (NEURONTIN) 100 mg capsule, , Disp: , Rfl:   •  hydrocortisone (ANUSOL-HC) 2.5 % rectal cream, Apply topically 2 (two) times a day, Disp: 28 g, Rfl: 0  •  hydrocortisone 2.5 % cream, Apply topically 2 (two) times a day scalp, Disp: 20 g, Rfl: 0  •  metFORMIN (GLUCOPHAGE-XR) 500 mg 24 hr tablet, TAKE ONE TABLET BY MOUTH EVERY DAY - TAKE WITH DINNER, Disp: 90 tablet, Rfl: 1  •  methocarbamol (ROBAXIN) 500 mg tablet, Take 1 tablet (500 mg total) by mouth 2 (two) times a day, Disp: 20 tablet, Rfl: 0  •  multivitamin (THERAGRAN) TABS, Take 1 tablet by mouth daily Last dose 3/21/21, Disp: ,  Rfl:   •  pantoprazole (PROTONIX) 40 mg tablet, TAKE ONE TABLET BY MOUTH EVERY DAY, Disp: 90 tablet, Rfl: 1  •  promethazine-dextromethorphan (PHENERGAN-DM) 6.25-15 mg/5 mL oral syrup, Take 5 mL by mouth 4 (four) times a day as needed for cough, Disp: 180 mL, Rfl: 0  •  semaglutide, 0.25 or 0.5 mg/dose, (Ozempic, 0.25 or 0.5 MG/DOSE,) 2 mg/3 mL injection pen, 0.25 mg under the skin every 7 days for 4 doses (28 days), THEN 0.5 mg under the skin every 7 days, Disp: 9 mL, Rfl: 0  •  sucralfate (CARAFATE) 1 g tablet, Take 1 tablet (1 g total) by mouth 3 (three) times a day, Disp: 30 tablet, Rfl: 0  •  hydroxychloroquine (PLAQUENIL) 200 mg tablet, Take 1 tablet (200 mg total) by mouth 2 (two) times a day with meals (Patient not taking: Reported on 4/16/2025), Disp: 60 tablet, Rfl: 5      Social History     Socioeconomic History   • Marital status:      Spouse name: Not on file   • Number of children: Not on file   • Years of education: Not on file   • Highest education level: Not on file   Occupational History   • Not on file   Tobacco Use   • Smoking status: Never   • Smokeless tobacco: Never   Vaping Use   • Vaping status: Never Used   Substance and Sexual Activity   • Alcohol use: No     Comment: rarely   • Drug use: Never   • Sexual activity: Not Currently     Birth control/protection: Post-menopausal   Other Topics Concern   • Not on file   Social History Narrative    Daily caffinated coffee consumption    Exercise habits- walking sometimes 1x per day- last impression 5/22/17- Karla Garzon    Good sleep hygiene    Pet - fish     Social Drivers of Health     Financial Resource Strain: Medium Risk (6/7/2023)    Overall Financial Resource Strain (CARDIA)    • Difficulty of Paying Living Expenses: Somewhat hard   Food Insecurity: No Food Insecurity (8/16/2024)    Nursing - Inadequate Food Risk Classification    • Worried About Running Out of Food in the Last Year: Never true    • Ran Out of Food in the  Last Year: Never true    • Ran Out of Food in the Last Year: Not on file   Transportation Needs: No Transportation Needs (8/16/2024)    PRAPARE - Transportation    • Lack of Transportation (Medical): No    • Lack of Transportation (Non-Medical): No   Physical Activity: Not on file   Stress: Not on file   Social Connections: Not on file   Intimate Partner Violence: Not on file   Housing Stability: Low Risk  (8/16/2024)    Housing Stability Vital Sign    • Unable to Pay for Housing in the Last Year: No    • Number of Times Moved in the Last Year: 0    • Homeless in the Last Year: No       Family History   Problem Relation Age of Onset   • Hypertension Mother    • Alzheimer's disease Mother    • Arthritis Mother    • Dementia Mother    • Hypertension Father    • Arthritis Father    • Heart attack Father    • Heart disease Father    • Stroke Father    • Diabetes Daughter    • Breast cancer Sister 32   • Skin cancer Sister    • Cancer Sister    • Abdominal aortic aneurysm Sister    • Cancer Sister         T cell sarcoma   • Breast cancer Sister    • No Known Problems Brother    • Diabetes Brother    • No Known Problems Son    • No Known Problems Maternal Aunt    • No Known Problems Maternal Aunt    • No Known Problems Paternal Aunt    • No Known Problems Paternal Aunt    • No Known Problems Paternal Aunt    • Asthma Sister    • Cancer Brother        Physical Exam  Vitals and nursing note reviewed.   Constitutional:       General: She is not in acute distress.     Appearance: She is not diaphoretic.   HENT:      Head: Normocephalic and atraumatic.   Eyes:      Conjunctiva/sclera: Conjunctivae normal.   Cardiovascular:      Rate and Rhythm: Normal rate and regular rhythm.      Pulses: Intact distal pulses.      Heart sounds: Normal heart sounds.   Pulmonary:      Effort: Pulmonary effort is normal.      Breath sounds: Normal breath sounds.   Abdominal:      General: Bowel sounds are normal.      Palpations: Abdomen is  "soft.   Musculoskeletal:         General: Normal range of motion.      Cervical back: Normal range of motion and neck supple.   Skin:     General: Skin is warm and dry.   Neurological:      Mental Status: She is alert and oriented to person, place, and time.         Vitals: Blood pressure 121/75, pulse 76, weight 74.9 kg (165 lb 3.2 oz), SpO2 98%, not currently breastfeeding.   Wt Readings from Last 3 Encounters:   25 74.9 kg (165 lb 3.2 oz)   25 76.5 kg (168 lb 11.2 oz)   25 76.2 kg (168 lb)           Labs & Results:  Lab Results   Component Value Date    WBC 8.17 2025    HGB 14.8 2025    HCT 47.4 (H) 2025    MCV 99 (H) 2025     2025     No results found for: \"BNP\"  No components found for: \"CHEM\"  Total CK   Date Value Ref Range Status   2023 48 26 - 192 U/L Final   2021 123 26 - 192 U/L Final   2018 99 26 - 192 U/L Final     Creatine Kinase, Total   Date Value Ref Range Status   2018 120 24 - 173 U/L Final     Troponin I   Date Value Ref Range Status   2018 <0.02 <=0.04 ng/mL Final     Comment:     3Autovalidation override   2018 <0.02 <=0.04 ng/mL Final     Comment:     3Autovalidation override   05/10/2017 <0.02 <0.04 ng/mL Final     Comment:     3Autovalidation override     Results for orders placed during the hospital encounter of 17    Echo complete with contrast if indicated    Narrative  [place] [phone]    Transthoracic Echocardiogram  2D, M-mode, Doppler, and Color Doppler    Study date:  31-May-2017    Patient: NORM JACKSON  MR number: [id number]  Account number: [id number]  : 1955  Age: 61 years  Gender: Female  Status: Routine  Location: Echo lab  Height: 65 in  Weight: 163.7 lb  BP: 117/ 81 mmHg    Indications: Dizziness    Diagnoses: R42. - Dizziness and giddiness    Sonographer:  LORE Erickson  Primary Physician: "  Cristela Yu MD  Referring Physician:  Elvi Willson DO  Group:  [facility]  Interpreting Physician:  Ana Paula Harding MD    SUMMARY    LEFT VENTRICLE:  Systolic function was normal. Ejection fraction was estimated in the range of 55 % to 60 % to be 60 %.  There were no regional wall motion abnormalities.  Wall thickness was at the upper limits of normal.  Doppler parameters were consistent with abnormal left ventricular relaxation (grade 1 diastolic dysfunction).    MITRAL VALVE:  There was trace regurgitation.    AORTIC VALVE:  There was trace regurgitation.    TRICUSPID VALVE:  There was trace regurgitation.  Estimated peak PA pressure was 25 mmHg.    HISTORY: PRIOR HISTORY: Shingles, Fibromyalgia, Asthma,Dyslipidemia    PROCEDURE: The procedure was performed in the echo lab. This was a routine study. The transthoracic approach was used. The study included complete 2D imaging, M-mode, complete spectral Doppler, and color Doppler. The heart rate was 74 bpm,  at the start of the study. Images were obtained from the parasternal, apical, subcostal, and suprasternal notch acoustic windows. Image quality was adequate.    LEFT VENTRICLE: Size was normal. Systolic function was normal. Ejection fraction was estimated in the range of 55 % to 60 % to be 60 %. There were no regional wall motion abnormalities. Wall thickness was at the upper limits of normal.  DOPPLER: Doppler parameters were consistent with abnormal left ventricular relaxation (grade 1 diastolic dysfunction).    RIGHT VENTRICLE: The size was normal. Systolic function was normal.    LEFT ATRIUM: Size was normal.    RIGHT ATRIUM: Size was normal.    MITRAL VALVE: Valve structure was normal. There was normal leaflet separation. DOPPLER: The transmitral velocity was within the normal range. There was no evidence for stenosis. There was trace regurgitation.    AORTIC VALVE: The valve was trileaflet. Leaflets exhibited normal thickness and normal cuspal  separation. DOPPLER: Transaortic velocity was within the normal range. There was no evidence for stenosis. There was trace regurgitation.    TRICUSPID VALVE: DOPPLER: There was trace regurgitation. Estimated peak PA pressure was 25 mmHg.    PULMONIC VALVE: DOPPLER: There was no significant regurgitation.    PERICARDIUM: There was no thickening or calcification. There was no pericardial effusion.    AORTA: The root exhibited normal size.    SYSTEMIC VEINS: IVC: The inferior vena cava was normal in size. Respirophasic changes were normal.    SYSTEM MEASUREMENT TABLES    2D mode  AoR Diam 2D: 2.9 cm  LA Diam (2D): 3.1 cm  LA/Ao (2D): 1.07  FS (2D Teich): 30.2 %  IVSd (2D): 1.15 cm  LVDEV: 65.1 cm³  LVEDV MOD BP: 64 cm³  LVESV: 27.3 cm³  LVIDd(2D): 3.88 cm  LVISd (2D): 2.71 cm  LVOT Area 2D: 3.14 cm squared  LVPWd (2D): 1.09 cm  SV (Teich): 37.8 cm³    Apical four chamber  LVEF A4C: 60 %    Apical two chamber  LVEF A2C: 50 %    Unspecified Scan Mode  LISA Cont Eq (Peak Matthew): 2.22 cm squared  LVOT Diam.: 2 cm  LVOT Vmax: 918 mm/s  LVOT Vmax; Mean: 918 mm/s  Peak Grad.; Mean: 3 mm[Hg]  MV Peak A Matthew: 785 mm/s  MV Peak E Matthew. Mean: 617 mm/s  MVA (PHT): 4.4 cm squared  PHT: 50 ms  Max P mm[Hg]  V Max: 2090 mm/s  Vmax: 1990 mm/s  RA Area: 9.2 cm squared  RA Volume: 17.6 cm³  TAPSE: 2.3 cm    [employer] Accredited Echocardiography Laboratory    Prepared and electronically signed by    Ana Paula Harding MD  Signed 31-May-2017 12:04:08    No results found for this or any previous visit.    No valid procedures specified.  No results found for this or any previous visit.                This note was completed in part utilizing [employer]am direct voice recognition software.   Grammatical errors, random word insertion, spelling mistakes, and incomplete sentences may be an occasional consequence of the system secondary to software limitations, ambient noise and hardware issues. At the time of dictation, efforts  were made to edit, clarify and /or correct errors.  Please read the chart carefully and recognize, using context, where substitutions have occurred.  If you have any questions or concerns about the context, text or information contained within the body of this dictation, please contact myself, the provider, for further clarification

## 2025-05-06 ENCOUNTER — OFFICE VISIT (OUTPATIENT)
Age: 70
End: 2025-05-06

## 2025-05-06 DIAGNOSIS — L56.8 PHOTOSENSITIVITY DERMATITIS: Primary | ICD-10-CM

## 2025-05-06 DIAGNOSIS — L50.0 ALLERGIC URTICARIA: ICD-10-CM

## 2025-05-06 RX ORDER — DAPSONE 25 MG/1
TABLET ORAL
Qty: 21 TABLET | Refills: 0 | Status: SHIPPED | OUTPATIENT
Start: 2025-05-06 | End: 2025-05-27

## 2025-05-06 NOTE — PROGRESS NOTES
"[facility] Note     Patient Name: Renetta Chan  Encounter Date: 5/6/25       Have you been cared for by a [facility]ologist in the last 3 years and, if so, which description applies to you? Yes. I have been here within the last 3 years, and my medical history has NOT changed since that time. I am not of child-bearing potential.     REVIEW OF SYSTEMS:  Have you recently had or currently have any of the following? No changes in my recent health.   PAST MEDICAL HISTORY:  Have you personally ever had or currently have any of the following?  If \"YES,\" then please provide more detail. No changes in my medical history.   HISTORY OF IMMUNOSUPPRESSION: Do you have a history of any of the following:  Systemic Immunosuppression such as Diabetes, Biologic or Immunotherapy, Chemotherapy, Organ Transplantation, Bone Marrow Transplantation or Prednisone?  YES, Immunosuppressed     Answering \"YES\" requires the addition of the dotphrase \"IMMUNOSUPPRESSED\" as the first diagnosis of the patient's visit.   FAMILY HISTORY:  Any \"first degree relatives\" (parent, brother, sister, or child) with the following?    No changes in my family's known health.   PATIENT EXPERIENCE:    Do you want the Dermatologist to perform a COMPLETE skin exam today including a clinical examination under the \"bra and underwear\" areas?  NO  If necessary, do we have your permission to call and leave a detailed message on your Preferred Phone number that includes your specific medical information?  Yes      Allergies   Allergen Reactions    Latex Rash and Other (See Comments)     Burn-skin irritation    Burning    Adhesive [Medical Tape] Other (See Comments)     bandaid-red,itch      Current Outpatient Medications:     albuterol (2.5 mg/3 mL) 0.083 % nebulizer solution, Take 3 mL (2.5 mg total) by nebulization every 6 (six) hours as needed for wheezing or shortness of breath, Disp: 180 mL, Rfl: 5    Ascorbic Acid (VITAMIN C) 1000 MG " tablet, , Disp: , Rfl:     atorvastatin (LIPITOR) 20 mg tablet, Take 1 tablet (20 mg total) by mouth daily at bedtime, Disp: , Rfl:     benzonatate (TESSALON) 200 MG capsule, Take 1 capsule (200 mg total) by mouth 2 (two) times a day as needed for cough, Disp: 60 capsule, Rfl: 1    celecoxib (CeleBREX) 200 mg capsule, Take 200 mg by mouth daily, Disp: , Rfl:     cholecalciferol (VITAMIN D3) 1,000 units tablet, Take 2,000 Units by mouth daily, Disp: , Rfl:     clobetasol (TEMOVATE) 0.05 % cream, Apply topically 2 (two) times a day Apply topically on scalp and right shoulder two times a day, Disp: 60 g, Rfl: 2    Cymbalta 20 MG capsule, Start at one tablet a night, and can increase to 2 tablets nightly 2 weeks later, Disp: , Rfl:     famotidine (PEPCID) 40 MG tablet, TAKE ONE TABLET BY MOUTH AT BEDTIME, Disp: 90 tablet, Rfl: 1    fexofenadine (Allegra Allergy) 180 MG tablet, Take by mouth daily, Disp: , Rfl:     fluocinonide (LIDEX) 0.05 % external solution, Apply topically every 12 (twelve) hours, Disp: , Rfl:     fluticasone-umeclidinium-vilanterol (Trelegy Ellipta) 200-62.5-25 mcg/actuation AEPB inhaler, Inhale 1 puff daily in the early morning Rinse mouth after use., Disp: 60 blister, Rfl: 5    gabapentin (NEURONTIN) 100 mg capsule, , Disp: , Rfl:     hydrocortisone (ANUSOL-HC) 2.5 % rectal cream, Apply topically 2 (two) times a day, Disp: 28 g, Rfl: 0    hydrocortisone 2.5 % cream, Apply topically 2 (two) times a day scalp, Disp: 20 g, Rfl: 0    hydroxychloroquine (PLAQUENIL) 200 mg tablet, Take 1 tablet (200 mg total) by mouth 2 (two) times a day with meals (Patient not taking: Reported on 4/16/2025), Disp: 60 tablet, Rfl: 5    metFORMIN (GLUCOPHAGE-XR) 500 mg 24 hr tablet, TAKE ONE TABLET BY MOUTH EVERY DAY - TAKE WITH DINNER, Disp: 90 tablet, Rfl: 1    methocarbamol (ROBAXIN) 500 mg tablet, Take 1 tablet (500 mg total) by mouth 2 (two) times a day, Disp: 20 tablet, Rfl: 0    multivitamin (THERAGRAN) TABS, Take  1 tablet by mouth daily Last dose 3/21/21, Disp: , Rfl:     pantoprazole (PROTONIX) 40 mg tablet, TAKE ONE TABLET BY MOUTH EVERY DAY, Disp: 90 tablet, Rfl: 1    promethazine-dextromethorphan (PHENERGAN-DM) 6.25-15 mg/5 mL oral syrup, Take 5 mL by mouth 4 (four) times a day as needed for cough, Disp: 180 mL, Rfl: 0    sucralfate (CARAFATE) 1 g tablet, Take 1 tablet (1 g total) by mouth 3 (three) times a day, Disp: 30 tablet, Rfl: 0              Whom besides the patient is providing clinical information about today's encounter?   NO ADDITIONAL HISTORIAN (patient alone provided history)    Physical Exam and Assessment/Plan by Diagnosis:       PHOTOSENSITIVITY DERMATITIS FOLLOW UP  Physical Exam:  Anatomic Location Affected:  Chest, upper back  Morphological Description:  erythematous patches       Additional History of Present Condition:  Patient here for rash follow up. Patient walked in to office requesting to be seen. Patient was last seen by Dr. Saini on 4/2/25 and was referred to allergist and Rheumatology. Taking Plaquenil 200 mg twice daily. She was instructed to obtain lab work, and then would be placed on Dapsone daily if labs adequate. Patient reports that the rash is worsening/not improving.      Assessment and Plan:  Based on a thorough discussion of this condition and the management approach to it (including a comprehensive discussion of the known risks, side effects and potential benefits of treatment), the patient (family) agrees to implement the following specific plan:  Continue plaquenil as directed and begin dapsone as prescribed - repeat CBC in 3 weeks, prior to f/u appt   F/u in 3-4 weeks   Cont following rheum/ allergist plan of care       Scribe Attestation      I,:  Ioana Field am acting as a scribe while in the presence of the attending physician.:       I,:  TANI Thakkar personally performed the services described in this documentation    as scribed in my presence.:

## 2025-05-06 NOTE — PATIENT INSTRUCTIONS
PHOTOSENSITIVITY DERMATITIS FOLLOW UP    Assessment and Plan:  Based on a thorough discussion of this condition and the management approach to it (including a comprehensive discussion of the known risks, side effects and potential benefits of treatment), the patient (family) agrees to implement the following specific plan:  Plan is to stay on Plaquenil until we get the results for the G6PD results which is ordered.  Once results are in for G6PD if normal plan to switch to Dapsone 25 mg tablets daily.  After you start Dapsone you will need to redo Blood test in 3 weeks within 2 days of next appt  Discussed with patient in depth why it is important to obtain labs 3 weeks after medication is started  Referring to allergist and rheumatologist for rashes specifically

## 2025-05-14 NOTE — PROGRESS NOTES
"ASSESSMENT/PLAN:    Assessment:   Trigger Finger  left  long finger    Neck pain with radiculopathy    Plan:   steroid injections  - provided injection to patient today  MRI of the cervical spine for radiculopathy  - Follow up with Spine and Pain    Follow Up:  6 weeks    To Do Next Visit:  Assess response to treatment    General Discussions:  Trigger FInger: The anatomy and physiology of trigger finger was discussed with the patient today in the office.  Edema and increased contact pressure within the flexor tendons at the A1 pulley can cause pain, crepitation, and limitation of function.  Treatment options include resting MP blocking splints to decrease edema, oral anti-inflammatory medications, home or formal therapy exercises, up to 2 steroid injections within the tendon sheath, or surgical release.  While majority of patients do respond to conservative treatment, up to 20% may require surgical release.     _____________________________________________________  CHIEF COMPLAINT:  Chief Complaint   Patient presents with    Right Hand - Pain, Numbness, Locking    Left Hand - Pain, Numbness, Locking     Left is worse         SUBJECTIVE:  Renetta Chan is a 68 y.o. female who presents with pain in hand bilaterally. She notes that she has pain and difficulty with gripping, as well as weakness in bilateral upper extremities. She notes that with gripping activities, her left long finger gets stuck and \"pops\" open. She denies any injuries. She has a history of neck pain as well and has not had any interventions for her neck previously.    PAST MEDICAL HISTORY:  Past Medical History:   Diagnosis Date    Abnormal glucose     last assessed 2/25/16    Arthritis     Asthma     w/ exacerbation; last assessed 5/14/15    Atypical nevi     Atypical nevus     last assessed 1/18/17    Breast lump     last assessed 3/6/14    Cataract     Cervical adenopathy     last assessed  2/7/17    Colon polyp     CPAP (continuous positive " airway pressure) dependence     Diverticulitis of colon     Dizziness     Dyslipidemia     last assessed 17    Facial droop     last assessed  16    Fibromyalgia, primary     Flu 2018    Foot abrasion     right between the 4th and 5th toe    Fractures     Genital herpes     resolved 16    GERD (gastroesophageal reflux disease)     Headache, tension-type     Herpes zoster     last assessed 16    History of shingles     may 2016    History of stomach ulcers     Hx of abnormal mammogram     last assessed  3/5/14    Hyperlipidemia     Myalgia     last assessed  12    Myositis     12    Neck pain     Pain involving joints of fingers of both hands 2021    Peripheral neuropathy     Seborrheic keratosis     Shingles     Skin disorder     Skin neoplasm     of the lower limb, including hip; onset 12; last assessed  12    Sleep apnea     Stomach disorder        PAST SURGICAL HISTORY:  Past Surgical History:   Procedure Laterality Date    ABDOMINAL SURGERY      release of adhesions    BLADDER SURGERY      mesh-lift    BREAST CYST ASPIRATION Right     does not know the year    BREAST SURGERY      lift    CATARACT EXTRACTION Bilateral      SECTION      x 3-,,    CHOLECYSTECTOMY      lap    COLONOSCOPY      COSMETIC SURGERY      tummy and breast lift    ESOPHAGOGASTRODUODENOSCOPY      OOPHORECTOMY Left     OTHER SURGICAL HISTORY      vocal cord surgery, scraping    MN HYSTEROSCOPY BX ENDOMETRIUM&/POLYPC W/WO D&C N/A 2016    Procedure: DILATATION AND CURETTAGE (D&C) WITH HYSTEROSCOPY;  Surgeon: Kyle Bethea MD;  Location: [facility];  Service: Gynecology    REDUCTION MAMMAPLASTY Bilateral 2008    TONSILLECTOMY      TUBAL LIGATION  10/29/1984    US GUIDED MSK PROCEDURE  2021       FAMILY HISTORY:  Family History   Problem Relation Age of Onset    Hypertension Mother     Alzheimer's disease Mother     Arthritis Mother     Hypertension Father      Arthritis Father     Heart attack Father     Heart disease Father     Stroke Father     Other Brother         vertigo, tinnitus    Diabetes Daughter     Breast cancer Sister 32    Skin cancer Sister     Cancer Sister     Other Son         downs syndrome    Abdominal aortic aneurysm Sister     Cancer Sister         T cell sarcoma    Breast cancer Sister     No Known Problems Brother     Diabetes Brother     Other Sister         anemia from the diet    No Known Problems Son     No Known Problems Maternal Aunt     No Known Problems Maternal Aunt     No Known Problems Paternal Aunt     No Known Problems Paternal Aunt     No Known Problems Paternal Aunt     Asthma Sister        SOCIAL HISTORY:  Social History     Tobacco Use    Smoking status: Never    Smokeless tobacco: Never   Vaping Use    Vaping status: Never Used   Substance Use Topics    Alcohol use: No     Comment: rarely    Drug use: Never       MEDICATIONS:    Current Outpatient Medications:     Ascorbic Acid (VITAMIN C) 1000 MG tablet, , Disp: , Rfl:     atorvastatin (LIPITOR) 20 mg tablet, TAKE ONE TABLET BY MOUTH EVERY DAY AT BEDTIME, Disp: 90 tablet, Rfl: 1    cholecalciferol (VITAMIN D3) 1,000 units tablet, Take 2,000 Units by mouth daily, Disp: , Rfl:     Cymbalta 20 MG capsule, Start at one tablet a night, and can increase to 2 tablets nightly 2 weeks later, Disp: , Rfl:     famotidine (PEPCID) 40 MG tablet, TAKE ONE TABLET BY MOUTH AT BEDTIME, Disp: 90 tablet, Rfl: 1    gabapentin (NEURONTIN) 100 mg capsule, , Disp: , Rfl:     metFORMIN (GLUCOPHAGE-XR) 500 mg 24 hr tablet, TAKE ONE TABLET BY MOUTH EVERY DAY - TAKE WITH DINNER, Disp: 90 tablet, Rfl: 1    methocarbamol (ROBAXIN) 500 mg tablet, Take 1 tablet (500 mg total) by mouth 2 (two) times a day, Disp: 20 tablet, Rfl: 0    montelukast (SINGULAIR) 10 mg tablet, TAKE ONE TABLET BY MOUTH EVERY DAY, Disp: 60 tablet, Rfl: 2    multivitamin (THERAGRAN) TABS, Take 1 tablet by mouth daily Last dose 3/21/21,  Disp: , Rfl:     pantoprazole (PROTONIX) 40 mg tablet, Take 1 tablet (40 mg total) by mouth daily, Disp: 90 tablet, Rfl: 1    albuterol (2.5 mg/3 mL) 0.083 % nebulizer solution, Take 3 mL (2.5 mg total) by nebulization every 6 (six) hours as needed for wheezing or shortness of breath (Patient not taking: Reported on 11/20/2023), Disp: 180 mL, Rfl: 5    clobetasol (TEMOVATE) 0.05 % cream, Apply topically 2 (two) times a day Apply topically on scalp and right shoulder two times a day (Patient not taking: Reported on 5/20/2024), Disp: 60 g, Rfl: 2    fluticasone-umeclidinium-vilanterol (Trelegy Ellipta) 100-62.5-25 mcg/actuation inhaler, Inhale 1 puff daily Rinse mouth after use., Disp: 60 blister, Rfl: 11    fluticasone-umeclidinium-vilanterol (Trelegy Ellipta) 200-62.5-25 mcg/actuation AEPB inhaler, Inhale 1 puff daily in the early morning Rinse mouth after use., Disp: 60 blister, Rfl: 5    hydrocortisone 2.5 % cream, Apply topically 2 (two) times a day scalp (Patient not taking: Reported on 5/20/2024), Disp: 20 g, Rfl: 0    ipratropium-albuterol (DUO-NEB) 0.5-2.5 mg/3 mL nebulizer solution, Take 3 mL by nebulization 4 (four) times a day as needed for wheezing or shortness of breath (Patient not taking: Reported on 11/20/2023), Disp: 360 mL, Rfl: 7    meloxicam (Mobic) 15 mg tablet, Take 1 tablet (15 mg total) by mouth daily, Disp: 30 tablet, Rfl: 0    methylPREDNISolone 4 MG tablet therapy pack, Use as directed on package (Patient not taking: Reported on 5/20/2024), Disp: 21 each, Rfl: 0    ondansetron (Zofran ODT) 4 mg disintegrating tablet, Take 1 tablet (4 mg total) by mouth every 6 (six) hours as needed for nausea or vomiting for up to 3 days (Patient not taking: Reported on 5/20/2024), Disp: 12 tablet, Rfl: 0    promethazine-dextromethorphan (PHENERGAN-DM) 6.25-15 mg/5 mL oral syrup, Take 5 mL by mouth 4 (four) times a day as needed for cough (Patient not taking: Reported on 5/20/2024), Disp: 180 mL, Rfl:  "0    ALLERGIES:  Allergies   Allergen Reactions    Latex Rash     Burn-skin irritation    Adhesive [Medical Tape] Other (See Comments)     bandaid-red,itch       REVIEW OF SYSTEMS:  Pertinent items are noted in HPI.  A comprehensive review of systems was negative.    LABS:  HgA1c:   Lab Results   Component Value Date    HGBA1C 6.9 (H) 08/15/2023     BMP:   Lab Results   Component Value Date    GLUCOSE 122 (H) 12/16/2017    CALCIUM 9.6 03/29/2024     12/16/2017    K 4.1 03/29/2024    CO2 27 03/29/2024     03/29/2024    BUN 15 03/29/2024    CREATININE 0.71 03/29/2024         _____________________________________________________  PHYSICAL EXAMINATION:  Vital signs: /72   Pulse 83   Ht 5' 4\" (1.626 m)   Wt 70.8 kg (156 lb)   BMI 26.78 kg/m²   General: well developed and well nourished, alert, oriented times 3, and appears comfortable  Psychiatric: Normal  HEENT: Trachea Midline, No torticollis  Cardiovascular: No discernable arrhythmia  Pulmonary: No wheezing or stridor  Abdomen: No rebound or guarding  Extremities: No peripheral edema  Skin: No masses, erythema, lacerations, fluctation, ulcerations  Neurovascular: Sensation Intact to the Median, Ulnar, Radial Nerve, Motor Intact to the Median, Ulnar, Radial Nerve, and Pulses Intact    MUSCULOSKELETAL EXAMINATION:  LEFT SIDE:  Finger:  Triggering  long finger, Palpable clicking long finger, and Nodules  long finger and Cervical Spine:  Negative Spurling's and ROM limitations due to pain and stiffness    _____________________________________________________  STUDIES REVIEWED:  Images were reviewed in PACS  and demonstrate: severe degenerative changes to the cervical spine       PROCEDURES PERFORMED:  Hand/upper extremity injection: L long A1  Universal Protocol:  Consent: Verbal consent obtained.  Consent given by: patient  Time out: Immediately prior to procedure a \"time out\" was called to verify the correct patient, procedure, equipment, support " staff and site/side marked as required.  Site marked: the operative site was marked  Supporting Documentation  Indications: pain and therapeutic   Procedure Details  Condition:trigger finger Location: long finger - L long A1   Preparation: Patient was prepped and draped in the usual sterile fashion  Needle size: 25 G  Ultrasound guidance: no  Approach: volar  Medications administered: 1 mL lidocaine 1 %; 40 mg dexamethasone 100 mg/10 mL  Patient tolerance: patient tolerated the procedure well with no immediate complications  Dressing:  Sterile dressing applied               Regular Diet - No restrictions

## 2025-05-28 ENCOUNTER — TELEPHONE (OUTPATIENT)
Age: 70
End: 2025-05-28

## 2025-05-28 NOTE — TELEPHONE ENCOUNTER
Call transferred from pep re pt, re appt today but labs not obtained. Pt states rash is worsening. States it is now more red and more itchy in areas of upper chest, neck, shoulders and upper back, Adds her scalp also is red. Pt states she puts rubbing alcohol in spray bottle and sprays it on skin to relieve itching, encouraged not to do that.     Noted will request lab orders and rescheduled f/u for 6/5. Pt requests cb when lab ordered so she can obtain.

## 2025-05-29 DIAGNOSIS — Z79.899 HIGH RISK MEDICATION USE: Primary | ICD-10-CM

## 2025-06-02 ENCOUNTER — APPOINTMENT (OUTPATIENT)
Dept: LAB | Facility: CLINIC | Age: 70
End: 2025-06-02
Payer: COMMERCIAL

## 2025-06-02 DIAGNOSIS — Z79.899 HIGH RISK MEDICATION USE: ICD-10-CM

## 2025-06-02 DIAGNOSIS — E78.5 DYSLIPIDEMIA: ICD-10-CM

## 2025-06-02 DIAGNOSIS — E11.9 CONTROLLED TYPE 2 DIABETES MELLITUS WITHOUT COMPLICATION, WITHOUT LONG-TERM CURRENT USE OF INSULIN (HCC): ICD-10-CM

## 2025-06-02 LAB
ALBUMIN SERPL BCG-MCNC: 4.3 G/DL (ref 3.5–5)
ALP SERPL-CCNC: 66 U/L (ref 34–104)
ALT SERPL W P-5'-P-CCNC: 32 U/L (ref 7–52)
ANION GAP SERPL CALCULATED.3IONS-SCNC: 7 MMOL/L (ref 4–13)
AST SERPL W P-5'-P-CCNC: 26 U/L (ref 13–39)
BASOPHILS # BLD AUTO: 0.11 THOUSANDS/ÂΜL (ref 0–0.1)
BASOPHILS NFR BLD AUTO: 1 % (ref 0–1)
BILIRUB SERPL-MCNC: 0.41 MG/DL (ref 0.2–1)
BUN SERPL-MCNC: 12 MG/DL (ref 5–25)
CALCIUM SERPL-MCNC: 9.6 MG/DL (ref 8.4–10.2)
CHLORIDE SERPL-SCNC: 103 MMOL/L (ref 96–108)
CHOLEST SERPL-MCNC: 142 MG/DL (ref ?–200)
CO2 SERPL-SCNC: 27 MMOL/L (ref 21–32)
CREAT SERPL-MCNC: 0.93 MG/DL (ref 0.6–1.3)
CREAT UR-MCNC: 170.8 MG/DL
EOSINOPHIL # BLD AUTO: 0.24 THOUSAND/ÂΜL (ref 0–0.61)
EOSINOPHIL NFR BLD AUTO: 3 % (ref 0–6)
ERYTHROCYTE [DISTWIDTH] IN BLOOD BY AUTOMATED COUNT: 13.4 % (ref 11.6–15.1)
GFR SERPL CREATININE-BSD FRML MDRD: 62 ML/MIN/1.73SQ M
GLUCOSE P FAST SERPL-MCNC: 103 MG/DL (ref 65–99)
HCT VFR BLD AUTO: 43.1 % (ref 34.8–46.1)
HDLC SERPL-MCNC: 46 MG/DL
HGB BLD-MCNC: 13.2 G/DL (ref 11.5–15.4)
IMM GRANULOCYTES # BLD AUTO: 0.07 THOUSAND/UL (ref 0–0.2)
IMM GRANULOCYTES NFR BLD AUTO: 1 % (ref 0–2)
LDLC SERPL CALC-MCNC: 68 MG/DL (ref 0–100)
LYMPHOCYTES # BLD AUTO: 2.12 THOUSANDS/ÂΜL (ref 0.6–4.47)
LYMPHOCYTES NFR BLD AUTO: 22 % (ref 14–44)
MCH RBC QN AUTO: 30.3 PG (ref 26.8–34.3)
MCHC RBC AUTO-ENTMCNC: 30.6 G/DL (ref 31.4–37.4)
MCV RBC AUTO: 99 FL (ref 82–98)
MICROALBUMIN UR-MCNC: 8.6 MG/L
MICROALBUMIN/CREAT 24H UR: 5 MG/G CREATININE (ref 0–30)
MONOCYTES # BLD AUTO: 1.24 THOUSAND/ÂΜL (ref 0.17–1.22)
MONOCYTES NFR BLD AUTO: 13 % (ref 4–12)
NEUTROPHILS # BLD AUTO: 5.95 THOUSANDS/ÂΜL (ref 1.85–7.62)
NEUTS SEG NFR BLD AUTO: 60 % (ref 43–75)
NRBC BLD AUTO-RTO: 0 /100 WBCS
PLATELET # BLD AUTO: 365 THOUSANDS/UL (ref 149–390)
PMV BLD AUTO: 10.6 FL (ref 8.9–12.7)
POTASSIUM SERPL-SCNC: 4.6 MMOL/L (ref 3.5–5.3)
PROT SERPL-MCNC: 7.6 G/DL (ref 6.4–8.4)
RBC # BLD AUTO: 4.36 MILLION/UL (ref 3.81–5.12)
SODIUM SERPL-SCNC: 137 MMOL/L (ref 135–147)
TRIGL SERPL-MCNC: 139 MG/DL (ref ?–150)
WBC # BLD AUTO: 9.73 THOUSAND/UL (ref 4.31–10.16)

## 2025-06-02 PROCEDURE — 80061 LIPID PANEL: CPT

## 2025-06-02 PROCEDURE — 36415 COLL VENOUS BLD VENIPUNCTURE: CPT

## 2025-06-02 PROCEDURE — 80053 COMPREHEN METABOLIC PANEL: CPT

## 2025-06-02 PROCEDURE — 83036 HEMOGLOBIN GLYCOSYLATED A1C: CPT

## 2025-06-02 PROCEDURE — 82570 ASSAY OF URINE CREATININE: CPT

## 2025-06-02 PROCEDURE — 85025 COMPLETE CBC W/AUTO DIFF WBC: CPT

## 2025-06-02 PROCEDURE — 82043 UR ALBUMIN QUANTITATIVE: CPT

## 2025-06-03 ENCOUNTER — TELEPHONE (OUTPATIENT)
Age: 70
End: 2025-06-03

## 2025-06-03 LAB
EST. AVERAGE GLUCOSE BLD GHB EST-MCNC: 131 MG/DL
HBA1C MFR BLD: 6.2 %

## 2025-06-03 NOTE — TELEPHONE ENCOUNTER
[facility] from [facility] called with the patient on the line to say they are denying her claim for Dr Montaño at [facility] [phone] appt date 12/26/24. The referral they have has the incorrect procedure code on it. Please fax a new doctors referral to [facility] at [phone] with the procedure code 75430 as soon as possible.

## 2025-06-03 NOTE — TELEPHONE ENCOUNTER
Please assist with this referral--I found the one from December 2024 in media and printed for reference.    thanks

## 2025-06-05 ENCOUNTER — TELEPHONE (OUTPATIENT)
Age: 70
End: 2025-06-05

## 2025-06-05 ENCOUNTER — OFFICE VISIT (OUTPATIENT)
Age: 70
End: 2025-06-05
Payer: COMMERCIAL

## 2025-06-05 VITALS — BODY MASS INDEX: 27.18 KG/M2 | WEIGHT: 159.2 LBS | TEMPERATURE: 99.1 F | HEIGHT: 64 IN

## 2025-06-05 DIAGNOSIS — L56.8 PHOTOSENSITIVITY DERMATITIS: ICD-10-CM

## 2025-06-05 DIAGNOSIS — L28.1 PRURIGO NODULARIS: Primary | ICD-10-CM

## 2025-06-05 DIAGNOSIS — Z51.81 MEDICATION MONITORING ENCOUNTER: ICD-10-CM

## 2025-06-05 PROCEDURE — 99214 OFFICE O/P EST MOD 30 MIN: CPT | Performed by: DERMATOLOGY

## 2025-06-05 PROCEDURE — 11900 INJECT SKIN LESIONS </W 7: CPT | Performed by: DERMATOLOGY

## 2025-06-05 RX ORDER — DAPSONE 25 MG/1
TABLET ORAL
Qty: 30 TABLET | Refills: 0 | Status: SHIPPED | OUTPATIENT
Start: 2025-06-05 | End: 2025-06-10 | Stop reason: SDUPTHER

## 2025-06-05 RX ORDER — ESTRADIOL 0.1 MG/G
CREAM VAGINAL 2 TIMES WEEKLY
COMMUNITY
Start: 2025-05-09 | End: 2026-05-09

## 2025-06-05 NOTE — TELEPHONE ENCOUNTER
Patient called pretty upset because she received a notification that she has an upcoming appointment on 06/09 , she was seen today and was told to schedule her follow up in 1 month not in 4 days as the office scheduled .   I did apologized and rs her on 07/09

## 2025-06-05 NOTE — PROGRESS NOTES
"[facility] Dermatology Clinic Note     Patient Name: Renetta Chan  Encounter Date: 06/05/2025       Have you been cared for by a [facility] Dermatologist in the last 3 years and, if so, which description applies to you? Yes. I have been here within the last 3 years, and my medical history has NOT changed since that time. I am not of child-bearing potential.     REVIEW OF SYSTEMS:  Have you recently had or currently have any of the following? No changes in my recent health.   PAST MEDICAL HISTORY:  Have you personally ever had or currently have any of the following?  If \"YES,\" then please provide more detail. No changes in my medical history.   HISTORY OF IMMUNOSUPPRESSION: Do you have a history of any of the following:  Systemic Immunosuppression such as Diabetes, Biologic or Immunotherapy, Chemotherapy, Organ Transplantation, Bone Marrow Transplantation or Prednisone?  No     Answering \"YES\" requires the addition of the dotphrase \"IMMUNOSUPPRESSED\" as the first diagnosis of the patient's visit.   FAMILY HISTORY:  Any \"first degree relatives\" (parent, brother, sister, or child) with the following?    No changes in my family's known health.   PATIENT EXPERIENCE:    Do you want the Dermatologist to perform a COMPLETE skin exam today including a clinical examination under the \"bra and underwear\" areas?  NO  If necessary, do we have your permission to call and leave a detailed message on your Preferred Phone number that includes your specific medical information?  Yes      Allergies[1] Current Medications[2]              Whom besides the patient is providing clinical information about today's encounter?   NO ADDITIONAL HISTORIAN (patient alone provided history)    Physical Exam and Assessment/Plan by Diagnosis:      PRURIGO NODULARIS  Physical Exam:  Anatomic Location Affected: scalp  Morphological Description: Crusted papule on top of scalp    Additional History of Present Condition:  Patient presents with crusted " itchy papule on top of the scalp.Patient had kenalog injections in the past around 2016.     Assessment and Plan:  Based on a thorough discussion of this condition and the management approach to it (including a comprehensive discussion of the known risks, side effects and potential benefits of treatment), the patient (family) agrees to implement the following specific plan:  Kenalog injection, consent signed today    Follow up in 4 weeks       PHOTOSENSITIVITY DERMATITIS FOLLOW UP  Physical Exam:  Anatomic Location Affected:  Left breast, upper back  Morphological Description:  erythematous scaly patches        Additional History of Present Condition:  Patient present today for rash follow up.Patient was last seen by TANI Freire on 5/06/2025 who prescribed Dapsone 25 mg 1 tablet once daily for 3 weeks.Patient completed course of Dapsone 25 mg and is currently taking Plaquenil 200 mg twice daily,clobetasol 0.05 % cream, and OTC calamine lotion.Patient reports that the rash is not improving.      Assessment and Plan:  Based on a thorough discussion of this condition and the management approach to it (including a comprehensive discussion of the known risks, side effects and potential benefits of treatment), the patient (family) agrees to implement the following specific plan:  Continue plaquenil as directed and begin increased dose of dapsone as prescribed (2 tablets daily; 50 mg total for 3 weeks)- repeat CBC in 3 weeks, prior to f/u appt   Follow up in4 weeks   Cont following rheum/ allergist plan of care     PROCEDURE:  INTRALESIONAL STEROID (KENALOG INJECTION)      Purpose: Triamcinolone is a synthetic glucocorticoid corticosteroid that has marked anti-inflammatory action. It is prepared in sterile aqueous suspension suitable for injecting directly into a lesion on or immediately below the skin to treat a dermal inflammatory process. Efudex (Fluorouracil) belongs to the group of medicines known as  antimetabolites that slows the growth of cells.      Indications:  Triamcinolone is indicated for alopecia areata; inflammatory acne cysts; discoid lupus erythematosus; keloids and hypertrophic scars; inflammatory lesions of granuloma annulare, lichen planus, lichen simplex chronicus (neurodermatitis), psoriatic plaques, and other localized inflammatory skin conditions. Efudex is used for a variety of conditions including cancers and hypertrophic of keloid scars.      Potential Side Effects: I understand that triamcinolone and efudex injection can potentially cause early and/or delayed adverse effects such as:    Pain    Impaired wound healing, ulceration   Increased hair growth    Bleeding    White or brown marks    Steroid acne    Infection    Telangiectasia    Skin thinning    Cutaneous and subcutaneous lipoatrophy (most common) appearing as skin indentations or dimples around the injection sites a few weeks after treatment      For Efudex, IMPORTANTLY, do not become pregnant while taking this medicine. Women should inform their doctor if they wish to become pregnant or think they might be pregnant. There is a potential for serious side effects to an unborn child.  Do NOT breast-feed an infant while taking this medicine. Men should inform their doctor if they wish to father a child. This medicine may lower sperm counts.    PROCEDURE NOTE:  After verbal and written consent were obtained, the to-be-treated area was wiped and cleaned with rubbing alcohol 70%.       Then, a total of 0.1 mL of Kenalog CONCENTRATION:  10 mg/mL   (Lot# [id number]; Expiration 05/2027, NDC#: 8118-6509-40 (for 40mg/mL) was injected intralesionally into a total of 2 lesion/s on the following anatomic areas:  scalp using a 1-mL syringe and a 30-gauge needle.  1mg was injected not 10 as documented     There was less than 1 mL of blood loss and little to no discomfort.  The area was bandaged with a Band-aid.  The patient tolerated the procedure  well and remained in the office for observation.  With no signs of an adverse reaction, the patient was eventually discharged from clinic.     Scribe Attestation      I,:  Eveline Belcher MA am acting as a scribe while in the presence of the attending physician.:       I,:  Brii Saini MD personally performed the services described in this documentation    as scribed in my presence.:                  [1]   Allergies  Allergen Reactions    Latex Rash and Other (See Comments)     Burn-skin irritation    Burning    Adhesive [Medical Tape] Other (See Comments)     bandaid-red,itch   [2]   Current Outpatient Medications:     albuterol (2.5 mg/3 mL) 0.083 % nebulizer solution, Take 3 mL (2.5 mg total) by nebulization every 6 (six) hours as needed for wheezing or shortness of breath, Disp: 180 mL, Rfl: 5    atorvastatin (LIPITOR) 20 mg tablet, Take 1 tablet (20 mg total) by mouth daily at bedtime, Disp: , Rfl:     celecoxib (CeleBREX) 200 mg capsule, Take 200 mg by mouth in the morning., Disp: , Rfl:     cholecalciferol (VITAMIN D3) 1,000 units tablet, Take 2,000 Units by mouth in the morning., Disp: , Rfl:     clobetasol (TEMOVATE) 0.05 % cream, Apply topically 2 (two) times a day Apply topically on scalp and right shoulder two times a day, Disp: 60 g, Rfl: 2    Cymbalta 20 MG capsule, , Disp: , Rfl:     estradiol (ESTRACE) 0.1 mg/g vaginal cream, Insert into the vagina 2 (two) times a week, Disp: , Rfl:     famotidine (PEPCID) 40 MG tablet, TAKE ONE TABLET BY MOUTH AT BEDTIME, Disp: 90 tablet, Rfl: 1    fexofenadine (Allegra Allergy) 180 MG tablet, Take by mouth in the morning., Disp: , Rfl:     fluocinonide (LIDEX) 0.05 % external solution, Apply topically every 12 (twelve) hours, Disp: , Rfl:     fluticasone-umeclidinium-vilanterol (Trelegy Ellipta) 200-62.5-25 mcg/actuation AEPB inhaler, Inhale 1 puff daily in the early morning Rinse mouth after use., Disp: 60 blister, Rfl: 5    gabapentin (NEURONTIN) 100 mg  capsule, , Disp: , Rfl:     hydrocortisone (ANUSOL-HC) 2.5 % rectal cream, Apply topically 2 (two) times a day, Disp: 28 g, Rfl: 0    hydrocortisone 2.5 % cream, Apply topically 2 (two) times a day scalp, Disp: 20 g, Rfl: 0    hydroxychloroquine (PLAQUENIL) 200 mg tablet, Take 1 tablet (200 mg total) by mouth 2 (two) times a day with meals, Disp: 60 tablet, Rfl: 5    metFORMIN (GLUCOPHAGE-XR) 500 mg 24 hr tablet, TAKE ONE TABLET BY MOUTH EVERY DAY - TAKE WITH DINNER, Disp: 90 tablet, Rfl: 1    multivitamin (THERAGRAN) TABS, Take 1 tablet by mouth in the morning. Last dose 3/21/21., Disp: , Rfl:     pantoprazole (PROTONIX) 40 mg tablet, TAKE ONE TABLET BY MOUTH EVERY DAY, Disp: 90 tablet, Rfl: 1    promethazine-dextromethorphan (PHENERGAN-DM) 6.25-15 mg/5 mL oral syrup, Take 5 mL by mouth 4 (four) times a day as needed for cough, Disp: 180 mL, Rfl: 0    Vibegron 75 MG TABS, Take 75 mg by mouth daily, Disp: , Rfl:     Ascorbic Acid (VITAMIN C) 1000 MG tablet, , Disp: , Rfl:     benzonatate (TESSALON) 200 MG capsule, Take 1 capsule (200 mg total) by mouth 2 (two) times a day as needed for cough, Disp: 60 capsule, Rfl: 1    methocarbamol (ROBAXIN) 500 mg tablet, Take 1 tablet (500 mg total) by mouth 2 (two) times a day, Disp: 20 tablet, Rfl: 0    sucralfate (CARAFATE) 1 g tablet, Take 1 tablet (1 g total) by mouth 3 (three) times a day, Disp: 30 tablet, Rfl: 0

## 2025-06-05 NOTE — PATIENT INSTRUCTIONS
PRURIGO NODULARIS  PROCEDURE:  INTRALESIONAL STEROID (KENALOG INJECTION) AND EFUDEX INJECTION     Purpose: Triamcinolone is a synthetic glucocorticoid corticosteroid that has marked anti-inflammatory action. It is prepared in sterile aqueous suspension suitable for injecting directly into a lesion on or immediately below the skin to treat a dermal inflammatory process. Efudex (Fluorouracil) belongs to the group of medicines known as antimetabolites that slows the growth of cells.      Indications:  Triamcinolone is indicated for alopecia areata; inflammatory acne cysts; discoid lupus erythematosus; keloids and hypertrophic scars; inflammatory lesions of granuloma annulare, lichen planus, lichen simplex chronicus (neurodermatitis), psoriatic plaques, and other localized inflammatory skin conditions. Efudex is used for a variety of conditions including cancers and hypertrophic of keloid scars.      Potential Side Effects: I understand that triamcinolone and efudex injection can potentially cause early and/or delayed adverse effects such as:    Pain    Impaired wound healing, ulceration   Increased hair growth    Bleeding    White or brown marks    Steroid acne    Infection    Telangiectasia    Skin thinning    Cutaneous and subcutaneous lipoatrophy (most common) appearing as skin indentations or dimples around the injection sites a few weeks after treatment      For Efudex, IMPORTANTLY, do not become pregnant while taking this medicine. Women should inform their doctor if they wish to become pregnant or think they might be pregnant. There is a potential for serious side effects to an unborn child.  Do NOT breast-feed an infant while taking this medicine. Men should inform their doctor if they wish to father a child. This medicine may lower sperm counts.

## 2025-06-10 ENCOUNTER — TELEPHONE (OUTPATIENT)
Age: 70
End: 2025-06-10

## 2025-06-10 DIAGNOSIS — L56.8 PHOTOSENSITIVITY DERMATITIS: ICD-10-CM

## 2025-06-10 RX ORDER — DAPSONE 25 MG/1
TABLET ORAL
Qty: 42 TABLET | Refills: 0 | Status: SHIPPED | OUTPATIENT
Start: 2025-06-10 | End: 2025-07-01

## 2025-06-19 ENCOUNTER — OFFICE VISIT (OUTPATIENT)
Dept: PULMONOLOGY | Facility: MEDICAL CENTER | Age: 70
End: 2025-06-19
Payer: COMMERCIAL

## 2025-06-19 VITALS
DIASTOLIC BLOOD PRESSURE: 78 MMHG | BODY MASS INDEX: 26.78 KG/M2 | HEART RATE: 75 BPM | WEIGHT: 156 LBS | TEMPERATURE: 98.4 F | SYSTOLIC BLOOD PRESSURE: 122 MMHG

## 2025-06-19 DIAGNOSIS — J30.1 SEASONAL ALLERGIC RHINITIS DUE TO POLLEN: ICD-10-CM

## 2025-06-19 DIAGNOSIS — J45.30 MILD PERSISTENT ASTHMA WITHOUT COMPLICATION: Primary | Chronic | ICD-10-CM

## 2025-06-19 PROCEDURE — 99213 OFFICE O/P EST LOW 20 MIN: CPT | Performed by: INTERNAL MEDICINE

## 2025-06-19 NOTE — PATIENT INSTRUCTIONS
Continue Wixela 500 mcg puff daily and if needed can always increase to twice a day if you start wheezing    New over-the-counter allergy medicine

## 2025-06-23 NOTE — PROGRESS NOTES
Follow-up  Visit - Pulmonary Medicine   Name: Renetta Chan      : 1955      MRN: [id number]  Encounter Provider: Leoncio Serrano DO  Encounter Date: 2025   Encounter department: [facility]  :  Assessment & Plan  Mild persistent asthma without complication  She is doing well on regimen of Wixela 500 mcg 1 puff twice daily.  States her breathing has been good and does not feel like she needs any prednisone at this time.  She has been having cough which is mostly due to nasal congestion and postnasal drainage.         Seasonal allergic rhinitis due to pollen  She is having some postnasal drainage and congestion now.  Does take Flonase and any histamine over-the-counter.  States her symptoms are starting to improve.           Return in about 6 months (around 2025).    History of Present Illness   Renetta Chan is a 69 y.o. female who presents for follow-up of her asthma.  She was having some nasal congestion, postnasal drainage and cough but this is gradually improving.  Not having any wheezing.  She has been getting Excela 100 mcg inhaler from her friend who has several of them.  She uses 1 puff twice a day.  Not having any shortness of breath.  She does have allergic rhinitis and does use Flonase and Allegra.    Review of Systems   Constitutional:  Positive for appetite change. Negative for fever.   HENT:  Positive for postnasal drip, sneezing, sore throat and trouble swallowing. Negative for ear pain and rhinorrhea.    Eyes:  Negative for pain and visual disturbance.   Respiratory:  Positive for cough and wheezing.    Cardiovascular:  Negative for chest pain.   Gastrointestinal:  Negative for abdominal pain.   Endocrine: Positive for polydipsia. Negative for polyphagia.   Musculoskeletal:  Positive for myalgias.   Neurological:  Negative for headaches.   Psychiatric/Behavioral:  Negative for decreased concentration.        Aside from what is mentioned in the HPI,  ROS is otherwise negative         Medical History Reviewed by provider this encounter:  Tobacco  Allergies  Meds  Problems  Med Hx  Surg Hx  Fam Hx     .    Objective   /78 (BP Location: Left arm, Patient Position: Sitting, Cuff Size: Extra-Large)   Pulse 75   Temp 98.4 °F (36.9 °C) (Temporal)   Wt 70.8 kg (156 lb)   BMI 26.78 kg/m²     Physical Exam  Vitals reviewed.   Constitutional:       General: She is not in acute distress.     Appearance: Normal appearance. She is well-developed.   HENT:      Head: Normocephalic.      Right Ear: External ear normal.      Left Ear: External ear normal.      Nose: Nose normal.      Mouth/Throat:      Mouth: Mucous membranes are moist.      Pharynx: Oropharynx is clear. No oropharyngeal exudate.     Eyes:      Conjunctiva/sclera: Conjunctivae normal.      Pupils: Pupils are equal, round, and reactive to light.       Cardiovascular:      Rate and Rhythm: Normal rate and regular rhythm.      Heart sounds: Normal heart sounds.   Pulmonary:      Effort: Pulmonary effort is normal.      Comments: Lung sounds are clear.  No wheezes, crackles or rhonchi  Abdominal:      General: There is no distension.      Palpations: Abdomen is soft.      Tenderness: There is no abdominal tenderness.     Musculoskeletal:      Cervical back: Neck supple.      Comments: No edema, sinus or clubbing   Lymphadenopathy:      Cervical: No cervical adenopathy.     Skin:     General: Skin is warm and dry.     Neurological:      General: No focal deficit present.      Mental Status: She is alert and oriented to person, place, and time.     Psychiatric:         Mood and Affect: Mood normal.         Behavior: Behavior normal.         Thought Content: Thought content normal.             Leoncio Serrano,       Answers submitted by the patient for this visit:  Pulmonology Questionnaire (Submitted on 6/19/2025)  Chief Complaint: Primary symptoms  Do you have chest tightness?: Yes  Do you have  difficulty breathing?: Yes  Do you have a wet cough?: Yes  Chronicity: new  When did you first notice your symptoms?: in the past 7 days  How often do your symptoms occur?: daily  Since you first noticed this problem, how has it changed?: gradually improving  Do you have shortness of breath that occurs with effort or exertion?: Yes  Do you have ear congestion?: No  Do you have heartburn?: No  Do you have fatigue?: Yes  Do you have nasal congestion?: Yes  Do you have shortness of breath when lying flat?: No  Do you have shortness of breath when you wake up?: No  Do you have sweats?: No  Have you experienced weight loss?: Yes  Which of the following makes your symptoms worse?: exposure to fumes, exposure to smoke, pollen, strenuous activity  Which of the following makes your symptoms better?: OTC cough suppressant, rest  Risk factors for lung disease: smoking/tobacco exposure

## 2025-06-23 NOTE — ASSESSMENT & PLAN NOTE
She is doing well on regimen of Wixela 500 mcg 1 puff twice daily.  States her breathing has been good and does not feel like she needs any prednisone at this time.  She has been having cough which is mostly due to nasal congestion and postnasal drainage.

## 2025-06-23 NOTE — ASSESSMENT & PLAN NOTE
She is having some postnasal drainage and congestion now.  Does take Flonase and any histamine over-the-counter.  States her symptoms are starting to improve.

## 2025-07-07 ENCOUNTER — APPOINTMENT (OUTPATIENT)
Dept: LAB | Facility: CLINIC | Age: 70
End: 2025-07-07
Attending: DERMATOLOGY
Payer: COMMERCIAL

## 2025-07-07 DIAGNOSIS — Z51.81 MEDICATION MONITORING ENCOUNTER: ICD-10-CM

## 2025-07-07 DIAGNOSIS — L56.8 PHOTOSENSITIVITY DERMATITIS: ICD-10-CM

## 2025-07-07 LAB
BASOPHILS # BLD AUTO: 0.13 THOUSANDS/ÂΜL (ref 0–0.1)
BASOPHILS NFR BLD AUTO: 1 % (ref 0–1)
EOSINOPHIL # BLD AUTO: 0.21 THOUSAND/ÂΜL (ref 0–0.61)
EOSINOPHIL NFR BLD AUTO: 2 % (ref 0–6)
ERYTHROCYTE [DISTWIDTH] IN BLOOD BY AUTOMATED COUNT: 14.6 % (ref 11.6–15.1)
HCT VFR BLD AUTO: 37.5 % (ref 34.8–46.1)
HGB BLD-MCNC: 11.8 G/DL (ref 11.5–15.4)
IMM GRANULOCYTES # BLD AUTO: 0.2 THOUSAND/UL (ref 0–0.2)
IMM GRANULOCYTES NFR BLD AUTO: 2 % (ref 0–2)
LYMPHOCYTES # BLD AUTO: 1.86 THOUSANDS/ÂΜL (ref 0.6–4.47)
LYMPHOCYTES NFR BLD AUTO: 16 % (ref 14–44)
MCH RBC QN AUTO: 31.1 PG (ref 26.8–34.3)
MCHC RBC AUTO-ENTMCNC: 31.5 G/DL (ref 31.4–37.4)
MCV RBC AUTO: 99 FL (ref 82–98)
MONOCYTES # BLD AUTO: 1.6 THOUSAND/ÂΜL (ref 0.17–1.22)
MONOCYTES NFR BLD AUTO: 14 % (ref 4–12)
NEUTROPHILS # BLD AUTO: 7.79 THOUSANDS/ÂΜL (ref 1.85–7.62)
NEUTS SEG NFR BLD AUTO: 65 % (ref 43–75)
NRBC BLD AUTO-RTO: 0 /100 WBCS
PLATELET # BLD AUTO: 386 THOUSANDS/UL (ref 149–390)
PMV BLD AUTO: 10.5 FL (ref 8.9–12.7)
RBC # BLD AUTO: 3.8 MILLION/UL (ref 3.81–5.12)
WBC # BLD AUTO: 11.79 THOUSAND/UL (ref 4.31–10.16)

## 2025-07-07 PROCEDURE — 36415 COLL VENOUS BLD VENIPUNCTURE: CPT

## 2025-07-07 PROCEDURE — 85025 COMPLETE CBC W/AUTO DIFF WBC: CPT

## 2025-07-08 PROBLEM — J30.1 SEASONAL ALLERGIC RHINITIS DUE TO POLLEN: Status: ACTIVE | Noted: 2025-07-08

## 2025-07-08 PROBLEM — J45.40 MODERATE PERSISTENT ASTHMA WITHOUT COMPLICATION: Status: ACTIVE | Noted: 2025-07-08

## 2025-07-08 PROBLEM — J45.30 MILD PERSISTENT ASTHMA WITHOUT COMPLICATION: Chronic | Status: RESOLVED | Noted: 2018-03-18 | Resolved: 2025-07-08

## 2025-07-09 ENCOUNTER — OFFICE VISIT (OUTPATIENT)
Age: 70
End: 2025-07-09
Payer: COMMERCIAL

## 2025-07-09 ENCOUNTER — TELEPHONE (OUTPATIENT)
Age: 70
End: 2025-07-09

## 2025-07-09 VITALS — BODY MASS INDEX: 26.78 KG/M2 | TEMPERATURE: 97.7 F | WEIGHT: 156 LBS

## 2025-07-09 DIAGNOSIS — L56.8 PHOTOSENSITIVITY DERMATITIS: ICD-10-CM

## 2025-07-09 DIAGNOSIS — L56.8 PHOTOSENSITIVITY DERMATITIS: Primary | ICD-10-CM

## 2025-07-09 DIAGNOSIS — Z79.899 HIGH RISK MEDICATION USE: ICD-10-CM

## 2025-07-09 DIAGNOSIS — L50.9 URTICARIA: ICD-10-CM

## 2025-07-09 DIAGNOSIS — L28.1 PRURIGO NODULARIS: ICD-10-CM

## 2025-07-09 DIAGNOSIS — R21 RASH: ICD-10-CM

## 2025-07-09 DIAGNOSIS — Z51.81 MEDICATION MONITORING ENCOUNTER: ICD-10-CM

## 2025-07-09 PROCEDURE — 99214 OFFICE O/P EST MOD 30 MIN: CPT | Performed by: DERMATOLOGY

## 2025-07-09 RX ORDER — DAPSONE 25 MG/1
TABLET ORAL
Qty: 30 TABLET | Refills: 0 | Status: SHIPPED | OUTPATIENT
Start: 2025-07-09 | End: 2025-07-12 | Stop reason: SDUPTHER

## 2025-07-09 NOTE — PATIENT INSTRUCTIONS
PHOTOSENSITIVITY DERMATITIS FOLLOW UP    Physical Exam:  Anatomic Location Affected:  Left breast, upper back  Morphological Description:  erythematous scaly patches      Additional History of Present Condition:  6/5/25 visit; Patient completed course of Dapsone 25 mg and is currently taking Plaquenil 200 mg twice daily, clobetasol 0.05 % cream, and OTC calamine lotion.     7/9/25 visit: patient return for rash visit; patient finished plaquenil and still taking Dapsone; still applying clobetasol ointment and OTC calamine lotion; patient went to Allergist and prescribed Duxpient but awaiting prior auth. Patient still reporting that the rash is not improving. Patient applying 100 SPF sunscreen     Assessment and Plan:  Based on a thorough discussion of this condition and the management approach to it (including a comprehensive discussion of the known risks, side effects and potential benefits of treatment), the patient (family) agrees to implement the following specific plan:     Increase dose of dapsone (take 1 in am and 2 at night; 25 mg in am and 50 mg at night for 3 weeks)- repeat CBC in 3 weeks, prior to f/u appt   Follow up in 3 weeks   Cont following rheum/ allergist plan of care

## 2025-07-09 NOTE — TELEPHONE ENCOUNTER
Nancy from [facility] asks that a new prescription for dapsone 25 mg tablets be re sent. The dispense quantity does not match the instructions as it will only give for 10 days.

## 2025-07-09 NOTE — PROGRESS NOTES
"[facility] Note     Patient Name: Renetta Chan  Encounter Date: 7/9/25       Have you been cared for by a [facility] in the last 3 years and, if so, which description applies to you? Yes. I have been here within the last 3 years, and my medical history has NOT changed since that time. I am not of child-bearing potential.     REVIEW OF SYSTEMS:  Have you recently had or currently have any of the following? No changes in my recent health.   PAST MEDICAL HISTORY:  Have you personally ever had or currently have any of the following?  If \"YES,\" then please provide more detail. No changes in my medical history.   HISTORY OF IMMUNOSUPPRESSION: Do you have a history of any of the following:  Systemic Immunosuppression such as Diabetes, Biologic or Immunotherapy, Chemotherapy, Organ Transplantation, Bone Marrow Transplantation or Prednisone?  No     Answering \"YES\" requires the addition of the dotphrase \"IMMUNOSUPPRESSED\" as the first diagnosis of the patient's visit.   FAMILY HISTORY:  Any \"first degree relatives\" (parent, brother, sister, or child) with the following?    No changes in my family's known health.   PATIENT EXPERIENCE:    Do you want the Dermatologist to perform a COMPLETE skin exam today including a clinical examination under the \"bra and underwear\" areas?  NO  If necessary, do we have your permission to call and leave a detailed message on your Preferred Phone number that includes your specific medical information?  Yes      Allergies[1] Current Medications[2]              Whom besides the patient is providing clinical information about today's encounter?   NO ADDITIONAL HISTORIAN (patient alone provided history)    Physical Exam and Assessment/Plan by Diagnosis:         PHOTOSENSITIVITY DERMATITIS FOLLOW UP    Physical Exam:  Anatomic Location Affected:  Left breast, upper back  Morphological Description:  erythematous scaly patches      Additional History of Present " Condition:  6/5/25 visit; Patient completed course of dapsone 25mg and is currently taking Plaquenil 200 mg twice daily, clobetasol 0.05 % cream, and OTC calamine lotion.     7/9/25 visit: patient return for rash visit; patient finished plaquenil and still taking Dapsone; still applying clobetasol ointment and OTC calamine lotion; patient went to Allergist and prescribed Duxpient but awaiting prior auth. Patient still reporting that the rash is not improving. Patient applying 100 SPF sunscreen     Assessment and Plan:  Based on a thorough discussion of this condition and the management approach to it (including a comprehensive discussion of the known risks, side effects and potential benefits of treatment), the patient (family) agrees to implement the following specific plan:     Increase dose of dapsone (take 1 in am and 2 at night; 25 mg in am and 50 mg at night for 3 weeks)- repeat CBC in 3 weeks, prior to f/u appt   Follow up in 3 weeks   Cont following rheum/ allergist plan of care        Scribe Attestation      I,:  Stephanie Clark am acting as a scribe while in the presence of the attending physician.:       I,:  Brii Saini MD personally performed the services described in this documentation    as scribed in my presence.:                    [1]   Allergies  Allergen Reactions    Latex Rash and Other (See Comments)     Burn-skin irritation    Burning    Adhesive [Medical Tape] Other (See Comments)     bandaid-red,itch   [2]   Current Outpatient Medications:     albuterol (2.5 mg/3 mL) 0.083 % nebulizer solution, Take 3 mL (2.5 mg total) by nebulization every 6 (six) hours as needed for wheezing or shortness of breath, Disp: 180 mL, Rfl: 5    atorvastatin (LIPITOR) 20 mg tablet, Take 1 tablet (20 mg total) by mouth daily at bedtime, Disp: , Rfl:     cholecalciferol (VITAMIN D3) 1,000 units tablet, Take 2,000 Units by mouth in the morning., Disp: , Rfl:     clobetasol (TEMOVATE) 0.05 % cream, Apply topically  2 (two) times a day Apply topically on scalp and right shoulder two times a day, Disp: 60 g, Rfl: 2    Cymbalta 20 MG capsule, , Disp: , Rfl:     estradiol (ESTRACE) 0.1 mg/g vaginal cream, Insert into the vagina 2 (two) times a week, Disp: , Rfl:     famotidine (PEPCID) 40 MG tablet, TAKE ONE TABLET BY MOUTH AT BEDTIME, Disp: 90 tablet, Rfl: 1    fexofenadine (Allegra Allergy) 180 MG tablet, Take by mouth in the morning., Disp: , Rfl:     fluocinonide (LIDEX) 0.05 % external solution, Apply topically every 12 (twelve) hours, Disp: , Rfl:     gabapentin (NEURONTIN) 100 mg capsule, , Disp: , Rfl:     hydrocortisone (ANUSOL-HC) 2.5 % rectal cream, Apply topically 2 (two) times a day, Disp: 28 g, Rfl: 0    hydrocortisone 2.5 % cream, Apply topically 2 (two) times a day scalp, Disp: 20 g, Rfl: 0    hydrOXYzine HCL (ATARAX) 25 mg tablet, Take 1 tablet (25 mg total) by mouth daily at bedtime, Disp: 20 tablet, Rfl: 1    metFORMIN (GLUCOPHAGE-XR) 500 mg 24 hr tablet, TAKE ONE TABLET BY MOUTH EVERY DAY - TAKE WITH DINNER, Disp: 90 tablet, Rfl: 1    mometasone (ELOCON) 0.1 % ointment, Apply 1-2 times per day to itchy patches; use no longer than 2 weeks at a time; avoid face/groin, Disp: 15 g, Rfl: 1    multivitamin (THERAGRAN) TABS, Take 1 tablet by mouth in the morning. Last dose 3/21/21., Disp: , Rfl:     pantoprazole (PROTONIX) 40 mg tablet, TAKE ONE TABLET BY MOUTH EVERY DAY, Disp: 90 tablet, Rfl: 1    promethazine-dextromethorphan (PHENERGAN-DM) 6.25-15 mg/5 mL oral syrup, Take 5 mL by mouth 4 (four) times a day as needed for cough, Disp: 180 mL, Rfl: 0    Vibegron 75 MG TABS, Take 75 mg by mouth daily, Disp: , Rfl:     Ascorbic Acid (VITAMIN C) 1000 MG tablet, , Disp: , Rfl:     benzonatate (TESSALON) 200 MG capsule, Take 1 capsule (200 mg total) by mouth 2 (two) times a day as needed for cough, Disp: 60 capsule, Rfl: 1    celecoxib (CeleBREX) 200 mg capsule, Take 200 mg by mouth in the morning., Disp: , Rfl:      fluticasone-umeclidinium-vilanterol (Trelegy Ellipta) 200-62.5-25 mcg/actuation AEPB inhaler, Inhale 1 puff daily in the early morning Rinse mouth after use. (Patient not taking: Reported on 6/19/2025), Disp: 60 blister, Rfl: 5    hydroxychloroquine (PLAQUENIL) 200 mg tablet, Take 1 tablet (200 mg total) by mouth 2 (two) times a day with meals, Disp: 60 tablet, Rfl: 5    methocarbamol (ROBAXIN) 500 mg tablet, Take 1 tablet (500 mg total) by mouth 2 (two) times a day, Disp: 20 tablet, Rfl: 0    sucralfate (CARAFATE) 1 g tablet, Take 1 tablet (1 g total) by mouth 3 (three) times a day, Disp: 30 tablet, Rfl: 0

## 2025-07-11 DIAGNOSIS — E78.5 HYPERLIPIDEMIA, UNSPECIFIED HYPERLIPIDEMIA TYPE: ICD-10-CM

## 2025-07-12 DIAGNOSIS — L56.8 PHOTOSENSITIVITY DERMATITIS: Primary | ICD-10-CM

## 2025-07-12 RX ORDER — DAPSONE 25 MG/1
TABLET ORAL
Qty: 63 TABLET | Refills: 1 | Status: SHIPPED | OUTPATIENT
Start: 2025-07-12 | End: 2025-08-04

## 2025-07-14 RX ORDER — DAPSONE 25 MG/1
TABLET ORAL
Qty: 63 TABLET | Refills: 0 | Status: SHIPPED | OUTPATIENT
Start: 2025-07-14 | End: 2025-07-30

## 2025-07-14 RX ORDER — ATORVASTATIN CALCIUM 20 MG/1
20 TABLET, FILM COATED ORAL
Qty: 90 TABLET | Refills: 1 | Status: SHIPPED | OUTPATIENT
Start: 2025-07-14